# Patient Record
Sex: MALE | Race: BLACK OR AFRICAN AMERICAN | NOT HISPANIC OR LATINO | Employment: UNEMPLOYED | URBAN - METROPOLITAN AREA
[De-identification: names, ages, dates, MRNs, and addresses within clinical notes are randomized per-mention and may not be internally consistent; named-entity substitution may affect disease eponyms.]

---

## 2017-04-26 ENCOUNTER — HOSPITAL ENCOUNTER (EMERGENCY)
Facility: MEDICAL CENTER | Age: 62
End: 2017-04-26
Attending: EMERGENCY MEDICINE
Payer: MEDICAID

## 2017-04-26 ENCOUNTER — APPOINTMENT (OUTPATIENT)
Dept: RADIOLOGY | Facility: MEDICAL CENTER | Age: 62
End: 2017-04-26
Attending: EMERGENCY MEDICINE
Payer: MEDICAID

## 2017-04-26 VITALS
HEIGHT: 67 IN | WEIGHT: 129 LBS | OXYGEN SATURATION: 98 % | RESPIRATION RATE: 15 BRPM | BODY MASS INDEX: 20.25 KG/M2 | HEART RATE: 60 BPM | DIASTOLIC BLOOD PRESSURE: 72 MMHG | TEMPERATURE: 97.9 F | SYSTOLIC BLOOD PRESSURE: 140 MMHG

## 2017-04-26 DIAGNOSIS — R63.4 WEIGHT LOSS: ICD-10-CM

## 2017-04-26 DIAGNOSIS — Z72.0 TOBACCO ABUSE: ICD-10-CM

## 2017-04-26 DIAGNOSIS — R94.31 ABNORMAL EKG: ICD-10-CM

## 2017-04-26 LAB
ALBUMIN SERPL BCP-MCNC: 3.5 G/DL (ref 3.2–4.9)
ALBUMIN/GLOB SERPL: 1 G/DL
ALP SERPL-CCNC: 45 U/L (ref 30–99)
ALT SERPL-CCNC: 20 U/L (ref 2–50)
ANION GAP SERPL CALC-SCNC: 2 MMOL/L (ref 0–11.9)
AST SERPL-CCNC: 25 U/L (ref 12–45)
BASOPHILS # BLD AUTO: 0.5 % (ref 0–1.8)
BASOPHILS # BLD: 0.02 K/UL (ref 0–0.12)
BILIRUB SERPL-MCNC: 0.3 MG/DL (ref 0.1–1.5)
BUN SERPL-MCNC: 13 MG/DL (ref 8–22)
CALCIUM SERPL-MCNC: 8.9 MG/DL (ref 8.5–10.5)
CHLORIDE SERPL-SCNC: 107 MMOL/L (ref 96–112)
CO2 SERPL-SCNC: 28 MMOL/L (ref 20–33)
CREAT SERPL-MCNC: 0.91 MG/DL (ref 0.5–1.4)
EOSINOPHIL # BLD AUTO: 0.16 K/UL (ref 0–0.51)
EOSINOPHIL NFR BLD: 4.1 % (ref 0–6.9)
ERYTHROCYTE [DISTWIDTH] IN BLOOD BY AUTOMATED COUNT: 43.7 FL (ref 35.9–50)
GFR SERPL CREATININE-BSD FRML MDRD: >60 ML/MIN/1.73 M 2
GLOBULIN SER CALC-MCNC: 3.6 G/DL (ref 1.9–3.5)
GLUCOSE SERPL-MCNC: 82 MG/DL (ref 65–99)
HCT VFR BLD AUTO: 38.3 % (ref 42–52)
HGB BLD-MCNC: 12.5 G/DL (ref 14–18)
IMM GRANULOCYTES # BLD AUTO: 0 K/UL (ref 0–0.11)
IMM GRANULOCYTES NFR BLD AUTO: 0 % (ref 0–0.9)
LYMPHOCYTES # BLD AUTO: 1.74 K/UL (ref 1–4.8)
LYMPHOCYTES NFR BLD: 44.3 % (ref 22–41)
MCH RBC QN AUTO: 28.5 PG (ref 27–33)
MCHC RBC AUTO-ENTMCNC: 32.6 G/DL (ref 33.7–35.3)
MCV RBC AUTO: 87.2 FL (ref 81.4–97.8)
MONOCYTES # BLD AUTO: 0.51 K/UL (ref 0–0.85)
MONOCYTES NFR BLD AUTO: 13 % (ref 0–13.4)
NEUTROPHILS # BLD AUTO: 1.5 K/UL (ref 1.82–7.42)
NEUTROPHILS NFR BLD: 38.1 % (ref 44–72)
NRBC # BLD AUTO: 0 K/UL
NRBC BLD AUTO-RTO: 0 /100 WBC
PLATELET # BLD AUTO: 246 K/UL (ref 164–446)
PMV BLD AUTO: 9.8 FL (ref 9–12.9)
POTASSIUM SERPL-SCNC: 4.2 MMOL/L (ref 3.6–5.5)
PROT SERPL-MCNC: 7.1 G/DL (ref 6–8.2)
RBC # BLD AUTO: 4.39 M/UL (ref 4.7–6.1)
SODIUM SERPL-SCNC: 137 MMOL/L (ref 135–145)
TROPONIN I SERPL-MCNC: <0.01 NG/ML (ref 0–0.04)
WBC # BLD AUTO: 3.9 K/UL (ref 4.8–10.8)

## 2017-04-26 PROCEDURE — 700117 HCHG RX CONTRAST REV CODE 255: Performed by: EMERGENCY MEDICINE

## 2017-04-26 PROCEDURE — 700102 HCHG RX REV CODE 250 W/ 637 OVERRIDE(OP): Performed by: EMERGENCY MEDICINE

## 2017-04-26 PROCEDURE — 85025 COMPLETE CBC W/AUTO DIFF WBC: CPT

## 2017-04-26 PROCEDURE — 700105 HCHG RX REV CODE 258: Performed by: EMERGENCY MEDICINE

## 2017-04-26 PROCEDURE — A9270 NON-COVERED ITEM OR SERVICE: HCPCS | Performed by: EMERGENCY MEDICINE

## 2017-04-26 PROCEDURE — 93005 ELECTROCARDIOGRAM TRACING: CPT | Performed by: EMERGENCY MEDICINE

## 2017-04-26 PROCEDURE — 80053 COMPREHEN METABOLIC PANEL: CPT

## 2017-04-26 PROCEDURE — 99284 EMERGENCY DEPT VISIT MOD MDM: CPT

## 2017-04-26 PROCEDURE — 71275 CT ANGIOGRAPHY CHEST: CPT

## 2017-04-26 PROCEDURE — 36415 COLL VENOUS BLD VENIPUNCTURE: CPT

## 2017-04-26 PROCEDURE — 84484 ASSAY OF TROPONIN QUANT: CPT

## 2017-04-26 PROCEDURE — 96360 HYDRATION IV INFUSION INIT: CPT

## 2017-04-26 RX ORDER — ASPIRIN 81 MG/1
324 TABLET, CHEWABLE ORAL ONCE
Status: COMPLETED | OUTPATIENT
Start: 2017-04-26 | End: 2017-04-26

## 2017-04-26 RX ORDER — SODIUM CHLORIDE 9 MG/ML
1000 INJECTION, SOLUTION INTRAVENOUS ONCE
Status: COMPLETED | OUTPATIENT
Start: 2017-04-26 | End: 2017-04-26

## 2017-04-26 RX ORDER — ASPIRIN 300 MG/1
300 SUPPOSITORY RECTAL ONCE
Status: COMPLETED | OUTPATIENT
Start: 2017-04-26 | End: 2017-04-26

## 2017-04-26 RX ADMIN — ASPIRIN 324 MG: 81 TABLET, CHEWABLE ORAL at 10:53

## 2017-04-26 RX ADMIN — SODIUM CHLORIDE 1000 ML: 9 INJECTION, SOLUTION INTRAVENOUS at 10:55

## 2017-04-26 RX ADMIN — IOHEXOL 75 ML: 350 INJECTION, SOLUTION INTRAVENOUS at 11:40

## 2017-04-26 ASSESSMENT — PAIN SCALES - GENERAL: PAINLEVEL_OUTOF10: 0

## 2017-04-26 ASSESSMENT — LIFESTYLE VARIABLES: DO YOU DRINK ALCOHOL: NO

## 2017-04-26 NOTE — ED PROVIDER NOTES
ED Provider Note    Scribed for Alexei Ashton M.D. by Jelani Lynne. 4/26/2017  10:36 AM    Primary care provider: Pcp Pt States None  Means of arrival: EMS  History obtained from: Patient  History limited by: None    CHIEF COMPLAINT  Chief Complaint   Patient presents with   • Abnormal EKG     sent from [facility]       HPI  Sylvain Espinosa is a 61 y.o. male who presents to the Emergency Department as a transfer from the [facility] for abnormal EKG. Patient reports associated indigestion, clear productive cough, unintentional weight loss. EMS reports rhonchi in lower lung fields. He states that he has not been eating today secondary to swollen gland on his left side face. Patient has a family history of murmurs. He has a history of cigarette use, hypertension for which he takes antihypertensives. Patient denies chest pain.    REVIEW OF SYSTEMS  Pertinent negatives include no chest pain. As above, all other systems reviewed and are negative.   See HPI for further details.   C.    PAST MEDICAL HISTORY   has a past medical history of Hypertension.    SURGICAL HISTORY  patient denies any surgical history    SOCIAL HISTORY  Social History   Substance Use Topics   • Smoking status: Current Some Day Smoker -- 0.50 packs/day   • Smokeless tobacco: None   • Alcohol Use: No      Comment: occ      History   Drug Use   • Yes   • Special: Inhaled     Comment: marijuana, meth        FAMILY HISTORY  History reviewed. No pertinent family history.    CURRENT MEDICATIONS  No current facility-administered medications for this encounter.    Current outpatient prescriptions:   •  aspirin  MG TBEC, Take 1 Tab by mouth every day., Disp: 60 Tab, Rfl:   •  lactobacillus granules (LACTINEX/FLORANEX) PACK, Take 1 Packet by mouth 3 times a day, with meals., Disp: , Rfl:   •  metronidazole (FLAGYL) 500 MG TABS, Take 1 Tab by mouth every 8 hours., Disp: 21 Tab, Rfl: 0  •  oxycodone immediate release (ROXICODONE) 5 MG TABS, Take 1 Tab by  "mouth every 6 hours as needed for Severe Pain., Disp: 10 Tab, Rfl: 0  •  ciprofloxacin (CIPRO) 500 MG TABS, Take 1 Tab by mouth every 12 hours., Disp: 14 Tab, Rfl: 0    ALLERGIES  No Known Allergies Patient reports no allergy to iodine or contrast.    PHYSICAL EXAM  VITAL SIGNS: /72 mmHg  Pulse 58  Temp(Src) 36.6 °C (97.9 °F)  Resp 16  Ht 1.702 m (5' 7.01\")  Wt 58.514 kg (129 lb)  BMI 20.20 kg/m2    Constitutional: Well developed, Well nourished, No acute distress, mildly cachectic appearance.   HENT: Normocephalic, Atraumatic, Bilateral external ears normal, Oropharynx is clear mucous membranes are dry. No oral exudates or nasal discharge.   Eyes: Pupils are equal round and reactive, EOMI, Conjunctiva normal, No discharge.   Neck: Normal range of motion, No tenderness, Supple, No stridor. No meningismus.  Lymphatic: No lymphadenopathy noted.   Cardiovascular: Bradycardic rate, Normal rhythm without rub or gallop. Holosystolic ejection murmur left sternal border.   Thorax & Lungs: No wheezes, or rales. There is no chest wall tenderness. Wet cough noted. Rhoncherous breath sounds bilaterally.   Abdomen: Soft non-tender non-distended. There is no rebound or guarding. No organomegaly is appreciated. Bowel sounds are normal.  Skin: Normal without rash.   Back: No CVA or spinal tenderness.   Extremities: Intact distal pulses, No edema, No tenderness, No cyanosis, No clubbing. Capillary refill is less than 2 seconds.  Musculoskeletal: Good range of motion in all major joints. No tenderness to palpation or major deformities noted.   Neurologic: Alert & oriented x 3, Normal motor function, Normal sensory function, No focal deficits noted. Reflexes are normal.  Psychiatric: Affect normal, Judgment normal, Mood normal. There is no suicidal ideation or patient reported hallucinations.       DIAGNOSTIC STUDIES / PROCEDURES    LABS  Labs Reviewed   CBC WITH DIFFERENTIAL - Abnormal; Notable for the following:     WBC " 3.9 (*)     RBC 4.39 (*)     Hemoglobin 12.5 (*)     Hematocrit 38.3 (*)     MCHC 32.6 (*)     Neutrophils-Polys 38.10 (*)     Lymphocytes 44.30 (*)     Neutrophils (Absolute) 1.50 (*)     All other components within normal limits   COMP METABOLIC PANEL - Abnormal; Notable for the following:     Globulin 3.6 (*)     All other components within normal limits   TROPONIN   ESTIMATED GFR   All labs reviewed by me.    EKG Interpretation:    Interpreted by emergency department physician    Rhythm: normal sinus   Rate: normal  Axis: normal  Ectopy: none  Conduction: normal  ST Segments: depression in  lateral leads  T Waves: inversion in  talia-lateral leads  Q Waves: nonspecific    Clinical Impression: non-specific EKG with old anterolateral depression and T-wave inversion that has been present for many months    RADIOLOGY  CT-CTA CHEST PULMONARY ARTERY W/ RECONS   Final Result         1. No CT evidence of pulmonary embolism.               The radiologist's interpretation of all radiological studies have been reviewed by me.    COURSE & MEDICAL DECISION MAKING  Nursing notes, VS, PMSFHx reviewed in chart.    10:36 AM Patient seen and examined at bedside. Informed the patient that without cardiac symptoms his EKG is not immediately alarming. At this time I believe that a chest CT to evaluate is the best course of action. Patient is agreeable with this. I spent approximately five minutes with the patient explaining the importance of smoking cessation. Ordered for CT-CTA chest pulmonary artery, CBC with differential, CMP, Troponin to evaluate. Patient was treated with NS 1000 ml to rehydrate secondary to weight loss and dry mucous membranes,  mg for his symptoms.      11:55 AM - CT scan does not show pulmonary embolism, pneumonia, or cancer. There is no evidence of EKG abnormalities that are new. Patient will be discharged home.     12:00 PM Recheck: Patient re-evaluated at beside. Discussed patient's condition and  treatment plan. Patient's lab and radiology results discussed. Patient will be discharged with a diagnosis of abnormal EKG and given discharge instructions. Advised to follow up with the [facility]. Instructed to return to Emergency Department immediately if any worsening symptoms.    Patient has had high blood pressure while in the emergency department, felt likely secondary to medical condition. Counseled patient to monitor blood pressure at home and follow up with primary care physician.      The patient will return for new or worsening symptoms and is stable at the time of discharge.    DISPOSITION:  Patient will be discharged home in stable condition.    FINAL IMPRESSION  1. Abnormal EKG    2. Tobacco abuse    3. Weight loss    Tobacco cessation counseling      Jelani DEL REAL (Scribe), am scribing for, and in the presence of, Alexei Ashton M.D..    Electronically signed by: Jelani Lynne (Scribe), 4/26/2017    Alexei DEL REAL M.D. personally performed the services described in this documentation, as scribed by Jealni Lynne in my presence, and it is both accurate and complete.    The note accurately reflects work and decisions made by me.  Alexei Ashton  4/26/2017  12:13 PM

## 2017-04-26 NOTE — ED AVS SNAPSHOT
WeeWorld Access Code: [email or web address]  Expires: 5/26/2017 12:22 PM    WeeWorld  A secure, online tool to manage your health information     [employer]’s WeeWorld® is a secure, online tool that connects you to your personalized health information from the privacy of your home -- day or night - making it very easy for you to manage your healthcare. Once the activation process is completed, you can even access your medical information using the WeeWorld piedad, which is available for free in the Apple Piedad store or Google Play store.     WeeWorld provides the following levels of access (as shown below):   My Chart Features   [facility] Primary Care Doctor [facility]  Specialists [facility]  Urgent  Care Non-[facility]  Primary Care  Doctor   Email your healthcare team securely and privately 24/7 X X X X   Manage appointments: schedule your next appointment; view details of past/upcoming appointments X      Request prescription refills. X      View recent personal medical records, including lab and immunizations X X X X   View health record, including health history, allergies, medications X X X X   Read reports about your outpatient visits, procedures, consult and ER notes X X X X   See your discharge summary, which is a recap of your hospital and/or ER visit that includes your diagnosis, lab results, and care plan. X X       How to register for WeeWorld:  1. Go to  [email or web address].  2. Click on the Sign Up Now box, which takes you to the New Member Sign Up page. You will need to provide the following information:  a. Enter your WeeWorld Access Code exactly as it appears at the top of this page. (You will not need to use this code after you’ve completed the sign-up process. If you do not sign up before the expiration date, you must request a new code.)   b. Enter your date of birth.   c. Enter your home email address.   d. Click Submit, and follow the next screen’s instructions.  3. Create a WeeWorld ID. This will be your WeeWorld  login ID and cannot be changed, so think of one that is secure and easy to remember.  4. Create a [employer] password. You can change your password at any time.  5. Enter your Password Reset Question and Answer. This can be used at a later time if you forget your password.   6. Enter your e-mail address. This allows you to receive e-mail notifications when new information is available in Doppelgames.  7. Click Sign Up. You can now view your health information.    For assistance activating your Doppelgames account, call [phone]

## 2017-04-26 NOTE — ED NOTES
"PT BIB EMS per report pt was having a follow up appt at the [facility] clinic today for a recent BP medication addition.  An EKG was performed and they were concerned about an abnormal EKG.  PT also reports a weight loss of 7 lbs since October and a cough.  Chief Complaint   Patient presents with   • Abnormal EKG     sent from [facility]     Blood pressure 140/72, pulse 58, temperature 36.6 °C (97.9 °F), resp. rate 16, height 1.702 m (5' 7.01\"), weight 58.514 kg (129 lb).    "

## 2017-04-26 NOTE — DISCHARGE INSTRUCTIONS
Steps to Quit Smoking   Smoking tobacco can be harmful to your health and can affect almost every organ in your body. Smoking puts you, and those around you, at risk for developing many serious chronic diseases. Quitting smoking is difficult, but it is one of the best things that you can do for your health. It is never too late to quit.  WHAT ARE THE BENEFITS OF QUITTING SMOKING?  When you quit smoking, you lower your risk of developing serious diseases and conditions, such as:  · Lung cancer or lung disease, such as COPD.  · Heart disease.  · Stroke.  · Heart attack.  · Infertility.  · Osteoporosis and bone fractures.  Additionally, symptoms such as coughing, wheezing, and shortness of breath may get better when you quit. You may also find that you get sick less often because your body is stronger at fighting off colds and infections. If you are pregnant, quitting smoking can help to reduce your chances of having a baby of low birth weight.  HOW DO I GET READY TO QUIT?  When you decide to quit smoking, create a plan to make sure that you are successful. Before you quit:  · Pick a date to quit. Set a date within the next two weeks to give you time to prepare.  · Write down the reasons why you are quitting. Keep this list in places where you will see it often, such as on your bathroom mirror or in your car or wallet.  · Identify the people, places, things, and activities that make you want to smoke (triggers) and avoid them. Make sure to take these actions:  ¨ Throw away all cigarettes at home, at work, and in your car.  ¨ Throw away smoking accessories, such as ashtrays and lighters.  ¨ Clean your car and make sure to empty the ashtray.  ¨ Clean your home, including curtains and carpets.  · Tell your family, friends, and coworkers that you are quitting. Support from your loved ones can make quitting easier.  · Talk with your health care provider about your options for quitting smoking.  · Find out what treatment  "options are covered by your health insurance.  WHAT STRATEGIES CAN I USE TO QUIT SMOKING?   Talk with your healthcare provider about different strategies to quit smoking. Some strategies include:  · Quitting smoking altogether instead of gradually lessening how much you smoke over a period of time. Research shows that quitting \"cold turkey\" is more successful than gradually quitting.  · Attending in-person counseling to help you build problem-solving skills. You are more likely to have success in quitting if you attend several counseling sessions. Even short sessions of 10 minutes can be effective.  · Finding resources and support systems that can help you to quit smoking and remain smoke-free after you quit. These resources are most helpful when you use them often. They can include:  ¨ Online chats with a counselor.  ¨ Telephone quitlines.  ¨ Printed self-help materials.  ¨ Support groups or group counseling.  ¨ Text messaging programs.  ¨ Mobile phone applications.  · Taking medicines to help you quit smoking. (If you are pregnant or breastfeeding, talk with your health care provider first.) Some medicines contain nicotine and some do not. Both types of medicines help with cravings, but the medicines that include nicotine help to relieve withdrawal symptoms. Your health care provider may recommend:  ¨ Nicotine patches, gum, or lozenges.  ¨ Nicotine inhalers or sprays.  ¨ Non-nicotine medicine that is taken by mouth.  Talk with your health care provider about combining strategies, such as taking medicines while you are also receiving in-person counseling. Using these two strategies together makes you more likely to succeed in quitting than if you used either strategy on its own.  If you are pregnant or breastfeeding, talk with your health care provider about finding counseling or other support strategies to quit smoking. Do not take medicine to help you quit smoking unless told to do so by your health care " provider.  WHAT THINGS CAN I DO TO MAKE IT EASIER TO QUIT?  Quitting smoking might feel overwhelming at first, but there is a lot that you can do to make it easier. Take these important actions:  · Reach out to your family and friends and ask that they support and encourage you during this time. Call telephone quitlines, reach out to support groups, or work with a counselor for support.  · Ask people who smoke to avoid smoking around you.  · Avoid places that trigger you to smoke, such as bars, parties, or smoke-break areas at work.  · Spend time around people who do not smoke.  · Lessen stress in your life, because stress can be a smoking trigger for some people. To lessen stress, try:  ¨ Exercising regularly.  ¨ Deep-breathing exercises.  ¨ Yoga.  ¨ Meditating.  ¨ Performing a body scan. This involves closing your eyes, scanning your body from head to toe, and noticing which parts of your body are particularly tense. Purposefully relax the muscles in those areas.  · Download or purchase mobile phone or tablet apps (applications) that can help you stick to your quit plan by providing reminders, tips, and encouragement. There are many free apps, such as QuitGuide from the CDC (Centers for Disease Control and Prevention). You can find other support for quitting smoking (smoking cessation) through smokefree.gov and other websites.  HOW WILL I FEEL WHEN I QUIT SMOKING?  Within the first 24 hours of quitting smoking, you may start to feel some withdrawal symptoms. These symptoms are usually most noticeable 2-3 days after quitting, but they usually do not last beyond 2-3 weeks. Changes or symptoms that you might experience include:  · Mood swings.  · Restlessness, anxiety, or irritation.  · Difficulty concentrating.  · Dizziness.  · Strong cravings for sugary foods in addition to nicotine.  · Mild weight gain.  · Constipation.  · Nausea.  · Coughing or a sore throat.  · Changes in how your medicines work in your  body.  · A depressed mood.  · Difficulty sleeping (insomnia).  After the first 2-3 weeks of quitting, you may start to notice more positive results, such as:  · Improved sense of smell and taste.  · Decreased coughing and sore throat.  · Slower heart rate.  · Lower blood pressure.  · Clearer skin.  · The ability to breathe more easily.  · Fewer sick days.  Quitting smoking is very challenging for most people. Do not get discouraged if you are not successful the first time. Some people need to make many attempts to quit before they achieve long-term success. Do your best to stick to your quit plan, and talk with your health care provider if you have any questions or concerns.     This information is not intended to replace advice given to you by your health care provider. Make sure you discuss any questions you have with your health care provider.     Document Released: 12/12/2002 Document Revised: 05/03/2016 Document Reviewed: 03/28/2013  [employer] Interactive Patient Education ©2016 [employer]

## 2017-04-26 NOTE — ED AVS SNAPSHOT
Home Care Instructions                                                                                                                Sylvain Espinosa   MRN: [id number]    Department:  [facility], Emergency Dept   Date of Visit:  4/26/2017            [facility], Emergency Dept    [address]    [place] [address]    Phone:  [phone]      You were seen by     Alexei Ashton M.D.      Your Diagnosis Was     Abnormal EKG     R94.31       These are the medications you received during your hospitalization from 04/26/2017 1028 to 04/26/2017 1223     Date/Time Order Dose Route Action    04/26/2017 1055 NS infusion 1,000 mL 1,000 mL Intravenous New Bag    04/26/2017 1053 aspirin (ASA) chewable tab 324 mg 324 mg Oral Given    04/26/2017 1140 iohexol (OMNIPAQUE) 350 mg/mL 75 mL Intravenous Given      Medication Information     Review all of your home medications and newly ordered medications with your primary doctor and/or pharmacist as soon as possible. Follow medication instructions as directed by your doctor and/or pharmacist.     Please keep your complete medication list with you and share with your physician. Update the information when medications are discontinued, doses are changed, or new medications (including over-the-counter products) are added; and carry medication information at all times in the event of emergency situations.               Medication List      ASK your doctor about these medications        Instructions    Morning Afternoon Evening Bedtime    Aspirin 325 MG Tbec        Take 1 Tab by mouth every day.   Dose:  325 mg                        ciprofloxacin 500 MG Tabs   Commonly known as:  CIPRO        Take 1 Tab by mouth every 12 hours.   Dose:  500 mg                        lactobacillus granules Pack        Take 1 Packet by mouth 3 times a day, with meals.   Dose:  1 Packet                        metronidazole 500 MG Tabs   Commonly known as:  FLAGYL           Take 1 Tab by mouth every 8 hours.   Dose:  500 mg                        oxycodone immediate-release 5 MG Tabs   Commonly known as:  ROXICODONE        Take 1 Tab by mouth every 6 hours as needed for Severe Pain.   Dose:  5 mg                                Procedures and tests performed during your visit     CBC w/ Differential    CONSENT FOR CONTRAST INJECTION    CT-CTA CHEST PULMONARY ARTERY W/ RECONS    Complete Metabolic Panel (CMP)    EKG (ER)    EKG (NOW)    ESTIMATED GFR    IV Saline Lock    Troponin STAT        Discharge Instructions       Steps to Quit Smoking   Smoking tobacco can be harmful to your health and can affect almost every organ in your body. Smoking puts you, and those around you, at risk for developing many serious chronic diseases. Quitting smoking is difficult, but it is one of the best things that you can do for your health. It is never too late to quit.  WHAT ARE THE BENEFITS OF QUITTING SMOKING?  When you quit smoking, you lower your risk of developing serious diseases and conditions, such as:  · Lung cancer or lung disease, such as COPD.  · Heart disease.  · Stroke.  · Heart attack.  · Infertility.  · Osteoporosis and bone fractures.  Additionally, symptoms such as coughing, wheezing, and shortness of breath may get better when you quit. You may also find that you get sick less often because your body is stronger at fighting off colds and infections. If you are pregnant, quitting smoking can help to reduce your chances of having a baby of low birth weight.  HOW DO I GET READY TO QUIT?  When you decide to quit smoking, create a plan to make sure that you are successful. Before you quit:  · Pick a date to quit. Set a date within the next two weeks to give you time to prepare.  · Write down the reasons why you are quitting. Keep this list in places where you will see it often, such as on your bathroom mirror or in your car or wallet.  · Identify the people, places, things, and  "activities that make you want to smoke (triggers) and avoid them. Make sure to take these actions:  ¨ Throw away all cigarettes at home, at work, and in your car.  ¨ Throw away smoking accessories, such as ashtrays and lighters.  ¨ Clean your car and make sure to empty the ashtray.  ¨ Clean your home, including curtains and carpets.  · Tell your family, friends, and coworkers that you are quitting. Support from your loved ones can make quitting easier.  · Talk with your health care provider about your options for quitting smoking.  · Find out what treatment options are covered by your health insurance.  WHAT STRATEGIES CAN I USE TO QUIT SMOKING?   Talk with your healthcare provider about different strategies to quit smoking. Some strategies include:  · Quitting smoking altogether instead of gradually lessening how much you smoke over a period of time. Research shows that quitting \"cold turkey\" is more successful than gradually quitting.  · Attending in-person counseling to help you build problem-solving skills. You are more likely to have success in quitting if you attend several counseling sessions. Even short sessions of 10 minutes can be effective.  · Finding resources and support systems that can help you to quit smoking and remain smoke-free after you quit. These resources are most helpful when you use them often. They can include:  ¨ Online chats with a counselor.  ¨ Telephone quitlines.  ¨ Printed self-help materials.  ¨ Support groups or group counseling.  ¨ Text messaging programs.  ¨ Mobile phone applications.  · Taking medicines to help you quit smoking. (If you are pregnant or breastfeeding, talk with your health care provider first.) Some medicines contain nicotine and some do not. Both types of medicines help with cravings, but the medicines that include nicotine help to relieve withdrawal symptoms. Your health care provider may recommend:  ¨ Nicotine patches, gum, or lozenges.  ¨ Nicotine inhalers or " sprays.  ¨ Non-nicotine medicine that is taken by mouth.  Talk with your health care provider about combining strategies, such as taking medicines while you are also receiving in-person counseling. Using these two strategies together makes you more likely to succeed in quitting than if you used either strategy on its own.  If you are pregnant or breastfeeding, talk with your health care provider about finding counseling or other support strategies to quit smoking. Do not take medicine to help you quit smoking unless told to do so by your health care provider.  WHAT THINGS CAN I DO TO MAKE IT EASIER TO QUIT?  Quitting smoking might feel overwhelming at first, but there is a lot that you can do to make it easier. Take these important actions:  · Reach out to your family and friends and ask that they support and encourage you during this time. Call telephone quitlines, reach out to support groups, or work with a counselor for support.  · Ask people who smoke to avoid smoking around you.  · Avoid places that trigger you to smoke, such as bars, parties, or smoke-break areas at work.  · Spend time around people who do not smoke.  · Lessen stress in your life, because stress can be a smoking trigger for some people. To lessen stress, try:  ¨ Exercising regularly.  ¨ Deep-breathing exercises.  ¨ Yoga.  ¨ Meditating.  ¨ Performing a body scan. This involves closing your eyes, scanning your body from head to toe, and noticing which parts of your body are particularly tense. Purposefully relax the muscles in those areas.  · Download or purchase mobile phone or tablet apps (applications) that can help you stick to your quit plan by providing reminders, tips, and encouragement. There are many free apps, such as QuitGuide from the CDC (Centers for Disease Control and Prevention). You can find other support for quitting smoking (smoking cessation) through smokefree.gov and other websites.  HOW WILL I FEEL WHEN I QUIT  SMOKING?  Within the first 24 hours of quitting smoking, you may start to feel some withdrawal symptoms. These symptoms are usually most noticeable 2-3 days after quitting, but they usually do not last beyond 2-3 weeks. Changes or symptoms that you might experience include:  · Mood swings.  · Restlessness, anxiety, or irritation.  · Difficulty concentrating.  · Dizziness.  · Strong cravings for sugary foods in addition to nicotine.  · Mild weight gain.  · Constipation.  · Nausea.  · Coughing or a sore throat.  · Changes in how your medicines work in your body.  · A depressed mood.  · Difficulty sleeping (insomnia).  After the first 2-3 weeks of quitting, you may start to notice more positive results, such as:  · Improved sense of smell and taste.  · Decreased coughing and sore throat.  · Slower heart rate.  · Lower blood pressure.  · Clearer skin.  · The ability to breathe more easily.  · Fewer sick days.  Quitting smoking is very challenging for most people. Do not get discouraged if you are not successful the first time. Some people need to make many attempts to quit before they achieve long-term success. Do your best to stick to your quit plan, and talk with your health care provider if you have any questions or concerns.     This information is not intended to replace advice given to you by your health care provider. Make sure you discuss any questions you have with your health care provider.     Document Released: 12/12/2002 Document Revised: 05/03/2016 Document Reviewed: 03/28/2013  [employer] Patient Education ©2016 [employer]                Patient Information     Patient Information    Following emergency treatment: all patient requiring follow-up care must return either to a private physician or a clinic if your condition worsens before you are able to obtain further medical attention, please return to the emergency room.     Billing Information    At [facility], we work to make the billing  process streamlined for our patients.  Our Representatives are here to answer any questions you may have regarding your hospital bill.  If you have insurance coverage and have supplied your insurance information to us, we will submit a claim to your insurer on your behalf.  Should you have any questions regarding your bill, we can be reached online or by phone as follows:  Online: You are able pay your bills online or live chat with our representatives about any billing questions you may have. We are here to help Monday - Friday from 8:00am to 7:30pm and 9:00am - 12:00pm on Saturdays.  Please visit [email or web address]  for more information.   Phone:  [phone] or [phone]    Please note that your emergency physician, surgeon, pathologist, radiologist, anesthesiologist, and other specialists are not employed by [facility] and will therefore bill separately for their services.  Please contact them directly for any questions concerning their bills at the numbers below:     Emergency Physician Services:  [phone]  [facility]:  [phone]  [facility]:  [phone]  [facility]:  [phone]    1. Your final bill may vary from the amount quoted upon discharge if all procedures are not complete at that time, or if your doctor has additional procedures of which we are not aware. You will receive an additional bill if you return to the Emergency Department at [facility] for suture removal regardless of the facility of which the sutures were placed.     2. Please arrange for settlement of this account at the emergency registration.    3. All self-pay accounts are due in full at the time of treatment.  If you are unable to meet this obligation then payment is expected within 4-5 days.     4. If you have had radiology studies (CT, X-ray, Ultrasound, MRI), you have received a preliminary result during your emergency department  visit. Please contact the radiology department [phone] to receive a copy of your final result. Please discuss the Final result with your primary physician or with the follow up physician provided.     Crisis Hotline:  [place] Crisis Hotline:  6-476-WSPBISX or 1-366.158.6753  [place] Crisis Hotline:    1-297.390.7242 or 901-230-7774         ED Discharge Follow Up Questions    1. In order to provide you with very good care, we would like to follow up with a phone call in the next few days.  May we have your permission to contact you?     YES /  NO    2. What is the best phone number to call you? (       )_____-__________    3. What is the best time to call you?      Morning  /  Afternoon  /  Evening                   Patient Signature:  ____________________________________________________________    Date:  ____________________________________________________________

## 2017-04-26 NOTE — ED NOTES
PT ready for discharge.  PT discharge instructions provided, pt verbalizes understanding and signed.   PT ambulated off unit, steady gait.  All possessions with pt upon discharge.

## 2017-04-26 NOTE — ED AVS SNAPSHOT
4/26/2017    Sylvain Espinosa  [address] [place] [address]    Dear Sylvain:    [facility] wants to ensure your discharge home is safe and you or your loved ones have had all of your questions answered regarding your care after you leave the hospital.    Below is a list of resources and contact information should you have any questions regarding your hospital stay, follow-up instructions, or active medical symptoms.    Questions or Concerns Regarding… Contact   Medical Questions Related to Your Discharge  (7 days a week, 8am-5pm) Contact a Nurse Care Coordinator   [phone]   Medical Questions Not Related to Your Discharge  (24 hours a day / 7 days a week)  Contact the Nurse Health Line   [phone]    Medications or Discharge Instructions Refer to your discharge packet   or contact your [facility] Primary Care Provider   [phone]   Follow-up Appointment(s) Schedule your appointment via [employer]   or contact Scheduling [phone]   Billing Review your statement via [employer]  or contact Billing [phone]   Medical Records Review your records via [employer]   or contact Medical Records [phone]     You may receive a telephone call within two days of discharge. This call is to make certain you understand your discharge instructions and have the opportunity to have any questions answered. You can also easily access your medical information, test results and upcoming appointments via the [employer] free online health management tool. You can learn more and sign up at [employer]. For assistance setting up your [employer] account, please call [phone].    Once again, we want to ensure your discharge home is safe and that you have a clear understanding of any next steps in your care. If you have any questions or concerns, please do not hesitate to contact us, we are here for you. Thank you for choosing [facility] for your healthcare needs.    Sincerely,    Your [facility] Healthcare Team

## 2017-05-10 LAB — EKG IMPRESSION: NORMAL

## 2017-07-04 ENCOUNTER — RESOLUTE PROFESSIONAL BILLING HOSPITAL PROF FEE (OUTPATIENT)
Dept: HOSPITALIST | Facility: MEDICAL CENTER | Age: 62
End: 2017-07-04
Payer: MEDICAID

## 2017-07-04 ENCOUNTER — APPOINTMENT (OUTPATIENT)
Dept: RADIOLOGY | Facility: MEDICAL CENTER | Age: 62
End: 2017-07-04
Attending: EMERGENCY MEDICINE
Payer: MEDICAID

## 2017-07-04 ENCOUNTER — HOSPITAL ENCOUNTER (OUTPATIENT)
Facility: MEDICAL CENTER | Age: 62
End: 2017-07-05
Attending: EMERGENCY MEDICINE | Admitting: HOSPITALIST
Payer: MEDICAID

## 2017-07-04 PROBLEM — N17.9 AKI (ACUTE KIDNEY INJURY) (HCC): Status: ACTIVE | Noted: 2017-07-04

## 2017-07-04 LAB
ALBUMIN SERPL BCP-MCNC: 3.2 G/DL (ref 3.2–4.9)
ALBUMIN/GLOB SERPL: 0.8 G/DL
ALP SERPL-CCNC: 45 U/L (ref 30–99)
ALT SERPL-CCNC: 41 U/L (ref 2–50)
AMPHET UR QL SCN: POSITIVE
ANION GAP SERPL CALC-SCNC: 8 MMOL/L (ref 0–11.9)
APPEARANCE UR: CLEAR
APTT PPP: 22 SEC (ref 24.7–36)
AST SERPL-CCNC: 46 U/L (ref 12–45)
BARBITURATES UR QL SCN: NEGATIVE
BASOPHILS # BLD AUTO: 0.2 % (ref 0–1.8)
BASOPHILS # BLD: 0.02 K/UL (ref 0–0.12)
BENZODIAZ UR QL SCN: NEGATIVE
BILIRUB SERPL-MCNC: 0.5 MG/DL (ref 0.1–1.5)
BILIRUB UR QL STRIP.AUTO: NEGATIVE
BNP SERPL-MCNC: 73 PG/ML (ref 0–100)
BUN SERPL-MCNC: 33 MG/DL (ref 8–22)
BZE UR QL SCN: NEGATIVE
CALCIUM SERPL-MCNC: 8.7 MG/DL (ref 8.5–10.5)
CANNABINOIDS UR QL SCN: NEGATIVE
CHLORIDE SERPL-SCNC: 108 MMOL/L (ref 96–112)
CHLORIDE UR-SCNC: 46 MMOL/L
CO2 SERPL-SCNC: 19 MMOL/L (ref 20–33)
COLOR UR: ABNORMAL
COMMENT 1642: NORMAL
CREAT SERPL-MCNC: 1.6 MG/DL (ref 0.5–1.4)
CREAT UR-MCNC: 271.1 MG/DL
CULTURE IF INDICATED INDCX: NO UA CULTURE
EKG IMPRESSION: NORMAL
EOSINOPHIL # BLD AUTO: 0.04 K/UL (ref 0–0.51)
EOSINOPHIL NFR BLD: 0.4 % (ref 0–6.9)
ERYTHROCYTE [DISTWIDTH] IN BLOOD BY AUTOMATED COUNT: 46.8 FL (ref 35.9–50)
ETHANOL BLD-MCNC: 0 G/DL
GFR SERPL CREATININE-BSD FRML MDRD: 44 ML/MIN/1.73 M 2
GIANT PLATELETS BLD QL SMEAR: NORMAL
GLOBULIN SER CALC-MCNC: 4 G/DL (ref 1.9–3.5)
GLUCOSE SERPL-MCNC: 94 MG/DL (ref 65–99)
GLUCOSE UR STRIP.AUTO-MCNC: NEGATIVE MG/DL
HCT VFR BLD AUTO: 40.5 % (ref 42–52)
HGB BLD-MCNC: 13.2 G/DL (ref 14–18)
IMM GRANULOCYTES # BLD AUTO: 0.03 K/UL (ref 0–0.11)
IMM GRANULOCYTES NFR BLD AUTO: 0.3 % (ref 0–0.9)
INR PPP: 1.06 (ref 0.87–1.13)
KETONES UR STRIP.AUTO-MCNC: 15 MG/DL
LACTATE BLD-SCNC: 1.2 MMOL/L (ref 0.5–2)
LEUKOCYTE ESTERASE UR QL STRIP.AUTO: NEGATIVE
LYMPHOCYTES # BLD AUTO: 1.23 K/UL (ref 1–4.8)
LYMPHOCYTES NFR BLD: 12.6 % (ref 22–41)
MCH RBC QN AUTO: 29.2 PG (ref 27–33)
MCHC RBC AUTO-ENTMCNC: 32.6 G/DL (ref 33.7–35.3)
MCV RBC AUTO: 89.6 FL (ref 81.4–97.8)
METHADONE UR QL SCN: NEGATIVE
MICRO URNS: ABNORMAL
MONOCYTES # BLD AUTO: 0.73 K/UL (ref 0–0.85)
MONOCYTES NFR BLD AUTO: 7.4 % (ref 0–13.4)
MORPHOLOGY BLD-IMP: NORMAL
NEUTROPHILS # BLD AUTO: 7.75 K/UL (ref 1.82–7.42)
NEUTROPHILS NFR BLD: 79.1 % (ref 44–72)
NITRITE UR QL STRIP.AUTO: NEGATIVE
NRBC # BLD AUTO: 0 K/UL
NRBC BLD AUTO-RTO: 0 /100 WBC
OPIATES UR QL SCN: NEGATIVE
OXYCODONE UR QL SCN: NEGATIVE
PCP UR QL SCN: NEGATIVE
PH UR STRIP.AUTO: 5 [PH]
PLATELET # BLD AUTO: 154 K/UL (ref 164–446)
PMV BLD AUTO: 11 FL (ref 9–12.9)
POTASSIUM SERPL-SCNC: 4 MMOL/L (ref 3.6–5.5)
POTASSIUM UR-SCNC: 91.9 MMOL/L
PROPOXYPH UR QL SCN: NEGATIVE
PROT SERPL-MCNC: 7.2 G/DL (ref 6–8.2)
PROT UR QL STRIP: NEGATIVE MG/DL
PROT UR-MCNC: 24.8 MG/DL (ref 0–15)
PROTHROMBIN TIME: 14.1 SEC (ref 12–14.6)
RBC # BLD AUTO: 4.52 M/UL (ref 4.7–6.1)
RBC BLD AUTO: PRESENT
RBC UR QL AUTO: NEGATIVE
SODIUM SERPL-SCNC: 135 MMOL/L (ref 135–145)
SODIUM UR-SCNC: 61 MMOL/L
SP GR UR STRIP.AUTO: 1.03
TROPONIN I SERPL-MCNC: 0.02 NG/ML (ref 0–0.04)
WBC # BLD AUTO: 9.8 K/UL (ref 4.8–10.8)

## 2017-07-04 PROCEDURE — 99218 PR INITIAL OBSERVATION CARE,LEVL I: CPT | Mod: 25 | Performed by: HOSPITALIST

## 2017-07-04 PROCEDURE — 80307 DRUG TEST PRSMV CHEM ANLYZR: CPT

## 2017-07-04 PROCEDURE — A9270 NON-COVERED ITEM OR SERVICE: HCPCS | Performed by: HOSPITALIST

## 2017-07-04 PROCEDURE — 85610 PROTHROMBIN TIME: CPT

## 2017-07-04 PROCEDURE — 306588 SLEEVE,VASO CALF MED: Performed by: HOSPITALIST

## 2017-07-04 PROCEDURE — 84156 ASSAY OF PROTEIN URINE: CPT | Mod: 59

## 2017-07-04 PROCEDURE — 302128 INFUSION PUMP: Performed by: HOSPITALIST

## 2017-07-04 PROCEDURE — 80053 COMPREHEN METABOLIC PANEL: CPT

## 2017-07-04 PROCEDURE — 99406 BEHAV CHNG SMOKING 3-10 MIN: CPT | Performed by: HOSPITALIST

## 2017-07-04 PROCEDURE — 82570 ASSAY OF URINE CREATININE: CPT | Mod: 59

## 2017-07-04 PROCEDURE — 700102 HCHG RX REV CODE 250 W/ 637 OVERRIDE(OP): Performed by: HOSPITALIST

## 2017-07-04 PROCEDURE — G0378 HOSPITAL OBSERVATION PER HR: HCPCS

## 2017-07-04 PROCEDURE — 81003 URINALYSIS AUTO W/O SCOPE: CPT | Mod: 59

## 2017-07-04 PROCEDURE — 94760 N-INVAS EAR/PLS OXIMETRY 1: CPT

## 2017-07-04 PROCEDURE — 99285 EMERGENCY DEPT VISIT HI MDM: CPT

## 2017-07-04 PROCEDURE — 82436 ASSAY OF URINE CHLORIDE: CPT

## 2017-07-04 PROCEDURE — 70450 CT HEAD/BRAIN W/O DYE: CPT

## 2017-07-04 PROCEDURE — 85025 COMPLETE CBC W/AUTO DIFF WBC: CPT

## 2017-07-04 PROCEDURE — 93005 ELECTROCARDIOGRAM TRACING: CPT

## 2017-07-04 PROCEDURE — 84300 ASSAY OF URINE SODIUM: CPT

## 2017-07-04 PROCEDURE — 93005 ELECTROCARDIOGRAM TRACING: CPT | Performed by: EMERGENCY MEDICINE

## 2017-07-04 PROCEDURE — 96360 HYDRATION IV INFUSION INIT: CPT

## 2017-07-04 PROCEDURE — 96361 HYDRATE IV INFUSION ADD-ON: CPT

## 2017-07-04 PROCEDURE — 84484 ASSAY OF TROPONIN QUANT: CPT

## 2017-07-04 PROCEDURE — 85730 THROMBOPLASTIN TIME PARTIAL: CPT

## 2017-07-04 PROCEDURE — 83880 ASSAY OF NATRIURETIC PEPTIDE: CPT

## 2017-07-04 PROCEDURE — 700105 HCHG RX REV CODE 258: Performed by: EMERGENCY MEDICINE

## 2017-07-04 PROCEDURE — 700105 HCHG RX REV CODE 258: Performed by: HOSPITALIST

## 2017-07-04 PROCEDURE — 83605 ASSAY OF LACTIC ACID: CPT

## 2017-07-04 PROCEDURE — 84133 ASSAY OF URINE POTASSIUM: CPT

## 2017-07-04 PROCEDURE — 71010 DX-CHEST-PORTABLE (1 VIEW): CPT

## 2017-07-04 RX ORDER — ENALAPRIL MALEATE 5 MG/1
2.5 TABLET ORAL DAILY
Status: ON HOLD | COMMUNITY
End: 2018-03-10

## 2017-07-04 RX ORDER — SODIUM CHLORIDE 9 MG/ML
1000 INJECTION, SOLUTION INTRAVENOUS ONCE
Status: COMPLETED | OUTPATIENT
Start: 2017-07-04 | End: 2017-07-05

## 2017-07-04 RX ORDER — DIGOXIN 125 MCG
125 TABLET ORAL EVERY EVENING
Status: ON HOLD | COMMUNITY
End: 2018-03-10

## 2017-07-04 RX ORDER — BISACODYL 10 MG
10 SUPPOSITORY, RECTAL RECTAL
Status: DISCONTINUED | OUTPATIENT
Start: 2017-07-04 | End: 2017-07-05 | Stop reason: HOSPADM

## 2017-07-04 RX ORDER — POLYETHYLENE GLYCOL 3350 17 G/17G
1 POWDER, FOR SOLUTION ORAL
Status: DISCONTINUED | OUTPATIENT
Start: 2017-07-04 | End: 2017-07-05 | Stop reason: HOSPADM

## 2017-07-04 RX ORDER — AMOXICILLIN 250 MG
2 CAPSULE ORAL 2 TIMES DAILY
Status: DISCONTINUED | OUTPATIENT
Start: 2017-07-04 | End: 2017-07-05 | Stop reason: HOSPADM

## 2017-07-04 RX ORDER — SODIUM CHLORIDE 9 MG/ML
INJECTION, SOLUTION INTRAVENOUS CONTINUOUS
Status: DISCONTINUED | OUTPATIENT
Start: 2017-07-04 | End: 2017-07-05 | Stop reason: HOSPADM

## 2017-07-04 RX ORDER — LABETALOL HYDROCHLORIDE 5 MG/ML
10 INJECTION, SOLUTION INTRAVENOUS EVERY 4 HOURS PRN
Status: DISCONTINUED | OUTPATIENT
Start: 2017-07-04 | End: 2017-07-05 | Stop reason: HOSPADM

## 2017-07-04 RX ORDER — ONDANSETRON 4 MG/1
4 TABLET, ORALLY DISINTEGRATING ORAL EVERY 4 HOURS PRN
Status: DISCONTINUED | OUTPATIENT
Start: 2017-07-04 | End: 2017-07-05 | Stop reason: HOSPADM

## 2017-07-04 RX ORDER — PROMETHAZINE HYDROCHLORIDE 25 MG/1
12.5-25 TABLET ORAL EVERY 4 HOURS PRN
Status: DISCONTINUED | OUTPATIENT
Start: 2017-07-04 | End: 2017-07-05 | Stop reason: HOSPADM

## 2017-07-04 RX ORDER — PROMETHAZINE HYDROCHLORIDE 25 MG/1
12.5-25 SUPPOSITORY RECTAL EVERY 4 HOURS PRN
Status: DISCONTINUED | OUTPATIENT
Start: 2017-07-04 | End: 2017-07-05 | Stop reason: HOSPADM

## 2017-07-04 RX ORDER — ONDANSETRON 2 MG/ML
4 INJECTION INTRAMUSCULAR; INTRAVENOUS EVERY 4 HOURS PRN
Status: DISCONTINUED | OUTPATIENT
Start: 2017-07-04 | End: 2017-07-05 | Stop reason: HOSPADM

## 2017-07-04 RX ORDER — SODIUM CHLORIDE 9 MG/ML
2000 INJECTION, SOLUTION INTRAVENOUS ONCE
Status: COMPLETED | OUTPATIENT
Start: 2017-07-04 | End: 2017-07-04

## 2017-07-04 RX ADMIN — SODIUM CHLORIDE 2000 ML: 9 INJECTION, SOLUTION INTRAVENOUS at 20:32

## 2017-07-04 RX ADMIN — SODIUM CHLORIDE 1000 ML: 9 INJECTION, SOLUTION INTRAVENOUS at 22:41

## 2017-07-04 RX ADMIN — SODIUM CHLORIDE: 9 INJECTION, SOLUTION INTRAVENOUS at 13:45

## 2017-07-04 RX ADMIN — STANDARDIZED SENNA CONCENTRATE AND DOCUSATE SODIUM 2 TABLET: 8.6; 5 TABLET, FILM COATED ORAL at 21:09

## 2017-07-04 ASSESSMENT — PAIN SCALES - GENERAL
PAINLEVEL_OUTOF10: 0
PAINLEVEL_OUTOF10: 0

## 2017-07-04 ASSESSMENT — LIFESTYLE VARIABLES
DO YOU DRINK ALCOHOL: NO
EVER_SMOKED: YES

## 2017-07-04 NOTE — ED NOTES
Pt resting comfortably.   Call light within reach  Personal belongings in reach  Bathroom and comfort needs met  VSS

## 2017-07-04 NOTE — ED NOTES
Chief Complaint   Patient presents with   • Syncope     pt was at [facility] shelter and went to have a BM. pt had syncopal episode. EMS states he was constipated and was trying the bathroom and bearing down many times per staff. denies drugs or ETOH. Blood sugar 87. pt drowsy on arrival

## 2017-07-04 NOTE — ED PROVIDER NOTES
ED Provider Note    CHIEF COMPLAINT  Chief Complaint   Patient presents with   • Syncope     pt was at [facility] shelter and went to have a BM. pt had syncopal episode. EMS states he was constipated and was trying the bathroom and bearing down many times per staff. denies drugs or ETOH. Blood sugar 87. pt drowsy on arrival        HPI  Sylvain Espinosa is a 61 y.o. male with a history of hypertension who presents after having a syncopal episode. [facility] and says while eating today was feeling nauseous. He went to the bathroom to have a bowel movement, and had a reported syncopal episode. EMS was called, the patient was noted to have a fingerstick blood sugar of 87 at the scene. He was transported to the ER for further care. At this time the patient is quite somnolent and drowsy. When awaking he mumbles some answers, which I cannot understand then falls back asleep. He is able to follow simple commands, squeezes my fingers, pushes down with his feet, opens his eyes. He complains of a mild headache, but denies any chest pain, back pain, or abdominal pain.    REVIEW OF SYSTEMS  See HPI for further details. All other systems are not obtainable as the patient is very drowsy, unable to give me much information.    PAST MEDICAL HISTORY  Past Medical History   Diagnosis Date   • Hypertension        FAMILY HISTORY  No family history on file.    SOCIAL HISTORY  Social History     Social History   • Marital Status: Single     Spouse Name: N/A   • Number of Children: N/A   • Years of Education: N/A     Social History Main Topics   • Smoking status: Current Some Day Smoker -- 0.50 packs/day   • Smokeless tobacco: Not on file   • Alcohol Use: No      Comment: occ   • Drug Use: Yes     Special: Inhaled      Comment: marijuana, meth    • Sexual Activity: Not on file     Other Topics Concern   • Not on file     Social History Narrative       SURGICAL HISTORY  No past surgical history on file.    CURRENT  "MEDICATIONS  Home Medications     **Home medications have not yet been reviewed for this encounter**          ALLERGIES  No Known Allergies    PHYSICAL EXAM  VITAL SIGNS: /50 mmHg  Pulse 78  Temp(Src) 36.6 °C (97.9 °F)  Resp 18  Ht 1.727 m (5' 8\")  Wt 54.432 kg (120 lb)  BMI 18.25 kg/m2  SpO2 98%  Constitutional: Asleep, arousable, easily falls back asleep, in no acute distress, Non-toxic appearance.   HENT: Atraumatic. Bilateral external ears normal, mucous membranes moist, throat nonerythematous without exudates, nose is normal.  Eyes: PERRL, EOMI, conjunctiva moist, noninjected.  Neck: Nontender, Normal range of motion, No nuchal rigidity, No stridor.   Lymphatic: No lymphadenopathy noted.   Cardiovascular: Regular rate and rhythm, 3/6 systolic ejection murmur, no rubs, gallops.  Thorax & Lungs:  Good breath sounds bilaterally, no wheezes, rales, or retractions.  No chest tenderness.  Abdomen: Bowel sounds normal, Soft, nontender, nondistended, no rebound, guarding, masses.  Back: No CVA or spinal tenderness.  Extremities: Intact distal pulses, No edema, No tenderness.   Skin: Warm, Dry, No rashes.   Musculoskeletal: No joint swelling or tenderness.  Neurologic: Asleep, arousable, very somnolent, oriented ×1, cranial nerves II through XII shows no obvious facial asymmetry, sensory intact to light touch, and motor function nose symmetrical strength to the upper and lower extremities, though the patient cannot comply with a full neurologic exam no focal deficits.   Psychiatric: Very somnolent, affect is depressed, judgment impaired.    EKG  Twelve-lead EKG shows a normal sinus rhythm, rate of 74, normal intervals, normal axis, left internal hypertrophy, no acute ST wave elevations, there are some mild ST depressions in leads V4 through V6, inverted T waves in 1, aVL, V4 through V6, no pathologic Q waves, no evidence of an acute injury pattern, the inverted T waves and ST depressions may indicate " ischemia, in comparison to a previous EKG from April, 2017, there are no acute changes, the ST depressions and inverted T waves were present previously.          RADIOLOGY/PROCEDURES  CT-HEAD W/O   Final Result      1.  No acute intracranial findings.      2.  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      3.  Chronic maxillary and ethmoid sinus disease.         DX-CHEST-PORTABLE (1 VIEW)   Final Result      Mild interstitial densities in the right lung base are suggestive of atelectasis. Early pneumonia could have a similar appearance.      US-RENAL    (Results Pending)         COURSE & MEDICAL DECISION MAKING  Pertinent Labs & Imaging studies reviewed. (See chart for details)  The patient presents with the above complaints. On arrival he was quite somnolent and difficult to arouse. IV was placed, he was given a bolus of normal saline. EKG shows what appears to be ischemic changes, however these are unchanged from a previous EKG. CT scan head without contrast shows no acute intracranial findings. Chest x-ray shows likely atelectasis of the right lung base. CBC shows a white count 9800, hemoglobin 13.2, 79% polys, lactic acid normal 1.2, chemistry CO2 19, BUN 33, creatinine 1.6, SGOT 46, alcohol 0, INR 1.06, troponin 0.02, BNP 73, urine shows 15 ketones, otherwise negative, urine drug screen is positive for amphetamines. After several hours in the ER, the patient remained somnolent. However when nursing attempted to place a Johnson catheter, the patient became very awake and alert and was able to produce a urine specimen.  He does admit to smoking methamphetamines in the past. He denies any alcohol use. He was able to give me much more coherent history, says he has been feeling nauseous today, and became lightheaded and dizzy before he passed out in the bathroom. The patient appears to be dehydrated with positive ketones in his urine and increase of his creatinine from 0.91 in April to 1.6  currently. The patient was given a 2nd liter normal saline. Given his symptoms she will be admitted. Case was discussed with Dr. Quick for admission.    FINAL IMPRESSION  1. Syncope  2. dehydration  3. Renal insufficiency         Electronically signed by: Lenny Miller, 7/4/2017 2:15 PM

## 2017-07-04 NOTE — IP AVS SNAPSHOT
" Home Care Instructions                                                                                                                  Name:Sylvain Espinosa  Medical Record Number:[id number]  CSN: [id number]    YOB: 1955   Age: 61 y.o.  Sex: male  HT:1.727 m (5' 8\") WT: 57.4 kg (126 lb 8.7 oz)          Admit Date: 7/4/2017     Discharge Date:   Today's Date: 7/5/2017  Attending Doctor:  Leno Sarabia M.D.                  Allergies:  Review of patient's allergies indicates no known allergies.            Discharge Instructions       Discharge Instructions    Discharged to other by bus with self. Discharged via walking, hospital escort: Yes.  Special equipment needed: Not Applicable    Be sure to schedule a follow-up appointment with your primary care doctor or any specialists as instructed.     Discharge Plan:   Diet Plan: Discussed  Activity Level: Discussed  Smoking Cessation Offered: Patient Refused  Confirmed Follow up Appointment: Appointment Scheduled  Confirmed Symptoms Management: Discussed  Medication Reconciliation Updated: Yes  Influenza Vaccine Indication: Not indicated: Previously immunized this influenza season and > 8 years of age    I understand that a diet low in cholesterol, fat, and sodium is recommended for good health. Unless I have been given specific instructions below for another diet, I accept this instruction as my diet prescription.   Other diet: Heart Healthy    Special Instructions: Remain Hydrated. Consume 8, 8 oz glasses of water daily    · Is patient discharged on Warfarin / Coumadin?   No     · Is patient Post Blood Transfusion?  No    Depression / Suicide Risk    As you are discharged from this [facility] facility, it is important to learn how to keep safe from harming yourself.    Recognize the warning signs:  · Abrupt changes in personality, positive or negative- including increase in energy   · Giving away possessions  · Change in eating patterns- significant weight " changes-  positive or negative  · Change in sleeping patterns- unable to sleep or sleeping all the time   · Unwillingness or inability to communicate  · Depression  · Unusual sadness, discouragement and loneliness  · Talk of wanting to die  · Neglect of personal appearance   · Rebelliousness- reckless behavior  · Withdrawal from people/activities they love  · Confusion- inability to concentrate     If you or a loved one observes any of these behaviors or has concerns about self-harm, here's what you can do:  · Talk about it- your feelings and reasons for harming yourself  · Remove any means that you might use to hurt yourself (examples: pills, rope, extension cords, firearm)  · Get professional help from the community (Mental Health, Substance Abuse, psychological counseling)  · Do not be alone:Call your Safe Contact- someone whom you trust who will be there for you.  · Call your local CRISIS HOTLINE 549-3947 or 289-973-8624  · Call your local Children's Mobile Crisis Response Team [place] (596) 902-6652 or www.CargoGuard  · Call the toll free National Suicide Prevention Hotlines   · National Suicide Prevention Lifeline 930-408-HIOP (3755)  · National Hope Line Network 800-SUICIDE (102-0185)      Dehydration, Adult  Dehydration is when you lose more fluids from the body than you take in. Vital organs like the kidneys, brain, and heart cannot function without a proper amount of fluids and salt. Any loss of fluids from the body can cause dehydration.   CAUSES   · Vomiting.  · Diarrhea.  · Excessive sweating.  · Excessive urine output.  · Fever.  SYMPTOMS   Mild dehydration  · Thirst.  · Dry lips.  · Slightly dry mouth.  Moderate dehydration  · Very dry mouth.  · Sunken eyes.  · Skin does not bounce back quickly when lightly pinched and released.  · Dark urine and decreased urine production.  · Decreased tear production.  · Headache.  Severe dehydration  · Very dry mouth.  · Extreme thirst.  · Rapid, weak  pulse (more than 100 beats per minute at rest).  · Cold hands and feet.  · Not able to sweat in spite of heat and temperature.  · Rapid breathing.  · Blue lips.  · Confusion and lethargy.  · Difficulty being awakened.  · Minimal urine production.  · No tears.  DIAGNOSIS   Your caregiver will diagnose dehydration based on your symptoms and your exam. Blood and urine tests will help confirm the diagnosis. The diagnostic evaluation should also identify the cause of dehydration.  TREATMENT   Treatment of mild or moderate dehydration can often be done at home by increasing the amount of fluids that you drink. It is best to drink small amounts of fluid more often. Drinking too much at one time can make vomiting worse. Refer to the home care instructions below.  Severe dehydration needs to be treated at the hospital where you will probably be given intravenous (IV) fluids that contain water and electrolytes.  HOME CARE INSTRUCTIONS   · Ask your caregiver about specific rehydration instructions.  · Drink enough fluids to keep your urine clear or pale yellow.  · Drink small amounts frequently if you have nausea and vomiting.  · Eat as you normally do.  · Avoid:  ¨ Foods or drinks high in sugar.  ¨ Carbonated drinks.  ¨ Juice.  ¨ Extremely hot or cold fluids.  ¨ Drinks with caffeine.  ¨ Fatty, greasy foods.  ¨ Alcohol.  ¨ Tobacco.  ¨ Overeating.  ¨ Gelatin desserts.  · Wash your hands well to avoid spreading bacteria and viruses.  · Only take over-the-counter or prescription medicines for pain, discomfort, or fever as directed by your caregiver.  · Ask your caregiver if you should continue all prescribed and over-the-counter medicines.  · Keep all follow-up appointments with your caregiver.  SEEK MEDICAL CARE IF:  · You have abdominal pain and it increases or stays in one area (localizes).  · You have a rash, stiff neck, or severe headache.  · You are irritable, sleepy, or difficult to awaken.  · You are weak, dizzy, or  extremely thirsty.  SEEK IMMEDIATE MEDICAL CARE IF:   · You are unable to keep fluids down or you get worse despite treatment.  · You have frequent episodes of vomiting or diarrhea.  · You have blood or green matter (bile) in your vomit.  · You have blood in your stool or your stool looks black and tarry.  · You have not urinated in 6 to 8 hours, or you have only urinated a small amount of very dark urine.  · You have a fever.  · You faint.  MAKE SURE YOU:   · Understand these instructions.  · Will watch your condition.  · Will get help right away if you are not doing well or get worse.     This information is not intended to replace advice given to you by your health care provider. Make sure you discuss any questions you have with your health care provider.     Document Released: 12/18/2006 Document Revised: 03/11/2013 Document Reviewed: 08/06/2012  [employer] Interactive Patient Education ©2016 [employer]        Follow-up Information     1. Follow up with NEY Phan. Go on 7/14/2017.    Why:  Please arrive at 8:30am for your appointment.    Contact information    [address] [facility]  [place] [address] [phone]         Discharge Medication Instructions:    Below are the medications your physician expects you to take upon discharge:    Review all your home medications and newly ordered medications with your doctor and/or pharmacist. Follow medication instructions as directed by your doctor and/or pharmacist.    Please keep your medication list with you and share with your physician.               Medication List      START taking these medications        Instructions    Morning Afternoon Evening Bedtime    ondansetron 4 MG Tbdp   Commonly known as:  ZOFRAN ODT        Take 1 Tab by mouth every four hours as needed for Nausea/Vomiting.   Dose:  4 mg                          CONTINUE taking these medications        Instructions    Morning Afternoon Evening Bedtime    aspirin EC 81 MG Tbec   Commonly  known as:  ECOTRIN        Take 81 mg by mouth every day.   Dose:  81 mg                        digoxin 125 MCG Tabs   Commonly known as:  LANOXIN        Take 125 mcg by mouth every day.   Dose:  125 mcg                        enalapril 5 MG Tabs   Commonly known as:  VASOTEC        Take 2.5 mg by mouth every day.   Dose:  2.5 mg                             Where to Get Your Medications      Information about where to get these medications is not yet available     ! Ask your nurse or doctor about these medications    - ondansetron 4 MG Tbdp            Instructions           Diet / Nutrition:    Follow any diet instructions given to you by your doctor or the dietician, including how much salt (sodium) you are allowed each day.    If you are overweight, talk to your doctor about a weight reduction plan.    Activity:    Remain physically active following your doctor's instructions about exercise and activity.    Rest often.     Any time you become even a little tired or short of breath, SIT DOWN and rest.    Worsening Symptoms:    Report any of the following signs and symptoms to the doctor's office immediately:    *Pain of jaw, arm, or neck  *Chest pain not relieved by medication                               *Dizziness or loss of consciousness  *Difficulty breathing even when at rest   *More tired than usual                                       *Bleeding drainage or swelling of surgical site  *Swelling of feet, ankles, legs or stomach                 *Fever (>100ºF)  *Pink or blood tinged sputum  *Weight gain (3lbs/day or 5lbs /week)           *Shock from internal defibrillator (if applicable)  *Palpitations or irregular heartbeats                *Cool and/or numb extremities    Stroke Awareness    Common Risk Factors for Stroke include:    Age  Atrial Fibrillation  Carotid Artery Stenosis  Diabetes Mellitus  Excessive alcohol consumption  High blood pressure  Overweight   Physical inactivity  Smoking    Warning  signs and symptoms of a stroke include:    *Sudden numbness or weakness of the face, arm or leg (especially on one side of the body).  *Sudden confusion, trouble speaking or understanding.  *Sudden trouble seeing in one or both eyes.  *Sudden trouble walking, dizziness, loss of balance or coordination.Sudden severe headache with no known cause.    It is very important to get treatment quickly when a stroke occurs. If you experience any of the above warning signs, call 911 immediately.                   Disclaimer         Quit Smoking / Tobacco Use:    I understand the use of any tobacco products increases my chance of suffering from future heart disease or stroke and could cause other illnesses which may shorten my life. Quitting the use of tobacco products is the single most important thing I can do to improve my health. For further information on smoking / tobacco cessation call a Toll Free Quit Line at 1-439.386.5951 (*National Cancer [place]) or 1-275.300.9094 (American Lung Association) or you can access the web based program at www.lungMapSense.org.    [place] Tobacco Users Help Line:  (574) 163-5095       Toll Free: 1-991.852.4416  Quit Tobacco Program [facility] (447)119-2275    Crisis Hotline:    [place] Crisis Hotline:  5-085-ENZGSWH or 1-366.886.7689    [place] Crisis Hotline:    1-159.759.5235 or 977-577-3699    Discharge Survey:   Thank you for choosing [facility]. We hope we did everything we could to make your hospital stay a pleasant one. You may be receiving a phone survey and we would appreciate your time and participation in answering the questions. Your input is very valuable to us in our efforts to improve our service to our patients and their families.        My signature on this form indicates that:    1. I have reviewed and understand the above information.  2. My questions regarding this information have been answered to my satisfaction.  3. I have formulated a plan  with my discharge nurse to obtain my prescribed medications for home.                  Disclaimer         __________________________________                     __________       ________                       Patient Signature                                                 Date                    Time

## 2017-07-04 NOTE — ED NOTES
Pt arrived in room and is changed into gown.  Pt resting comfortably on gurney.   Call light within reach and visible from nurses station.

## 2017-07-05 ENCOUNTER — APPOINTMENT (OUTPATIENT)
Dept: RADIOLOGY | Facility: MEDICAL CENTER | Age: 62
End: 2017-07-05
Attending: HOSPITALIST
Payer: MEDICAID

## 2017-07-05 ENCOUNTER — PATIENT OUTREACH (OUTPATIENT)
Dept: HEALTH INFORMATION MANAGEMENT | Facility: OTHER | Age: 62
End: 2017-07-05

## 2017-07-05 VITALS
HEART RATE: 94 BPM | BODY MASS INDEX: 19.18 KG/M2 | RESPIRATION RATE: 18 BRPM | OXYGEN SATURATION: 99 % | TEMPERATURE: 97.8 F | DIASTOLIC BLOOD PRESSURE: 65 MMHG | HEIGHT: 68 IN | SYSTOLIC BLOOD PRESSURE: 147 MMHG | WEIGHT: 126.54 LBS

## 2017-07-05 PROBLEM — I10 HTN (HYPERTENSION): Chronic | Status: ACTIVE | Noted: 2017-07-05

## 2017-07-05 PROBLEM — E86.0 DEHYDRATION: Status: ACTIVE | Noted: 2017-07-05

## 2017-07-05 PROBLEM — B19.20 HEPATITIS C: Chronic | Status: ACTIVE | Noted: 2017-07-05

## 2017-07-05 LAB
ANION GAP SERPL CALC-SCNC: 5 MMOL/L (ref 0–11.9)
BUN SERPL-MCNC: 29 MG/DL (ref 8–22)
CALCIUM SERPL-MCNC: 7.7 MG/DL (ref 8.5–10.5)
CHLORIDE SERPL-SCNC: 112 MMOL/L (ref 96–112)
CO2 SERPL-SCNC: 21 MMOL/L (ref 20–33)
CREAT SERPL-MCNC: 1.11 MG/DL (ref 0.5–1.4)
ERYTHROCYTE [DISTWIDTH] IN BLOOD BY AUTOMATED COUNT: 46.4 FL (ref 35.9–50)
GFR SERPL CREATININE-BSD FRML MDRD: >60 ML/MIN/1.73 M 2
GLUCOSE SERPL-MCNC: 93 MG/DL (ref 65–99)
HCT VFR BLD AUTO: 35.8 % (ref 42–52)
HGB BLD-MCNC: 11.7 G/DL (ref 14–18)
MCH RBC QN AUTO: 29 PG (ref 27–33)
MCHC RBC AUTO-ENTMCNC: 32.7 G/DL (ref 33.7–35.3)
MCV RBC AUTO: 88.8 FL (ref 81.4–97.8)
PLATELET # BLD AUTO: 227 K/UL (ref 164–446)
PMV BLD AUTO: 9.9 FL (ref 9–12.9)
POTASSIUM SERPL-SCNC: 4 MMOL/L (ref 3.6–5.5)
RBC # BLD AUTO: 4.03 M/UL (ref 4.7–6.1)
SODIUM SERPL-SCNC: 138 MMOL/L (ref 135–145)
WBC # BLD AUTO: 6.9 K/UL (ref 4.8–10.8)

## 2017-07-05 PROCEDURE — 700102 HCHG RX REV CODE 250 W/ 637 OVERRIDE(OP): Performed by: HOSPITALIST

## 2017-07-05 PROCEDURE — 76775 US EXAM ABDO BACK WALL LIM: CPT

## 2017-07-05 PROCEDURE — A9270 NON-COVERED ITEM OR SERVICE: HCPCS | Performed by: HOSPITALIST

## 2017-07-05 PROCEDURE — 700105 HCHG RX REV CODE 258: Performed by: EMERGENCY MEDICINE

## 2017-07-05 PROCEDURE — G0378 HOSPITAL OBSERVATION PER HR: HCPCS

## 2017-07-05 PROCEDURE — 99217 PR OBSERVATION CARE DISCHARGE: CPT | Performed by: HOSPITALIST

## 2017-07-05 PROCEDURE — 96361 HYDRATE IV INFUSION ADD-ON: CPT

## 2017-07-05 PROCEDURE — 85027 COMPLETE CBC AUTOMATED: CPT

## 2017-07-05 PROCEDURE — 80048 BASIC METABOLIC PNL TOTAL CA: CPT

## 2017-07-05 RX ORDER — ONDANSETRON 4 MG/1
4 TABLET, ORALLY DISINTEGRATING ORAL EVERY 4 HOURS PRN
Qty: 20 TAB | Refills: 0 | Status: SHIPPED | OUTPATIENT
Start: 2017-07-05 | End: 2018-01-19

## 2017-07-05 RX ADMIN — STANDARDIZED SENNA CONCENTRATE AND DOCUSATE SODIUM 2 TABLET: 8.6; 5 TABLET, FILM COATED ORAL at 08:27

## 2017-07-05 RX ADMIN — SODIUM CHLORIDE: 9 INJECTION, SOLUTION INTRAVENOUS at 06:22

## 2017-07-05 ASSESSMENT — PAIN SCALES - GENERAL
PAINLEVEL_OUTOF10: 0
PAINLEVEL_OUTOF10: 0

## 2017-07-05 NOTE — PROGRESS NOTES
Pt arrived to the unit. Oriented to room, call light, and smoking policy. Pt aao x 4. Maged. Fatigued. Denies pain upon assessment, but generalized weakness. Admit profile completed. Assessment completed. Poc discussed with pt. IVF running. Call light within reach. Instructed pt to use call light for assistance. Hourly rounding in use

## 2017-07-05 NOTE — ED NOTES
Pt resting comfortably.   Call light within reach  Personal belongings in reach  Bathroom and comfort needs met with urinal at bedside   VSS with patient on the monitor

## 2017-07-05 NOTE — DISCHARGE INSTRUCTIONS
Discharge Instructions    Discharged to other by bus with self. Discharged via walking, hospital escort: Yes.  Special equipment needed: Not Applicable    Be sure to schedule a follow-up appointment with your primary care doctor or any specialists as instructed.     Discharge Plan:   Diet Plan: Discussed  Activity Level: Discussed  Smoking Cessation Offered: Patient Refused  Confirmed Follow up Appointment: Appointment Scheduled  Confirmed Symptoms Management: Discussed  Medication Reconciliation Updated: Yes  Influenza Vaccine Indication: Not indicated: Previously immunized this influenza season and > 8 years of age    I understand that a diet low in cholesterol, fat, and sodium is recommended for good health. Unless I have been given specific instructions below for another diet, I accept this instruction as my diet prescription.   Other diet: Heart Healthy    Special Instructions: Remain Hydrated. Consume 8, 8 oz glasses of water daily    · Is patient discharged on Warfarin / Coumadin?   No     · Is patient Post Blood Transfusion?  No    Depression / Suicide Risk    As you are discharged from this [facility] facility, it is important to learn how to keep safe from harming yourself.    Recognize the warning signs:  · Abrupt changes in personality, positive or negative- including increase in energy   · Giving away possessions  · Change in eating patterns- significant weight changes-  positive or negative  · Change in sleeping patterns- unable to sleep or sleeping all the time   · Unwillingness or inability to communicate  · Depression  · Unusual sadness, discouragement and loneliness  · Talk of wanting to die  · Neglect of personal appearance   · Rebelliousness- reckless behavior  · Withdrawal from people/activities they love  · Confusion- inability to concentrate     If you or a loved one observes any of these behaviors or has concerns about self-harm, here's what you can do:  · Talk about it- your feelings and  reasons for harming yourself  · Remove any means that you might use to hurt yourself (examples: pills, rope, extension cords, firearm)  · Get professional help from the community (Mental Health, Substance Abuse, psychological counseling)  · Do not be alone:Call your Safe Contact- someone whom you trust who will be there for you.  · Call your local CRISIS HOTLINE 336-1585 or 757-845-4683  · Call your local Children's Mobile Crisis Response Team [place] (936) 073-3056 or wwwTivra  · Call the toll free National Suicide Prevention Hotlines   · National Suicide Prevention Lifeline 664-458-GBRK (9577)  · Good.Co Line Network 800-SUICIDE (020-2876)      Dehydration, Adult  Dehydration is when you lose more fluids from the body than you take in. Vital organs like the kidneys, brain, and heart cannot function without a proper amount of fluids and salt. Any loss of fluids from the body can cause dehydration.   CAUSES   · Vomiting.  · Diarrhea.  · Excessive sweating.  · Excessive urine output.  · Fever.  SYMPTOMS   Mild dehydration  · Thirst.  · Dry lips.  · Slightly dry mouth.  Moderate dehydration  · Very dry mouth.  · Sunken eyes.  · Skin does not bounce back quickly when lightly pinched and released.  · Dark urine and decreased urine production.  · Decreased tear production.  · Headache.  Severe dehydration  · Very dry mouth.  · Extreme thirst.  · Rapid, weak pulse (more than 100 beats per minute at rest).  · Cold hands and feet.  · Not able to sweat in spite of heat and temperature.  · Rapid breathing.  · Blue lips.  · Confusion and lethargy.  · Difficulty being awakened.  · Minimal urine production.  · No tears.  DIAGNOSIS   Your caregiver will diagnose dehydration based on your symptoms and your exam. Blood and urine tests will help confirm the diagnosis. The diagnostic evaluation should also identify the cause of dehydration.  TREATMENT   Treatment of mild or moderate dehydration can often be  done at home by increasing the amount of fluids that you drink. It is best to drink small amounts of fluid more often. Drinking too much at one time can make vomiting worse. Refer to the home care instructions below.  Severe dehydration needs to be treated at the hospital where you will probably be given intravenous (IV) fluids that contain water and electrolytes.  HOME CARE INSTRUCTIONS   · Ask your caregiver about specific rehydration instructions.  · Drink enough fluids to keep your urine clear or pale yellow.  · Drink small amounts frequently if you have nausea and vomiting.  · Eat as you normally do.  · Avoid:  ¨ Foods or drinks high in sugar.  ¨ Carbonated drinks.  ¨ Juice.  ¨ Extremely hot or cold fluids.  ¨ Drinks with caffeine.  ¨ Fatty, greasy foods.  ¨ Alcohol.  ¨ Tobacco.  ¨ Overeating.  ¨ Gelatin desserts.  · Wash your hands well to avoid spreading bacteria and viruses.  · Only take over-the-counter or prescription medicines for pain, discomfort, or fever as directed by your caregiver.  · Ask your caregiver if you should continue all prescribed and over-the-counter medicines.  · Keep all follow-up appointments with your caregiver.  SEEK MEDICAL CARE IF:  · You have abdominal pain and it increases or stays in one area (localizes).  · You have a rash, stiff neck, or severe headache.  · You are irritable, sleepy, or difficult to awaken.  · You are weak, dizzy, or extremely thirsty.  SEEK IMMEDIATE MEDICAL CARE IF:   · You are unable to keep fluids down or you get worse despite treatment.  · You have frequent episodes of vomiting or diarrhea.  · You have blood or green matter (bile) in your vomit.  · You have blood in your stool or your stool looks black and tarry.  · You have not urinated in 6 to 8 hours, or you have only urinated a small amount of very dark urine.  · You have a fever.  · You faint.  MAKE SURE YOU:   · Understand these instructions.  · Will watch your condition.  · Will get help right away  if you are not doing well or get worse.     This information is not intended to replace advice given to you by your health care provider. Make sure you discuss any questions you have with your health care provider.     Document Released: 12/18/2006 Document Revised: 03/11/2013 Document Reviewed: 08/06/2012  [employer]vier Interactive Patient Education ©2016 [employer]

## 2017-07-05 NOTE — DISCHARGE PLANNING
Care Transition Team Assessment    Information Source  Orientation : Oriented x 4  Information Given By: Patient  Informant's Name:  (Sylvain)  Who is responsible for making decisions for patient? : Patient    Readmission Evaluation  Is this a readmission?: No    Elopement Risk  Legal Hold: No  Ambulatory or Self Mobile in Wheelchair: Yes  Disoriented: No  Psychiatric Symptoms: None  History of Wandering: No  Elopement this Admit: No  Vocalizing Wanting to Leave: No  Displays Behaviors, Body Language Wanting to Leave: No-Not at Risk for Elopement  Elopement Risk: Not at Risk for Elopement    Interdisciplinary Discharge Planning  Does Admitting Nurse Feel This Could be a Complex Discharge?: No  Primary Care Physician:  ([employer] [employer])  Lives with - Patient's Self Care Capacity: Alone and Unable to Care For Self  Patient or legal guardian wants to designate a caregiver (see row info): No  Support Systems: Shelter  Housing / Facility: Homeless (shelter )  Do You Take your Prescribed Medications Regularly: Yes  Able to Return to Previous ADL's: Yes  Mobility Issues: No  Prior Services: Other (Comments) (shelter)  Assistance Needed: No  Durable Medical Equipment: Not Applicable    Discharge Preparedness  What are your discharge supports?:  (Cousins Gabe, Sister and Mother in [place])  Prior Functional Level: Ambulatory, Drives Self, Independent with Activities of Daily Living    Functional Assesment  Prior Functional Level: Ambulatory, Drives Self, Independent with Activities of Daily Living    Finances  Financial Barriers to Discharge:  (unemployed-temporarily)  Prescription Coverage: Yes ([employer])    Vision / Hearing Impairment  Vision Impairment : Yes  Right Eye Vision: Wears Glasses  Left Eye Vision: Wears Glasses  Hearing Impairment : Yes  Hearing Impairment: Hearing Device Not Available, Left Ear  Does Pt Need Special Equipment for the Hearing Impaired?: No    Values / Beliefs / Concerns  Values /  "Beliefs Concerns : No    Advance Directive  Advance Directive?: None  Advance Directive offered?: AD Booklet refused    Domestic Abuse  Have you ever been the victim of abuse or violence?: No    Psychological Assessment  History of Substance Abuse: Alcohol  Date Last Used - Alcohol:  (couple days ago)  Substance Abuse Comments:  (pt states he just has things to do-doesn't drink like in pas)  History of Psychiatric Problems: Yes (anger issues-\"spent 30 years in [place]\")  Non-compliant with Treatment: Yes    Discharge Risks or Barriers  Discharge risks or barriers?: Transportation, Lives alone, no community support  Patient risk factors: Lack of outside supports, Lives alone and no community support    Anticipated Discharge Information  Anticipated discharge disposition: Shelter  Discharge Address:  ([address] [place] [address])  Discharge Contact Phone Number:  (pt states he can walk or take bus)        "

## 2017-07-05 NOTE — ED NOTES
Assumed care of pt. Pt sleeping in [facility] in [facility], connected to monitor.

## 2017-07-05 NOTE — DISCHARGE SUMMARY
CHIEF COMPLAINT ON ADMISSION  Chief Complaint   Patient presents with   • Syncope     pt was at [facility] shelter and went to have a BM. pt had syncopal episode. EMS states he was constipated and was trying the bathroom and bearing down many times per staff. denies drugs or ETOH. Blood sugar 87. pt drowsy on arrival        CODE STATUS  Full Code    HPI & HOSPITAL COURSE  This is a 61 y.o. male here with with chief complaint of nausea.  This is a 61-year-old gentleman with a history of hypertension, not on home medications, as well as diagnosed hepatitis C who has not yet started his medical management who presents with complaints of nausea. The patient states that prior to this, he was in his usual state of health other than constipation yesterday. This morning, he had acute onset of nausea but no vomiting area. He was noted to have TATI and was admitted for observation and IV fluids. His TATI resolved and he was able to tolerate PO foods/fluids.    Therefore, he is discharged in good and stable condition with close outpatient follow-up.    SPECIFIC OUTPATIENT FOLLOW-UP  PCP within 7-10days    DISCHARGE PROBLEM LIST  Principal Problem:    Dehydration POA: Yes  Active Problems:    TATI (acute kidney injury) (CMS-HCC) POA: Yes    HTN (hypertension) (Chronic) POA: Yes    Hepatitis C (Chronic) POA: Yes  Resolved Problems:    * No resolved hospital problems. *      FOLLOW UP  No future appointments.  [address] [address] [phone]  Schedule an appointment as soon as possible for a visit        MEDICATIONS ON DISCHARGE   Sylvain Espinosa   Home Medication Instructions JESSY:[id number]    Printed on:07/05/17 1155   Medication Information                      aspirin EC (ECOTRIN) 81 MG Tablet Delayed Response  Take 81 mg by mouth every day.             digoxin (LANOXIN) 125 MCG Tab  Take 125 mcg by mouth every day.             enalapril (VASOTEC) 5 MG Tab  Take 2.5 mg by mouth  every day.             ondansetron (ZOFRAN ODT) 4 MG TABLET DISPERSIBLE  Take 1 Tab by mouth every four hours as needed for Nausea/Vomiting.                 DIET  Orders Placed This Encounter   Procedures   • Diet Order     Standing Status: Standing      Number of Occurrences: 1      Standing Expiration Date:      Order Specific Question:  Diet:     Answer:  Regular [1]       ACTIVITY  As tolerated.  Weight bearing as tolerated      CONSULTATIONS  None    PROCEDURES  None    LABORATORY  Lab Results   Component Value Date/Time    SODIUM 138 07/05/2017 03:50 AM    POTASSIUM 4.0 07/05/2017 03:50 AM    CHLORIDE 112 07/05/2017 03:50 AM    CO2 21 07/05/2017 03:50 AM    GLUCOSE 93 07/05/2017 03:50 AM    BUN 29* 07/05/2017 03:50 AM    CREATININE 1.11 07/05/2017 03:50 AM        Lab Results   Component Value Date/Time    WBC 6.9 07/05/2017 03:50 AM    HEMOGLOBIN 11.7* 07/05/2017 03:50 AM    HEMATOCRIT 35.8* 07/05/2017 03:50 AM    PLATELET COUNT 227 07/05/2017 03:50 AM        Total time of the discharge process exceeds 32 minutes

## 2017-07-05 NOTE — H&P
PATIENT ID:  NAME:  Sylvain Espinosa  MRN:               [id number]  YOB: 1955    PMD: Pcp Not In Computer    CC: Nausea    HPI: Sylvain Espinosa is a 61 y.o. male with chief complaint of nausea.  This is a 61-year-old gentleman with a history of hypertension, not on home medications, as well as diagnosed hepatitis C who has not yet started his medical management who presents with complaints of nausea. The patient states that prior to this, he was in his usual state of health other than constipation yesterday. This morning, he had acute onset of nausea but no vomiting area. Reports that he was able to keep down ice tea. He denies any recent contrast studies. He has not been on any NSAIDs or new prescribed medications. He denies any fevers chills no chest pains or shortness of breath.                 PAST MEDICAL HISTORY  Past Medical History   Diagnosis Date   • Hypertension        PAST SURGICAL HISTORY  No past surgical history on file.    FAMILY HISTORY  No family history on file.    SOCIAL HISTORY  Social History   Substance Use Topics   • Smoking status: Current Some Day Smoker -- 0.50 packs/day   • Smokeless tobacco: Not on file   • Alcohol Use: No      Comment: occ       ALLERGIES  Allergies as of 07/04/2017   • (No Known Allergies)       OUTPATIENT MEDICATIONS     Medication List      ASK your doctor about these medications       Instructions    aspirin EC 81 MG Tbec   Commonly known as:  ECOTRIN    Take 81 mg by mouth every day.   Dose:  81 mg       digoxin 125 MCG Tabs   Commonly known as:  LANOXIN    Take 125 mcg by mouth every day.   Dose:  125 mcg       enalapril 5 MG Tabs   Commonly known as:  VASOTEC    Take 2.5 mg by mouth every day.   Dose:  2.5 mg             REVIEW OF SYSTEMS  A full review of systems was completed and all pertinent positives and negatives are included in the HPI above by AMA/CMS criteria.    PHYSICAL EXAM:  Blood pressure 144/79, pulse 75, temperature 36.3 °C (97.4  "°F), resp. rate 16, height 1.727 m (5' 8\"), weight 57.4 kg (126 lb 8.7 oz), SpO2 94 %.  Oxygen: Pulse Oximetry: 94 %, O2 (LPM): 0, O2 Delivery: None (Room Air)    Gen: NAD, comfortable  HEENT: NCAT, EOMI, no LAD, no JVD, neck supple, MMM  Heart: +S1/S2, RRR, no murmur appreciated  Lungs: CTAB, no accessory muscle use  GI: NTND, soft, +BS, no rebound/guarding  Extremities: Warm, well-perfused, no cyanosis/clubbing, no peripheral edema  MSK: 5/5 strength in all 4 extremities, sensation intact to light touch  Neuro: AO x 3, CN II-XII grossly intact    LAB TESTS and IMAGES:   Recent Labs      07/04/17   1330   WBC  9.8   RBC  4.52*   HEMOGLOBIN  13.2*   HEMATOCRIT  40.5*   MCV  89.6   MCH  29.2   RDW  46.8   PLATELETCT  154*   MPV  11.0   NEUTSPOLYS  79.10*   LYMPHOCYTES  12.60*   MONOCYTES  7.40   EOSINOPHILS  0.40   BASOPHILS  0.20   RBCMORPHOLO  Present     Recent Labs      07/04/17   1330   TROPONINI  0.02   BNPBTYPENAT  73     Recent Labs      07/04/17   1330   APTT  22.0*   INR  1.06     Recent Labs      07/04/17   1330   SODIUM  135   POTASSIUM  4.0   CHLORIDE  108   CO2  19*   BUN  33*   CREATININE  1.60*   CALCIUM  8.7   ALBUMIN  3.2     Estimated GFR/CRCL = Estimated Creatinine Clearance: 39.4 mL/min (by C-G formula based on Cr of 1.6).  Recent Labs      07/04/17   1330   GLUCOSE  94     No results found for: HBA1C, TSH, FREET4, FREET3, CORTISOL  Recent Labs      07/04/17   1330   ASTSGOT  46*   ALTSGPT  41   TBILIRUBIN  0.5   ALKPHOSPHAT  45   GLOBULIN  4.0*   INR  1.06           Invalid input(s): [id number]  No results found for: SGUEBSJQ70, FOLATE, FERRITIN, IRON, TOTIRONBC  Ct-head W/o    7/4/2017 7/4/2017 2:20 PM HISTORY/REASON FOR EXAM:  Syncope. TECHNIQUE/EXAM DESCRIPTION AND NUMBER OF VIEWS: CT of the head without contrast. Up to date radiation dose reduction adjustments have been utilized to meet ALARA standards for radiation dose reduction. COMPARISON:  None available FINDINGS: No acute " hemorrhage, mass-effect, or midline shift.   No acute ischemia is identified. There are extra-axial calcified masses measuring approximately 1.9 x 2.2 x 0.8 cm superficial to the left frontal lobe and 1.1 x 1.3 x 0.5 cm superficial to the right temporal lobe. Ventricles and cisterns are normal.   Gray/white matter differentiation is maintained.    There is mucosal thickening in the maxillary and ethmoid sinuses. The mastoid air cells are clear. There is a hematoma in the posterior neck and lower scalp.     7/4/2017  1.  No acute intracranial findings. 2.  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 3.  Chronic maxillary and ethmoid sinus disease.     Dx-chest-portable (1 View)    7/4/2017 7/4/2017 1:26 PM HISTORY/REASON FOR EXAM:  Cough. TECHNIQUE/EXAM DESCRIPTION AND NUMBER OF VIEWS: Single portable view of the chest. COMPARISON: 1/14/2015 FINDINGS: Single portable view of the chest demonstrates a normal cardiac silhouette and mediastinal contours. There is mild increased interstitial densities in the right lung base. Cystic structures again noted in the left lower lung. No pleural fluid or pneumothorax.. No suspicious bony lesions.     7/4/2017  Mild interstitial densities in the right lung base are suggestive of atelectasis. Early pneumonia could have a similar appearance.      ASSESSMENT/PLAN: This is a 61-year-old gentleman who presented with nausea and is noted to have acute kidney injury likely secondary to dehydration.    #1 acute kidney injury, I will check a renal ultrasound and urine electrolytes for completeness. However, I do suspect that this is prerenal azotemia in the setting of dehydration. He will be receiving IV fluid resuscitation overnight and we will repeat a BMP in the morning.    #2 hypertension: The patient is not on any home medications, we will monitor his blood pressure and provide him with PRN antihypertensives number next    #3 tobacco abuse: Greater than 3  minutes were spent at bedside in tobacco cessation counseling, and nicotine patch has been offered however the patient has declined at this time. We will continue to encourage cessation during this hospitalization.    PPX: Sequential compression devices for DVT prophylaxis, no PPI indicated, stool softeners ordered    DISPO: Medical floor    CODE STATUS: FULL

## 2017-07-05 NOTE — PROGRESS NOTES
Bedside report received 0715. POC discussed with pt; Pt verbalized improvement in the way he feels compared to yesterday, Denies pain, dizziness, SOB; decreased O2 to 1 L, Pt saturation of 95%; will continue to monitor; Pt does not require O2 on regular basis; all questions answered at this time.

## 2017-07-05 NOTE — PROGRESS NOTES
Pt dc'd to self. IV and monitor removed; Pt left unit via walking with staff.  Personal belongings with pt when leaving unit. Pt given discharge instructions prior to leaving unit including prescription and when to visit with physician; verbalizes understanding. Copy of discharge instructions with pt and in the chart.
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
## 2017-11-16 ENCOUNTER — TELEPHONE (OUTPATIENT)
Dept: CARDIOLOGY | Facility: MEDICAL CENTER | Age: 62
End: 2017-11-16

## 2017-11-16 NOTE — TELEPHONE ENCOUNTER
Spoke with pt and he states he had imaging done at [facility]. Called and they have an echo from 07/19, being faxed now

## 2017-11-17 ENCOUNTER — OFFICE VISIT (OUTPATIENT)
Dept: CARDIOLOGY | Facility: MEDICAL CENTER | Age: 62
End: 2017-11-17
Payer: MEDICAID

## 2017-11-17 VITALS
OXYGEN SATURATION: 94 % | SYSTOLIC BLOOD PRESSURE: 140 MMHG | HEIGHT: 68 IN | WEIGHT: 129 LBS | BODY MASS INDEX: 19.55 KG/M2 | DIASTOLIC BLOOD PRESSURE: 90 MMHG | HEART RATE: 73 BPM

## 2017-11-17 DIAGNOSIS — R06.09 DYSPNEA ON EXERTION: ICD-10-CM

## 2017-11-17 DIAGNOSIS — B19.20 HEPATITIS C VIRUS INFECTION WITHOUT HEPATIC COMA, UNSPECIFIED CHRONICITY: Chronic | ICD-10-CM

## 2017-11-17 DIAGNOSIS — I10 ESSENTIAL HYPERTENSION: Chronic | ICD-10-CM

## 2017-11-17 DIAGNOSIS — I35.0 AORTIC VALVE STENOSIS, ETIOLOGY OF CARDIAC VALVE DISEASE UNSPECIFIED: ICD-10-CM

## 2017-11-17 LAB — EKG IMPRESSION: NORMAL

## 2017-11-17 PROCEDURE — 93000 ELECTROCARDIOGRAM COMPLETE: CPT | Performed by: INTERNAL MEDICINE

## 2017-11-17 PROCEDURE — 99244 OFF/OP CNSLTJ NEW/EST MOD 40: CPT | Performed by: INTERNAL MEDICINE

## 2017-11-17 ASSESSMENT — ENCOUNTER SYMPTOMS
CLAUDICATION: 0
NAUSEA: 0
COUGH: 0
SHORTNESS OF BREATH: 1
SPEECH CHANGE: 0
HALLUCINATIONS: 0
BLOOD IN STOOL: 0
FEVER: 0
FALLS: 0
BLURRED VISION: 0
EYE DISCHARGE: 0
ABDOMINAL PAIN: 0
MYALGIAS: 0
DIZZINESS: 0
EYE PAIN: 0
CHILLS: 0
VOMITING: 0
BRUISES/BLEEDS EASILY: 0
ORTHOPNEA: 0
WEIGHT LOSS: 0
PND: 0
HEADACHES: 0
LOSS OF CONSCIOUSNESS: 0
DEPRESSION: 0
SENSORY CHANGE: 0
PALPITATIONS: 0
DOUBLE VISION: 0

## 2017-11-17 NOTE — LETTER
[facility]   [address]  [place] [address]  Phone: [phone]  Fax: [phone]              Sylvain Espinosa  1955    Encounter Date: 11/17/2017    Gerda Mckeon M.D.          PROGRESS NOTE:  Subjective:   Sylvain Espinosa is a 62 y.o. male who presents today For cardiac care and evaluation because of an abnormal echocardiogram showing evidence of aortic stenosis. The mean gradient is in the 20s. Patient does report having shortness of breath upon exertion. No prior cardiac workup in terms of invasive procedure or surgeries.    Past Medical History:   Diagnosis Date   • Hypertension      History reviewed. No pertinent surgical history.  History reviewed. No pertinent family history.  History   Smoking Status   • Former Smoker   • Packs/day: 0.50   • Years: 40.00   • Quit date: 8/17/2017   Smokeless Tobacco   • Never Used     No Known Allergies  Outpatient Encounter Prescriptions as of 11/17/2017   Medication Sig Dispense Refill   • ondansetron (ZOFRAN ODT) 4 MG TABLET DISPERSIBLE Take 1 Tab by mouth every four hours as needed for Nausea/Vomiting. 20 Tab 0   • enalapril (VASOTEC) 5 MG Tab Take 2.5 mg by mouth every day.     • digoxin (LANOXIN) 125 MCG Tab Take 125 mcg by mouth every day.     • aspirin EC (ECOTRIN) 81 MG Tablet Delayed Response Take 81 mg by mouth every day.       No facility-administered encounter medications on file as of 11/17/2017.      Review of Systems   Constitutional: Negative for chills, fever, malaise/fatigue and weight loss.   HENT: Negative for ear discharge, ear pain, hearing loss and nosebleeds.    Eyes: Negative for blurred vision, double vision, pain and discharge.   Respiratory: Positive for shortness of breath. Negative for cough.    Cardiovascular: Negative for chest pain, palpitations, orthopnea, claudication, leg swelling and PND.   Gastrointestinal: Negative for abdominal pain, blood in stool, melena, nausea  "and vomiting.   Genitourinary: Negative for dysuria and hematuria.   Musculoskeletal: Negative for falls, joint pain and myalgias.   Skin: Negative for itching and rash.   Neurological: Negative for dizziness, sensory change, speech change, loss of consciousness and headaches.   Endo/Heme/Allergies: Negative for environmental allergies. Does not bruise/bleed easily.   Psychiatric/Behavioral: Negative for depression, hallucinations and suicidal ideas.        Objective:   /90   Pulse 73   Ht 1.727 m (5' 8\")   Wt 58.5 kg (129 lb)   SpO2 94%   BMI 19.61 kg/m²      Physical Exam   Constitutional: He is oriented to person, place, and time.   HENT:   Head: Normocephalic and atraumatic.   Eyes: EOM are normal.   Neck: Normal range of motion. No JVD present.   Cardiovascular: Normal rate, regular rhythm and intact distal pulses.  Exam reveals no gallop and no friction rub.    Murmur heard.  there is 2-3/6 systolic murmur heard best at the right border of the aortic valve area. The murmur does not radiate to the carotids.     Pulmonary/Chest: No respiratory distress. He has no wheezes. He has no rales. He exhibits no tenderness.   Abdominal: Soft. Bowel sounds are normal. There is no tenderness. There is no rebound and no guarding.   The is no presence of abdominal bruits   Musculoskeletal: Normal range of motion.   Neurological: He is alert and oriented to person, place, and time.   Skin: Skin is warm and dry.   Psychiatric: He has a normal mood and affect.   Nursing note and vitals reviewed.      Assessment:     1. Aortic valve stenosis, etiology of cardiac valve disease unspecified  EKG    TREADMILL STRESS   2. Essential hypertension  TREADMILL STRESS   3. Hepatitis C virus infection without hepatic coma, unspecified chronicity     4. Dyspnea on exertion  TREADMILL STRESS       Medical Decision Making:  Today's Assessment / Status / Plan:   At this time, I will send patient for treadmill stress test to assess " functional capacity and symptomatology.  No indication for intervention for his moderate aortic stenosis at this time.      GARETH Ledesma.  [id number]  [facility] [address]  VIA Facsimile: [phone]

## 2017-11-17 NOTE — PROGRESS NOTES
Subjective:   Sylvain Espinosa is a 62 y.o. male who presents today For cardiac care and evaluation because of an abnormal echocardiogram showing evidence of aortic stenosis. The mean gradient is in the 20s. Patient does report having shortness of breath upon exertion. No prior cardiac workup in terms of invasive procedure or surgeries.    Past Medical History:   Diagnosis Date   • Hypertension      History reviewed. No pertinent surgical history.  Family History   Problem Relation Age of Onset   • Heart Disease Mother      History   Smoking Status   • Former Smoker   • Packs/day: 0.50   • Years: 40.00   • Quit date: 8/17/2017   Smokeless Tobacco   • Never Used     No Known Allergies  Outpatient Encounter Prescriptions as of 11/17/2017   Medication Sig Dispense Refill   • ondansetron (ZOFRAN ODT) 4 MG TABLET DISPERSIBLE Take 1 Tab by mouth every four hours as needed for Nausea/Vomiting. 20 Tab 0   • enalapril (VASOTEC) 5 MG Tab Take 2.5 mg by mouth every day.     • digoxin (LANOXIN) 125 MCG Tab Take 125 mcg by mouth every day.     • aspirin EC (ECOTRIN) 81 MG Tablet Delayed Response Take 81 mg by mouth every day.       No facility-administered encounter medications on file as of 11/17/2017.      Review of Systems   Constitutional: Negative for chills, fever, malaise/fatigue and weight loss.   HENT: Negative for ear discharge, ear pain, hearing loss and nosebleeds.    Eyes: Negative for blurred vision, double vision, pain and discharge.   Respiratory: Positive for shortness of breath. Negative for cough.    Cardiovascular: Negative for chest pain, palpitations, orthopnea, claudication, leg swelling and PND.   Gastrointestinal: Negative for abdominal pain, blood in stool, melena, nausea and vomiting.   Genitourinary: Negative for dysuria and hematuria.   Musculoskeletal: Negative for falls, joint pain and myalgias.   Skin: Negative for itching and rash.   Neurological: Negative for dizziness, sensory change, speech  "change, loss of consciousness and headaches.   Endo/Heme/Allergies: Negative for environmental allergies. Does not bruise/bleed easily.   Psychiatric/Behavioral: Negative for depression, hallucinations and suicidal ideas.        Objective:   /90   Pulse 73   Ht 1.727 m (5' 8\")   Wt 58.5 kg (129 lb)   SpO2 94%   BMI 19.61 kg/m²     Physical Exam   Constitutional: He is oriented to person, place, and time.   HENT:   Head: Normocephalic and atraumatic.   Eyes: EOM are normal.   Neck: Normal range of motion. No JVD present.   Cardiovascular: Normal rate, regular rhythm and intact distal pulses.  Exam reveals no gallop and no friction rub.    Murmur heard.  there is 2-3/6 systolic murmur heard best at the right border of the aortic valve area. The murmur does not radiate to the carotids.     Pulmonary/Chest: No respiratory distress. He has no wheezes. He has no rales. He exhibits no tenderness.   Abdominal: Soft. Bowel sounds are normal. There is no tenderness. There is no rebound and no guarding.   The is no presence of abdominal bruits   Musculoskeletal: Normal range of motion.   Neurological: He is alert and oriented to person, place, and time.   Skin: Skin is warm and dry.   Psychiatric: He has a normal mood and affect.   Nursing note and vitals reviewed.      Assessment:     1. Aortic valve stenosis, etiology of cardiac valve disease unspecified  EKG    TREADMILL STRESS   2. Essential hypertension  TREADMILL STRESS   3. Hepatitis C virus infection without hepatic coma, unspecified chronicity     4. Dyspnea on exertion  TREADMILL STRESS       Medical Decision Making:  Today's Assessment / Status / Plan:   At this time, I will send patient for treadmill stress test to assess functional capacity and symptomatology.  No indication for intervention for his moderate aortic stenosis at this time.  "

## 2018-01-19 ENCOUNTER — HOSPITAL ENCOUNTER (EMERGENCY)
Facility: MEDICAL CENTER | Age: 63
End: 2018-01-19
Attending: EMERGENCY MEDICINE
Payer: MEDICAID

## 2018-01-19 ENCOUNTER — APPOINTMENT (OUTPATIENT)
Dept: RADIOLOGY | Facility: MEDICAL CENTER | Age: 63
End: 2018-01-19
Attending: EMERGENCY MEDICINE
Payer: MEDICAID

## 2018-01-19 VITALS
OXYGEN SATURATION: 99 % | TEMPERATURE: 98 F | RESPIRATION RATE: 17 BRPM | BODY MASS INDEX: 20.98 KG/M2 | HEIGHT: 68 IN | WEIGHT: 138.45 LBS | DIASTOLIC BLOOD PRESSURE: 96 MMHG | HEART RATE: 81 BPM | SYSTOLIC BLOOD PRESSURE: 135 MMHG

## 2018-01-19 DIAGNOSIS — M79.89 SWELLING OF RIGHT HAND: ICD-10-CM

## 2018-01-19 DIAGNOSIS — M79.89 CALF SWELLING: ICD-10-CM

## 2018-01-19 PROCEDURE — 99284 EMERGENCY DEPT VISIT MOD MDM: CPT

## 2018-01-19 PROCEDURE — 93971 EXTREMITY STUDY: CPT

## 2018-01-19 PROCEDURE — 73130 X-RAY EXAM OF HAND: CPT | Mod: RT

## 2018-01-19 NOTE — ED NOTES
Assumed care. Dr. Goncalves at bedside. R hand noted with marked swelling. R leg noted with swelling noted in the calf area. Pt aware of plan for hand x-ray and leg US. Call light in reach, no additional needs at this time.
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
## 2018-01-19 NOTE — DISCHARGE INSTRUCTIONS
"How to Use a Sling  A sling is a type of hanging bandage. You wear it around your neck to protect an injured arm, shoulder, or other body part. You may need to wear a sling to keep you from moving the injured body part while it heals. Keeping the injured part of your body still reduces pain and speeds up healing. Your doctor may suggest you use a sling if you have:  · A broken arm.  · A broken collarbone.  · A shoulder injury.  · Surgery.  RISKS AND COMPLICATIONS  Wearing a sling the wrong way can:  · Make your injury worse.  · Cause stiffness or numbness.  · Affect blood circulation in your arm and hand. This can cause tingling or numbness in your fingers or hands.  HOW TO USE A SLING  The way that you should use a sling depends on your injury. It is important that you follow all of your doctor's instructions for your injury. Also follow these general suggestions:  · Wear the sling so that your arm bends 90 degrees at the elbow. That is like a right angle or the shape of a capital letter \"L.\" The sling should also support your wrist and hand.  · Try not to move your arm.  · Do not lie down flat on your back while you have to wear a sling. Sleep in a recliner or use pillows to raise your upper body in bed.  · Do not twist, raise, or move your arm in a way that could make your injury worse.  · Do not lean on your arm while you have to wear a sling.  · Do not lift anything while you have to wear a sling.  GET HELP IF:  · You have bruising, swelling, or pain that is getting worse.  · Your pain medicine is not helping.  · You have a fever.  GET HELP RIGHT AWAY IF:  · Your fingers are numb or tingling.  · Your fingers turn blue or feel cold to the touch.  · You cannot control the bleeding from your injury.  · You are short of breath.     This information is not intended to replace advice given to you by your health care provider. Make sure you discuss any questions you have with your health care provider.     Document " Released: 03/14/2011 Document Revised: 01/08/2016 Document Reviewed: 10/21/2015  Elsevier Interactive Patient Education ©2016 [employer]

## 2018-01-19 NOTE — ED NOTES
US complete. All results back, chart up for re-eval. Pt updated on POC. Tech aware of order for sling
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
## 2018-02-20 ENCOUNTER — TELEPHONE (OUTPATIENT)
Dept: CARDIOLOGY | Facility: MEDICAL CENTER | Age: 63
End: 2018-02-20

## 2018-03-05 ENCOUNTER — APPOINTMENT (OUTPATIENT)
Dept: RADIOLOGY | Facility: MEDICAL CENTER | Age: 63
DRG: 872 | End: 2018-03-05
Attending: UROLOGY
Payer: MEDICAID

## 2018-03-05 ENCOUNTER — APPOINTMENT (OUTPATIENT)
Dept: RADIOLOGY | Facility: MEDICAL CENTER | Age: 63
DRG: 872 | End: 2018-03-05
Attending: EMERGENCY MEDICINE
Payer: MEDICAID

## 2018-03-05 ENCOUNTER — HOSPITAL ENCOUNTER (INPATIENT)
Facility: MEDICAL CENTER | Age: 63
LOS: 5 days | DRG: 872 | End: 2018-03-10
Attending: EMERGENCY MEDICINE | Admitting: HOSPITALIST
Payer: MEDICAID

## 2018-03-05 ENCOUNTER — RESOLUTE PROFESSIONAL BILLING HOSPITAL PROF FEE (OUTPATIENT)
Dept: HOSPITALIST | Facility: MEDICAL CENTER | Age: 63
End: 2018-03-05
Payer: MEDICAID

## 2018-03-05 DIAGNOSIS — N49.0: ICD-10-CM

## 2018-03-05 LAB
ALBUMIN SERPL BCP-MCNC: 3.3 G/DL (ref 3.2–4.9)
ALBUMIN/GLOB SERPL: 0.8 G/DL
ALP SERPL-CCNC: 63 U/L (ref 30–99)
ALT SERPL-CCNC: 14 U/L (ref 2–50)
ANION GAP SERPL CALC-SCNC: 9 MMOL/L (ref 0–11.9)
APPEARANCE UR: ABNORMAL
AST SERPL-CCNC: 14 U/L (ref 12–45)
BACTERIA #/AREA URNS HPF: ABNORMAL /HPF
BASOPHILS # BLD AUTO: 0.2 % (ref 0–1.8)
BASOPHILS # BLD: 0.03 K/UL (ref 0–0.12)
BILIRUB SERPL-MCNC: 0.4 MG/DL (ref 0.1–1.5)
BILIRUB UR QL STRIP.AUTO: NEGATIVE
BUN SERPL-MCNC: 16 MG/DL (ref 8–22)
CALCIUM SERPL-MCNC: 9 MG/DL (ref 8.5–10.5)
CHLORIDE SERPL-SCNC: 98 MMOL/L (ref 96–112)
CO2 SERPL-SCNC: 24 MMOL/L (ref 20–33)
COLOR UR: YELLOW
CREAT SERPL-MCNC: 0.86 MG/DL (ref 0.5–1.4)
EOSINOPHIL # BLD AUTO: 0.11 K/UL (ref 0–0.51)
EOSINOPHIL NFR BLD: 0.7 % (ref 0–6.9)
EPI CELLS #/AREA URNS HPF: ABNORMAL /HPF
ERYTHROCYTE [DISTWIDTH] IN BLOOD BY AUTOMATED COUNT: 40.5 FL (ref 35.9–50)
GLOBULIN SER CALC-MCNC: 4 G/DL (ref 1.9–3.5)
GLUCOSE SERPL-MCNC: 92 MG/DL (ref 65–99)
GLUCOSE UR STRIP.AUTO-MCNC: NEGATIVE MG/DL
HCT VFR BLD AUTO: 36.5 % (ref 42–52)
HGB BLD-MCNC: 12.2 G/DL (ref 14–18)
IMM GRANULOCYTES # BLD AUTO: 0.11 K/UL (ref 0–0.11)
IMM GRANULOCYTES NFR BLD AUTO: 0.7 % (ref 0–0.9)
KETONES UR STRIP.AUTO-MCNC: NEGATIVE MG/DL
LEUKOCYTE ESTERASE UR QL STRIP.AUTO: ABNORMAL
LYMPHOCYTES # BLD AUTO: 1.83 K/UL (ref 1–4.8)
LYMPHOCYTES NFR BLD: 10.9 % (ref 22–41)
MCH RBC QN AUTO: 28.4 PG (ref 27–33)
MCHC RBC AUTO-ENTMCNC: 33.4 G/DL (ref 33.7–35.3)
MCV RBC AUTO: 84.9 FL (ref 81.4–97.8)
MICRO URNS: ABNORMAL
MONOCYTES # BLD AUTO: 1.58 K/UL (ref 0–0.85)
MONOCYTES NFR BLD AUTO: 9.4 % (ref 0–13.4)
NEUTROPHILS # BLD AUTO: 13.18 K/UL (ref 1.82–7.42)
NEUTROPHILS NFR BLD: 78.1 % (ref 44–72)
NITRITE UR QL STRIP.AUTO: POSITIVE
NRBC # BLD AUTO: 0 K/UL
NRBC BLD-RTO: 0 /100 WBC
PH UR STRIP.AUTO: 5.5 [PH]
PLATELET # BLD AUTO: 312 K/UL (ref 164–446)
PMV BLD AUTO: 9.4 FL (ref 9–12.9)
POTASSIUM SERPL-SCNC: 4.1 MMOL/L (ref 3.6–5.5)
PROT SERPL-MCNC: 7.3 G/DL (ref 6–8.2)
PROT UR QL STRIP: NEGATIVE MG/DL
RBC # BLD AUTO: 4.3 M/UL (ref 4.7–6.1)
RBC # URNS HPF: ABNORMAL /HPF
RBC UR QL AUTO: ABNORMAL
SODIUM SERPL-SCNC: 131 MMOL/L (ref 135–145)
SP GR UR REFRACTOMETRY: >1.035
UROBILINOGEN UR STRIP.AUTO-MCNC: 0.2 MG/DL
WBC # BLD AUTO: 16.8 K/UL (ref 4.8–10.8)
WBC #/AREA URNS HPF: ABNORMAL /HPF

## 2018-03-05 PROCEDURE — 96367 TX/PROPH/DG ADDL SEQ IV INF: CPT

## 2018-03-05 PROCEDURE — 700105 HCHG RX REV CODE 258: Performed by: EMERGENCY MEDICINE

## 2018-03-05 PROCEDURE — 99406 BEHAV CHNG SMOKING 3-10 MIN: CPT | Performed by: HOSPITALIST

## 2018-03-05 PROCEDURE — 700101 HCHG RX REV CODE 250: Performed by: EMERGENCY MEDICINE

## 2018-03-05 PROCEDURE — 96366 THER/PROPH/DIAG IV INF ADDON: CPT

## 2018-03-05 PROCEDURE — 700105 HCHG RX REV CODE 258: Performed by: HOSPITALIST

## 2018-03-05 PROCEDURE — 81001 URINALYSIS AUTO W/SCOPE: CPT

## 2018-03-05 PROCEDURE — 700101 HCHG RX REV CODE 250: Performed by: HOSPITALIST

## 2018-03-05 PROCEDURE — 99285 EMERGENCY DEPT VISIT HI MDM: CPT

## 2018-03-05 PROCEDURE — 85025 COMPLETE CBC W/AUTO DIFF WBC: CPT

## 2018-03-05 PROCEDURE — 74177 CT ABD & PELVIS W/CONTRAST: CPT

## 2018-03-05 PROCEDURE — 700117 HCHG RX CONTRAST REV CODE 255: Performed by: EMERGENCY MEDICINE

## 2018-03-05 PROCEDURE — 304561 HCHG STAT O2

## 2018-03-05 PROCEDURE — 76870 US EXAM SCROTUM: CPT

## 2018-03-05 PROCEDURE — 96365 THER/PROPH/DIAG IV INF INIT: CPT

## 2018-03-05 PROCEDURE — 96375 TX/PRO/DX INJ NEW DRUG ADDON: CPT

## 2018-03-05 PROCEDURE — 36415 COLL VENOUS BLD VENIPUNCTURE: CPT

## 2018-03-05 PROCEDURE — 770006 HCHG ROOM/CARE - MED/SURG/GYN SEMI*

## 2018-03-05 PROCEDURE — 99223 1ST HOSP IP/OBS HIGH 75: CPT | Mod: 25 | Performed by: HOSPITALIST

## 2018-03-05 PROCEDURE — 80053 COMPREHEN METABOLIC PANEL: CPT

## 2018-03-05 PROCEDURE — 700111 HCHG RX REV CODE 636 W/ 250 OVERRIDE (IP): Performed by: EMERGENCY MEDICINE

## 2018-03-05 PROCEDURE — 71046 X-RAY EXAM CHEST 2 VIEWS: CPT

## 2018-03-05 PROCEDURE — 700111 HCHG RX REV CODE 636 W/ 250 OVERRIDE (IP): Performed by: HOSPITALIST

## 2018-03-05 RX ORDER — CLINDAMYCIN PHOSPHATE 600 MG/50ML
600 INJECTION, SOLUTION INTRAVENOUS ONCE
Status: COMPLETED | OUTPATIENT
Start: 2018-03-05 | End: 2018-03-05

## 2018-03-05 RX ORDER — BISACODYL 10 MG
10 SUPPOSITORY, RECTAL RECTAL
Status: DISCONTINUED | OUTPATIENT
Start: 2018-03-05 | End: 2018-03-10 | Stop reason: HOSPADM

## 2018-03-05 RX ORDER — POLYETHYLENE GLYCOL 3350 17 G/17G
1 POWDER, FOR SOLUTION ORAL
Status: DISCONTINUED | OUTPATIENT
Start: 2018-03-05 | End: 2018-03-10 | Stop reason: HOSPADM

## 2018-03-05 RX ORDER — NICOTINE 21 MG/24HR
14 PATCH, TRANSDERMAL 24 HOURS TRANSDERMAL
Status: DISCONTINUED | OUTPATIENT
Start: 2018-03-06 | End: 2018-03-10 | Stop reason: HOSPADM

## 2018-03-05 RX ORDER — TAMSULOSIN HYDROCHLORIDE 0.4 MG/1
0.4 CAPSULE ORAL
Status: DISCONTINUED | OUTPATIENT
Start: 2018-03-06 | End: 2018-03-10 | Stop reason: HOSPADM

## 2018-03-05 RX ORDER — AMOXICILLIN 250 MG
2 CAPSULE ORAL 2 TIMES DAILY
Status: DISCONTINUED | OUTPATIENT
Start: 2018-03-05 | End: 2018-03-10 | Stop reason: HOSPADM

## 2018-03-05 RX ORDER — DIGOXIN 125 MCG
125 TABLET ORAL EVERY EVENING
Status: DISCONTINUED | OUTPATIENT
Start: 2018-03-05 | End: 2018-03-10 | Stop reason: HOSPADM

## 2018-03-05 RX ORDER — MORPHINE SULFATE 4 MG/ML
2-4 INJECTION, SOLUTION INTRAMUSCULAR; INTRAVENOUS EVERY 4 HOURS PRN
Status: DISCONTINUED | OUTPATIENT
Start: 2018-03-05 | End: 2018-03-10 | Stop reason: HOSPADM

## 2018-03-05 RX ORDER — OXYCODONE HYDROCHLORIDE 5 MG/1
5 TABLET ORAL EVERY 4 HOURS PRN
Status: DISCONTINUED | OUTPATIENT
Start: 2018-03-05 | End: 2018-03-10 | Stop reason: HOSPADM

## 2018-03-05 RX ORDER — SODIUM CHLORIDE 9 MG/ML
2000 INJECTION, SOLUTION INTRAVENOUS CONTINUOUS
Status: DISCONTINUED | OUTPATIENT
Start: 2018-03-05 | End: 2018-03-08

## 2018-03-05 RX ORDER — SODIUM CHLORIDE 9 MG/ML
500 INJECTION, SOLUTION INTRAVENOUS
Status: DISCONTINUED | OUTPATIENT
Start: 2018-03-05 | End: 2018-03-10 | Stop reason: HOSPADM

## 2018-03-05 RX ORDER — ENALAPRIL MALEATE 2.5 MG/1
2.5 TABLET ORAL DAILY
Status: DISCONTINUED | OUTPATIENT
Start: 2018-03-05 | End: 2018-03-10 | Stop reason: HOSPADM

## 2018-03-05 RX ORDER — OXYCODONE HYDROCHLORIDE 10 MG/1
10 TABLET ORAL EVERY 4 HOURS PRN
Status: DISCONTINUED | OUTPATIENT
Start: 2018-03-05 | End: 2018-03-10 | Stop reason: HOSPADM

## 2018-03-05 RX ORDER — CLINDAMYCIN PHOSPHATE 600 MG/50ML
600 INJECTION, SOLUTION INTRAVENOUS EVERY 8 HOURS
Status: DISCONTINUED | OUTPATIENT
Start: 2018-03-05 | End: 2018-03-05

## 2018-03-05 RX ORDER — SODIUM CHLORIDE 9 MG/ML
INJECTION, SOLUTION INTRAVENOUS CONTINUOUS
Status: DISCONTINUED | OUTPATIENT
Start: 2018-03-05 | End: 2018-03-05

## 2018-03-05 RX ADMIN — VANCOMYCIN HYDROCHLORIDE 1600 MG: 100 INJECTION, POWDER, LYOPHILIZED, FOR SOLUTION INTRAVENOUS at 22:50

## 2018-03-05 RX ADMIN — SODIUM CHLORIDE 2000 ML: 9 INJECTION, SOLUTION INTRAVENOUS at 21:30

## 2018-03-05 RX ADMIN — MORPHINE SULFATE 4 MG: 4 INJECTION INTRAVENOUS at 22:44

## 2018-03-05 RX ADMIN — FENTANYL CITRATE 100 MCG: 50 INJECTION, SOLUTION INTRAMUSCULAR; INTRAVENOUS at 17:22

## 2018-03-05 RX ADMIN — CLINDAMYCIN IN 5 PERCENT DEXTROSE 600 MG: 12 INJECTION, SOLUTION INTRAVENOUS at 18:56

## 2018-03-05 RX ADMIN — SODIUM CHLORIDE: 9 INJECTION, SOLUTION INTRAVENOUS at 17:19

## 2018-03-05 RX ADMIN — IOHEXOL 100 ML: 350 INJECTION, SOLUTION INTRAVENOUS at 18:15

## 2018-03-05 RX ADMIN — WATER 2 G: 1 INJECTION INTRAMUSCULAR; INTRAVENOUS; SUBCUTANEOUS at 22:45

## 2018-03-05 ASSESSMENT — PAIN SCALES - GENERAL
PAINLEVEL_OUTOF10: 6
PAINLEVEL_OUTOF10: 4
PAINLEVEL_OUTOF10: 3
PAINLEVEL_OUTOF10: 8
PAINLEVEL_OUTOF10: 3

## 2018-03-05 NOTE — ED NOTES
"Pt bib [facility], arrives to triage via wheelchair with c/c left groin pain & left testicle swelling x2 days- \"It is as big as 2 oranges.\"  A&ox4.  Appears uncomfortable.  Pt to lobby & advised to inform RN of any changes.    "

## 2018-03-06 PROBLEM — Z72.0 TOBACCO ABUSE: Status: ACTIVE | Noted: 2018-03-06

## 2018-03-06 PROBLEM — A41.9 SEPSIS (HCC): Status: ACTIVE | Noted: 2018-03-06

## 2018-03-06 PROBLEM — N45.2 ORCHITIS: Status: ACTIVE | Noted: 2018-03-06

## 2018-03-06 LAB
APPEARANCE UR: ABNORMAL
BACTERIA #/AREA URNS HPF: NEGATIVE /HPF
BILIRUB UR QL STRIP.AUTO: NEGATIVE
COLOR UR: YELLOW
EPI CELLS #/AREA URNS HPF: NEGATIVE /HPF
GLUCOSE UR STRIP.AUTO-MCNC: NEGATIVE MG/DL
HYALINE CASTS #/AREA URNS LPF: ABNORMAL /LPF
KETONES UR STRIP.AUTO-MCNC: ABNORMAL MG/DL
LEUKOCYTE ESTERASE UR QL STRIP.AUTO: ABNORMAL
MICRO URNS: ABNORMAL
NITRITE UR QL STRIP.AUTO: NEGATIVE
PH UR STRIP.AUTO: 5 [PH]
PROT UR QL STRIP: NEGATIVE MG/DL
RBC # URNS HPF: ABNORMAL /HPF
RBC UR QL AUTO: ABNORMAL
SP GR UR STRIP.AUTO: 1.02
UROBILINOGEN UR STRIP.AUTO-MCNC: 0.2 MG/DL
WBC #/AREA URNS HPF: ABNORMAL /HPF

## 2018-03-06 PROCEDURE — 700102 HCHG RX REV CODE 250 W/ 637 OVERRIDE(OP): Performed by: UROLOGY

## 2018-03-06 PROCEDURE — 700101 HCHG RX REV CODE 250: Performed by: HOSPITALIST

## 2018-03-06 PROCEDURE — 770006 HCHG ROOM/CARE - MED/SURG/GYN SEMI*

## 2018-03-06 PROCEDURE — 87591 N.GONORRHOEAE DNA AMP PROB: CPT

## 2018-03-06 PROCEDURE — A9270 NON-COVERED ITEM OR SERVICE: HCPCS | Performed by: HOSPITALIST

## 2018-03-06 PROCEDURE — A9270 NON-COVERED ITEM OR SERVICE: HCPCS | Performed by: UROLOGY

## 2018-03-06 PROCEDURE — 700105 HCHG RX REV CODE 258: Performed by: HOSPITALIST

## 2018-03-06 PROCEDURE — 81001 URINALYSIS AUTO W/SCOPE: CPT

## 2018-03-06 PROCEDURE — 700111 HCHG RX REV CODE 636 W/ 250 OVERRIDE (IP): Performed by: HOSPITALIST

## 2018-03-06 PROCEDURE — 700102 HCHG RX REV CODE 250 W/ 637 OVERRIDE(OP): Performed by: HOSPITALIST

## 2018-03-06 PROCEDURE — 99232 SBSQ HOSP IP/OBS MODERATE 35: CPT | Performed by: HOSPITALIST

## 2018-03-06 PROCEDURE — 87491 CHLMYD TRACH DNA AMP PROBE: CPT

## 2018-03-06 RX ADMIN — TAMSULOSIN HYDROCHLORIDE 0.4 MG: 0.4 CAPSULE ORAL at 09:59

## 2018-03-06 RX ADMIN — OXYCODONE HYDROCHLORIDE 10 MG: 10 TABLET ORAL at 01:34

## 2018-03-06 RX ADMIN — OXYCODONE HYDROCHLORIDE 10 MG: 10 TABLET ORAL at 09:59

## 2018-03-06 RX ADMIN — DIGOXIN 125 MCG: 125 TABLET ORAL at 01:34

## 2018-03-06 RX ADMIN — ENALAPRIL MALEATE 2.5 MG: 2.5 TABLET ORAL at 19:59

## 2018-03-06 RX ADMIN — DOXYCYCLINE 100 MG: 100 INJECTION, POWDER, LYOPHILIZED, FOR SOLUTION INTRAVENOUS at 19:59

## 2018-03-06 RX ADMIN — ENALAPRIL MALEATE 2.5 MG: 2.5 TABLET ORAL at 01:34

## 2018-03-06 RX ADMIN — VANCOMYCIN HYDROCHLORIDE 1000 MG: 100 INJECTION, POWDER, LYOPHILIZED, FOR SOLUTION INTRAVENOUS at 09:59

## 2018-03-06 RX ADMIN — WATER 2 G: 1 INJECTION INTRAMUSCULAR; INTRAVENOUS; SUBCUTANEOUS at 21:00

## 2018-03-06 RX ADMIN — DIGOXIN 125 MCG: 125 TABLET ORAL at 19:59

## 2018-03-06 ASSESSMENT — ENCOUNTER SYMPTOMS
DIZZINESS: 0
ABDOMINAL PAIN: 0
SHORTNESS OF BREATH: 0
DIARRHEA: 0
HEARTBURN: 0
FEVER: 0
VOMITING: 0
SORE THROAT: 0
NECK PAIN: 0
HEMOPTYSIS: 0
MYALGIAS: 0
DOUBLE VISION: 0
PHOTOPHOBIA: 0
DEPRESSION: 0
FOCAL WEAKNESS: 0
BRUISES/BLEEDS EASILY: 0
BLURRED VISION: 0
PALPITATIONS: 0
HEADACHES: 0
CHILLS: 0
WHEEZING: 0
TINGLING: 0
COUGH: 0
NAUSEA: 0

## 2018-03-06 ASSESSMENT — PAIN SCALES - GENERAL
PAINLEVEL_OUTOF10: 4
PAINLEVEL_OUTOF10: 4
PAINLEVEL_OUTOF10: 3

## 2018-03-06 ASSESSMENT — LIFESTYLE VARIABLES
EVER HAD A DRINK FIRST THING IN THE MORNING TO STEADY YOUR NERVES TO GET RID OF A HANGOVER: YES
HAVE PEOPLE ANNOYED YOU BY CRITICIZING YOUR DRINKING: NO
ON A TYPICAL DAY WHEN YOU DRINK ALCOHOL HOW MANY DRINKS DO YOU HAVE: 2
ALCOHOL_USE: YES
EVER_SMOKED: YES
AVERAGE NUMBER OF DAYS PER WEEK YOU HAVE A DRINK CONTAINING ALCOHOL: 1
TOTAL SCORE: 1
TOTAL SCORE: 1
HOW MANY TIMES IN THE PAST YEAR HAVE YOU HAD 5 OR MORE DRINKS IN A DAY: 5
CONSUMPTION TOTAL: POSITIVE
TOTAL SCORE: 1
HAVE YOU EVER FELT YOU SHOULD CUT DOWN ON YOUR DRINKING: NO
EVER FELT BAD OR GUILTY ABOUT YOUR DRINKING: NO

## 2018-03-06 ASSESSMENT — PATIENT HEALTH QUESTIONNAIRE - PHQ9
1. LITTLE INTEREST OR PLEASURE IN DOING THINGS: NOT AT ALL
SUM OF ALL RESPONSES TO PHQ QUESTIONS 1-9: 0
2. FEELING DOWN, DEPRESSED, IRRITABLE, OR HOPELESS: NOT AT ALL
SUM OF ALL RESPONSES TO PHQ9 QUESTIONS 1 AND 2: 0

## 2018-03-06 NOTE — ED PROVIDER NOTES
ED Provider Note    Scribed for Trudy Hernandez M.D. by Merrick Penaloza. 3/5/2018, 4:41 PM.    Primary care provider: NEY Ledesma  Means of arrival: Walk in  History obtained from: Patient  History limited by: None    CHIEF COMPLAINT  Chief Complaint   Patient presents with   • Testicle Pain     left    • Groin Pain       HPI  Sylvain Espinosa is a 62 y.o. male who presents to the Emergency Department complaining of testicle pain and swelling, left greater than right. Patient noticed the pain two weeks ago. The swelling started a couple of days ago. He denies any recent trauma to the area. Patient denies any associated trouble urinating, abnormal bowel movements, fevers, vomiting, leg swelling, or abdominal pain. He does not report any other associated symptoms at this time. He has a history of prostate problems. Patient denies history of diabetes.       REVIEW OF SYSTEMS  Pertinent positives include testicle pain and swelling. Pertinent negatives include no trouble urinating, abnormal bowel movements, fevers, vomiting, leg swelling, or abdominal pain. As above, all other systems reviewed and are negative.   See HPI for further details.   C    PAST MEDICAL HISTORY   has a past medical history of Abnormality of left ventricle of heart and Hypertension.    SURGICAL HISTORY  Past Surgical History:   Procedure Laterality Date   • TONSILLECTOMY         SOCIAL HISTORY  Social History   Substance Use Topics   • Smoking status: Current Some Day Smoker     Packs/day: 0.20     Years: 40.00     Types: Cigarettes   • Smokeless tobacco: Never Used   • Alcohol use Yes      Comment: occ      History   Drug Use   • Types: Inhaled     Comment: marijuana current,  meth last use 1 monht ago       FAMILY HISTORY  Family History   Problem Relation Age of Onset   • Heart Disease Mother        CURRENT MEDICATIONS  No current facility-administered medications on file prior to encounter.      Current Outpatient Prescriptions on  "File Prior to Encounter   Medication Sig Dispense Refill   • enalapril (VASOTEC) 5 MG Tab Take 2.5 mg by mouth every day.     • digoxin (LANOXIN) 125 MCG Tab Take 125 mcg by mouth every day.     • aspirin EC (ECOTRIN) 81 MG Tablet Delayed Response Take 81 mg by mouth every day.        ALLERGIES  No Known Allergies    PHYSICAL EXAM  VITAL SIGNS: /73   Pulse (!) 103   Temp 36.9 °C (98.5 °F)   Resp 16   Ht 1.727 m (5' 8\")   Wt 64.9 kg (143 lb)   SpO2 96%   BMI 21.74 kg/m²   Vitals reviewed.  Consitutional: Well-developed, thin, mildly disheveled. Negative for: distress.  HENT: Normocephalic, right external ear normal, left external ear normal, oropharynx clear and moist.  Eyes: Conjunctivae normal, extraocular movements normal. Negative for: discharge in right and left eye, icterus.  Neck: Range of motion normal, supple. Negative for cervical adenopathy.  Cardiovascular: Moderately tachycardic, regular rhythm, heart sounds normal, intact distal pulses. Negative for: murmur, rub, gallop.  Pulmonary/Chest Wall: Effort normal, breath sounds normal. Negative for: respiratory distress, wheezes, rales, rhonchi.   Abdominal: Soft, bowel sounds normal. Negative for: distention, tenderness, rebound, guarding.  : Tenderness, swelling, and induration to entire left testicle and left inguinal canal. Enlarged spermatic cord. No tenderness to perineum. Right testicle is only minimally tender and appears to be only slightly enlarged.  Musculoskeletal: Normal range of motion. Negative for edema.  Neurological: Alert and oriented x3. No focal deficits.  Skin: Hot, sweaty. Negative for rash.  Psych: Mood/affect normal, behavior normal, judgment normal.      DIAGNOSTIC STUDIES / PROCEDURES    LABS  Results for orders placed or performed during the hospital encounter of 03/05/18   CBC WITH DIFFERENTIAL   Result Value Ref Range    WBC 16.8 (H) 4.8 - 10.8 K/uL    RBC 4.30 (L) 4.70 - 6.10 M/uL    Hemoglobin 12.2 (L) 14.0 - " 18.0 g/dL    Hematocrit 36.5 (L) 42.0 - 52.0 %    MCV 84.9 81.4 - 97.8 fL    MCH 28.4 27.0 - 33.0 pg    MCHC 33.4 (L) 33.7 - 35.3 g/dL    RDW 40.5 35.9 - 50.0 fL    Platelet Count 312 164 - 446 K/uL    MPV 9.4 9.0 - 12.9 fL    Neutrophils-Polys 78.10 (H) 44.00 - 72.00 %    Lymphocytes 10.90 (L) 22.00 - 41.00 %    Monocytes 9.40 0.00 - 13.40 %    Eosinophils 0.70 0.00 - 6.90 %    Basophils 0.20 0.00 - 1.80 %    Immature Granulocytes 0.70 0.00 - 0.90 %    Nucleated RBC 0.00 /100 WBC    Neutrophils (Absolute) 13.18 (H) 1.82 - 7.42 K/uL    Lymphs (Absolute) 1.83 1.00 - 4.80 K/uL    Monos (Absolute) 1.58 (H) 0.00 - 0.85 K/uL    Eos (Absolute) 0.11 0.00 - 0.51 K/uL    Baso (Absolute) 0.03 0.00 - 0.12 K/uL    Immature Granulocytes (abs) 0.11 0.00 - 0.11 K/uL    NRBC (Absolute) 0.00 K/uL   COMP METABOLIC PANEL   Result Value Ref Range    Sodium 131 (L) 135 - 145 mmol/L    Potassium 4.1 3.6 - 5.5 mmol/L    Chloride 98 96 - 112 mmol/L    Co2 24 20 - 33 mmol/L    Anion Gap 9.0 0.0 - 11.9    Glucose 92 65 - 99 mg/dL    Bun 16 8 - 22 mg/dL    Creatinine 0.86 0.50 - 1.40 mg/dL    Calcium 9.0 8.5 - 10.5 mg/dL    AST(SGOT) 14 12 - 45 U/L    ALT(SGPT) 14 2 - 50 U/L    Alkaline Phosphatase 63 30 - 99 U/L    Total Bilirubin 0.4 0.1 - 1.5 mg/dL    Albumin 3.3 3.2 - 4.9 g/dL    Total Protein 7.3 6.0 - 8.2 g/dL    Globulin 4.0 (H) 1.9 - 3.5 g/dL    A-G Ratio 0.8 g/dL   ESTIMATED GFR   Result Value Ref Range    GFR If African American >60 >60 mL/min/1.73 m 2    GFR If Non African American >60 >60 mL/min/1.73 m 2      All labs reviewed by me.      RADIOLOGY  CT-ABDOMEN-PELVIS WITH   Final Result      1.6 x 1.6 x 1.3 cm peripherally enhancing collection in the region of the left seminal vesicle. This could represent a small abscess.      Decompressed bladder, limiting evaluation. The bladder does appear somewhat thick-walled. Correlation with urinalysis is recommended as this can be seen in setting of cystitis.      Small amount of free  fluid in the abdomen and pelvis.      Nonvisualization of the appendix, limiting evaluation for appendicitis.      Moderate amount of colonic stool.      Mildly heterogeneous enhancement of the upper pole of the right kidney. Correlation with urinalysis is recommended as this can be seen in the setting of pyelonephritis.      Hyperenhancing 2.5 x 2.5 cm lesion in the right lobe of the liver. This could represent a hemangioma. Hepatic protocol MRI may be performed for definitive characterization.      Infrarenal aortic aneurysm measuring 3.1 cm. Atherosclerotic plaque.      Trace bilateral pleural effusions. Emphysematous changes.         [id number]   Final Result      1.  Enlarged hypervascular right epididymis consistent with epididymitis.      2.  Again seen large complex left hydrocele.      3.  No evidence of testicular torsion or testicular mass.      4.  Small right epididymal cyst.      5.  Small right varicocele.      6.  Small right hydrocele.      CT-ABDOMEN-PELVIS W/O   Final Result      Anasarca.   No evidence of bowel obstruction.   No hydronephrosis.   Marked mesenteric and retroperitoneal edema. Small pelvic free fluid.   Small bibasilar pleural effusions and dependent atelectasis within the lungs.      DX-CHEST-2 VIEWS   Final Result         1.  A near densities in the bilateral lung bases, similar to prior, appearance suggests scarring or chronic atelectasis.   2.  Atherosclerosis   3.  Perihilar interstitial prominence and bronchial wall cuffing, appearance suggests changes of underlying bronchial inflammation, consider bronchitis.   4.  Hyperexpansion of the lungs suggest changes of COPD.      [id number]   Final Result         1.  Increased size and vascularity of the right epididymis, appearance compatible with epididymitis.   2.  Large complex left hydrocele with septation and debris, recommend radiographic follow-up.   3.  Small right hydrocele with debris.   4.  Bilateral  small epididymal cysts.   5.  Right varicocele      CT-ABDOMEN-PELVIS WITH   Final Result      1.  Medium-sized left, small right hydroceles      2.  Left hemipelvis enhancing 24 x 18 mm cystic process is suspicious for the left seminal vesicle and could indicate seminal vesicle infection. Abscess is possible.      3.  3 cm infrarenal aortic ectasia is minimally enlarged      4.  Stable aortic valvular calcifications which likely indicate stenosis      5.  Severe emphysema with bleb and bullae formation        The radiologist's interpretation of all radiological studies have been reviewed by me.    COURSE & MEDICAL DECISION MAKING  Nursing notes, VS, PMSFHx reviewed in chart.      4:41 PM - Patient seen and examined at bedside. The patient presents with testicle pain and swelling and the differential diagnosis includes but is not limited to rupture, abscess, orchitis, necrotizing fasciitis, hydrocele. Discussed plan of care which includes pain medication, CT evaluation, and labs. He understands and agrees. Ordered for CT abdomen-pelvis, CBC, CMP, UA to evaluate. Patient will be treated with sublimaze 100 mcg, NS infusion because he is NPO, Clindamycin for liekly infection.       Response to IV fluids: Tachycardia has improved.     6:50 PM Page out for urology and US scrotum ordered.    7:13 PM 2nd page out for Dr Cox.    7:25 PM Dr Cox called back and will consult from urology.  He wants the patient NPO, admitted on antibiotics.  Page out for hospitalist    7:31 PM I spoke with Dr. Wilkins. She is happy to admit the patient. Transfer of care is at 7:45 PM. I reevaluated the patient. He was sleeping soundly and understands the plan of care. He states he is feeling somewhat better and has urinated dark abbey urine which has been sent to the lab.    Disposition:  Admitted to Dr. Wilkins, hospitalist, in guarded condition    FINAL IMPRESSION  1. Abscess of seminal vesicle          I, Merrick Penaloza (Scribe),  am scribing for, and in the presence of, Trudy Hernandez M.D..    Electronically signed by: Merrick Penaloza (Scribe), 3/5/2018    ITrudy M.D. personally performed the services described in this documentation, as scribed by Merrick Penaloza in my presence, and it is both accurate and complete.    The note accurately reflects work and decisions made by me.  Trudy Hernandez  3/5/2018  7:32 PM

## 2018-03-06 NOTE — ED NOTES
PIV placed, blood has been drawn and sent.  Pt medicated per MAR.  NS infusing.  Urinal bedside.  Pt awaiting CT scan.  Updated on POC.  All needs met.

## 2018-03-06 NOTE — H&P
" Hospital Medicine History and Physical    Date of Service  3/5/2018    Chief Complaint  Chief Complaint   Patient presents with   • Testicle Pain     left    • Groin Pain       History of Presenting Illness  62 y.o. male who presented on 3/5/2018 with testicular pain. The patient reports that his symptoms began 2 weeks ago with mild pain. However in the last several days, he noted swelling to the left testicle. He reports no trauma to the area, no lesions, no bruising or scratches. He has had no dysuria, increased urgency or frequency. Prior to this, he was in his usual state of health with no fevers, chills, nausea, vomiting, headache, chest pain, shortness of breath abdominal pain, or diarrhea.      Primary Care Physician  GARETH Ledesma.    Consultants  Dr. Remy, urology    Code Status  Full    Review of Systems  Review of Systems   Constitutional: Negative for chills and fever.   HENT: Negative for congestion and sore throat.    Eyes: Negative for photophobia.   Respiratory: Negative for cough, shortness of breath and wheezing.    Cardiovascular: Negative for chest pain and palpitations.   Gastrointestinal: Negative for abdominal pain, diarrhea, nausea and vomiting.   Genitourinary: Negative for dysuria.        Testicular swelling and pain, left sided   Musculoskeletal: Negative for myalgias.   Skin: Negative.    Neurological: Negative for dizziness, tingling, focal weakness and headaches.   Psychiatric/Behavioral: Negative for depression and suicidal ideas.        Past Medical History  Past Medical History:   Diagnosis Date   • Abnormality of left ventricle of heart     \"constricted left ventricle\"    • Hypertension        Surgical History  Past Surgical History:   Procedure Laterality Date   • TONSILLECTOMY         Medications  No current facility-administered medications on file prior to encounter.      Current Outpatient Prescriptions on File Prior to Encounter   Medication Sig Dispense Refill "   • enalapril (VASOTEC) 5 MG Tab Take 2.5 mg by mouth every day.     • digoxin (LANOXIN) 125 MCG Tab Take 125 mcg by mouth every evening.     • aspirin EC (ECOTRIN) 81 MG Tablet Delayed Response Take 81 mg by mouth every day.         Family History  Family History   Problem Relation Age of Onset   • Heart Disease Mother        Social History  Social History   Substance Use Topics   • Smoking status: Current Some Day Smoker     Packs/day: 0.20     Years: 40.00     Types: Cigarettes   • Smokeless tobacco: Never Used   • Alcohol use Yes      Comment: occ       Allergies  No Known Allergies     Physical Exam  Laboratory   Hemodynamics  Temp (24hrs), Av.9 °C (98.5 °F), Min:36.9 °C (98.5 °F), Max:36.9 °C (98.5 °F)   Temperature: 36.9 °C (98.5 °F)  Pulse  Av.3  Min: 102  Max: 112    Blood Pressure: 136/73, NIBP: 121/81      Respiratory      Respiration: 16, Pulse Oximetry: 98 %             Physical Exam   Constitutional: He is oriented to person, place, and time. No distress.   HENT:   Head: Normocephalic and atraumatic.   Right Ear: External ear normal.   Left Ear: External ear normal.   Eyes: EOM are normal. Right eye exhibits no discharge. Left eye exhibits no discharge.   Neck: Neck supple. No JVD present.   Cardiovascular: Normal rate, regular rhythm and normal heart sounds.    Pulmonary/Chest: Effort normal and breath sounds normal. No respiratory distress. He exhibits no tenderness.   Abdominal: Soft. Bowel sounds are normal. He exhibits no distension. There is no tenderness.   Genitourinary:   Genitourinary Comments: Left testicle swollen and erythematous, exquisitely tender   Musculoskeletal: He exhibits no edema.   Neurological: He is alert and oriented to person, place, and time. No cranial nerve deficit.   Skin: Skin is dry. He is not diaphoretic. No erythema.   Psychiatric: He has a normal mood and affect. His behavior is normal.   Nursing note and vitals reviewed.    Capillary refill less than 3  seconds, distal pulses intact    Recent Labs      03/05/18   1717   WBC  16.8*   RBC  4.30*   HEMOGLOBIN  12.2*   HEMATOCRIT  36.5*   MCV  84.9   MCH  28.4   MCHC  33.4*   RDW  40.5   PLATELETCT  312   MPV  9.4     Recent Labs      03/05/18   1717   SODIUM  131*   POTASSIUM  4.1   CHLORIDE  98   CO2  24   GLUCOSE  92   BUN  16   CREATININE  0.86   CALCIUM  9.0     Recent Labs      03/05/18   1717   ALTSGPT  14   ASTSGOT  14   ALKPHOSPHAT  63   TBILIRUBIN  0.4   GLUCOSE  92                 Lab Results   Component Value Date    TROPONINI 0.02 07/04/2017       Imaging  Ct-abdomen-pelvis With    Result Date: 3/5/2018  3/5/2018 5:51 PM HISTORY/REASON FOR EXAM:  LLQ Pain, left testicular swelling for 2 days TECHNIQUE/EXAM DESCRIPTION: CT of the abdomen and pelvis with contrast. Contrast-enhanced helical scanning was obtained from the diaphragmatic domes through the pubic symphysis following the bolus administration of 100 mL of nonionic contrast (Omnipaque 350) without complication. Low dose optimization technique was utilized for this CT exam including automated exposure control and adjustment of the mA and/or kV according to patient size. COMPARISON: 1/14/2015 FINDINGS: The visualized lung bases show no consolidation. Emphysematous change with areas of bleb and bullae formation. Right lower lobe atelectasis Aortic valve calcification CT Abdomen: The spleen, pancreas, gallbladder, adrenal glands enhance normally. Hyper enhancing right hepatic 22 x 31 mm mass most likely is a hemangioma Kidneys again shows some peripheral areas of decreased density, heterogeneous enhancement but solid masses are not seen. There is a stable 1 cm left interpolar cortical cyst. No hydronephrosis There is no abnormal bowel dilatation or inflammatory change. Possible normal gas-filled appendix overlying the right common iliac artery There is no lymphadenopathy or aneurysmal change of the aorta. 30 mm from 29 mm infrarenal aortic ectasia.  Considerable atherosclerosis CT Pelvis: There is a medium sized left hydrocele. Small right hydrocele. No inguinal hernia There is a left hemipelvis 18 x 24 mm rim-enhancing process appears to correspond to the left seminal vesicle although there is generalized fat stranding and a paucity of fat said this is difficult to definitively characterize Prostate is moderately enlarged. No lymphadenopathy. There is no free air or free fluid.     1.  Medium-sized left, small right hydroceles 2.  Left hemipelvis enhancing 24 x 18 mm cystic process is suspicious for the left seminal vesicle and could indicate seminal vesicle infection. Abscess is possible. 3.  3 cm infrarenal aortic ectasia is minimally enlarged 4.  Stable aortic valvular calcifications which likely indicate stenosis 5.  Severe emphysema with bleb and bullae formation     Assessment/Plan     Anticipate that patient will need greater than 2 midnights for management of the discussed medical issues.    This dictation was created using voice recognition software. The accuracy of the dictation is limited to the abilities of the software. I expect there may be some errors of grammar and possibly content.    * Orchitis   Assessment & Plan    Epididymitis also noted on ultrasound. CT shows possible abscess. The patient has leukocytosis, he is tachycardic, but otherwise he is afebrile and his vital signs are stable with a normal blood pressure. I have started him on IV ceftriaxone and vancomycin. I will check a urinalysis, chlamydia and gonorrhea. He will receive maintenance IV fluids and we will await blood cultures and any cultures obtained intraoperatively to monitor for speciation and sensitivities. He will be kept nothing by mouth in the event that urology would like to proceed to the operating room in the morning. He'll receive symptomatic management for pain.        Sepsis (CMS-[facility])   Assessment & Plan    Treatment as noted above, no evidence of end organ  damage.        HTN (hypertension)- (present on admission)   Assessment & Plan    Patient is currently normotensive, will continue him on his home digoxin and enalapril.        Tobacco abuse   Assessment & Plan    Greater than 3 minutes were spent at bedside and tobacco cessation counseling, nicotine replacement ordered, we will continue to encourage cessation during this hospitalization.          Prophylaxis: Sequential compression devices for DVT prophylaxis, no PPI indicated, stool softeners ordered

## 2018-03-06 NOTE — PROGRESS NOTES
"Urology consult note, follow up  Reason for ongoing consultation: epididymitis/prostatitis    Overnight Events: None    S: No fevers, chills, nausea or vomiting. Voiding well. Feels better    O:   Blood pressure 122/87, pulse 78, temperature 37.1 °C (98.7 °F), resp. rate 16, height 1.727 m (5' 8\"), weight 64.9 kg (143 lb), SpO2 93 %.  Recent Labs      03/05/18   1717   SODIUM  131*   POTASSIUM  4.1   CHLORIDE  98   CO2  24   GLUCOSE  92   BUN  16   CREATININE  0.86   CALCIUM  9.0     Recent Labs      03/05/18   1717   WBC  16.8*   RBC  4.30*   HEMOGLOBIN  12.2*   HEMATOCRIT  36.5*   MCV  84.9   MCH  28.4   MCHC  33.4*   RDW  40.5   PLATELETCT  312   MPV  9.4         Exam:  Abdomen soft, benign.  : left testis tender, swollen. No change. No signs of abcess or scrotal necrosis   Urine: clear      A/P:    Active Hospital Problems    Diagnosis   • Orchitis [N45.2]     Priority: High   • Sepsis (CMS-HCC) [A41.9]     Priority: High   • Tobacco abuse [Z72.0]     Priority: Low   • HTN (hypertension) [I10]       Stable.   New recommendations: daily cbc. Check cultures. Plan for f/u CT in 2 to 3 days to see if abcess develops in pelvis     "

## 2018-03-06 NOTE — CONSULTS
DATE OF SERVICE:  03/05/2018    EMERGENCY ROOM CONSULTATION    REASON FOR CONSULTATION:  Acute epididymal orchitis and possible seminal   vesicle abscess.    HISTORY OF PRESENT ILLNESS:  The patient is a 61-year-old gentleman.  He has a   prior history of benign prostatic hyperplasia.  He presents with increased   pain and swelling in the left testicle.  He denies prior similar problems.  He   has not had prior urinary tract infections or prostatitis.  No history of   stones.    PAST MEDICAL HISTORY:  Hypertension.    SOCIAL HISTORY:  Has a long smoking history.    PAST SURGICAL HISTORY:  None.    PHYSICAL EXAMINATION:  GENERAL:  He is awake.  He is in mild distress.  He is coughing with some   sputum production.  ABDOMEN:  Soft, nontender, no masses.  GENITOURINARY:  Shows a normal uncircumcised penis.  Right testicle is normal.    Left hemiscrotum is slightly reddened, but no signs of necrosis.  The   testicle is 3 times normal size, firm and tender consistent with epididymal   orchitis with extension into the inguinal canal.  Rectal exam shows no   palpable masses.  He is tender with some induration of the left area of the   prostate.  EXTREMITIES:  No edema.    LABORATORY DATA:  White blood count is 16.8, creatinine 0.86.  Urinalysis   positive nitrites, moderate blood, large white cells, many bacteria.    DIAGNOSTIC DATA:  CT scan shows normal kidneys.  There is some swelling and   inflammation, some decreased contrast areas in the left prostate, left seminal   vesicle area suggestive of acute prostatitis with possible abscess formation.    Bladder is moderately distended.    ASSESSMENT:  Probable acute prostatitis, seminal vesiculitis and epididymal   orchitis.  At this point, I do not see anything that would be drainable, need   to be drained.  He is on an alpha blocker at home with doxazosin.  I will just   change it to Flomax at the hospital.  He is on systemic antibiotics.  We will   need to monitor that  for resolution.  He will likely require then future   imaging to see if there is progression towards the true abscess in the   prostate.       ____________________________________     MD HARDIK BAUTISTA / CARA    DD:  03/05/2018 20:39:15  DT:  03/05/2018 21:20:53    D#:  [id number]  Job#:  [id number]

## 2018-03-06 NOTE — ASSESSMENT & PLAN NOTE
Epididymitis also noted on ultrasound. CT shows possible abscess. The patient has leukocytosis, he is tachycardic, but otherwise he is afebrile and his vital signs are stable with a normal blood pressure. I have started him on IV ceftriaxone and vancomycin. I will check a urinalysis, chlamydia and gonorrhea. He will receive maintenance IV fluids and we will await blood cultures and any cultures obtained intraoperatively to monitor for speciation and sensitivities. He will be kept nothing by mouth in the event that urology would like to proceed to the operating room in the morning. He'll receive symptomatic management for pain.  3/7 had ct abdomen today w/o contrast showed Marked mesenteric and retroperitoneal edema. Small pelvic free fluid. Might need to repeat ct with contrast. Leukocytosis improving.   3/8 repeat ct with contrast today showed small abscess, ID consulted and possible drainage by urology.   3/9 as per urologist no need for drainage of small abscess, on PO atb, will probably d/c tomorrow if ok with urologist/ID.

## 2018-03-06 NOTE — PROGRESS NOTES
"Pharmacy Kinetics 62 y.o. male on vancomycin day # 1 3/6/2018    Currently on Vancomycin new start    Indication for Treatment: epididymal orchitis with possible abscess     Pertinent history per medical record: Admitted on 3/5/2018 for worsening pain and swelling in the L testicle.  Patient was found to have L seminal vesicle infection with possible abscess 24 x 18 mm. Urology consulted. Antibiotics initiated.     Other antibiotics: ceftriaxone 2 g IV q24h    Allergies: Patient has no known allergies.     List concerns for renal function: age    Pertinent cultures to date:   none    Recent Labs      18   1717   WBC  16.8*   NEUTSPOLYS  78.10*     Recent Labs      18   1717   BUN  16   CREATININE  0.86   ALBUMIN  3.3      Blood pressure 136/73, pulse 94, temperature 36.9 °C (98.5 °F), resp. rate 17, height 1.727 m (5' 8\"), weight 64.9 kg (143 lb), SpO2 100 %. Temp (24hrs), Av.9 °C (98.5 °F), Min:36.9 °C (98.5 °F), Max:36.9 °C (98.5 °F)      A/P   1. Vancomycin dose change: initiate 15 mg/kg q12h  2. Next vancomycin level: prior to 4th total dose (not yet ordered)  3. Goal trough: 12-16 mcg/mL  4. Comments: new start vanco for SSTI. Urology consulted, no current plans to drain abscess. Minimal concerns for renal insult and accumulation. Will dose per protocol and plan for a level prior to the 4th total dose. Will follow.    Capri Rodriguez, PharmD    "

## 2018-03-07 ENCOUNTER — APPOINTMENT (OUTPATIENT)
Dept: RADIOLOGY | Facility: MEDICAL CENTER | Age: 63
DRG: 872 | End: 2018-03-07
Attending: NURSE PRACTITIONER
Payer: MEDICAID

## 2018-03-07 LAB
ANION GAP SERPL CALC-SCNC: 8 MMOL/L (ref 0–11.9)
BASOPHILS # BLD AUTO: 0.1 % (ref 0–1.8)
BASOPHILS # BLD: 0.02 K/UL (ref 0–0.12)
BUN SERPL-MCNC: 18 MG/DL (ref 8–22)
C TRACH DNA SPEC QL NAA+PROBE: NEGATIVE
CALCIUM SERPL-MCNC: 8.5 MG/DL (ref 8.5–10.5)
CHLORIDE SERPL-SCNC: 99 MMOL/L (ref 96–112)
CO2 SERPL-SCNC: 24 MMOL/L (ref 20–33)
CREAT SERPL-MCNC: 1.02 MG/DL (ref 0.5–1.4)
CRP SERPL HS-MCNC: 10.03 MG/DL (ref 0–0.75)
EOSINOPHIL # BLD AUTO: 0.23 K/UL (ref 0–0.51)
EOSINOPHIL NFR BLD: 1.6 % (ref 0–6.9)
ERYTHROCYTE [DISTWIDTH] IN BLOOD BY AUTOMATED COUNT: 38.7 FL (ref 35.9–50)
ERYTHROCYTE [SEDIMENTATION RATE] IN BLOOD BY WESTERGREN METHOD: 86 MM/HOUR (ref 0–20)
GLUCOSE SERPL-MCNC: 162 MG/DL (ref 65–99)
HCT VFR BLD AUTO: 37.2 % (ref 42–52)
HGB BLD-MCNC: 12.4 G/DL (ref 14–18)
IMM GRANULOCYTES # BLD AUTO: 0.06 K/UL (ref 0–0.11)
IMM GRANULOCYTES NFR BLD AUTO: 0.4 % (ref 0–0.9)
LYMPHOCYTES # BLD AUTO: 1.07 K/UL (ref 1–4.8)
LYMPHOCYTES NFR BLD: 7.6 % (ref 22–41)
MCH RBC QN AUTO: 28.6 PG (ref 27–33)
MCHC RBC AUTO-ENTMCNC: 33.3 G/DL (ref 33.7–35.3)
MCV RBC AUTO: 85.7 FL (ref 81.4–97.8)
MONOCYTES # BLD AUTO: 0.85 K/UL (ref 0–0.85)
MONOCYTES NFR BLD AUTO: 6 % (ref 0–13.4)
N GONORRHOEA DNA SPEC QL NAA+PROBE: NEGATIVE
NEUTROPHILS # BLD AUTO: 11.83 K/UL (ref 1.82–7.42)
NEUTROPHILS NFR BLD: 84.3 % (ref 44–72)
NRBC # BLD AUTO: 0 K/UL
NRBC BLD-RTO: 0 /100 WBC
PLATELET # BLD AUTO: 324 K/UL (ref 164–446)
PMV BLD AUTO: 9.5 FL (ref 9–12.9)
POTASSIUM SERPL-SCNC: 3.6 MMOL/L (ref 3.6–5.5)
RBC # BLD AUTO: 4.34 M/UL (ref 4.7–6.1)
SODIUM SERPL-SCNC: 131 MMOL/L (ref 135–145)
SPECIMEN SOURCE: NORMAL
WBC # BLD AUTO: 14.1 K/UL (ref 4.8–10.8)

## 2018-03-07 PROCEDURE — 36415 COLL VENOUS BLD VENIPUNCTURE: CPT

## 2018-03-07 PROCEDURE — 74176 CT ABD & PELVIS W/O CONTRAST: CPT

## 2018-03-07 PROCEDURE — 700105 HCHG RX REV CODE 258: Performed by: HOSPITALIST

## 2018-03-07 PROCEDURE — A9270 NON-COVERED ITEM OR SERVICE: HCPCS | Performed by: HOSPITALIST

## 2018-03-07 PROCEDURE — 700102 HCHG RX REV CODE 250 W/ 637 OVERRIDE(OP): Performed by: HOSPITALIST

## 2018-03-07 PROCEDURE — 700111 HCHG RX REV CODE 636 W/ 250 OVERRIDE (IP): Performed by: HOSPITALIST

## 2018-03-07 PROCEDURE — 85652 RBC SED RATE AUTOMATED: CPT

## 2018-03-07 PROCEDURE — 770006 HCHG ROOM/CARE - MED/SURG/GYN SEMI*

## 2018-03-07 PROCEDURE — 700101 HCHG RX REV CODE 250: Performed by: HOSPITALIST

## 2018-03-07 PROCEDURE — 80048 BASIC METABOLIC PNL TOTAL CA: CPT

## 2018-03-07 PROCEDURE — 99232 SBSQ HOSP IP/OBS MODERATE 35: CPT | Performed by: HOSPITALIST

## 2018-03-07 PROCEDURE — 85025 COMPLETE CBC W/AUTO DIFF WBC: CPT

## 2018-03-07 PROCEDURE — 86140 C-REACTIVE PROTEIN: CPT

## 2018-03-07 RX ADMIN — MORPHINE SULFATE 4 MG: 4 INJECTION INTRAVENOUS at 08:07

## 2018-03-07 RX ADMIN — DOXYCYCLINE 100 MG: 100 INJECTION, POWDER, LYOPHILIZED, FOR SOLUTION INTRAVENOUS at 21:37

## 2018-03-07 RX ADMIN — DOXYCYCLINE 100 MG: 100 INJECTION, POWDER, LYOPHILIZED, FOR SOLUTION INTRAVENOUS at 07:46

## 2018-03-07 RX ADMIN — WATER 2 G: 1 INJECTION INTRAMUSCULAR; INTRAVENOUS; SUBCUTANEOUS at 21:37

## 2018-03-07 RX ADMIN — DIGOXIN 125 MCG: 125 TABLET ORAL at 21:37

## 2018-03-07 RX ADMIN — STANDARDIZED SENNA CONCENTRATE AND DOCUSATE SODIUM 2 TABLET: 8.6; 5 TABLET, FILM COATED ORAL at 21:37

## 2018-03-07 ASSESSMENT — ENCOUNTER SYMPTOMS
DEPRESSION: 0
FEVER: 0
NECK PAIN: 0
BRUISES/BLEEDS EASILY: 0
DOUBLE VISION: 0
BLURRED VISION: 0
NAUSEA: 0
DIZZINESS: 0
HEMOPTYSIS: 0
PALPITATIONS: 0
HEARTBURN: 0
MYALGIAS: 0

## 2018-03-07 ASSESSMENT — PAIN SCALES - GENERAL
PAINLEVEL_OUTOF10: 2
PAINLEVEL_OUTOF10: 6

## 2018-03-07 NOTE — CARE PLAN
Problem: Bowel/Gastric:  Goal: Normal bowel function is maintained or improved  Outcome: PROGRESSING AS EXPECTED  Patient ate a meal tonight before becoming NPO; first meal in over 24 hours    Problem: Pain Management  Goal: Pain level will decrease to patient's comfort goal  Outcome: PROGRESSING AS EXPECTED  Patient states pain levels are tolerable and is able to sleep with current levels of pain

## 2018-03-07 NOTE — PROGRESS NOTES
[facility] Hospitalist Progress Note    Date of Service: 3/6/2018    Chief Complaint  62 y.o. male admitted 3/5/2018 with scrotal redness and swelling.     Interval Problem Update  Patient is resting in bed, no new complains not in distress,  Pain is improved. Voiding w/o problem    Consultants/Specialty  urology    Disposition  Tbd.         Review of Systems   Constitutional: Negative for chills and fever.   HENT: Negative for hearing loss and tinnitus.    Eyes: Negative for blurred vision and double vision.   Respiratory: Negative for hemoptysis.    Cardiovascular: Negative for chest pain and palpitations.   Gastrointestinal: Negative for heartburn and nausea.   Genitourinary: Negative for frequency and urgency.        Scrotum edema and erythema    Musculoskeletal: Negative for myalgias and neck pain.   Skin: Negative for itching.   Neurological: Negative for dizziness.   Endo/Heme/Allergies: Does not bruise/bleed easily.   Psychiatric/Behavioral: Negative for depression.      Physical Exam  Laboratory/Imaging   Hemodynamics  Temp (24hrs), Av.2 °C (98.9 °F), Min:36.4 °C (97.6 °F), Max:37.6 °C (99.7 °F)   Temperature: 37.6 °C (99.7 °F)  Pulse  Av.6  Min: 78  Max: 112    Blood Pressure: 130/70, NIBP: 130/99      Respiratory      Respiration: 14, Pulse Oximetry: 95 %             Fluids    Intake/Output Summary (Last 24 hours) at 18 1901  Last data filed at 18 0954   Gross per 24 hour   Intake                0 ml   Output              450 ml   Net             -450 ml       Nutrition  Orders Placed This Encounter   Procedures   • Diet NPO     Standing Status:   Standing     Number of Occurrences:   1     Order Specific Question:   Restrict to:     Answer:   Sips with Medications [3]     Physical Exam   Constitutional: He is oriented to person, place, and time. No distress.   HENT:   Head: Normocephalic.   Mouth/Throat: No oropharyngeal exudate.   Eyes: Conjunctivae are normal. No scleral icterus.      Neck: Normal range of motion. Neck supple. No JVD present.   Cardiovascular: Normal rate and regular rhythm.    Pulmonary/Chest: Effort normal and breath sounds normal. No respiratory distress. He has no wheezes.   Abdominal: Soft. Bowel sounds are normal. He exhibits no distension. There is no tenderness.   Genitourinary:       Right testis shows swelling. Left testis shows swelling.   Musculoskeletal: Normal range of motion. He exhibits no tenderness.   Neurological: He is alert and oriented to person, place, and time. He exhibits normal muscle tone.   Skin: No erythema.   Psychiatric: He has a normal mood and affect.   Nursing note and vitals reviewed.      Recent Labs      03/05/18   1717   WBC  16.8*   RBC  4.30*   HEMOGLOBIN  12.2*   HEMATOCRIT  36.5*   MCV  84.9   MCH  28.4   MCHC  33.4*   RDW  40.5   PLATELETCT  312   MPV  9.4     Recent Labs      03/05/18   1717   SODIUM  131*   POTASSIUM  4.1   CHLORIDE  98   CO2  24   GLUCOSE  92   BUN  16   CREATININE  0.86   CALCIUM  9.0                      Assessment/Plan     * Orchitis   Assessment & Plan    Epididymitis also noted on ultrasound. CT shows possible abscess. The patient has leukocytosis, he is tachycardic, but otherwise he is afebrile and his vital signs are stable with a normal blood pressure. I have started him on IV ceftriaxone and vancomycin. I will check a urinalysis, chlamydia and gonorrhea. He will receive maintenance IV fluids and we will await blood cultures and any cultures obtained intraoperatively to monitor for speciation and sensitivities. He will be kept nothing by mouth in the event that urology would like to proceed to the operating room in the morning. He'll receive symptomatic management for pain.  3/6 continue iv atb, will need repeat ct in 2-3 days per urology. F/u cx will probably need to discuss with ID.         Sepsis (CMS-HCC)   Assessment & Plan    Due to above, stable.         HTN (hypertension)- (present on admission)    Assessment & Plan    on enalapril.        Tobacco abuse   Assessment & Plan    Needs to quit.           Quality-Core Measures   Reviewed items::  Radiology images reviewed, Labs reviewed and Medications reviewed  Johnson catheter::  No Jhonson  Antibiotics:  Treating active infection/contamination beyond 24 hours perioperative coverage

## 2018-03-08 ENCOUNTER — APPOINTMENT (OUTPATIENT)
Dept: RADIOLOGY | Facility: MEDICAL CENTER | Age: 63
DRG: 872 | End: 2018-03-08
Attending: HOSPITALIST
Payer: MEDICAID

## 2018-03-08 PROBLEM — R16.0 LIVER MASS, RIGHT LOBE: Status: ACTIVE | Noted: 2018-03-08

## 2018-03-08 LAB
ANION GAP SERPL CALC-SCNC: 6 MMOL/L (ref 0–11.9)
BASOPHILS # BLD AUTO: 0.3 % (ref 0–1.8)
BASOPHILS # BLD: 0.03 K/UL (ref 0–0.12)
BUN SERPL-MCNC: 17 MG/DL (ref 8–22)
CALCIUM SERPL-MCNC: 9.2 MG/DL (ref 8.5–10.5)
CHLORIDE SERPL-SCNC: 101 MMOL/L (ref 96–112)
CO2 SERPL-SCNC: 25 MMOL/L (ref 20–33)
CREAT SERPL-MCNC: 0.84 MG/DL (ref 0.5–1.4)
EOSINOPHIL # BLD AUTO: 0.23 K/UL (ref 0–0.51)
EOSINOPHIL NFR BLD: 1.9 % (ref 0–6.9)
ERYTHROCYTE [DISTWIDTH] IN BLOOD BY AUTOMATED COUNT: 37.4 FL (ref 35.9–50)
GLUCOSE SERPL-MCNC: 99 MG/DL (ref 65–99)
HCT VFR BLD AUTO: 36.8 % (ref 42–52)
HGB BLD-MCNC: 12.5 G/DL (ref 14–18)
IMM GRANULOCYTES # BLD AUTO: 0.06 K/UL (ref 0–0.11)
IMM GRANULOCYTES NFR BLD AUTO: 0.5 % (ref 0–0.9)
LYMPHOCYTES # BLD AUTO: 1.47 K/UL (ref 1–4.8)
LYMPHOCYTES NFR BLD: 12.4 % (ref 22–41)
MCH RBC QN AUTO: 28.6 PG (ref 27–33)
MCHC RBC AUTO-ENTMCNC: 34 G/DL (ref 33.7–35.3)
MCV RBC AUTO: 84.2 FL (ref 81.4–97.8)
MONOCYTES # BLD AUTO: 0.97 K/UL (ref 0–0.85)
MONOCYTES NFR BLD AUTO: 8.2 % (ref 0–13.4)
NEUTROPHILS # BLD AUTO: 9.09 K/UL (ref 1.82–7.42)
NEUTROPHILS NFR BLD: 76.7 % (ref 44–72)
NRBC # BLD AUTO: 0 K/UL
NRBC BLD-RTO: 0 /100 WBC
PLATELET # BLD AUTO: 399 K/UL (ref 164–446)
PMV BLD AUTO: 9.6 FL (ref 9–12.9)
POTASSIUM SERPL-SCNC: 4 MMOL/L (ref 3.6–5.5)
RBC # BLD AUTO: 4.37 M/UL (ref 4.7–6.1)
SODIUM SERPL-SCNC: 132 MMOL/L (ref 135–145)
WBC # BLD AUTO: 11.9 K/UL (ref 4.8–10.8)

## 2018-03-08 PROCEDURE — 700117 HCHG RX CONTRAST REV CODE 255: Performed by: HOSPITALIST

## 2018-03-08 PROCEDURE — 74177 CT ABD & PELVIS W/CONTRAST: CPT

## 2018-03-08 PROCEDURE — 700105 HCHG RX REV CODE 258: Performed by: HOSPITALIST

## 2018-03-08 PROCEDURE — 85025 COMPLETE CBC W/AUTO DIFF WBC: CPT

## 2018-03-08 PROCEDURE — 80048 BASIC METABOLIC PNL TOTAL CA: CPT

## 2018-03-08 PROCEDURE — 36415 COLL VENOUS BLD VENIPUNCTURE: CPT

## 2018-03-08 PROCEDURE — 76870 US EXAM SCROTUM: CPT

## 2018-03-08 PROCEDURE — A9270 NON-COVERED ITEM OR SERVICE: HCPCS | Performed by: INTERNAL MEDICINE

## 2018-03-08 PROCEDURE — 700102 HCHG RX REV CODE 250 W/ 637 OVERRIDE(OP): Performed by: INTERNAL MEDICINE

## 2018-03-08 PROCEDURE — A9270 NON-COVERED ITEM OR SERVICE: HCPCS | Performed by: UROLOGY

## 2018-03-08 PROCEDURE — 700102 HCHG RX REV CODE 250 W/ 637 OVERRIDE(OP): Performed by: UROLOGY

## 2018-03-08 PROCEDURE — 700102 HCHG RX REV CODE 250 W/ 637 OVERRIDE(OP): Performed by: HOSPITALIST

## 2018-03-08 PROCEDURE — 700111 HCHG RX REV CODE 636 W/ 250 OVERRIDE (IP): Performed by: HOSPITALIST

## 2018-03-08 PROCEDURE — 770006 HCHG ROOM/CARE - MED/SURG/GYN SEMI*

## 2018-03-08 PROCEDURE — 700101 HCHG RX REV CODE 250: Performed by: HOSPITALIST

## 2018-03-08 PROCEDURE — 99232 SBSQ HOSP IP/OBS MODERATE 35: CPT | Performed by: HOSPITALIST

## 2018-03-08 PROCEDURE — A9270 NON-COVERED ITEM OR SERVICE: HCPCS | Performed by: HOSPITALIST

## 2018-03-08 RX ORDER — SODIUM CHLORIDE 9 MG/ML
INJECTION, SOLUTION INTRAVENOUS CONTINUOUS
Status: DISCONTINUED | OUTPATIENT
Start: 2018-03-08 | End: 2018-03-10 | Stop reason: HOSPADM

## 2018-03-08 RX ORDER — DOXYCYCLINE 100 MG/1
100 TABLET ORAL EVERY 12 HOURS
Status: DISCONTINUED | OUTPATIENT
Start: 2018-03-08 | End: 2018-03-08

## 2018-03-08 RX ORDER — SULFAMETHOXAZOLE AND TRIMETHOPRIM 800; 160 MG/1; MG/1
1 TABLET ORAL EVERY 12 HOURS
Status: DISCONTINUED | OUTPATIENT
Start: 2018-03-08 | End: 2018-03-10 | Stop reason: HOSPADM

## 2018-03-08 RX ADMIN — IOHEXOL 100 ML: 350 INJECTION, SOLUTION INTRAVENOUS at 11:10

## 2018-03-08 RX ADMIN — STANDARDIZED SENNA CONCENTRATE AND DOCUSATE SODIUM 2 TABLET: 8.6; 5 TABLET, FILM COATED ORAL at 20:01

## 2018-03-08 RX ADMIN — DOXYCYCLINE 100 MG: 100 INJECTION, POWDER, LYOPHILIZED, FOR SOLUTION INTRAVENOUS at 10:22

## 2018-03-08 RX ADMIN — SODIUM CHLORIDE: 9 INJECTION, SOLUTION INTRAVENOUS at 10:22

## 2018-03-08 RX ADMIN — STANDARDIZED SENNA CONCENTRATE AND DOCUSATE SODIUM 2 TABLET: 8.6; 5 TABLET, FILM COATED ORAL at 10:22

## 2018-03-08 RX ADMIN — OXYCODONE HYDROCHLORIDE 5 MG: 5 TABLET ORAL at 20:01

## 2018-03-08 RX ADMIN — WATER 2 G: 1 INJECTION INTRAMUSCULAR; INTRAVENOUS; SUBCUTANEOUS at 20:04

## 2018-03-08 RX ADMIN — OXYCODONE HYDROCHLORIDE 5 MG: 5 TABLET ORAL at 11:28

## 2018-03-08 RX ADMIN — TAMSULOSIN HYDROCHLORIDE 0.4 MG: 0.4 CAPSULE ORAL at 10:22

## 2018-03-08 RX ADMIN — DIGOXIN 125 MCG: 125 TABLET ORAL at 20:01

## 2018-03-08 RX ADMIN — ENALAPRIL MALEATE 2.5 MG: 2.5 TABLET ORAL at 10:23

## 2018-03-08 RX ADMIN — SULFAMETHOXAZOLE AND TRIMETHOPRIM 1 TABLET: 800; 160 TABLET ORAL at 20:01

## 2018-03-08 RX ADMIN — SULFAMETHOXAZOLE AND TRIMETHOPRIM 1 TABLET: 800; 160 TABLET ORAL at 15:41

## 2018-03-08 ASSESSMENT — PAIN SCALES - GENERAL
PAINLEVEL_OUTOF10: 0
PAINLEVEL_OUTOF10: 2
PAINLEVEL_OUTOF10: 1
PAINLEVEL_OUTOF10: ASSUMED PAIN PRESENT
PAINLEVEL_OUTOF10: 3
PAINLEVEL_OUTOF10: ASSUMED PAIN PRESENT

## 2018-03-08 ASSESSMENT — ENCOUNTER SYMPTOMS
BRUISES/BLEEDS EASILY: 0
HEMOPTYSIS: 0
FEVER: 0
BLURRED VISION: 0
HEARTBURN: 0
MYALGIAS: 0
NAUSEA: 0
DEPRESSION: 0
DIZZINESS: 0

## 2018-03-08 NOTE — CONSULTS
"DATE OF SERVICE:  03/08/2018    REASON FOR CONSULT:  Epididymitis, prostatitis.    CONSULTING PHYSICIAN:  Geo Grant MD    HISTORY OF PRESENT ILLNESS:  This is a very pleasant 62-year-old    American male who was admitted on 03/05/2018 due to increasing testicular   pain.  He states his symptoms started approximately 2 days prior to admission.    He denies any unprotected sex, trauma, lesions, oozing or injury to that   area.  He had no associated dysuria, urgency, or frequency; however, he does   have a known history of BPH.  He was seen and evaluated by urology and   diagnosed with acute epididymal orchitis with possible seminal vesicle abscess   as well as prostatitis and possible early prostatic abscess.  He has been   started empirically on doxycycline and ceftriaxone with significant   improvement in his symptoms.  He is scheduled for repeat CT scan and   infectious disease is consulted for antibiotic recommendations and management.    REVIEW OF SYSTEMS:  All other systems reviewed and are negative.    ALLERGIES:  He has no known antibiotic allergies.    PAST MEDICAL HISTORY:  BPH, heart murmur, \"constricted left ventricle\", and   hypertension.    FAMILY HISTORY:  Coronary artery disease and extensive cancer.    SOCIAL HISTORY:  He does smoke, occasionally drinks, does not use illicit   drugs.    PHYSICAL EXAMINATION:  GENERAL:  Looks younger than his stated age, in no acute distress, pleasant   and cooperative.  VITAL SIGNS:  T-max since admission is 99.7, blood pressure 170/89, pulse 90,   respiratory rate 16, oxygen saturation 96% on room air.  He weighs 67 kilos.  HEENT:  Normocephalic, atraumatic.  Pupils are equal, round, reactive to   light.  Extraocular movements intact.  Oropharynx is clear.  He has fair   dentition.  NECK:  Supple.  CARDIOVASCULAR:  Regular rate and rhythm.  He has a loud 3/6 systolic murmur.  CHEST:  Clear to auscultation bilaterally, unlabored.  ABDOMEN:  Soft, " nontender.  EXTREMITIES:  Show no cyanosis, clubbing, or edema.  He states he has   decreased swelling.  NEUROLOGIC:  He is awake, alert, oriented.  Speech is fluent without   dysarthria.  Cranial nerves were intact.    LABORATORY DATA:  Current labs, his admitting white blood cell count was 16.8,   is now 11.9, H and H of 12.5 and 36.8, platelets of 399.  Sodium 132,   potassium 4, chloride 101, bicarb 25, glucose 99, BUN 17, creatinine 0.84.    Urinalysis on admission did show trace ketones, large leukocyte esterase,   packed wbc's, 10-20 rbc's.  C-reactive protein was 10.  Gonorrhea, chlamydia   was negative, but no urine culture was sent.  CT scan done on admission showed   a 2.2x3.1 cm mass in his liver, thought to be a hemangioma.  Kidneys showed a   cortical cyst.  No hydronephrosis, no lymphadenopathy.  A medium sized left   hydrocele, small right hydrocele.  No inguinal hernia.  On the left   hemipelvis, there is a 1.8x2.4 cm rim enhancing process corresponding to the   left seminal vesicle with some stranding.  Prostate was moderately enlarged.    He had a calcified aortic valve, severe emphysema.  CT scan done today showed   no free air.  There is a 2.5x2.5 enhancing lesion in the liver that was again   noted, previously described abscess of the seminal vesicles was approximately   1.6x1.6x1.3 cm.    ASSESSMENT AND PLAN:  A 62-year-old male with known history of heart murmur   and chronic obstructive pulmonary disease by radiography, admitted with   epididymitis, seminal vesicle abscess and likely prostatitis.  GC was   negative, so we will plan on discontinuing the doxycycline.  Can continue the   ceftriaxone for the time being.  We will add Bactrim to better treat the prostatitis.    Monitor closely for side effects.  Drainage of abscess per urology.    Discussed with internal medicine.    Thank you and we will follow with you.       ____________________________________     MICHELE LORENZANA,  MD TOLBERT / CARA    DD:  03/08/2018 13:39:39  DT:  03/08/2018 14:03:40    D#:  [id number]  Job#:  [id number]

## 2018-03-08 NOTE — PROGRESS NOTES
[facility] Hospitalist Progress Note    Date of Service: 3/7/2018    Chief Complaint  62 y.o. male admitted 3/5/2018 with scrotal redness and swelling.     Interval Problem Update  In bed, no fever cx still pending, no complains, tolerating diet.     Consultants/Specialty  urology    Disposition  Tbd.         Review of Systems   Constitutional: Negative for fever.   Eyes: Negative for blurred vision and double vision.   Respiratory: Negative for hemoptysis.    Cardiovascular: Negative for chest pain and palpitations.   Gastrointestinal: Negative for heartburn and nausea.   Genitourinary: Negative for urgency.        Scrotum edema and erythema    Musculoskeletal: Negative for myalgias and neck pain.   Neurological: Negative for dizziness.   Endo/Heme/Allergies: Does not bruise/bleed easily.   Psychiatric/Behavioral: Negative for depression.      Physical Exam  Laboratory/Imaging   Hemodynamics  Temp (24hrs), Av.7 °C (98.1 °F), Min:36.1 °C (97 °F), Max:37.2 °C (98.9 °F)   Temperature: 37.2 °C (98.9 °F)  Pulse  Av.3  Min: 78  Max: 112    Blood Pressure: 151/78      Respiratory      Respiration: 18, Pulse Oximetry: 91 %             Fluids    Intake/Output Summary (Last 24 hours) at 18 1756  Last data filed at 18 1530   Gross per 24 hour   Intake              200 ml   Output             1150 ml   Net             -950 ml       Nutrition  Orders Placed This Encounter   Procedures   • DIET ORDER     Standing Status:   Standing     Number of Occurrences:   1     Order Specific Question:   Diet:     Answer:   Regular [1]     Physical Exam   Constitutional: He is oriented to person, place, and time. No distress.   HENT:   Head: Normocephalic.   Eyes: Conjunctivae are normal.   Neck: Normal range of motion.   Cardiovascular: Normal rate and regular rhythm.    Pulmonary/Chest: Effort normal and breath sounds normal. No respiratory distress. He has no wheezes.   Abdominal: Soft. Bowel sounds are normal. He  exhibits no distension. There is no tenderness.   Genitourinary:       Right testis shows swelling. Left testis shows swelling.   Musculoskeletal: Normal range of motion. He exhibits no tenderness.   Neurological: He is alert and oriented to person, place, and time.   Skin: No erythema.   Psychiatric: He has a normal mood and affect.   Nursing note and vitals reviewed.      Recent Labs      03/05/18 1717 03/07/18   0115   WBC  16.8*  14.1*   RBC  4.30*  4.34*   HEMOGLOBIN  12.2*  12.4*   HEMATOCRIT  36.5*  37.2*   MCV  84.9  85.7   MCH  28.4  28.6   MCHC  33.4*  33.3*   RDW  40.5  38.7   PLATELETCT  312  324   MPV  9.4  9.5     Recent Labs      03/05/18 1717 03/07/18   0116   SODIUM  131*  131*   POTASSIUM  4.1  3.6   CHLORIDE  98  99   CO2  24  24   GLUCOSE  92  162*   BUN  16  18   CREATININE  0.86  1.02   CALCIUM  9.0  8.5                      Assessment/Plan     * Orchitis   Assessment & Plan    Epididymitis also noted on ultrasound. CT shows possible abscess. The patient has leukocytosis, he is tachycardic, but otherwise he is afebrile and his vital signs are stable with a normal blood pressure. I have started him on IV ceftriaxone and vancomycin. I will check a urinalysis, chlamydia and gonorrhea. He will receive maintenance IV fluids and we will await blood cultures and any cultures obtained intraoperatively to monitor for speciation and sensitivities. He will be kept nothing by mouth in the event that urology would like to proceed to the operating room in the morning. He'll receive symptomatic management for pain.  3/7 had ct abdomen today w/o contrast showed Marked mesenteric and retroperitoneal edema. Small pelvic free fluid. Might need to repeat ct with contrast. Leukocytosis improving.         Sepsis (CMS-HCC)   Assessment & Plan    Resolving.         HTN (hypertension)- (present on admission)   Assessment & Plan    on enalapril.        Tobacco abuse   Assessment & Plan    Needs to quit.            Quality-Core Measures   Reviewed items::  Radiology images reviewed, Labs reviewed and Medications reviewed  Johnson catheter::  No Johnson  Antibiotics:  Treating active infection/contamination beyond 24 hours perioperative coverage

## 2018-03-08 NOTE — PROGRESS NOTES
"Urology Progress Note    Interval Events: None    S: No fevers, chills, nausea or vomiting. +flatus.  + left testicular pain  O:   Blood pressure 149/80, pulse 90, temperature 37.6 °C (99.6 °F), resp. rate 16, height 1.727 m (5' 8\"), weight 66.7 kg (147 lb 0.8 oz), SpO2 96 %.  Recent Labs      03/05/18 1717 03/07/18   0116  03/08/18   0426   SODIUM  131*  131*  132*   POTASSIUM  4.1  3.6  4.0   CHLORIDE  98  99  101   CO2  24  24  25   GLUCOSE  92  162*  99   BUN  16  18  17   CREATININE  0.86  1.02  0.84   CALCIUM  9.0  8.5  9.2     Recent Labs      03/05/18 1717 03/07/18   0115  03/08/18   0426   WBC  16.8*  14.1*  11.9*   RBC  4.30*  4.34*  4.37*   HEMOGLOBIN  12.2*  12.4*  12.5*   HEMATOCRIT  36.5*  37.2*  36.8*   MCV  84.9  85.7  84.2   MCH  28.4  28.6  28.6   MCHC  33.4*  33.3*  34.0   RDW  40.5  38.7  37.4   PLATELETCT  312  324  399   MPV  9.4  9.5  9.6         Intake/Output Summary (Last 24 hours) at 03/08/18 1429  Last data filed at 03/08/18 1300   Gross per 24 hour   Intake             1120 ml   Output             2480 ml   Net            -1360 ml       Exam:  Abdomen soft, benign. Left testicle swollen   Urine: clear    A/P:    Active Hospital Problems    Diagnosis   • Orchitis [N45.2]     Priority: High   • Sepsis (CMS-HCC) [A41.9]     Priority: High   • Tobacco abuse [Z72.0]     Priority: Low   • HTN (hypertension) [I10]     C&G  Negative- Doxy will be Dc'd    MD Cox will evaluate for drainage of abscess.     Discussed with Pt, MD Brooke Baez A.P.R.N.  "

## 2018-03-08 NOTE — PROGRESS NOTES
"Urology Progress Note        Interval Events: None.  Pt states swelling to left testis is improved    S: No fevers, chills, nausea or vomiting.  + flatus.    O:   Blood pressure 151/78, pulse 99, temperature 37.2 °C (98.9 °F), resp. rate 18, height 1.727 m (5' 8\"), weight 66.7 kg (147 lb 0.8 oz), SpO2 91 %.  Recent Labs      03/05/18   1717  03/07/18   0116   SODIUM  131*  131*   POTASSIUM  4.1  3.6   CHLORIDE  98  99   CO2  24  24   GLUCOSE  92  162*   BUN  16  18   CREATININE  0.86  1.02   CALCIUM  9.0  8.5     Recent Labs      03/05/18   1717  03/07/18   0115   WBC  16.8*  14.1*   RBC  4.30*  4.34*   HEMOGLOBIN  12.2*  12.4*   HEMATOCRIT  36.5*  37.2*   MCV  84.9  85.7   MCH  28.4  28.6   MCHC  33.4*  33.3*   RDW  40.5  38.7   PLATELETCT  312  324   MPV  9.4  9.5         Intake/Output Summary (Last 24 hours) at 03/07/18 1734  Last data filed at 03/07/18 1530   Gross per 24 hour   Intake              200 ml   Output             1150 ml   Net             -950 ml       Exam:  Abdomen soft, benign.  Left testis tender and swollen.  No signs of necrosis.     Urine: clear      A/P:    Active Hospital Problems    Diagnosis   • Orchitis [N45.2]     Priority: High   • Sepsis (CMS-HCC) [A41.9]     Priority: High   • Tobacco abuse [Z72.0]     Priority: Low   • HTN (hypertension) [I10]       Stable.   Ambulate, IS.    CT done without contrast-  Shows small  Pelvic fluid, marked presacral and perirectal edema. Without contrast it is difficult to compare to previous scan and visualization is diminished.- Consider CT of abdomen/pelvis with contrast.      Discussed case with RN, BRENDA Thomas.  "

## 2018-03-08 NOTE — CARE PLAN
Problem: Safety  Goal: Will remain free from injury  Outcome: PROGRESSING AS EXPECTED    Goal: Will remain free from falls  Outcome: PROGRESSING AS EXPECTED      Problem: Knowledge Deficit  Goal: Knowledge of disease process/condition, treatment plan, diagnostic tests, and medications will improve  Outcome: PROGRESSING AS EXPECTED

## 2018-03-08 NOTE — PROGRESS NOTES
Assume pt care. Pt a/o x3, VSS, No acute issues overnight. Pt rounds Q1-2hr with assessment of 4p's. IV assessment and care given if present. Pt education provided base on admission, care plan, current medications, and PRN. Pt is otherwise lethargic, has a flat affect, compliant with medications and pleasant with writer.     Disposition  Tbd.     MD - Might need to repeat ct with contrast.    Labs resulted - chlamydia and gonorrhea are both negative.

## 2018-03-09 PROBLEM — E87.1 HYPONATREMIA: Status: ACTIVE | Noted: 2018-03-09

## 2018-03-09 PROCEDURE — A9270 NON-COVERED ITEM OR SERVICE: HCPCS | Performed by: UROLOGY

## 2018-03-09 PROCEDURE — 99232 SBSQ HOSP IP/OBS MODERATE 35: CPT | Performed by: HOSPITALIST

## 2018-03-09 PROCEDURE — 700102 HCHG RX REV CODE 250 W/ 637 OVERRIDE(OP): Performed by: HOSPITALIST

## 2018-03-09 PROCEDURE — 770006 HCHG ROOM/CARE - MED/SURG/GYN SEMI*

## 2018-03-09 PROCEDURE — 700102 HCHG RX REV CODE 250 W/ 637 OVERRIDE(OP): Performed by: INTERNAL MEDICINE

## 2018-03-09 PROCEDURE — A9270 NON-COVERED ITEM OR SERVICE: HCPCS | Performed by: INTERNAL MEDICINE

## 2018-03-09 PROCEDURE — 700102 HCHG RX REV CODE 250 W/ 637 OVERRIDE(OP): Performed by: UROLOGY

## 2018-03-09 PROCEDURE — A9270 NON-COVERED ITEM OR SERVICE: HCPCS | Performed by: HOSPITALIST

## 2018-03-09 RX ORDER — CEFDINIR 300 MG/1
300 CAPSULE ORAL EVERY 12 HOURS
Status: DISCONTINUED | OUTPATIENT
Start: 2018-03-09 | End: 2018-03-10 | Stop reason: HOSPADM

## 2018-03-09 RX ADMIN — CEFDINIR 300 MG: 300 CAPSULE ORAL at 12:12

## 2018-03-09 RX ADMIN — STANDARDIZED SENNA CONCENTRATE AND DOCUSATE SODIUM 2 TABLET: 8.6; 5 TABLET, FILM COATED ORAL at 09:46

## 2018-03-09 RX ADMIN — SULFAMETHOXAZOLE AND TRIMETHOPRIM 1 TABLET: 800; 160 TABLET ORAL at 19:23

## 2018-03-09 RX ADMIN — ENALAPRIL MALEATE 2.5 MG: 2.5 TABLET ORAL at 09:47

## 2018-03-09 RX ADMIN — DIGOXIN 125 MCG: 125 TABLET ORAL at 19:23

## 2018-03-09 RX ADMIN — OXYCODONE HYDROCHLORIDE 5 MG: 5 TABLET ORAL at 09:46

## 2018-03-09 RX ADMIN — CEFDINIR 300 MG: 300 CAPSULE ORAL at 19:23

## 2018-03-09 RX ADMIN — SULFAMETHOXAZOLE AND TRIMETHOPRIM 1 TABLET: 800; 160 TABLET ORAL at 09:47

## 2018-03-09 RX ADMIN — OXYCODONE HYDROCHLORIDE 5 MG: 5 TABLET ORAL at 19:23

## 2018-03-09 RX ADMIN — TAMSULOSIN HYDROCHLORIDE 0.4 MG: 0.4 CAPSULE ORAL at 09:47

## 2018-03-09 RX ADMIN — STANDARDIZED SENNA CONCENTRATE AND DOCUSATE SODIUM 2 TABLET: 8.6; 5 TABLET, FILM COATED ORAL at 19:23

## 2018-03-09 ASSESSMENT — ENCOUNTER SYMPTOMS
DIZZINESS: 0
ABDOMINAL PAIN: 0
HEARTBURN: 0
HEMOPTYSIS: 0
NAUSEA: 0
DEPRESSION: 0
VOMITING: 0
MYALGIAS: 0
DIARRHEA: 0
BLURRED VISION: 0
BRUISES/BLEEDS EASILY: 0
FEVER: 0
CHILLS: 0

## 2018-03-09 ASSESSMENT — PAIN SCALES - GENERAL
PAINLEVEL_OUTOF10: 7
PAINLEVEL_OUTOF10: 5
PAINLEVEL_OUTOF10: 2
PAINLEVEL_OUTOF10: ASSUMED PAIN PRESENT
PAINLEVEL_OUTOF10: ASSUMED PAIN PRESENT

## 2018-03-09 NOTE — PROGRESS NOTES
Patient clinically improving.    CT with possible, small abcess of left seminal vesicle. Not readily accessible. As is getting better, will not plan for attempt at drainage.

## 2018-03-09 NOTE — PROGRESS NOTES
Infectious Disease Progress Note    Author: Anisha Lincoln M.D. Date & Time of service: 3/9/2018  2:25 PM    Chief Complaint:  Epididymitis, prostatitis, seminal abscess      Interval History:  62-year-old male with known history of heart murmur and chronic obstructive pulmonary disease by radiography, admitted with   epididymitis, seminal vesicle abscess and likely prostatitis  3/9 AF tolerated Bactrim well-no plan for surgery    Labs Reviewed, Medications Reviewed, Radiology Reviewed and Wound Reviewed.    Review of Systems:  Review of Systems   Constitutional: Negative for chills and fever.   Cardiovascular: Negative for chest pain.   Gastrointestinal: Negative for abdominal pain, diarrhea, nausea and vomiting.   All other systems reviewed and are negative.      Hemodynamics:  Temp (24hrs), Av.1 °C (98.7 °F), Min:36.8 °C (98.2 °F), Max:37.3 °C (99.1 °F)  Temperature: 36.8 °C (98.2 °F)  Pulse  Av.6  Min: 70  Max: 112   Blood Pressure: 143/86       Physical Exam:  Physical Exam   Constitutional: He is oriented to person, place, and time. He appears well-developed. No distress.   HENT:   Head: Normocephalic and atraumatic.   Poor dentition   Eyes: EOM are normal. Pupils are equal, round, and reactive to light.   Neck: Neck supple.   Cardiovascular: Normal rate.    Murmur heard.  Pulmonary/Chest: Effort normal. No respiratory distress.   Abdominal: Soft. He exhibits no distension.   Musculoskeletal: He exhibits no edema.   Neurological: He is alert and oriented to person, place, and time.   Skin: No rash noted.   Nursing note and vitals reviewed.      Meds:    Current Facility-Administered Medications:   •  cefdinir  •  NS  •  sulfamethoxazole-trimethoprim  •  enalapril  •  digoxin  •  senna-docusate **AND** polyethylene glycol/lytes **AND** magnesium hydroxide **AND** bisacodyl  •  NS  •  INITIATE NICOTINE REPLACEMENT PROTOCOL  **AND** nicotine **AND** Protocol 205 PATIENT EDUCATION MATERIALS **AND**  Protocol 205 Rotate nicotine patch application sites daily  **AND** nicotine polacrilex  •  tamsulosin  •  morphine injection  •  oxyCODONE immediate-release **OR** oxyCODONE immediate-release    Labs:  Recent Labs      03/07/18 0115 03/08/18 0426   WBC  14.1*  11.9*   RBC  4.34*  4.37*   HEMOGLOBIN  12.4*  12.5*   HEMATOCRIT  37.2*  36.8*   MCV  85.7  84.2   MCH  28.6  28.6   RDW  38.7  37.4   PLATELETCT  324  399   MPV  9.5  9.6   NEUTSPOLYS  84.30*  76.70*   LYMPHOCYTES  7.60*  12.40*   MONOCYTES  6.00  8.20   EOSINOPHILS  1.60  1.90   BASOPHILS  0.10  0.30     Recent Labs      03/07/18 0116 03/08/18 0426   SODIUM  131*  132*   POTASSIUM  3.6  4.0   CHLORIDE  99  101   CO2  24  25   GLUCOSE  162*  99   BUN  18  17     Recent Labs      03/07/18 0116 03/08/18 0426   CREATININE  1.02  0.84       Imaging:  Ct-abdomen-pelvis W/o    Result Date: 3/7/2018  3/7/2018 4:32 PM HISTORY/REASON FOR EXAM:  Pain. Left lower quadrant pain TECHNIQUE/EXAM DESCRIPTION: CT scan of the abdomen and pelvis without contrast. Noncontrast helical scanning was obtained from the diaphragmatic domes through the pubic symphysis. Low dose optimization technique was utilized for this CT exam including automated exposure control and adjustment of the mA and/or kV according to patient size. COMPARISON: 3/5/2018 FINDINGS: CT Abdomen: Bilateral lung base blebs. Small bibasilar pleural effusions and dependent atelectasis. Cardiac valvular calcifications. Liver and spleen are within normal limits in size. Pancreas and adrenal glands are not well demonstrated. No calcified gallstones. No hydronephrosis and no urolithiasis identified. Ureters are not well delineated. No evidence of bowel obstruction. Large and small bowel are not well delineated due to considerable mesenteric edema. Calcified plaque is located within the aorta. Infrarenal abdominal aorta is dilated to 2.9 cm. Marked diffuse subcutaneous edema. CT Pelvis: Urinary bladder  is distended and smoothly marginated. Small pelvic free fluid. Marked presacral and perirectal edema. The previously demonstrated cystic left seminal vesicle is poorly demonstrated without contrast. It appears similar to previous examination.     Anasarca. No evidence of bowel obstruction. No hydronephrosis. Marked mesenteric and retroperitoneal edema. Small pelvic free fluid. Small bibasilar pleural effusions and dependent atelectasis within the lungs.    Ct-abdomen-pelvis With    Result Date: 3/8/2018  3/8/2018 11:10 AM HISTORY/REASON FOR EXAM:  Intra-abdominal abscess. TECHNIQUE/EXAM DESCRIPTION:  CT scan of the abdomen and pelvis with contrast. Contrast-enhanced helical scanning was obtained from the diaphragmatic domes through the pubic symphysis following the bolus administration of nonionic contrast without complication. 100 mL of Omnipaque 350 nonionic contrast was administered without complication. Low dose optimization technique was utilized for this CT exam including automated exposure control and adjustment of the mA and/or kV according to patient size. COMPARISON: 3/7/2018 FINDINGS: Lung bases: There is trace pleural fluid, right greater than left. There is bibasilar emphysematous change. Calcific dictation is seen at the aortic and mitral annulus. Abdomen: No free air is seen in the abdomen or pelvis. There is a 2.5 x 2.5 cm hyperenhancing lesion in the inferior right lobe of the liver. Portal vein is patent. Gallbladder is contracted. Spleen is not enlarged. No definite adrenal mass is identified. Small hypodense left renal lesion is again seen which most likely represents a small cyst. There is mild heterogeneity of the upper pole of the right kidney. No hydronephrosis is seen. The ureters are not well delineated. Examination is limited by paucity of intra-abdominal fat. Pancreas appears unremarkable. There is no biliary ductal dilatation. Atherosclerotic plaque is seen in the aorta. There is an  infrarenal aortic aneurysm measuring 3.1 cm. No bulky lymphadenopathy is identified. There is no evidence of bowel obstruction. The appendix is not identified, limiting evaluation for appendicitis. There is a moderate amount of colonic stool. Small amount of free fluid is seen in the abdomen and pelvis. Bladder is thick-walled. There is a small peripherally enhancing collection in the left pelvis measuring 1.6 x 1.6 x 1.3 cm. This is in the region of the left seminal vesicle. Degenerative changes are seen in the spine.     1.6 x 1.6 x 1.3 cm peripherally enhancing collection in the region of the left seminal vesicle. This could represent a small abscess. Decompressed bladder, limiting evaluation. The bladder does appear somewhat thick-walled. Correlation with urinalysis is recommended as this can be seen in setting of cystitis. Small amount of free fluid in the abdomen and pelvis. Nonvisualization of the appendix, limiting evaluation for appendicitis. Moderate amount of colonic stool. Mildly heterogeneous enhancement of the upper pole of the right kidney. Correlation with urinalysis is recommended as this can be seen in the setting of pyelonephritis. Hyperenhancing 2.5 x 2.5 cm lesion in the right lobe of the liver. This could represent a hemangioma. Hepatic protocol MRI may be performed for definitive characterization. Infrarenal aortic aneurysm measuring 3.1 cm. Atherosclerotic plaque. Trace bilateral pleural effusions. Emphysematous changes.     Ct-abdomen-pelvis With    Result Date: 3/5/2018  3/5/2018 5:51 PM HISTORY/REASON FOR EXAM:  LLQ Pain, left testicular swelling for 2 days TECHNIQUE/EXAM DESCRIPTION: CT of the abdomen and pelvis with contrast. Contrast-enhanced helical scanning was obtained from the diaphragmatic domes through the pubic symphysis following the bolus administration of 100 mL of nonionic contrast (Omnipaque 350) without complication. Low dose optimization technique was utilized for this CT  exam including automated exposure control and adjustment of the mA and/or kV according to patient size. COMPARISON: 1/14/2015 FINDINGS: The visualized lung bases show no consolidation. Emphysematous change with areas of bleb and bullae formation. Right lower lobe atelectasis Aortic valve calcification CT Abdomen: The spleen, pancreas, gallbladder, adrenal glands enhance normally. Hyper enhancing right hepatic 22 x 31 mm mass most likely is a hemangioma Kidneys again shows some peripheral areas of decreased density, heterogeneous enhancement but solid masses are not seen. There is a stable 1 cm left interpolar cortical cyst. No hydronephrosis There is no abnormal bowel dilatation or inflammatory change. Possible normal gas-filled appendix overlying the right common iliac artery There is no lymphadenopathy or aneurysmal change of the aorta. 30 mm from 29 mm infrarenal aortic ectasia. Considerable atherosclerosis CT Pelvis: There is a medium sized left hydrocele. Small right hydrocele. No inguinal hernia There is a left hemipelvis 18 x 24 mm rim-enhancing process appears to correspond to the left seminal vesicle although there is generalized fat stranding and a paucity of fat said this is difficult to definitively characterize Prostate is moderately enlarged. No lymphadenopathy. There is no free air or free fluid.     1.  Medium-sized left, small right hydroceles 2.  Left hemipelvis enhancing 24 x 18 mm cystic process is suspicious for the left seminal vesicle and could indicate seminal vesicle infection. Abscess is possible. 3.  3 cm infrarenal aortic ectasia is minimally enlarged 4.  Stable aortic valvular calcifications which likely indicate stenosis 5.  Severe emphysema with bleb and bullae formation    Dx-chest-2 Views    Result Date: 3/5/2018    3/5/2018 9:06 PM HISTORY/REASON FOR EXAM: Cough TECHNIQUE/EXAM DESCRIPTION:  PA and lateral views of the chest. COMPARISON: July 4, 2017 FINDINGS: The cardiac silhouette  appears within normal limits. Atherosclerotic calcification of the aorta is noted.  Bilateral perihilar interstitial prominence is noted. The lungs appear hyperexpanded bilaterally with flattening of the diaphragms.  Near densities in the bilateral lung bases appears similar to prior study. No significant pleural effusions are identified. The bony structures appear age-appropriate.     1.  A near densities in the bilateral lung bases, similar to prior, appearance suggests scarring or chronic atelectasis. 2.  Atherosclerosis 3.  Perihilar interstitial prominence and bronchial wall cuffing, appearance suggests changes of underlying bronchial inflammation, consider bronchitis. 4.  Hyperexpansion of the lungs suggest changes of COPD.    [id number]    Result Date: 3/8/2018  3/8/2018 8:48 AM HISTORY/REASON FOR EXAM: Follow-up left scrotal swelling.. TECHNIQUE/EXAM DESCRIPTION: Real-time sonography of the scrotum was performed with grey-scale, color and duplex Doppler imaging. COMPARISON: 3/5/2018 FINDINGS: The right testicle measures 4.4 cm in length. The left testicle measures 3.7 cm in length. There is no evidence of testicular mass.   Arterial flow is present bilaterally and appears symmetric. There is septated echogenic fluid seen surrounding the left testicle consistent with a complex echogenic left hydrocele. There is a small right hydrocele. The left epididymis is enlarged and hypervascular. There is a small right varicocele. There is a 4 mm right epididymal cyst.     1.  Enlarged hypervascular right epididymis consistent with epididymitis. 2.  Again seen large complex left hydrocele. 3.  No evidence of testicular torsion or testicular mass. 4.  Small right epididymal cyst. 5.  Small right varicocele. 6.  Small right hydrocele.    [id number]    Result Date: 3/5/2018  3/5/2018 7:35 PM HISTORY/REASON FOR EXAM: Pain. TECHNIQUE/EXAM DESCRIPTION: Real-time sonography of the scrotum was performed with  gray-scale, color and duplex Doppler imaging. COMPARISON: None FINDINGS: The testes are homogeneous in echotexture and low-resistive waveforms are confirmed bilaterally. No intratesticular mass is seen. Vascular flow is symmetric. The right testis measures 4.06 cm x 2.22 cm x 2.40 cm. The left testis measures  3.59 cm x 2.84 cm x 2.62 cm. Right epididymis head and body appear enlarged with somewhat increased Doppler flow.  There is 7.2 x 3.6 mm right epididymal head cyst identified. 3.2 x 2.1 mm left epididymal body cyst is seen. Small right hydrocele with specular echoes is seen. There is complex hydrocele on the left with septation and echogenic material. Right varicocele is seen.     1.  Increased size and vascularity of the right epididymis, appearance compatible with epididymitis. 2.  Large complex left hydrocele with septation and debris, recommend radiographic follow-up. 3.  Small right hydrocele with debris. 4.  Bilateral small epididymal cysts. 5.  Right varicocele      Micro:  Results     Procedure Component Value Units Date/Time    CHLAMYDIA/GC PCR URINE OR SWAB [[id number]] Collected:  03/06/18 1840    Order Status:  Completed Specimen:  Urine from Genital Updated:  03/07/18 2246     Source Urine     C. trachomatis by PCR Negative     N. gonorrhoeae by PCR Negative    Narrative:       Collected By:[id number] VALERIE YOUNG    Urinalysis [[id number]]  (Abnormal) Collected:  03/06/18 1840    Order Status:  Completed Specimen:  Urine Updated:  03/06/18 1913     Color Yellow     Character Cloudy (A)     Specific Gravity 1.024     Ph 5.0     Glucose Negative mg/dL      Ketones Trace (A) mg/dL      Protein Negative mg/dL      Bilirubin Negative     Urobilinogen, Urine 0.2     Nitrite Negative     Leukocyte Esterase Large (A)     Occult Blood Moderate (A)     Micro Urine Req Microscopic    Narrative:       If not done within the last 24 hours    URINALYSIS (UA) [[id number]]  (Abnormal) Collected:  03/05/18 1945     Order Status:  Completed Specimen:  Urine Updated:  03/05/18 2015     Micro Urine Req Microscopic     Color Yellow     Character Cloudy (A)     Ph 5.5     Glucose Negative mg/dL      Ketones Negative mg/dL      Protein Negative mg/dL      Bilirubin Negative     Urobilinogen, Urine 0.2     Nitrite Positive (A)     Leukocyte Esterase Large (A)     Occult Blood Moderate (A)          Assessment:  Active Hospital Problems    Diagnosis   • *Orchitis [N45.2]   • Sepsis (CMS-HCC) [A41.9]   • Tobacco abuse [Z72.0]   • Liver mass, right lobe [R16.0]   • HTN (hypertension) [I10]       Plan:  Epididymitis  Improved  GC neg  Change to cefdinir to complete 10 day course    Prostatitis  Continue Bactrim  Stop date 4/5/2018    Seminal vesicle abscess  Abx as above  1.6 by 1.3 cm  FU Urology as scheduled    FU ID clinic if needed    Discussed with internal medicine.

## 2018-03-09 NOTE — PROGRESS NOTES
"Urology Progress Note    Interval Events: None     S: No fevers, chills, nausea or vomiting. +flatus.  + left testicular pain improved.    O:   Blood pressure 143/86, pulse 81, temperature 36.8 °C (98.2 °F), resp. rate 18, height 1.727 m (5' 8\"), weight 66.7 kg (147 lb 0.8 oz), SpO2 92 %.  Recent Labs      03/07/18   0116  03/08/18   0426   SODIUM  131*  132*   POTASSIUM  3.6  4.0   CHLORIDE  99  101   CO2  24  25   GLUCOSE  162*  99   BUN  18  17   CREATININE  1.02  0.84   CALCIUM  8.5  9.2     Recent Labs      03/07/18   0115  03/08/18   0426   WBC  14.1*  11.9*   RBC  4.34*  4.37*   HEMOGLOBIN  12.4*  12.5*   HEMATOCRIT  37.2*  36.8*   MCV  85.7  84.2   MCH  28.6  28.6   MCHC  33.3*  34.0   RDW  38.7  37.4   PLATELETCT  324  399   MPV  9.5  9.6         Intake/Output Summary (Last 24 hours) at 03/09/18 1450  Last data filed at 03/09/18 1200   Gross per 24 hour   Intake                0 ml   Output             2000 ml   Net            -2000 ml       Exam:  Abdomen soft, benign.  Left testicle swollen,     Urine: clear    A/P:    Active Hospital Problems    Diagnosis   • Orchitis [N45.2]     Priority: High   • Sepsis (CMS-HCC) [A41.9]     Priority: High   • Tobacco abuse [Z72.0]     Priority: Low   • Liver mass, right lobe [R16.0]   • HTN (hypertension) [I10]       Stable.   Ambulate, Is.       Patient  improving.     CT shows possible, small abcess of left seminal vesicle. No attempts for drainage at his time due to accessibility and pt improvement.  Continue ABX  [facility] signing off Please contact for question.     Discussed with PT, BRENDA Thomas.  "

## 2018-03-09 NOTE — CARE PLAN
Problem: Pain Management  Goal: Pain level will decrease to patient's comfort goal  Outcome: PROGRESSING AS EXPECTED  Pt states only having pain with movement.    Problem: Mobility  Goal: Risk for activity intolerance will decrease  Outcome: PROGRESSING AS EXPECTED  Pt is up with one assist and tolerates it well. Increase of pain

## 2018-03-09 NOTE — PROGRESS NOTES
[facility] Hospitalist Progress Note    Date of Service: 3/8/2018    Chief Complaint  62 y.o. male admitted 3/5/2018 with scrotal redness and swelling.     Interval Problem Update  Resting in bed feels harpal, no fever swelling is better able to ambulate.     Consultants/Specialty  urology    Disposition  Tbd.         Review of Systems   Constitutional: Negative for fever.   Eyes: Negative for blurred vision.   Respiratory: Negative for hemoptysis.    Cardiovascular: Negative for chest pain.   Gastrointestinal: Negative for heartburn and nausea.   Genitourinary: Negative for urgency.        Scrotum edema and erythema    Musculoskeletal: Negative for myalgias.   Neurological: Negative for dizziness.   Endo/Heme/Allergies: Does not bruise/bleed easily.   Psychiatric/Behavioral: Negative for depression.      Physical Exam  Laboratory/Imaging   Hemodynamics  Temp (24hrs), Av.3 °C (99.2 °F), Min:37.1 °C (98.8 °F), Max:37.6 °C (99.6 °F)   Temperature: 37.2 °C (99 °F)  Pulse  Av.1  Min: 78  Max: 112    Blood Pressure: 124/74      Respiratory      Respiration: 16, Pulse Oximetry: 94 %             Fluids    Intake/Output Summary (Last 24 hours) at 18 1849  Last data filed at 18 1800   Gross per 24 hour   Intake              920 ml   Output             2730 ml   Net            -1810 ml       Nutrition  Orders Placed This Encounter   Procedures   • DIET ORDER     Standing Status:   Standing     Number of Occurrences:   1     Order Specific Question:   Diet:     Answer:   Regular [1]     Physical Exam   Constitutional: He is oriented to person, place, and time. No distress.   HENT:   Head: Normocephalic.   Eyes: Conjunctivae are normal.   Neck: Normal range of motion.   Cardiovascular: Normal rate and regular rhythm.    Pulmonary/Chest: Effort normal and breath sounds normal. No respiratory distress. He has no wheezes.   Abdominal: Soft. Bowel sounds are normal. He exhibits no distension. There is no tenderness.    Genitourinary:       Right testis shows swelling (improved. ). Left testis shows swelling (improved. ).   Musculoskeletal: Normal range of motion. He exhibits no tenderness.   Neurological: He is alert and oriented to person, place, and time.   Skin: No erythema.   Psychiatric: He has a normal mood and affect.   Nursing note and vitals reviewed.      Recent Labs      03/07/18 0115 03/08/18   0426   WBC  14.1*  11.9*   RBC  4.34*  4.37*   HEMOGLOBIN  12.4*  12.5*   HEMATOCRIT  37.2*  36.8*   MCV  85.7  84.2   MCH  28.6  28.6   MCHC  33.3*  34.0   RDW  38.7  37.4   PLATELETCT  324  399   MPV  9.5  9.6     Recent Labs      03/07/18 0116 03/08/18 0426   SODIUM  131*  132*   POTASSIUM  3.6  4.0   CHLORIDE  99  101   CO2  24  25   GLUCOSE  162*  99   BUN  18  17   CREATININE  1.02  0.84   CALCIUM  8.5  9.2                      Assessment/Plan     * Orchitis- (present on admission)   Assessment & Plan    Epididymitis also noted on ultrasound. CT shows possible abscess. The patient has leukocytosis, he is tachycardic, but otherwise he is afebrile and his vital signs are stable with a normal blood pressure. I have started him on IV ceftriaxone and vancomycin. I will check a urinalysis, chlamydia and gonorrhea. He will receive maintenance IV fluids and we will await blood cultures and any cultures obtained intraoperatively to monitor for speciation and sensitivities. He will be kept nothing by mouth in the event that urology would like to proceed to the operating room in the morning. He'll receive symptomatic management for pain.  3/7 had ct abdomen today w/o contrast showed Marked mesenteric and retroperitoneal edema. Small pelvic free fluid. Might need to repeat ct with contrast. Leukocytosis improving.   3/8 repeat ct with contrast today showed small abscess, ID consulted and possible drainage by urology.         Sepsis (CMS-HCC)- (present on admission)   Assessment & Plan    Resolving.         Liver mass, right  lobe- (present on admission)   Assessment & Plan    Ct abdomen showed Hyperenhancing 2.5 x 2.5 cm lesion in the right lobe of the liver. This could represent a hemangioma will probably need MRI liver as outpatient.         HTN (hypertension)- (present on admission)   Assessment & Plan    on enalapril.        Tobacco abuse   Assessment & Plan    Needs to quit.           Quality-Core Measures   Reviewed items::  Radiology images reviewed, Labs reviewed and Medications reviewed  Johnson catheter::  No Johnson  Antibiotics:  Treating active infection/contamination beyond 24 hours perioperative coverage

## 2018-03-09 NOTE — PROGRESS NOTES
Report received from HEBERT Fuentes and care assumed for patient at 1900. Pt A&O X 4,  On room air oxygen. VSS.  Voiding via urinal. CMS +. Pt calls appropriately.  Pt sitting up in bed, requesting snacks, snacks provided. Plan of care discussed including pain management, safety, mobilization.  Hourly rounding in place.  Call light and belongings at bedside and within reach.  Bed in lowest position.

## 2018-03-09 NOTE — CARE PLAN
Problem: Safety  Goal: Will remain free from injury  Outcome: PROGRESSING AS EXPECTED  Call light within reach, pt calls for assistance at all times. Bed in low position and locked, upper bedside rails up, proper mobility signs placed, personal possessions within reach, treaded socks on. Hourly rounding in place.          Problem: Pain Management  Goal: Pain level will decrease to patient's comfort goal  Outcome: PROGRESSING AS EXPECTED  Pain medication given PRN per MAR for adequate pain relief.

## 2018-03-09 NOTE — PROGRESS NOTES
Received bedside shift report from night RN. Pt AOx4.  Pt report no pain, he says there is only pain with movement. Does call appropriately. Bed alarm is off.  Pt is resting in bed. PIV assessed and is patent. Pt is on RA. POC discussed as well as unit routine, comfort, and safety. Dicussed DC planning to home after CT and US. Discussed the goal for today, scans, mobility, safety and dc planning. Reviewed orders, notes, labs, and test results. Hourly rounding in place with RN rounding on odd hours and CNA on even hours. 12 hour chart check completed.

## 2018-03-09 NOTE — PROGRESS NOTES
Assumed care from HEBERT Fuentes at 0730. Performed assessment, PT A&O x4, loud murmur present. Ate 100% of breakfast, Voiding in urinal, up to bathroom for Bm. Up for shave and shower after lunch. Changed linen and gown. Call light and belongings at bedside and within reach. Bed in lowest position, brakes engaged

## 2018-03-09 NOTE — ASSESSMENT & PLAN NOTE
Ct abdomen showed Hyperenhancing 2.5 x 2.5 cm lesion in the right lobe of the liver. This could represent a hemangioma will probably need MRI liver as outpatient.

## 2018-03-09 NOTE — DISCHARGE PLANNING
Medical Social Work    BLAKE faxed progress notes to Nolberto at [place] [phone].    BLAKE remains available for DC planning and support.

## 2018-03-09 NOTE — DISCHARGE PLANNING
"Care Transition Team Assessment    Pt was denied housing by [facility] because of criminal background.  Pt was last released from alf in 2011.  Pt has a mother and sister that live in [place].  When SW asked why he doesn't live with family he replied \"their home is too small.\"  Pt has been living at the shelter for the past year.    Information Source  Orientation : Oriented x 4  Information Given By: Patient  Informant's Name: Sylvain Espinosa  Who is responsible for making decisions for patient? : Patient    Readmission Evaluation  Is this a readmission?: No    Elopement Risk  Legal Hold: No  Ambulatory or Self Mobile in Wheelchair: Yes  Disoriented: No  Psychiatric Symptoms: None  History of Wandering: No  Elopement this Admit: No  Vocalizing Wanting to Leave: No  Displays Behaviors, Body Language Wanting to Leave: No-Not at Risk for Elopement  Elopement Risk: Not at Risk for Elopement    Interdisciplinary Discharge Planning  Lives with - Patient's Self Care Capacity: Alone and Able to Care For Self  Support Systems: Friends / Neighbors, Family Member(s)  Housing / Facility: [address] [facility]  Do You Take your Prescribed Medications Regularly: Yes  Able to Return to Previous ADL's: Yes  Mobility Issues: No  Prior Services: None  Assistance Needed: No  Durable Medical Equipment: Not Applicable    Discharge Preparedness  What is your plan after discharge?: Uncertain - pending medical team collaboration  What are your discharge supports?: Parent, Sibling  Prior Functional Level: Independent with Activities of Daily Living  Difficulity with ADLs: None  Difficulity with IADLs: None    Functional Assesment  Prior Functional Level: Independent with Activities of Daily Living    Finances  Financial Barriers to Discharge: Yes  Average Monthly Income: 657 $  Source of Income: [facility]  Prescription Coverage: Yes    Vision / Hearing Impairment  Vision Impairment : No  Hearing Impairment : No    Values / Beliefs / " Concerns  Values / Beliefs Concerns : No    Advance Directive  Advance Directive?: None  Advance Directive offered?: AD Booklet given    Domestic Abuse  Have you ever been the victim of abuse or violence?: No  Physical Abuse or Sexual Abuse: No  Verbal Abuse or Emotional Abuse: No  Possible Abuse Reported to:: Not Applicable    Psychological Assessment  History of Substance Abuse: Methamphetamine  Date Last Used - Methamphetamine: 03/03/18    Discharge Risks or Barriers  Discharge risks or barriers?: Transportation, Substance abuse, Mental health, Lives alone, no community support, Homeless / couch surfing  Patient risk factors: Homeless, Lives alone and no community support, Substance abuse    Anticipated Discharge Information  Anticipated discharge disposition: Shelter

## 2018-03-09 NOTE — CARE PLAN
Problem: Discharge Barriers/Planning  Goal: Patient's continuum of care needs will be met  Outcome: PROGRESSING AS EXPECTED  Plan to go home with out pt urology appointment once pt is medically cleared    Problem: Mobility  Goal: Risk for activity intolerance will decrease  Outcome: PROGRESSING AS EXPECTED  Pt is up with stand by assist and FWW. He is at his baseline. Pain increases only with ambulation.

## 2018-03-09 NOTE — DOCUMENTATION QUERY
DOCUMENTATION QUERY    PROVIDERS: Please select “Cosign w/ note”to reply to query.    To better represent the severity of illness of your patient, please review the following information and exercise your independent professional judgment in responding to this query.     3/5/18 sodium is 131  3/7/18 sodium is 131  3/8/18 sodium is 132    Low sodium levels are noted in the lab results.     Based upon the clinical findings, risk factors, and treatment, can a diagnosis be provided to support this finding?    • Hyponatremia  • Findings of no clinical significance   • Other explanation of clinical findings  • Unable to determine (no explanation for clinical findings)          The medical record reflects the following:   Clinical Findings Noted low sodium levels    Treatment NS IVF   Repeat lab draws    Risk Factors Advanced age   Sepsis   Prostatitis   Orchitis  Epididymitis    Location within medical record  Progress Notes, Lab Results and MAR      Thank you,   Martha Valentine RN  Clinical   [phone]

## 2018-03-10 ENCOUNTER — PATIENT OUTREACH (OUTPATIENT)
Dept: HEALTH INFORMATION MANAGEMENT | Facility: OTHER | Age: 63
End: 2018-03-10

## 2018-03-10 VITALS
SYSTOLIC BLOOD PRESSURE: 144 MMHG | WEIGHT: 147.05 LBS | DIASTOLIC BLOOD PRESSURE: 74 MMHG | RESPIRATION RATE: 18 BRPM | OXYGEN SATURATION: 94 % | TEMPERATURE: 98.5 F | HEART RATE: 74 BPM | BODY MASS INDEX: 22.29 KG/M2 | HEIGHT: 68 IN

## 2018-03-10 PROBLEM — N45.2 ORCHITIS: Status: RESOLVED | Noted: 2018-03-06 | Resolved: 2018-03-10

## 2018-03-10 PROBLEM — A41.9 SEPSIS (HCC): Status: RESOLVED | Noted: 2018-03-06 | Resolved: 2018-03-10

## 2018-03-10 PROCEDURE — 99239 HOSP IP/OBS DSCHRG MGMT >30: CPT | Performed by: HOSPITALIST

## 2018-03-10 PROCEDURE — 700102 HCHG RX REV CODE 250 W/ 637 OVERRIDE(OP): Performed by: UROLOGY

## 2018-03-10 PROCEDURE — A9270 NON-COVERED ITEM OR SERVICE: HCPCS | Performed by: HOSPITALIST

## 2018-03-10 PROCEDURE — 700102 HCHG RX REV CODE 250 W/ 637 OVERRIDE(OP): Performed by: INTERNAL MEDICINE

## 2018-03-10 PROCEDURE — A9270 NON-COVERED ITEM OR SERVICE: HCPCS | Performed by: UROLOGY

## 2018-03-10 PROCEDURE — 700102 HCHG RX REV CODE 250 W/ 637 OVERRIDE(OP): Performed by: HOSPITALIST

## 2018-03-10 PROCEDURE — A9270 NON-COVERED ITEM OR SERVICE: HCPCS | Performed by: INTERNAL MEDICINE

## 2018-03-10 RX ORDER — ENALAPRIL MALEATE 5 MG/1
2.5 TABLET ORAL DAILY
Qty: 30 TAB | Refills: 0 | Status: SHIPPED | OUTPATIENT
Start: 2018-03-10 | End: 2018-12-29 | Stop reason: CLARIF

## 2018-03-10 RX ORDER — DIGOXIN 125 MCG
125 TABLET ORAL EVERY EVENING
Qty: 30 TAB | Refills: 0 | Status: SHIPPED | OUTPATIENT
Start: 2018-03-10 | End: 2018-12-29

## 2018-03-10 RX ORDER — SULFAMETHOXAZOLE AND TRIMETHOPRIM 800; 160 MG/1; MG/1
1 TABLET ORAL EVERY 12 HOURS
Qty: 52 TAB | Refills: 0 | Status: SHIPPED | OUTPATIENT
Start: 2018-03-10 | End: 2018-04-05

## 2018-03-10 RX ORDER — ACETAMINOPHEN 325 MG/1
650 TABLET ORAL EVERY 6 HOURS PRN
COMMUNITY
Start: 2018-03-10 | End: 2018-10-24

## 2018-03-10 RX ORDER — TAMSULOSIN HYDROCHLORIDE 0.4 MG/1
0.4 CAPSULE ORAL
Qty: 30 CAP | Refills: 0 | Status: SHIPPED | OUTPATIENT
Start: 2018-03-11 | End: 2018-12-29

## 2018-03-10 RX ORDER — IBUPROFEN 400 MG/1
400 TABLET ORAL EVERY 6 HOURS PRN
Qty: 30 TAB | COMMUNITY
Start: 2018-03-10 | End: 2018-10-24

## 2018-03-10 RX ORDER — CEFDINIR 300 MG/1
300 CAPSULE ORAL EVERY 12 HOURS
Qty: 12 CAP | Refills: 0 | Status: SHIPPED | OUTPATIENT
Start: 2018-03-10 | End: 2018-03-16

## 2018-03-10 RX ADMIN — OXYCODONE HYDROCHLORIDE 5 MG: 5 TABLET ORAL at 09:10

## 2018-03-10 RX ADMIN — ENALAPRIL MALEATE 2.5 MG: 2.5 TABLET ORAL at 09:10

## 2018-03-10 RX ADMIN — SULFAMETHOXAZOLE AND TRIMETHOPRIM 1 TABLET: 800; 160 TABLET ORAL at 09:10

## 2018-03-10 RX ADMIN — TAMSULOSIN HYDROCHLORIDE 0.4 MG: 0.4 CAPSULE ORAL at 09:11

## 2018-03-10 RX ADMIN — STANDARDIZED SENNA CONCENTRATE AND DOCUSATE SODIUM 2 TABLET: 8.6; 5 TABLET, FILM COATED ORAL at 09:10

## 2018-03-10 RX ADMIN — CEFDINIR 300 MG: 300 CAPSULE ORAL at 09:11

## 2018-03-10 ASSESSMENT — ENCOUNTER SYMPTOMS
CHILLS: 0
ABDOMINAL PAIN: 0
NAUSEA: 0
FEVER: 0
DIARRHEA: 0
VOMITING: 0

## 2018-03-10 ASSESSMENT — LIFESTYLE VARIABLES: EVER_SMOKED: YES

## 2018-03-10 ASSESSMENT — PATIENT HEALTH QUESTIONNAIRE - PHQ9
SUM OF ALL RESPONSES TO PHQ9 QUESTIONS 1 AND 2: 0
2. FEELING DOWN, DEPRESSED, IRRITABLE, OR HOPELESS: NOT AT ALL
1. LITTLE INTEREST OR PLEASURE IN DOING THINGS: NOT AT ALL
SUM OF ALL RESPONSES TO PHQ QUESTIONS 1-9: 0

## 2018-03-10 ASSESSMENT — PAIN SCALES - GENERAL: PAINLEVEL_OUTOF10: 3

## 2018-03-10 NOTE — CARE PLAN
Problem: Venous Thromboembolism (VTW)/Deep Vein Thrombosis (DVT) Prevention:  Goal: Patient will participate in Venous Thrombosis (VTE)/Deep Vein Thrombosis (DVT)Prevention Measures  Outcome: PROGRESSING AS EXPECTED  No s/s of DVT. Pt walks around occasionally. Pt moves around in bed frequently.    Problem: Bowel/Gastric:  Goal: Normal bowel function is maintained or improved  Outcome: PROGRESSING AS EXPECTED  Pt had a BM today. Pt is taking stool softeners.

## 2018-03-10 NOTE — CARE PLAN
Problem: Discharge Barriers/Planning  Goal: Patient's continuum of care needs will be met  Outcome: PROGRESSING AS EXPECTED  Pt is now on all oral ABX and tolerating it very well. He will be dc today    Problem: Pain Management  Goal: Pain level will decrease to patient's comfort goal  Outcome: PROGRESSING AS EXPECTED  Pt only states pain when he stands. He says it is getting better.

## 2018-03-10 NOTE — PROGRESS NOTES
Received shift report from night RN. Pt comfortable and resting in bed at the moment. Planning for today is discharge, Rx , and bus pass for transportation . Goals for the day: safety and comfort. Reviewed orders, labs/test results. Assessment done. Bed down/locked, call light in place, hourly rounding in place. Chart check completed.

## 2018-03-10 NOTE — DISCHARGE INSTRUCTIONS
Discharge Instructions    Discharged to other by taxi with self. Discharged via wheelchair, hospital escort: Yes.  Special equipment needed: Not Applicable    Be sure to schedule a follow-up appointment with your primary care doctor or any specialists as instructed.     Discharge Plan:   Diet Plan: Discussed  Activity Level: Discussed  Smoking Cessation Offered: Patient Refused  Confirmed Symptoms Management: Discussed  Medication Reconciliation Updated: Yes  Influenza Vaccine Indication: Not indicated: Previously immunized this influenza season and > 8 years of age    I understand that a diet low in cholesterol, fat, and sodium is recommended for good health. Unless I have been given specific instructions below for another diet, I accept this instruction as my diet prescription.   Other diet: regular as tolerated     Special Instructions: Discharge instructions for the Orthopedic Patient    Follow up with Primary Care Physician within 2 weeks of discharge to home, regarding:  Review of medications and diagnostic testing.  Surveillance for medical complications.  Workup and treatment of osteoporosis, if appropriate.     -Is this a Joint Replacement patient? No    -Is this patient being discharged with medication to prevent blood clots?  Yes, Aspirin Aspirin, ASA oral tablets  What is this medicine?  ASPIRIN (AS pir in) is a pain reliever. It is used to treat mild pain and fever. This medicine is also used as directed by a doctor to prevent and to treat heart attacks, to prevent strokes, and to treat arthritis or inflammation.  This medicine may be used for other purposes; ask your health care provider or pharmacist if you have questions.  COMMON BRAND NAME(S): Aspir-Low, Aspir-Deanna, Aspirtab, Michael Advanced Aspirin, Michael Aspirin, Michael Aspirin Extra Strength, Michael Aspirin Plus, Michael Extra Strength, Michael Extra Strength Plus, Michael Genuine Aspirin, Michael Womens Aspirin, Bufferin, Bufferin Extra Strength, Bufferin  Low Dose  What should I tell my health care provider before I take this medicine?  They need to know if you have any of these conditions:  -anemia  -asthma  -bleeding problems  -child with chickenpox, the flu, or other viral infection  -diabetes  -gout  -if you frequently drink alcohol containing drinks  -kidney disease  -liver disease  -low level of vitamin K  -lupus  -smoke tobacco  -stomach ulcers or other problems  -an unusual or allergic reaction to aspirin, tartrazine dye, other medicines, dyes, or preservatives  -pregnant or trying to get pregnant  -breast-feeding  How should I use this medicine?  Take this medicine by mouth with a glass of water. Follow the directions on the package or prescription label. You can take this medicine with or without food. If it upsets your stomach, take it with food. Do not take your medicine more often than directed.  Talk to your pediatrician regarding the use of this medicine in children. While this drug may be prescribed for children as young as 12 years of age for selected conditions, precautions do apply. Children and teenagers should not use this medicine to treat chicken pox or flu symptoms unless directed by a doctor.  Patients over 65 years old may have a stronger reaction and need a smaller dose.  Overdosage: If you think you have taken too much of this medicine contact a poison control center or emergency room at once.  NOTE: This medicine is only for you. Do not share this medicine with others.  What if I miss a dose?  If you are taking this medicine on a regular schedule and miss a dose, take it as soon as you can. If it is almost time for your next dose, take only that dose. Do not take double or extra doses.  What may interact with this medicine?  Do not take this medicine with any of the following medications:  -cidofovir  -ketorolac  -probenecid  This medicine may also interact with the following medications:  -alcohol  -alendronate  -bismuth  subsalicylate  -flavocoxid  -herbal supplements like feverfew, garlic, praneeth, ginkgo biloba, horse chestnut  -medicines for diabetes or glaucoma like acetazolamide, methazolamide  -medicines for gout  -medicines that treat or prevent blood clots like enoxaparin, heparin, ticlopidine, warfarin  -other aspirin and aspirin-like medicines  -NSAIDs, medicines for pain and inflammation, like ibuprofen or naproxen  -pemetrexed  -sulfinpyrazone  -varicella live vaccine  This list may not describe all possible interactions. Give your health care provider a list of all the medicines, herbs, non-prescription drugs, or dietary supplements you use. Also tell them if you smoke, drink alcohol, or use illegal drugs. Some items may interact with your medicine.  What should I watch for while using this medicine?  If you are treating yourself for pain, tell your doctor or health care professional if the pain lasts more than 10 days, if it gets worse, or if there is a new or different kind of pain. Tell your doctor if you see redness or swelling. Also, check with your doctor if you have a fever that lasts for more than 3 days. Only take this medicine to prevent heart attacks or blood clotting if prescribed by your doctor or health care professional.  Do not take aspirin or aspirin-like medicines with this medicine. Too much aspirin can be dangerous. Always read the labels carefully.  This medicine can irritate your stomach or cause bleeding problems. Do not smoke cigarettes or drink alcohol while taking this medicine. Do not lie down for 30 minutes after taking this medicine to prevent irritation to your throat.  If you are scheduled for any medical or dental procedure, tell your healthcare provider that you are taking this medicine. You may need to stop taking this medicine before the procedure.  This medicine may be used to treat migraines. If you take migraine medicines for 10 or more days a month, your migraines may get worse.  Keep a diary of headache days and medicine use. Contact your healthcare professional if your migraine attacks occur more frequently.  What side effects may I notice from receiving this medicine?  Side effects that you should report to your doctor or health care professional as soon as possible:  -allergic reactions like skin rash, itching or hives, swelling of the face, lips, or tongue  -breathing problems  -changes in hearing, ringing in the ears  -confusion  -general ill feeling or flu-like symptoms  -pain on swallowing  -redness, blistering, peeling or loosening of the skin, including inside the mouth or nose  -signs and symptoms of bleeding such as bloody or black, tarry stools; red or dark-brown urine; spitting up blood or brown material that looks like coffee grounds; red spots on the skin; unusual bruising or bleeding from the eye, gums, or nose  -trouble passing urine or change in the amount of urine  -unusually weak or tired  -yellowing of the eyes or skin  Side effects that usually do not require medical attention (report to your doctor or health care professional if they continue or are bothersome):  -diarrhea or constipation  -headache  -nausea, vomiting  -stomach gas, heartburn  This list may not describe all possible side effects. Call your doctor for medical advice about side effects. You may report side effects to FDA at 5-800-FDA-5433.  Where should I keep my medicine?  Keep out of the reach of children.  Store at room temperature between 15 and 30 degrees C (59 and 86 degrees F). Protect from heat and moisture. Do not use this medicine if it has a strong vinegar smell. Throw away any unused medicine after the expiration date.  NOTE: This sheet is a summary. It may not cover all possible information. If you have questions about this medicine, talk to your doctor, pharmacist, or health care provider.  © 2018 [employer] Standard (2014-08-19 11:30:31)      · Is patient discharged on Warfarin /  Coumadin?   No     Orchitis  Introduction  Orchitis is swelling (inflammation) of a testicle caused by infection. Testicles are the male organs that produce sperm. The testicles are held in a fleshy sac (scrotum) located behind the penis. Orchitis usually affects only one testicle, but it can occur in both. The condition can develop suddenly.  Orchitis can be caused by many different kinds of bacteria and viruses.  What are the causes?  Orchitis can be caused by either a bacterial or viral infection.  Bacterial Infections  · These often occur along with an infection of the coiled tube that collects sperm and sits on top of the testicle (epididymis).  · In men who are not sexually active, bacterial orchitis usually starts as a urinary tract infection and spreads to the testicle.  · In sexually active men, sexually transmitted infections are the most common cause of bacterial orchitis. These can include:  ¨ Gonorrhea.  ¨ Chlamydia.  Viral Infections  · Mumps is still the most common cause of viral orchitis, though mumps is now rare in many areas because of vaccination.  · Other viruses that can cause orchitis include:  ¨ The chickenpox virus (varicella-zoster virus).  ¨ The virus that causes mononucleosis (Coleen-Barr virus).  What increases the risk?  Boys and men who have not been vaccinated against mumps are at risk for mumps orchitis.  Risk factors for bacterial orchitis include:  · Frequent urinary tract infections.  · High-risk sexual behaviors.  · Having a sexual partner with a sexually transmitted infection.  · Having had urinary tract surgery.  · Using a tube passed through the penis to drain urine (Johnson catheter).  · An enlarged prostate gland.  What are the signs or symptoms?  The most common symptoms of orchitis are swelling and pain in the scrotum. Other signs and symptoms may include:  · Feeling generally sick (malaise).  · Fever and chills.  · Painful urination.  · Painful ejaculation.  · Blood or  discharge from the penis.  · Nausea.  · Headache.  · Fatigue.  How is this diagnosed?  Your health care provider may suspect orchitis if you have a painful, swollen testicle along with other signs and symptoms of the condition. A physical exam will be done. Tests may also be done to help your health care provider make a diagnosis. These may include:  · A blood test to check for signs of infection.  · A urine test to check for a urinary tract infection.  · Using a swab to collect a fluid sample from the tip of the penis to test for sexually transmitted infections.  · Taking an image of the testicle using sound waves and a computer (testicular ultrasound).  How is this treated?  Treatment of orchitis depends on the cause. For orchitis caused by a bacterial infection, your health care provider will most likely prescribe antibiotic medicines. Bacterial infections usually clear up within a few days.  Both viral infections and bacterial infections may be treated with:  · Bed rest.  · Anti-inflammatory medicines.  · Pain medicines.  · Elevating the scrotum and applying ice.  Follow these instructions at home:  · Rest as directed by your health care provider.  · Take medicines only as directed by your health care provider.  · If you were prescribed an antibiotic medicine, finish it all even if you start to feel better.  · Elevate your scrotum and apply ice as directed:  ¨ Put ice in a plastic bag.  ¨ Place a small towel or pillow between your legs.  ¨ Rest your scrotum on the pillow or towel.  ¨ Place another towel between your skin and the plastic bag.  ¨ Leave the ice on for 20 minutes, 2-3 times a day.  Contact a health care provider if:  · You have a fever.  · Pain and swelling have not gotten better after 3 days.  Get help right away if:  · Your pain is getting worse.  · The swelling in your testicle gets worse.  This information is not intended to replace advice given to you by your health care provider. Make sure you  discuss any questions you have with your health care provider.  Document Released: 12/15/2001 Document Revised: 05/25/2017 Document Reviewed: 05/07/2015  © 2017 Elsevier    Infection Control in the Home  If you have an infection or you are taking care of someone who has an infection, it is important to know how to keep the infection from spreading. Follow these guidelines to help stop the spread of infection, and talk to your health care provider.  HOW ARE INFECTIONS SPREAD?  In order for an infection to spread, the following must be present:  · A germ. This may be a virus, bacteria, fungus, or parasite.  · A place for the germ to live. This may be:  ¨ On or in a person, animal, plant, or food.  ¨ In soil or water.  ¨ On surfaces, such as a door handle.  · A susceptible host. This is a person or animal who does not have resistance (immunity) to the germ.  · A way for the germ to enter the host. This may occur by:  ¨ Direct contact. This may happen by making contact--such as shaking hands or hugging--with an infected person or animal. Some germs can also travel through the air and spread to you if an infected person coughs or sneezes on you or near you.  ¨ Indirect contact. This is when the germ enters the host through contact with an infected object. Examples include eating contaminated food, drinking contaminated water, or touching a contaminated surface with your hands and then touching your face, nose, or mouth soon after that.  HOW CAN I HELP TO PREVENT INFECTION FROM SPREADING?  There are several things that you can do to help prevent infection from spreading.  Hand Washing  It is very important to wash your hands correctly, following these steps:  1. Wet your hands with clean, running water.  2. Apply soap to your hands. Liquid soap is better than bar soap.  3. Rub your hands together quickly to create lather.  4. Keep rubbing your hands together for at least 20 seconds. Thoroughly scrub all parts of your  hands, including under your fingernails and between your fingers.  5. Rinse your hands with clean, running water until all of the soap is gone.  6. Dry your hands with an air dryer or a clean paper or cloth towel, or let your hands air-dry. Do not use your clothing or a soiled towel to dry your hands.  7. If you are in a public restroom, use your towel to turn off the water faucet and to open the bathroom door.  Make sure to wash your hands:  · Before:  ¨ Visiting a baby or anyone with a weakened or lowered defense (immune) system.  ¨ Putting in and taking out any contact lenses.  · After:  ¨ Working or playing outside.  ¨ Touching an animal or its toys or leash.  ¨ Handling livestock.  ¨ Using the bathroom or helping a child or adult to use the bathroom.  ¨ Using household  or toxic chemicals.  ¨ Touching or taking out the garbage.  ¨ Touching anything dirty around your home.  ¨ Handling soiled clothes or rags.  ¨ Taking care of a sick child. This includes touching used tissues, toys, and clothes.  ¨ Sneezing, coughing, or blowing your nose.  ¨ Using public transportation.  ¨ Shaking hands.  ¨ Using a phone, including your mobile phone.  ¨ Touching money.  · Before and after:  ¨ Preparing food.  ¨ Preparing a bottle for a baby.  ¨ Feeding a baby or a young child.  ¨ Eating.  ¨ Visiting or taking care of someone who is sick.  ¨ Changing a diaper.  ¨ Changing a bandage (dressing) or taking care of an injury or wound.  ¨ Giving or taking medicine.  Taking Care of Your Home  · Make sure that you have enough cleaning supplies at all times. These include:  ¨ Disinfectants.  ¨ Reusable cleaning cloths. Wash these after each use.  ¨ Paper towels.  ¨ Utility gloves. Replace your gloves if they are cracked or torn or if they start to peel.  · Use bleach safely. Never mix it with other cleaning products, especially those that contain ammonia. This mixture can create a dangerous gas that may be deadly.  · Take care of  your cleaning supplies. Toilet brushes, mops, and sponges can breed germs. Soak them in bleach and water for 5 minutes after each use.  · Do not pour used mop water down the sink. Pour it down the toilet instead.  · Maintain proper ventilation in your home.  · If you have a pet, ensure that your pet stays clean. Do not let people with weak immune systems touch bird droppings, fish tank water, or a litter box.  ¨ If you have a cat, be sure to change the litter every day.  · In the bathroom, make sure you:  ¨ Provide liquid soap.  ¨ Change towels and washcloths frequently. Avoid sharing towels and washcloths.  ¨ Change toothbrushes often and store them separately in a clean, dry place.  ¨ Disinfect the toilet.  ¨ Clean the tub, shower, and sink with standard cleaning products.    ¨ Mop the floor with a standard .  ¨ Do not share personal items, such as razors, toothbrushes, drinking glasses, deodorant, fernandez, brushes, towels, and washcloths.    · In the kitchen, make sure you:  ¨ Store food carefully.  ¨ Refrigerate leftovers promptly in covered containers.  ¨ Throw out stale or spoiled food.  ¨ Clean the inside of your refrigerator each week.  ¨ Keep your refrigerator set at 40°F (4°C) or less, and set your freezer at 0°F (-18°C) or less.  ¨ Thaw foods in the refrigerator or microwave, not at room temperature.  ¨ Serve foods at the proper temperature. Do not eat raw meat. Make sure it is cooked to the appropriate temperature. Cook eggs until they are firm.  ¨ Wash fruits and vegetables under running water.  ¨ Use separate cutting boards, plates, and utensils for raw foods and cooked foods.  ¨ Keep work surfaces clean.  ¨ Use a clean spoon each time you sample food while cooking.  ¨ Wash your dishes in hot, soapy water. Air-dry your dishes or use a .  ¨ Do not share forks, cups, or spoons during meals.  · Wear gloves if laundry is visibly soiled.  · Change linens each week or whenever they are  soiled.  · Do not shake soiled linens. Doing that may send germs into the air. Put dressings, sanitary or incontinence pads, diapers, and gloves in plastic garbage bags for disposal.     This information is not intended to replace advice given to you by your health care provider. Make sure you discuss any questions you have with your health care provider.     Document Released: 09/26/2009 Document Revised: 01/08/2016 Document Reviewed: 08/20/2015  [employer] Patient Education ©2016 [employer]    Enalapril tablets  What is this medicine?  ENALAPRIL (e NAL a pril) is an ACE inhibitor. This medicine is used to treat high blood pressure and heart failure.  This medicine may be used for other purposes; ask your health care provider or pharmacist if you have questions.  COMMON BRAND NAME(S): Vasotec  What should I tell my health care provider before I take this medicine?  They need to know if you have any of these conditions:  -bone marrow disease  -heart or blood vessel disease  -if you are on a special diet, such as a low salt diet  -immune system disease like lupus  -kidney or liver disease  -low blood pressure  -previous swelling of the tongue, face, or lips with difficulty breathing, difficulty swallowing, hoarseness, or tightening of the throat  -an unusual or allergic reaction to enalapril, other ACE inhibitors, insect venom, foods, dyes, or preservatives  -pregnant or trying to get pregnant  -breast-feeding  How should I use this medicine?  Take this medicine by mouth with a glass of water. Follow the directions on the prescription label. Take your doses at regular intervals. Do not take your medicine more often than directed. Do not stop taking this medicine except on the advice of your doctor or health care professional.  Talk to your pediatrician regarding the use of this medicine in children. Special care may be needed. While this drug may be prescribed for children as young as 1 month,  precautions do apply.  Overdosage: If you think you have taken too much of this medicine contact a poison control center or emergency room at once.  NOTE: This medicine is only for you. Do not share this medicine with others.  What if I miss a dose?  If you miss a dose, take it as soon as you can. If it is almost time for your next dose, take only that dose. Do not take double or extra doses.  What may interact with this medicine?  Do not take this medication with any of the following medications:  -sacubitril; valsartan  This medicine may also interact with the following:  -diuretics  -lithium  -medicines for high blood pressure  -NSAIDs, medicines for pain and inflammation, like ibuprofen or naproxen  -potassium salts or potassium supplements  This list may not describe all possible interactions. Give your health care provider a list of all the medicines, herbs, non-prescription drugs, or dietary supplements you use. Also tell them if you smoke, drink alcohol, or use illegal drugs. Some items may interact with your medicine.  What should I watch for while using this medicine?  Visit your doctor or health care professional for regular checks on your progress. Check your blood pressure as directed. Ask your doctor or health care professional what your blood pressure should be and when you should contact him or her. Call your doctor or health care professional if you notice an irregular or fast heart beat.  You may get drowsy or dizzy. Do not drive, use machinery, or do anything that needs mental alertness until you know how this drug affects you. Do not stand or sit up quickly, especially if you are an older patient. This reduces the risk of dizzy or fainting spells. Alcohol can make you more drowsy and dizzy. Avoid alcoholic drinks.  Women should inform their doctor if they wish to become pregnant or think they might be pregnant. There is a potential for serious side effects to an unborn child. Talk to your health  care professional or pharmacist for more information.  Check with your doctor or health care professional if you get an attack of severe diarrhea, nausea and vomiting, or if you sweat a lot. The loss of too much body fluid can make it dangerous for you to take this medicine.  Avoid salt substitutes unless you are told otherwise by your doctor or health care professional.  Do not treat yourself for coughs, colds, or pain while you are taking this medicine without asking your doctor or health care professional for advice. Some ingredients may increase your blood pressure.  What side effects may I notice from receiving this medicine?  Side effects that you should report to your doctor or health care professional as soon as possible:  -allergic reactions like skin rash, itching or hives, swelling of the face, lips, or tongue  -breathing problems  -change in amount of urine passed  -chest pain  -feeling faint or lightheaded, falls  -fever or chills  -numbness or tingling in your fingers or toes  -redness, blistering, peeling or loosening of the skin, including inside the mouth  -swelling of ankles, legs  -unusual bleeding or bruising or pinpoint red spots on the skin  Side effects that usually do not require medical attention (report to your doctor or health care professional if they continue or are bothersome):  -change in sex drive or performance  -cough  -sun sensitivity  -tiredness  This list may not describe all possible side effects. Call your doctor for medical advice about side effects. You may report side effects to FDA at 0-245-FDA-7567.  Where should I keep my medicine?  Keep out of the reach of children.  Store at room temperature below 30 degrees C (86 degrees F). Protect from moisture. Keep container tightly closed. Throw away any unused medicine after the expiration date.  NOTE: This sheet is a summary. It may not cover all possible information. If you have questions about this medicine, talk to your  doctor, pharmacist, or health care provider.  © 2018 [employer] (2017-02-10 11:24:30)    Cefdinir capsules  What is this medicine?  CEFDINIR (SEF di ner) is a cephalosporin antibiotic. It is used to treat certain kinds of bacterial infections. It will not work for colds, flu, or other viral infections.  This medicine may be used for other purposes; ask your health care provider or pharmacist if you have questions.  COMMON BRAND NAME(S): Eliza  What should I tell my health care provider before I take this medicine?  They need to know if you have any of these conditions:  -bleeding problems  -kidney disease  -stomach or intestine problems (especially colitis)  -an unusual or allergic reaction to cefdinir, other cephalosporin antibiotics, penicillin, penicillamine, other foods, dyes or preservatives  -pregnant or trying to get pregnant  -breast-feeding  How should I use this medicine?  Take this medicine by mouth. Swallow it with a drink of water. Follow the directions on the prescription label. You can take it with or without food. If it upsets your stomach it may help to take it with food. Take your doses at regular intervals. Do not take it more often than directed. Finish all the medicine you are prescribed even if you think your infection is better.  Talk to your pediatrician regarding the use of this medicine in children. Special care may be needed.  Overdosage: If you think you have taken too much of this medicine contact a poison control center or emergency room at once.  NOTE: This medicine is only for you. Do not share this medicine with others.  What if I miss a dose?  If you miss a dose, take it as soon as you can. If it is almost time for your next dose, take only that dose. Do not take double or extra doses.  What may interact with this medicine?  -antacids that contain aluminum or magnesium  -iron supplements  -other antibiotics  -probenecid  This list may not describe all possible  interactions. Give your health care provider a list of all the medicines, herbs, non-prescription drugs, or dietary supplements you use. Also tell them if you smoke, drink alcohol, or use illegal drugs. Some items may interact with your medicine.  What should I watch for while using this medicine?  Tell your doctor or health care professional if your symptoms do not get better in a few days.  If you are diabetic you may get a false-positive result for sugar in your urine. Check with your doctor or health care professional before you change your diet or the dose of your diabetes medicine.  What side effects may I notice from receiving this medicine?  Side effects that you should report to your doctor or health care professional as soon as possible:  -allergic reactions like skin rash, itching or hives, swelling of the face, lips, or tongue  -bloody or watery diarrhea  -breathing problems  -fever  -redness, blistering, peeling or loosening of the skin, including inside the mouth  -seizures  -trouble passing urine or change in the amount of urine  -unusual bleeding or bruising  -unusually weak or tired  Side effects that usually do not require medical attention (report to your doctor or health care professional if they continue or are bothersome):  -constipation  -diarrhea  -dizziness  -dry mouth  -headache  -loss of appetite  -nausea, vomiting  -stomach pain  -stool discoloration  -tiredness  -vaginal discharge, itching, or odor in women  This list may not describe all possible side effects. Call your doctor for medical advice about side effects. You may report side effects to FDA at 2-173-FDA-9399.  Where should I keep my medicine?  Keep out of the reach of children.  Store at room temperature between 15 and 30 degrees C (59 and 86 degrees F). Throw the medicine away after the expiration date.  NOTE: This sheet is a summary. It may not cover all possible information. If you have questions about this medicine, talk to  your doctor, pharmacist, or health care provider.  © 2018 [employer] (2017-04-24 15:52:44)    Sulfamethoxazole; Trimethoprim, SMX-TMP tablets  What is this medicine?  SULFAMETHOXAZOLE; TRIMETHOPRIM or SMX-TMP (suhl fuh meth OK gretta zohl; trye METH oh prim) is a combination of a sulfonamide antibiotic and a second antibiotic, trimethoprim. It is used to treat or prevent certain kinds of bacterial infections. It will not work for colds, flu, or other viral infections.  This medicine may be used for other purposes; ask your health care provider or pharmacist if you have questions.  COMMON BRAND NAME(S): Bacter-Aid DS, Bactrim, Bactrim DS, Septra, Septra DS  What should I tell my health care provider before I take this medicine?  They need to know if you have any of these conditions:  -anemia  -asthma  -being treated with anticonvulsants  -if you frequently drink alcohol containing drinks  -kidney disease  -liver disease  -low level of folic acid or glucose-6-phosphate dehydrogenase  -poor nutrition or malabsorption  -porphyria  -severe allergies  -thyroid disorder  -an unusual or allergic reaction to sulfamethoxazole, trimethoprim, sulfa drugs, other medicines, foods, dyes, or preservatives  -pregnant or trying to get pregnant  -breast-feeding  How should I use this medicine?  Take this medicine by mouth with a full glass of water. Follow the directions on the prescription label. Take your medicine at regular intervals. Do not take it more often than directed. Do not skip doses or stop your medicine early.  Talk to your pediatrician regarding the use of this medicine in children. Special care may be needed. This medicine has been used in children as young as 2 months of age.  Overdosage: If you think you have taken too much of this medicine contact a poison control center or emergency room at once.  NOTE: This medicine is only for you. Do not share this medicine with others.  What if I miss a dose?  If you  miss a dose, take it as soon as you can. If it is almost time for your next dose, take only that dose. Do not take double or extra doses.  What may interact with this medicine?  Do not take this medicine with any of the following medications:  -aminobenzoate potassium  -dofetilide  -metronidazole  This medicine may also interact with the following medications:  -ACE inhibitors like benazepril, enalapril, lisinopril, and ramipril  -birth control pills  -cyclosporine  -digoxin  -diuretics  -indomethacin  -medicines for diabetes  -methenamine  -methotrexate  -phenytoin  -potassium supplements  -pyrimethamine  -sulfinpyrazone  -tricyclic antidepressants  -warfarin  This list may not describe all possible interactions. Give your health care provider a list of all the medicines, herbs, non-prescription drugs, or dietary supplements you use. Also tell them if you smoke, drink alcohol, or use illegal drugs. Some items may interact with your medicine.  What should I watch for while using this medicine?  Tell your doctor or health care professional if your symptoms do not improve. Drink several glasses of water a day to reduce the risk of kidney problems.  Do not treat diarrhea with over the counter products. Contact your doctor if you have diarrhea that lasts more than 2 days or if it is severe and watery.  This medicine can make you more sensitive to the sun. Keep out of the sun. If you cannot avoid being in the sun, wear protective clothing and use a sunscreen. Do not use sun lamps or tanning beds/booths.  What side effects may I notice from receiving this medicine?  Side effects that you should report to your doctor or health care professional as soon as possible:  -allergic reactions like skin rash or hives, swelling of the face, lips, or tongue  -breathing problems  -fever or chills, sore throat  -irregular heartbeat, chest pain  -joint or muscle pain  -pain or difficulty passing urine  -red pinpoint spots on  skin  -redness, blistering, peeling or loosening of the skin, including inside the mouth  -unusual bleeding or bruising  -unusually weak or tired  -yellowing of the eyes or skin  Side effects that usually do not require medical attention (report to your doctor or health care professional if they continue or are bothersome):  -diarrhea  -dizziness  -headache  -loss of appetite  -nausea, vomiting  -nervousness  This list may not describe all possible side effects. Call your doctor for medical advice about side effects. You may report side effects to FDA at 5-417-FDA-3038.  Where should I keep my medicine?  Keep out of the reach of children.  Store at room temperature between 20 to 25 degrees C (68 to 77 degrees F). Protect from light. Throw away any unused medicine after the expiration date.  NOTE: This sheet is a summary. It may not cover all possible information. If you have questions about this medicine, talk to your doctor, pharmacist, or health care provider.  © 2018 [employer] Standard (2014-07-25 14:38:26)    Tamsulosin capsules  What is this medicine?  TAMSULOSIN (lakhani CORINA bolivar sin) is used to treat enlargement of the prostate gland in men, a condition called benign prostatic hyperplasia or BPH. It is not for use in women. It works by relaxing muscles in the prostate and bladder neck. This improves urine flow and reduces BPH symptoms.  This medicine may be used for other purposes; ask your health care provider or pharmacist if you have questions.  COMMON BRAND NAME(S): Flomax  What should I tell my health care provider before I take this medicine?  They need to know if you have any of the following conditions:  -advanced kidney disease  -advanced liver disease  -low blood pressure  -prostate cancer  -an unusual or allergic reaction to tamsulosin, sulfa drugs, other medicines, foods, dyes, or preservatives  -pregnant or trying to get pregnant  -breast-feeding  How should I use this medicine?  Take this  medicine by mouth about 30 minutes after the same meal every day. Follow the directions on the prescription label. Swallow the capsules whole with a glass of water. Do not crush, chew, or open capsules. Do not take your medicine more often than directed. Do not stop taking your medicine unless your doctor tells you to.  Talk to your pediatrician regarding the use of this medicine in children. Special care may be needed.  Overdosage: If you think you have taken too much of this medicine contact a poison control center or emergency room at once.  NOTE: This medicine is only for you. Do not share this medicine with others.  What if I miss a dose?  If you miss a dose, take it as soon as you can. If it is almost time for your next dose, take only that dose. Do not take double or extra doses. If you stop taking your medicine for several days or more, ask your doctor or health care professional what dose you should start back on.  What may interact with this medicine?  -cimetidine  -fluoxetine  -ketoconazole  -medicines for erectile disfunction like sildenafil, tadalafil, vardenafil  -medicines for high blood pressure  -other alpha-blockers like alfuzosin, doxazosin, phentolamine, phenoxybenzamine, prazosin, terazosin  -warfarin  This list may not describe all possible interactions. Give your health care provider a list of all the medicines, herbs, non-prescription drugs, or dietary supplements you use. Also tell them if you smoke, drink alcohol, or use illegal drugs. Some items may interact with your medicine.  What should I watch for while using this medicine?  Visit your doctor or health care professional for regular check ups. You will need lab work done before you start this medicine and regularly while you are taking it. Check your blood pressure as directed. Ask your health care professional what your blood pressure should be, and when you should contact him or her.  This medicine may make you feel dizzy or  lightheaded. This is more likely to happen after the first dose, after an increase in dose, or during hot weather or exercise. Drinking alcohol and taking some medicines can make this worse. Do not drive, use machinery, or do anything that needs mental alertness until you know how this medicine affects you. Do not sit or stand up quickly. If you begin to feel dizzy, sit down until you feel better. These effects can decrease once your body adjusts to the medicine.  Contact your doctor or health care professional right away if you have an erection that lasts longer than 4 hours or if it becomes painful. This may be a sign of a serious problem and must be treated right away to prevent permanent damage.  If you are thinking of having cataract surgery, tell your eye surgeon that you have taken this medicine.  What side effects may I notice from receiving this medicine?  Side effects that you should report to your doctor or health care professional as soon as possible:  -allergic reactions like skin rash or itching, hives, swelling of the lips, mouth, tongue, or throat  -breathing problems  -change in vision  -feeling faint or lightheaded  -irregular heartbeat  -prolonged or painful erection  -weakness  Side effects that usually do not require medical attention (report to your doctor or health care professional if they continue or are bothersome):  -back pain  -change in sex drive or performance  -constipation, nausea or vomiting  -cough  -drowsy  -runny or stuffy nose  -trouble sleeping  This list may not describe all possible side effects. Call your doctor for medical advice about side effects. You may report side effects to FDA at 8-172-FDA-8015.  Where should I keep my medicine?  Keep out of the reach of children.  Store at room temperature between 15 and 30 degrees C (59 and 86 degrees F). Throw away any unused medicine after the expiration date.  NOTE: This sheet is a summary. It may not cover all possible  information. If you have questions about this medicine, talk to your doctor, pharmacist, or health care provider.  © 2018 [employer] Standard (2013-12-18 14:11:34)      Left Ventricular Dysfunction  Left ventricular dysfunction means that the main pumping chamber of your heart (left ventricle) is not working well. The heart is a muscle and needs a constant supply of oxygen and blood. If the arteries that deliver the oxygen and blood are blocked, parts of the heart may get weak and not pump well. The heart may also get weak from having to constantly pump against the resistance of high blood pressure or narrow arteries.  CAUSES  · Damage after a heart attack.  · High blood pressure.  · Leaking or malfunctioning heart valves.  · Toxins such as alcohol.  · Metabolic problems such as:  · Vitamin deficiencies.  · Thyroid problems.  · Diabetes.  Heartbeats that are irregular or too fast for a long time.  SYMPTOMS  Symptoms may progress to include:  · Fatigue.  · Reduced ability to exercise.  · Trouble breathing, especially when active.  · Shortness of breath at rest.  · Trouble breathing when lying down at night.  · Being more tired, lightheaded, or confused.  · Weight gain with swelling of the legs and feet.  · Passing out.  · Sudden cardiac death.  DIAGNOSIS   Your doctor may find that your heart pumping function is reduced after taking a medical history and doing a physical exam.   · Clues from your exam may include:  · The veins in your neck are too big.  · An irregular heart beat.  · Abnormal heart sounds  · Sounds in your chest from fluid in your lungs.  · Swelling in your ankles or feet.  · Tender places in your belly plus swelling.  · Other studies that may be done include:  · Chest x-ray  · EKG (a recording of the electrical activity of your heart).  · Echocardiogram (a picture of your heart made by using sound waves).  · Holter monitoring (portable longer term checking of your heart rhythm).  · Cardiac stress  testing to see how well your heart is pumping.  · Angiogram to look for any blocked arteries.  HOME CARE INSTRUCTIONS  · Activity Level--- Your caregiver will help you determine what type of exercise program may be helpful. It is important to maintain strength and increase it if possible. Pace your activities and avoid shortness of breath or chest pain. Plan activities for at least an hour after meals or before eating. This allows your body to handle one activity at a time. Your caregiver can help advise you.  · Diet--- Maintain a low salt and heart healthy diet,or as directed by your caregiver. Get diet information from your caregiver or a registered dietician. Keep your salt  of sight and avoid adding salt to your foods. Measure the amounts of fluids you take in per day in cups and record these amounts.  · Medications--- You may have been prescribed an ACE Inhibitor or a beta blocker to take for your heart failure. Take these medications as directed. They improve your heart function and your survival. Ask your caregiver if taking statins (drugs to lower cholesterol) would be helpful. Diuretics or water pills may be prescribed to help get rid of fluids. You may also be given a potassium supplement to replace the amount lost from the water pills.  · Weight Monitoring---Record your hospital or clinic weight. When you get home, compare it to your scale and record your weight. Weigh twice per day at first and record these weights. Try to weigh at the same time every day. It is best to weigh first thing in the morning, in your same clothes, after going to the bathroom and before eating or drinking anything. Place the scale on a hard surfaced floor. Bring these weights to your caregiver to be reviewed during your appointments.  · Blood pressure monitoring should be done twice a day at first, morning and evening when you are relaxed. Once your blood pressure has stabilized, rechecking once a day and then a few  times a week may be enough. You can get a home blood pressure cuff at your [facility]. Record these values and bring them with you for your clinic visits. Notify your caregiver if you become dizzy or lightheaded upon standing up.  · Be familiar with your medications--- If you have trouble remembering when you took them, write down times or set your medications out in advance for the day or the week to avoid problems. If you take your medications twice a day, place them by your toothbrush and get in the habit of brushing your teeth twice a day. If you are on a water pill (diuretic), take these in the morning so you are not up all night going to the bathroom.  · If you are currently a smoker, it is time to quit. Nicotine makes your heart work harder and is one of the leading causes of cardiac (heart) deaths. Do not leave your doctor's office without a smoking cessation plan or instructions on help available to quit smoking.  · Immunization with influenza and pneumococcal vaccines may reduce the risk of respiratory infection.  · Nonsteroidal anti-inflammatory drugs should not be used. They can cause salt (sodium) retention and also may impair the action of diuretics and ACE inhibitors. They can also elevate blood pressure. This includes drugs such as Advil, Motrin, ibuprofen, etc.  · Aldosterone Antagonists such as spironolactone may have helpful effects and may be used if there are symptoms at rest despite the use of diurectics, an ACE inhibitor and (usually) a beta-blocker. Aldosterone antagonism is recommended in advanced heart failure in addition to ACE inhibition and diuretics to improve survival and decrease chances of other problems.  · If you do not follow your diet and take your medications properly, this may lead to a rapid decline in your health, emergency care or hospitalization. Follow the advice of your caregiver.  · What To Do If Symptoms Worsen--- If there are immediate problems go to the Emergency  Department. This would include any symptoms which brought you in and which are getting worse rather than better. Call your local emergency services (911 in [place] for immediate care.  DAILY PATH TO QUALITY LIVING  · Monitor weight and record.  · Monitor blood pressure and record.  · Monitor fluid intake.  · Monitor sodium intake.  · Monitor activity levels.  · Take your medications.  · Stop all use of nicotine.  · Know when to call for help and do so.  SEEK IMMEDIATE MEDICAL CARE IF:   · Your weight increases by 3 lb/1.4 kg in 1 day or 5 lb/2.3 kg in a week, or as your caregiver suggests.  · You notice increasing shortness of breath during rest, sleeping, or with activity, and which is unusual for you.  · You develop an increase in angina, or develop chest pain which is unusual for you.  · You develop sweating or nausea which is unusual for you.  · You notice swelling in your hands, feet, ankles or abdomen.  · You have a feeling of fullness in your abdomen or develop nausea or loss of appetite.  · You notice lasting (persistent) dizziness, blurred vision, headache, or unsteadiness.  Make an appointment with your caregiver as directed for follow-up.  Document Released: 03/07/2006 Document Revised: 03/11/2013 Document Reviewed: 04/16/2009  [employer]® Patient Information ©2014 [employer].        Smoking Cessation, Tips for Success  If you are ready to quit smoking, congratulations! You have chosen to help yourself be healthier. Cigarettes bring nicotine, tar, carbon monoxide, and other irritants into your body. Your lungs, heart, and blood vessels will be able to work better without these poisons. There are many different ways to quit smoking. Nicotine gum, nicotine patches, a nicotine inhaler, or nicotine nasal spray can help with physical craving. Hypnosis, support groups, and medicines help break the habit of smoking.  WHAT THINGS CAN I DO TO MAKE QUITTING EASIER?   Here are some tips to help you quit for  "good:  · Pick a date when you will quit smoking completely. Tell all of your friends and family about your plan to quit on that date.  · Do not try to slowly cut down on the number of cigarettes you are smoking. Pick a quit date and quit smoking completely starting on that day.  · Throw away all cigarettes.    · Clean and remove all ashtrays from your home, work, and car.  · On a card, write down your reasons for quitting. Carry the card with you and read it when you get the urge to smoke.  · Cleanse your body of nicotine. Drink enough water and fluids to keep your urine clear or pale yellow. Do this after quitting to flush the nicotine from your body.  · Learn to predict your moods. Do not let a bad situation be your excuse to have a cigarette. Some situations in your life might tempt you into wanting a cigarette.  · Never have \"just one\" cigarette. It leads to wanting another and another. Remind yourself of your decision to quit.  · Change habits associated with smoking. If you smoked while driving or when feeling stressed, try other activities to replace smoking. Stand up when drinking your coffee. Brush your teeth after eating. Sit in a different chair when you read the paper. Avoid alcohol while trying to quit, and try to drink fewer caffeinated beverages. Alcohol and caffeine may urge you to smoke.  · Avoid foods and drinks that can trigger a desire to smoke, such as sugary or spicy foods and alcohol.  · Ask people who smoke not to smoke around you.  · Have something planned to do right after eating or having a cup of coffee. For example, plan to take a walk or exercise.  · Try a relaxation exercise to calm you down and decrease your stress. Remember, you may be tense and nervous for the first 2 weeks after you quit, but this will pass.  · Find new activities to keep your hands busy. Play with a pen, coin, or rubber band. Doodle or draw things on paper.  · Brush your teeth right after eating. This will help " "cut down on the craving for the taste of tobacco after meals. You can also try mouthwash.    · Use oral substitutes in place of cigarettes. Try using lemon drops, carrots, cinnamon sticks, or chewing gum. Keep them handy so they are available when you have the urge to smoke.  · When you have the urge to smoke, try deep breathing.  · Designate your home as a nonsmoking area.  · If you are a heavy smoker, ask your health care provider about a prescription for nicotine chewing gum. It can ease your withdrawal from nicotine.  · Reward yourself. Set aside the cigarette money you save and buy yourself something nice.  · Look for support from others. Join a support group or smoking cessation program. Ask someone at home or at work to help you with your plan to quit smoking.  · Always ask yourself, \"Do I need this cigarette or is this just a reflex?\" Tell yourself, \"Today, I choose not to smoke,\" or \"I do not want to smoke.\" You are reminding yourself of your decision to quit.  · Do not replace cigarette smoking with electronic cigarettes (commonly called e-cigarettes). The safety of e-cigarettes is unknown, and some may contain harmful chemicals.  · If you relapse, do not give up! Plan ahead and think about what you will do the next time you get the urge to smoke.  HOW WILL I FEEL WHEN I QUIT SMOKING?  You may have symptoms of withdrawal because your body is used to nicotine (the addictive substance in cigarettes). You may crave cigarettes, be irritable, feel very hungry, cough often, get headaches, or have difficulty concentrating. The withdrawal symptoms are only temporary. They are strongest when you first quit but will go away within 10-14 days. When withdrawal symptoms occur, stay in control. Think about your reasons for quitting. Remind yourself that these are signs that your body is healing and getting used to being without cigarettes. Remember that withdrawal symptoms are easier to treat than the major diseases " that smoking can cause.   Even after the withdrawal is over, expect periodic urges to smoke. However, these cravings are generally short lived and will go away whether you smoke or not. Do not smoke!  WHAT RESOURCES ARE AVAILABLE TO HELP ME QUIT SMOKING?  Your health care provider can direct you to community resources or hospitals for support, which may include:  · Group support.  · Education.  · Hypnosis.  · Therapy.     This information is not intended to replace advice given to you by your health care provider. Make sure you discuss any questions you have with your health care provider.     Document Released: 09/15/2005 Document Revised: 01/08/2016 Document Reviewed: 06/05/2014  [employer] Interactive Patient Education ©2016 [employer]        Depression / Suicide Risk    As you are discharged from this [facility] facility, it is important to learn how to keep safe from harming yourself.    Recognize the warning signs:  · Abrupt changes in personality, positive or negative- including increase in energy   · Giving away possessions  · Change in eating patterns- significant weight changes-  positive or negative  · Change in sleeping patterns- unable to sleep or sleeping all the time   · Unwillingness or inability to communicate  · Depression  · Unusual sadness, discouragement and loneliness  · Talk of wanting to die  · Neglect of personal appearance   · Rebelliousness- reckless behavior  · Withdrawal from people/activities they love  · Confusion- inability to concentrate     If you or a loved one observes any of these behaviors or has concerns about self-harm, here's what you can do:  · Talk about it- your feelings and reasons for harming yourself  · Remove any means that you might use to hurt yourself (examples: pills, rope, extension cords, firearm)  · Get professional help from the community (Mental Health, Substance Abuse, psychological counseling)  · Do not be alone:Call your Safe Contact- someone whom you  trust who will be there for you.  · Call your local CRISIS HOTLINE 930-2500 or 347-472-0680  · Call your local Children's Mobile Crisis Response Team [place] (297) 510-7416 or www.[employer]  · Call the toll free National Suicide Prevention Hotlines   · National Suicide Prevention Lifeline 577-896-YDFQ (7016)  · National [employer] 800-SUICIDE (813-0191)

## 2018-03-10 NOTE — PROGRESS NOTES
Pt d/c to shelter/friends house, with self. He was escorted out by Brandie DELGADILLO in wheelchair. All belongings were with pt on discharge. Discharge paperwork was reviewed, all questions for the MD/RN were answered, one copy was signed and placed in the chart, another was given to the pt to keep. IV was d/c, tip intact, pt tolerated well. Pt had 5 Rx to take home with him. Pt left with 2 bus passes that he could use to get to the pharmacy and to the shelter or his friends house to stay. Pt educated on where to find DME should he need it for comfort and stability with walking.

## 2018-03-10 NOTE — DISCHARGE PLANNING
Referral: Follow Up    Intervention: SW met with pt at bedside.  Pt indicates he will filled his discharge scripts at [facility].  Pt requested / received a few bus passes as he will stay at his sister's house across from [facility].    Plan: As Above.  Home with transportation assistance.

## 2018-03-10 NOTE — PROGRESS NOTES
[facility] Hospitalist Progress Note    Date of Service: 3/9/2018    Chief Complaint  62 y.o. male admitted 3/5/2018 with scrotal redness and swelling.     Interval Problem Update  Feeling better, tolerating po intake, no N/V pain and swelling is improved voiding w/o problem.     Consultants/Specialty  urology    Disposition  Home in am.         Review of Systems   Constitutional: Negative for fever.   Eyes: Negative for blurred vision.   Respiratory: Negative for hemoptysis.    Cardiovascular: Negative for chest pain.   Gastrointestinal: Negative for heartburn.   Genitourinary: Negative for dysuria.        Scrotum edema and erythema    Musculoskeletal: Negative for myalgias.   Neurological: Negative for dizziness.   Endo/Heme/Allergies: Does not bruise/bleed easily.   Psychiatric/Behavioral: Negative for depression.      Physical Exam  Laboratory/Imaging   Hemodynamics  Temp (24hrs), Av °C (98.6 °F), Min:36.8 °C (98.2 °F), Max:37.3 °C (99.1 °F)   Temperature: 37.1 °C (98.8 °F)  Pulse  Av.5  Min: 70  Max: 112    Blood Pressure: 150/76      Respiratory      Respiration: 16, Pulse Oximetry: 95 %        RUL Breath Sounds: Clear, RML Breath Sounds: Clear, RLL Breath Sounds: Diminished, RUTH Breath Sounds: Clear, LLL Breath Sounds: Diminished    Fluids    Intake/Output Summary (Last 24 hours) at 18 1705  Last data filed at 18 1700   Gross per 24 hour   Intake                0 ml   Output             2180 ml   Net            -2180 ml       Nutrition  Orders Placed This Encounter   Procedures   • DIET ORDER     Standing Status:   Standing     Number of Occurrences:   1     Order Specific Question:   Diet:     Answer:   Regular [1]     Physical Exam   Constitutional: He is oriented to person, place, and time. No distress.   HENT:   Head: Normocephalic.   Cardiovascular: Normal rate and regular rhythm.    Pulmonary/Chest: Effort normal and breath sounds normal. No respiratory distress. He has no wheezes.      Abdominal: Soft. Bowel sounds are normal. He exhibits no distension. There is no tenderness.   Genitourinary:       Right testis shows swelling (improved. ). Left testis shows swelling (improved. ).   Musculoskeletal: Normal range of motion. He exhibits no tenderness.   Neurological: He is alert and oriented to person, place, and time.   Skin: No erythema.   Psychiatric: He has a normal mood and affect.   Nursing note and vitals reviewed.      Recent Labs      03/07/18 0115 03/08/18   0426   WBC  14.1*  11.9*   RBC  4.34*  4.37*   HEMOGLOBIN  12.4*  12.5*   HEMATOCRIT  37.2*  36.8*   MCV  85.7  84.2   MCH  28.6  28.6   MCHC  33.3*  34.0   RDW  38.7  37.4   PLATELETCT  324  399   MPV  9.5  9.6     Recent Labs      03/07/18 0116 03/08/18   0426   SODIUM  131*  132*   POTASSIUM  3.6  4.0   CHLORIDE  99  101   CO2  24  25   GLUCOSE  162*  99   BUN  18  17   CREATININE  1.02  0.84   CALCIUM  8.5  9.2                      Assessment/Plan     * Orchitis- (present on admission)   Assessment & Plan    Epididymitis also noted on ultrasound. CT shows possible abscess. The patient has leukocytosis, he is tachycardic, but otherwise he is afebrile and his vital signs are stable with a normal blood pressure. I have started him on IV ceftriaxone and vancomycin. I will check a urinalysis, chlamydia and gonorrhea. He will receive maintenance IV fluids and we will await blood cultures and any cultures obtained intraoperatively to monitor for speciation and sensitivities. He will be kept nothing by mouth in the event that urology would like to proceed to the operating room in the morning. He'll receive symptomatic management for pain.  3/7 had ct abdomen today w/o contrast showed Marked mesenteric and retroperitoneal edema. Small pelvic free fluid. Might need to repeat ct with contrast. Leukocytosis improving.   3/8 repeat ct with contrast today showed small abscess, ID consulted and possible drainage by urology.   3/9 as per  urologist no need for drainage of small abscess, on PO atb, will probably d/c tomorrow if ok with urologist/ID.         Sepsis (CMS-HCC)- (present on admission)   Assessment & Plan    Resolved        Hyponatremia   Assessment & Plan    Mild and trending up.         Liver mass, right lobe- (present on admission)   Assessment & Plan    Ct abdomen showed Hyperenhancing 2.5 x 2.5 cm lesion in the right lobe of the liver. This could represent a hemangioma will probably need MRI liver as outpatient.         HTN (hypertension)- (present on admission)   Assessment & Plan    on enalapril.         Tobacco abuse   Assessment & Plan    Needs to quit.           Quality-Core Measures   Reviewed items::  Radiology images reviewed, Labs reviewed and Medications reviewed  Johnson catheter::  No Johnson  Antibiotics:  Treating active infection/contamination beyond 24 hours perioperative coverage

## 2018-03-10 NOTE — PROGRESS NOTES
Infectious Disease Progress Note    Author: Anisha Lincoln M.D. Date & Time of service: 3/10/2018  3:11 PM    Chief Complaint:  Epididymitis, prostatitis, seminal abscess      Interval History:  62-year-old male with known history of heart murmur and chronic obstructive pulmonary disease by radiography, admitted with   epididymitis, seminal vesicle abscess and likely prostatitis  3/9 AF tolerated Bactrim well-no plan for surgery  3/10 AF WBC 11.9 has some itching in groin-decreased pain-denies SE abx  Labs Reviewed, Medications Reviewed, Radiology Reviewed and Wound Reviewed.    Review of Systems:  Review of Systems   Constitutional: Negative for chills and fever.   Cardiovascular: Negative for chest pain.   Gastrointestinal: Negative for abdominal pain, diarrhea, nausea and vomiting.   All other systems reviewed and are negative.      Hemodynamics:  Temp (24hrs), Av.1 °C (98.8 °F), Min:36.9 °C (98.5 °F), Max:37.3 °C (99.1 °F)  Temperature: 36.9 °C (98.5 °F)  Pulse  Av.1  Min: 70  Max: 112   Blood Pressure: 144/74       Physical Exam:  Physical Exam   Constitutional: He is oriented to person, place, and time. He appears well-developed. No distress.   HENT:   Head: Normocephalic and atraumatic.   Poor dentition   Eyes: EOM are normal. Pupils are equal, round, and reactive to light.   Neck: Neck supple.   Cardiovascular: Normal rate.    Murmur heard.  Pulmonary/Chest: Effort normal. No respiratory distress.   Abdominal: Soft. He exhibits no distension.   Musculoskeletal: He exhibits no edema.   +LAD   Neurological: He is alert and oriented to person, place, and time.   Skin: No rash noted.   Nursing note and vitals reviewed.      Meds:    Current Facility-Administered Medications:   •  cefdinir  •  NS  •  sulfamethoxazole-trimethoprim  •  enalapril  •  digoxin  •  senna-docusate **AND** polyethylene glycol/lytes **AND** magnesium hydroxide **AND** bisacodyl  •  NS  •  INITIATE NICOTINE REPLACEMENT  PROTOCOL  **AND** nicotine **AND** Protocol 205 PATIENT EDUCATION MATERIALS **AND** Protocol 205 Rotate nicotine patch application sites daily  **AND** nicotine polacrilex  •  tamsulosin  •  morphine injection  •  oxyCODONE immediate-release **OR** oxyCODONE immediate-release    Current Outpatient Prescriptions:   •  aspirin EC, 81 mg, Oral, DAILY  •  enalapril, 2.5 mg, Oral, DAILY  •  digoxin, 125 mcg, Oral, Q EVENING  •  cefdinir, 300 mg, Oral, Q12HRS  •  sulfamethoxazole-trimethoprim, 1 Tab, Oral, Q12HRS  •  [START ON 3/11/2018] tamsulosin, 0.4 mg, Oral, AFTER BREAKFAST  •  acetaminophen, 650 mg, Oral, Q6HRS PRN  •  ibuprofen, 400 mg, Oral, Q6HRS PRN    Labs:  Recent Labs      03/08/18 0426   WBC  11.9*   RBC  4.37*   HEMOGLOBIN  12.5*   HEMATOCRIT  36.8*   MCV  84.2   MCH  28.6   RDW  37.4   PLATELETCT  399   MPV  9.6   NEUTSPOLYS  76.70*   LYMPHOCYTES  12.40*   MONOCYTES  8.20   EOSINOPHILS  1.90   BASOPHILS  0.30     Recent Labs      03/08/18   0426   SODIUM  132*   POTASSIUM  4.0   CHLORIDE  101   CO2  25   GLUCOSE  99   BUN  17     Recent Labs      03/08/18 0426   CREATININE  0.84       Imaging:  Ct-abdomen-pelvis W/o    Result Date: 3/7/2018  3/7/2018 4:32 PM HISTORY/REASON FOR EXAM:  Pain. Left lower quadrant pain TECHNIQUE/EXAM DESCRIPTION: CT scan of the abdomen and pelvis without contrast. Noncontrast helical scanning was obtained from the diaphragmatic domes through the pubic symphysis. Low dose optimization technique was utilized for this CT exam including automated exposure control and adjustment of the mA and/or kV according to patient size. COMPARISON: 3/5/2018 FINDINGS: CT Abdomen: Bilateral lung base blebs. Small bibasilar pleural effusions and dependent atelectasis. Cardiac valvular calcifications. Liver and spleen are within normal limits in size. Pancreas and adrenal glands are not well demonstrated. No calcified gallstones. No hydronephrosis and no urolithiasis identified. Ureters are not  well delineated. No evidence of bowel obstruction. Large and small bowel are not well delineated due to considerable mesenteric edema. Calcified plaque is located within the aorta. Infrarenal abdominal aorta is dilated to 2.9 cm. Marked diffuse subcutaneous edema. CT Pelvis: Urinary bladder is distended and smoothly marginated. Small pelvic free fluid. Marked presacral and perirectal edema. The previously demonstrated cystic left seminal vesicle is poorly demonstrated without contrast. It appears similar to previous examination.     Anasarca. No evidence of bowel obstruction. No hydronephrosis. Marked mesenteric and retroperitoneal edema. Small pelvic free fluid. Small bibasilar pleural effusions and dependent atelectasis within the lungs.    Ct-abdomen-pelvis With    Result Date: 3/8/2018  3/8/2018 11:10 AM HISTORY/REASON FOR EXAM:  Intra-abdominal abscess. TECHNIQUE/EXAM DESCRIPTION:  CT scan of the abdomen and pelvis with contrast. Contrast-enhanced helical scanning was obtained from the diaphragmatic domes through the pubic symphysis following the bolus administration of nonionic contrast without complication. 100 mL of Omnipaque 350 nonionic contrast was administered without complication. Low dose optimization technique was utilized for this CT exam including automated exposure control and adjustment of the mA and/or kV according to patient size. COMPARISON: 3/7/2018 FINDINGS: Lung bases: There is trace pleural fluid, right greater than left. There is bibasilar emphysematous change. Calcific dictation is seen at the aortic and mitral annulus. Abdomen: No free air is seen in the abdomen or pelvis. There is a 2.5 x 2.5 cm hyperenhancing lesion in the inferior right lobe of the liver. Portal vein is patent. Gallbladder is contracted. Spleen is not enlarged. No definite adrenal mass is identified. Small hypodense left renal lesion is again seen which most likely represents a small cyst. There is mild heterogeneity  of the upper pole of the right kidney. No hydronephrosis is seen. The ureters are not well delineated. Examination is limited by paucity of intra-abdominal fat. Pancreas appears unremarkable. There is no biliary ductal dilatation. Atherosclerotic plaque is seen in the aorta. There is an infrarenal aortic aneurysm measuring 3.1 cm. No bulky lymphadenopathy is identified. There is no evidence of bowel obstruction. The appendix is not identified, limiting evaluation for appendicitis. There is a moderate amount of colonic stool. Small amount of free fluid is seen in the abdomen and pelvis. Bladder is thick-walled. There is a small peripherally enhancing collection in the left pelvis measuring 1.6 x 1.6 x 1.3 cm. This is in the region of the left seminal vesicle. Degenerative changes are seen in the spine.     1.6 x 1.6 x 1.3 cm peripherally enhancing collection in the region of the left seminal vesicle. This could represent a small abscess. Decompressed bladder, limiting evaluation. The bladder does appear somewhat thick-walled. Correlation with urinalysis is recommended as this can be seen in setting of cystitis. Small amount of free fluid in the abdomen and pelvis. Nonvisualization of the appendix, limiting evaluation for appendicitis. Moderate amount of colonic stool. Mildly heterogeneous enhancement of the upper pole of the right kidney. Correlation with urinalysis is recommended as this can be seen in the setting of pyelonephritis. Hyperenhancing 2.5 x 2.5 cm lesion in the right lobe of the liver. This could represent a hemangioma. Hepatic protocol MRI may be performed for definitive characterization. Infrarenal aortic aneurysm measuring 3.1 cm. Atherosclerotic plaque. Trace bilateral pleural effusions. Emphysematous changes.     Ct-abdomen-pelvis With    Result Date: 3/5/2018  3/5/2018 5:51 PM HISTORY/REASON FOR EXAM:  LLQ Pain, left testicular swelling for 2 days TECHNIQUE/EXAM DESCRIPTION: CT of the abdomen and  pelvis with contrast. Contrast-enhanced helical scanning was obtained from the diaphragmatic domes through the pubic symphysis following the bolus administration of 100 mL of nonionic contrast (Omnipaque 350) without complication. Low dose optimization technique was utilized for this CT exam including automated exposure control and adjustment of the mA and/or kV according to patient size. COMPARISON: 1/14/2015 FINDINGS: The visualized lung bases show no consolidation. Emphysematous change with areas of bleb and bullae formation. Right lower lobe atelectasis Aortic valve calcification CT Abdomen: The spleen, pancreas, gallbladder, adrenal glands enhance normally. Hyper enhancing right hepatic 22 x 31 mm mass most likely is a hemangioma Kidneys again shows some peripheral areas of decreased density, heterogeneous enhancement but solid masses are not seen. There is a stable 1 cm left interpolar cortical cyst. No hydronephrosis There is no abnormal bowel dilatation or inflammatory change. Possible normal gas-filled appendix overlying the right common iliac artery There is no lymphadenopathy or aneurysmal change of the aorta. 30 mm from 29 mm infrarenal aortic ectasia. Considerable atherosclerosis CT Pelvis: There is a medium sized left hydrocele. Small right hydrocele. No inguinal hernia There is a left hemipelvis 18 x 24 mm rim-enhancing process appears to correspond to the left seminal vesicle although there is generalized fat stranding and a paucity of fat said this is difficult to definitively characterize Prostate is moderately enlarged. No lymphadenopathy. There is no free air or free fluid.     1.  Medium-sized left, small right hydroceles 2.  Left hemipelvis enhancing 24 x 18 mm cystic process is suspicious for the left seminal vesicle and could indicate seminal vesicle infection. Abscess is possible. 3.  3 cm infrarenal aortic ectasia is minimally enlarged 4.  Stable aortic valvular calcifications which likely  indicate stenosis 5.  Severe emphysema with bleb and bullae formation    Dx-chest-2 Views    Result Date: 3/5/2018    3/5/2018 9:06 PM HISTORY/REASON FOR EXAM: Cough TECHNIQUE/EXAM DESCRIPTION:  PA and lateral views of the chest. COMPARISON: July 4, 2017 FINDINGS: The cardiac silhouette appears within normal limits. Atherosclerotic calcification of the aorta is noted.  Bilateral perihilar interstitial prominence is noted. The lungs appear hyperexpanded bilaterally with flattening of the diaphragms.  Near densities in the bilateral lung bases appears similar to prior study. No significant pleural effusions are identified. The bony structures appear age-appropriate.     1.  A near densities in the bilateral lung bases, similar to prior, appearance suggests scarring or chronic atelectasis. 2.  Atherosclerosis 3.  Perihilar interstitial prominence and bronchial wall cuffing, appearance suggests changes of underlying bronchial inflammation, consider bronchitis. 4.  Hyperexpansion of the lungs suggest changes of COPD.    [id number]    Result Date: 3/8/2018  3/8/2018 8:48 AM HISTORY/REASON FOR EXAM: Follow-up left scrotal swelling.. TECHNIQUE/EXAM DESCRIPTION: Real-time sonography of the scrotum was performed with grey-scale, color and duplex Doppler imaging. COMPARISON: 3/5/2018 FINDINGS: The right testicle measures 4.4 cm in length. The left testicle measures 3.7 cm in length. There is no evidence of testicular mass.   Arterial flow is present bilaterally and appears symmetric. There is septated echogenic fluid seen surrounding the left testicle consistent with a complex echogenic left hydrocele. There is a small right hydrocele. The left epididymis is enlarged and hypervascular. There is a small right varicocele. There is a 4 mm right epididymal cyst.     1.  Enlarged hypervascular right epididymis consistent with epididymitis. 2.  Again seen large complex left hydrocele. 3.  No evidence of testicular torsion  or testicular mass. 4.  Small right epididymal cyst. 5.  Small right varicocele. 6.  Small right hydrocele.    [id number]    Result Date: 3/5/2018  3/5/2018 7:35 PM HISTORY/REASON FOR EXAM: Pain. TECHNIQUE/EXAM DESCRIPTION: Real-time sonography of the scrotum was performed with gray-scale, color and duplex Doppler imaging. COMPARISON: None FINDINGS: The testes are homogeneous in echotexture and low-resistive waveforms are confirmed bilaterally. No intratesticular mass is seen. Vascular flow is symmetric. The right testis measures 4.06 cm x 2.22 cm x 2.40 cm. The left testis measures  3.59 cm x 2.84 cm x 2.62 cm. Right epididymis head and body appear enlarged with somewhat increased Doppler flow.  There is 7.2 x 3.6 mm right epididymal head cyst identified. 3.2 x 2.1 mm left epididymal body cyst is seen. Small right hydrocele with specular echoes is seen. There is complex hydrocele on the left with septation and echogenic material. Right varicocele is seen.     1.  Increased size and vascularity of the right epididymis, appearance compatible with epididymitis. 2.  Large complex left hydrocele with septation and debris, recommend radiographic follow-up. 3.  Small right hydrocele with debris. 4.  Bilateral small epididymal cysts. 5.  Right varicocele      Micro:  Results     Procedure Component Value Units Date/Time    CHLAMYDIA/GC PCR URINE OR SWAB [[id number]] Collected:  03/06/18 1840    Order Status:  Completed Specimen:  Urine from Genital Updated:  03/07/18 2246     Source Urine     C. trachomatis by PCR Negative     N. gonorrhoeae by PCR Negative    Narrative:       Collected By:[id number] VALERIE YOUNG    Urinalysis [[id number]]  (Abnormal) Collected:  03/06/18 1840    Order Status:  Completed Specimen:  Urine Updated:  03/06/18 1913     Color Yellow     Character Cloudy (A)     Specific Gravity 1.024     Ph 5.0     Glucose Negative mg/dL      Ketones Trace (A) mg/dL      Protein Negative mg/dL       Bilirubin Negative     Urobilinogen, Urine 0.2     Nitrite Negative     Leukocyte Esterase Large (A)     Occult Blood Moderate (A)     Micro Urine Req Microscopic    Narrative:       If not done within the last 24 hours    URINALYSIS (UA) [[id number]]  (Abnormal) Collected:  03/05/18 1945    Order Status:  Completed Specimen:  Urine Updated:  03/05/18 2015     Micro Urine Req Microscopic     Color Yellow     Character Cloudy (A)     Ph 5.5     Glucose Negative mg/dL      Ketones Negative mg/dL      Protein Negative mg/dL      Bilirubin Negative     Urobilinogen, Urine 0.2     Nitrite Positive (A)     Leukocyte Esterase Large (A)     Occult Blood Moderate (A)          Assessment:  Active Hospital Problems    Diagnosis   • *Orchitis [N45.2]   • Sepsis (CMS-HCC) [A41.9]   • Tobacco abuse [Z72.0]   • Liver mass, right lobe [R16.0]   • HTN (hypertension) [I10]       Plan:  Epididymitis  Improved  GC neg  Continue cefdinir to complete 10 day course    Prostatitis  Continue Bactrim  Stop date 4/5/2018    Seminal vesicle abscess  Abx as above  1.6 by 1.3 cm  FU Urology as scheduled    Itching, new  No current rash  Monitor for tinea cruris-topical treatment discussed    FU ID clinic if needed    Discussed with internal medicine.

## 2018-03-11 ENCOUNTER — PATIENT OUTREACH (OUTPATIENT)
Dept: HEALTH INFORMATION MANAGEMENT | Facility: OTHER | Age: 63
End: 2018-03-11

## 2018-03-11 NOTE — PROGRESS NOTES
Placed discharge outreach phone call to patient s/p hospital discharge 3/10/18.  Left voicemail providing my contact information and instructions to call with any questions or concerns.

## 2018-03-11 NOTE — DISCHARGE SUMMARY
CHIEF COMPLAINT ON ADMISSION  Chief Complaint   Patient presents with   • Testicle Pain     left    • Groin Pain       CODE STATUS     Full code  HPI & HOSPITAL COURSE  Please see original H&P and consult note for specific information, patient was admitted on 3/5/18. He was discharged on 3/10/18. Patient was admitted for acute orchitis, and sepsis, had CT of the abdomen that showed possible abscess. He was started on vancomycin and ceftriaxone. Urology was consulted. He went for ultrasound of the testis with his which showed epididymitis. Patient was started on IV fluids, patient responded to treatment, patient is started to fit him better and leukocytosis resolved. Patient had repeated CT and ultrasound . CT abdomen showed 1.6 x 1.6 x 1.3 cm peripherally enhancing collection in the region of the left seminal vesicle. This could represent a small abscess. Infectious disease was consulted, as per urology consult to a small to drain. Recommended medical management for now, patient is afebrile and he is tolerating diet. He denies any fever and pain and swelling has resolved. ID recommended to continue with cefdinir to complete 10 days and with bactrim for 26 days. Patient went follow-up with urology and ID as needed. Patient will be discharged today.  was consulted since patient is homeless, prescriptions given to , patient expressed understanding of his discharge plan patient is hemodynamically stable. He is afebrile.  Patient had mild hyponatremia which is trending up, leukocytosis resolved.    The patient met 2-midnight criteria for an inpatient stay at the time of discharge.    Therefore, he is discharged in good and stable condition with close outpatient follow-up.    SPECIFIC OUTPATIENT FOLLOW-UP  Primary care  ID  Urology  Patient has incidental findings on CT abdomen that need to be f/u as outpatient. Has Hyperenhancing 2.5 x 2.5 cm lesion in the right lobe of the liver. This could  represent a hemangioma, he will probably need MRI liver.     DISCHARGE PROBLEM LIST  Principal Problem (Resolved):    Orchitis POA: Yes  Active Problems:    Tobacco abuse POA: Unknown    HTN (hypertension) (Chronic) POA: Yes    Liver mass, right lobe POA: Yes    Hyponatremia POA: Unknown  Resolved Problems:    Sepsis (CMS-HCC) POA: Yes      FOLLOW UP  No future appointments.  Jakob Cox M.D.  [address]  [place] [address]  [phone]      Please call to setup your follow up appointment. Office closed over weekend,  could not make appointment. thank you     NEY Landers  [address] [facility] [address]  [place] [address]  [phone]    On 3/13/2018  Please check in for your appointment at 10:20 am thank you.    Anisha Lincoln M.D.  [address]  [place] [address] [phone]      Please call to setup your follow up appointment. Office closed over weekend. thank you       MEDICATIONS ON DISCHARGE   Sylvain Espinosa   Home Medication Instructions JESSY:[id number]    Printed on:03/10/18 6532   Medication Information                      acetaminophen (TYLENOL) 325 MG Tab  Take 2 Tabs by mouth every 6 hours as needed for Mild Pain or Moderate Pain.             aspirin EC (ECOTRIN) 81 MG Tablet Delayed Response  Take 1 Tab by mouth every day.             cefdinir (OMNICEF) 300 MG Cap  Take 1 Cap by mouth every 12 hours for 6 days.             digoxin (LANOXIN) 125 MCG Tab  Take 1 Tab by mouth every evening.             enalapril (VASOTEC) 5 MG Tab  Take 0.5 Tabs by mouth every day.             ibuprofen (MOTRIN) 400 MG Tab  Take 1 Tab by mouth every 6 hours as needed for Moderate Pain or Inflammation.             sulfamethoxazole-trimethoprim (BACTRIM DS) 800-160 MG tablet  Take 1 Tab by mouth every 12 hours for 26 days.             tamsulosin (FLOMAX) 0.4 MG capsule  Take 1 Cap by mouth ONE-HALF HOUR AFTER BREAKFAST.                 DIET  General diet    ACTIVITY  As  tolerated.  Weight bearing as tolerated      CONSULTATIONS  Infectious disease  Urology    PROCEDURES  None    LABORATORY  Lab Results   Component Value Date/Time    SODIUM 132 (L) 03/08/2018 04:26 AM    POTASSIUM 4.0 03/08/2018 04:26 AM    CHLORIDE 101 03/08/2018 04:26 AM    CO2 25 03/08/2018 04:26 AM    GLUCOSE 99 03/08/2018 04:26 AM    BUN 17 03/08/2018 04:26 AM    CREATININE 0.84 03/08/2018 04:26 AM        Lab Results   Component Value Date/Time    WBC 11.9 (H) 03/08/2018 04:26 AM    HEMOGLOBIN 12.5 (L) 03/08/2018 04:26 AM    HEMATOCRIT 36.8 (L) 03/08/2018 04:26 AM    PLATELETCT 399 03/08/2018 04:26 AM        Total time of the discharge process exceeds 40 minutes

## 2018-10-24 ENCOUNTER — APPOINTMENT (OUTPATIENT)
Dept: RADIOLOGY | Facility: MEDICAL CENTER | Age: 63
End: 2018-10-24
Attending: EMERGENCY MEDICINE
Payer: MEDICAID

## 2018-10-24 ENCOUNTER — HOSPITAL ENCOUNTER (EMERGENCY)
Facility: MEDICAL CENTER | Age: 63
End: 2018-10-24
Attending: EMERGENCY MEDICINE
Payer: MEDICAID

## 2018-10-24 VITALS
HEIGHT: 67 IN | OXYGEN SATURATION: 93 % | SYSTOLIC BLOOD PRESSURE: 137 MMHG | TEMPERATURE: 98.9 F | BODY MASS INDEX: 21.97 KG/M2 | WEIGHT: 140 LBS | DIASTOLIC BLOOD PRESSURE: 64 MMHG | HEART RATE: 65 BPM | RESPIRATION RATE: 14 BRPM

## 2018-10-24 DIAGNOSIS — M79.10 MUSCULAR PAIN: ICD-10-CM

## 2018-10-24 DIAGNOSIS — M25.551 RIGHT HIP PAIN: ICD-10-CM

## 2018-10-24 DIAGNOSIS — R05.9 COUGH: ICD-10-CM

## 2018-10-24 LAB
ALBUMIN SERPL BCP-MCNC: 3.7 G/DL (ref 3.2–4.9)
ALBUMIN/GLOB SERPL: 0.9 G/DL
ALP SERPL-CCNC: 48 U/L (ref 30–99)
ALT SERPL-CCNC: 30 U/L (ref 2–50)
ANION GAP SERPL CALC-SCNC: 5 MMOL/L (ref 0–11.9)
AST SERPL-CCNC: 36 U/L (ref 12–45)
BASOPHILS # BLD AUTO: 0.2 % (ref 0–1.8)
BASOPHILS # BLD: 0.01 K/UL (ref 0–0.12)
BILIRUB SERPL-MCNC: 0.3 MG/DL (ref 0.1–1.5)
BUN SERPL-MCNC: 17 MG/DL (ref 8–22)
CALCIUM SERPL-MCNC: 9.8 MG/DL (ref 8.5–10.5)
CHLORIDE SERPL-SCNC: 99 MMOL/L (ref 96–112)
CO2 SERPL-SCNC: 29 MMOL/L (ref 20–33)
CREAT SERPL-MCNC: 1.03 MG/DL (ref 0.5–1.4)
EOSINOPHIL # BLD AUTO: 0.17 K/UL (ref 0–0.51)
EOSINOPHIL NFR BLD: 3.3 % (ref 0–6.9)
ERYTHROCYTE [DISTWIDTH] IN BLOOD BY AUTOMATED COUNT: 44.7 FL (ref 35.9–50)
GLOBULIN SER CALC-MCNC: 4.1 G/DL (ref 1.9–3.5)
GLUCOSE SERPL-MCNC: 78 MG/DL (ref 65–99)
HCT VFR BLD AUTO: 40.5 % (ref 42–52)
HGB BLD-MCNC: 13.8 G/DL (ref 14–18)
IMM GRANULOCYTES # BLD AUTO: 0.01 K/UL (ref 0–0.11)
IMM GRANULOCYTES NFR BLD AUTO: 0.2 % (ref 0–0.9)
LYMPHOCYTES # BLD AUTO: 2 K/UL (ref 1–4.8)
LYMPHOCYTES NFR BLD: 39.1 % (ref 22–41)
MCH RBC QN AUTO: 30.4 PG (ref 27–33)
MCHC RBC AUTO-ENTMCNC: 34.1 G/DL (ref 33.7–35.3)
MCV RBC AUTO: 89.2 FL (ref 81.4–97.8)
MONOCYTES # BLD AUTO: 0.87 K/UL (ref 0–0.85)
MONOCYTES NFR BLD AUTO: 17 % (ref 0–13.4)
NEUTROPHILS # BLD AUTO: 2.06 K/UL (ref 1.82–7.42)
NEUTROPHILS NFR BLD: 40.2 % (ref 44–72)
NRBC # BLD AUTO: 0 K/UL
NRBC BLD-RTO: 0 /100 WBC
PLATELET # BLD AUTO: 257 K/UL (ref 164–446)
PMV BLD AUTO: 9.9 FL (ref 9–12.9)
POTASSIUM SERPL-SCNC: 4.1 MMOL/L (ref 3.6–5.5)
PROT SERPL-MCNC: 7.8 G/DL (ref 6–8.2)
RBC # BLD AUTO: 4.54 M/UL (ref 4.7–6.1)
SODIUM SERPL-SCNC: 133 MMOL/L (ref 135–145)
WBC # BLD AUTO: 5.1 K/UL (ref 4.8–10.8)

## 2018-10-24 PROCEDURE — 71045 X-RAY EXAM CHEST 1 VIEW: CPT

## 2018-10-24 PROCEDURE — 85025 COMPLETE CBC W/AUTO DIFF WBC: CPT

## 2018-10-24 PROCEDURE — 72170 X-RAY EXAM OF PELVIS: CPT

## 2018-10-24 PROCEDURE — 80053 COMPREHEN METABOLIC PANEL: CPT

## 2018-10-24 PROCEDURE — 99284 EMERGENCY DEPT VISIT MOD MDM: CPT

## 2018-10-24 PROCEDURE — A9270 NON-COVERED ITEM OR SERVICE: HCPCS | Performed by: EMERGENCY MEDICINE

## 2018-10-24 PROCEDURE — 700102 HCHG RX REV CODE 250 W/ 637 OVERRIDE(OP): Performed by: EMERGENCY MEDICINE

## 2018-10-24 PROCEDURE — 700105 HCHG RX REV CODE 258: Performed by: EMERGENCY MEDICINE

## 2018-10-24 RX ORDER — SODIUM CHLORIDE 9 MG/ML
1000 INJECTION, SOLUTION INTRAVENOUS ONCE
Status: COMPLETED | OUTPATIENT
Start: 2018-10-24 | End: 2018-10-24

## 2018-10-24 RX ORDER — ACETAMINOPHEN 325 MG/1
325 TABLET ORAL EVERY 4 HOURS PRN
Qty: 30 TAB | Refills: 0 | Status: SHIPPED | OUTPATIENT
Start: 2018-10-24 | End: 2018-10-29

## 2018-10-24 RX ORDER — IBUPROFEN 600 MG/1
600 TABLET ORAL ONCE
Status: COMPLETED | OUTPATIENT
Start: 2018-10-24 | End: 2018-10-24

## 2018-10-24 RX ORDER — ACETAMINOPHEN 325 MG/1
650 TABLET ORAL ONCE
Status: COMPLETED | OUTPATIENT
Start: 2018-10-24 | End: 2018-10-24

## 2018-10-24 RX ORDER — IBUPROFEN 600 MG/1
600 TABLET ORAL EVERY 6 HOURS PRN
Qty: 30 TAB | Refills: 0 | Status: SHIPPED | OUTPATIENT
Start: 2018-10-24 | End: 2018-10-29

## 2018-10-24 RX ADMIN — SODIUM CHLORIDE 1000 ML: 9 INJECTION, SOLUTION INTRAVENOUS at 16:19

## 2018-10-24 RX ADMIN — IBUPROFEN 600 MG: 600 TABLET, FILM COATED ORAL at 16:18

## 2018-10-24 RX ADMIN — ACETAMINOPHEN 650 MG: 325 TABLET, FILM COATED ORAL at 16:18

## 2018-10-24 ASSESSMENT — PAIN SCALES - GENERAL: PAINLEVEL_OUTOF10: 10

## 2018-10-24 NOTE — ED TRIAGE NOTES
Chief Complaint   Patient presents with   • Hip Pain     pt bib remsa c/o right hip pain/back pain x 2 months. pt able to ambulate on scene using cane.   • T-5000 GLF     slipped and fell yesterday landed on tailbone. no shortening/rotation noted.     Denies trauma per EMS. Triage process explained. Instructed to notify staff for worsening symptoms.

## 2018-10-24 NOTE — ED PROVIDER NOTES
ED Provider Note    Scribed for Abraham Hurley M.D. by Bernard Boland. 10/24/2018  3:56 PM    CHIEF COMPLAINT  Chief Complaint   Patient presents with   • Hip Pain     pt bib remsa c/o right hip pain/back pain x 2 months. pt able to ambulate on scene using cane.   • T-5000 GLF     slipped and fell yesterday landed on tailbone. no shortening/rotation noted.       HPI  Sylvain Espinosa is a 63 y.o. male who presents for evaluation of right hip pain, which onset a few months ago and is described as an achy pain. He denies any falls or traumatic injuries to cause his hip pain. He reports walking exacerbates his pain and also causes tingling in his hip, sitting down is said to help alleviate his pain. Sylvain has not taken any medications for his pain. Sylvain denies any fevers, chills, nausea or vomiting. He denies any drug use at this time as well.    Sylvain endorses falling yesterday, stating he slipped, fell, and landed on his tailbone. He denies any chest pain, or lightheadedness prior to falling.    Sylvain also has a cough at this time, which he has not had evaluated yet. He denies any pain with his cough.    REVIEW OF SYSTEMS  Const: No fevers, chills, or drug use  GI: No nausea, or vomiting  MSK: Falls, Right hip pain  Pulmonary: Cough  Neuro: Tingling to right hip  See HPI for further details. All other systems are negative.       PAST MEDICAL HISTORY   has a past medical history of Abnormality of left ventricle of heart and Hypertension.    SOCIAL HISTORY  Social History     Social History Main Topics   • Smoking status: Current Some Day Smoker     Packs/day: 0.20     Years: 40.00     Types: Cigarettes   • Smokeless tobacco: Never Used   • Alcohol use Yes      Comment: occ   • Drug use: Yes     Types: Inhaled      Comment: marijuana current,  meth last use 1 monht ago   • Sexual activity: Not on file       SURGICAL HISTORY   has a past  "surgical history that includes tonsillectomy.    CURRENT MEDICATIONS  No current facility-administered medications on file prior to encounter.      Current Outpatient Prescriptions on File Prior to Encounter   Medication Sig Dispense Refill   • aspirin EC (ECOTRIN) 81 MG Tablet Delayed Response Take 1 Tab by mouth every day. 30 Tab 0   • enalapril (VASOTEC) 5 MG Tab Take 0.5 Tabs by mouth every day. 30 Tab 0   • digoxin (LANOXIN) 125 MCG Tab Take 1 Tab by mouth every evening. 30 Tab 0   • tamsulosin (FLOMAX) 0.4 MG capsule Take 1 Cap by mouth ONE-HALF HOUR AFTER BREAKFAST. 30 Cap 0       ALLERGIES  No Known Allergies    PHYSICAL EXAM  VITAL SIGNS: /76   Pulse 90   Temp 37.2 °C (98.9 °F)   Resp 16   Ht 1.702 m (5' 7\")   SpO2 97%  @ANGEL[[id number]::@   Pulse ox interpretation: I interpret this pulse ox as normal.  Genl: Male laying in bed, speaking clearly, thin but non-toxic appearing,  Head: NC/AT   ENT: Dry mucous membranes, posterior pharynx clear, uvula midline, nares patent bilaterally   Eyes: Normal sclera, pupils equal round reactive to light  Neck: Supple, FROM, no LAD appreciated   Pulmonary: Lungs are clear to auscultation bilaterally with slight inspiratory crackles on the right lower side inconsistent and cleared after cough, Patient coughing throughout exam,  Chest: No Chest wall tenderness, no TTP  CV:  RRR, no murmur appreciated, pulses 2+ in both upper and lower extremities,  Abdomen: soft, NT/ND; no rebound/guarding, no masses palpated, no HSM   : no CVA or suprapubic tenderness, no pain with palpation of the coccyx or sacrum area no cutaneous bruising noted in this area  Musculoskeletal: Pain with palpation over IT band, pain with palpation over anterior iliac crest, intact range of motion testing both actively and passively, neurovascularly intact, Pain free ROM of the neck.   Neuro: A&Ox4 (person, place, time, situation), speech fluent, gait steady, no focal deficits appreciated, No " cerebellar signs Sensation is grossly intact in the distal upper and lower extremities  5/5 strength in  and dorsiflexion/plantar flexion of the ankles  Psych: Patient has an appropriate affect and behavior  Skin: No rash or lesions.  No pallor or jaundice.  No cyanosis.  Warm and dry.    DIAGNOSTIC STUDIES / PROCEDURES      LABS  Results for orders placed or performed during the hospital encounter of 10/24/18   COMP METABOLIC PANEL   Result Value Ref Range    Sodium 133 (L) 135 - 145 mmol/L    Potassium 4.1 3.6 - 5.5 mmol/L    Chloride 99 96 - 112 mmol/L    Co2 29 20 - 33 mmol/L    Anion Gap 5.0 0.0 - 11.9    Glucose 78 65 - 99 mg/dL    Bun 17 8 - 22 mg/dL    Creatinine 1.03 0.50 - 1.40 mg/dL    Calcium 9.8 8.5 - 10.5 mg/dL    AST(SGOT) 36 12 - 45 U/L    ALT(SGPT) 30 2 - 50 U/L    Alkaline Phosphatase 48 30 - 99 U/L    Total Bilirubin 0.3 0.1 - 1.5 mg/dL    Albumin 3.7 3.2 - 4.9 g/dL    Total Protein 7.8 6.0 - 8.2 g/dL    Globulin 4.1 (H) 1.9 - 3.5 g/dL    A-G Ratio 0.9 g/dL   CBC WITH DIFFERENTIAL   Result Value Ref Range    WBC 5.1 4.8 - 10.8 K/uL    RBC 4.54 (L) 4.70 - 6.10 M/uL    Hemoglobin 13.8 (L) 14.0 - 18.0 g/dL    Hematocrit 40.5 (L) 42.0 - 52.0 %    MCV 89.2 81.4 - 97.8 fL    MCH 30.4 27.0 - 33.0 pg    MCHC 34.1 33.7 - 35.3 g/dL    RDW 44.7 35.9 - 50.0 fL    Platelet Count 257 164 - 446 K/uL    MPV 9.9 9.0 - 12.9 fL    Neutrophils-Polys 40.20 (L) 44.00 - 72.00 %    Lymphocytes 39.10 22.00 - 41.00 %    Monocytes 17.00 (H) 0.00 - 13.40 %    Eosinophils 3.30 0.00 - 6.90 %    Basophils 0.20 0.00 - 1.80 %    Immature Granulocytes 0.20 0.00 - 0.90 %    Nucleated RBC 0.00 /100 WBC    Neutrophils (Absolute) 2.06 1.82 - 7.42 K/uL    Lymphs (Absolute) 2.00 1.00 - 4.80 K/uL    Monos (Absolute) 0.87 (H) 0.00 - 0.85 K/uL    Eos (Absolute) 0.17 0.00 - 0.51 K/uL    Baso (Absolute) 0.01 0.00 - 0.12 K/uL    Immature Granulocytes (abs) 0.01 0.00 - 0.11 K/uL    NRBC (Absolute) 0.00 K/uL   ESTIMATED GFR   Result Value  Ref Range    GFR If African American >60 >60 mL/min/1.73 m 2    GFR If Non African American >60 >60 mL/min/1.73 m 2       RADIOLOGY  DX-PELVIS-1 OR 2 VIEWS   Final Result      No acute fractures are identified.      DX-CHEST-PORTABLE (1 VIEW)   Final Result      Interstitial prominence is similar to the prior exam and may be chronic in nature. Superimposed peribronchial inflammation/infection cannot be excluded.            COURSE & MEDICAL DECISION MAKING  Pertinent Labs & Imaging studies reviewed. (See chart for details)    Differential diagnoses include but not limited to:  muscle strain, fracture, dislocation, dehydration, pneumonia, bronchitis, traumatic injury    3:56 PM - Patient seen and examined at bedside. Patient will be treated with Motrin 600 mg, Tylenol 650 mg, and 1 L of IV fluids secondary to patients tachycardia and dehydration. Ordered DX-Pelvis 1 or 2 views, DX-Chest 1 view, CMP, CBC with differential to evaluate his symptoms.     5:57 PM - Patient was reevaluated at bedside. Discussed lab and radiology results with the patient and informed them that there are no fractures identified on his X-Ray. He is recommended to follow up with his PCP as soon as possible for continued management of his pain and will be sent home with Tylenol and Ibuprofen to help alleviate his pain. He understands the recommended follow up plan of care and will be discharged at this time.    Medical Decision Making:   Patient presented to the emergency room for complaints of right-sided hip pain.  The patient did not have a traumatic origin and reports having some chronic changes with worsening pain in the last couple of days.  He did have a ground-level fall at some point where he landed on his tailbone.  Initial evaluation demonstrated stable vital signs, no fevers, and reproducible pain with palpation of the iliac crests, IT band.  He is a thin distribution, he has no ecchymoses or signs of a developing hematoma.  His pain  was treated with oral nonnarcotic pain medications and x-rays were obtained for evaluation of the differential noted above.  The patient also had a nonproductive and dry cough during the exam.  As the patient does not have established care, basic labs and x-ray were obtained.  He has a anemia with no other gross metabolic derangements at this time.  Chest x-ray is without focal consolidations or signs of acute pneumonia or cardiomegaly.  I discussed the results with the patient and recommend short course of anti-inflammatories and Tylenol in addition to rest, ice, elevation.  The patient was able to ambulate with me without difficulty and continues to have soft tissue tenderness over the right lateral hip.  He had good active range of motion testing, making likelihood of septic joint or other acute infectious etiology less likely this done the labs and vital signs.  I discussed the return precautions should the signs develop instructed him on the importance of long-term follow-up care.    The patient will return for worsening symptoms and is stable at the time of discharge. The patient verbalizes understanding and will comply.     The patient will return for new or worsening symptoms and is stable at the time of discharge.    The patient is referred to a primary physician for blood pressure management, diabetic screening, and for all other preventative health concerns.    DISPOSITION:  Patient will be discharged home in stable condition.    FOLLOW UP:  GARETH Ledesma.  [address] [place] [address] [address]  [address]  [facility] [address] [phone]    Schedule an appointment as soon as possible for a visit       Kimi Figueroa M.D.  [address] [facility]  [address]  [facility] [address] [phone]      As needed, call if unable to get in with your PCP to establish care      OUTPATIENT MEDICATIONS:  New Prescriptions    ACETAMINOPHEN (TYLENOL) 325 MG TAB    Take 1 Tab by mouth every four hours as needed for up to 5 days.     IBUPROFEN (MOTRIN) 600 MG TAB    Take 1 Tab by mouth every 6 hours as needed for up to 5 days.       FINAL IMPRESSION  Visit Diagnoses     ICD-10-CM   1. Right hip pain M25.551   2. Cough R05   3. Muscular pain M79.10          IBernard (Scribe), am scribing for, and in the presence of, Abraham Hurley M.D..    Electronically signed by: Bernard Boland (Scribe), 10/24/2018    IAbraham M.D. personally performed the services described in this documentation, as scribed by Bernard Boland in my presence, and it is both accurate and complete. C.    The note accurately reflects work and decisions made by me.  Abraham Hurley  10/24/2018  7:24 PM

## 2018-10-25 ENCOUNTER — PATIENT OUTREACH (OUTPATIENT)
Dept: HEALTH INFORMATION MANAGEMENT | Facility: OTHER | Age: 63
End: 2018-10-25

## 2018-10-25 NOTE — LETTER
Sylvain Espinosa  [address] [place] [address]    October 25, 2018      Dear Sylvain Espinosa,    [facility] wants to ensure your discharge home is safe and you or your loved ones have had all of your questions answered regarding your care after you leave the hospital.    Our discharge team was unsuccessful in our attempts to contact you telephonically and we wanted to be sure that you had a list of resources and contact information should you have any questions regarding your hospital stay, follow-up instructions, or active medical symptoms.    Questions or Concerns Regarding… Contact   Medical Questions Related to Your Discharge  (7 days a week, 8am-5pm) Contact a Nurse Care Coordinator   [phone]   Medical Questions Not Related to Your Discharge  (24 hours a day / 7 days a week)  Contact the Nurse Health Line   [phone]    Medications or Discharge Instructions Refer to your discharge packet   or contact your -[phone]   Follow-up Appointment(s) Schedule your appointment via [employer]   or contact Scheduling [phone]   Billing Review your statement via [employer]  or contact Billing [phone]   Medical Records Review your records via [employer]   or contact Medical Records [phone]     You can also easily access your medical information, test results and upcoming appointments via the [employer] free online health management tool. You can learn more and sign up at [employer]. For assistance setting up your [employer] account, please call [phone].    Once again, we want to ensure your discharge home is safe and that you have a clear understanding of any next steps in your care. If you have any questions or concerns, please do not hesitate to contact us, we are here for you. Thank you for choosing [facility] for your healthcare needs.    Sincerely,      Your [facility] Healthcare Team

## 2018-10-30 ENCOUNTER — APPOINTMENT (OUTPATIENT)
Dept: RADIOLOGY | Facility: MEDICAL CENTER | Age: 63
End: 2018-10-30
Attending: EMERGENCY MEDICINE
Payer: MEDICAID

## 2018-10-30 ENCOUNTER — HOSPITAL ENCOUNTER (EMERGENCY)
Facility: MEDICAL CENTER | Age: 63
End: 2018-10-30
Attending: EMERGENCY MEDICINE
Payer: MEDICAID

## 2018-10-30 VITALS
HEART RATE: 78 BPM | HEIGHT: 67 IN | SYSTOLIC BLOOD PRESSURE: 161 MMHG | WEIGHT: 140 LBS | RESPIRATION RATE: 16 BRPM | TEMPERATURE: 97.9 F | OXYGEN SATURATION: 97 % | BODY MASS INDEX: 21.97 KG/M2 | DIASTOLIC BLOOD PRESSURE: 66 MMHG

## 2018-10-30 DIAGNOSIS — R11.0 NAUSEA: ICD-10-CM

## 2018-10-30 DIAGNOSIS — R19.7 DIARRHEA, UNSPECIFIED TYPE: ICD-10-CM

## 2018-10-30 DIAGNOSIS — R10.33 PERIUMBILICAL ABDOMINAL PAIN: ICD-10-CM

## 2018-10-30 LAB
ALBUMIN SERPL BCP-MCNC: 3.7 G/DL (ref 3.2–4.9)
ALBUMIN/GLOB SERPL: 0.8 G/DL
ALP SERPL-CCNC: 53 U/L (ref 30–99)
ALT SERPL-CCNC: 23 U/L (ref 2–50)
ANION GAP SERPL CALC-SCNC: 4 MMOL/L (ref 0–11.9)
APPEARANCE UR: ABNORMAL
AST SERPL-CCNC: 32 U/L (ref 12–45)
BACTERIA #/AREA URNS HPF: NEGATIVE /HPF
BASOPHILS # BLD AUTO: 0.2 % (ref 0–1.8)
BASOPHILS # BLD: 0.01 K/UL (ref 0–0.12)
BILIRUB SERPL-MCNC: 0.3 MG/DL (ref 0.1–1.5)
BILIRUB UR QL STRIP.AUTO: NEGATIVE
BUN SERPL-MCNC: 18 MG/DL (ref 8–22)
CALCIUM SERPL-MCNC: 9.8 MG/DL (ref 8.5–10.5)
CAOX CRY #/AREA URNS HPF: ABNORMAL /HPF
CHLORIDE SERPL-SCNC: 104 MMOL/L (ref 96–112)
CO2 SERPL-SCNC: 28 MMOL/L (ref 20–33)
COLOR UR: YELLOW
CREAT SERPL-MCNC: 0.96 MG/DL (ref 0.5–1.4)
EOSINOPHIL # BLD AUTO: 0.08 K/UL (ref 0–0.51)
EOSINOPHIL NFR BLD: 1.5 % (ref 0–6.9)
EPI CELLS #/AREA URNS HPF: NEGATIVE /HPF
ERYTHROCYTE [DISTWIDTH] IN BLOOD BY AUTOMATED COUNT: 46.1 FL (ref 35.9–50)
GLOBULIN SER CALC-MCNC: 4.8 G/DL (ref 1.9–3.5)
GLUCOSE SERPL-MCNC: 88 MG/DL (ref 65–99)
GLUCOSE UR STRIP.AUTO-MCNC: NEGATIVE MG/DL
HCT VFR BLD AUTO: 48.5 % (ref 42–52)
HGB BLD-MCNC: 15.4 G/DL (ref 14–18)
HYALINE CASTS #/AREA URNS LPF: ABNORMAL /LPF
IMM GRANULOCYTES # BLD AUTO: 0.01 K/UL (ref 0–0.11)
IMM GRANULOCYTES NFR BLD AUTO: 0.2 % (ref 0–0.9)
KETONES UR STRIP.AUTO-MCNC: NEGATIVE MG/DL
LEUKOCYTE ESTERASE UR QL STRIP.AUTO: NEGATIVE
LIPASE SERPL-CCNC: 24 U/L (ref 11–82)
LYMPHOCYTES # BLD AUTO: 1.62 K/UL (ref 1–4.8)
LYMPHOCYTES NFR BLD: 29.8 % (ref 22–41)
MCH RBC QN AUTO: 28.8 PG (ref 27–33)
MCHC RBC AUTO-ENTMCNC: 31.8 G/DL (ref 33.7–35.3)
MCV RBC AUTO: 90.8 FL (ref 81.4–97.8)
MICRO URNS: ABNORMAL
MONOCYTES # BLD AUTO: 0.66 K/UL (ref 0–0.85)
MONOCYTES NFR BLD AUTO: 12.1 % (ref 0–13.4)
NEUTROPHILS # BLD AUTO: 3.06 K/UL (ref 1.82–7.42)
NEUTROPHILS NFR BLD: 56.2 % (ref 44–72)
NITRITE UR QL STRIP.AUTO: NEGATIVE
NRBC # BLD AUTO: 0 K/UL
NRBC BLD-RTO: 0 /100 WBC
PH UR STRIP.AUTO: 5 [PH]
PLATELET # BLD AUTO: 269 K/UL (ref 164–446)
PMV BLD AUTO: 9.9 FL (ref 9–12.9)
POTASSIUM SERPL-SCNC: 4.9 MMOL/L (ref 3.6–5.5)
PROT SERPL-MCNC: 8.5 G/DL (ref 6–8.2)
PROT UR QL STRIP: NEGATIVE MG/DL
RBC # BLD AUTO: 5.34 M/UL (ref 4.7–6.1)
RBC # URNS HPF: ABNORMAL /HPF
RBC UR QL AUTO: NEGATIVE
SODIUM SERPL-SCNC: 136 MMOL/L (ref 135–145)
SP GR UR STRIP.AUTO: 1.03
UROBILINOGEN UR STRIP.AUTO-MCNC: 0.2 MG/DL
WBC # BLD AUTO: 5.4 K/UL (ref 4.8–10.8)
WBC #/AREA URNS HPF: ABNORMAL /HPF

## 2018-10-30 PROCEDURE — 71045 X-RAY EXAM CHEST 1 VIEW: CPT

## 2018-10-30 PROCEDURE — 80053 COMPREHEN METABOLIC PANEL: CPT

## 2018-10-30 PROCEDURE — 81001 URINALYSIS AUTO W/SCOPE: CPT

## 2018-10-30 PROCEDURE — 99284 EMERGENCY DEPT VISIT MOD MDM: CPT | Mod: 25

## 2018-10-30 PROCEDURE — 85025 COMPLETE CBC W/AUTO DIFF WBC: CPT

## 2018-10-30 PROCEDURE — 83690 ASSAY OF LIPASE: CPT

## 2018-10-30 PROCEDURE — 36415 COLL VENOUS BLD VENIPUNCTURE: CPT

## 2018-10-30 RX ORDER — IBUPROFEN 600 MG/1
600 TABLET ORAL EVERY 6 HOURS PRN
Qty: 30 TAB | Refills: 0 | Status: SHIPPED | OUTPATIENT
Start: 2018-10-30 | End: 2018-12-29 | Stop reason: CLARIF

## 2018-10-30 RX ORDER — ACETAMINOPHEN 325 MG/1
325 TABLET ORAL EVERY 4 HOURS PRN
Qty: 30 TAB | Refills: 0 | Status: SHIPPED | OUTPATIENT
Start: 2018-10-30 | End: 2018-12-29 | Stop reason: CLARIF

## 2018-10-30 RX ORDER — LOPERAMIDE HYDROCHLORIDE 2 MG/1
TABLET ORAL
Qty: 20 TAB | Refills: 0 | Status: SHIPPED | OUTPATIENT
Start: 2018-10-30 | End: 2018-10-30

## 2018-10-30 RX ORDER — LOPERAMIDE HYDROCHLORIDE 2 MG/1
TABLET ORAL
Qty: 20 TAB | Refills: 0 | Status: SHIPPED | OUTPATIENT
Start: 2018-10-30 | End: 2018-12-29 | Stop reason: CLARIF

## 2018-10-30 RX ORDER — ONDANSETRON 4 MG/1
4 TABLET, ORALLY DISINTEGRATING ORAL EVERY 8 HOURS PRN
Qty: 10 TAB | Refills: 0 | Status: SHIPPED | OUTPATIENT
Start: 2018-10-30 | End: 2018-12-29 | Stop reason: CLARIF

## 2018-10-30 ASSESSMENT — LIFESTYLE VARIABLES
HAVE YOU EVER FELT YOU SHOULD CUT DOWN ON YOUR DRINKING: NO
AVERAGE NUMBER OF DAYS PER WEEK YOU HAVE A DRINK CONTAINING ALCOHOL: 2
HOW MANY TIMES IN THE PAST YEAR HAVE YOU HAD 5 OR MORE DRINKS IN A DAY: 0
TOTAL SCORE: 0
TOTAL SCORE: 0
DO YOU DRINK ALCOHOL: YES
EVER FELT BAD OR GUILTY ABOUT YOUR DRINKING: NO
EVER HAD A DRINK FIRST THING IN THE MORNING TO STEADY YOUR NERVES TO GET RID OF A HANGOVER: NO
ON A TYPICAL DAY WHEN YOU DRINK ALCOHOL HOW MANY DRINKS DO YOU HAVE: 1
HAVE PEOPLE ANNOYED YOU BY CRITICIZING YOUR DRINKING: NO
TOTAL SCORE: 0
CONSUMPTION TOTAL: NEGATIVE

## 2018-10-30 NOTE — ED NOTES
Pt resting quietly in room with lights turned down.  Pt lying supine with HOB elevated, appears asleep at this time.  Respirations even and unlabored. VSS.

## 2018-10-30 NOTE — ED TRIAGE NOTES
Pt presenting per EMS with generalized abd pain. Pt reporting diarrhea, nausea, vomiting for the past 2 days. No history noted.

## 2018-10-30 NOTE — ED NOTES
Pt resting quietly in room with lights turned down.  Pt lying supine, appears asleep at this time.  Respirations even and unlabored. VSS.

## 2018-10-30 NOTE — DISCHARGE INSTRUCTIONS
Abdominal Pain, Extended Version   Belly Pain, Stomach Pain    Take a clear fluid diet 12 hours and use Tylenol for pain.  Return to emergency department if pain has not resolved in 1-2 days.  Return to the ED sooner for worsening pain (especially in the lower right abdomen), pain that is worse with movement, uncontrolled vomiting, new fever, bleeding or ill appearance.  Take Imodium for diarrhea and Zofran for nausea.    You had an elevated or high normal blood pressure reading today.  This does not necessarily mean you have hypertension.  Please followup with your/a primary physician for comprehensive blood pressure evaluation.  BP Readings from Last 3 Encounters:   10/30/18 (!) 161/66   10/24/18 137/64   03/10/18 144/74         Your exam may not have shown the exact reason you have abdominal pain.  Since there are many different causes of abdominal pain, another checkup and more tests may be needed. One of the many possible causes of abdominal pain in any person who has not had their appendix removed is Acute Appendicitis.  Appendicitis is often a very difficult to diagnosis. Normal blood tests, urine tests, and even ultrasound and CT can not ensure there is not an early appendicitis. Sometimes only the changes which occur over time will allow appendicitis and other causes of abdominal pain to be determined.  Because of this, it is important you follow all of the instructions below.                                                                                                HOME CARE INSTRUCTIONS  Rest.  Do not eat solid food until your pain is gone.  While You Have Pain:  Stay on a clear liquid diet.  A clear liquid is one you can see through (water, weak tea, broth or bouillon, ginger ale, Jell-o, Serg-Aid, Gatorade, apple juice, popsicles or ice chips).   When Your Pain is Gone:  Start a light diet (dry toast, crackers, applesauce, white rice, bananas, broth or bouillon) and increase the diet slowly, as  long as it does not bother you.  No dairy products (including cheese and eggs) and no spicy, fatty, fried or high fiber foods.  No alcohol, caffeine or cigarettes.  Take your regular medicines unless the caregiver told you not to.  Take any prescribed medicine as directed.  Do not take medicine containing aspirin, ibuprofen (Advil® / Motrin® ), naprosyn/naproxen (Aleve®) or ketoprofen (Orudis® ) unless told to by your own caregiver.    SEEK IMMEDIATE MEDICAL ATTENTION IF :  Your pain is not gone in 8-12 hours.  Your pain becomes worse, changes location or feels different.  You have a fever or shaking chills.  You keep throwing up or cannot drink liquids.   You see blood when you throw up or see blood in your bowel movements.    Your bowel movements become dark or black.  You move your bowels frequently.  Your bowel movements stop (become blocked) or you cannot pass gas.  You have bloody, frequent or painful urination (?passing water?).  Your skin or the whites of your eyes look yellow.  Your stomach becomes bloated or bigger.  You notice bleeding or discharge from your vagina.  You have dizziness or fainting.  You have chest or back pain.   Anything else worries you.  You become short of breath.    If you have questions or concerns, please call your caregiver.     Adapted from ©2001  [employer] Aftercare Instruction Sheets  Last reviewed July 12, 2005, [employer], creator of [employer] Patient Information System.    [employer]® Patient Information ©2007 [employer]

## 2018-10-30 NOTE — ED NOTES
Break RN: PIV removed per primary RN.  Patient given two (2) additional prescriptions for Tylenol and Motrin per patient request.  Discharge instructions previously given by primary RN.  Patient ambulatory out of ED w/steady gait.

## 2018-10-30 NOTE — ED PROVIDER NOTES
"ED Provider Note    Scribed for Liang Santamaria M.D. by Merrick Penaloza. 10/30/2018  9:15 AM    Primary care provider: NEY Ledesma  Means of arrival: EMS  History obtained from: Patient  History limited by: None    CHIEF COMPLAINT  Chief Complaint   Patient presents with   • Abdominal Pain       HPI  Sylvain Espinosa is a 63 y.o. male who presents to the Emergency Department complaining of right periumbilical abdominal pain onset 3 days ago. Patient states the abdominal pain has been constant today. He rates the pain a 7/10 in severity. Patient reports having diarrhea 5 times per day over the last couple of days. He is having nausea and some vomiting. Patient reports urinary incontinence each time he gets up, coughs, and sneezes. He denies any flank pain, dysuria, urinary urgency, hematochezia, or leg swelling. Patient reports having history of UTI. He denies any recent antibiotics. Patient denies any recent new or unusual exposures. He denies eating any new foods. He denies any recent travel. Patient denies any prior abdominal surgeries. He denies history of diabetes or immune compromise. Patient notes having GI symptoms once per month but otherwise denies any history of known GI conditions such as diverticulitis, appendicitis, or pancreatitis. He denies being seen by an MD with prior episodes of GI symptoms.      REVIEW OF SYSTEMS  Pertinent positives include: abdominal pain, nausea, vomiting, diarrhea, urinary incontinence.  Pertinent negatives include: flank pain, dysuria, urinary urgency, hematochezia, or leg swelling.  10+ systems reviewed and negative.        PAST MEDICAL HISTORY  Past Medical History:   Diagnosis Date   • Abnormality of left ventricle of heart     \"constricted left ventricle\"    • BPH (benign prostatic hyperplasia)    • Hypercholesteremia    • Hypertension        FAMILY HISTORY  Family History   Problem Relation Age of Onset   • Heart Disease Mother        SOCIAL HISTORY  Social " "History   Substance Use Topics   • Smoking status: Current Some Day Smoker     Packs/day: 0.20     Years: 40.00     Types: Cigarettes   • Smokeless tobacco: Never Used   • Alcohol use Yes      Comment: occ     History   Drug Use   • Types: Inhaled     Comment: marijuana current,  meth last use 1 monht ago       SURGICAL HISTORY  Past Surgical History:   Procedure Laterality Date   • TONSILLECTOMY         CURRENT MEDICATIONS  No current facility-administered medications on file prior to encounter.      Current Outpatient Prescriptions on File Prior to Encounter   Medication Sig Dispense Refill   • aspirin EC (ECOTRIN) 81 MG Tablet Delayed Response Take 1 Tab by mouth every day. 30 Tab 0   • enalapril (VASOTEC) 5 MG Tab Take 0.5 Tabs by mouth every day. 30 Tab 0   • digoxin (LANOXIN) 125 MCG Tab Take 1 Tab by mouth every evening. 30 Tab 0   • tamsulosin (FLOMAX) 0.4 MG capsule Take 1 Cap by mouth ONE-HALF HOUR AFTER BREAKFAST. 30 Cap 0      ALLERGIES  No Known Allergies    PHYSICAL EXAM  VITAL SIGNS: BP (!) 161/66   Pulse 83   Temp 36.6 °C (97.9 °F)   Resp 16   Ht 1.702 m (5' 7\")   Wt 63.5 kg (140 lb)   SpO2 99%   BMI 21.93 kg/m²   Reviewed and hypertensive, afebrile  Constitutional: Well developed, Well nourished, well-appearing, thin.  HENT: Normocephalic, atraumatic, bilateral external ears normal, oropharynx moist, No exudates or erythema.   Eyes: PERRLA, conjunctiva pink, no scleral icterus.   Cardiovascular: Regular rate and rhythm. 2/6 systolic murmur. No rubs or gallops.  Respiratory: Lungs clear to auscultation bilaterally. No wheezes, rales, or rhonchi.  Abdominal:  Abdomen soft, mild periumbilical tenderness, very minimal bilateral lower quadrant tenderness, non distended. No rebound, or guarding. Good bowel sounds.  Skin: No erythema, no rash.   Genitourinary: No costovertebral angle tenderness.   Musculoskeletal: No edema.   Neurologic: Alert & oriented x 3, cranial nerves 2-12 intact by passive " exam.  No focal deficit noted.  Psychiatric: Affect normal, Judgment normal, Mood normal.       DIFFERENTIAL DIAGNOSIS:  Viral syndrome, diverticulitis, appendicitis  Less likely, UTI, food poisoning.      RADIOLOGY/PROCEDURES  DX-CHEST-LIMITED (1 VIEW)   Final Result      1.  Again seen bullous change within the left lung base.      2.  Again seen peribronchial thickening.      3.  Mild cardiomegaly.        Radiologist interpretation have been reviewed by me.     LABORATORY:  Results for orders placed or performed during the hospital encounter of 10/30/18   CBC WITH DIFFERENTIAL   Result Value Ref Range    WBC 5.4 4.8 - 10.8 K/uL    RBC 5.34 4.70 - 6.10 M/uL    Hemoglobin 15.4 14.0 - 18.0 g/dL    Hematocrit 48.5 42.0 - 52.0 %    MCV 90.8 81.4 - 97.8 fL    MCH 28.8 27.0 - 33.0 pg    MCHC 31.8 (L) 33.7 - 35.3 g/dL    RDW 46.1 35.9 - 50.0 fL    Platelet Count 269 164 - 446 K/uL    MPV 9.9 9.0 - 12.9 fL    Neutrophils-Polys 56.20 44.00 - 72.00 %    Lymphocytes 29.80 22.00 - 41.00 %    Monocytes 12.10 0.00 - 13.40 %    Eosinophils 1.50 0.00 - 6.90 %    Basophils 0.20 0.00 - 1.80 %    Immature Granulocytes 0.20 0.00 - 0.90 %    Nucleated RBC 0.00 /100 WBC    Neutrophils (Absolute) 3.06 1.82 - 7.42 K/uL    Lymphs (Absolute) 1.62 1.00 - 4.80 K/uL    Monos (Absolute) 0.66 0.00 - 0.85 K/uL    Eos (Absolute) 0.08 0.00 - 0.51 K/uL    Baso (Absolute) 0.01 0.00 - 0.12 K/uL    Immature Granulocytes (abs) 0.01 0.00 - 0.11 K/uL    NRBC (Absolute) 0.00 K/uL   COMP METABOLIC PANEL   Result Value Ref Range    Sodium 136 135 - 145 mmol/L    Potassium 4.9 3.6 - 5.5 mmol/L    Chloride 104 96 - 112 mmol/L    Co2 28 20 - 33 mmol/L    Anion Gap 4.0 0.0 - 11.9    Glucose 88 65 - 99 mg/dL    Bun 18 8 - 22 mg/dL    Creatinine 0.96 0.50 - 1.40 mg/dL    Calcium 9.8 8.5 - 10.5 mg/dL    AST(SGOT) 32 12 - 45 U/L    ALT(SGPT) 23 2 - 50 U/L    Alkaline Phosphatase 53 30 - 99 U/L    Total Bilirubin 0.3 0.1 - 1.5 mg/dL    Albumin 3.7 3.2 - 4.9 g/dL     Total Protein 8.5 (H) 6.0 - 8.2 g/dL    Globulin 4.8 (H) 1.9 - 3.5 g/dL    A-G Ratio 0.8 g/dL   LIPASE   Result Value Ref Range    Lipase 24 11 - 82 U/L   URINALYSIS,CULTURE IF INDICATED   Result Value Ref Range    Color Yellow     Character Cloudy (A)     Specific Gravity 1.028 <1.035    Ph 5.0 5.0 - 8.0    Glucose Negative Negative mg/dL    Ketones Negative Negative mg/dL    Protein Negative Negative mg/dL    Bilirubin Negative Negative    Urobilinogen, Urine 0.2 Negative    Nitrite Negative Negative    Leukocyte Esterase Negative Negative    Occult Blood Negative Negative    Micro Urine Req Microscopic    URINE MICROSCOPIC (W/UA)   Result Value Ref Range    WBC 0-2 (A) /hpf    RBC 2-5 (A) /hpf    Bacteria Negative None /hpf    Epithelial Cells Negative /hpf    Ca Oxalate Crystal Many /hpf    Hyaline Cast 0-2 /lpf      Lab results reviewed by me.       ED COURSE:    Obtained and reviewed past medical records which show patient had possible pyelo in March 2018.    9:15 AM - Patient seen and examined at bedside. Discussed plan of care which includes imaging and labs. Patient understands and agrees to plan. Ordered DX chest, CBC, CMP, lipase, urinalysis, culture stool, estimated GFR, C diff by PCR rflx toxin to evaluate.     1:40 PM Patient reevaluated at bedside. Discussed lab and imaging results as seen above which are overall unrevealing. Patient reports persisting abdominal pain. He has not had any further episodes of diarrhea. Informed patient he possibly has a viral syndrome. The patient will be discharged with instructions regarding supportive care and medications. Will treat him with medications for diarrhea and nausea. Advised patient to ensure he stays hydrated with plenty of fluids. Discussed indications for seeking immediate medical attention. Advised patient to visit his PCP or return to the ED if his symptoms do not improve in 2-3 days. Patient was given the opportunity for questions. The patient  understands and agrees.      The patient is referred to a primary physician for blood pressure management, diabetic screening, and for all other preventative health concerns.     MEDICAL DECISION MAKING:  This patient presents with periumbilical abdominal pain and diarrhea.  He does not seem to have risk factors for C. difficile.  His pain spontaneously resolved in the ER and had very minimal tenderness on repeat exam.  Diverticulitis, bowel obstruction, appendicitis are all unlikely.  There is no UTI.  His CT abdomen earlier this year that was consistent with left pyelonephritis without significant other abnormalities.  At this point there is no indication for repeat imaging.    PLAN:  Discharge Medication List as of 10/30/2018  1:47 PM      START taking these medications    Details   ondansetron (ZOFRAN ODT) 4 MG TABLET DISPERSIBLE Take 1 Tab by mouth every 8 hours as needed., Disp-10 Tab, R-0, Print Rx Paper         Imodium  Clear fluid diet for 6-12 hours  Abdominal pain handout given  Return for persistent or worsening pain, uncontrolled vomiting, GI bleed, ill appearance    CONDITION: Patient will be discharged in good condition.     FINAL IMPRESSION  1. Periumbilical abdominal pain    2. Diarrhea, unspecified type    3. Nausea        Merrick DEL REAL (Scribe), am scribing for, and in the presence of, Liang Santamaria M.D..    Electronically signed by: Merrick Penaloza (Mellissaibe), 10/30/2018    Liang DEL REAL M.D. personally performed the services described in this documentation, as scribed by Merrick Penaloza in my presence, and it is both accurate and complete. C    The note accurately reflects work and decisions made by me.  Liang Santamaria  10/30/2018  4:43 PM

## 2018-12-29 ENCOUNTER — APPOINTMENT (OUTPATIENT)
Dept: RADIOLOGY | Facility: MEDICAL CENTER | Age: 63
DRG: 917 | End: 2018-12-29
Attending: EMERGENCY MEDICINE
Payer: MEDICAID

## 2018-12-29 ENCOUNTER — HOSPITAL ENCOUNTER (INPATIENT)
Facility: MEDICAL CENTER | Age: 63
LOS: 7 days | DRG: 917 | End: 2019-01-05
Attending: EMERGENCY MEDICINE | Admitting: HOSPITALIST
Payer: MEDICAID

## 2018-12-29 DIAGNOSIS — D64.9 ANEMIA, UNSPECIFIED TYPE: ICD-10-CM

## 2018-12-29 DIAGNOSIS — I10 ESSENTIAL HYPERTENSION: Chronic | ICD-10-CM

## 2018-12-29 DIAGNOSIS — R79.89 ELEVATED TROPONIN: ICD-10-CM

## 2018-12-29 DIAGNOSIS — E87.1 HYPONATREMIA: ICD-10-CM

## 2018-12-29 DIAGNOSIS — I50.9 ACUTE CONGESTIVE HEART FAILURE, UNSPECIFIED HEART FAILURE TYPE (HCC): ICD-10-CM

## 2018-12-29 DIAGNOSIS — J96.01 ACUTE RESPIRATORY FAILURE WITH HYPOXIA (HCC): ICD-10-CM

## 2018-12-29 LAB
ALBUMIN SERPL BCP-MCNC: 3.3 G/DL (ref 3.2–4.9)
ALBUMIN/GLOB SERPL: 0.8 G/DL
ALP SERPL-CCNC: 75 U/L (ref 30–99)
ALT SERPL-CCNC: 47 U/L (ref 2–50)
ANION GAP SERPL CALC-SCNC: 5 MMOL/L (ref 0–11.9)
APTT PPP: 27.4 SEC (ref 24.7–36)
AST SERPL-CCNC: 52 U/L (ref 12–45)
BASOPHILS # BLD AUTO: 0.2 % (ref 0–1.8)
BASOPHILS # BLD: 0.02 K/UL (ref 0–0.12)
BILIRUB SERPL-MCNC: 0.3 MG/DL (ref 0.1–1.5)
BLOOD CULTURE HOLD CXBCH: NORMAL
BNP SERPL-MCNC: 796 PG/ML (ref 0–100)
BUN SERPL-MCNC: 23 MG/DL (ref 8–22)
CALCIUM SERPL-MCNC: 9.8 MG/DL (ref 8.5–10.5)
CHLORIDE SERPL-SCNC: 107 MMOL/L (ref 96–112)
CO2 SERPL-SCNC: 27 MMOL/L (ref 20–33)
CREAT SERPL-MCNC: 0.9 MG/DL (ref 0.5–1.4)
EOSINOPHIL # BLD AUTO: 0.17 K/UL (ref 0–0.51)
EOSINOPHIL NFR BLD: 2.1 % (ref 0–6.9)
ERYTHROCYTE [DISTWIDTH] IN BLOOD BY AUTOMATED COUNT: 46 FL (ref 35.9–50)
FLUAV RNA SPEC QL NAA+PROBE: NEGATIVE
FLUBV RNA SPEC QL NAA+PROBE: NEGATIVE
GLOBULIN SER CALC-MCNC: 4.2 G/DL (ref 1.9–3.5)
GLUCOSE SERPL-MCNC: 91 MG/DL (ref 65–99)
HCT VFR BLD AUTO: 37.6 % (ref 42–52)
HGB BLD-MCNC: 12.2 G/DL (ref 14–18)
IMM GRANULOCYTES # BLD AUTO: 0.02 K/UL (ref 0–0.11)
IMM GRANULOCYTES NFR BLD AUTO: 0.2 % (ref 0–0.9)
INR PPP: 1.08 (ref 0.87–1.13)
LACTATE BLD-SCNC: 1.7 MMOL/L (ref 0.5–2)
LIPASE SERPL-CCNC: 99 U/L (ref 11–82)
LYMPHOCYTES # BLD AUTO: 1.82 K/UL (ref 1–4.8)
LYMPHOCYTES NFR BLD: 22.7 % (ref 22–41)
MCH RBC QN AUTO: 29.2 PG (ref 27–33)
MCHC RBC AUTO-ENTMCNC: 32.4 G/DL (ref 33.7–35.3)
MCV RBC AUTO: 90 FL (ref 81.4–97.8)
MONOCYTES # BLD AUTO: 0.7 K/UL (ref 0–0.85)
MONOCYTES NFR BLD AUTO: 8.7 % (ref 0–13.4)
NEUTROPHILS # BLD AUTO: 5.3 K/UL (ref 1.82–7.42)
NEUTROPHILS NFR BLD: 66.1 % (ref 44–72)
NRBC # BLD AUTO: 0 K/UL
NRBC BLD-RTO: 0 /100 WBC
PLATELET # BLD AUTO: 280 K/UL (ref 164–446)
PMV BLD AUTO: 10.2 FL (ref 9–12.9)
POTASSIUM SERPL-SCNC: 5.2 MMOL/L (ref 3.6–5.5)
PROT SERPL-MCNC: 7.5 G/DL (ref 6–8.2)
PROTHROMBIN TIME: 14.1 SEC (ref 12–14.6)
RBC # BLD AUTO: 4.18 M/UL (ref 4.7–6.1)
SODIUM SERPL-SCNC: 139 MMOL/L (ref 135–145)
TROPONIN I SERPL-MCNC: 0.06 NG/ML (ref 0–0.04)
WBC # BLD AUTO: 8 K/UL (ref 4.8–10.8)

## 2018-12-29 PROCEDURE — 304561 HCHG STAT O2

## 2018-12-29 PROCEDURE — 83880 ASSAY OF NATRIURETIC PEPTIDE: CPT

## 2018-12-29 PROCEDURE — 99291 CRITICAL CARE FIRST HOUR: CPT | Performed by: INTERNAL MEDICINE

## 2018-12-29 PROCEDURE — 96365 THER/PROPH/DIAG IV INF INIT: CPT

## 2018-12-29 PROCEDURE — 85025 COMPLETE CBC W/AUTO DIFF WBC: CPT

## 2018-12-29 PROCEDURE — 71045 X-RAY EXAM CHEST 1 VIEW: CPT

## 2018-12-29 PROCEDURE — 94760 N-INVAS EAR/PLS OXIMETRY 1: CPT

## 2018-12-29 PROCEDURE — 80053 COMPREHEN METABOLIC PANEL: CPT

## 2018-12-29 PROCEDURE — 36415 COLL VENOUS BLD VENIPUNCTURE: CPT

## 2018-12-29 PROCEDURE — 700101 HCHG RX REV CODE 250: Performed by: EMERGENCY MEDICINE

## 2018-12-29 PROCEDURE — 85730 THROMBOPLASTIN TIME PARTIAL: CPT

## 2018-12-29 PROCEDURE — 94002 VENT MGMT INPAT INIT DAY: CPT

## 2018-12-29 PROCEDURE — 700111 HCHG RX REV CODE 636 W/ 250 OVERRIDE (IP): Performed by: HOSPITALIST

## 2018-12-29 PROCEDURE — 84484 ASSAY OF TROPONIN QUANT: CPT

## 2018-12-29 PROCEDURE — 85610 PROTHROMBIN TIME: CPT

## 2018-12-29 PROCEDURE — 96375 TX/PRO/DX INJ NEW DRUG ADDON: CPT

## 2018-12-29 PROCEDURE — 83690 ASSAY OF LIPASE: CPT

## 2018-12-29 PROCEDURE — 99291 CRITICAL CARE FIRST HOUR: CPT

## 2018-12-29 PROCEDURE — 93005 ELECTROCARDIOGRAM TRACING: CPT | Performed by: EMERGENCY MEDICINE

## 2018-12-29 PROCEDURE — 99223 1ST HOSP IP/OBS HIGH 75: CPT | Mod: AI | Performed by: HOSPITALIST

## 2018-12-29 PROCEDURE — 83605 ASSAY OF LACTIC ACID: CPT

## 2018-12-29 PROCEDURE — 87502 INFLUENZA DNA AMP PROBE: CPT

## 2018-12-29 PROCEDURE — A9270 NON-COVERED ITEM OR SERVICE: HCPCS | Performed by: EMERGENCY MEDICINE

## 2018-12-29 PROCEDURE — 700102 HCHG RX REV CODE 250 W/ 637 OVERRIDE(OP): Performed by: EMERGENCY MEDICINE

## 2018-12-29 PROCEDURE — 770022 HCHG ROOM/CARE - ICU (200)

## 2018-12-29 PROCEDURE — 700111 HCHG RX REV CODE 636 W/ 250 OVERRIDE (IP): Performed by: EMERGENCY MEDICINE

## 2018-12-29 PROCEDURE — 700105 HCHG RX REV CODE 258: Performed by: EMERGENCY MEDICINE

## 2018-12-29 RX ORDER — NITROGLYCERIN 0.4 MG/1
0.4 TABLET SUBLINGUAL ONCE
Status: COMPLETED | OUTPATIENT
Start: 2018-12-29 | End: 2018-12-29

## 2018-12-29 RX ORDER — ONDANSETRON 2 MG/ML
4 INJECTION INTRAMUSCULAR; INTRAVENOUS EVERY 4 HOURS PRN
Status: DISCONTINUED | OUTPATIENT
Start: 2018-12-29 | End: 2019-01-05 | Stop reason: HOSPADM

## 2018-12-29 RX ORDER — BISACODYL 10 MG
10 SUPPOSITORY, RECTAL RECTAL
Status: DISCONTINUED | OUTPATIENT
Start: 2018-12-29 | End: 2019-01-05 | Stop reason: HOSPADM

## 2018-12-29 RX ORDER — HEPARIN SODIUM 5000 [USP'U]/ML
5000 INJECTION, SOLUTION INTRAVENOUS; SUBCUTANEOUS EVERY 8 HOURS
Status: DISCONTINUED | OUTPATIENT
Start: 2018-12-29 | End: 2019-01-05 | Stop reason: HOSPADM

## 2018-12-29 RX ORDER — POLYETHYLENE GLYCOL 3350 17 G/17G
1 POWDER, FOR SOLUTION ORAL
Status: DISCONTINUED | OUTPATIENT
Start: 2018-12-29 | End: 2019-01-05 | Stop reason: HOSPADM

## 2018-12-29 RX ORDER — DOXAZOSIN 2 MG/1
2 TABLET ORAL DAILY
Status: DISCONTINUED | OUTPATIENT
Start: 2018-12-30 | End: 2019-01-05 | Stop reason: HOSPADM

## 2018-12-29 RX ORDER — ASPIRIN 81 MG/1
324 TABLET, CHEWABLE ORAL ONCE
Status: COMPLETED | OUTPATIENT
Start: 2018-12-29 | End: 2018-12-29

## 2018-12-29 RX ORDER — AMOXICILLIN 250 MG
2 CAPSULE ORAL 2 TIMES DAILY
Status: DISCONTINUED | OUTPATIENT
Start: 2018-12-29 | End: 2019-01-05 | Stop reason: HOSPADM

## 2018-12-29 RX ORDER — ATORVASTATIN CALCIUM 20 MG/1
20 TABLET, FILM COATED ORAL NIGHTLY
Status: DISCONTINUED | OUTPATIENT
Start: 2018-12-29 | End: 2019-01-05 | Stop reason: HOSPADM

## 2018-12-29 RX ORDER — SODIUM CHLORIDE 9 MG/ML
30 INJECTION, SOLUTION INTRAVENOUS ONCE
Status: COMPLETED | OUTPATIENT
Start: 2018-12-29 | End: 2018-12-30

## 2018-12-29 RX ORDER — ENALAPRIL MALEATE 5 MG/1
2.5 TABLET ORAL DAILY
Status: ON HOLD | COMMUNITY
End: 2019-01-05

## 2018-12-29 RX ORDER — PROMETHAZINE HYDROCHLORIDE 25 MG/1
12.5-25 TABLET ORAL EVERY 4 HOURS PRN
Status: DISCONTINUED | OUTPATIENT
Start: 2018-12-29 | End: 2019-01-05 | Stop reason: HOSPADM

## 2018-12-29 RX ORDER — IPRATROPIUM BROMIDE AND ALBUTEROL SULFATE 2.5; .5 MG/3ML; MG/3ML
3 SOLUTION RESPIRATORY (INHALATION) ONCE
Status: COMPLETED | OUTPATIENT
Start: 2018-12-29 | End: 2018-12-29

## 2018-12-29 RX ORDER — PROMETHAZINE HYDROCHLORIDE 25 MG/1
12.5-25 SUPPOSITORY RECTAL EVERY 4 HOURS PRN
Status: DISCONTINUED | OUTPATIENT
Start: 2018-12-29 | End: 2019-01-05 | Stop reason: HOSPADM

## 2018-12-29 RX ORDER — ATORVASTATIN CALCIUM 20 MG/1
20 TABLET, FILM COATED ORAL NIGHTLY
COMMUNITY
End: 2019-01-24

## 2018-12-29 RX ORDER — AZITHROMYCIN 500 MG/1
500 INJECTION, POWDER, LYOPHILIZED, FOR SOLUTION INTRAVENOUS ONCE
Status: COMPLETED | OUTPATIENT
Start: 2018-12-29 | End: 2018-12-29

## 2018-12-29 RX ORDER — ONDANSETRON 4 MG/1
4 TABLET, ORALLY DISINTEGRATING ORAL EVERY 4 HOURS PRN
Status: DISCONTINUED | OUTPATIENT
Start: 2018-12-29 | End: 2019-01-05 | Stop reason: HOSPADM

## 2018-12-29 RX ORDER — FUROSEMIDE 10 MG/ML
20 INJECTION INTRAMUSCULAR; INTRAVENOUS
Status: COMPLETED | OUTPATIENT
Start: 2018-12-29 | End: 2019-01-03

## 2018-12-29 RX ORDER — DOXAZOSIN 2 MG/1
2 TABLET ORAL DAILY
COMMUNITY
End: 2019-01-24

## 2018-12-29 RX ORDER — ENALAPRIL MALEATE 2.5 MG/1
2.5 TABLET ORAL DAILY
Status: DISCONTINUED | OUTPATIENT
Start: 2018-12-30 | End: 2018-12-30

## 2018-12-29 RX ADMIN — CEFTRIAXONE SODIUM 2 G: 2 INJECTION, POWDER, FOR SOLUTION INTRAMUSCULAR; INTRAVENOUS at 20:14

## 2018-12-29 RX ADMIN — IPRATROPIUM BROMIDE AND ALBUTEROL SULFATE 3 ML: .5; 3 SOLUTION RESPIRATORY (INHALATION) at 20:12

## 2018-12-29 RX ADMIN — FUROSEMIDE 20 MG: 10 INJECTION, SOLUTION INTRAMUSCULAR; INTRAVENOUS at 23:39

## 2018-12-29 RX ADMIN — SODIUM CHLORIDE 1770 ML: 9 INJECTION, SOLUTION INTRAVENOUS at 20:15

## 2018-12-29 RX ADMIN — AZITHROMYCIN MONOHYDRATE 500 MG: 500 INJECTION, POWDER, LYOPHILIZED, FOR SOLUTION INTRAVENOUS at 20:49

## 2018-12-29 RX ADMIN — NITROGLYCERIN 0.4 MG: 0.4 TABLET SUBLINGUAL at 19:43

## 2018-12-29 RX ADMIN — ASPIRIN 324 MG: 81 TABLET, CHEWABLE ORAL at 19:43

## 2018-12-29 ASSESSMENT — ENCOUNTER SYMPTOMS
SPEECH CHANGE: 0
SENSORY CHANGE: 0
HEMOPTYSIS: 0
ABDOMINAL PAIN: 0
BLURRED VISION: 0
EYE DISCHARGE: 0
COUGH: 1
DOUBLE VISION: 0
FOCAL WEAKNESS: 0
FEVER: 0
WEAKNESS: 0
FLANK PAIN: 0
MYALGIAS: 0
CHILLS: 0
HALLUCINATIONS: 0
ORTHOPNEA: 1
DEPRESSION: 0
SHORTNESS OF BREATH: 1
NAUSEA: 0
VOMITING: 0
HEARTBURN: 0
BRUISES/BLEEDS EASILY: 0
DIZZINESS: 0
PALPITATIONS: 0

## 2018-12-29 ASSESSMENT — LIFESTYLE VARIABLES
SUBSTANCE_ABUSE: 0
EVER_SMOKED: YES
DO YOU DRINK ALCOHOL: NO

## 2018-12-29 ASSESSMENT — COPD QUESTIONNAIRES
DURING THE PAST 4 WEEKS HOW MUCH DID YOU FEEL SHORT OF BREATH: SOME OF THE TIME
COPD SCREENING SCORE: 7
DO YOU EVER COUGH UP ANY MUCUS OR PHLEGM?: YES, A FEW DAYS A WEEK OR MONTH
HAVE YOU SMOKED AT LEAST 100 CIGARETTES IN YOUR ENTIRE LIFE: YES

## 2018-12-29 ASSESSMENT — PULMONARY FUNCTION TESTS
EPAP_CMH2O: 8
EPAP_CMH2O: 8

## 2018-12-29 ASSESSMENT — PAIN SCALES - GENERAL
PAINLEVEL_OUTOF10: 0
PAINLEVEL_OUTOF10: 0

## 2018-12-30 ENCOUNTER — APPOINTMENT (OUTPATIENT)
Dept: CARDIOLOGY | Facility: MEDICAL CENTER | Age: 63
DRG: 917 | End: 2018-12-30
Attending: HOSPITALIST
Payer: MEDICAID

## 2018-12-30 LAB
ALBUMIN SERPL BCP-MCNC: 3 G/DL (ref 3.2–4.9)
ALBUMIN/GLOB SERPL: 0.9 G/DL
ALP SERPL-CCNC: 61 U/L (ref 30–99)
ALT SERPL-CCNC: 39 U/L (ref 2–50)
ANION GAP SERPL CALC-SCNC: 6 MMOL/L (ref 0–11.9)
AST SERPL-CCNC: 29 U/L (ref 12–45)
BASOPHILS # BLD AUTO: 0.5 % (ref 0–1.8)
BASOPHILS # BLD: 0.03 K/UL (ref 0–0.12)
BILIRUB SERPL-MCNC: 0.3 MG/DL (ref 0.1–1.5)
BUN SERPL-MCNC: 19 MG/DL (ref 8–22)
CALCIUM SERPL-MCNC: 9.1 MG/DL (ref 8.5–10.5)
CHLORIDE SERPL-SCNC: 107 MMOL/L (ref 96–112)
CO2 SERPL-SCNC: 27 MMOL/L (ref 20–33)
CREAT SERPL-MCNC: 0.84 MG/DL (ref 0.5–1.4)
EKG IMPRESSION: NORMAL
EOSINOPHIL # BLD AUTO: 0.14 K/UL (ref 0–0.51)
EOSINOPHIL NFR BLD: 2.1 % (ref 0–6.9)
ERYTHROCYTE [DISTWIDTH] IN BLOOD BY AUTOMATED COUNT: 46.7 FL (ref 35.9–50)
GLOBULIN SER CALC-MCNC: 3.5 G/DL (ref 1.9–3.5)
GLUCOSE SERPL-MCNC: 91 MG/DL (ref 65–99)
HCT VFR BLD AUTO: 33.5 % (ref 42–52)
HGB BLD-MCNC: 10.7 G/DL (ref 14–18)
IMM GRANULOCYTES # BLD AUTO: 0.02 K/UL (ref 0–0.11)
IMM GRANULOCYTES NFR BLD AUTO: 0.3 % (ref 0–0.9)
LV EJECT FRACT  99904: 50
LV EJECT FRACT MOD 2C 99903: 49.07
LV EJECT FRACT MOD 4C 99902: 49.99
LV EJECT FRACT MOD BP 99901: 49.37
LYMPHOCYTES # BLD AUTO: 1.85 K/UL (ref 1–4.8)
LYMPHOCYTES NFR BLD: 28.1 % (ref 22–41)
MCH RBC QN AUTO: 28.9 PG (ref 27–33)
MCHC RBC AUTO-ENTMCNC: 31.9 G/DL (ref 33.7–35.3)
MCV RBC AUTO: 90.5 FL (ref 81.4–97.8)
MONOCYTES # BLD AUTO: 0.67 K/UL (ref 0–0.85)
MONOCYTES NFR BLD AUTO: 10.2 % (ref 0–13.4)
NEUTROPHILS # BLD AUTO: 3.88 K/UL (ref 1.82–7.42)
NEUTROPHILS NFR BLD: 58.8 % (ref 44–72)
NRBC # BLD AUTO: 0 K/UL
NRBC BLD-RTO: 0 /100 WBC
PLATELET # BLD AUTO: 347 K/UL (ref 164–446)
PMV BLD AUTO: 9.5 FL (ref 9–12.9)
POTASSIUM SERPL-SCNC: 4.3 MMOL/L (ref 3.6–5.5)
PROT SERPL-MCNC: 6.5 G/DL (ref 6–8.2)
RBC # BLD AUTO: 3.7 M/UL (ref 4.7–6.1)
SODIUM SERPL-SCNC: 140 MMOL/L (ref 135–145)
TROPONIN I SERPL-MCNC: 0.06 NG/ML (ref 0–0.04)
WBC # BLD AUTO: 6.6 K/UL (ref 4.8–10.8)

## 2018-12-30 PROCEDURE — 80053 COMPREHEN METABOLIC PANEL: CPT

## 2018-12-30 PROCEDURE — 93306 TTE W/DOPPLER COMPLETE: CPT

## 2018-12-30 PROCEDURE — 84484 ASSAY OF TROPONIN QUANT: CPT

## 2018-12-30 PROCEDURE — 94003 VENT MGMT INPAT SUBQ DAY: CPT

## 2018-12-30 PROCEDURE — 700111 HCHG RX REV CODE 636 W/ 250 OVERRIDE (IP): Performed by: HOSPITALIST

## 2018-12-30 PROCEDURE — 99291 CRITICAL CARE FIRST HOUR: CPT | Performed by: INTERNAL MEDICINE

## 2018-12-30 PROCEDURE — 93306 TTE W/DOPPLER COMPLETE: CPT | Mod: 26 | Performed by: INTERNAL MEDICINE

## 2018-12-30 PROCEDURE — A9270 NON-COVERED ITEM OR SERVICE: HCPCS | Performed by: HOSPITALIST

## 2018-12-30 PROCEDURE — 700102 HCHG RX REV CODE 250 W/ 637 OVERRIDE(OP): Performed by: HOSPITALIST

## 2018-12-30 PROCEDURE — 99253 IP/OBS CNSLTJ NEW/EST LOW 45: CPT | Performed by: INTERNAL MEDICINE

## 2018-12-30 PROCEDURE — 770022 HCHG ROOM/CARE - ICU (200)

## 2018-12-30 PROCEDURE — 99233 SBSQ HOSP IP/OBS HIGH 50: CPT | Performed by: HOSPITALIST

## 2018-12-30 PROCEDURE — 85025 COMPLETE CBC W/AUTO DIFF WBC: CPT

## 2018-12-30 RX ORDER — AMLODIPINE BESYLATE 5 MG/1
2.5 TABLET ORAL
Status: DISCONTINUED | OUTPATIENT
Start: 2018-12-31 | End: 2019-01-05 | Stop reason: HOSPADM

## 2018-12-30 RX ORDER — POTASSIUM CHLORIDE 20 MEQ/1
20 TABLET, EXTENDED RELEASE ORAL DAILY
Status: DISCONTINUED | OUTPATIENT
Start: 2018-12-30 | End: 2019-01-05 | Stop reason: HOSPADM

## 2018-12-30 RX ADMIN — HEPARIN SODIUM 5000 UNITS: 5000 INJECTION, SOLUTION INTRAVENOUS; SUBCUTANEOUS at 06:06

## 2018-12-30 RX ADMIN — FUROSEMIDE 20 MG: 10 INJECTION, SOLUTION INTRAMUSCULAR; INTRAVENOUS at 06:06

## 2018-12-30 RX ADMIN — POTASSIUM CHLORIDE 20 MEQ: 1500 TABLET, EXTENDED RELEASE ORAL at 14:52

## 2018-12-30 RX ADMIN — HEPARIN SODIUM 5000 UNITS: 5000 INJECTION, SOLUTION INTRAVENOUS; SUBCUTANEOUS at 00:27

## 2018-12-30 RX ADMIN — HEPARIN SODIUM 5000 UNITS: 5000 INJECTION, SOLUTION INTRAVENOUS; SUBCUTANEOUS at 14:52

## 2018-12-30 RX ADMIN — FUROSEMIDE 20 MG: 10 INJECTION, SOLUTION INTRAMUSCULAR; INTRAVENOUS at 14:52

## 2018-12-30 RX ADMIN — ATORVASTATIN CALCIUM 20 MG: 20 TABLET, FILM COATED ORAL at 21:31

## 2018-12-30 RX ADMIN — HEPARIN SODIUM 5000 UNITS: 5000 INJECTION, SOLUTION INTRAVENOUS; SUBCUTANEOUS at 21:31

## 2018-12-30 ASSESSMENT — PULMONARY FUNCTION TESTS
EPAP_CMH2O: 8

## 2018-12-30 ASSESSMENT — ENCOUNTER SYMPTOMS
NEUROLOGICAL NEGATIVE: 1
BACK PAIN: 0
DIZZINESS: 0
EYES NEGATIVE: 1
NECK PAIN: 0
HEMOPTYSIS: 0
SPUTUM PRODUCTION: 1
CHILLS: 0
NAUSEA: 0
COUGH: 1
PALPITATIONS: 0
CONSTITUTIONAL NEGATIVE: 1
ORTHOPNEA: 0
VOMITING: 0
GASTROINTESTINAL NEGATIVE: 1
PSYCHIATRIC NEGATIVE: 1
SHORTNESS OF BREATH: 1
MUSCULOSKELETAL NEGATIVE: 1

## 2018-12-30 ASSESSMENT — COGNITIVE AND FUNCTIONAL STATUS - GENERAL
SUGGESTED CMS G CODE MODIFIER MOBILITY: CH
SUGGESTED CMS G CODE MODIFIER DAILY ACTIVITY: CH
DAILY ACTIVITIY SCORE: 24
MOBILITY SCORE: 24

## 2018-12-30 ASSESSMENT — PATIENT HEALTH QUESTIONNAIRE - PHQ9
2. FEELING DOWN, DEPRESSED, IRRITABLE, OR HOPELESS: NOT AT ALL
1. LITTLE INTEREST OR PLEASURE IN DOING THINGS: NOT AT ALL
SUM OF ALL RESPONSES TO PHQ9 QUESTIONS 1 AND 2: 0

## 2018-12-30 ASSESSMENT — PAIN SCALES - GENERAL
PAINLEVEL_OUTOF10: 0

## 2018-12-30 NOTE — PROGRESS NOTES
[facility] Medicine Daily Progress Note    Date of Service  12/30/2018    Chief Complaint  63 y.o. male admitted 12/29/2018 with shortness of breath.    Hospital Course    Mr Espinosa has a history of hypertension, hyperlipidemia, and substance abuse including methamphetamine.  Patient presented to the emergency room on 12/29/2018 with shortness of breath.  Patient did endorse symptoms of orthopnea and also lower extremity edema.  He was started on BiPAP in the emergency room, admitted to the ICU, diuresis was initiated.      Interval Problem Update  Admitted yesterday  Used BiPAP overnight, on oxymask 10L this morning  Charted I/O's indicate he is 1.9L negative today    Consultants/Specialty  Critical Care, I discussed the patient's condition with Dr. aLra this morning on ICU Rounds    Code Status  Full Code    Disposition  Just off BiPAP, still critical with 10L oxymask, keep in ICU    Review of Systems  Review of Systems   Constitutional: Negative for chills and malaise/fatigue.   Respiratory: Positive for cough, sputum production and shortness of breath. Negative for hemoptysis.    Cardiovascular: Positive for leg swelling. Negative for chest pain, palpitations and orthopnea.   Gastrointestinal: Negative for nausea and vomiting.   Musculoskeletal: Negative for back pain and neck pain.   Skin: Negative for itching and rash.   Neurological: Negative for dizziness.   All other systems reviewed and are negative.       Physical Exam  Temp:  [36.5 °C (97.7 °F)-36.9 °C (98.4 °F)] 36.5 °C (97.7 °F)  Pulse:  [] 94  Resp:  [16-28] 18  BP: (174)/(98) 174/98    Physical Exam   Constitutional: He is oriented to person, place, and time. He appears well-developed and well-nourished.   HENT:   Head: Normocephalic and atraumatic.   Eyes: Conjunctivae and EOM are normal. Right eye exhibits no discharge. Left eye exhibits no discharge.   Cardiovascular: Normal rate, regular rhythm and intact distal pulses.    No murmur  heard.  2+ Radial Pulses  Brisk Capillary Refill   Pulmonary/Chest: No respiratory distress. He has no wheezes. He has rales.   Working hard to breath, RR 25-30  Crackles at bilateral bases   Abdominal: Soft. Bowel sounds are normal. He exhibits no distension. There is no tenderness. There is no rebound.   Musculoskeletal: Normal range of motion. He exhibits edema.   Neurological: He is alert and oriented to person, place, and time. No cranial nerve deficit.   Skin: Skin is warm and dry. He is not diaphoretic. No erythema.   Skin is warm and well perfused   Psychiatric: He has a normal mood and affect.       Fluids    Intake/Output Summary (Last 24 hours) at 12/30/18 0822  Last data filed at 12/30/18 0800   Gross per 24 hour   Intake                0 ml   Output             1650 ml   Net            -1650 ml       Laboratory  Recent Labs      12/29/18   1900  12/30/18   0445   WBC  8.0  6.6   RBC  4.18*  3.70*   HEMOGLOBIN  12.2*  10.7*   HEMATOCRIT  37.6*  33.5*   MCV  90.0  90.5   MCH  29.2  28.9   MCHC  32.4*  31.9*   RDW  46.0  46.7   PLATELETCT  280  347   MPV  10.2  9.5     Recent Labs      12/29/18   1900  12/30/18   0445   SODIUM  139  140   POTASSIUM  5.2  4.3   CHLORIDE  107  107   CO2  27  27   GLUCOSE  91  91   BUN  23*  19   CREATININE  0.90  0.84   CALCIUM  9.8  9.1     Recent Labs      12/29/18   1900   APTT  27.4   INR  1.08     Recent Labs      12/29/18   1900   BNPBTYPENAT  796*           Imaging  DX-CHEST-PORTABLE (1 VIEW)   Final Result         Infrahilar airspace opacities bilaterally could be seen with pulmonary edema or multifocal pneumonia.      EC-ECHOCARDIOGRAM COMPLETE W/O CONT    (Results Pending)        Assessment/Plan  * Acute congestive heart failure (HCC)- (present on admission)   Assessment & Plan    Suspected acute onset heart failure in setting of meth abuse  Current ejection fraction unknown, check echo  Afterload reduction with ACE inhibitor  Monitor I&O Daily weights  Continuous  hemodynamic monitoring     Elevated troponin- (present on admission)   Assessment & Plan    Likely demand ischemia  He is currently chest pain free  Repeat now x 1  Check echocardiogram     HTN (hypertension)- (present on admission)   Assessment & Plan    Enalapril, doxazosin     Acute respiratory failure with hypoxia (HCC)- (present on admission)   Assessment & Plan    Due to CHF  Severe, requiring BiPAP  Wean supplemental O2 as tolerated  Diuresis     Anemia- (present on admission)   Assessment & Plan    Mild with no source of bleeding continue to monitor     Liver mass, right lobe- (present on admission)   Assessment & Plan    Described on scan 3/8/18  Needs outpatient follow-up     Tobacco abuse- (present on admission)   Assessment & Plan    Cessation recommended          VTE prophylaxis: heparin

## 2018-12-30 NOTE — CARE PLAN
Problem: Ventilation Defect:  Goal: Ability to achieve and maintain unassisted ventilation or tolerate decreased levels of ventilator support  Outcome: PROGRESSING SLOWER THAN EXPECTED  Respiratory Therapy Update        BIPAP 12/8. 40%    Cough: Congested (12/30/18 0000)  Sputum Amount: Moderate (12/29/18 2355)  Sputum Color: Yellow;White (12/29/18 2355)  Sputum Consistency: Thin;Thick (12/29/18 2355)    O2 (LPM): 40%  O2 Daily Delivery Respiratory : BIPAP mask     Breath Sounds  Pre/Post Intervention: Pre Intervention Assessment (12/30/18 0248)  RUL Breath Sounds: Coarse Crackles (12/30/18 0248)  RML Breath Sounds: Coarse Crackles (12/30/18 0248)  RLL Breath Sounds: Coarse Crackles (12/30/18 0248)  RUTH Breath Sounds: Coarse Crackles (12/30/18 0248)  LLL Breath Sounds: Coarse Crackles (12/30/18 0248)    Therapy changed to   Events/Summary/Plan: Pt remains on BIPAP. Current CXR shows persistent fluid overload.
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
## 2018-12-30 NOTE — PROGRESS NOTES
· 2 RN skin check complete.   · Devices in place Bi-Pap, BP Cuff, Cardiac Monitor, and Pulse ox.  · Skin assessed under devices yes.  · Confirmed pressure ulcers found on none.  · New potential pressure ulcers noted none. Wound consult placed and wound reported.  · The following interventions in place 2 RN skin assessment q sift

## 2018-12-30 NOTE — H&P
Hospital Medicine History & Physical Note    Date of Service  12/29/2018    Primary Care Physician  NEY Ledesma    Consultants  PMA    Code Status  full    Chief Complaint  Shortness of breath    History of Presenting Illness  63 y.o. male who presented 12/29/2018 with history of congenital heart defect hypertension hypercholesterolemia previous smoker and methamphetamine abuse who presents with shortness of breath.  Patient states last meth use was yesterday has been dealing with shortness of breath progressively worsening over the last several months.  Acutely worsened today also has some orthopnea.  He states he sleeps upright.  He is also had some lower extremity edema worse on the left foot.  In the emergency department he was found to be hypoxic and in respiratory distress placed on BiPAP improvement of symptoms will be admitted to the ICU for further management.    Review of Systems  Review of Systems   Constitutional: Negative for chills and fever.   HENT: Negative for congestion, hearing loss and tinnitus.    Eyes: Negative for blurred vision, double vision and discharge.   Respiratory: Positive for cough and shortness of breath. Negative for hemoptysis.    Cardiovascular: Positive for orthopnea and leg swelling. Negative for chest pain and palpitations.   Gastrointestinal: Negative for abdominal pain, heartburn, nausea and vomiting.   Genitourinary: Negative for dysuria and flank pain.   Musculoskeletal: Negative for joint pain and myalgias.   Skin: Negative for rash.   Neurological: Negative for dizziness, sensory change, speech change, focal weakness and weakness.   Endo/Heme/Allergies: Negative for environmental allergies. Does not bruise/bleed easily.   Psychiatric/Behavioral: Negative for depression, hallucinations and substance abuse.       Past Medical History   has a past medical history of Abnormality of left ventricle of heart; BPH (benign prostatic hyperplasia); Hypercholesteremia; and  Hypertension.    Surgical History   has a past surgical history that includes tonsillectomy.     Family History  family history includes Heart Disease in his mother.     Social History   reports that he quit smoking about 2 months ago. His smoking use included Cigarettes. He has a 8.00 pack-year smoking history. He has never used smokeless tobacco. He reports that he drinks alcohol. He reports that he uses drugs, including Inhaled.    Allergies  No Known Allergies    Medications  Prior to Admission Medications   Prescriptions Last Dose Informant Patient Reported? Taking?   acetaminophen (TYLENOL) 325 MG Tab   No No   Sig: Take 1 Tab by mouth every four hours as needed.   aspirin EC (ECOTRIN) 81 MG Tablet Delayed Response   No No   Sig: Take 1 Tab by mouth every day.   atorvastatin (LIPITOR) 20 MG Tab   Yes Yes   Sig: Take 20 mg by mouth every evening.   doxazosin (CARDURA) 2 MG Tab   Yes Yes   Sig: Take 2 mg by mouth every day.   enalapril (VASOTEC) 5 MG Tab   No No   Sig: Take 0.5 Tabs by mouth every day.   ibuprofen (MOTRIN) 600 MG Tab   No No   Sig: Take 1 Tab by mouth every 6 hours as needed for Mild Pain.   loperamide (IMODIUM A-D) 2 MG tablet   No No   Sig: Take 2 tablets for diarrhea and one tablet after each loose stool to max 8 tablets daily   ondansetron (ZOFRAN ODT) 4 MG TABLET DISPERSIBLE   No No   Sig: Take 1 Tab by mouth every 8 hours as needed.      Facility-Administered Medications: None       Physical Exam  Pulse:  [115-116] 115  Resp:  [22-28] 22  BP: (174)/(98) 174/98    Physical Exam   Constitutional: He is oriented to person, place, and time. He appears well-developed and well-nourished. He appears distressed.   HENT:   Head: Normocephalic and atraumatic.   Eyes: Pupils are equal, round, and reactive to light. Conjunctivae and EOM are normal.   Neck: Normal range of motion. Neck supple. JVD present.   Cardiovascular: Normal rate, regular rhythm, normal heart sounds and intact distal pulses.     No murmur heard.  Pulmonary/Chest: Breath sounds normal. He is in respiratory distress. He exhibits no tenderness.   Crackles bilaterally    Abdominal: Soft. Bowel sounds are normal. He exhibits no distension. There is no tenderness.   Musculoskeletal: Normal range of motion. He exhibits edema.   Neurological: He is alert and oriented to person, place, and time. No cranial nerve deficit. He exhibits normal muscle tone.   Skin: Skin is warm and dry. No erythema.   Psychiatric: He has a normal mood and affect. His behavior is normal. Judgment and thought content normal.   Nursing note and vitals reviewed.      Laboratory:  Recent Labs      12/29/18 1900   WBC  8.0   RBC  4.18*   HEMOGLOBIN  12.2*   HEMATOCRIT  37.6*   MCV  90.0   MCH  29.2   MCHC  32.4*   RDW  46.0   PLATELETCT  280   MPV  10.2     Recent Labs      12/29/18 1900   SODIUM  139   POTASSIUM  5.2   CHLORIDE  107   CO2  27   GLUCOSE  91   BUN  23*   CREATININE  0.90   CALCIUM  9.8     Recent Labs      12/29/18 1900   ALTSGPT  47   ASTSGOT  52*   ALKPHOSPHAT  75   TBILIRUBIN  0.3   LIPASE  99*   GLUCOSE  91     Recent Labs      12/29/18 1900   APTT  27.4   INR  1.08     Recent Labs      12/29/18 1900   BNPBTYPENAT  796*         Recent Labs      12/29/18 1900   TROPONINI  0.06*       Urinalysis:    No results found     Imaging:  DX-CHEST-PORTABLE (1 VIEW)   Final Result         Infrahilar airspace opacities bilaterally could be seen with pulmonary edema or multifocal pneumonia.      EC-ECHOCARDIOGRAM COMPLETE W/O CONT    (Results Pending)         Assessment/Plan:  I anticipate this patient will require at least two midnights for appropriate medical management, necessitating inpatient admission.    * Acute congestive heart failure (HCC)   Assessment & Plan    Suspected acute onset heart failure in setting of meth abuse  Continue with IV Lasix  Check echocardiogram  Afterload reduction with ACE inhibitor  Monitor I&O Daily weights     Elevated  troponin   Assessment & Plan    Likely demand ischemia     Acute respiratory failure with hypoxia (HCC)   Assessment & Plan    On Bipap due to resp distress  PMA consult  ICU admit   Resp care protocol   Wean bipap as tolerated     Anemia   Assessment & Plan    Mild with no source of bleeding continue to monitor     Liver mass, right lobe- (present on admission)   Assessment & Plan    Needs outpatient follow-up     HTN (hypertension)- (present on admission)   Assessment & Plan    Resume home blood pressure medications     Tobacco abuse- (present on admission)   Assessment & Plan    Greater than 3 minutes spent with the patient smoking cessation counseling.  We discussed cardiovascular risk factors of smoking         VTE prophylaxis: heparin

## 2018-12-30 NOTE — CARE PLAN
Problem: Safety  Goal: Will remain free from injury  Outcome: PROGRESSING AS EXPECTED  Patient oriented to room, surroundings, use of call light, bed/chair alarm on, hourly rounding in place.   Intervention: Provide assistance with mobility  Patient mobilized to edge of bed and then stand to chair, patient is a one person standby assist, alarm in place when in chair.       Problem: Respiratory:  Goal: Respiratory status will improve  Outcome: PROGRESSING AS EXPECTED  Patient titrated off bipap and placed on oxymask, then on to nasal cannula, patient tolerating well.

## 2018-12-30 NOTE — ASSESSMENT & PLAN NOTE
Tolerating diuresis well, is net -11.4 L since admission.  In troponin to 0.06, suspect secondary to volume overload on admission.  Cardiac cath was performed and no significant CAD was found  -reduce lasix to 20 mg p.o. daily  -Strict I's and O's, daily weights  -Continue with telemetry monitoring    Echo 12/30/18:  Moderate concentric left ventricular hypertrophy.  Left ventricular systolic function is low normal.  Severe aortic stenosis.  Transvalvular gradients are - Peak: 98 mmHg, Mean: 56 mmHg.  Estimated right ventricular systolic pressure  is 55 mmHg.  Compared to the images of the study done on 7/19/17, previous severe   aortic stenosis is now critical aortic stenosis and previously normal   left ventricular ejection fraction is now mildly reduced.

## 2018-12-30 NOTE — ASSESSMENT & PLAN NOTE
Severe, requiring BiPAP now on 1.5 L nasal cannula.  This is secondary to his volume overload  -Continue with diuresis, slowing down given his critical aortic stenosis.  Appears to be nearly euvolemic.  -Wean oxygen as tolerated

## 2018-12-30 NOTE — CARE PLAN
Problem: Communication  Goal: The ability to communicate needs accurately and effectively will improve  Outcome: PROGRESSING AS EXPECTED      Problem: Safety  Goal: Will remain free from injury  Outcome: PROGRESSING AS EXPECTED    Goal: Will remain free from falls  Outcome: PROGRESSING AS EXPECTED      Problem: Infection  Goal: Will remain free from infection  Outcome: PROGRESSING AS EXPECTED      Problem: Venous Thromboembolism (VTW)/Deep Vein Thrombosis (DVT) Prevention:  Goal: Patient will participate in Venous Thrombosis (VTE)/Deep Vein Thrombosis (DVT)Prevention Measures  Outcome: PROGRESSING AS EXPECTED      Problem: Bowel/Gastric:  Goal: Normal bowel function is maintained or improved  Outcome: PROGRESSING AS EXPECTED      Problem: Respiratory:  Goal: Respiratory status will improve  Outcome: PROGRESSING AS EXPECTED      Problem: Fluid Volume:  Goal: Will maintain balanced intake and output  Outcome: PROGRESSING AS EXPECTED      Problem: Pain Management  Goal: Pain level will decrease to patient's comfort goal  Outcome: PROGRESSING AS EXPECTED      Problem: Psychosocial Needs:  Goal: Level of anxiety will decrease  Outcome: PROGRESSING AS EXPECTED      Problem: Skin Integrity  Goal: Risk for impaired skin integrity will decrease  Outcome: PROGRESSING AS EXPECTED      Problem: Mobility  Goal: Risk for activity intolerance will decrease  Outcome: PROGRESSING AS EXPECTED      Problem: Medication  Goal: Compliance with prescribed medication will improve  Outcome: PROGRESSING AS EXPECTED

## 2018-12-30 NOTE — ED NOTES
Bedside report given to HEBERT Can. Respiratory at bedside. Pt transported off floor, all pt care responsibilities handed off.

## 2018-12-30 NOTE — RESPIRATORY CARE
COPD EDUCATION by COPD CLINICAL EDUCATOR  12/30/2018 at 8:36 AM by Shahana Chau     Patient reviewed by COPD education team. Patient does not qualify for COPD program.
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
## 2018-12-30 NOTE — ASSESSMENT & PLAN NOTE
Borderline low blood pressure.  Was on doxazosin and enalapril outpatient  -Discontinue amlodipine

## 2018-12-30 NOTE — PROGRESS NOTES
Critical Care Progress Note    Date of admission  12/29/2018    Chief Complaint  63 y.o. male admitted 12/29/2018 with pulmonary edema secondary to CHF.  The patient has history of high blood pressure, also he has reported history of congenital heart disease, constricted left ventricle, tobacco and meth abuse.  Hospital Course    Patient was admitted to the cardiac ICU overnight.  He was given Lasix with good urine output.  He was treated with BiPAP to help with work of breathing overnight.  Patient responded to treatment well.      Interval Problem Update  Reviewed last 24 hour events:  Afebrile overnight.  Heart rate between 90s-100s  BNP was 789   Hemoglobin 12.7   review of Systems  Review of Systems   Constitutional: Negative.    HENT: Negative.    Eyes: Negative.    Respiratory: Positive for cough and shortness of breath.    Cardiovascular: Negative for leg swelling.   Gastrointestinal: Negative.    Musculoskeletal: Negative.    Skin: Negative.    Neurological: Negative.    Endo/Heme/Allergies: Negative.    Psychiatric/Behavioral: Negative.         Vital Signs for last 24 hours   Temp:  [36.8 °C (98.2 °F)-36.9 °C (98.4 °F)] 36.9 °C (98.4 °F)  Pulse:  [] 106  Resp:  [16-28] 22  BP: (174)/(98) 174/98    Hemodynamic parameters for last 24 hours       Respiratory       Physical Exam   Physical Exam  Physical Exam   Constitutional: He is oriented to person, place, and time.   Thin male   HENT:   Head: Normocephalic and atraumatic.   Eyes: Pupils are equal, round, and reactive to light. EOM are normal.   Neck: JVD present.   Cardiovascular: Normal rate and regular rhythm.    No murmur heard.  Pulmonary/Chest: He has rales.   Abdominal: Soft. Bowel sounds are normal. He exhibits no distension. There is no tenderness. There is no rebound and no guarding.   Musculoskeletal: He exhibits edema.   Neurological: He is alert and oriented to person, place, and time. No cranial nerve deficit.   Skin: Skin is warm and  dry.   Psychiatric: He has a normal mood and affect.      Medications  Current Facility-Administered Medications   Medication Dose Route Frequency Provider Last Rate Last Dose   • senna-docusate (PERICOLACE or SENOKOT S) 8.6-50 MG per tablet 2 Tab  2 Tab Oral BID Celestina Wolf M.D.   Stopped at 12/29/18 2215    And   • polyethylene glycol/lytes (MIRALAX) PACKET 1 Packet  1 Packet Oral QDAY PRN Celestina Wolf M.D.        And   • magnesium hydroxide (MILK OF MAGNESIA) suspension 30 mL  30 mL Oral QDAY PRN Celestina Wolf M.D.        And   • bisacodyl (DULCOLAX) suppository 10 mg  10 mg Rectal QDAY PRN Celestina Wolf M.D.       • Respiratory Care per Protocol   Nebulization Continuous RT Celestina Wolf M.D.       • heparin injection 5,000 Units  5,000 Units Subcutaneous Q8HRS Celestina Wolf M.D.   5,000 Units at 12/30/18 0606   • ondansetron (ZOFRAN) syringe/vial injection 4 mg  4 mg Intravenous Q4HRS PRN Celestina Wolf M.D.       • ondansetron (ZOFRAN ODT) dispertab 4 mg  4 mg Oral Q4HRS PRN Celestina Wolf M.D.       • promethazine (PHENERGAN) tablet 12.5-25 mg  12.5-25 mg Oral Q4HRS PRN Celestina Wolf M.D.       • promethazine (PHENERGAN) suppository 12.5-25 mg  12.5-25 mg Rectal Q4HRS PRN Celestina Wolf M.D.       • prochlorperazine (COMPAZINE) injection 5-10 mg  5-10 mg Intravenous Q4HRS PRN Celestina Wolf M.D.       • atorvastatin (LIPITOR) tablet 20 mg  20 mg Oral Nightly Celestina Wolf M.D.   Stopped at 12/29/18 2230   • enalapril (VASOTEC) tablet 2.5 mg  2.5 mg Oral DAILY Celestina Wolf M.D.       • doxazosin (CARDURA) tablet 2 mg  2 mg Oral DAILY Celestina Wolf M.D.       • aspirin EC (ECOTRIN) tablet 81 mg  81 mg Oral DAILY Celestina Wolf M.D.       • furosemide (LASIX) injection 20 mg  20 mg Intravenous BID DIURETIC Celestina Wolf M.D.   20 mg at 12/30/18 0606       Fluids    Intake/Output Summary (Last 24 hours) at 12/30/18 0701  Last data filed at 12/30/18  0000   Gross per 24 hour   Intake                0 ml   Output              750 ml   Net             -750 ml       Laboratory      Recent Labs      12/29/18 1900   TROPONINI  0.06*   BNPBTYPENAT  796*     Recent Labs      12/29/18 1900 12/30/18   0445   SODIUM  139  140   POTASSIUM  5.2  4.3   CHLORIDE  107  107   CO2  27  27   BUN  23*  19   CREATININE  0.90  0.84   CALCIUM  9.8  9.1     Recent Labs      12/29/18 1900 12/30/18   0445   ALTSGPT  47  39   ASTSGOT  52*  29   ALKPHOSPHAT  75  61   TBILIRUBIN  0.3  0.3   LIPASE  99*   --    GLUCOSE  91  91     Recent Labs      12/29/18 1900 12/30/18   0445   WBC  8.0  6.6   NEUTSPOLYS  66.10  58.80   LYMPHOCYTES  22.70  28.10   MONOCYTES  8.70  10.20   EOSINOPHILS  2.10  2.10   BASOPHILS  0.20  0.50   ASTSGOT  52*  29   ALTSGPT  47  39   ALKPHOSPHAT  75  61   TBILIRUBIN  0.3  0.3     Recent Labs      12/29/18 1900 12/30/18   0445   RBC  4.18*  3.70*   HEMOGLOBIN  12.2*  10.7*   HEMATOCRIT  37.6*  33.5*   PLATELETCT  280  347   PROTHROMBTM  14.1   --    APTT  27.4   --    INR  1.08   --        Imaging  X-Ray:  I have personally reviewed the images and compared with prior images.    Assessment/Plan  * Acute congestive heart failure (HCC)   Assessment & Plan    Lasix as blood pressure permits  Monitor this and notes  Bipap 12/8 on as-needed basis since the patient also much better  Prior Echo LVH with mod AS  Follow-up echocardiogram will be done this admission  Recommend stopping meth abuse and tobacco  Medical non compliance       Acute respiratory failure with hypoxia (HCC)   Assessment & Plan    Related to chf  Continue Lasix and noninvasive ventilation.  Monitor closely for need to use endotracheal intubation and mechanical ventilation.         Anemia   Assessment & Plan    Monitor for bleeding  Transfuse < 7     Liver mass, right lobe- (present on admission)   Assessment & Plan    Will need follow up as outpatient  Likely hemangioma     HTN  (hypertension)- (present on admission)   Assessment & Plan    Control afterload  Ace Inhibitor  Lasix  Doxazosin  Statin          VTE:  Heparin  Ulcer: Not Indicated  Lines: None    I have performed a physical exam and reviewed and updated ROS and Plan today (12/30/2018). In review of yesterday's note (12/29/2018), there are no changes except as documented above.     Discussed patient condition and risk of morbidity and/or mortality with Hospitalist, RN, RT, Therapies, Pharmacy, Dietary, Charge nurse / hot rounds and Patient  The patient remains critically ill.  Critical care time = 35 minutes in directly providing and coordinating critical care and extensive data review.  No time overlap and excludes procedures.

## 2018-12-30 NOTE — CONSULTS
Critical Care Consultation    Date of consult: 12/29/2018    Referring Physician  Deepak Bernard M.D.    Reason for Consultation  SOB requiring Bipap     History of Presenting Illness  63 y.o. male who presented 12/29/2018 HTN, BPH, Chol and patient describes he has congenital heart disease, constricted left ventricle, tob, meth abuse, chronic right hip pain. He presents with 3 months of worsening SOB that got worse today. He describes no chest pain but does has indigestion to pizza, tomatoe sauce. He describes that he sleep upright.He gi fever chills, sick contacts, abdominal pain or diarrhea. He has been trying to quit tobacco now down to 3 cigs/day last used meth yesterday. Feels now much better on Bipap 12/8 40%    Code Status  Full Code    Review of Systems  Review of Systems   All other systems reviewed and are negative.      Past Medical History   has a past medical history of Abnormality of left ventricle of heart; BPH (benign prostatic hyperplasia); Hypercholesteremia; and Hypertension.    Surgical History   has a past surgical history that includes tonsillectomy.    Family History  family history includes Heart Disease in his mother.    Social History   reports that he quit smoking about 2 months ago. His smoking use included Cigarettes. He has a 8.00 pack-year smoking history. He has never used smokeless tobacco. He reports that he drinks alcohol. He reports that he uses drugs, including Inhaled.    Medications  Home Medications     Reviewed by Alley Barton R.N. (Registered Nurse) on 12/29/18 at 1932  Med List Status: Partial   Medication Last Dose Status   acetaminophen (TYLENOL) 325 MG Tab  Active   aspirin EC (ECOTRIN) 81 MG Tablet Delayed Response  Active   atorvastatin (LIPITOR) 20 MG Tab  Active   doxazosin (CARDURA) 2 MG Tab  Active   enalapril (VASOTEC) 5 MG Tab  Active   ibuprofen (MOTRIN) 600 MG Tab  Active   loperamide (IMODIUM A-D) 2 MG tablet  Active   ondansetron (ZOFRAN ODT)  4 MG TABLET DISPERSIBLE  Active              Current Facility-Administered Medications   Medication Dose Route Frequency Provider Last Rate Last Dose   • senna-docusate (PERICOLACE or SENOKOT S) 8.6-50 MG per tablet 2 Tab  2 Tab Oral BID Celestina Wolf M.D.        And   • polyethylene glycol/lytes (MIRALAX) PACKET 1 Packet  1 Packet Oral QDAY PRN Celestina Wolf M.D.        And   • magnesium hydroxide (MILK OF MAGNESIA) suspension 30 mL  30 mL Oral QDAY PRN Celestina Wolf M.D.        And   • bisacodyl (DULCOLAX) suppository 10 mg  10 mg Rectal QDAY PRN Celestina Wolf M.D.       • Respiratory Care per Protocol   Nebulization Continuous RT Celestina Wolf M.D.       • heparin injection 5,000 Units  5,000 Units Subcutaneous Q8HRS Celestina Wolf M.D.       • ondansetron (ZOFRAN) syringe/vial injection 4 mg  4 mg Intravenous Q4HRS PRN Celestina Wolf M.D.       • ondansetron (ZOFRAN ODT) dispertab 4 mg  4 mg Oral Q4HRS PRN Celestina Wolf M.D.       • promethazine (PHENERGAN) tablet 12.5-25 mg  12.5-25 mg Oral Q4HRS PRN Celestina Wolf M.D.       • promethazine (PHENERGAN) suppository 12.5-25 mg  12.5-25 mg Rectal Q4HRS PRN Celestina Wolf M.D.       • prochlorperazine (COMPAZINE) injection 5-10 mg  5-10 mg Intravenous Q4HRS PRN Celestina Wolf M.D.       • atorvastatin (LIPITOR) tablet 20 mg  20 mg Oral Nightly Celestina Wolf M.D.       • [START ON 12/30/2018] enalapril (VASOTEC) tablet 2.5 mg  2.5 mg Oral DAILY Celestina Wolf M.D.       • [START ON 12/30/2018] doxazosin (CARDURA) tablet 2 mg  2 mg Oral DAILY Celestina Wolf M.D.       • [START ON 12/30/2018] aspirin EC (ECOTRIN) tablet 81 mg  81 mg Oral DAILY Celestina Wolf M.D.       • furosemide (LASIX) injection 20 mg  20 mg Intravenous BID DIURETIC Celestina Wolf M.D.         Current Outpatient Prescriptions   Medication Sig Dispense Refill   • atorvastatin (LIPITOR) 20 MG Tab Take 20 mg by mouth every evening.     •  doxazosin (CARDURA) 2 MG Tab Take 2 mg by mouth every day.     • ondansetron (ZOFRAN ODT) 4 MG TABLET DISPERSIBLE Take 1 Tab by mouth every 8 hours as needed. 10 Tab 0   • loperamide (IMODIUM A-D) 2 MG tablet Take 2 tablets for diarrhea and one tablet after each loose stool to max 8 tablets daily 20 Tab 0   • ibuprofen (MOTRIN) 600 MG Tab Take 1 Tab by mouth every 6 hours as needed for Mild Pain. 30 Tab 0   • acetaminophen (TYLENOL) 325 MG Tab Take 1 Tab by mouth every four hours as needed. 30 Tab 0   • aspirin EC (ECOTRIN) 81 MG Tablet Delayed Response Take 1 Tab by mouth every day. 30 Tab 0   • enalapril (VASOTEC) 5 MG Tab Take 0.5 Tabs by mouth every day. 30 Tab 0       Allergies  No Known Allergies    Vital Signs last 24 hours  Pulse:  [115-116] 115  Resp:  [22-28] 22  BP: (174)/(98) 174/98    Physical Exam  Physical Exam   Constitutional: He is oriented to person, place, and time.   Thin male   HENT:   Head: Normocephalic and atraumatic.   Eyes: Pupils are equal, round, and reactive to light. EOM are normal.   Neck: JVD present.   Cardiovascular: Normal rate and regular rhythm.    No murmur heard.  Pulmonary/Chest: He has rales.   Abdominal: Soft. Bowel sounds are normal. He exhibits no distension. There is no tenderness. There is no rebound and no guarding.   Musculoskeletal: He exhibits edema.   Neurological: He is alert and oriented to person, place, and time. No cranial nerve deficit.   Skin: Skin is warm and dry.   Psychiatric: He has a normal mood and affect.       Fluids  No intake or output data in the 24 hours ending 12/29/18 8151    Laboratory  Recent Results (from the past 48 hour(s))   CBC WITH DIFFERENTIAL    Collection Time: 12/29/18  7:00 PM   Result Value Ref Range    WBC 8.0 4.8 - 10.8 K/uL    RBC 4.18 (L) 4.70 - 6.10 M/uL    Hemoglobin 12.2 (L) 14.0 - 18.0 g/dL    Hematocrit 37.6 (L) 42.0 - 52.0 %    MCV 90.0 81.4 - 97.8 fL    MCH 29.2 27.0 - 33.0 pg    MCHC 32.4 (L) 33.7 - 35.3 g/dL    RDW  46.0 35.9 - 50.0 fL    Platelet Count 280 164 - 446 K/uL    MPV 10.2 9.0 - 12.9 fL    Neutrophils-Polys 66.10 44.00 - 72.00 %    Lymphocytes 22.70 22.00 - 41.00 %    Monocytes 8.70 0.00 - 13.40 %    Eosinophils 2.10 0.00 - 6.90 %    Basophils 0.20 0.00 - 1.80 %    Immature Granulocytes 0.20 0.00 - 0.90 %    Nucleated RBC 0.00 /100 WBC    Neutrophils (Absolute) 5.30 1.82 - 7.42 K/uL    Lymphs (Absolute) 1.82 1.00 - 4.80 K/uL    Monos (Absolute) 0.70 0.00 - 0.85 K/uL    Eos (Absolute) 0.17 0.00 - 0.51 K/uL    Baso (Absolute) 0.02 0.00 - 0.12 K/uL    Immature Granulocytes (abs) 0.02 0.00 - 0.11 K/uL    NRBC (Absolute) 0.00 K/uL   CMP    Collection Time: 12/29/18  7:00 PM   Result Value Ref Range    Sodium 139 135 - 145 mmol/L    Potassium 5.2 3.6 - 5.5 mmol/L    Chloride 107 96 - 112 mmol/L    Co2 27 20 - 33 mmol/L    Anion Gap 5.0 0.0 - 11.9    Glucose 91 65 - 99 mg/dL    Bun 23 (H) 8 - 22 mg/dL    Creatinine 0.90 0.50 - 1.40 mg/dL    Calcium 9.8 8.5 - 10.5 mg/dL    AST(SGOT) 52 (H) 12 - 45 U/L    ALT(SGPT) 47 2 - 50 U/L    Alkaline Phosphatase 75 30 - 99 U/L    Total Bilirubin 0.3 0.1 - 1.5 mg/dL    Albumin 3.3 3.2 - 4.9 g/dL    Total Protein 7.5 6.0 - 8.2 g/dL    Globulin 4.2 (H) 1.9 - 3.5 g/dL    A-G Ratio 0.8 g/dL   LIPASE    Collection Time: 12/29/18  7:00 PM   Result Value Ref Range    Lipase 99 (H) 11 - 82 U/L   TROPONIN    Collection Time: 12/29/18  7:00 PM   Result Value Ref Range    Troponin I 0.06 (H) 0.00 - 0.04 ng/mL   BNP    Collection Time: 12/29/18  7:00 PM   Result Value Ref Range    B Natriuretic Peptide 796 (H) 0 - 100 pg/mL   LACTIC ACID    Collection Time: 12/29/18  7:00 PM   Result Value Ref Range    Lactic Acid 1.7 0.5 - 2.0 mmol/L   PT/INR    Collection Time: 12/29/18  7:00 PM   Result Value Ref Range    PT 14.1 12.0 - 14.6 sec    INR 1.08 0.87 - 1.13   PTT    Collection Time: 12/29/18  7:00 PM   Result Value Ref Range    APTT 27.4 24.7 - 36.0 sec   ESTIMATED GFR    Collection Time: 12/29/18   7:00 PM   Result Value Ref Range    GFR If African American >60 >60 mL/min/1.73 m 2    GFR If Non African American >60 >60 mL/min/1.73 m 2   EKG    Collection Time: 18  7:23 PM   Result Value Ref Range    Report       [facility] Emergency Dept.    Test Date:  2018  Pt Name:    JAIMIE YAO              Department: ER  MRN:        [id number]                      Room:        10  Gender:     Male                         Technician: [id number]  :        1955                   Requested By:KANWAL MARX  Order #:    [id number]                    Reading MD:    Measurements  Intervals                                Axis  Rate:       115                          P:          64  WA:         136                          QRS:        3  QRSD:       88                           T:          83  QT:         348  QTc:        482    Interpretive Statements  SINUS TACHYCARDIA  PROBABLE LEFT ATRIAL ABNORMALITY  LEFT VENTRICULAR HYPERTROPHY  ANTERIOR ST ELEVATION, PROBABLY DUE TO LVH  BORDERLINE PROLONGED QT INTERVAL  BASELINE WANDER IN LEAD(S) II,III,aVF  Compared to ECG 2017 10:50:09  ST (T wave) deviation now present  Sinus arrhythmia no longer present     BLOOD CULTURE,HOLD    Collection Time: 18  7:50 PM   Result Value Ref Range    Blood Culture Hold Collected    Influenza A/B By PCR    Collection Time: 18  8:45 PM   Result Value Ref Range    Influenza virus A RNA Negative Negative    Influenza virus B, PCR Negative Negative       Imaging  DX-CHEST-PORTABLE (1 VIEW)   Final Result         Infrahilar airspace opacities bilaterally could be seen with pulmonary edema or multifocal pneumonia.      EC-ECHOCARDIOGRAM COMPLETE W/O CONT    (Results Pending)       Assessment/Plan  CHF (congestive heart failure) (HCC)   Assessment & Plan    Force diuresis  Bipap  40% feeling much better  Prior Echo LVH with mod AS  Repeat echo  Ekg with LVH  Recommend stopping meth abuse and  tobacco  Medical non compliance       Acute respiratory failure with hypoxia (HCC)   Assessment & Plan    Related to chf  BP control  Force diuresis  Monitor need for bipap and or mechanical ventilation.          Anemia   Assessment & Plan    Monitor for bleeding  Transfuse < 7     Liver mass, right lobe- (present on admission)   Assessment & Plan    Will need follow up as outpatient  Likely hemangioma     HTN (hypertension)- (present on admission)   Assessment & Plan    Control afterload  Ace Inhibitor  Lasix  Doxazosin  Statin         Discussed patient condition and risk of morbidity and/or mortality with Patient.    The patient remains critically ill on bipap for CHF excerbation continue to titrate peep and need for bipap vs mechanical ventilation.  Critical care time = 55 minutes in directly providing and coordinating critical care and extensive data review.  No time overlap and excludes procedures.

## 2018-12-31 PROBLEM — E83.42 HYPOMAGNESEMIA: Status: ACTIVE | Noted: 2018-12-31

## 2018-12-31 PROBLEM — I50.1 PULMONARY EDEMA CARDIAC CAUSE (HCC): Status: ACTIVE | Noted: 2018-12-31

## 2018-12-31 PROBLEM — F15.10 METHAMPHETAMINE ABUSE (HCC): Status: ACTIVE | Noted: 2018-12-31

## 2018-12-31 PROBLEM — I35.0 NONRHEUMATIC AORTIC VALVE STENOSIS: Status: ACTIVE | Noted: 2018-12-31

## 2018-12-31 LAB
ANION GAP SERPL CALC-SCNC: 5 MMOL/L (ref 0–11.9)
BASOPHILS # BLD AUTO: 0.5 % (ref 0–1.8)
BASOPHILS # BLD: 0.04 K/UL (ref 0–0.12)
BUN SERPL-MCNC: 24 MG/DL (ref 8–22)
CALCIUM SERPL-MCNC: 9.4 MG/DL (ref 8.5–10.5)
CHLORIDE SERPL-SCNC: 103 MMOL/L (ref 96–112)
CO2 SERPL-SCNC: 31 MMOL/L (ref 20–33)
CREAT SERPL-MCNC: 0.92 MG/DL (ref 0.5–1.4)
EOSINOPHIL # BLD AUTO: 0.23 K/UL (ref 0–0.51)
EOSINOPHIL NFR BLD: 3 % (ref 0–6.9)
ERYTHROCYTE [DISTWIDTH] IN BLOOD BY AUTOMATED COUNT: 46.8 FL (ref 35.9–50)
GLUCOSE SERPL-MCNC: 97 MG/DL (ref 65–99)
HCT VFR BLD AUTO: 38.4 % (ref 42–52)
HGB BLD-MCNC: 12 G/DL (ref 14–18)
IMM GRANULOCYTES # BLD AUTO: 0.02 K/UL (ref 0–0.11)
IMM GRANULOCYTES NFR BLD AUTO: 0.3 % (ref 0–0.9)
LYMPHOCYTES # BLD AUTO: 1.81 K/UL (ref 1–4.8)
LYMPHOCYTES NFR BLD: 23.9 % (ref 22–41)
MAGNESIUM SERPL-MCNC: 1.9 MG/DL (ref 1.5–2.5)
MCH RBC QN AUTO: 28.6 PG (ref 27–33)
MCHC RBC AUTO-ENTMCNC: 31.3 G/DL (ref 33.7–35.3)
MCV RBC AUTO: 91.4 FL (ref 81.4–97.8)
MONOCYTES # BLD AUTO: 0.77 K/UL (ref 0–0.85)
MONOCYTES NFR BLD AUTO: 10.2 % (ref 0–13.4)
NEUTROPHILS # BLD AUTO: 4.69 K/UL (ref 1.82–7.42)
NEUTROPHILS NFR BLD: 62.1 % (ref 44–72)
NRBC # BLD AUTO: 0 K/UL
NRBC BLD-RTO: 0 /100 WBC
PLATELET # BLD AUTO: 368 K/UL (ref 164–446)
PMV BLD AUTO: 9.6 FL (ref 9–12.9)
POTASSIUM SERPL-SCNC: 4.7 MMOL/L (ref 3.6–5.5)
RBC # BLD AUTO: 4.2 M/UL (ref 4.7–6.1)
SODIUM SERPL-SCNC: 139 MMOL/L (ref 135–145)
WBC # BLD AUTO: 7.6 K/UL (ref 4.8–10.8)

## 2018-12-31 PROCEDURE — 94003 VENT MGMT INPAT SUBQ DAY: CPT

## 2018-12-31 PROCEDURE — 85025 COMPLETE CBC W/AUTO DIFF WBC: CPT

## 2018-12-31 PROCEDURE — A9270 NON-COVERED ITEM OR SERVICE: HCPCS | Performed by: HOSPITALIST

## 2018-12-31 PROCEDURE — 700111 HCHG RX REV CODE 636 W/ 250 OVERRIDE (IP): Performed by: HOSPITALIST

## 2018-12-31 PROCEDURE — A9270 NON-COVERED ITEM OR SERVICE: HCPCS | Performed by: INTERNAL MEDICINE

## 2018-12-31 PROCEDURE — 99233 SBSQ HOSP IP/OBS HIGH 50: CPT | Performed by: INTERNAL MEDICINE

## 2018-12-31 PROCEDURE — 83735 ASSAY OF MAGNESIUM: CPT

## 2018-12-31 PROCEDURE — 700102 HCHG RX REV CODE 250 W/ 637 OVERRIDE(OP): Performed by: HOSPITALIST

## 2018-12-31 PROCEDURE — 80048 BASIC METABOLIC PNL TOTAL CA: CPT

## 2018-12-31 PROCEDURE — 99233 SBSQ HOSP IP/OBS HIGH 50: CPT | Performed by: HOSPITALIST

## 2018-12-31 PROCEDURE — 770022 HCHG ROOM/CARE - ICU (200)

## 2018-12-31 PROCEDURE — 700102 HCHG RX REV CODE 250 W/ 637 OVERRIDE(OP): Performed by: INTERNAL MEDICINE

## 2018-12-31 PROCEDURE — 99291 CRITICAL CARE FIRST HOUR: CPT | Performed by: INTERNAL MEDICINE

## 2018-12-31 RX ADMIN — ATORVASTATIN CALCIUM 20 MG: 20 TABLET, FILM COATED ORAL at 21:00

## 2018-12-31 RX ADMIN — HEPARIN SODIUM 5000 UNITS: 5000 INJECTION, SOLUTION INTRAVENOUS; SUBCUTANEOUS at 21:00

## 2018-12-31 RX ADMIN — DOXAZOSIN 2 MG: 2 TABLET ORAL at 05:58

## 2018-12-31 RX ADMIN — HEPARIN SODIUM 5000 UNITS: 5000 INJECTION, SOLUTION INTRAVENOUS; SUBCUTANEOUS at 15:48

## 2018-12-31 RX ADMIN — AMLODIPINE BESYLATE 2.5 MG: 5 TABLET ORAL at 06:00

## 2018-12-31 RX ADMIN — FUROSEMIDE 20 MG: 10 INJECTION, SOLUTION INTRAMUSCULAR; INTRAVENOUS at 05:58

## 2018-12-31 RX ADMIN — ASPIRIN 81 MG: 81 TABLET, COATED ORAL at 05:58

## 2018-12-31 RX ADMIN — STANDARDIZED SENNA CONCENTRATE AND DOCUSATE SODIUM 2 TABLET: 8.6; 5 TABLET, FILM COATED ORAL at 17:54

## 2018-12-31 RX ADMIN — POTASSIUM CHLORIDE 20 MEQ: 1500 TABLET, EXTENDED RELEASE ORAL at 05:58

## 2018-12-31 RX ADMIN — FUROSEMIDE 20 MG: 10 INJECTION, SOLUTION INTRAMUSCULAR; INTRAVENOUS at 15:48

## 2018-12-31 RX ADMIN — HEPARIN SODIUM 5000 UNITS: 5000 INJECTION, SOLUTION INTRAVENOUS; SUBCUTANEOUS at 05:58

## 2018-12-31 ASSESSMENT — PAIN SCALES - GENERAL
PAINLEVEL_OUTOF10: 0

## 2018-12-31 ASSESSMENT — ENCOUNTER SYMPTOMS
NECK PAIN: 0
ORTHOPNEA: 0
WHEEZING: 0
CHILLS: 0
FLANK PAIN: 0
HEMOPTYSIS: 0
BLURRED VISION: 0
POLYDIPSIA: 0
PND: 0
SHORTNESS OF BREATH: 1
MYALGIAS: 0
ORTHOPNEA: 1
PALPITATIONS: 0
FEVER: 0
NERVOUS/ANXIOUS: 0
VOMITING: 0
NAUSEA: 0
ABDOMINAL PAIN: 0
DEPRESSION: 0
BACK PAIN: 0
SORE THROAT: 0
HEADACHES: 0
COUGH: 1
SPUTUM PRODUCTION: 0
DIZZINESS: 0
SPUTUM PRODUCTION: 1

## 2018-12-31 ASSESSMENT — PULMONARY FUNCTION TESTS
EPAP_CMH2O: 8

## 2018-12-31 NOTE — ASSESSMENT & PLAN NOTE
Cessation recommended, will need to abstain for at least 2 weeks to be a candidate for TAVR.  Patient is motivated  Appt on Swapnil 10

## 2018-12-31 NOTE — ASSESSMENT & PLAN NOTE
Echocardiogram demonstrates progression of his aortic stenosis to critical  -Cardiology following, CT surgery consulted  -Plan for outpatient follow-up in the TAVR clinic  -We will need to abstain from methamphetamines for at least 2 weeks

## 2018-12-31 NOTE — PROGRESS NOTES
Critical Care Progress Note    Date of admission  12/29/2018    Chief Complaint  63 y.o. male admitted 12/29/2018 with pulmonary edema secondary to CHF.  The patient has history of high blood pressure, also he has reported history of congenital heart disease, constricted left ventricle, tobacco and meth abuse.    Hospital Course    Patient was admitted to the cardiac ICU overnight.  He was given Lasix with good urine output.  He was treated with BiPAP to help with work of breathing overnight.  Patient responded to treatment well.      Interval Problem Update  Reviewed last 24 hour events:   - on BiPAP this morning and states breathing improving   - AF, nl WBC   - Hgb unchanged at 12   - nl kidney function, K 4.7   - adequate UOP with lasix   - Mag low at 1.9   - echo showing EF 50% with severe AS   - cardiac cath planned for today   - AAOx4    review of Systems  Review of Systems   Constitutional: Negative for chills, fever and malaise/fatigue.   HENT: Negative for congestion and sore throat.    Eyes: Negative for blurred vision.   Respiratory: Positive for cough and shortness of breath. Negative for sputum production and wheezing.    Cardiovascular: Positive for orthopnea. Negative for chest pain, palpitations, leg swelling and PND.   Gastrointestinal: Negative for abdominal pain, nausea and vomiting.   Genitourinary: Negative for dysuria and flank pain.   Musculoskeletal: Negative for back pain and myalgias.   Skin: Negative for rash.   Neurological: Negative for dizziness and headaches.   Endo/Heme/Allergies: Negative for polydipsia.   Psychiatric/Behavioral: Negative for depression. The patient is not nervous/anxious.    All other systems reviewed and are negative.       Vital Signs for last 24 hours   Temp:  [36.5 °C (97.7 °F)-37.1 °C (98.7 °F)] 37.1 °C (98.7 °F)  Pulse:  [] 92  Resp:  [16-31] 17   Blood pressure 130-140s/70s-90s    Hemodynamic parameters for last 24 hours       Respiratory   SaO2 91-98%  on 2-3 lpm n/c <--> BiPAP 12/8, 30% FiO2 with TVs 500mL+, RR 18    Physical Exam   Physical Exam   Constitutional: He is oriented to person, place, and time. He appears well-developed and well-nourished. No distress.   HENT:   Head: Normocephalic and atraumatic.   Nose: Nose normal.   Mouth/Throat: Oropharynx is clear and moist.   Eyes: Pupils are equal, round, and reactive to light. Conjunctivae are normal. No scleral icterus.   Neck: Neck supple. JVD present. No tracheal deviation present.   Cardiovascular: Regular rhythm and normal pulses.   Occasional extrasystoles are present. Tachycardia present.  PMI is displaced.  Exam reveals no distant heart sounds.    Murmur heard.   Systolic murmur is present with a grade of 5/6   Pulmonary/Chest: No accessory muscle usage. No tachypnea. He has no decreased breath sounds. He has no wheezes. He has no rhonchi. He has rales in the right middle field, the right lower field, the left middle field and the left lower field.   Abdominal: Soft. Bowel sounds are normal. He exhibits no distension. There is no tenderness.   Musculoskeletal: He exhibits no edema or tenderness.   Mild clubbing   Lymphadenopathy:     He has no cervical adenopathy.   Neurological: He is alert and oriented to person, place, and time. No cranial nerve deficit. He exhibits normal muscle tone.   Skin: Skin is warm and dry. No rash noted.   Psychiatric: He has a normal mood and affect. His behavior is normal. Judgment and thought content normal.   Nursing note and vitals reviewed.     Medications  Current Facility-Administered Medications   Medication Dose Route Frequency Provider Last Rate Last Dose   • potassium chloride SA (Kdur) tablet 20 mEq  20 mEq Oral DAILY Deejay Guzman M.D.   20 mEq at 12/31/18 0558   • amLODIPine (NORVASC) tablet 2.5 mg  2.5 mg Oral Q DAY Michi Dietrich M.D.   2.5 mg at 12/31/18 0600   • senna-docusate (PERICOLACE or SENOKOT S) 8.6-50 MG per tablet 2 Tab  2 Tab Oral BID  Celestina Wolf M.D.   Stopped at 12/29/18 2215    And   • polyethylene glycol/lytes (MIRALAX) PACKET 1 Packet  1 Packet Oral QDAY PRN Celestina Wolf M.D.        And   • magnesium hydroxide (MILK OF MAGNESIA) suspension 30 mL  30 mL Oral QDAY PRN Celestina Wolf M.D.        And   • bisacodyl (DULCOLAX) suppository 10 mg  10 mg Rectal QDAY PRN Celestina Wolf M.D.       • Respiratory Care per Protocol   Nebulization Continuous RT Celestina Wolf M.D.       • heparin injection 5,000 Units  5,000 Units Subcutaneous Q8HRS Celestina Wolf M.D.   5,000 Units at 12/31/18 0558   • ondansetron (ZOFRAN) syringe/vial injection 4 mg  4 mg Intravenous Q4HRS PRN Celestina Wolf M.D.       • ondansetron (ZOFRAN ODT) dispertab 4 mg  4 mg Oral Q4HRS PRN Celestina Wlof M.D.       • promethazine (PHENERGAN) tablet 12.5-25 mg  12.5-25 mg Oral Q4HRS PRN Celestina Wolf M.D.       • promethazine (PHENERGAN) suppository 12.5-25 mg  12.5-25 mg Rectal Q4HRS PRN Celestina Wolf M.D.       • prochlorperazine (COMPAZINE) injection 5-10 mg  5-10 mg Intravenous Q4HRS PRN Celestina Wolf M.D.       • atorvastatin (LIPITOR) tablet 20 mg  20 mg Oral Nightly Celestina Wolf M.D.   20 mg at 12/30/18 2131   • doxazosin (CARDURA) tablet 2 mg  2 mg Oral DAILY Celestina Wolf M.D.   2 mg at 12/31/18 0558   • aspirin EC (ECOTRIN) tablet 81 mg  81 mg Oral DAILY Celestina Wolf M.D.   81 mg at 12/31/18 0558   • furosemide (LASIX) injection 20 mg  20 mg Intravenous BID DIURETIC Celestina Wolf M.D.   20 mg at 12/31/18 0558       Fluids    Intake/Output Summary (Last 24 hours) at 12/31/18 0737  Last data filed at 12/31/18 0200   Gross per 24 hour   Intake              240 ml   Output             2780 ml   Net            -2540 ml       Laboratory      Recent Labs      12/29/18   1900  12/30/18   1555   TROPONINI  0.06*  0.06*   BNPBTYPENAT  796*   --      Recent Labs      12/29/18   1900  12/30/18   0445  12/31/18   0430    SODIUM  139  140  139   POTASSIUM  5.2  4.3  4.7   CHLORIDE  107  107  103   CO2  27  27  31   BUN  23*  19  24*   CREATININE  0.90  0.84  0.92   MAGNESIUM   --    --   1.9   CALCIUM  9.8  9.1  9.4     Recent Labs      12/29/18 1900 12/30/18 0445  12/31/18   0430   ALTSGPT  47  39   --    ASTSGOT  52*  29   --    ALKPHOSPHAT  75  61   --    TBILIRUBIN  0.3  0.3   --    LIPASE  99*   --    --    GLUCOSE  91  91  97     Recent Labs      12/29/18 1900 12/30/18 0445  12/31/18   0430   WBC  8.0  6.6  7.6   NEUTSPOLYS  66.10  58.80  62.10   LYMPHOCYTES  22.70  28.10  23.90   MONOCYTES  8.70  10.20  10.20   EOSINOPHILS  2.10  2.10  3.00   BASOPHILS  0.20  0.50  0.50   ASTSGOT  52*  29   --    ALTSGPT  47  39   --    ALKPHOSPHAT  75  61   --    TBILIRUBIN  0.3  0.3   --      Recent Labs      12/29/18 1900 12/30/18 0445  12/31/18   0430   RBC  4.18*  3.70*  4.20*   HEMOGLOBIN  12.2*  10.7*  12.0*   HEMATOCRIT  37.6*  33.5*  38.4*   PLATELETCT  280  347  368   PROTHROMBTM  14.1   --    --    APTT  27.4   --    --    INR  1.08   --    --        Imaging  X-Ray:  No film today  Echo:   Reviewed CONCLUSIONS  Moderate concentric left ventricular hypertrophy.  Left ventricular systolic function is low normal.  Severe aortic stenosis.  Transvalvular gradients are - Peak: 98 mmHg, Mean: 56 mmHg.  Estimated right ventricular systolic pressure  is 55 mmHg.  Compared to the images of the study done on 7/19/17, previous severe   aortic stenosis is now critical aortic stenosis and previously normal   left ventricular ejection fraction is now mildly reduced.    Assessment/Plan  * Acute congestive heart failure (HCC)- (present on admission)   Assessment & Plan    Diuresis  Strict blood pressure control  Beta-blocker, ACE inhibitor?       Nonrheumatic aortic valve stenosis   Assessment & Plan    Severe  Coronary artery angiography today to evaluate for potential TAVR candidacy     Acute respiratory failure with hypoxia  (HCC)- (present on admission)   Assessment & Plan    Continue BiPAP with current settings titrating FiO2 to goal SaO2 greater than 92%  Forced diuresis  RT/O2 protocols  Limit sedatives         Pulmonary edema cardiac cause (HCC)   Assessment & Plan    Forced diuresis     Methamphetamine abuse (HCC)   Assessment & Plan    Drug cessation education provided     Elevated troponin- (present on admission)   Assessment & Plan    Likely secondary to demand ischemia  Aspirin     HTN (hypertension)- (present on admission)   Assessment & Plan    Uncontrolled  Continue amlodipine, Cardura, Lasix     Hypomagnesemia   Assessment & Plan    Repletion and monitor daily     Anemia- (present on admission)   Assessment & Plan    Daily CBC  Conservative transfusion strategy     Liver mass, right lobe- (present on admission)   Assessment & Plan    Will need follow up as outpatient  Likely hemangioma     Tobacco abuse- (present on admission)   Assessment & Plan    Nicotine replacement protocol  Tobacco cessation education       FULL CODE    VTE:  Heparin  Ulcer: Not Indicated  Lines: None    I have performed a physical exam and reviewed and updated ROS and Plan today (12/31/2018). In review of yesterday's note (12/30/2018), there are no changes except as documented above.     Patient remains critically ill today requiring active management of his noninvasive ventilatory support for his acute respiratory failure. High risk of deterioration and worsening vital organ dysfunction and death without the above critical care interventions.    Discussed patient condition and risk of morbidity and/or mortality with Hospitalist, RN, RT, Pharmacy, Charge nurse / hot rounds, Patient and cardiology  The patient remains critically ill.  Critical care time = 31 minutes in directly providing and coordinating critical care and extensive data review.  No time overlap and excludes procedures.

## 2018-12-31 NOTE — PROGRESS NOTES
[facility] Medicine Daily Progress Note    Date of Service  12/31/2018    Chief Complaint  63 y.o. male admitted 12/29/2018 with shortness of breath.    Hospital Course    Mr Espinosa has a history of hypertension, hyperlipidemia, and substance abuse including methamphetamine.  Patient presented to the emergency room on 12/29/2018 with shortness of breath.  Patient did endorse symptoms of orthopnea and also lower extremity edema.  He was started on BiPAP in the emergency room, admitted to the ICU, diuresis was initiated.      Interval Problem Update  Admitted yesterday  Used BiPAP overnight, on oxymask 10L this morning  Coughing a little, no sputum  Charted I/O's indicate he is 1.9L negative since admission  Denies chest pain, denies palpitations    Consultants/Specialty  Critical Care, I discussed the patient's condition with Dr. Lara this morning on ICU Rounds    Code Status  Full Code    Disposition  Just off BiPAP, still critical with 10L oxymask, keep in ICU    Review of Systems  Review of Systems   Constitutional: Negative for chills and malaise/fatigue.   Respiratory: Positive for cough, sputum production and shortness of breath. Negative for hemoptysis.    Cardiovascular: Positive for leg swelling. Negative for chest pain, palpitations and orthopnea.   Gastrointestinal: Negative for nausea and vomiting.   Musculoskeletal: Negative for back pain and neck pain.   Skin: Negative for itching and rash.   Neurological: Negative for dizziness.   All other systems reviewed and are negative.       Physical Exam  Temp:  [36.7 °C (98 °F)-37.1 °C (98.7 °F)] 37.1 °C (98.7 °F)  Pulse:  [] 88  Resp:  [16-31] 18    Physical Exam   Constitutional: He is oriented to person, place, and time. He appears well-developed and well-nourished.   HENT:   Head: Normocephalic and atraumatic.   Eyes: Conjunctivae and EOM are normal. Right eye exhibits no discharge. Left eye exhibits no discharge.   Cardiovascular: Normal rate,  regular rhythm and intact distal pulses.    Murmur heard.  Pulmonary/Chest: No respiratory distress. He has no wheezes. He has rales.   Working hard to breath, RR 25-30  Crackles at bilateral bases   Abdominal: Soft. Bowel sounds are normal. He exhibits no distension. There is no tenderness. There is no rebound.   Musculoskeletal: Normal range of motion. He exhibits edema.   Neurological: He is alert and oriented to person, place, and time. No cranial nerve deficit.   Skin: Skin is warm and dry. He is not diaphoretic. No erythema.   Skin is warm and well perfused   Psychiatric: He has a normal mood and affect.       Fluids    Intake/Output Summary (Last 24 hours) at 12/31/18 1145  Last data filed at 12/31/18 0800   Gross per 24 hour   Intake              240 ml   Output             3080 ml   Net            -2840 ml       Laboratory  Recent Labs      12/29/18   1900  12/30/18   0445  12/31/18   0430   WBC  8.0  6.6  7.6   RBC  4.18*  3.70*  4.20*   HEMOGLOBIN  12.2*  10.7*  12.0*   HEMATOCRIT  37.6*  33.5*  38.4*   MCV  90.0  90.5  91.4   MCH  29.2  28.9  28.6   MCHC  32.4*  31.9*  31.3*   RDW  46.0  46.7  46.8   PLATELETCT  280  347  368   MPV  10.2  9.5  9.6     Recent Labs      12/29/18   1900  12/30/18   0445  12/31/18   0430   SODIUM  139  140  139   POTASSIUM  5.2  4.3  4.7   CHLORIDE  107  107  103   CO2  27  27  31   GLUCOSE  91  91  97   BUN  23*  19  24*   CREATININE  0.90  0.84  0.92   CALCIUM  9.8  9.1  9.4     Recent Labs      12/29/18   1900   APTT  27.4   INR  1.08     Recent Labs      12/29/18   1900   BNPBTYPENAT  796*           Imaging  EC-ECHOCARDIOGRAM COMPLETE W/O CONT   Final Result      DX-CHEST-PORTABLE (1 VIEW)   Final Result         Infrahilar airspace opacities bilaterally could be seen with pulmonary edema or multifocal pneumonia.           Assessment/Plan  * Acute congestive heart failure (HCC)- (present on admission)   Assessment & Plan    EF 50%  Lasix 20mg BID, monitor I/O's, daily  weights  Continuous hemodynamic monitoring    Echo 12/30/18:  Moderate concentric left ventricular hypertrophy.  Left ventricular systolic function is low normal.  Severe aortic stenosis.  Transvalvular gradients are - Peak: 98 mmHg, Mean: 56 mmHg.  Estimated right ventricular systolic pressure  is 55 mmHg.  Compared to the images of the study done on 7/19/17, previous severe   aortic stenosis is now critical aortic stenosis and previously normal   left ventricular ejection fraction is now mildly reduced.     Nonrheumatic aortic valve stenosis- (present on admission)   Assessment & Plan    Echocardiogram demonstrates progression of his aortic stenosis to critical  Discussed with cardiology, plan for cardiac catheterization and further evaluation of the valve     Acute respiratory failure with hypoxia (HCC)- (present on admission)   Assessment & Plan    Severe, requiring BiPAP  Wean supplemental O2 as tolerated  Diuresis     Pulmonary edema cardiac cause (HCC)- (present on admission)   Assessment & Plan    Due to CHF, aortic stenosis, and substance abuse  Improved with diuresis     Methamphetamine abuse (HCC)- (present on admission)   Assessment & Plan    Cessation recommended     Elevated troponin- (present on admission)   Assessment & Plan    Likely demand ischemia  He is currently chest pain free     HTN (hypertension)- (present on admission)   Assessment & Plan    Norvasc     Anemia- (present on admission)   Assessment & Plan    Morning labs reviewed  12/31: HGB 12  No need for transfusion     Liver mass, right lobe- (present on admission)   Assessment & Plan    Described on scan 3/8/18  Needs outpatient follow-up     Tobacco abuse- (present on admission)   Assessment & Plan    Cessation recommended          VTE prophylaxis: heparin

## 2018-12-31 NOTE — CARE PLAN
Problem: Ventilation Defect:  Goal: Ability to achieve and maintain unassisted ventilation or tolerate decreased levels of ventilator support  Outcome: PROGRESSING SLOWER THAN EXPECTED  Respiratory Therapy Update          BIPAP 12/8/ 30%^     Cough: Congested (12/31/18 0000)  Sputum Amount: Unable to Evaluate (12/30/18 1818)  Sputum Color: Unable to Evaluate (12/30/18 1818)  Sputum Consistency: Unable to Evaluate (12/30/18 1818)    FiO2%: 30 % (12/30/18 0800)      Breath Sounds  Pre/Post Intervention: Pre Intervention Assessment (12/31/18 0230)  RUL Breath Sounds: Coarse Crackles (12/31/18 0230)  RML Breath Sounds: Coarse Crackles (12/31/18 0230)  RLL Breath Sounds: Coarse Crackles (12/31/18 0230)  RUTH Breath Sounds: Coarse Crackles (12/31/18 0230)  LLL Breath Sounds: Coarse Crackles (12/31/18 0230)    Therapy changed to   Events/Summary/Plan: Pt increased WOB and tachypneic. Placed back on BIPAP  (12/30/18 2003)

## 2018-12-31 NOTE — CONSULTS
"Cardiology Consult Note    Date of note:    12/30/2018      Patient ID:    Name:             Sylvain Espinosa     YOB: 1955  Age:                 63 y.o.  male   MRN:               [id number]                                                             Consulting Physician: Silvano Sumner MD    Consult question: Evaluation of critical aortic stenosis    History of Present Illness:      Sylvain Espinosa is a 63-year-old man with aortic stenosis previously evaluated in cardiology clinic at which time it was moderate and not seen in follow-up as he did not show for stress test and appointments.  He is an active methamphetamine user who comes in with pulmonary edema and heart failure.    In speaking with the patient, he is quite reasonable.  He confirms with me that he has been using methamphetamines rate up to the time of his admission.  He confirms the admission history of exertional dyspnea telling me that he had exertional dyspnea with walking probably 5-10 feet prior to admission.  He has orthopnea sitting upright prior to admission as well.    He was placed on BiPAP and sent to the ICU diuresing with IV Lasix.    Review of Systems:  All others reviewed and negative.    Past Medical History:   Past Medical History:   Diagnosis Date   • Abnormality of left ventricle of heart     \"constricted left ventricle\"    • BPH (benign prostatic hyperplasia)    • Hypercholesteremia    • Hypertension      Active Hospital Problems    Diagnosis   • Acute congestive heart failure (HCC) [I50.9]     Priority: High   • Elevated troponin [R74.8]     Priority: Medium   • HTN (hypertension) [I10]     Priority: Medium   • Anemia [D64.9]     Priority: Low   • Liver mass, right lobe [R16.0]     Priority: Low   • Tobacco abuse [Z72.0]     Priority: Low   • Acute respiratory failure with hypoxia (HCC) [J96.01]       Past Surgical History:  Past Surgical History:   Procedure Laterality Date   [facility] Medications:    Current Facility-Administered Medications:   •  potassium chloride SA (Kdur) tablet 20 mEq, 20 mEq, Oral, DAILY, Deejay Guzman M.D., 20 mEq at 12/30/18 1452  •  [START ON 12/31/2018] amLODIPine (NORVASC) tablet 2.5 mg, 2.5 mg, Oral, Q DAY, Michi Dietrich M.D.  •  senna-docusate (PERICOLACE or SENOKOT S) 8.6-50 MG per tablet 2 Tab, 2 Tab, Oral, BID, Stopped at 12/29/18 2215 **AND** polyethylene glycol/lytes (MIRALAX) PACKET 1 Packet, 1 Packet, Oral, QDAY PRN **AND** magnesium hydroxide (MILK OF MAGNESIA) suspension 30 mL, 30 mL, Oral, QDAY PRN **AND** bisacodyl (DULCOLAX) suppository 10 mg, 10 mg, Rectal, QDAY PRN, Celestina Wolf M.D.  •  Respiratory Care per Protocol, , Nebulization, Continuous RT, Celestina Wolf M.D.  •  heparin injection 5,000 Units, 5,000 Units, Subcutaneous, Q8HRS, Celestina Wolf M.D., 5,000 Units at 12/30/18 1452  •  ondansetron (ZOFRAN) syringe/vial injection 4 mg, 4 mg, Intravenous, Q4HRS PRN, Celestina Wolf M.D.  •  ondansetron (ZOFRAN ODT) dispertab 4 mg, 4 mg, Oral, Q4HRS PRN, Celestina Wolf M.D.  •  promethazine (PHENERGAN) tablet 12.5-25 mg, 12.5-25 mg, Oral, Q4HRS PRN, Celestina Wolf M.D.  •  promethazine (PHENERGAN) suppository 12.5-25 mg, 12.5-25 mg, Rectal, Q4HRS PRN, Celestina Wolf M.D.  •  prochlorperazine (COMPAZINE) injection 5-10 mg, 5-10 mg, Intravenous, Q4HRS PRN, Celestina Wolf M.D.  •  atorvastatin (LIPITOR) tablet 20 mg, 20 mg, Oral, Nightly, Celestina Wolf M.D., Stopped at 12/29/18 2230  •  doxazosin (CARDURA) tablet 2 mg, 2 mg, Oral, DAILY, Celestina Wolf M.D., Stopped at 12/30/18 0900  •  aspirin EC (ECOTRIN) tablet 81 mg, 81 mg, Oral, DAILY, Celestina Wolf M.D., Stopped at 12/30/18 0900  •  furosemide (LASIX) injection 20 mg, 20 mg, Intravenous, BID DIURETIC, Celestina Wolf M.D., 20 mg at 12/30/18 1323    Current Outpatient Medications:  Prescriptions Prior to Admission   Medication Sig Dispense Refill Last Dose   •  "atorvastatin (LIPITOR) 20 MG Tab Take 20 mg by mouth every evening.   12/28/2018 at PM   • doxazosin (CARDURA) 2 MG Tab Take 2 mg by mouth every day.   12/29/2018 at AM   • aspirin EC (ECOTRIN) 81 MG Tablet Delayed Response Take 81 mg by mouth every day.   12/29/2018 at AM   • enalapril (VASOTEC) 5 MG Tab Take 2.5 mg by mouth every day.   12/29/2018 at AM       Medication Allergy:  No Known Allergies    Family History:  Family History   Problem Relation Age of Onset   • Heart Disease Mother        Social History:  Social History     Social History   • Marital status: Single     Spouse name: N/A   • Number of children: N/A   • Years of education: N/A     Occupational History   • Not on file.     Social History Main Topics   • Smoking status: Former Smoker     Packs/day: 0.20     Years: 40.00     Types: Cigarettes     Quit date: 10/15/2018   • Smokeless tobacco: Never Used   • Alcohol use Yes      Comment: occ   • Drug use: Yes     Types: Inhaled      Comment: marijuana current,  meth last use 1 monht ago   • Sexual activity: Not on file     Other Topics Concern   • Not on file     Social History Narrative   • No narrative on file         Physical Exam:   Vitals/   Weight/BMI: Body mass index is 21.17 kg/m².  Blood pressure (!) 174/98, pulse (!) 109, temperature 36.9 °C (98.4 °F), temperature source Temporal, resp. rate (!) 25, height 1.702 m (5' 7\"), weight 61.3 kg (135 lb 2.3 oz), SpO2 98 %.  Vitals:    12/30/18 1818 12/30/18 1900 12/30/18 1901 12/30/18 2003   BP:       Pulse:  (!) 106 (!) 109    Resp:  (!) 31 (!) 25    Temp:       TempSrc:       SpO2: 98% 90% 91% 98%   Weight:       Height:         Oxygen Therapy:  Pulse Oximetry: 98 %, O2 (LPM): 2, O2 Delivery: Silicone Nasal Cannula    Physical Exam   Constitutional: He is oriented to person, place, and time. No distress.   Middle-aged appearing man, muscular, but in medical distress in the ICU breathing via BiPAP   HENT:   Head: Normocephalic and atraumatic. "   Eyes: Pupils are equal, round, and reactive to light. Conjunctivae and EOM are normal.   Neck: No JVD present.   Cardiovascular: Normal rate and regular rhythm.  Exam reveals no gallop and no friction rub.    Murmur heard.   Crescendo decrescendo systolic (Late peaking systolic ejection murmur with obliterated S2) murmur is present with a grade of 3/6   Pulmonary/Chest: No respiratory distress. He has wheezes. He has no rales.   Accessory muscle use noted, breathing via BiPAP   Abdominal: Soft. Bowel sounds are normal. He exhibits no distension.   Musculoskeletal: He exhibits edema.   Neurological: He is alert and oriented to person, place, and time.   Skin: Skin is warm and dry. No rash noted. He is not diaphoretic. No erythema. No pallor.   Psychiatric: He has a normal mood and affect. His behavior is normal. Judgment and thought content normal.   Nursing note and vitals reviewed.      MDM (Data Review):     Records reviewed and summarized in current documentation    Lab Data Review:  Recent Results (from the past 24 hour(s))   Influenza A/B By PCR    Collection Time: 12/29/18  8:45 PM   Result Value Ref Range    Influenza virus A RNA Negative Negative    Influenza virus B, PCR Negative Negative   CBC with Differential    Collection Time: 12/30/18  4:45 AM   Result Value Ref Range    WBC 6.6 4.8 - 10.8 K/uL    RBC 3.70 (L) 4.70 - 6.10 M/uL    Hemoglobin 10.7 (L) 14.0 - 18.0 g/dL    Hematocrit 33.5 (L) 42.0 - 52.0 %    MCV 90.5 81.4 - 97.8 fL    MCH 28.9 27.0 - 33.0 pg    MCHC 31.9 (L) 33.7 - 35.3 g/dL    RDW 46.7 35.9 - 50.0 fL    Platelet Count 347 164 - 446 K/uL    MPV 9.5 9.0 - 12.9 fL    Neutrophils-Polys 58.80 44.00 - 72.00 %    Lymphocytes 28.10 22.00 - 41.00 %    Monocytes 10.20 0.00 - 13.40 %    Eosinophils 2.10 0.00 - 6.90 %    Basophils 0.50 0.00 - 1.80 %    Immature Granulocytes 0.30 0.00 - 0.90 %    Nucleated RBC 0.00 /100 WBC    Neutrophils (Absolute) 3.88 1.82 - 7.42 K/uL    Lymphs (Absolute) 1.85  1.00 - 4.80 K/uL    Monos (Absolute) 0.67 0.00 - 0.85 K/uL    Eos (Absolute) 0.14 0.00 - 0.51 K/uL    Baso (Absolute) 0.03 0.00 - 0.12 K/uL    Immature Granulocytes (abs) 0.02 0.00 - 0.11 K/uL    NRBC (Absolute) 0.00 K/uL   Comp Metabolic Panel (CMP)    Collection Time: 12/30/18  4:45 AM   Result Value Ref Range    Sodium 140 135 - 145 mmol/L    Potassium 4.3 3.6 - 5.5 mmol/L    Chloride 107 96 - 112 mmol/L    Co2 27 20 - 33 mmol/L    Anion Gap 6.0 0.0 - 11.9    Glucose 91 65 - 99 mg/dL    Bun 19 8 - 22 mg/dL    Creatinine 0.84 0.50 - 1.40 mg/dL    Calcium 9.1 8.5 - 10.5 mg/dL    AST(SGOT) 29 12 - 45 U/L    ALT(SGPT) 39 2 - 50 U/L    Alkaline Phosphatase 61 30 - 99 U/L    Total Bilirubin 0.3 0.1 - 1.5 mg/dL    Albumin 3.0 (L) 3.2 - 4.9 g/dL    Total Protein 6.5 6.0 - 8.2 g/dL    Globulin 3.5 1.9 - 3.5 g/dL    A-G Ratio 0.9 g/dL   ESTIMATED GFR    Collection Time: 12/30/18  4:45 AM   Result Value Ref Range    GFR If African American >60 >60 mL/min/1.73 m 2    GFR If Non African American >60 >60 mL/min/1.73 m 2   TROPONIN    Collection Time: 12/30/18  3:55 PM   Result Value Ref Range    Troponin I 0.06 (H) 0.00 - 0.04 ng/mL   EC-ECHOCARDIOGRAM COMPLETE W/O CONT    Collection Time: 12/30/18  6:57 PM   Result Value Ref Range    Eject.Frac. MOD BP 49.37     Eject.Frac. MOD 4C 49.99     Eject.Frac. MOD 2C 49.07     Left Ventrical Ejection Fraction 50        Imaging/Procedures Review (all reviewed and pertinent for):      EKG (12/29/2018, tracings and report reviewed, my interpretation): Shows sinus tachycardia rate 115 bpm, left ventricular hypertrophy with repolarization abnormality    Echocardiogram, 12/30/2018, images and report reviewed, my interpretation: Demonstrates critical aortic stenosis with a mean gradient of 56 mmHg, and mild systolic dysfunction with a left ventricular ejection fraction of 50%.  RVSP was estimated at 55 mmHg.  Compared to his previous [facility] echocardiogram from 7/19/2017  aortic stenosis previously severe is now critical with worsening of left ventricular systolic function      Impression and Recommendations:       1.  Critical aortic stenosis: I counseled the patient on the poor prognosis associated with this finding if the valve is not replaced.  I discussed with him that he would have to prove abstinence from methamphetamines almost certainly prior to cardiothoracic surgery excepting his case.  He voiced understanding, and agreed that he believes he could do that.  To further work him up, I have scheduled him for cardiac catheterization tomorrow.  After cardiac catheterization, I will plan to have cardiothoracic surgery evaluate his case.    2.  Essential hypertension: I do think that he needs antihypertensive therapy in light of his elevated blood pressure though of course with his critical aortic stenosis will have to be quite judicious with this.  I stopped enalapril, and prescribed amlodipine at 2.5 mg p.o. daily.    3.  Fluid retention: Secondary to valvular heart disease, and methamphetamine use with pulmonary hypertension: Continue diuresis with IV Lasix.    Michi Dietrich MD  Cardiologist, [facility] [place]

## 2018-12-31 NOTE — PROGRESS NOTES
Cardiology Follow Up Progress Note    Date of Service  12/31/2018    Attending Physician  Deejay Guzman M.D.    Chief Complaint   AS, CHF    HPI  Sylvain Espinosa is a 63 y.o. male admitted 12/29/2018 with resp failure, pulm edema, critical AS, LV dysfunction, active meth use. Has HTN, tobacco use also. Known liver mass.    Interim Events  Tele- sinus tach, some PVCs  BP a little high at times  Last Trop 0.06, 0.06  Resp failure improved with lasix and BiPAP, but still with [place] heart class IV shortness of breath and not yet comfortable laying flat    FH/SH unchanged    Review of Systems  Review of Systems   Constitutional: Negative for chills and fever.   Gastrointestinal: Negative for nausea and vomiting.       Vital signs in last 24 hours  Temp:  [36.7 °C (98 °F)-37.1 °C (98.7 °F)] 37.1 °C (98.7 °F)  Pulse:  [] 92  Resp:  [16-31] 17    Physical Exam  Physical Exam   General: No acute distress.  Chronically ill-appearing  HEENT: EOM grossly intact, no scleral icterus, no pharyngeal erythema.  BiPAP in place  Neck:  No JVD appreciated, no bruits, trachea midline  CVS: Fast, regular, some ectopy. 4/6 late peaking syst murmur throughout. No LE edema.  2+ radial pulses, 1+ PT pulses  Resp: CT coarse breath sounds throughout, moderate increased work of breathing.  Abdomen: Soft, NT, no noris hepatomegaly.  MSK/Ext: No clubbing or cyanosis.  Skin: Warm and dry, no rashes.  Neurological: CN III-XII grossly intact. No focal deficits.   Psych: A&O x 3, appropriate affect, good judgement      Lab Review  Lab Results   Component Value Date/Time    WBC 7.6 12/31/2018 04:30 AM    RBC 4.20 (L) 12/31/2018 04:30 AM    HEMOGLOBIN 12.0 (L) 12/31/2018 04:30 AM    HEMATOCRIT 38.4 (L) 12/31/2018 04:30 AM    MCV 91.4 12/31/2018 04:30 AM    MCH 28.6 12/31/2018 04:30 AM    MCHC 31.3 (L) 12/31/2018 04:30 AM    MPV 9.6 12/31/2018 04:30 AM      Lab Results   Component Value Date/Time    SODIUM 139 12/31/2018 04:30 AM    POTASSIUM  4.7 12/31/2018 04:30 AM    CHLORIDE 103 12/31/2018 04:30 AM    CO2 31 12/31/2018 04:30 AM    GLUCOSE 97 12/31/2018 04:30 AM    BUN 24 (H) 12/31/2018 04:30 AM    CREATININE 0.92 12/31/2018 04:30 AM      Lab Results   Component Value Date/Time    ASTSGOT 29 12/30/2018 04:45 AM    ALTSGPT 39 12/30/2018 04:45 AM     Lab Results   Component Value Date/Time    CHOLSTRLTOT 129 01/15/2015 11:37 PM    LDL 74 01/15/2015 11:37 PM    HDL 30 (A) 01/15/2015 11:37 PM    TRIGLYCERIDE 124 01/15/2015 11:37 PM    TROPONINI 0.06 (H) 12/30/2018 03:55 PM       Recent Labs      12/29/18   1900   BNPBTYPENAT  796*       Cardiac Imaging and Procedures Review  EKG:  My personal interpretation of the EKG dated 12-29-18 is sinus tach 115, LVH with strain    Echocardiogram:  12-30-18  Critical AS, Vmax 5 m/s, MG 56 mmHg, EF 50% (from prior 55%), RVSP 55 mmHg, severe LAE      Imaging  Chest X-Ray:  12-29-18  Infrahilar airspace opacities bilaterally could be seen with pulmonary edema or multifocal pneumonia.    Stress Test:  1-15-15  Normal perfusion, EF 46%.    Assessment/Plan  -Critical AS, LV dysfunction  -Meth use  -Acute Diastolic CHF due to AS, [facility] 4  -Acute hypoxic resp failure  -HTN  -Mod to severe sec pulm HTN  -Tobacco use  -Anemia  -Known liver mass  -HLP      PLAN:  -Cont lasix, ASA, statin, CCB  -Eventual L/RHC, likely tomorrow  -Needs AV surgery but will need to prove he is not doing meth, otherwise he will succumb to severe AS and CHF  -Surgical consultation after heart catheterization  -High risk for arrhythmias, severe LAE, continue to monitor    DW Dr. Guzman, RN.    Thank you for allowing me to participate in the care of this patient.  I will continue to follow this patient    Please contact me with any questions.    Mitzi Lee M.D.   Cardiologist, [facility]  [phone]

## 2018-12-31 NOTE — THERAPY
Pt eval order received. Planned cardiac cath today, per cardiology likely with subsequent surgery consult. Will defer eval until definitive POC established and if indicated pt is post op.

## 2018-12-31 NOTE — ASSESSMENT & PLAN NOTE
Severe  Clean coronaries on cath 1/1/2019  CVS and cardiology following with plans for possible TAVR  Optimize blood pressure, heart rate, fluid status

## 2019-01-01 LAB
ALBUMIN SERPL BCP-MCNC: 3 G/DL (ref 3.2–4.9)
ALBUMIN/GLOB SERPL: 0.8 G/DL
ALP SERPL-CCNC: 64 U/L (ref 30–99)
ALT SERPL-CCNC: 25 U/L (ref 2–50)
ANION GAP SERPL CALC-SCNC: 4 MMOL/L (ref 0–11.9)
APTT PPP: 29.7 SEC (ref 24.7–36)
AST SERPL-CCNC: 19 U/L (ref 12–45)
BASOPHILS # BLD AUTO: 0.5 % (ref 0–1.8)
BASOPHILS # BLD: 0.03 K/UL (ref 0–0.12)
BILIRUB SERPL-MCNC: 0.4 MG/DL (ref 0.1–1.5)
BUN SERPL-MCNC: 23 MG/DL (ref 8–22)
CALCIUM SERPL-MCNC: 9.7 MG/DL (ref 8.5–10.5)
CHLORIDE SERPL-SCNC: 98 MMOL/L (ref 96–112)
CO2 SERPL-SCNC: 32 MMOL/L (ref 20–33)
CREAT SERPL-MCNC: 0.9 MG/DL (ref 0.5–1.4)
EOSINOPHIL # BLD AUTO: 0.21 K/UL (ref 0–0.51)
EOSINOPHIL NFR BLD: 3.3 % (ref 0–6.9)
ERYTHROCYTE [DISTWIDTH] IN BLOOD BY AUTOMATED COUNT: 43.6 FL (ref 35.9–50)
GLOBULIN SER CALC-MCNC: 4 G/DL (ref 1.9–3.5)
GLUCOSE SERPL-MCNC: 92 MG/DL (ref 65–99)
HCT VFR BLD AUTO: 38.5 % (ref 42–52)
HGB BLD-MCNC: 12.6 G/DL (ref 14–18)
IMM GRANULOCYTES # BLD AUTO: 0.02 K/UL (ref 0–0.11)
IMM GRANULOCYTES NFR BLD AUTO: 0.3 % (ref 0–0.9)
INR PPP: 1.03 (ref 0.87–1.13)
LYMPHOCYTES # BLD AUTO: 1.68 K/UL (ref 1–4.8)
LYMPHOCYTES NFR BLD: 26 % (ref 22–41)
MCH RBC QN AUTO: 28.8 PG (ref 27–33)
MCHC RBC AUTO-ENTMCNC: 32.7 G/DL (ref 33.7–35.3)
MCV RBC AUTO: 88.1 FL (ref 81.4–97.8)
MONOCYTES # BLD AUTO: 0.77 K/UL (ref 0–0.85)
MONOCYTES NFR BLD AUTO: 11.9 % (ref 0–13.4)
NEUTROPHILS # BLD AUTO: 3.75 K/UL (ref 1.82–7.42)
NEUTROPHILS NFR BLD: 58 % (ref 44–72)
NRBC # BLD AUTO: 0 K/UL
NRBC BLD-RTO: 0 /100 WBC
PLATELET # BLD AUTO: 318 K/UL (ref 164–446)
PMV BLD AUTO: 9.4 FL (ref 9–12.9)
POTASSIUM SERPL-SCNC: 4.6 MMOL/L (ref 3.6–5.5)
PROT SERPL-MCNC: 7 G/DL (ref 6–8.2)
PROTHROMBIN TIME: 13.7 SEC (ref 12–14.6)
RBC # BLD AUTO: 4.37 M/UL (ref 4.7–6.1)
SODIUM SERPL-SCNC: 134 MMOL/L (ref 135–145)
WBC # BLD AUTO: 6.5 K/UL (ref 4.8–10.8)

## 2019-01-01 PROCEDURE — G0278 ILIAC ART ANGIO,CARDIAC CATH: HCPCS

## 2019-01-01 PROCEDURE — B2111ZZ FLUOROSCOPY OF MULTIPLE CORONARY ARTERIES USING LOW OSMOLAR CONTRAST: ICD-10-PCS | Performed by: INTERNAL MEDICINE

## 2019-01-01 PROCEDURE — 99152 MOD SED SAME PHYS/QHP 5/>YRS: CPT

## 2019-01-01 PROCEDURE — 93567 NJX CAR CTH SPRVLV AORTGRPHY: CPT | Performed by: INTERNAL MEDICINE

## 2019-01-01 PROCEDURE — 700117 HCHG RX CONTRAST REV CODE 255: Performed by: INTERNAL MEDICINE

## 2019-01-01 PROCEDURE — 4A023N8 MEASUREMENT OF CARDIAC SAMPLING AND PRESSURE, BILATERAL, PERCUTANEOUS APPROACH: ICD-10-PCS | Performed by: INTERNAL MEDICINE

## 2019-01-01 PROCEDURE — 700102 HCHG RX REV CODE 250 W/ 637 OVERRIDE(OP): Performed by: INTERNAL MEDICINE

## 2019-01-01 PROCEDURE — C1769 GUIDE WIRE: HCPCS

## 2019-01-01 PROCEDURE — 99233 SBSQ HOSP IP/OBS HIGH 50: CPT | Performed by: HOSPITALIST

## 2019-01-01 PROCEDURE — B2151ZZ FLUOROSCOPY OF LEFT HEART USING LOW OSMOLAR CONTRAST: ICD-10-PCS | Performed by: INTERNAL MEDICINE

## 2019-01-01 PROCEDURE — B31P1ZZ FLUOROSCOPY OF THORACO-ABDOMINAL AORTA USING LOW OSMOLAR CONTRAST: ICD-10-PCS | Performed by: INTERNAL MEDICINE

## 2019-01-01 PROCEDURE — 700101 HCHG RX REV CODE 250

## 2019-01-01 PROCEDURE — 85025 COMPLETE CBC W/AUTO DIFF WBC: CPT

## 2019-01-01 PROCEDURE — 85610 PROTHROMBIN TIME: CPT

## 2019-01-01 PROCEDURE — A9270 NON-COVERED ITEM OR SERVICE: HCPCS | Performed by: INTERNAL MEDICINE

## 2019-01-01 PROCEDURE — 700105 HCHG RX REV CODE 258: Performed by: INTERNAL MEDICINE

## 2019-01-01 PROCEDURE — 307093 HCHG TR BAND RADIAL

## 2019-01-01 PROCEDURE — 99152 MOD SED SAME PHYS/QHP 5/>YRS: CPT | Performed by: INTERNAL MEDICINE

## 2019-01-01 PROCEDURE — A9270 NON-COVERED ITEM OR SERVICE: HCPCS | Performed by: HOSPITALIST

## 2019-01-01 PROCEDURE — 361014 HCHG 6FR ARROW SWAN/THERMO

## 2019-01-01 PROCEDURE — 770022 HCHG ROOM/CARE - ICU (200)

## 2019-01-01 PROCEDURE — C1894 INTRO/SHEATH, NON-LASER: HCPCS

## 2019-01-01 PROCEDURE — 93460 R&L HRT ART/VENTRICLE ANGIO: CPT

## 2019-01-01 PROCEDURE — 99233 SBSQ HOSP IP/OBS HIGH 50: CPT | Performed by: INTERNAL MEDICINE

## 2019-01-01 PROCEDURE — 75716 ARTERY X-RAYS ARMS/LEGS: CPT | Mod: 26,59 | Performed by: INTERNAL MEDICINE

## 2019-01-01 PROCEDURE — 93567 NJX CAR CTH SPRVLV AORTGRPHY: CPT

## 2019-01-01 PROCEDURE — 700102 HCHG RX REV CODE 250 W/ 637 OVERRIDE(OP): Performed by: HOSPITALIST

## 2019-01-01 PROCEDURE — 700111 HCHG RX REV CODE 636 W/ 250 OVERRIDE (IP): Performed by: HOSPITALIST

## 2019-01-01 PROCEDURE — 360979 HCHG DIAGNOSTIC CATH

## 2019-01-01 PROCEDURE — 80053 COMPREHEN METABOLIC PANEL: CPT

## 2019-01-01 PROCEDURE — 85730 THROMBOPLASTIN TIME PARTIAL: CPT

## 2019-01-01 PROCEDURE — 99153 MOD SED SAME PHYS/QHP EA: CPT

## 2019-01-01 PROCEDURE — 93460 R&L HRT ART/VENTRICLE ANGIO: CPT | Mod: 26 | Performed by: INTERNAL MEDICINE

## 2019-01-01 PROCEDURE — 700111 HCHG RX REV CODE 636 W/ 250 OVERRIDE (IP)

## 2019-01-01 RX ORDER — MIDAZOLAM HYDROCHLORIDE 1 MG/ML
INJECTION INTRAMUSCULAR; INTRAVENOUS
Status: COMPLETED
Start: 2019-01-01 | End: 2019-01-01

## 2019-01-01 RX ORDER — LIDOCAINE HYDROCHLORIDE 20 MG/ML
INJECTION, SOLUTION INFILTRATION; PERINEURAL
Status: COMPLETED
Start: 2019-01-01 | End: 2019-01-01

## 2019-01-01 RX ORDER — SODIUM CHLORIDE 9 MG/ML
INJECTION, SOLUTION INTRAVENOUS CONTINUOUS
Status: DISCONTINUED | OUTPATIENT
Start: 2019-01-01 | End: 2019-01-01

## 2019-01-01 RX ORDER — VERAPAMIL HYDROCHLORIDE 2.5 MG/ML
INJECTION, SOLUTION INTRAVENOUS
Status: COMPLETED
Start: 2019-01-01 | End: 2019-01-01

## 2019-01-01 RX ORDER — HEPARIN SODIUM,PORCINE 1000/ML
VIAL (ML) INJECTION
Status: COMPLETED
Start: 2019-01-01 | End: 2019-01-01

## 2019-01-01 RX ADMIN — FUROSEMIDE 20 MG: 10 INJECTION, SOLUTION INTRAMUSCULAR; INTRAVENOUS at 15:07

## 2019-01-01 RX ADMIN — NITROGLYCERIN 10 ML: 20 INJECTION INTRAVENOUS at 09:40

## 2019-01-01 RX ADMIN — HEPARIN SODIUM 5000 UNITS: 5000 INJECTION, SOLUTION INTRAVENOUS; SUBCUTANEOUS at 22:00

## 2019-01-01 RX ADMIN — ATORVASTATIN CALCIUM 20 MG: 20 TABLET, FILM COATED ORAL at 22:14

## 2019-01-01 RX ADMIN — FUROSEMIDE 20 MG: 10 INJECTION, SOLUTION INTRAMUSCULAR; INTRAVENOUS at 05:07

## 2019-01-01 RX ADMIN — FENTANYL CITRATE 50 MCG: 50 INJECTION, SOLUTION INTRAMUSCULAR; INTRAVENOUS at 10:17

## 2019-01-01 RX ADMIN — MIDAZOLAM HYDROCHLORIDE 2 MG: 1 INJECTION, SOLUTION INTRAMUSCULAR; INTRAVENOUS at 10:04

## 2019-01-01 RX ADMIN — IOHEXOL 150 ML: 350 INJECTION, SOLUTION INTRAVENOUS at 10:45

## 2019-01-01 RX ADMIN — DOXAZOSIN 2 MG: 2 TABLET ORAL at 05:07

## 2019-01-01 RX ADMIN — SODIUM CHLORIDE: 9 INJECTION, SOLUTION INTRAVENOUS at 08:55

## 2019-01-01 RX ADMIN — LIDOCAINE HYDROCHLORIDE: 20 INJECTION, SOLUTION INFILTRATION; PERINEURAL at 09:39

## 2019-01-01 RX ADMIN — HEPARIN SODIUM 5000 UNITS: 5000 INJECTION, SOLUTION INTRAVENOUS; SUBCUTANEOUS at 05:07

## 2019-01-01 RX ADMIN — HEPARIN SODIUM: 200 INJECTION, SOLUTION INTRAVENOUS at 09:15

## 2019-01-01 RX ADMIN — HEPARIN SODIUM 5000 UNITS: 5000 INJECTION, SOLUTION INTRAVENOUS; SUBCUTANEOUS at 15:06

## 2019-01-01 RX ADMIN — HEPARIN SODIUM: 1000 INJECTION, SOLUTION INTRAVENOUS; SUBCUTANEOUS at 09:39

## 2019-01-01 RX ADMIN — POTASSIUM CHLORIDE 20 MEQ: 1500 TABLET, EXTENDED RELEASE ORAL at 05:07

## 2019-01-01 RX ADMIN — STANDARDIZED SENNA CONCENTRATE AND DOCUSATE SODIUM 2 TABLET: 8.6; 5 TABLET, FILM COATED ORAL at 18:05

## 2019-01-01 RX ADMIN — ASPIRIN 81 MG: 81 TABLET, COATED ORAL at 05:07

## 2019-01-01 RX ADMIN — AMLODIPINE BESYLATE 2.5 MG: 5 TABLET ORAL at 05:08

## 2019-01-01 RX ADMIN — VERAPAMIL HYDROCHLORIDE 5 MG: 2.5 INJECTION, SOLUTION INTRAVENOUS at 09:40

## 2019-01-01 ASSESSMENT — ENCOUNTER SYMPTOMS
LOSS OF CONSCIOUSNESS: 0
COUGH: 1
DOUBLE VISION: 0
HALLUCINATIONS: 0
PALPITATIONS: 0
BACK PAIN: 0
DIZZINESS: 0
SHORTNESS OF BREATH: 0
VOMITING: 0
WHEEZING: 0
SPUTUM PRODUCTION: 0
HEADACHES: 0
NAUSEA: 0
DIARRHEA: 0
HEARTBURN: 0
SHORTNESS OF BREATH: 1
ORTHOPNEA: 0
CHILLS: 0
ABDOMINAL PAIN: 0
FEVER: 0

## 2019-01-01 ASSESSMENT — PAIN SCALES - GENERAL
PAINLEVEL_OUTOF10: 0

## 2019-01-01 NOTE — PROGRESS NOTES
Cardiology Follow Up Progress Note    Date of Service  1/1/2019    Attending Physician  Deejay Guzman M.D.    Chief Complaint   AS, CHF    HPI  Sylvain Espinosa is a 63 y.o. male admitted 12/29/2018 with resp failure, pulm edema, critical AS, LV dysfunction, active meth use. Has HTN, tobacco use also. Known liver mass.    Interim Events  Tele- sinus to sinus tach, some PVCs,   BP better, near controlled  Trop 0.06, 0.06  Resp failure improved with lasix and BiPAP, breathing much better, still [facility] 4 but more comfortable, able to be off BiPAP  Cr same, K 4.6  IO over 7 liters out    FH/SH unchanged    Review of Systems  Review of Systems   Constitutional: Negative for chills and fever.   Gastrointestinal: Negative for nausea and vomiting.       Vital signs in last 24 hours  Temp:  [36.2 °C (97.1 °F)-37.1 °C (98.7 °F)] 36.9 °C (98.5 °F)  Pulse:  [] 89  Resp:  [14-25] 14    Physical Exam  Physical Exam     General: No acute distress.  Chronically ill-appearing  HEENT: EOM grossly intact, no scleral icterus, no pharyngeal erythema.  BiPAP in place  Neck:  No JVD appreciated, no bruits, trachea midline  CVS: Fast, regular, some ectopy. 4/6 late peaking syst murmur throughout. No LE edema.  2+ radial pulses, 1+ PT pulses  Resp: CT coarse breath sounds throughout, moderate increased work of breathing.  Abdomen: Soft, NT, no noris hepatomegaly.  MSK/Ext: No clubbing or cyanosis.  Skin: Warm and dry, no rashes.  Neurological: CN III-XII grossly intact. No focal deficits.   Psych: A&O x 3, appropriate affect, good judgement    Physical exam performed and unchanged compared to 12/31/2018.    Lab Review  Lab Results   Component Value Date/Time    WBC 6.5 01/01/2019 05:19 AM    RBC 4.37 (L) 01/01/2019 05:19 AM    HEMOGLOBIN 12.6 (L) 01/01/2019 05:19 AM    HEMATOCRIT 38.5 (L) 01/01/2019 05:19 AM    MCV 88.1 01/01/2019 05:19 AM    MCH 28.8 01/01/2019 05:19 AM    MCHC 32.7 (L) 01/01/2019 05:19 AM    MPV 9.4 01/01/2019  05:19 AM      Lab Results   Component Value Date/Time    SODIUM 134 (L) 01/01/2019 05:19 AM    POTASSIUM 4.6 01/01/2019 05:19 AM    CHLORIDE 98 01/01/2019 05:19 AM    CO2 32 01/01/2019 05:19 AM    GLUCOSE 92 01/01/2019 05:19 AM    BUN 23 (H) 01/01/2019 05:19 AM    CREATININE 0.90 01/01/2019 05:19 AM      Lab Results   Component Value Date/Time    ASTSGOT 19 01/01/2019 05:19 AM    ALTSGPT 25 01/01/2019 05:19 AM     Lab Results   Component Value Date/Time    CHOLSTRLTOT 129 01/15/2015 11:37 PM    LDL 74 01/15/2015 11:37 PM    HDL 30 (A) 01/15/2015 11:37 PM    TRIGLYCERIDE 124 01/15/2015 11:37 PM    TROPONINI 0.06 (H) 12/30/2018 03:55 PM       Recent Labs      12/29/18   1900   BNPBTYPENAT  796*       Cardiac Imaging and Procedures Review  EKG:  My personal interpretation of the EKG dated 12-29-18 is sinus tach 115, LVH with strain    Echocardiogram:  12-30-18  Critical AS, Vmax 5 m/s, MG 56 mmHg, EF 50% (from prior 55%), RVSP 55 mmHg, severe LAE      Imaging  Chest X-Ray:  12-29-18  Infrahilar airspace opacities bilaterally could be seen with pulmonary edema or multifocal pneumonia.    Stress Test:  1-15-15  Normal perfusion, EF 46%.    Assessment/Plan  -Critical AS, LV dysfunction  -Meth use  -Acute Diastolic CHF due to AS, [facility] 4  -Acute hypoxic resp failure  -HTN  -Mod to severe sec pulm HTN  -Tobacco use  -Anemia  -Known liver mass  -HLP      PLAN:  -Cont lasix, ASA, statin, CCB  -L/RHC today  -Needs AV surgery but will need to prove he is not doing meth, otherwise he will succumb to severe AS and CHF  -Surgical consultation after heart catheterization  -High risk for arrhythmias, severe LAE, continue to monitor    DW Dr. Gilmer Carrillo and RN.    Thank you for allowing me to participate in the care of this patient.  I will continue to follow this patient    Please contact me with any questions.    Mitzi Lee M.D.   Cardiologist, [facility]  [phone] -  [phone]

## 2019-01-01 NOTE — CARE PLAN
Problem: Ventilation Defect:  Goal: Ability to achieve and maintain unassisted ventilation or tolerate decreased levels of ventilator support    Intervention: Support and monitor invasive and noninvasive mechanical ventilation  Pt on continuous Bipap throughout the day. 12/8, 30%   Tolerated well.

## 2019-01-01 NOTE — PROCEDURES
DATE OF SERVICE:  01/01/2019    REFERRING PHYSICIAN:  Mitzi Lee MD and Michi Dietrich MD    PROCEDURES:  1.  Right heart catheterization.  2.  Left heart catheterization.  3.  Coronary angiography.  4.  Left ventriculogram.  5.  Ascending aortogram.  6.  Descending aortogram.  7.  Monitor conscious sedation.    PREPROCEDURE DIAGNOSES:  1.  Acute valvular diastolic congestive heart failure.  2.  Severe aortic stenosis.    POSTPROCEDURE DIAGNOSES:  1.  Severe aortic stenosis with peak to peak gradient of 80 mmHg, mean   gradient of 61 mmHg, calculated TAYA of 0.48 cm2.  2.  No angiographic evidence of coronary artery disease.  3.  Mildly reduced left ventricular systolic function with ejection fraction   of 55%.  4.  Elevated left ventricular end diastolic pressure.  5.  Elevated right heart pressure with PA systolic pressure of 65 mmHg.  6.  Mildly dilated ascending aorta.  7.  Ectatic and diffusely atherosclerotic distal abdominal aorta with   bilateral iliofemoral system, moderate in caliber, bilateral nonobstructive   stenosis with tortuosity left greater than right.    INDICATION:  The patient is a 63-year-old male with past medical history   significant for hypertension and methamphetamine abuse.  The patient was   admitted to [facility] with acute valvular heart failure with   respiratory distress requiring BiPAP therapy.  He underwent an echocardiogram   which shows severe aortic stenosis.  He was scheduled for cardiac   catheterization.    DESCRIPTION OF PROCEDURE:  After informed consent was signed by the patient,   the patient was brought to the cardiac catheterization laboratory.  He was   prepped and draped in usual sterile manner.  The right brachial area was   anesthetized with 2% Xylocaine.  A 6-[place] sheath was inserted into the right   brachial artery using modified Seldinger technique over an existing IV.  A   6-[place]-Angeline catheter was positioned at the pulmonary artery.     Thermodilution cardiac outputs were obtained.  Right heart pressure   measurements were obtained.  The [place]-Angeline catheter was then removed.  The   right wrist area was anesthetized with 2% Xylocaine.  A 6-[place] citizen sheath was   inserted into the right radial artery using the modified Seldinger technique.    Intra-arterial verapamil and nitroglycerin were given.  IV heparin was given.    A JL4 catheter was positioned into the left main coronary artery.  Coronary   angiography was performed.  This was exchanged for a JR4 catheter, which was   positioned into the right coronary artery.  Coronary angiography was   performed.  This catheter was directed across the aortic valve with a straight   wire exchanged over an exchange length wire for a pigtail catheter, which was   positioned into the left ventricle.  Left angiography was performed.  Left   ventricular and aortic pressure gradients were measured on pigtail pullback.    Ascending aortogram was performed.  This catheter was directed into the   descending aorta and descending aortogram was performed.  The patient   tolerated the procedure well.  At the end of procedure, all catheters and   sheaths were removed.  The case was discussed with Dr. Yun.  He was then   transferred back to CICU in stable condition.    HEMODYNAMIC DATA:  Hemodynamic data shows right heart pressures of RA 20 mmHg,   RV 65/18 with RVEDP of 22 mmHg, PA 65/40 with mean of 50 mmHg.  Pulmonary   wedge 30 mmHg.  Cardiac output by thermodilution 4.6.  Cardiac index 2.69.    Left heart pressures /60 with mean of 105 mmHg and /10 with LVEDP   of 25 mmHg.    AORTIC VALVE:  Peak to peak gradient of 80 mmHg, mean gradient of 61 mmHg,   calculated TAYA 0.48 cm2.    LEFT VENTRICULAR GRAM:  A 10 mL of contrast was delivered for 3 seconds.  EF was estimated to be 55%.    ASCENDING AORTOGRAM:  A 10 mL of contrast was delivered for 3 seconds.  There   was mildly dilated ascending aorta  noted.    DESCENDING AORTOGRAM:  A 10 mL of contrast was delivered for 3 seconds.  There   was diffuse ectasis and atherosclerosis noted of the distal aorta.  Bilateral   iliofemoral system are moderate in caliber with diffuse atherosclerosis and   tortuosity, left greater than right.    ANGIOGRAM:  LEFT MAIN CORONARY ARTERY:  Left main coronary artery is a long large caliber   vessel free of disease.    LEFT ANTERIOR DESCENDING ARTERY:  Left anterior descending artery is a long   large caliber vessel, which wraps around the apex.  There are moderate caliber   bifurcating diagonal branches noted.  LAD and its branches are free of   disease.    LEFT CIRCUMFLEX ARTERY:  Left circumflex artery is a nondominant   moderate-caliber vessel with large bifurcating first obtuse marginal branch   and small distal obtuse marginal branches.  Left circumflex artery and its   branches are free of disease.    RIGHT CORONARY ARTERY:  Right coronary artery is a dominant moderate-caliber   vessel with moderate length, moderate caliber posterior descending artery.    Right coronary artery and its branches are free of disease.    IMPRESSION:  1.  Severe aortic stenosis with peak to peak gradient of 80 mmHg, mean   gradient of 61 mmHg, calculated TAYA of 0.48 cm2.  2.  No angiographic evidence of coronary artery disease.  3.  Mildly reduced left ventricular systolic function with ejection fraction   of 55%.  4.  Elevated left ventricular end diastolic pressure.  5.  Elevated right heart pressure with PA systolic pressure of 65 mmHg.  6.  Mildly dilated ascending aorta.  7.  Ectatic and diffusely atherosclerotic distal abdominal aorta with   bilateral iliofemoral system, moderate in caliber, bilateral nonobstructive   stenosis with tortuosity left greater than right.    RECOMMENDATIONS:  Recommend surgical aortic valve replacement with Dr. Yun.    SEDATION: The patient's sedation was managed by myself with continuous   face to face  time with the patient for 15 minutes from 09:20 to 09:35.       ____________________________________     MD UCHE SMILEY / CARA    DD:  01/01/2019 10:31:22  DT:  01/01/2019 11:01:59    D#:  [id number]  Job#:  [id number]

## 2019-01-01 NOTE — PROGRESS NOTES
Hospital Medicine Daily Progress Note    Date of Service  1/1/2019    Chief Complaint  63 y.o. male admitted 12/29/2018 with SOB    Hospital Course        Mr Espinosa has a history of hypertension, hyperlipidemia, and substance abuse including methamphetamine.  Patient presented to the emergency room on 12/29/2018 with shortness of breath.  Patient did endorse symptoms of orthopnea and also lower extremity edema.  He was started on BiPAP in the emergency room, admitted to the ICU, diuresis was initiated.    Cath on 1/1 showing critical AS relatively clean coronaries       Interval Problem Update  Pt states he's much less SOB today.  Breathing more easily, feels less SOB when up and walking.  cont's to have some cough but also less  UOP 4 litres/24hrs  Cath clean vessels, severe AS  SBP 120s  Tolerating po  Off NIPPV x 24hrs      Consultants/Specialty  Pulmonology  cardiology    Code Status  full    Disposition  Cont in ICU  Probable aortic valve replacement pending CT surgery consult    Review of Systems  Review of Systems   Constitutional: Negative for chills and fever.   Respiratory: Positive for cough and shortness of breath.    Cardiovascular: Negative for chest pain.   Gastrointestinal: Negative for abdominal pain, diarrhea, nausea and vomiting.   Musculoskeletal: Negative for back pain.   Skin: Negative for rash.   Neurological: Negative for dizziness, loss of consciousness and headaches.        Physical Exam  Temp:  [36.2 °C (97.1 °F)-37.1 °C (98.7 °F)] 37.1 °C (98.7 °F)  Pulse:  [] 93  Resp:  [14-25] 17    Physical Exam   Constitutional: He is oriented to person, place, and time. He appears well-developed and well-nourished. No distress.   HENT:   Head: Normocephalic and atraumatic.   Eyes: Conjunctivae are normal.   Neck: No JVD present.   Cardiovascular: Normal rate.  Exam reveals no gallop.    Murmur heard.  Pulmonary/Chest: Effort normal. No stridor. No respiratory distress. He has no wheezes. He  has rales.   Abdominal: Soft. There is no tenderness. There is no rebound and no guarding.   Musculoskeletal: He exhibits no edema.   Neurological: He is oriented to person, place, and time.   Skin: Skin is warm and dry. No rash noted. He is not diaphoretic.   Psychiatric: He has a normal mood and affect. Thought content normal.   Nursing note and vitals reviewed.      Fluids    Intake/Output Summary (Last 24 hours) at 01/01/19 0502  Last data filed at 12/31/18 2100   Gross per 24 hour   Intake              740 ml   Output             3325 ml   Net            -2585 ml       Laboratory  Recent Labs      12/29/18   1900  12/30/18   0445  12/31/18   0430   WBC  8.0  6.6  7.6   RBC  4.18*  3.70*  4.20*   HEMOGLOBIN  12.2*  10.7*  12.0*   HEMATOCRIT  37.6*  33.5*  38.4*   MCV  90.0  90.5  91.4   MCH  29.2  28.9  28.6   MCHC  32.4*  31.9*  31.3*   RDW  46.0  46.7  46.8   PLATELETCT  280  347  368   MPV  10.2  9.5  9.6     Recent Labs      12/29/18   1900  12/30/18   0445  12/31/18   0430   SODIUM  139  140  139   POTASSIUM  5.2  4.3  4.7   CHLORIDE  107  107  103   CO2  27  27  31   GLUCOSE  91  91  97   BUN  23*  19  24*   CREATININE  0.90  0.84  0.92   CALCIUM  9.8  9.1  9.4     Recent Labs      12/29/18   1900   APTT  27.4   INR  1.08     Recent Labs      12/29/18   1900   BNPBTYPENAT  796*           Imaging        Assessment/Plan  * Acute congestive heart failure (HCC)- (present on admission)   Assessment & Plan    EF 50%  Lasix 20mg BID, monitor I/O's, daily weights  Continuous hemodynamic monitoring    Echo 12/30/18:  Moderate concentric left ventricular hypertrophy.  Left ventricular systolic function is low normal.  Severe aortic stenosis.  Transvalvular gradients are - Peak: 98 mmHg, Mean: 56 mmHg.  Estimated right ventricular systolic pressure  is 55 mmHg.  Compared to the images of the study done on 7/19/17, previous severe   aortic stenosis is now critical aortic stenosis and previously normal   left  ventricular ejection fraction is now mildly reduced.    CT surgery consulted, look forward to their evaluation and recomendations  Cautious fluid management     Nonrheumatic aortic valve stenosis- (present on admission)   Assessment & Plan    Echocardiogram demonstrates progression of his aortic stenosis to critical  CT surgery consulted     Acute respiratory failure with hypoxia (HCC)- (present on admission)   Assessment & Plan    Severe, requiring BiPAP  Now off NIPPV  Has responded well to diuresis  Cont lasix however cautiously as may not tolerate hypovolemia  Follow daily I&Os, UOP, BMP     Pulmonary edema cardiac cause (HCC)- (present on admission)   Assessment & Plan    Due to CHF, aortic stenosis, and substance abuse  Improved with diuresis     Methamphetamine abuse (HCC)- (present on admission)   Assessment & Plan    Cessation recommended     Elevated troponin- (present on admission)   Assessment & Plan    Likely demand ischemia  He is currently chest pain free     HTN (hypertension)- (present on admission)   Assessment & Plan    Norvasc     Anemia- (present on admission)   Assessment & Plan    Morning labs reviewed  12/31: HGB 12  No need for transfusion     Liver mass, right lobe- (present on admission)   Assessment & Plan    Described on scan 3/8/18  Needs outpatient follow-up     Tobacco abuse- (present on admission)   Assessment & Plan    Cessation recommended          VTE prophylaxis:

## 2019-01-01 NOTE — CARE PLAN
Problem: Safety  Goal: Will remain free from injury    Intervention: Provide assistance with mobility  Bed alarm on.      Problem: Respiratory:  Goal: Respiratory status will improve    Intervention: Assess and monitor pulmonary status  Pt now on 2 liters NC and tolerating it well.

## 2019-01-01 NOTE — PROGRESS NOTES
Critical Care Progress Note    Date of admission  12/29/2018    Chief Complaint  63 y.o. male admitted 12/29/2018 with pulmonary edema secondary to CHF.  The patient has history of high blood pressure, also he has reported history of congenital heart disease, constricted left ventricle, tobacco and meth abuse.    Hospital Course    Patient was admitted to the cardiac ICU overnight.  He was given Lasix with good urine output.  He was treated with BiPAP to help with work of breathing overnight.  Patient responded to treatment well.      Interval Problem Update  Reviewed last 24 hour events:   - off BiPAP now   - clean coronary vessels on cath today, [facility] consulted   - SBP 120s   - Na down to 134    review of Systems  Review of Systems   Constitutional: Negative for chills and malaise/fatigue.   HENT: Negative for congestion.    Eyes: Negative for double vision.   Respiratory: Positive for cough. Negative for sputum production, shortness of breath and wheezing.    Cardiovascular: Negative for chest pain, palpitations and orthopnea.   Gastrointestinal: Negative for heartburn and vomiting.   Genitourinary: Negative for frequency.   Musculoskeletal: Negative for joint pain.   Skin: Negative for itching.   Neurological: Negative for dizziness and headaches.   Endo/Heme/Allergies: Negative for environmental allergies.   Psychiatric/Behavioral: Negative for hallucinations.   All other systems reviewed and are negative.       Vital Signs for last 24 hours   Temp:  [36.2 °C (97.1 °F)-37.1 °C (98.7 °F)] 36.7 °C (98 °F)  Pulse:  [] 86  Resp:  [14-23] 18   Blood pressure 119/68    Hemodynamic parameters for last 24 hours       Respiratory   SaO2 % on 2 lpm n/c    Physical Exam   Physical Exam   Constitutional: He is oriented to person, place, and time. He appears well-developed and well-nourished. No distress.   Resting comfortably in bed post cardiac catheterization   HENT:   Head: Normocephalic and atraumatic.    Right Ear: External ear normal.   Left Ear: External ear normal.   Mouth/Throat: No oropharyngeal exudate.   Eyes: Pupils are equal, round, and reactive to light. Conjunctivae and EOM are normal. Right eye exhibits no discharge. Left eye exhibits no discharge.   Neck: Neck supple. No JVD present. No thyromegaly present.   Cardiovascular: Normal rate, regular rhythm, intact distal pulses and normal pulses.   No extrasystoles are present. PMI is displaced.  Exam reveals no distant heart sounds and no friction rub.    Murmur heard.   Systolic murmur is present with a grade of 5/6   Right radial artery with bandage in place, no hematoma   Pulmonary/Chest: No accessory muscle usage. No tachypnea. No respiratory distress. He has no decreased breath sounds. He has no wheezes. He has no rhonchi. He has rales in the right lower field and the left lower field.   Abdominal: Soft. Bowel sounds are normal. There is no tenderness. There is no rebound.   Musculoskeletal: He exhibits no edema or deformity.   Mild clubbing   Neurological: He is alert and oriented to person, place, and time. No cranial nerve deficit. Coordination normal.   Skin: Skin is warm and dry. He is not diaphoretic. No erythema.   Psychiatric: He has a normal mood and affect. His behavior is normal. Judgment and thought content normal.   Nursing note and vitals reviewed.     Medications  Current Facility-Administered Medications   Medication Dose Route Frequency Provider Last Rate Last Dose   • lidocaine (XYLOCAINE) 1 % injection 0.5 mL  0.5 mL Intradermal Once PRN Mitzi Lee M.D.       • NS infusion   Intravenous Continuous Mitzi Lee M.D. 50 mL/hr at 01/01/19 0855     • potassium chloride SA (Kdur) tablet 20 mEq  20 mEq Oral DAILY Deejay Guzman M.D.   20 mEq at 01/01/19 0507   • amLODIPine (NORVASC) tablet 2.5 mg  2.5 mg Oral Q DAY Michi Dietrich M.D.   2.5 mg at 01/01/19 0508   • senna-docusate (PERICOLACE or SENOKOT S) 8.6-50 MG per  tablet 2 Tab  2 Tab Oral BID Celestina Wolf M.D.   2 Tab at 12/31/18 1754    And   • polyethylene glycol/lytes (MIRALAX) PACKET 1 Packet  1 Packet Oral QDAY PRN Celestina Wolf M.D.        And   • magnesium hydroxide (MILK OF MAGNESIA) suspension 30 mL  30 mL Oral QDAY PRN Celestina Wolf M.D.        And   • bisacodyl (DULCOLAX) suppository 10 mg  10 mg Rectal QDAY PRN Celestina Wolf M.D.       • Respiratory Care per Protocol   Nebulization Continuous RT Celestina Wolf M.D.       • heparin injection 5,000 Units  5,000 Units Subcutaneous Q8HRS Celestina Wolf M.D.   5,000 Units at 01/01/19 0507   • ondansetron (ZOFRAN) syringe/vial injection 4 mg  4 mg Intravenous Q4HRS PRN Celestina Wolf M.D.       • ondansetron (ZOFRAN ODT) dispertab 4 mg  4 mg Oral Q4HRS PRN Celestina Wolf M.D.       • promethazine (PHENERGAN) tablet 12.5-25 mg  12.5-25 mg Oral Q4HRS PRN Celestina Wolf M.D.       • promethazine (PHENERGAN) suppository 12.5-25 mg  12.5-25 mg Rectal Q4HRS PRN Celestina Wolf M.D.       • prochlorperazine (COMPAZINE) injection 5-10 mg  5-10 mg Intravenous Q4HRS PRN Celestina Wolf M.D.       • atorvastatin (LIPITOR) tablet 20 mg  20 mg Oral Nightly Celestina Wolf M.D.   20 mg at 12/31/18 2100   • doxazosin (CARDURA) tablet 2 mg  2 mg Oral DAILY Celestina Wolf M.D.   2 mg at 01/01/19 0507   • aspirin EC (ECOTRIN) tablet 81 mg  81 mg Oral DAILY Celestina Wolf M.D.   81 mg at 01/01/19 0507   • furosemide (LASIX) injection 20 mg  20 mg Intravenous BID DIURETIC Celestina Wolf M.D.   20 mg at 01/01/19 0507       Fluids    Intake/Output Summary (Last 24 hours) at 01/01/19 0901  Last data filed at 01/01/19 0800   Gross per 24 hour   Intake              740 ml   Output             4250 ml   Net            -3510 ml       Laboratory      Recent Labs      12/29/18   1900  12/30/18   1555   TROPONINI  0.06*  0.06*   BNPBTYPENAT  796*   --      Recent Labs      12/30/18   0445  12/31/18    0430 01/01/19   0519   SODIUM  140  139  134*   POTASSIUM  4.3  4.7  4.6   CHLORIDE  107  103  98   CO2  27  31  32   BUN  19  24*  23*   CREATININE  0.84  0.92  0.90   MAGNESIUM   --   1.9   --    CALCIUM  9.1  9.4  9.7     Recent Labs      12/29/18 1900 12/30/18 0445 12/31/18 0430 01/01/19 0519   ALTSGPT  47  39   --   25   ASTSGOT  52*  29   --   19   ALKPHOSPHAT  75  61   --   64   TBILIRUBIN  0.3  0.3   --   0.4   LIPASE  99*   --    --    --    GLUCOSE  91  91  97  92     Recent Labs      12/29/18 1900 12/30/18 0445 12/31/18 0430 01/01/19 0519   WBC  8.0  6.6  7.6  6.5   NEUTSPOLYS  66.10  58.80  62.10  58.00   LYMPHOCYTES  22.70  28.10  23.90  26.00   MONOCYTES  8.70  10.20  10.20  11.90   EOSINOPHILS  2.10  2.10  3.00  3.30   BASOPHILS  0.20  0.50  0.50  0.50   ASTSGOT  52*  29   --   19   ALTSGPT  47  39   --   25   ALKPHOSPHAT  75  61   --   64   TBILIRUBIN  0.3  0.3   --   0.4     Recent Labs      12/29/18 1900 12/30/18 0445 12/31/18 0430 01/01/19 0519   RBC  4.18*  3.70*  4.20*  4.37*   HEMOGLOBIN  12.2*  10.7*  12.0*  12.6*   HEMATOCRIT  37.6*  33.5*  38.4*  38.5*   PLATELETCT  280  347  368  318   PROTHROMBTM  14.1   --    --   13.7   APTT  27.4   --    --   29.7   INR  1.08   --    --   1.03       Imaging  X-Ray:  No film today and 1/1/2019  Echo:   Reviewed CONCLUSIONS  Moderate concentric left ventricular hypertrophy.  Left ventricular systolic function is low normal.  Severe aortic stenosis.  Transvalvular gradients are - Peak: 98 mmHg, Mean: 56 mmHg.  Estimated right ventricular systolic pressure  is 55 mmHg.  Compared to the images of the study done on 7/19/17, previous severe   aortic stenosis is now critical aortic stenosis and previously normal   left ventricular ejection fraction is now mildly reduced.    Assessment/Plan  * Acute congestive heart failure (HCC)- (present on admission)   Assessment & Plan    Decompensated   Continue diuresis  Strict blood  pressure control  Beta-blocker, ACE inhibitor?       Nonrheumatic aortic valve stenosis- (present on admission)   Assessment & Plan    Severe  Clean coronaries on cath 1/1/2019  Cardiothoracic surgery consultation for possible valve surgery  Optimize blood pressure, heart rate, fluid status     Acute respiratory failure with hypoxia (HCC)- (present on admission)   Assessment & Plan    Improving  Discontinue BiPAP  Continue same dose of forced diuretic  RT/O2 protocols  Limit sedatives         Pulmonary edema cardiac cause (HCC)- (present on admission)   Assessment & Plan    Improving  Continue forced diuresis     Methamphetamine abuse (HCC)- (present on admission)   Assessment & Plan    Drug cessation education provided     Elevated troponin- (present on admission)   Assessment & Plan    Likely secondary to demand ischemia  Aspirin     HTN (hypertension)- (present on admission)   Assessment & Plan    Improving  Continue amlodipine, Cardura, Lasix     Hypomagnesemia   Assessment & Plan    Repleted, monitor     Anemia- (present on admission)   Assessment & Plan    Daily CBC  Conservative transfusion strategy (hemoglobin greater than 7)     Hyponatremia- (present on admission)   Assessment & Plan    Monitor with diuresis     Liver mass, right lobe- (present on admission)   Assessment & Plan    Will need follow up as outpatient  Likely hemangioma     Tobacco abuse- (present on admission)   Assessment & Plan    Nicotine replacement protocol  Tobacco cessation education       FULL CODE    VTE:  Heparin  Ulcer: Not Indicated  Lines: None    I have performed a physical exam and reviewed and updated ROS and Plan today (1/1/2019). In review of yesterday's note (12/31/2018), there are no changes except as documented above.     Discussed patient condition and risk of morbidity and/or mortality with RN, RT, Pharmacy, Charge nurse / hot rounds, Patient, cardiology and Dr. Barth

## 2019-01-01 NOTE — PROGRESS NOTES
Pt back up from cath lab. Right radial site is C/D/I with +pulse. Pt is awake, taking and denying pain. Bed alarm on and call light is with in reach.

## 2019-01-02 ENCOUNTER — PATIENT OUTREACH (OUTPATIENT)
Dept: HEALTH INFORMATION MANAGEMENT | Facility: OTHER | Age: 64
End: 2019-01-02

## 2019-01-02 LAB
ANION GAP SERPL CALC-SCNC: 4 MMOL/L (ref 0–11.9)
BASOPHILS # BLD AUTO: 0.4 % (ref 0–1.8)
BASOPHILS # BLD: 0.03 K/UL (ref 0–0.12)
BUN SERPL-MCNC: 32 MG/DL (ref 8–22)
CALCIUM SERPL-MCNC: 9.6 MG/DL (ref 8.5–10.5)
CHLORIDE SERPL-SCNC: 98 MMOL/L (ref 96–112)
CO2 SERPL-SCNC: 32 MMOL/L (ref 20–33)
CREAT SERPL-MCNC: 1.02 MG/DL (ref 0.5–1.4)
EOSINOPHIL # BLD AUTO: 0.29 K/UL (ref 0–0.51)
EOSINOPHIL NFR BLD: 4.2 % (ref 0–6.9)
ERYTHROCYTE [DISTWIDTH] IN BLOOD BY AUTOMATED COUNT: 43.9 FL (ref 35.9–50)
GLUCOSE SERPL-MCNC: 96 MG/DL (ref 65–99)
HCT VFR BLD AUTO: 41.6 % (ref 42–52)
HGB BLD-MCNC: 13.5 G/DL (ref 14–18)
IMM GRANULOCYTES # BLD AUTO: 0.02 K/UL (ref 0–0.11)
IMM GRANULOCYTES NFR BLD AUTO: 0.3 % (ref 0–0.9)
LYMPHOCYTES # BLD AUTO: 1.71 K/UL (ref 1–4.8)
LYMPHOCYTES NFR BLD: 25 % (ref 22–41)
MCH RBC QN AUTO: 28.7 PG (ref 27–33)
MCHC RBC AUTO-ENTMCNC: 32.5 G/DL (ref 33.7–35.3)
MCV RBC AUTO: 88.3 FL (ref 81.4–97.8)
MONOCYTES # BLD AUTO: 0.89 K/UL (ref 0–0.85)
MONOCYTES NFR BLD AUTO: 13 % (ref 0–13.4)
NEUTROPHILS # BLD AUTO: 3.91 K/UL (ref 1.82–7.42)
NEUTROPHILS NFR BLD: 57.1 % (ref 44–72)
NRBC # BLD AUTO: 0 K/UL
NRBC BLD-RTO: 0 /100 WBC
PLATELET # BLD AUTO: 356 K/UL (ref 164–446)
PMV BLD AUTO: 9.4 FL (ref 9–12.9)
POTASSIUM SERPL-SCNC: 4.6 MMOL/L (ref 3.6–5.5)
RBC # BLD AUTO: 4.71 M/UL (ref 4.7–6.1)
SODIUM SERPL-SCNC: 134 MMOL/L (ref 135–145)
WBC # BLD AUTO: 6.9 K/UL (ref 4.8–10.8)

## 2019-01-02 PROCEDURE — A9270 NON-COVERED ITEM OR SERVICE: HCPCS | Performed by: HOSPITALIST

## 2019-01-02 PROCEDURE — 99232 SBSQ HOSP IP/OBS MODERATE 35: CPT | Performed by: HOSPITALIST

## 2019-01-02 PROCEDURE — 99233 SBSQ HOSP IP/OBS HIGH 50: CPT | Performed by: INTERNAL MEDICINE

## 2019-01-02 PROCEDURE — 700102 HCHG RX REV CODE 250 W/ 637 OVERRIDE(OP): Performed by: INTERNAL MEDICINE

## 2019-01-02 PROCEDURE — A9270 NON-COVERED ITEM OR SERVICE: HCPCS | Performed by: INTERNAL MEDICINE

## 2019-01-02 PROCEDURE — 85025 COMPLETE CBC W/AUTO DIFF WBC: CPT

## 2019-01-02 PROCEDURE — 700111 HCHG RX REV CODE 636 W/ 250 OVERRIDE (IP): Performed by: HOSPITALIST

## 2019-01-02 PROCEDURE — 700102 HCHG RX REV CODE 250 W/ 637 OVERRIDE(OP): Performed by: HOSPITALIST

## 2019-01-02 PROCEDURE — 80048 BASIC METABOLIC PNL TOTAL CA: CPT

## 2019-01-02 PROCEDURE — 770020 HCHG ROOM/CARE - TELE (206)

## 2019-01-02 RX ADMIN — POLYETHYLENE GLYCOL 3350 1 PACKET: 17 POWDER, FOR SOLUTION ORAL at 05:53

## 2019-01-02 RX ADMIN — ASPIRIN 81 MG: 81 TABLET, COATED ORAL at 05:53

## 2019-01-02 RX ADMIN — HEPARIN SODIUM 5000 UNITS: 5000 INJECTION, SOLUTION INTRAVENOUS; SUBCUTANEOUS at 20:52

## 2019-01-02 RX ADMIN — ATORVASTATIN CALCIUM 20 MG: 20 TABLET, FILM COATED ORAL at 20:53

## 2019-01-02 RX ADMIN — POTASSIUM CHLORIDE 20 MEQ: 1500 TABLET, EXTENDED RELEASE ORAL at 05:54

## 2019-01-02 RX ADMIN — HEPARIN SODIUM 5000 UNITS: 5000 INJECTION, SOLUTION INTRAVENOUS; SUBCUTANEOUS at 05:54

## 2019-01-02 RX ADMIN — FUROSEMIDE 20 MG: 10 INJECTION, SOLUTION INTRAMUSCULAR; INTRAVENOUS at 15:48

## 2019-01-02 RX ADMIN — FUROSEMIDE 20 MG: 10 INJECTION, SOLUTION INTRAMUSCULAR; INTRAVENOUS at 05:54

## 2019-01-02 RX ADMIN — DOXAZOSIN 2 MG: 2 TABLET ORAL at 05:54

## 2019-01-02 RX ADMIN — AMLODIPINE BESYLATE 2.5 MG: 5 TABLET ORAL at 05:54

## 2019-01-02 RX ADMIN — HEPARIN SODIUM 5000 UNITS: 5000 INJECTION, SOLUTION INTRAVENOUS; SUBCUTANEOUS at 13:51

## 2019-01-02 RX ADMIN — STANDARDIZED SENNA CONCENTRATE AND DOCUSATE SODIUM 2 TABLET: 8.6; 5 TABLET, FILM COATED ORAL at 05:53

## 2019-01-02 ASSESSMENT — PAIN SCALES - GENERAL
PAINLEVEL_OUTOF10: 0

## 2019-01-02 ASSESSMENT — ENCOUNTER SYMPTOMS
INSOMNIA: 0
NECK PAIN: 0
SHORTNESS OF BREATH: 0
POLYDIPSIA: 0
ABDOMINAL PAIN: 0
FEVER: 0
SPUTUM PRODUCTION: 0
PALPITATIONS: 0
CONSTIPATION: 0
CHILLS: 0
WHEEZING: 0
DIARRHEA: 0
COUGH: 1
DOUBLE VISION: 0
SHORTNESS OF BREATH: 1
HEADACHES: 0
NAUSEA: 0
VOMITING: 0
TREMORS: 0
DIZZINESS: 0
LOSS OF CONSCIOUSNESS: 0
BACK PAIN: 0

## 2019-01-02 NOTE — PROGRESS NOTES
Cardiology Follow Up Progress Note    Date of Service  1/2/2019    Attending Physician  Deejay Guzman M.D.    Chief Complaint   AS, CHF    HPI  Sylvain Espinosa is a 63 y.o. male admitted 12/29/2018 with resp failure, pulm edema, critical AS, LV dysfunction, active meth use. Has HTN, tobacco use also. Known liver mass.  Required BiPAP for 2 days.    Interim Events  Tele- sinus to sinus tach, , some PVCs  BP controlled  Trop 0.06, 0.06  Off BiPap  Shortness of breath significantly better.  No chest pain.  LHC with no sign CAD, critical AS  Cr 1.02 after cath, K 4.6  IO over 9 liters out  LDL 74, HDL 30  Had a long conversation about methamphetamine use    FH/SH unchanged    Review of Systems  Review of Systems   Constitutional: Negative for chills and fever.   Gastrointestinal: Negative for nausea and vomiting.       Vital signs in last 24 hours  Temp:  [36 °C (96.8 °F)-36.9 °C (98.4 °F)] 36.9 °C (98.4 °F)  Pulse:  [] 86  Resp:  [11-25] 18    Physical Exam  Physical Exam     General: No acute distress.  Chronically ill-appearing, on nasal cannula  HEENT: EOM grossly intact, no scleral icterus, no pharyngeal erythema.    Neck:  No JVD appreciated, no bruits, trachea midline  CVS: F regular rate and rhythm. 4/6 late peaking syst murmur throughout. No LE edema.  2+ radial pulses, 1+ PT pulses  Resp: CT coarse breath sounds throughout, mild increased work of breathing.  Abdomen: Soft, NT, no noris hepatomegaly.  MSK/Ext: No clubbing or cyanosis.  Skin: Warm and dry, no rashes.  Neurological: CN III-XII grossly intact. No focal deficits.   Psych: A&O x 3, appropriate affect, good judgement      Lab Review  Lab Results   Component Value Date/Time    WBC 6.9 01/02/2019 04:55 AM    RBC 4.71 01/02/2019 04:55 AM    HEMOGLOBIN 13.5 (L) 01/02/2019 04:55 AM    HEMATOCRIT 41.6 (L) 01/02/2019 04:55 AM    MCV 88.3 01/02/2019 04:55 AM    MCH 28.7 01/02/2019 04:55 AM    MCHC 32.5 (L) 01/02/2019 04:55 AM    MPV 9.4  01/02/2019 04:55 AM      Lab Results   Component Value Date/Time    SODIUM 134 (L) 01/02/2019 04:55 AM    POTASSIUM 4.6 01/02/2019 04:55 AM    CHLORIDE 98 01/02/2019 04:55 AM    CO2 32 01/02/2019 04:55 AM    GLUCOSE 96 01/02/2019 04:55 AM    BUN 32 (H) 01/02/2019 04:55 AM    CREATININE 1.02 01/02/2019 04:55 AM      Lab Results   Component Value Date/Time    ASTSGOT 19 01/01/2019 05:19 AM    ALTSGPT 25 01/01/2019 05:19 AM     Lab Results   Component Value Date/Time    CHOLSTRLTOT 129 01/15/2015 11:37 PM    LDL 74 01/15/2015 11:37 PM    HDL 30 (A) 01/15/2015 11:37 PM    TRIGLYCERIDE 124 01/15/2015 11:37 PM    TROPONINI 0.06 (H) 12/30/2018 03:55 PM             Cardiac Imaging and Procedures Review  EKG:  My personal interpretation of the EKG dated 12-29-18 is sinus tach 115, LVH with strain    Echocardiogram:  12-30-18  Critical AS, Vmax 5 m/s, MG 56 mmHg, EF 50% (from prior 55%), RVSP 55 mmHg, severe LAE    [facility] 1-1-18  POSTPROCEDURE DIAGNOSES:  1.  Severe aortic stenosis with peak to peak gradient of 80 mmHg, mean   gradient of 61 mmHg, calculated TAYA of 0.48 cm2.  2.  No angiographic evidence of coronary artery disease.  3.  Mildly reduced left ventricular systolic function with ejection fraction   of 55%.  4.  Elevated left ventricular end diastolic pressure.  5.  Elevated right heart pressure with PA systolic pressure of 65 mmHg.  6.  Mildly dilated ascending aorta.  7.  Ectatic and diffusely atherosclerotic distal abdominal aorta with   bilateral iliofemoral system, moderate in caliber, bilateral nonobstructive   stenosis with tortuosity left greater than right.    Imaging  Chest X-Ray:  12-29-18  Infrahilar airspace opacities bilaterally could be seen with pulmonary edema or multifocal pneumonia.    Stress Test:  1-15-15  Normal perfusion, EF 46%.    Assessment/Plan  -Critical AS, LV dysfunction  -Meth use  -Acute Diastolic CHF due to AS, Psychiatric 4  -Acute hypoxic resp failure  -HTN  -Mod to severe sec pulm  HTN  -Tobacco use  -Anemia  -Known liver mass  -HLP by hx, LDL 74, HDL 30      PLAN:  -Cont lasix, ASA, statin, CCB, eventual BB (can wait until after surgery)  -CT surgery  TAVR.  -Needs to be ready to abstain from meth use, we discussed this today and he has social support from his sister and mother who lives in [place] so he may go home with them.  -High risk for arrhythmias, severe LAE, continue to monitor      DW Dr. Gilmer Carrillo, Dr. Gonda, Dr. Yun and RN.    Thank you for allowing me to participate in the care of this patient.  I will continue to follow this patient    Please contact me with any questions.    Mitzi Lee M.D.   Cardiologist, [facility]  [phone]

## 2019-01-02 NOTE — PROGRESS NOTES
Critical Care Progress Note    Date of admission  12/29/2018    Chief Complaint  63 y.o. male admitted 12/29/2018 with pulmonary edema secondary to CHF.  The patient has history of high blood pressure, also he has reported history of congenital heart disease, constricted left ventricle, tobacco and meth abuse.    Hospital Course    Patient was admitted to the cardiac ICU overnight.  He was given Lasix with good urine output.  He was treated with BiPAP to help with work of breathing overnight.  Patient responded to treatment well.      Interval Problem Update  Reviewed last 24 hour events:   -No overnight events, remained off BiPAP   -Evaluated by CT surgery who recommends TAVR    review of Systems  Review of Systems   Constitutional: Negative for malaise/fatigue.   HENT: Negative for tinnitus.    Eyes: Negative for double vision.   Respiratory: Positive for cough. Negative for sputum production, shortness of breath and wheezing.    Cardiovascular: Negative for chest pain, palpitations and leg swelling.   Gastrointestinal: Negative for constipation.   Genitourinary: Negative for frequency.   Musculoskeletal: Negative for neck pain.   Skin: Negative for rash.   Neurological: Negative for tremors.   Endo/Heme/Allergies: Negative for polydipsia.   Psychiatric/Behavioral: The patient does not have insomnia.    All other systems reviewed and are negative.       Vital Signs for last 24 hours   Temp:  [36 °C (96.8 °F)-36.9 °C (98.4 °F)] 36.9 °C (98.4 °F)  Pulse:  [] 86  Resp:  [11-25] 18   Blood pressure 108-149/62-74    Hemodynamic parameters for last 24 hours       Respiratory   SaO2 % on 2 lpm n/c    Physical Exam   Physical Exam   Constitutional: He is oriented to person, place, and time. He appears well-developed and well-nourished. No distress.   Open chair today, good energy and mood   HENT:   Head: Normocephalic and atraumatic.   Left Ear: External ear normal.   Nose: Nose normal.   Mouth/Throat:  Oropharynx is clear and moist.   Eyes: Pupils are equal, round, and reactive to light. Conjunctivae are normal. No scleral icterus.   Neck: Neck supple. No tracheal deviation present.   Cardiovascular: Normal rate, regular rhythm, intact distal pulses and normal pulses.   No extrasystoles are present. PMI is displaced.  Exam reveals no distant heart sounds and no friction rub.    Murmur heard.   Systolic murmur is present with a grade of 5/6   Pulmonary/Chest: No accessory muscle usage. No tachypnea. No respiratory distress. He has no decreased breath sounds. He has no wheezes. He has no rhonchi. He has no rales.   Abdominal: Soft. Bowel sounds are normal. He exhibits no distension. There is no tenderness. There is no rebound.   Musculoskeletal: He exhibits no edema or tenderness.   Lymphadenopathy:     He has no cervical adenopathy.   Neurological: He is alert and oriented to person, place, and time. No cranial nerve deficit. He exhibits normal muscle tone.   Skin: Skin is warm and dry. No rash noted.   Psychiatric: He has a normal mood and affect. His behavior is normal. Judgment normal.   Nursing note and vitals reviewed.     Medications  Current Facility-Administered Medications   Medication Dose Route Frequency Provider Last Rate Last Dose   • potassium chloride SA (Kdur) tablet 20 mEq  20 mEq Oral DAILY Deejay Guzman M.D.   20 mEq at 01/02/19 0554   • amLODIPine (NORVASC) tablet 2.5 mg  2.5 mg Oral Q DAY Michi Dietrich M.D.   2.5 mg at 01/02/19 0554   • senna-docusate (PERICOLACE or SENOKOT S) 8.6-50 MG per tablet 2 Tab  2 Tab Oral BID Celestina Wolf M.D.   2 Tab at 01/02/19 0553    And   • polyethylene glycol/lytes (MIRALAX) PACKET 1 Packet  1 Packet Oral QDAY PRN Celestina Wolf M.D.   1 Packet at 01/02/19 0553    And   • magnesium hydroxide (MILK OF MAGNESIA) suspension 30 mL  30 mL Oral QDAY PRN Celestina Wolf M.D.        And   • bisacodyl (DULCOLAX) suppository 10 mg  10 mg Rectal QDAY PRN  Celestina Wolf M.D.       • Respiratory Care per Protocol   Nebulization Continuous RT Celestina Wolf M.D.       • heparin injection 5,000 Units  5,000 Units Subcutaneous Q8HRS Celestina Wolf M.D.   5,000 Units at 01/02/19 0554   • ondansetron (ZOFRAN) syringe/vial injection 4 mg  4 mg Intravenous Q4HRS PRN Celestina Wolf M.D.       • ondansetron (ZOFRAN ODT) dispertab 4 mg  4 mg Oral Q4HRS PRN Celestina Wolf M.D.       • promethazine (PHENERGAN) tablet 12.5-25 mg  12.5-25 mg Oral Q4HRS PRN Celestina Wolf M.D.       • promethazine (PHENERGAN) suppository 12.5-25 mg  12.5-25 mg Rectal Q4HRS PRN Celestina Wolf M.D.       • prochlorperazine (COMPAZINE) injection 5-10 mg  5-10 mg Intravenous Q4HRS PRN Celestina Wolf M.D.       • atorvastatin (LIPITOR) tablet 20 mg  20 mg Oral Nightly Celestina Wolf M.D.   20 mg at 01/01/19 2214   • doxazosin (CARDURA) tablet 2 mg  2 mg Oral DAILY Celestina Wolf M.D.   2 mg at 01/02/19 0554   • aspirin EC (ECOTRIN) tablet 81 mg  81 mg Oral DAILY Celestina Wolf M.D.   81 mg at 01/02/19 0553   • furosemide (LASIX) injection 20 mg  20 mg Intravenous BID DIURETIC Celestina Wolf M.D.   20 mg at 01/02/19 0554       Fluids    Intake/Output Summary (Last 24 hours) at 01/02/19 0729  Last data filed at 01/02/19 0643   Gross per 24 hour   Intake              650 ml   Output             2650 ml   Net            -2000 ml       Laboratory      Recent Labs      12/30/18   1555   TROPONINI  0.06*     Recent Labs      12/31/18   0430  01/01/19   0519  01/02/19   0455   SODIUM  139  134*  134*   POTASSIUM  4.7  4.6  4.6   CHLORIDE  103  98  98   CO2  31  32  32   BUN  24*  23*  32*   CREATININE  0.92  0.90  1.02   MAGNESIUM  1.9   --    --    CALCIUM  9.4  9.7  9.6     Recent Labs      12/31/18   0430  01/01/19   0519  01/02/19   0455   ALTSGPT   --   25   --    ASTSGOT   --   19   --    ALKPHOSPHAT   --   64   --    TBILIRUBIN   --   0.4   --    GLUCOSE  97  92  96      Recent Labs      12/31/18   0430  01/01/19   0519  01/02/19   0455   WBC  7.6  6.5  6.9   NEUTSPOLYS  62.10  58.00  57.10   LYMPHOCYTES  23.90  26.00  25.00   MONOCYTES  10.20  11.90  13.00   EOSINOPHILS  3.00  3.30  4.20   BASOPHILS  0.50  0.50  0.40   ASTSGOT   --   19   --    ALTSGPT   --   25   --    ALKPHOSPHAT   --   64   --    TBILIRUBIN   --   0.4   --      Recent Labs      12/31/18   0430  01/01/19   0519  01/02/19   0455   RBC  4.20*  4.37*  4.71   HEMOGLOBIN  12.0*  12.6*  13.5*   HEMATOCRIT  38.4*  38.5*  41.6*   PLATELETCT  368  318  356   PROTHROMBTM   --   13.7   --    APTT   --   29.7   --    INR   --   1.03   --        Imaging  X-Ray:  No film today and 1/2/2019  Echo:   Reviewed CONCLUSIONS  Moderate concentric left ventricular hypertrophy.  Left ventricular systolic function is low normal.  Severe aortic stenosis.  Transvalvular gradients are - Peak: 98 mmHg, Mean: 56 mmHg.  Estimated right ventricular systolic pressure  is 55 mmHg.  Compared to the images of the study done on 7/19/17, previous severe   aortic stenosis is now critical aortic stenosis and previously normal   left ventricular ejection fraction is now mildly reduced.    Assessment/Plan  * Acute congestive heart failure (HCC)- (present on admission)   Assessment & Plan    Decompensated -improving  Continue diuresis  Strict blood pressure control         Nonrheumatic aortic valve stenosis- (present on admission)   Assessment & Plan    Severe  Clean coronaries on cath 1/1/2019  CVS and cardiology following with plans for possible TAVR  Optimize blood pressure, heart rate, fluid status     Acute respiratory failure with hypoxia (HCC)- (present on admission)   Assessment & Plan    Improving  Continue Lasix  RT/O2 protocols  Limit sedatives  Mobilize/ambulate, encourage incentive spirometry         Pulmonary edema cardiac cause (HCC)- (present on admission)   Assessment & Plan    Improving  Continue forced diuresis     Methamphetamine  abuse (HCC)- (present on admission)   Assessment & Plan    Drug cessation education provided     Elevated troponin- (present on admission)   Assessment & Plan    Likely secondary to demand ischemia  Aspirin     HTN (hypertension)- (present on admission)   Assessment & Plan    Improving  Continue amlodipine, Cardura, Lasix     Hypomagnesemia   Assessment & Plan    Repleted, monitor     Anemia- (present on admission)   Assessment & Plan    Daily CBC  Conservative transfusion strategy (hemoglobin greater than 7)     Hyponatremia- (present on admission)   Assessment & Plan    Unchanged, monitor with diuresis     Liver mass, right lobe- (present on admission)   Assessment & Plan    Will need follow up as outpatient  Likely hemangioma     Tobacco abuse- (present on admission)   Assessment & Plan    Nicotine replacement protocol  Tobacco cessation education       FULL CODE    VTE:  Heparin  Ulcer: Not Indicated  Lines: None    I have performed a physical exam and reviewed and updated ROS and Plan today (1/2/2019). In review of yesterday's note (1/1/2019), there are no changes except as documented above.     Discussed patient condition and risk of morbidity and/or mortality with Hospitalist, RN, RT, Pharmacy, Charge nurse / hot rounds, Patient and cardiology and CVS

## 2019-01-02 NOTE — CARE PLAN
Problem: Safety  Goal: Will remain free from falls    Intervention: Assess risk factors for falls  Chair and bed alarms on.      Problem: Pain Management  Goal: Pain level will decrease to patient's comfort goal    Intervention: Follow pain managment plan developed in collaboration with patient and Interdisciplinary Team  Pt montiored for s/s of pain.

## 2019-01-02 NOTE — PROGRESS NOTES
Hospital Medicine Daily Progress Note    Date of Service  1/2/2019    Chief Complaint  63 y.o. male admitted 12/29/2018 with SOB    Hospital Course        Mr Espinosa has a history of hypertension, hyperlipidemia, and substance abuse including methamphetamine.  Patient presented to the emergency room on 12/29/2018 with shortness of breath.  Patient did endorse symptoms of orthopnea and also lower extremity edema.  He was started on BiPAP in the emergency room, admitted to the ICU, diuresis was initiated.    Cath on 1/1 showing critical AS relatively clean coronaries       Interval Problem Update  Pt states he's much less SOB today.  Breathing more easily, feels less SOB when up and walking.  cont's to have some cough but also less  UOP 4 litres/24hrs  Cath clean vessels, severe AS  SBP 120s  Tolerating po  Off NIPPV x 24hrs      Consultants/Specialty  Pulmonology  cardiology    Code Status  full    Disposition  Cont in ICU  Probable aortic valve replacement pending CT surgery consult    Review of Systems  Review of Systems   Constitutional: Negative for chills and fever.   Respiratory: Positive for cough and shortness of breath.    Cardiovascular: Negative for chest pain and leg swelling.   Gastrointestinal: Negative for abdominal pain, diarrhea, nausea and vomiting.   Musculoskeletal: Negative for back pain.   Skin: Negative for rash.   Neurological: Negative for dizziness, loss of consciousness and headaches.        Physical Exam  Temp:  [36 °C (96.8 °F)-36.8 °C (98.3 °F)] 36.8 °C (98.3 °F)  Pulse:  [] 84  Resp:  [13-25] 20    Physical Exam   Constitutional: He is oriented to person, place, and time. He appears well-developed and well-nourished. No distress.   HENT:   Head: Normocephalic and atraumatic.   Eyes: Conjunctivae are normal. No scleral icterus.   Neck: Neck supple. No JVD present.   Cardiovascular: Normal rate.  Exam reveals no gallop.    Murmur heard.  Pulmonary/Chest: Effort normal. No stridor.  No respiratory distress. He has no wheezes. He has rales.   Abdominal: Soft. There is no tenderness. There is no rebound and no guarding.   Musculoskeletal: He exhibits no edema.   Neurological: He is oriented to person, place, and time.   Skin: Skin is warm and dry. No rash noted. He is not diaphoretic.   Psychiatric: He has a normal mood and affect. Thought content normal.   Nursing note and vitals reviewed.      Fluids    Intake/Output Summary (Last 24 hours) at 01/02/19 0424  Last data filed at 01/01/19 2200   Gross per 24 hour   Intake              400 ml   Output             2525 ml   Net            -2125 ml       Laboratory  Recent Labs      12/30/18 0445 12/31/18   0430  01/01/19   0519   WBC  6.6  7.6  6.5   RBC  3.70*  4.20*  4.37*   HEMOGLOBIN  10.7*  12.0*  12.6*   HEMATOCRIT  33.5*  38.4*  38.5*   MCV  90.5  91.4  88.1   MCH  28.9  28.6  28.8   MCHC  31.9*  31.3*  32.7*   RDW  46.7  46.8  43.6   PLATELETCT  347  368  318   MPV  9.5  9.6  9.4     Recent Labs      12/30/18 0445 12/31/18   0430  01/01/19   0519   SODIUM  140  139  134*   POTASSIUM  4.3  4.7  4.6   CHLORIDE  107  103  98   CO2  27  31  32   GLUCOSE  91  97  92   BUN  19  24*  23*   CREATININE  0.84  0.92  0.90   CALCIUM  9.1  9.4  9.7     Recent Labs      01/01/19 0519   APTT  29.7   INR  1.03               Imaging        Assessment/Plan  * Acute congestive heart failure (HCC)- (present on admission)   Assessment & Plan    EF 50%  Lasix 20mg BID, monitor I/O's, daily weights  Continuous hemodynamic monitoring    Echo 12/30/18:  Moderate concentric left ventricular hypertrophy.  Left ventricular systolic function is low normal.  Severe aortic stenosis.  Transvalvular gradients are - Peak: 98 mmHg, Mean: 56 mmHg.  Estimated right ventricular systolic pressure  is 55 mmHg.  Compared to the images of the study done on 7/19/17, previous severe   aortic stenosis is now critical aortic stenosis and previously normal   left ventricular  ejection fraction is now mildly reduced.    CT surgery consulted probable TAVR     Nonrheumatic aortic valve stenosis- (present on admission)   Assessment & Plan    Echocardiogram demonstrates progression of his aortic stenosis to critical  CT surgery consulted     Acute respiratory failure with hypoxia (HCC)- (present on admission)   Assessment & Plan    Severe, requiring BiPAP  Now off NIPPV  Has responded well to diuresis  Cont lasix however cautiously as may not tolerate hypovolemia  Follow daily I&Os, UOP, BMP     Pulmonary edema cardiac cause (HCC)- (present on admission)   Assessment & Plan    Due to CHF, aortic stenosis, and substance abuse  Improved with diuresis     Methamphetamine abuse (HCC)- (present on admission)   Assessment & Plan    Cessation recommended     Elevated troponin- (present on admission)   Assessment & Plan    Likely demand ischemia  He is currently chest pain free     HTN (hypertension)- (present on admission)   Assessment & Plan    Norvasc     Anemia- (present on admission)   Assessment & Plan    Morning labs reviewed  12/31: HGB 12  No need for transfusion     Liver mass, right lobe- (present on admission)   Assessment & Plan    Described on scan 3/8/18  Needs outpatient follow-up     Tobacco abuse- (present on admission)   Assessment & Plan    Cessation recommended          VTE prophylaxis:

## 2019-01-02 NOTE — HEART FAILURE PROGRAM
.Cardiovascular Nurse Navigator () Advanced Heart Failure Program Inpatient Consult Note:     Hospital Admit Date: 12/29/18. Active methamphetamine use, Medicaid coverage. Listed address is the [facility].    Angiogram yesterday revealed clean coronaries. EF of 55% with severe AS, pending CTS evaluation.     GD Secondary Prevention interventions:    · Pneumococcal vaccine: needs to be given or refused, HF is a high risk condition.  · Influenza vaccine: needs to be given or refused  · AC for AF?: n/a      BEDSIDE NURSING Daily Weights and Intake and Output ordered    · Every HF patient should have daily weights and I's and O's  · Please weigh patient daily on a standing scale if not clinically contraindicated.   · While doing this, teach patient to write weight down on the Symptom Tracker in the HF book - and ask them what they would do with that weight at home (ie: call for 3# weight gain). This is an excellent opportunity for impactful, in the moment teaching.  Providers rely on your complete documentation of weights and I's and O's to decide whether or not our treatment is working and whether or not a HF patient is ready to discharge!    If pt does not own a working scale and cannot afford to purchase one, please provide one. Scales can be found in the Care Coordination Rooms on T-7&T-8.    [facility] Plan Notes:   none  Therapy Notes:   deferred  Advanced Care Planning:  No AD on file. Full code status at the time of this note's filing.    The ACC recommends engaging palliative care as part of optimization of HFrEF treatment to solicit goals of care and focus on quality of life throughout the clinical course of HF.    Once all diagnostics are in, please consider an order for palliative to discuss Advanced Care Planning.      Speaking with patients frankly about their end-of-life wishes is one of the most important things a palliative care team can do. This is especially important in the context  "of heart failure, since it’s such an unpredictable disease.    Follow up appointment:   If discharged from acute care to home (exception hospice discharge), pt must have an appointment scheduled within 7 days of discharge (Cardiology, PCP, or DC Clinic).    If discharged to Transitional Care Facility (LTAC, SNF, IRH), appointment should be made about a month out for after TCF.     Bedside Nursing Education:  Please provide HF booklet and repeated, ongoing education while administering medications, weighing patient, discussing management of symptoms, diet and need to follow up and act on changes.    Bedside Nursing Discharge:  When completing the after visit summary (discharge instructions) please select \"Cardiac Diagnosis, and Heart Failure\" in the special instructions section to populate the heart failure specific discharge instructions.     Referrals/Orders Placed:    Hospital Schedulers for HF f/u?  yes  Social Work   yes  Registered Dietician  yes  [facility] CP Program for patients with Medicaid, [facility], or retirement Plus coverage?  Eligible - will refer when clinical course becomes more clear  Outpatient Care Coordination for patients with Senior Care Plus coverage and with 3 or more admissions within a year?  no    Patricia SANCHES RN, [facility] ext. [phone] M-F  ADDENDUM 1/3/19    BLAKE saw patient yesterday, he may be able to stay with his mother and sister in [place] if he has heart surgery.   BLAKE is working with Markus with Medicaid HPN who will see the patient this week at bedside and who can support him with housing if he qualifies and CM in the community.   No CTS note as of the time of this note's filing.  "

## 2019-01-02 NOTE — CARE PLAN
Problem: Bowel/Gastric:  Goal: Normal bowel function is maintained or improved  Outcome: PROGRESSING AS EXPECTED  Pt had regular formed BM this morning.    Problem: Knowledge Deficit  Goal: Knowledge of disease process/condition, treatment plan, diagnostic tests, and medications will improve  Outcome: PROGRESSING AS EXPECTED  Cardiologist came to bedside and discussed possible TAVR and surgical options for aortic valve replacement.
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
## 2019-01-03 ENCOUNTER — TELEPHONE (OUTPATIENT)
Dept: CARDIOLOGY | Facility: MEDICAL CENTER | Age: 64
End: 2019-01-03

## 2019-01-03 ENCOUNTER — APPOINTMENT (OUTPATIENT)
Dept: RADIOLOGY | Facility: MEDICAL CENTER | Age: 64
DRG: 917 | End: 2019-01-03
Attending: NURSE PRACTITIONER
Payer: MEDICAID

## 2019-01-03 DIAGNOSIS — I35.9 AORTIC VALVE DISORDER: ICD-10-CM

## 2019-01-03 PROBLEM — E87.1 HYPONATREMIA: Status: RESOLVED | Noted: 2018-03-09 | Resolved: 2019-01-03

## 2019-01-03 PROBLEM — I50.31 ACUTE DIASTOLIC CONGESTIVE HEART FAILURE (HCC): Status: ACTIVE | Noted: 2018-12-29

## 2019-01-03 LAB
ANION GAP SERPL CALC-SCNC: 4 MMOL/L (ref 0–11.9)
BASOPHILS # BLD AUTO: 0.6 % (ref 0–1.8)
BASOPHILS # BLD: 0.03 K/UL (ref 0–0.12)
BUN SERPL-MCNC: 31 MG/DL (ref 8–22)
CALCIUM SERPL-MCNC: 9.5 MG/DL (ref 8.5–10.5)
CHLORIDE SERPL-SCNC: 96 MMOL/L (ref 96–112)
CO2 SERPL-SCNC: 35 MMOL/L (ref 20–33)
CREAT SERPL-MCNC: 1.08 MG/DL (ref 0.5–1.4)
EOSINOPHIL # BLD AUTO: 0.24 K/UL (ref 0–0.51)
EOSINOPHIL NFR BLD: 4.7 % (ref 0–6.9)
ERYTHROCYTE [DISTWIDTH] IN BLOOD BY AUTOMATED COUNT: 43.1 FL (ref 35.9–50)
GLUCOSE SERPL-MCNC: 90 MG/DL (ref 65–99)
HCT VFR BLD AUTO: 41.1 % (ref 42–52)
HGB BLD-MCNC: 13.5 G/DL (ref 14–18)
IMM GRANULOCYTES # BLD AUTO: 0.01 K/UL (ref 0–0.11)
IMM GRANULOCYTES NFR BLD AUTO: 0.2 % (ref 0–0.9)
LYMPHOCYTES # BLD AUTO: 1.99 K/UL (ref 1–4.8)
LYMPHOCYTES NFR BLD: 39.3 % (ref 22–41)
MCH RBC QN AUTO: 28.8 PG (ref 27–33)
MCHC RBC AUTO-ENTMCNC: 32.8 G/DL (ref 33.7–35.3)
MCV RBC AUTO: 87.8 FL (ref 81.4–97.8)
MONOCYTES # BLD AUTO: 0.78 K/UL (ref 0–0.85)
MONOCYTES NFR BLD AUTO: 15.4 % (ref 0–13.4)
NEUTROPHILS # BLD AUTO: 2.02 K/UL (ref 1.82–7.42)
NEUTROPHILS NFR BLD: 39.8 % (ref 44–72)
NRBC # BLD AUTO: 0 K/UL
NRBC BLD-RTO: 0 /100 WBC
PLATELET # BLD AUTO: 356 K/UL (ref 164–446)
PMV BLD AUTO: 9.8 FL (ref 9–12.9)
POTASSIUM SERPL-SCNC: 4.4 MMOL/L (ref 3.6–5.5)
RBC # BLD AUTO: 4.68 M/UL (ref 4.7–6.1)
SODIUM SERPL-SCNC: 135 MMOL/L (ref 135–145)
WBC # BLD AUTO: 5.1 K/UL (ref 4.8–10.8)

## 2019-01-03 PROCEDURE — 99232 SBSQ HOSP IP/OBS MODERATE 35: CPT | Performed by: HOSPITALIST

## 2019-01-03 PROCEDURE — 770020 HCHG ROOM/CARE - TELE (206)

## 2019-01-03 PROCEDURE — A9270 NON-COVERED ITEM OR SERVICE: HCPCS | Performed by: HOSPITALIST

## 2019-01-03 PROCEDURE — 85025 COMPLETE CBC W/AUTO DIFF WBC: CPT

## 2019-01-03 PROCEDURE — 700102 HCHG RX REV CODE 250 W/ 637 OVERRIDE(OP): Performed by: INTERNAL MEDICINE

## 2019-01-03 PROCEDURE — 93880 EXTRACRANIAL BILAT STUDY: CPT | Mod: 26 | Performed by: SURGERY

## 2019-01-03 PROCEDURE — 700102 HCHG RX REV CODE 250 W/ 637 OVERRIDE(OP): Performed by: HOSPITALIST

## 2019-01-03 PROCEDURE — 99233 SBSQ HOSP IP/OBS HIGH 50: CPT | Performed by: INTERNAL MEDICINE

## 2019-01-03 PROCEDURE — A9270 NON-COVERED ITEM OR SERVICE: HCPCS | Performed by: INTERNAL MEDICINE

## 2019-01-03 PROCEDURE — 80048 BASIC METABOLIC PNL TOTAL CA: CPT

## 2019-01-03 PROCEDURE — 93880 EXTRACRANIAL BILAT STUDY: CPT

## 2019-01-03 PROCEDURE — 36415 COLL VENOUS BLD VENIPUNCTURE: CPT

## 2019-01-03 PROCEDURE — 700111 HCHG RX REV CODE 636 W/ 250 OVERRIDE (IP): Performed by: NURSE PRACTITIONER

## 2019-01-03 PROCEDURE — 700111 HCHG RX REV CODE 636 W/ 250 OVERRIDE (IP): Performed by: HOSPITALIST

## 2019-01-03 RX ORDER — FUROSEMIDE 20 MG/1
20 TABLET ORAL
Status: DISCONTINUED | OUTPATIENT
Start: 2019-01-04 | End: 2019-01-05 | Stop reason: HOSPADM

## 2019-01-03 RX ADMIN — ASPIRIN 81 MG: 81 TABLET, COATED ORAL at 05:50

## 2019-01-03 RX ADMIN — ATORVASTATIN CALCIUM 20 MG: 20 TABLET, FILM COATED ORAL at 21:58

## 2019-01-03 RX ADMIN — FUROSEMIDE 20 MG: 10 INJECTION, SOLUTION INTRAMUSCULAR; INTRAVENOUS at 16:56

## 2019-01-03 RX ADMIN — HEPARIN SODIUM 5000 UNITS: 5000 INJECTION, SOLUTION INTRAVENOUS; SUBCUTANEOUS at 21:58

## 2019-01-03 RX ADMIN — FUROSEMIDE 20 MG: 10 INJECTION, SOLUTION INTRAMUSCULAR; INTRAVENOUS at 05:51

## 2019-01-03 RX ADMIN — POTASSIUM CHLORIDE 20 MEQ: 1500 TABLET, EXTENDED RELEASE ORAL at 05:50

## 2019-01-03 RX ADMIN — HEPARIN SODIUM 5000 UNITS: 5000 INJECTION, SOLUTION INTRAVENOUS; SUBCUTANEOUS at 05:50

## 2019-01-03 RX ADMIN — AMLODIPINE BESYLATE 2.5 MG: 5 TABLET ORAL at 05:50

## 2019-01-03 RX ADMIN — HEPARIN SODIUM 5000 UNITS: 5000 INJECTION, SOLUTION INTRAVENOUS; SUBCUTANEOUS at 14:23

## 2019-01-03 RX ADMIN — STANDARDIZED SENNA CONCENTRATE AND DOCUSATE SODIUM 2 TABLET: 8.6; 5 TABLET, FILM COATED ORAL at 16:56

## 2019-01-03 RX ADMIN — DOXAZOSIN 2 MG: 2 TABLET ORAL at 05:51

## 2019-01-03 ASSESSMENT — LIFESTYLE VARIABLES: SUBSTANCE_ABUSE: 1

## 2019-01-03 ASSESSMENT — PAIN SCALES - GENERAL
PAINLEVEL_OUTOF10: 0

## 2019-01-03 ASSESSMENT — ENCOUNTER SYMPTOMS
FATIGUE: 0
SHORTNESS OF BREATH: 0
STRIDOR: 0
DIAPHORESIS: 0
DIZZINESS: 0
PALPITATIONS: 0
FALLS: 0
CHILLS: 0
LOSS OF CONSCIOUSNESS: 0
NAUSEA: 0
ABDOMINAL PAIN: 0
WEAKNESS: 0
WHEEZING: 0
CHEST TIGHTNESS: 0
HEADACHES: 0
COUGH: 0
VOMITING: 0
FEVER: 0
BACK PAIN: 0
BLOOD IN STOOL: 0
LIGHT-HEADEDNESS: 0
COUGH: 1
SHORTNESS OF BREATH: 1
DIARRHEA: 0
COLOR CHANGE: 0

## 2019-01-03 NOTE — PROGRESS NOTES
Report received, pt care assumed, tele box on. VSS, pt assessment complete. Pt resting quietly in bed. aaox4, no signs of distress noted at this time. POC discussed with pt and verbalizes no questions. Pt denies chest pain, pain or sob at this time. Pt denies any additional needs at this time. Bed in lowest position, bed alarm on, pt educated on fall risk and verbalized understanding, call light within reach, will continue to monitor.

## 2019-01-03 NOTE — CARE PLAN
Problem: Communication  Goal: The ability to communicate needs accurately and effectively will improve  Outcome: PROGRESSING AS EXPECTED  Patient educated to utilize call light. Patient and family oriented to hospital room. Patient encouraged to ask questions about plan of care. Patient effectively uses call light and is involved in POC.    Problem: Safety  Goal: Will remain free from injury  Outcome: PROGRESSING AS EXPECTED  Patient's risk for injury and falls assessed. Appropriate safety precautions in place. Patient educated to utilize call light for needs. Patient verbalizes understanding.

## 2019-01-03 NOTE — PROGRESS NOTES
Received patient to room via w/c.  Assisted to bed and placed on tele monitor.   Alert and oriented.  No distress noted.  O2 1.5 L NC  Call bell and personal items in reach.

## 2019-01-03 NOTE — PROGRESS NOTES
Pt updated on POC, pt verbalizes understanding, no questions. No changes noted in status. Pt resting comfortably in bed, denies any additional needs, call light within reach, will continue to monitor.

## 2019-01-03 NOTE — CONSULTS
DATE OF SERVICE:  01/02/2019    REFERRING PHYSICIAN:  Dr. Lee.    REASON FOR CONSULTATION:  Consideration for aortic valve replacement.    CHIEF COMPLAINT:  Shortness of breath.    HISTORY OF PRESENT ILLNESS:  The patient is a 63-year-old male who presented   on 12/29/2018 complaining of worsening shortness of breath.  This has become   noticeably worsened in the last 3 months.  He denies any chest pain, nausea,   vomiting, diaphoresis, fever, or syncope.  Echocardiography on 12/30/2018   showed severe aortic stenosis with peak and mean transvalvular gradient of 98   and 66 mmHg, respectively.  His V-max was 4.94 m/sec.  There was trace mitral   regurgitation.  His left ventricular ejection fraction was estimated to be   50%.  Cardiac catheterization on 01/01/2019 did not show any angiographic   evidence of coronary artery disease.  His left ventricular ejection fraction   was mildly reduced at 55%.  There was severe aortic stenosis with a peak and   mean transvalvular gradient of 80 and 61 mmHg, respectively.  His aortic valve   area was calculated at 0.48 cm2.    PAST MEDICAL HISTORY:  Significant for hypertension, dyslipidemia, and benign   prostatic hypertrophy.    ALLERGIES:  No known drug allergies.    MEDICATIONS:  Tylenol, aspirin, Lipitor, Cardura, Vasotec, Motrin, and Zofran.    FAMILY HISTORY:  Negative for premature coronary artery disease.    PSYCHOSOCIAL HISTORY:  He reports smoking cessation about 2 months ago.  He is   an active methamphetamine abuser and also uses marijuana.    REVIEW OF SYSTEMS:  NEUROLOGIC:  There is no history of stroke or syncope.  RESPIRATORY:  Shortness of breath.  CARDIAC:  As per history of present illness.  All the rest of the systems were reviewed with the patient and were negative.    PHYSICAL EXAMINATION:  GENERAL:  Well-developed, well-nourished 63-year-old male in no acute   distress.  He is alert and oriented x3.  VITAL SIGNS:  Blood pressure is 112/71, heart  rate 81 and regular,   respirations 18, height 5 feet 7.5 inches, and weight 128 pounds.  NECK:  Supple, without jugular venous distention.  There is no cervical   lymphadenopathy.  HEENT:  Normocephalic, atraumatic.  Extraocular muscles are intact.  His nasal   and oral mucosae are pink and moist.  SKIN:  Normal.  RESPIRATORY:  Lungs are clear to auscultation bilaterally except with some   scattered wheezes.  There are no rales.  CARDIAC:  Regular rate and rhythm with a III/VI systolic ejection murmur.  ABDOMEN:  Soft, nondistended, nontender with bowel sounds present.  There is   no hepatosplenomegaly and no palpable masses.  EXTREMITIES:  There is no clubbing or cyanosis.  There is 1+ peripheral edema.  VASCULAR:  There are good peripheral pulses bilaterally.  MUSCULOSKELETAL:  Good range of motion.  NEUROLOGIC:  Grossly intact.    IMPRESSION:  Severe aortic stenosis (calcific or degenerative), acute on   chronic left ventricular systolic and diastolic failure, congestive heart   failure, valvular heart disease (NYHA class III), and methamphetamine abuse.    PLAN:  The patient is an intermediate risk candidate for surgical aortic valve   replacement.  His STS mortality risk score is 2.5% and his morbidity and   mortality risk score is 11%.  However, his true surgical risk is probably   in the 3-5% range especially considering his active methamphetamine abuse.  I   therefore feel that he is a better candidate for transcatheter aortic valve   replacement and I would recommend proceeding with workup.  The risks,   benefits, potential complications, and alternative treatments were discussed   with the patient in detail including his risks should he decide not to undergo   my recommended treatment.  The patient understands these risks and is willing   to proceed.  Findings and recommendations were discussed with Dr. Mitzi Lee and Dr. Dariel Moser.    Thank you for this challenging consultation and participation  in the patient's   care.       ____________________________________     MD KWABENA BALLESTEROS / CARA    DD:  01/03/2019 11:23:01  DT:  01/03/2019 12:24:04    D#:  [id number]  Job#:  [id number]
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
## 2019-01-03 NOTE — PROGRESS NOTES
Received Bedside report. Assumed care at 1900. This pt is AOx4, voiding appropriately, no s/s of pain. Patient and RN discussed plan of care: questions answered. Labs noted, assessment complete, patient tolerating cardiac diet. Tele box in place. Pt is on 1.5L of O2 via NC. Call light in place, fall precautions in place, patient educated on importance of calling for assistance. No additional needs at this time. VSS

## 2019-01-03 NOTE — PROGRESS NOTES
[facility] Medicine Daily Progress Note    Date of Service  1/3/2019    Chief Complaint  63 y.o. male admitted 12/29/2018 with SOB    Hospital Course        Mr Espinosa has a history of hypertension, hyperlipidemia, and substance abuse including methamphetamine.  Patient presented to the emergency room on 12/29/2018 with shortness of breath.  Patient did endorse symptoms of orthopnea and also lower extremity edema.  He was started on BiPAP in the emergency room, admitted to the ICU, diuresis was initiated.    Cath on 1/1 showing critical AS relatively clean coronaries     Interval Problem Update  States that he is doing well today, still with some shortness of breath but greatly improved.  No longer requiring BiPAP and was transferred out of the ICU.  Reiterates that he is motivated to stop his drug abuse.    Consultants/Specialty  Pulmonology  cardiology    Code Status  full    Disposition  Home pending cardiology clearance, will need outpatient follow-up at [facility] clinic    Review of Systems  Review of Systems   Constitutional: Negative for chills, fever and malaise/fatigue.   HENT: Negative for congestion.    Respiratory: Positive for cough and shortness of breath.    Cardiovascular: Negative for chest pain and leg swelling.   Gastrointestinal: Negative for abdominal pain, blood in stool, diarrhea, nausea and vomiting.   Genitourinary: Negative for dysuria.   Musculoskeletal: Negative for back pain and falls.   Skin: Negative for rash.   Neurological: Negative for dizziness, loss of consciousness, weakness and headaches.   Psychiatric/Behavioral: Positive for substance abuse.        Physical Exam  Temp:  [36.3 °C (97.4 °F)-37.1 °C (98.7 °F)] 36.5 °C (97.7 °F)  Pulse:  [] 85  Resp:  [16-23] 16  BP: (112-129)/(61-71) 116/71    Physical Exam   Constitutional: He is oriented to person, place, and time. He appears well-developed. No distress.   Thin, muscular   HENT:   Head: Normocephalic.   Mouth/Throat: Oropharynx  is clear and moist.   Eyes: EOM are normal. No scleral icterus.   Neck: Normal range of motion. Neck supple. No JVD present.   Cardiovascular: Normal rate.  Exam reveals no gallop.    Murmur (Systolic) heard.  Pulmonary/Chest: Effort normal. No stridor. No respiratory distress. He has no wheezes. He has rales (Improving).   Abdominal: Soft. He exhibits no distension. There is no tenderness. There is no rebound.   Musculoskeletal: He exhibits no edema.   Neurological: He is alert and oriented to person, place, and time.   Skin: Skin is warm and dry. No rash noted. He is not diaphoretic.   Psychiatric: He has a normal mood and affect. His behavior is normal.   Nursing note and vitals reviewed.      Fluids    Intake/Output Summary (Last 24 hours) at 01/03/19 0651  Last data filed at 01/03/19 0600   Gross per 24 hour   Intake              225 ml   Output             1725 ml   Net            -1500 ml       Laboratory  Recent Labs      01/01/19   0519 01/02/19   0455  01/03/19   0323   WBC  6.5  6.9  5.1   RBC  4.37*  4.71  4.68*   HEMOGLOBIN  12.6*  13.5*  13.5*   HEMATOCRIT  38.5*  41.6*  41.1*   MCV  88.1  88.3  87.8   MCH  28.8  28.7  28.8   MCHC  32.7*  32.5*  32.8*   RDW  43.6  43.9  43.1   PLATELETCT  318  356  356   MPV  9.4  9.4  9.8     Recent Labs      01/01/19   0519 01/02/19   0455   SODIUM  134*  134*   POTASSIUM  4.6  4.6   CHLORIDE  98  98   CO2  32  32   GLUCOSE  92  96   BUN  23*  32*   CREATININE  0.90  1.02   CALCIUM  9.7  9.6     Recent Labs      01/01/19   0519   APTT  29.7   INR  1.03               Imaging        Assessment/Plan  * Acute diastolic congestive heart failure (HCC)- (present on admission)   Assessment & Plan    Tolerating diuresis well, is net -11.4 L since admission.  In troponin to 0.06, suspect secondary to volume overload on admission.  Cardiac cath was performed and no significant CAD was found  -Continue with Lasix 20 mg p.o. twice daily  -Strict I's and O's, daily  weights  -Continue with telemetry monitoring    Echo 12/30/18:  Moderate concentric left ventricular hypertrophy.  Left ventricular systolic function is low normal.  Severe aortic stenosis.  Transvalvular gradients are - Peak: 98 mmHg, Mean: 56 mmHg.  Estimated right ventricular systolic pressure  is 55 mmHg.  Compared to the images of the study done on 7/19/17, previous severe   aortic stenosis is now critical aortic stenosis and previously normal   left ventricular ejection fraction is now mildly reduced.     Nonrheumatic aortic valve stenosis- (present on admission)   Assessment & Plan    Echocardiogram demonstrates progression of his aortic stenosis to critical  -Cardiology following, CT surgery consulted  -Plan for outpatient follow-up in the TAVR clinic  -We will need to abstain from methamphetamines for at least 2 weeks     Methamphetamine abuse (HCC)- (present on admission)   Assessment & Plan    Cessation recommended, will need to abstain for at least 2 weeks to be a candidate for TAVR.  Patient is motivated     Elevated troponin- (present on admission)   Assessment & Plan    Likely demand ischemia, as noted above.  No chest pain  -Monitor clinically     Acute respiratory failure with hypoxia (HCC)- (present on admission)   Assessment & Plan    Severe, requiring BiPAP now on 1.5 L nasal cannula.  This is secondary to his volume overload  -Continue with diuresis, slowing down given his critical aortic stenosis.  Appears to be nearly euvolemic.  -Wean oxygen as tolerated     HTN (hypertension)- (present on admission)   Assessment & Plan    Borderline low blood pressure.  Was on doxazosin and enalapril outpatient  -Discontinue amlodipine     Anemia- (present on admission)   Assessment & Plan    Morning labs reviewed  12/31: HGB 12  No need for transfusion     Liver mass, right lobe- (present on admission)   Assessment & Plan    Described on scan 3/8/18  - Needs outpatient follow-up     Tobacco abuse- (present  on admission)   Assessment & Plan    Cessation recommended          VTE prophylaxis:

## 2019-01-04 ENCOUNTER — APPOINTMENT (OUTPATIENT)
Dept: RADIOLOGY | Facility: MEDICAL CENTER | Age: 64
DRG: 917 | End: 2019-01-04
Attending: NURSE PRACTITIONER
Payer: MEDICAID

## 2019-01-04 LAB
ANION GAP SERPL CALC-SCNC: 5 MMOL/L (ref 0–11.9)
BUN SERPL-MCNC: 28 MG/DL (ref 8–22)
CALCIUM SERPL-MCNC: 9.8 MG/DL (ref 8.5–10.5)
CHLORIDE SERPL-SCNC: 98 MMOL/L (ref 96–112)
CO2 SERPL-SCNC: 30 MMOL/L (ref 20–33)
CREAT SERPL-MCNC: 1.05 MG/DL (ref 0.5–1.4)
GLUCOSE SERPL-MCNC: 95 MG/DL (ref 65–99)
POTASSIUM SERPL-SCNC: 4.4 MMOL/L (ref 3.6–5.5)
SODIUM SERPL-SCNC: 133 MMOL/L (ref 135–145)

## 2019-01-04 PROCEDURE — 700111 HCHG RX REV CODE 636 W/ 250 OVERRIDE (IP): Performed by: HOSPITALIST

## 2019-01-04 PROCEDURE — 97161 PT EVAL LOW COMPLEX 20 MIN: CPT

## 2019-01-04 PROCEDURE — 700102 HCHG RX REV CODE 250 W/ 637 OVERRIDE(OP): Performed by: NURSE PRACTITIONER

## 2019-01-04 PROCEDURE — 74174 CTA ABD&PLVS W/CONTRAST: CPT

## 2019-01-04 PROCEDURE — 99232 SBSQ HOSP IP/OBS MODERATE 35: CPT | Performed by: INTERNAL MEDICINE

## 2019-01-04 PROCEDURE — A9270 NON-COVERED ITEM OR SERVICE: HCPCS | Performed by: NURSE PRACTITIONER

## 2019-01-04 PROCEDURE — 700102 HCHG RX REV CODE 250 W/ 637 OVERRIDE(OP): Performed by: INTERNAL MEDICINE

## 2019-01-04 PROCEDURE — 770020 HCHG ROOM/CARE - TELE (206)

## 2019-01-04 PROCEDURE — 700117 HCHG RX CONTRAST REV CODE 255: Performed by: NURSE PRACTITIONER

## 2019-01-04 PROCEDURE — A9270 NON-COVERED ITEM OR SERVICE: HCPCS | Performed by: INTERNAL MEDICINE

## 2019-01-04 PROCEDURE — 700102 HCHG RX REV CODE 250 W/ 637 OVERRIDE(OP): Performed by: HOSPITALIST

## 2019-01-04 PROCEDURE — 80048 BASIC METABOLIC PNL TOTAL CA: CPT

## 2019-01-04 PROCEDURE — 99232 SBSQ HOSP IP/OBS MODERATE 35: CPT | Performed by: HOSPITALIST

## 2019-01-04 PROCEDURE — A9270 NON-COVERED ITEM OR SERVICE: HCPCS | Performed by: HOSPITALIST

## 2019-01-04 PROCEDURE — 36415 COLL VENOUS BLD VENIPUNCTURE: CPT

## 2019-01-04 PROCEDURE — 71275 CT ANGIOGRAPHY CHEST: CPT

## 2019-01-04 RX ADMIN — IOHEXOL 100 ML: 350 INJECTION, SOLUTION INTRAVENOUS at 10:59

## 2019-01-04 RX ADMIN — HEPARIN SODIUM 5000 UNITS: 5000 INJECTION, SOLUTION INTRAVENOUS; SUBCUTANEOUS at 21:03

## 2019-01-04 RX ADMIN — HEPARIN SODIUM 5000 UNITS: 5000 INJECTION, SOLUTION INTRAVENOUS; SUBCUTANEOUS at 14:37

## 2019-01-04 RX ADMIN — ATORVASTATIN CALCIUM 20 MG: 20 TABLET, FILM COATED ORAL at 21:03

## 2019-01-04 RX ADMIN — FUROSEMIDE 20 MG: 20 TABLET ORAL at 06:06

## 2019-01-04 RX ADMIN — ASPIRIN 81 MG: 81 TABLET, COATED ORAL at 06:05

## 2019-01-04 RX ADMIN — AMLODIPINE BESYLATE 2.5 MG: 5 TABLET ORAL at 06:04

## 2019-01-04 RX ADMIN — HEPARIN SODIUM 5000 UNITS: 5000 INJECTION, SOLUTION INTRAVENOUS; SUBCUTANEOUS at 06:06

## 2019-01-04 RX ADMIN — DOXAZOSIN 2 MG: 2 TABLET ORAL at 06:04

## 2019-01-04 RX ADMIN — POTASSIUM CHLORIDE 20 MEQ: 1500 TABLET, EXTENDED RELEASE ORAL at 06:05

## 2019-01-04 ASSESSMENT — ENCOUNTER SYMPTOMS
PALPITATIONS: 0
STRIDOR: 0
FEVER: 0
SHORTNESS OF BREATH: 1
SHORTNESS OF BREATH: 0
BLOOD IN STOOL: 0
COLOR CHANGE: 0
COUGH: 0
BACK PAIN: 0
FATIGUE: 0
LOSS OF CONSCIOUSNESS: 0
CHEST TIGHTNESS: 0
WEAKNESS: 0
DIARRHEA: 0
ABDOMINAL PAIN: 0
DIZZINESS: 0
WHEEZING: 0
NAUSEA: 0
VOMITING: 0
HEADACHES: 0
FALLS: 0
LIGHT-HEADEDNESS: 0
DIAPHORESIS: 0
CHILLS: 0

## 2019-01-04 ASSESSMENT — GAIT ASSESSMENTS
DISTANCE (FEET): 250
GAIT LEVEL OF ASSIST: SUPERVISED
ASSISTIVE DEVICE: FRONT WHEEL WALKER

## 2019-01-04 ASSESSMENT — COGNITIVE AND FUNCTIONAL STATUS - GENERAL
SUGGESTED CMS G CODE MODIFIER MOBILITY: CH
MOBILITY SCORE: 24

## 2019-01-04 ASSESSMENT — PAIN SCALES - GENERAL
PAINLEVEL_OUTOF10: 0

## 2019-01-04 ASSESSMENT — LIFESTYLE VARIABLES: SUBSTANCE_ABUSE: 1

## 2019-01-04 NOTE — THERAPY
"Physical Therapy Evaluation completed.   Bed Mobility:  Supine to Sit: Supervised  Transfers: Sit to Stand: Supervised  Gait: Level Of Assist: Supervised with Front-Wheel Walker       Plan of Care: Patient with no further skilled PT needs in the acute care setting at this time  Discharge Recommendations: Equipment: Front-Wheel Walker.   63 y.o. male who presented 12/29/2018 with history of congenital heart defect hypertension hypercholesterolemia previous smoker and methamphetamine abuse who presents with shortness of breath. Pt is to have an outpatient consult re TAVR on [address] 10. Today, pt showed wobbly, but functional LEs with standing and ambulation x 250 feet using FWW with supervision. Pt is homeless, but reports that he plans to go stay with his Mother and sister in [place] upon d/c. Pt would benefit from a FWW upon d/c. No further inpt PT needs.   See \"Rehab Therapy-Acute\" Patient Summary Report for complete documentation.     "

## 2019-01-04 NOTE — PROGRESS NOTES
Assumed care of patient. Pt is A+O x4. No chest pain or SOB. No active bleeding noted. Informed of safety and call system. rhythm is SR. Patient reports wish to continue resting.

## 2019-01-04 NOTE — PROGRESS NOTES
Report received, pt care assumed, tele box on. VSS, pt assessment complete. Pt resting quietly in bed. aaox4, no signs of distress noted at this time. POC discussed with pt and verbalizes no questions. Pt denies chest pain, pain or sob at this time. Pt denies any additional needs at this time. Bed in lowest position, bed alarm off, pt educated on fall risk and verbalized understanding, call light within reach, will continue to monitor.

## 2019-01-04 NOTE — PROGRESS NOTES
Hospital Medicine Daily Progress Note    Date of Service  1/4/2019    Chief Complaint  63 y.o. male admitted 12/29/2018 with SOB    Hospital Course        Mr Espinosa has a history of hypertension, hyperlipidemia, and substance abuse including methamphetamine.  Patient presented to the emergency room on 12/29/2018 with shortness of breath.  Patient did endorse symptoms of orthopnea and also lower extremity edema.  He was started on BiPAP in the emergency room, admitted to the ICU, diuresis was initiated.    Cath on 1/1 showing critical AS relatively clean coronaries     Interval Problem Update  Feels well today, weaning O2. Explains that he is going to call his mom today and ask to stay with her while working on his sobriety.    Consultants/Specialty  Pulmonology  cardiology    Code Status  full    Disposition  Anticipate home tomorrow, will have outpatient follow-up at [facility] clinic    Review of Systems  Review of Systems   Constitutional: Negative for chills, fever and malaise/fatigue.   HENT: Negative for congestion.    Respiratory: Positive for shortness of breath (improving). Negative for cough.    Cardiovascular: Negative for chest pain and leg swelling.   Gastrointestinal: Negative for abdominal pain, blood in stool, diarrhea, nausea and vomiting.   Genitourinary: Negative for dysuria.   Musculoskeletal: Negative for back pain and falls.   Skin: Negative for rash.   Neurological: Negative for dizziness, loss of consciousness, weakness and headaches.   Psychiatric/Behavioral: Positive for substance abuse.        Physical Exam  Temp:  [36.6 °C (97.9 °F)-37.5 °C (99.5 °F)] 37.2 °C (98.9 °F)  Pulse:  [81-85] 85  Resp:  [16-19] 18  BP: ()/(63-71) 131/70    Physical Exam   Constitutional: He is oriented to person, place, and time. He appears well-developed. No distress.   Thin, muscular   HENT:   Head: Normocephalic.   Mouth/Throat: Oropharynx is clear and moist.   Eyes: EOM are normal. No scleral icterus.    Neck: Normal range of motion. No JVD present.   Cardiovascular: Normal rate.  Exam reveals no gallop.    Murmur (Systolic) heard.  Pulmonary/Chest: Effort normal. No stridor. No respiratory distress. He has no wheezes. He has no rales (resolved).   Abdominal: Soft. He exhibits no distension. There is no tenderness. There is no rebound.   Musculoskeletal: He exhibits no edema.   Neurological: He is alert and oriented to person, place, and time.   Skin: Skin is warm and dry. No rash noted. He is not diaphoretic.   Psychiatric: He has a normal mood and affect. His behavior is normal.   Nursing note and vitals reviewed.      Fluids    Intake/Output Summary (Last 24 hours) at 01/04/19 0659  Last data filed at 01/04/19 0400   Gross per 24 hour   Intake              720 ml   Output             1550 ml   Net             -830 ml       Laboratory  Recent Labs      01/02/19 0455 01/03/19   0323   WBC  6.9  5.1   RBC  4.71  4.68*   HEMOGLOBIN  13.5*  13.5*   HEMATOCRIT  41.6*  41.1*   MCV  88.3  87.8   MCH  28.7  28.8   MCHC  32.5*  32.8*   RDW  43.9  43.1   PLATELETCT  356  356   MPV  9.4  9.8     Recent Labs      01/02/19 0455 01/03/19   0323  01/04/19   0400   SODIUM  134*  135  133*   POTASSIUM  4.6  4.4  4.4   CHLORIDE  98  96  98   CO2  32  35*  30   GLUCOSE  96  90  95   BUN  32*  31*  28*   CREATININE  1.02  1.08  1.05   CALCIUM  9.6  9.5  9.8                   Imaging        Assessment/Plan  * Acute diastolic congestive heart failure (HCC)- (present on admission)   Assessment & Plan    Tolerating diuresis well, is net -11.4 L since admission.  In troponin to 0.06, suspect secondary to volume overload on admission.  Cardiac cath was performed and no significant CAD was found  -reduce lasix to 20 mg p.o. daily  -Strict I's and O's, daily weights  -Continue with telemetry monitoring    Echo 12/30/18:  Moderate concentric left ventricular hypertrophy.  Left ventricular systolic function is low normal.  Severe  aortic stenosis.  Transvalvular gradients are - Peak: 98 mmHg, Mean: 56 mmHg.  Estimated right ventricular systolic pressure  is 55 mmHg.  Compared to the images of the study done on 7/19/17, previous severe   aortic stenosis is now critical aortic stenosis and previously normal   left ventricular ejection fraction is now mildly reduced.     Nonrheumatic aortic valve stenosis- (present on admission)   Assessment & Plan    Echocardiogram demonstrates progression of his aortic stenosis to critical  -Cardiology following, CT surgery consulted  -Plan for outpatient follow-up in the TAVR clinic  -We will need to abstain from methamphetamines for at least 2 weeks     Methamphetamine abuse (HCC)- (present on admission)   Assessment & Plan    Cessation recommended, will need to abstain for at least 2 weeks to be a candidate for TAVR.  Patient is motivated  Appt on Swapnil 10     Elevated troponin- (present on admission)   Assessment & Plan    Likely demand ischemia, as noted above.  No chest pain  -Monitor clinically     Acute respiratory failure with hypoxia (HCC)- (present on admission)   Assessment & Plan    Severe, requiring BiPAP now on 1.5 L nasal cannula.  This is secondary to his volume overload  -Continue with diuresis, slowing down given his critical aortic stenosis.  Appears to be nearly euvolemic.  -Wean oxygen as tolerated     HTN (hypertension)- (present on admission)   Assessment & Plan    Borderline low blood pressure.  Was on doxazosin and enalapril outpatient  -Discontinue amlodipine     Anemia- (present on admission)   Assessment & Plan    Morning labs reviewed  12/31: HGB 12  No need for transfusion     Liver mass, right lobe- (present on admission)   Assessment & Plan    Described on scan 3/8/18  - Needs outpatient follow-up     Tobacco abuse- (present on admission)   Assessment & Plan    Cessation recommended

## 2019-01-04 NOTE — CARE PLAN
Problem: Respiratory:  Goal: Respiratory status will improve    Intervention: Educate and encourage coughing and deep breathing  Patient has crackles in lungs, patient is able to demonstrate cough and deep breathing. No sputum at this time. Will continue to attempt oxygen titration.       Problem: Mobility  Goal: Risk for activity intolerance will decrease    Intervention: Encourage patient to increase activity level in collaboration with Interdisciplinary Team  Patient reports he moves around in bed and does not have need to walk. Will continue to encourage activity to maintain patient strength and stamina. Will continue to educate that activity can help improve respiratory status.

## 2019-01-04 NOTE — DISCHARGE PLANNING
Anticipated Discharge Disposition: TBD    Action: Spoke to pt's INS Velvet LAKHANI. She will be visiting pt at bedside today. She hopes to assist w/ pt d/c as he is homeless.    LSw reported pt's current room location as he has transferred off this floor and is now on T7.     Barriers to Discharge: TBD    Plan: f/u w/ medical team

## 2019-01-04 NOTE — PROGRESS NOTES
Patient is planned for outpatient TAVR workup and consult appointment on 1/10/18. Cardiology rounding team has been made aware of the plan of care for this patient.     Please call structural heart team for any concerns/questions regarding this patient.    Perla Orr APRN   [phone]

## 2019-01-04 NOTE — THERAPY
Occupational Therapy Contact Note    Per cardiac surgery consult, plan is for TAVR. Will hold until post op.    Avis Hernandez OTR/L

## 2019-01-04 NOTE — PROGRESS NOTES
Spoke with sister in [place]. Patient will going to live with sister and mother. Sister will pick patient up on D/C.

## 2019-01-04 NOTE — PROGRESS NOTES
Cardiology Follow Up Progress Note    Date of Service  1/4/2019    Attending Physician  Radha Blackmon M.D.    Chief Complaint   Shortness of Breath    Cardiology consulted for critical aortic stenosis.    HPI  Sylvain Espinosa is a 63 y.o. male admitted 12/29/2018 with aortic stenosis, pulmonary edema , and heart failure. Patient was previously evaluated in cardiology clinic at which time it was moderate and was not seen in follow-up as he did not show for his stress test and appointments. Patient is an active methamphetamine user and had been using up to the time of his admission. Left cardiac catheterization on 1/1/2019 showed no evidence of coronary artery disease and mildly reduced left ventricular systolic function with ejection fraction   of 55%, and and severe AS with peak gradient of 80 mmHg, mean gradient of 61 mmHg, and calculated TAYA of 0.48 cm². CTS was consulted and based on patient methamphetamine use and morbidity and mortality risk, they recommended patient better candidate for TAVR.     Past medical history of aortic stenosis, diastolic heart failure, methamphetamine abuse, benign prostatic hyperplasia (BPH), hyperlipidemia, hypertension, and former smoker.    Interim Events  1/4/19: Patient up in chair.  Denies chest pain, palpitations, shortness of breath, and/or lower extremity edema.  No significant events or rhythm changes overnight.  Telemetry monitor shows sinus rhythm.  Discussed patient's plan of care upon discharge and need for utilization of social support system with mother and sister in order to abstain from drug use.  Patient verbalizes understanding.  No complaints or concerns at this time.  1/3/19: Patient resting comfortably at bed denies complaints of chest pain, palpitations, shortness of breath, and/or lower extremity edema.  Telemetry monitor showed sinus rhythm with heart rate 70s-80s.  No significant events or rhythm changes overnight.     Review of Systems  Review of  Systems   Constitutional: Negative for chills, diaphoresis, fatigue and fever.   Respiratory: Negative for cough, chest tightness, shortness of breath, wheezing and stridor.    Cardiovascular: Negative for chest pain, palpitations and leg swelling.   Gastrointestinal: Negative for abdominal pain.   Genitourinary: Negative for difficulty urinating and hematuria.   Skin: Negative for color change and rash.   Neurological: Negative for dizziness and light-headedness.     Vital signs in last 24 hours  Temp:  [36.3 °C (97.4 °F)-37.5 °C (99.5 °F)] 36.3 °C (97.4 °F)  Pulse:  [83-88] 88  Resp:  [16-18] 18  BP: ()/(63-70) 113/68    Physical Exam  Physical Exam   Constitutional: He is oriented to person, place, and time. He appears well-developed and well-nourished. No distress.   HENT:   Head: Normocephalic.   Eyes: Pupils are equal, round, and reactive to light.   Neck: No JVD present. No thyromegaly present.   Cardiovascular: Normal rate and regular rhythm.    Murmur heard.  Pulses:       Radial pulses are 2+ on the right side, and 2+ on the left side.        Dorsalis pedis pulses are 2+ on the right side, and 2+ on the left side.   Pulmonary/Chest: Effort normal. No respiratory distress. He has decreased breath sounds. He has no wheezes. He has no rales. He exhibits no tenderness.   Abdominal: Soft. There is no tenderness.   Musculoskeletal: He exhibits no edema.   Neurological: He is alert and oriented to person, place, and time.   Skin: Skin is warm and dry. He is not diaphoretic. No erythema.   Psychiatric: He has a normal mood and affect. His behavior is normal. Judgment and thought content normal.   Nursing note reviewed.    Lab Review  Lab Results   Component Value Date/Time    WBC 5.1 01/03/2019 03:23 AM    RBC 4.68 (L) 01/03/2019 03:23 AM    HEMOGLOBIN 13.5 (L) 01/03/2019 03:23 AM    HEMATOCRIT 41.1 (L) 01/03/2019 03:23 AM    MCV 87.8 01/03/2019 03:23 AM    MCH 28.8 01/03/2019 03:23 AM    MCHC 32.8 (L)  01/03/2019 03:23 AM    MPV 9.8 01/03/2019 03:23 AM      Lab Results   Component Value Date/Time    SODIUM 133 (L) 01/04/2019 04:00 AM    POTASSIUM 4.4 01/04/2019 04:00 AM    CHLORIDE 98 01/04/2019 04:00 AM    CO2 30 01/04/2019 04:00 AM    GLUCOSE 95 01/04/2019 04:00 AM    BUN 28 (H) 01/04/2019 04:00 AM    CREATININE 1.05 01/04/2019 04:00 AM      Lab Results   Component Value Date/Time    ASTSGOT 19 01/01/2019 05:19 AM    ALTSGPT 25 01/01/2019 05:19 AM     Lab Results   Component Value Date/Time    CHOLSTRLTOT 129 01/15/2015 11:37 PM    LDL 74 01/15/2015 11:37 PM    HDL 30 (A) 01/15/2015 11:37 PM    TRIGLYCERIDE 124 01/15/2015 11:37 PM    TROPONINI 0.06 (H) 12/30/2018 03:55 PM           Cardiac Imaging and Procedures Review  EKG 12/29/2018:    SINUS TACHYCARDIA   PROBABLE LEFT ATRIAL ABNORMALITY   LEFT VENTRICULAR HYPERTROPHY   BORDERLINE PROLONGED QT INTERVAL   BASELINE WANDER IN LEAD(S) II,III,aVF   Compared to ECG 11/17/2017 10:50:09   ST (T wave) deviation now present   Sinus arrhythmia no longer present   No STEMI     Echocardiogram 12/30/2018:  Critical AS, Vmax 5 m/s, MG 56 mmHg, EF 50% (from prior 55%), RVSP 55 mmHg, severe LAE     Left Cardiac Catheterization 01/01/2019:  1.  Severe aortic stenosis with peak to peak gradient of 80 mmHg, mean   gradient of 61 mmHg, calculated TAYA of 0.48 cm2.  2.  No angiographic evidence of coronary artery disease.  3.  Mildly reduced left ventricular systolic function with ejection fraction   of 55%.  4.  Elevated left ventricular end diastolic pressure.  5.  Elevated right heart pressure with PA systolic pressure of 65 mmHg.  6.  Mildly dilated ascending aorta.  7.  Ectatic and diffusely atherosclerotic distal abdominal aorta with   bilateral iliofemoral system, moderate in caliber, bilateral nonobstructive   stenosis with tortuosity left greater than right.    Bilateral Carotid Ultrasound 01/03/2019:    1.  Less than 50% stenosis in the right carotid bifurcation with no     evidence of internal carotid arterial stenosis.   2.  Normal left carotid duplex examination.   3.  Antegrade bilateral vertebral arterial flow.  Right carotid.- Very mild plaque of the carotid bifurcation. Doppler velocities are normal. Vertebral artery waveforms are antegrade and normal in character and velocity.   Left carotid- Flow velocities and Doppler waveforms are normal throughout the carotid system without evidence of plaque. Vertebral artery waveforms are antegrade and normal in character and velocity.     Assessment/Plan  Severe Aortic Stenosis  - Critical AS per echo 12/30/18 with LV dysfunction.  - Cardiac catheterization on 1/1/19 showed no evidence of CAD, mildly reduced LVEF 55%, and severe AS with peak gradient of 80 mmHg, mean gradient of 61 mmHg, and calculated TAYA of 0.48 cm².  - Carotid US on 1/3/19 negative with <50% stenosis of right carotid artery and normal left carotid artery.  - CTS recommends non-surgical and patient better candidate for TAVR.  - Discussed with TAVR clinic MICHAEL Resendiz- per Dr. Moser, patient needs to abstain from methamphetamines for at least 2 weeks.  - TAVR clinic will follow up with outpatient appointment for evaluation on 1/10/19.    - Patient has hx of missing appointments in clinic x3, patient will need to make this appointment or he will be discharged from clinic. Reinforced importance with patient.     Acute diastolic congestive heart failure  - Secondary to AS, [facility] IV  - LVEF 50%  - Appears euvolemic.  - Net I/Os: -12.1L since admission (per pt report unsure, but per hx dry weight ~124 lbs; today weight 122 lbs).  - BUN/Cr: 28/1.05, will monitor.  - Started PO lasix 20 mg daily today.   - Continue aspirin 81 mg daily and Lipitor 40 mg daily.  - No beta-blocker at this time, will reevaluate after surgery.  - High risk for arrhythmias, severe LAE, continue to monitor.  - Strict I/0s and daily standing weights, discussed with RN.   - Discussed daily  weights at home and to call office if weight gain >3lbs in 1 day or >5lbs in 1 week.     Methamphetamine abuse  - Needs to abstain from meth use for TAVR evaluation.   - Discussed with patient at bedside, encouraged patient to utilize his social support from his sister and mother who lives in [place] and may go home with them.    Hypertension  -Stable  -Continue Norvasc 2.5 mg daily.    Pulmonary Hypertension  - Moderate to severe, likely secondary to aortic stenosis and methamphetamine abuse.  - RVSP 55 mmHg  -Continue to diurese per above.    Hyperlipidemia  - HLP per history, LDL 74 (1/15/15)  - Recommend repeat lipid panel as outpatient.  - Continue Lipitor 20 mg daily.      Thank you for allowing me to participate in the care of this patient.  Cardiology will sign off on this patient    Please contact me with any questions.    BRENDA Esquivel.   Cardiologist, [facility]  [phone]'

## 2019-01-05 VITALS
SYSTOLIC BLOOD PRESSURE: 120 MMHG | DIASTOLIC BLOOD PRESSURE: 70 MMHG | HEIGHT: 68 IN | OXYGEN SATURATION: 94 % | BODY MASS INDEX: 18.74 KG/M2 | RESPIRATION RATE: 19 BRPM | HEART RATE: 92 BPM | WEIGHT: 123.68 LBS | TEMPERATURE: 97.6 F

## 2019-01-05 PROBLEM — D18.03 HEMANGIOMA OF LIVER: Status: ACTIVE | Noted: 2018-03-08

## 2019-01-05 PROBLEM — R79.89 ELEVATED TROPONIN: Status: RESOLVED | Noted: 2018-12-29 | Resolved: 2019-01-05

## 2019-01-05 PROBLEM — J96.01 ACUTE RESPIRATORY FAILURE WITH HYPOXIA (HCC): Status: RESOLVED | Noted: 2018-12-29 | Resolved: 2019-01-05

## 2019-01-05 PROBLEM — I50.31 ACUTE DIASTOLIC CONGESTIVE HEART FAILURE (HCC): Status: RESOLVED | Noted: 2018-12-29 | Resolved: 2019-01-05

## 2019-01-05 LAB
ANION GAP SERPL CALC-SCNC: 7 MMOL/L (ref 0–11.9)
APTT PPP: 33.1 SEC (ref 24.7–36)
BUN SERPL-MCNC: 28 MG/DL (ref 8–22)
CALCIUM SERPL-MCNC: 9.8 MG/DL (ref 8.5–10.5)
CHLORIDE SERPL-SCNC: 98 MMOL/L (ref 96–112)
CO2 SERPL-SCNC: 27 MMOL/L (ref 20–33)
CREAT SERPL-MCNC: 1.15 MG/DL (ref 0.5–1.4)
GLUCOSE SERPL-MCNC: 89 MG/DL (ref 65–99)
POTASSIUM SERPL-SCNC: 4.5 MMOL/L (ref 3.6–5.5)
SODIUM SERPL-SCNC: 132 MMOL/L (ref 135–145)

## 2019-01-05 PROCEDURE — 700102 HCHG RX REV CODE 250 W/ 637 OVERRIDE(OP): Performed by: HOSPITALIST

## 2019-01-05 PROCEDURE — A9270 NON-COVERED ITEM OR SERVICE: HCPCS | Performed by: HOSPITALIST

## 2019-01-05 PROCEDURE — 700102 HCHG RX REV CODE 250 W/ 637 OVERRIDE(OP): Performed by: INTERNAL MEDICINE

## 2019-01-05 PROCEDURE — 85730 THROMBOPLASTIN TIME PARTIAL: CPT

## 2019-01-05 PROCEDURE — 36415 COLL VENOUS BLD VENIPUNCTURE: CPT

## 2019-01-05 PROCEDURE — 99239 HOSP IP/OBS DSCHRG MGMT >30: CPT | Performed by: HOSPITALIST

## 2019-01-05 PROCEDURE — A9270 NON-COVERED ITEM OR SERVICE: HCPCS | Performed by: INTERNAL MEDICINE

## 2019-01-05 PROCEDURE — A9270 NON-COVERED ITEM OR SERVICE: HCPCS | Performed by: NURSE PRACTITIONER

## 2019-01-05 PROCEDURE — 700111 HCHG RX REV CODE 636 W/ 250 OVERRIDE (IP): Performed by: HOSPITALIST

## 2019-01-05 PROCEDURE — 80048 BASIC METABOLIC PNL TOTAL CA: CPT

## 2019-01-05 PROCEDURE — 700102 HCHG RX REV CODE 250 W/ 637 OVERRIDE(OP): Performed by: NURSE PRACTITIONER

## 2019-01-05 RX ORDER — POTASSIUM CHLORIDE 750 MG/1
10 TABLET, EXTENDED RELEASE ORAL DAILY
Qty: 30 TAB | Refills: 0 | Status: SHIPPED | OUTPATIENT
Start: 2019-01-05 | End: 2019-01-05

## 2019-01-05 RX ORDER — AMLODIPINE BESYLATE 2.5 MG/1
2.5 TABLET ORAL DAILY
Qty: 30 TAB | Refills: 0 | Status: SHIPPED | OUTPATIENT
Start: 2019-01-06 | End: 2019-01-05

## 2019-01-05 RX ORDER — AMLODIPINE BESYLATE 2.5 MG/1
2.5 TABLET ORAL DAILY
Qty: 30 TAB | Refills: 0 | Status: SHIPPED | OUTPATIENT
Start: 2019-01-06 | End: 2019-02-06 | Stop reason: SDUPTHER

## 2019-01-05 RX ORDER — FUROSEMIDE 20 MG/1
20 TABLET ORAL DAILY
Qty: 30 TAB | Refills: 0 | Status: SHIPPED | OUTPATIENT
Start: 2019-01-06 | End: 2019-01-05

## 2019-01-05 RX ORDER — FUROSEMIDE 20 MG/1
20 TABLET ORAL DAILY
Qty: 30 TAB | Refills: 0 | Status: SHIPPED | OUTPATIENT
Start: 2019-01-06 | End: 2019-02-06 | Stop reason: SDUPTHER

## 2019-01-05 RX ORDER — POTASSIUM CHLORIDE 750 MG/1
10 TABLET, EXTENDED RELEASE ORAL DAILY
Qty: 30 TAB | Refills: 0 | Status: SHIPPED | OUTPATIENT
Start: 2019-01-05 | End: 2019-02-06 | Stop reason: SDUPTHER

## 2019-01-05 RX ADMIN — FUROSEMIDE 20 MG: 20 TABLET ORAL at 05:44

## 2019-01-05 RX ADMIN — HEPARIN SODIUM 5000 UNITS: 5000 INJECTION, SOLUTION INTRAVENOUS; SUBCUTANEOUS at 05:44

## 2019-01-05 RX ADMIN — ASPIRIN 81 MG: 81 TABLET, COATED ORAL at 05:44

## 2019-01-05 RX ADMIN — AMLODIPINE BESYLATE 2.5 MG: 5 TABLET ORAL at 05:44

## 2019-01-05 RX ADMIN — POTASSIUM CHLORIDE 20 MEQ: 1500 TABLET, EXTENDED RELEASE ORAL at 05:43

## 2019-01-05 RX ADMIN — DOXAZOSIN 2 MG: 2 TABLET ORAL at 05:44

## 2019-01-05 ASSESSMENT — PAIN SCALES - GENERAL
PAINLEVEL_OUTOF10: 0
PAINLEVEL_OUTOF10: 0

## 2019-01-05 NOTE — PROGRESS NOTES
Sitting up in chair w/o distress noted. No changes noted in status. Needs addressed. Call bell and personal items in reach.

## 2019-01-05 NOTE — CARE PLAN
Problem: Communication  Goal: The ability to communicate needs accurately and effectively will improve    Intervention: [place] patient and significant other/support system to call light to alert staff of needs  Communication provided on plan of care and goals. All questions answered.       Problem: Safety  Goal: Will remain free from falls    Intervention: Implement fall precautions  Safety intervention in place. Demonstration of use of call light. Call light within reach. Bed locked and in lowest in position. Bed alarm on.

## 2019-01-05 NOTE — DISCHARGE SUMMARY
"Discharge Summary    CHIEF COMPLAINT ON ADMISSION  Chief Complaint   Patient presents with   • Shortness of Breath     for the past month, worsened today.       Reason for Admission  EMS     Admission Date  12/29/2018    CODE STATUS  Full Code    HPI & HOSPITAL COURSE  This is a 63 y.o. male with history of tobacco and methamphetamine abuse who presents with SOB. It had been going on for \"months\" but continued to worsen so he presented for evaluation. He was found to be significantly hypoxic and in respiratory distress. He was placed on bipap and PMA was consulted for ICU admission. No leukocytosis, , CXR with diffuse interstitial edema with cephalization. Influenza negative. Echo showed now critical aortic stenosis so CT surgery was consulted who deemed that he was a better candidate for TAVR as opposed to open heart surgery. Evaluation was initiated with angiogram which showed AS but relatively clean coronary arteries and  CT chest/abd/pelvis for aortogram and runoff evaluation.     He was diuresed, net negative 14.6L since admission and titrated down to lasix 20mg PO daily to help maintain his euvolemic state. His hypoxic respiratory failure secondary to acute HFpEF improved and he was weaned to room air, ambulating the floor without issue. His sobriety was extensively discussed with the hospitalist, cardiologist and the patient. He is very motivated to have this procedure and remain off methamphetamines. Plan is for him to stay with his sister and mother to help with this. Plan for outpatient TAVR clinic appointment on [address] 10.         Therefore, he is discharged in good and stable condition to home with close outpatient follow-up.    The patient met 2-midnight criteria for an inpatient stay at the time of discharge.    Discharge Date  1/5/2019    FOLLOW UP ITEMS POST DISCHARGE  Follow up with PCP and Cardiology.    DISCHARGE DIAGNOSES  Principal Problem (Resolved):    Acute diastolic congestive heart " failure (HCC) POA: Yes  Active Problems:    Nonrheumatic aortic valve stenosis POA: Yes    HTN (hypertension) (Chronic) POA: Yes    Methamphetamine abuse (HCC) POA: Yes    Tobacco abuse POA: Yes    Hemangioma of liver POA: Yes    Anemia POA: Yes  Resolved Problems:    Acute respiratory failure with hypoxia (HCC) POA: Yes    Elevated troponin POA: Yes    Hyponatremia POA: Yes      FOLLOW UP  Future Appointments  Date Time Provider Department Center   1/10/2019 9:40 AM Dariel Moser M.D. [facility] None   1/18/2019 1:15 PM JANIYA Hernandez [facility] None     [facility]  [address] [place] [address] [address]  [address]  [facility] [address]  [phone]    Go on 1/9/2019  PLEASE WALK IN AT 9AM TO ARRANGE YOUR FOLLOW UP APPOINTMENT. THANK YOU      MEDICATIONS ON DISCHARGE     Medication List      START taking these medications      Instructions   amLODIPine 2.5 MG Tabs  Start taking on:  1/6/2019  Commonly known as:  NORVASC   Take 1 Tab by mouth every day.  Dose:  2.5 mg     furosemide 20 MG Tabs  Start taking on:  1/6/2019  Commonly known as:  LASIX   Take 1 Tab by mouth every day.  Dose:  20 mg     potassium chloride SA 10 MEQ Tbcr  Commonly known as:  K-DUR   Take 1 Tab by mouth every day.  Dose:  10 mEq        CONTINUE taking these medications      Instructions   aspirin EC 81 MG Tbec  Commonly known as:  ECOTRIN   Take 81 mg by mouth every day.  Dose:  81 mg     atorvastatin 20 MG Tabs  Commonly known as:  LIPITOR   Take 20 mg by mouth every evening.  Dose:  20 mg     doxazosin 2 MG Tabs  Commonly known as:  CARDURA   Take 2 mg by mouth every day.  Dose:  2 mg        STOP taking these medications    enalapril 5 MG Tabs  Commonly known as:  VASOTEC            Allergies  No Known Allergies    DIET  Orders Placed This Encounter   Procedures   • Diet Order Cardiac     Standing Status:   Standing     Number of Occurrences:   1     Order Specific Question:   Diet:     Answer:   Cardiac [6]       ACTIVITY  As  tolerated.  Weight bearing as tolerated    CONSULTATIONS  Pulmonary/Critical Care  Cardiology    IMAGING AND PROCEDURES  CT-CTA CHEST WITH & W/O-POST PROCESS   Final Result      1.  Mean aortic valvular area 584 sq mm      2.  The coronary arteries originate just greater than 10 mm above the aortic annulus      3.  Minimal diameter right external iliac artery 6.1 mm with minimal tortuosity      4.  Minimal diameter left external iliac artery 5.7 mm with moderate tortuosity      5.  3.7 x 3.5 cm infrarenal abdominal aortic aneurysm      6.  Hepatic fatty infiltration and moderate hepatomegaly      7.  2.6 cm benign cavernous hemangioma within the right lobe of the liver         CTA ABDOMEN PELVIS W & W/O POST PROCESS   Final Result      1.  Mean aortic valvular area 584 sq mm      2.  The coronary arteries originate just greater than 10 mm above the aortic annulus      3.  Minimal diameter right external iliac artery 6.1 mm with minimal tortuosity      4.  Minimal diameter left external iliac artery 5.7 mm with moderate tortuosity      5.  3.7 x 3.5 cm infrarenal abdominal aortic aneurysm      6.  Hepatic fatty infiltration and moderate hepatomegaly      7.  2.6 cm benign cavernous hemangioma within the right lobe of the liver         US-CAROTID DOPPLER BILAT   Final Result      EC-ECHOCARDIOGRAM COMPLETE W/O CONT   Final Result      DX-CHEST-PORTABLE (1 VIEW)   Final Result         Infrahilar airspace opacities bilaterally could be seen with pulmonary edema or multifocal pneumonia.        Echo-  Moderate concentric left ventricular hypertrophy.  Left ventricular systolic function is low normal.  Severe aortic stenosis.  Transvalvular gradients are - Peak: 98 mmHg, Mean: 56 mmHg.  Estimated right ventricular systolic pressure  is 55 mmHg.  Compared to the images of the study done on 7/19/17, previous severe   aortic stenosis is now critical aortic stenosis and previously normal   left ventricular ejection fraction is  now mildly reduced.    Angiogram-  1.  Right heart catheterization.  2.  Left heart catheterization.  3.  Coronary angiography.  4.  Left ventriculogram.  5.  Ascending aortogram.  6.  Descending aortogram.  7.  Monitor conscious sedation.  Findings:  1.  Severe aortic stenosis with peak to peak gradient of 80 mmHg, mean   gradient of 61 mmHg, calculated TAYA of 0.48 cm2.  2.  No angiographic evidence of coronary artery disease.  3.  Mildly reduced left ventricular systolic function with ejection fraction   of 55%.  4.  Elevated left ventricular end diastolic pressure.  5.  Elevated right heart pressure with PA systolic pressure of 65 mmHg.  6.  Mildly dilated ascending aorta.  7.  Ectatic and diffusely atherosclerotic distal abdominal aorta with   bilateral iliofemoral system, moderate in caliber, bilateral nonobstructive   stenosis with tortuosity left greater than right.    LABORATORY  Lab Results   Component Value Date    SODIUM 132 (L) 01/05/2019    POTASSIUM 4.5 01/05/2019    CHLORIDE 98 01/05/2019    CO2 27 01/05/2019    GLUCOSE 89 01/05/2019    BUN 28 (H) 01/05/2019    CREATININE 1.15 01/05/2019        Lab Results   Component Value Date    WBC 5.1 01/03/2019    HEMOGLOBIN 13.5 (L) 01/03/2019    HEMATOCRIT 41.1 (L) 01/03/2019    PLATELETCT 356 01/03/2019        Total time of the discharge process exceeds 46 minutes.

## 2019-01-05 NOTE — PROGRESS NOTES
Pt discharged home. Home pharmacy changed to [facility] in [place] as pt is going to stay with family there. IV's DC'd prior to DC. Discharge instructions discussed and all questions answered. Pt left via wheelchair with family in personal car. Pt DC'd in pleasant and stable condition.

## 2019-01-05 NOTE — DISCHARGE INSTRUCTIONS
Discharge Instructions    Discharged to home by car with relative. Discharged via wheelchair, hospital escort: Yes.  Special equipment needed: Not Applicable    Be sure to schedule a follow-up appointment with your primary care doctor or any specialists as instructed.     Discharge Plan:   Diet Plan: Discussed  Activity Level: Discussed  Smoking Cessation Offered: Patient Refused  Confirmed Follow up Appointment: Appointment Scheduled  Confirmed Symptoms Management: Discussed  Medication Reconciliation Updated: Yes  Influenza Vaccine Indication: Not indicated: Previously immunized this influenza season and > 8 years of age    I understand that a diet low in cholesterol, fat, and sodium is recommended for good health. Unless I have been given specific instructions below for another diet, I accept this instruction as my diet prescription.   Other diet: Cardiac as discussed.     Special Instructions: None    · Is patient discharged on Warfarin / Coumadin?   No     Depression / Suicide Risk    As you are discharged from this [facility] facility, it is important to learn how to keep safe from harming yourself.    Recognize the warning signs:  · Abrupt changes in personality, positive or negative- including increase in energy   · Giving away possessions  · Change in eating patterns- significant weight changes-  positive or negative  · Change in sleeping patterns- unable to sleep or sleeping all the time   · Unwillingness or inability to communicate  · Depression  · Unusual sadness, discouragement and loneliness  · Talk of wanting to die  · Neglect of personal appearance   · Rebelliousness- reckless behavior  · Withdrawal from people/activities they love  · Confusion- inability to concentrate     If you or a loved one observes any of these behaviors or has concerns about self-harm, here's what you can do:  · Talk about it- your feelings and reasons for harming yourself  · Remove any means that you might use to hurt  yourself (examples: pills, rope, extension cords, firearm)  · Get professional help from the community (Mental Health, Substance Abuse, psychological counseling)  · Do not be alone:Call your Safe Contact- someone whom you trust who will be there for you.  · Call your local CRISIS HOTLINE 528-6407 or 901-100-9036  · Call your local Children's Mobile Crisis Response Team [place] (166) 381-8811 or www.InboxFever  · Call the toll free National Suicide Prevention Hotlines   · National Suicide Prevention Lifeline 029-336-TBWN (8512)  · National Hope Line Network 800-SUICIDE (813-1988)      Heart-Healthy Eating Plan  Introduction  Heart-healthy meal planning includes:  · Limiting unhealthy fats.  · Increasing healthy fats.  · Making other small dietary changes.  You may need to talk with your doctor or a diet specialist (dietitian) to create an eating plan that is right for you.  What types of fat should I choose?  · Choose healthy fats. These include olive oil and canola oil, flaxseeds, walnuts, almonds, and seeds.  · Eat more omega-3 fats. These include salmon, mackerel, sardines, tuna, flaxseed oil, and ground flaxseeds. Try to eat fish at least twice each week.  · Limit saturated fats.  ¨ Saturated fats are often found in animal products, such as meats, butter, and cream.  ¨ Plant sources of saturated fats include palm oil, palm kernel oil, and coconut oil.  · Avoid foods with partially hydrogenated oils in them. These include stick margarine, some tub margarines, cookies, crackers, and other baked goods. These contain trans fats.  What general guidelines do I need to follow?  · Check food labels carefully. Identify foods with trans fats or high amounts of saturated fat.  · Fill one half of your plate with vegetables and green salads. Eat 4-5 servings of vegetables per day. A serving of vegetables is:  ¨ 1 cup of raw leafy vegetables.  ¨ ½ cup of raw or cooked cut-up vegetables.  ¨ ½ cup of vegetable  "juice.  · Fill one fourth of your plate with whole grains. Look for the word \"whole\" as the first word in the ingredient list.  · Fill one fourth of your plate with lean protein foods.  · Eat 4-5 servings of fruit per day. A serving of fruit is:  ¨ One medium whole fruit.  ¨ ¼ cup of dried fruit.  ¨ ½ cup of fresh, frozen, or canned fruit.  ¨ ½ cup of 100% fruit juice.  · Eat more foods that contain soluble fiber. These include apples, broccoli, carrots, beans, peas, and barley. Try to get 20-30 g of fiber per day.  · Eat more home-cooked food. Eat less restaurant, buffet, and fast food.  · Limit or avoid alcohol.  · Limit foods high in starch and sugar.  · Avoid fried foods.  · Avoid frying your food. Try baking, boiling, grilling, or broiling it instead. You can also reduce fat by:  ¨ Removing the skin from poultry.  ¨ Removing all visible fats from meats.  ¨ Skimming the fat off of stews, soups, and gravies before serving them.  ¨ Steaming vegetables in water or broth.  · Lose weight if you are overweight.  · Eat 4-5 servings of nuts, legumes, and seeds per week:  ¨ One serving of dried beans or legumes equals ½ cup after being cooked.  ¨ One serving of nuts equals 1½ ounces.  ¨ One serving of seeds equals ½ ounce or one tablespoon.  · You may need to keep track of how much salt or sodium you eat. This is especially true if you have high blood pressure. Talk with your doctor or dietitian to get more information.  What foods can I eat?  Grains   Breads, including Sami, white, issac, wheat, raisin, rye, oatmeal, and Italian. Tortillas that are neither fried nor made with lard or trans fat. Low-fat rolls, including hotdog and hamburger buns and English muffins. Biscuits. Muffins. Waffles. Pancakes. Light popcorn. Whole-grain cereals. Flatbread. Daniela toast. Pretzels. Breadsticks. Rusks. Low-fat snacks. Low-fat crackers, including oyster, saltine, matzo, conrad, animal, and rye. Rice and pasta, including brown rice " and pastas that are made with whole wheat.  Vegetables   All vegetables.  Fruits   All fruits, but limit coconut.  Meats and Other Protein Sources   Lean, well-trimmed beef, veal, pork, and lamb. Chicken and turkey without skin. All fish and shellfish. Wild duck, rabbit, pheasant, and venison. Egg whites or low-cholesterol egg substitutes. Dried beans, peas, lentils, and tofu. Seeds and most nuts.  Dairy   Low-fat or nonfat cheeses, including ricotta, string, and mozzarella. Skim or 1% milk that is liquid, powdered, or evaporated. Buttermilk that is made with low-fat milk. Nonfat or low-fat yogurt.  Beverages   Mineral water. Diet carbonated beverages.  Sweets and Desserts   Sherbets and fruit ices. Honey, jam, marmalade, jelly, and syrups. Meringues and gelatins. Pure sugar candy, such as hard candy, jelly beans, gumdrops, mints, marshmallows, and small amounts of dark chocolate. Mauro food cake.  Eat all sweets and desserts in moderation.  Fats and Oils   Nonhydrogenated (trans-free) margarines. Vegetable oils, including soybean, sesame, sunflower, olive, peanut, safflower, corn, canola, and cottonseed. Salad dressings or mayonnaise made with a vegetable oil. Limit added fats and oils that you use for cooking, baking, salads, and as spreads.  Other   Cocoa powder. Coffee and tea. All seasonings and condiments.  The items listed above may not be a complete list of recommended foods or beverages. Contact your dietitian for more options.   What foods are not recommended?  Grains   Breads that are made with saturated or trans fats, oils, or whole milk. Croissants. Butter rolls. Cheese breads. Sweet rolls. Donuts. Buttered popcorn. Chow mein noodles. High-fat crackers, such as cheese or butter crackers.  Meats and Other Protein Sources   Fatty meats, such as hotdogs, short ribs, sausage, spareribs, broussard, rib eye roast or steak, and mutton. High-fat deli meats, such as salami and bologna. Caviar. Domestic duck and  goose. Organ meats, such as kidney, liver, sweetbreads, and heart.  Dairy   Cream, sour cream, cream cheese, and creamed cottage cheese. Whole-milk cheeses, including blue (ciara), [place] Bruce, Brie, Karl, American, Havarti, Swiss, cheddar, Camembert, and [place]. Whole or 2% milk that is liquid, evaporated, or condensed. Whole buttermilk. Cream sauce or high-fat cheese sauce. Yogurt that is made from whole milk.  Beverages   Regular sodas and juice drinks with added sugar.  Sweets and Desserts   Frosting. Pudding. Cookies. Cakes other than alyssa food cake. Candy that has milk chocolate or white chocolate, hydrogenated fat, butter, coconut, or unknown ingredients. Buttered syrups. Full-fat ice cream or ice cream drinks.  Fats and Oils   Gravy that has suet, meat fat, or shortening. Cocoa butter, hydrogenated oils, palm oil, coconut oil, palm kernel oil. These can often be found in baked products, candy, fried foods, nondairy creamers, and whipped toppings. Solid fats and shortenings, including broussard fat, salt pork, lard, and butter. Nondairy cream substitutes, such as coffee creamers and sour cream substitutes. Salad dressings that are made of unknown oils, cheese, or sour cream.  The items listed above may not be a complete list of foods and beverages to avoid. Contact your dietitian for more information.   This information is not intended to replace advice given to you by your health care provider. Make sure you discuss any questions you have with your health care provider.  Document Released: 06/18/2013 Document Revised: 05/25/2017 Document Reviewed: 06/11/2015  © 2017 Elsevier

## 2019-01-06 ENCOUNTER — PATIENT OUTREACH (OUTPATIENT)
Dept: HEALTH INFORMATION MANAGEMENT | Facility: OTHER | Age: 64
End: 2019-01-06

## 2019-01-06 NOTE — PROGRESS NOTES
"Placed discharge outreach phone call to pt s/p hospital discharge 1/5/19.  Male answered and stated \"wrong phone number.\"  Discharge outreach letter mailed to pt.  "

## 2019-01-06 NOTE — LETTER
Sylvain Espinosa  [address] [place] [address]    January 6, 2019      Dear Sylvain Espinosa,    [facility] wants to ensure your discharge home is safe and you or your loved ones have had all of your questions answered regarding your care after you leave the hospital.    Our discharge team was unsuccessful in our attempts to contact you telephonically and we wanted to be sure that you had a list of resources and contact information should you have any questions regarding your hospital stay, follow-up instructions, or active medical symptoms.    Questions or Concerns Regarding… Contact   Medical Questions Related to Your Discharge  (7 days a week, 8am-5pm) Contact a Nurse Care Coordinator   [phone]   Medical Questions Not Related to Your Discharge  (24 hours a day / 7 days a week)  Contact the Nurse Health Line   [phone]    Medications or Discharge Instructions Refer to your discharge packet   or contact your -[phone]   Follow-up Appointment(s) Schedule your appointment via [employer]   or contact Scheduling [phone]   Billing Review your statement via [employer]  or contact Billing [phone]   Medical Records Review your records via [employer]   or contact Medical Records [phone]     You can also easily access your medical information, test results and upcoming appointments via the [employer] free online health management tool. You can learn more and sign up at [employer]. For assistance setting up your [employer] account, please call [phone].    Once again, we want to ensure your discharge home is safe and that you have a clear understanding of any next steps in your care. If you have any questions or concerns, please do not hesitate to contact us, we are here for you. Thank you for choosing [facility] for your healthcare needs.    Sincerely,      Your [facility] Healthcare Team

## 2019-01-07 DIAGNOSIS — R06.02 SHORTNESS OF BREATH: ICD-10-CM

## 2019-01-07 DIAGNOSIS — Z01.810 PRE-OPERATIVE CARDIOVASCULAR EXAMINATION: ICD-10-CM

## 2019-01-07 DIAGNOSIS — I35.0 NONRHEUMATIC AORTIC VALVE STENOSIS: ICD-10-CM

## 2019-01-07 DIAGNOSIS — R42 LIGHTHEADEDNESS: ICD-10-CM

## 2019-01-08 DIAGNOSIS — I35.0 SEVERE AORTIC STENOSIS: ICD-10-CM

## 2019-01-08 DIAGNOSIS — Z00.6 EXAMINATION OF PARTICIPANT IN CLINICAL TRIAL: ICD-10-CM

## 2019-01-09 ENCOUNTER — TELEPHONE (OUTPATIENT)
Dept: CARDIOLOGY | Facility: MEDICAL CENTER | Age: 64
End: 2019-01-09

## 2019-01-09 NOTE — TELEPHONE ENCOUNTER
Valve Conference Heart Team Plan of Care: 1/9/19    TAVR candidate: Not at this time  With Possible open heart: Yes   Access: TF, right iliac  Valve Size: 29  Anesthesia: GA   Unit: Tele   Incidental findings: none  Clearance needed prior to procedure: Yes. Drug rehab  Plan of care: Patient will be postponed until he obtains health insurance coverage. He will need to become active in a drug rehabilitation program and have PCP provide drug tests showing that he is clean from methamphetamine abuse for a minimum of two weeks prior to proceeding with any invasive cardiac treatments.       Patient has chosen to cancel his valve program consultation at this time. He will reschedule such consult once [employer] insurance coverage obtained, as his consult would have been OOP payment. Will have  follow up with patient in regards to rescheduling such consult.

## 2019-01-24 ENCOUNTER — OFFICE VISIT (OUTPATIENT)
Dept: CARDIOLOGY | Facility: MEDICAL CENTER | Age: 64
End: 2019-01-24
Payer: MEDICAID

## 2019-01-24 VITALS
OXYGEN SATURATION: 91 % | BODY MASS INDEX: 19.81 KG/M2 | HEART RATE: 104 BPM | SYSTOLIC BLOOD PRESSURE: 118 MMHG | HEIGHT: 68 IN | DIASTOLIC BLOOD PRESSURE: 78 MMHG | WEIGHT: 130.73 LBS

## 2019-01-24 DIAGNOSIS — F15.10 METHAMPHETAMINE ABUSE (HCC): ICD-10-CM

## 2019-01-24 DIAGNOSIS — I35.0 SEVERE AORTIC STENOSIS: ICD-10-CM

## 2019-01-24 PROCEDURE — 99245 OFF/OP CONSLTJ NEW/EST HI 55: CPT | Performed by: INTERNAL MEDICINE

## 2019-01-24 ASSESSMENT — ENCOUNTER SYMPTOMS
FOCAL WEAKNESS: 0
DIZZINESS: 0
MYALGIAS: 0
WEIGHT LOSS: 0
PALPITATIONS: 0
ABDOMINAL PAIN: 0
GASTROINTESTINAL NEGATIVE: 1
DEPRESSION: 0
PSYCHIATRIC NEGATIVE: 1
NERVOUS/ANXIOUS: 0
BRUISES/BLEEDS EASILY: 0
SHORTNESS OF BREATH: 1
HEADACHES: 0
CLAUDICATION: 0
NAUSEA: 0
VOMITING: 0
COUGH: 0
WEAKNESS: 0
EYES NEGATIVE: 1
CHILLS: 0
BLURRED VISION: 0
CONSTITUTIONAL NEGATIVE: 1
FEVER: 0
NEUROLOGICAL NEGATIVE: 1
DOUBLE VISION: 0
CARDIOVASCULAR NEGATIVE: 1
MUSCULOSKELETAL NEGATIVE: 1

## 2019-01-24 NOTE — PROGRESS NOTES
"Chief Complaint   Patient presents with   • Aortic Stenosis       Subjective:   Sylvain Espinosa is a 63 y.o. male who presents today for interventional consult/TAVR evaluation requested by Mitzi Trivedi for severe symptomatic aortic stenosis.    Thank you for allowing me to evaluate Mr. Joyce, who as you know is a 63 year old male with severe aortic stenosis and methamphetamine use. Since the discharge from [facility] on 01/05/19, he has continued to experience mild fatigue and shortness of breath. He denies shortness of breath, dyspnea on exertion, chest pain, dizziness or syncope. He has been living methamphetamine free at his parents house.    Past Medical History:   Diagnosis Date   • Abnormality of left ventricle of heart     \"constricted left ventricle\"    • BPH (benign prostatic hyperplasia)    • Hypercholesteremia    • Hypertension      Past Surgical History:   Procedure Laterality Date   • TONSILLECTOMY       Family History   Problem Relation Age of Onset   • Heart Disease Mother      Social History     Social History   • Marital status: Single     Spouse name: N/A   • Number of children: N/A   • Years of education: N/A     Occupational History   • Not on file.     Social History Main Topics   • Smoking status: Former Smoker     Packs/day: 0.20     Years: 40.00     Types: Cigarettes     Quit date: 10/15/2018   • Smokeless tobacco: Never Used   • Alcohol use Yes      Comment: occ   • Drug use: Yes     Types: Inhaled      Comment: marijuana current,  meth last use 1 monht ago   • Sexual activity: Not on file     Other Topics Concern   • Not on file     Social History Narrative   • No narrative on file     No Known Allergies     Medications reviewed.    Outpatient Encounter Prescriptions as of 1/24/2019   Medication Sig Dispense Refill   • amLODIPine (NORVASC) 2.5 MG Tab Take 1 Tab by mouth every day. 30 Tab 0   • furosemide (LASIX) 20 MG Tab Take 1 Tab by mouth every day. 30 Tab 0   • " "potassium chloride SA (K-DUR) 10 MEQ Tab CR Take 1 Tab by mouth every day. 30 Tab 0   • [DISCONTINUED] atorvastatin (LIPITOR) 20 MG Tab Take 20 mg by mouth every evening.     • [DISCONTINUED] doxazosin (CARDURA) 2 MG Tab Take 2 mg by mouth every day.     • [DISCONTINUED] aspirin EC (ECOTRIN) 81 MG Tablet Delayed Response Take 81 mg by mouth every day.       No facility-administered encounter medications on file as of 1/24/2019.      Review of Systems   Constitutional: Negative.  Negative for chills, fever, malaise/fatigue and weight loss.   HENT: Negative.  Negative for hearing loss.    Eyes: Negative.  Negative for blurred vision and double vision.   Respiratory: Positive for shortness of breath. Negative for cough.    Cardiovascular: Negative.  Negative for chest pain, palpitations, claudication and leg swelling.   Gastrointestinal: Negative.  Negative for abdominal pain, nausea and vomiting.   Genitourinary: Negative.  Negative for dysuria and urgency.   Musculoskeletal: Negative.  Negative for joint pain and myalgias.   Skin: Negative.  Negative for itching and rash.   Neurological: Negative.  Negative for dizziness, focal weakness, weakness and headaches.   Endo/Heme/Allergies: Negative.  Does not bruise/bleed easily.   Psychiatric/Behavioral: Negative.  Negative for depression. The patient is not nervous/anxious.         Objective:   /78 (BP Location: Left arm, Patient Position: Sitting, BP Cuff Size: Adult)   Pulse (!) 104   Ht 1.715 m (5' 7.5\")   Wt 59.3 kg (130 lb 11.7 oz)   SpO2 91%   BMI 20.17 kg/m²     Physical Exam   Constitutional: He is oriented to person, place, and time. He appears well-developed and well-nourished.   HENT:   Head: Normocephalic and atraumatic.   Eyes: Pupils are equal, round, and reactive to light. Conjunctivae are normal.   Neck: Normal range of motion. Neck supple.   Cardiovascular: Normal rate and regular rhythm.    Murmur heard.  Pulmonary/Chest: Effort normal and " breath sounds normal.   Abdominal: Soft. Bowel sounds are normal.   Musculoskeletal: Normal range of motion.   Neurological: He is alert and oriented to person, place, and time.   Skin: Skin is warm and dry.   Psychiatric: He has a normal mood and affect.     CARDIAC STUDIES/PROCEDURES:    CARDIAC CATHETERIZATION CONCLUSIONS (01/01/19)  1.  Severe aortic stenosis with peak to peak gradient of 80 mmHg, mean   gradient of 61 mmHg, calculated TAYA of 0.48 cm2.  2.  No angiographic evidence of coronary artery disease.  3.  Mildly reduced left ventricular systolic function with ejection fraction of 55%.  4.  Elevated left ventricular end diastolic pressure.  5.  Elevated right heart pressure with PA systolic pressure of 65 mmHg.  6.  Mildly dilated ascending aorta.  7.  Ectatic and diffusely atherosclerotic distal abdominal aorta with   bilateral iliofemoral system, moderate in caliber, bilateral nonobstructive   stenosis with tortuosity left greater than right.  (study result reviewed)    CAROTID ULTRASOUND (01/03/19)  1.  Less than 50% stenosis in the right carotid bifurcation with no    evidence of internal carotid arterial stenosis.  2.  Normal left carotid duplex examination.  3.  Antegrade bilateral vertebral arterial flow.  (study result reviewed)    CTA OF CHEST AND ABDOMEN (01/04/15)  1.  Mean aortic valvular area 584 sq mm  2.  The coronary arteries originate just greater than 10 mm above the aortic annulus  3.  Minimal diameter right external iliac artery 6.1 mm with minimal tortuosity  4.  Minimal diameter left external iliac artery 5.7 mm with moderate tortuosity  5.  3.7 x 3.5 cm infrarenal abdominal aortic aneurysm  6.  Hepatic fatty infiltration and moderate hepatomegaly  7.  2.6 cm benign cavernous hemangioma within the right lobe of the liver(study result reviewed)  (study result reviewed)    ECHOCARDIOGRAM CONCLUSIONS (12/30/18)  Moderate concentric left ventricular hypertrophy.  Left ventricular  systolic function is low normal.  Severe aortic stenosis.  Transvalvular gradients are - Peak: 98 mmHg, Mean: 56 mmHg.  Estimated right ventricular systolic pressure  is 55 mmHg.  Compared to the images of the study done on 7/19/17, previous severe   aortic stenosis is now critical aortic stenosis and previously normal   left ventricular ejection fraction is now mildly reduced.  (study result reviewed)    EKG performed on (12/29/18) was reviewed: EKG shows sinus tachycardia with Left ventricular hypertrophy.    Laboratory results of (01/05/19) were reviewed. Bun of 28 mg/dl, creatinine levels of 1.15 mg/dl noted.    Assessment:     1. Severe aortic stenosis     2. Methamphetamine abuse (HCC)       Medical Decision Making:  Today's Assessment / Status / Plan:     1. Aortic stenosis: He is symptomatic with his severe aortic stenosis, NYHA class IIIB. He is not a surgical candidate due to excessive risk with STS score of 3-5% range especially considering his active methamphetamine abuse per Dr. Yun. We will schedule him for TAVR when he completes his drug use treatment and assessment. He understands the risks and benefits and agrees with plan.  2. Methamphetamine use: He has been drug free.    The risks, benefits, and alternatives to TAVR, general anesthesia and transesophageal echocardiogram were discussed in great detail. Specific risks mentioned include bleeding, infection, kidney damage, allergic reaction, cardiac perforation with possible tamponade requiring natalie-cardiocentesis or possible open heart surgery if the patient is a candidate. Lastly the risks of heart attack, stroke, and death were discussed; the risks of major complications includingall cause mortality of 2.2%, disabling stroke of 1.1%, the risk on new pacemaker of 12% and the risk of vascular complications of 4.1%. The patient verbalized understanding of these potential complications and wishes to proceed with this procedure. (Source 3  Registry).  The procedure will be performed completely in collaboration with cardiac surgery.    We will follow up in one month in valve clinic.    CC Jocelyne Paredes

## 2019-01-24 NOTE — LETTER
"     [facility]   [address]  [place] [address]  Phone: [phone]  Fax: [phone]              Sylvain Espinosa  1955    Encounter Date: 1/24/2019    Dariel Moser M.D.          PROGRESS NOTE:  Chief Complaint   Patient presents with   • Aortic Stenosis       Subjective:   Sylvain Espinosa is a 63 y.o. male who presents today for interventional consult/TAVR evaluation requested by Mitzi Trivedi for severe symptomatic aortic stenosis.    Thank you for allowing me to evaluate Mr. Joyce, who as you know is a 63 year old male with severe aortic stenosis and methamphetamine use. Since the discharge from [facility] on 01/05/19, he has continued to experience mild fatigue and shortness of breath. He denies shortness of breath, dyspnea on exertion, chest pain, dizziness or syncope. He has been living methamphetamine free at his parents house.    Past Medical History:   Diagnosis Date   • Abnormality of left ventricle of heart     \"constricted left ventricle\"    • BPH (benign prostatic hyperplasia)    • Hypercholesteremia    • Hypertension      Past Surgical History:   Procedure Laterality Date   • TONSILLECTOMY       Family History   Problem Relation Age of Onset   • Heart Disease Mother      Social History     Social History   • Marital status: Single     Spouse name: N/A   • Number of children: N/A   • Years of education: N/A     Occupational History   • Not on file.     Social History Main Topics   • Smoking status: Former Smoker     Packs/day: 0.20     Years: 40.00     Types: Cigarettes     Quit date: 10/15/2018   • Smokeless tobacco: Never Used   • Alcohol use Yes      Comment: occ   • Drug use: Yes     Types: Inhaled      Comment: marijuana current,  meth last use 1 monht ago   • Sexual activity: Not on file     Other Topics Concern   • Not on file     Social History Narrative   • No narrative on file     No Known Allergies     " "    Medications reviewed.    Outpatient Encounter Prescriptions as of 1/24/2019   Medication Sig Dispense Refill   • amLODIPine (NORVASC) 2.5 MG Tab Take 1 Tab by mouth every day. 30 Tab 0   • furosemide (LASIX) 20 MG Tab Take 1 Tab by mouth every day. 30 Tab 0   • potassium chloride SA (K-DUR) 10 MEQ Tab CR Take 1 Tab by mouth every day. 30 Tab 0   • [DISCONTINUED] atorvastatin (LIPITOR) 20 MG Tab Take 20 mg by mouth every evening.     • [DISCONTINUED] doxazosin (CARDURA) 2 MG Tab Take 2 mg by mouth every day.     • [DISCONTINUED] aspirin EC (ECOTRIN) 81 MG Tablet Delayed Response Take 81 mg by mouth every day.       No facility-administered encounter medications on file as of 1/24/2019.      Review of Systems   Constitutional: Negative.  Negative for chills, fever, malaise/fatigue and weight loss.   HENT: Negative.  Negative for hearing loss.    Eyes: Negative.  Negative for blurred vision and double vision.   Respiratory: Positive for shortness of breath. Negative for cough.    Cardiovascular: Negative.  Negative for chest pain, palpitations, claudication and leg swelling.   Gastrointestinal: Negative.  Negative for abdominal pain, nausea and vomiting.   Genitourinary: Negative.  Negative for dysuria and urgency.   Musculoskeletal: Negative.  Negative for joint pain and myalgias.   Skin: Negative.  Negative for itching and rash.   Neurological: Negative.  Negative for dizziness, focal weakness, weakness and headaches.   Endo/Heme/Allergies: Negative.  Does not bruise/bleed easily.   Psychiatric/Behavioral: Negative.  Negative for depression. The patient is not nervous/anxious.         Objective:   /78 (BP Location: Left arm, Patient Position: Sitting, BP Cuff Size: Adult)   Pulse (!) 104   Ht 1.715 m (5' 7.5\")   Wt 59.3 kg (130 lb 11.7 oz)   SpO2 91%   BMI 20.17 kg/m²      Physical Exam   Constitutional: He is oriented to person, place, and time. He appears well-developed and well-nourished.   HENT: "   Head: Normocephalic and atraumatic.   Eyes: Pupils are equal, round, and reactive to light. Conjunctivae are normal.   Neck: Normal range of motion. Neck supple.   Cardiovascular: Normal rate and regular rhythm.    Murmur heard.  Pulmonary/Chest: Effort normal and breath sounds normal.   Abdominal: Soft. Bowel sounds are normal.   Musculoskeletal: Normal range of motion.   Neurological: He is alert and oriented to person, place, and time.   Skin: Skin is warm and dry.   Psychiatric: He has a normal mood and affect.     CARDIAC STUDIES/PROCEDURES:    CARDIAC CATHETERIZATION CONCLUSIONS (01/01/19)  1.  Severe aortic stenosis with peak to peak gradient of 80 mmHg, mean   gradient of 61 mmHg, calculated TAYA of 0.48 cm2.  2.  No angiographic evidence of coronary artery disease.  3.  Mildly reduced left ventricular systolic function with ejection fraction of 55%.  4.  Elevated left ventricular end diastolic pressure.  5.  Elevated right heart pressure with PA systolic pressure of 65 mmHg.  6.  Mildly dilated ascending aorta.  7.  Ectatic and diffusely atherosclerotic distal abdominal aorta with   bilateral iliofemoral system, moderate in caliber, bilateral nonobstructive   stenosis with tortuosity left greater than right.  (study result reviewed)    CAROTID ULTRASOUND (01/03/19)  1.  Less than 50% stenosis in the right carotid bifurcation with no    evidence of internal carotid arterial stenosis.  2.  Normal left carotid duplex examination.  3.  Antegrade bilateral vertebral arterial flow.  (study result reviewed)    CTA OF CHEST AND ABDOMEN (01/04/15)  1.  Mean aortic valvular area 584 sq mm  2.  The coronary arteries originate just greater than 10 mm above the aortic annulus  3.  Minimal diameter right external iliac artery 6.1 mm with minimal tortuosity  4.  Minimal diameter left external iliac artery 5.7 mm with moderate tortuosity  5.  3.7 x 3.5 cm infrarenal abdominal aortic aneurysm  6.  Hepatic fatty  infiltration and moderate hepatomegaly  7.  2.6 cm benign cavernous hemangioma within the right lobe of the liver(study result reviewed)  (study result reviewed)    ECHOCARDIOGRAM CONCLUSIONS (12/30/18)  Moderate concentric left ventricular hypertrophy.  Left ventricular systolic function is low normal.  Severe aortic stenosis.  Transvalvular gradients are - Peak: 98 mmHg, Mean: 56 mmHg.  Estimated right ventricular systolic pressure  is 55 mmHg.  Compared to the images of the study done on 7/19/17, previous severe   aortic stenosis is now critical aortic stenosis and previously normal   left ventricular ejection fraction is now mildly reduced.  (study result reviewed)    EKG performed on (12/29/18) was reviewed: EKG shows sinus tachycardia with Left ventricular hypertrophy.    Laboratory results of (01/05/19) were reviewed. Bun of 28 mg/dl, creatinine levels of 1.15 mg/dl noted.    Assessment:     1. Severe aortic stenosis     2. Methamphetamine abuse (HCC)       Medical Decision Making:  Today's Assessment / Status / Plan:     1. Aortic stenosis: He is symptomatic with his severe aortic stenosis, NYHA class IIIB. He is not a surgical candidate due to excessive risk with STS score of 3-5% range especially considering his active methamphetamine abuse per Dr. Yun. We will schedule him for TAVR. He understands the risks and benefits and agrees with plan.  2. Methamphetamine use: He has been drug free.    The risks, benefits, and alternatives to TAVR, general anesthesia and transesophageal echocardiogram were discussed in great detail. Specific risks mentioned include bleeding, infection, kidney damage, allergic reaction, cardiac perforation with possible tamponade requiring natalie-cardiocentesis or possible open heart surgery if the patient is a candidate. Lastly the risks of heart attack, stroke, and death were discussed; the risks of major complications includingall cause mortality of 2.2%, disabling stroke of  1.1%, the risk on new pacemaker of 12% and the risk of vascular complications of 4.1%. The patient verbalized understanding of these potential complications and wishes to proceed with this procedure. (Source 3 Registry).  The procedure will be performed completely in collaboration with cardiac surgery.    We will follow up in one month in valve clinic.    CC Jocelyne Paredes      No Recipients

## 2019-02-01 ENCOUNTER — OFFICE VISIT (OUTPATIENT)
Dept: CARDIOLOGY | Facility: MEDICAL CENTER | Age: 64
End: 2019-02-01
Payer: MEDICAID

## 2019-02-01 VITALS
WEIGHT: 139 LBS | HEART RATE: 90 BPM | OXYGEN SATURATION: 96 % | SYSTOLIC BLOOD PRESSURE: 110 MMHG | HEIGHT: 67 IN | DIASTOLIC BLOOD PRESSURE: 80 MMHG | BODY MASS INDEX: 21.82 KG/M2

## 2019-02-01 DIAGNOSIS — I35.0 SEVERE AORTIC STENOSIS: ICD-10-CM

## 2019-02-01 DIAGNOSIS — F15.10 METHAMPHETAMINE ABUSE (HCC): ICD-10-CM

## 2019-02-01 DIAGNOSIS — Z72.0 TOBACCO ABUSE: ICD-10-CM

## 2019-02-01 DIAGNOSIS — I10 ESSENTIAL HYPERTENSION: Chronic | ICD-10-CM

## 2019-02-01 PROBLEM — E83.42 HYPOMAGNESEMIA: Status: RESOLVED | Noted: 2018-12-31 | Resolved: 2019-02-01

## 2019-02-01 PROBLEM — N17.9 AKI (ACUTE KIDNEY INJURY) (HCC): Status: RESOLVED | Noted: 2017-07-04 | Resolved: 2019-02-01

## 2019-02-01 PROBLEM — I50.1 PULMONARY EDEMA CARDIAC CAUSE (HCC): Status: RESOLVED | Noted: 2018-12-31 | Resolved: 2019-02-01

## 2019-02-01 PROBLEM — E86.0 DEHYDRATION: Status: RESOLVED | Noted: 2017-07-05 | Resolved: 2019-02-01

## 2019-02-01 PROCEDURE — 99214 OFFICE O/P EST MOD 30 MIN: CPT | Performed by: NURSE PRACTITIONER

## 2019-02-01 ASSESSMENT — ENCOUNTER SYMPTOMS
FEVER: 0
DIZZINESS: 0
MYALGIAS: 0
COUGH: 0
CLAUDICATION: 0
PND: 0
PALPITATIONS: 0
ORTHOPNEA: 0
SHORTNESS OF BREATH: 0
ABDOMINAL PAIN: 0

## 2019-02-01 NOTE — PROGRESS NOTES
"Chief Complaint   Patient presents with   • HTN (Controlled)     follow up     Subjective:   Sylvain Espinosa is a 63 y.o. male who presents today for one week follow up to bring drug screening and clearance letter into clinic.    He is a patient of Dr. Moser in our office.    Hx of severe AS pending TAVR, HLD, HTN, BPH, meth abuse, hepatitis C, and former smoker.    He lives currently in [facility] with his sister and mother. His mother who is very frail, seems to be concerned about his health but the sister doesn't seem to want to be involved in his plan of care.    He is planning to live back in [place] after the surgery with his friend Velvet. He will contact her today and make sure he has a room after he discharged from his tentative surgery date.    He still has yet to bring his clearance letter in. He was confused on that he needed to establish with a PCP to get a drug screen.    His only symptom currently is generalized fatigue.    He has had no episodes of chest pain, palpitations, dizziness/lightheadedness, shortness of breath, orthopnea, or peripheral edema.    Past Medical History:   Diagnosis Date   • Abnormality of left ventricle of heart     \"constricted left ventricle\"    • Aortic stenosis    • BPH (benign prostatic hyperplasia)    • Hypercholesteremia    • Hypertension      Past Surgical History:   Procedure Laterality Date   • TONSILLECTOMY       Family History   Problem Relation Age of Onset   • Heart Disease Mother      Social History     Social History   • Marital status: Single     Spouse name: N/A   • Number of children: N/A   • Years of education: N/A     Occupational History   • Not on file.     Social History Main Topics   • Smoking status: Former Smoker     Packs/day: 0.20     Years: 40.00     Types: Cigarettes     Quit date: 10/15/2018   • Smokeless tobacco: Never Used   • Alcohol use Yes      Comment: occ   • Drug use: Yes     Types: Inhaled      Comment: marijuana current,  meth last use 1 " "monht ago   • Sexual activity: Not on file     Other Topics Concern   • Not on file     Social History Narrative   • No narrative on file     No Known Allergies  Outpatient Encounter Prescriptions as of 2/1/2019   Medication Sig Dispense Refill   • amLODIPine (NORVASC) 2.5 MG Tab Take 1 Tab by mouth every day. 30 Tab 0   • furosemide (LASIX) 20 MG Tab Take 1 Tab by mouth every day. 30 Tab 0   • potassium chloride SA (K-DUR) 10 MEQ Tab CR Take 1 Tab by mouth every day. 30 Tab 0     No facility-administered encounter medications on file as of 2/1/2019.      Review of Systems   Constitutional: Positive for malaise/fatigue. Negative for fever.        Generalized with walking   Respiratory: Negative for cough and shortness of breath.    Cardiovascular: Negative for chest pain, palpitations, orthopnea, claudication, leg swelling and PND.   Gastrointestinal: Negative for abdominal pain.   Musculoskeletal: Negative for myalgias.   Neurological: Negative for dizziness.        Objective:   /80 (BP Location: Left arm, Patient Position: Sitting, BP Cuff Size: Adult)   Pulse 90   Ht 1.702 m (5' 7\")   Wt 63 kg (139 lb)   SpO2 96%   BMI 21.77 kg/m²     Physical Exam   Constitutional: He appears well-developed and well-nourished.   HENT:   Head: Normocephalic and atraumatic.   Eyes: EOM are normal.   Neck: No JVD present.   Cardiovascular: Normal rate, regular rhythm and intact distal pulses.    Murmur heard.   Systolic murmur is present with a grade of 2/6   Pulmonary/Chest: Effort normal and breath sounds normal.   Musculoskeletal: Normal range of motion. He exhibits no edema.   Skin: Skin is warm and dry.   Psychiatric: He has a normal mood and affect.   Nursing note and vitals reviewed.      Assessment:     1. Severe aortic stenosis     2. Essential hypertension     3. Methamphetamine abuse (HCC)     4. Tobacco abuse       Medical Decision Making:  Today's Assessment / Status / Plan:     1. Severe AS pending " TAVR  -he needs drug clearance letter and that he is establishing with PCP for drug rehab/prevention program  -once we receive this letter, we will schedule surgery date (TENTATIVE DATE 2/25) dependent on this letter  -cont diuresis for now  -he will fax the clearance and drug screen to our office, which he states he will get in [facility]    2. HTN  -good control on amlodipine and lasix    3. Meth and tobacco abuse  -cessation per patient  -need drug screen and PCP discussion about this    FU in clinic in 1 month with SC post surgery (tentative)    Patient verbalizes understanding and agrees with the plan of care.     Collaborating MD: Michi Dietrich MD

## 2019-02-01 NOTE — LETTER
"     [facility]   [address], [facility] [address]  [place] [address]  Phone: [phone]  Fax: [phone]              Sylvain Espinosa  1955    Encounter Date: 2/1/2019    JANIYA Hernandez          PROGRESS NOTE:  Chief Complaint   Patient presents with   • HTN (Controlled)     follow up     Subjective:   Sylvain Espinosa is a 63 y.o. male who presents today for one week follow up to bring drug screening and clearance letter into clinic.    He is a patient of Dr. Moser in our office.    Hx of severe AS pending TAVR, HLD, HTN, BPH, meth abuse, hepatitis C, and former smoker.    He lives currently in [facility] with his sister and mother. His mother who is very frail, seems to be concerned about his health but the sister doesn't seem to want to be involved in his plan of care.    He is planning to live back in [place] after the surgery with his friend Velvet. He will contact her today and make sure he has a room after he discharged from his tentative surgery date.    He still has yet to bring his clearance letter in. He was confused on that he needed to establish with a PCP to get a drug screen.    His only symptom currently is generalized fatigue.    He has had no episodes of chest pain, palpitations, dizziness/lightheadedness, shortness of breath, orthopnea, or peripheral edema.    Past Medical History:   Diagnosis Date   • Abnormality of left ventricle of heart     \"constricted left ventricle\"    • Aortic stenosis    • BPH (benign prostatic hyperplasia)    • Hypercholesteremia    • Hypertension      Past Surgical History:   Procedure Laterality Date   • TONSILLECTOMY       Family History   Problem Relation Age of Onset   • Heart Disease Mother      Social History     Social History   • Marital status: Single     Spouse name: N/A   • Number of children: N/A   • Years of education: N/A     Occupational History   • Not on file.     Social History Main Topics   • " "Smoking status: Former Smoker     Packs/day: 0.20     Years: 40.00     Types: Cigarettes     Quit date: 10/15/2018   • Smokeless tobacco: Never Used   • Alcohol use Yes      Comment: occ   • Drug use: Yes     Types: Inhaled      Comment: marijuana current,  meth last use 1 monht ago   • Sexual activity: Not on file     Other Topics Concern   • Not on file     Social History Narrative   • No narrative on file     No Known Allergies  Outpatient Encounter Prescriptions as of 2/1/2019   Medication Sig Dispense Refill   • amLODIPine (NORVASC) 2.5 MG Tab Take 1 Tab by mouth every day. 30 Tab 0   • furosemide (LASIX) 20 MG Tab Take 1 Tab by mouth every day. 30 Tab 0   • potassium chloride SA (K-DUR) 10 MEQ Tab CR Take 1 Tab by mouth every day. 30 Tab 0     No facility-administered encounter medications on file as of 2/1/2019.      Review of Systems   Constitutional: Positive for malaise/fatigue. Negative for fever.        Generalized with walking   Respiratory: Negative for cough and shortness of breath.    Cardiovascular: Negative for chest pain, palpitations, orthopnea, claudication, leg swelling and PND.   Gastrointestinal: Negative for abdominal pain.   Musculoskeletal: Negative for myalgias.   Neurological: Negative for dizziness.        Objective:   /80 (BP Location: Left arm, Patient Position: Sitting, BP Cuff Size: Adult)   Pulse 90   Ht 1.702 m (5' 7\")   Wt 63 kg (139 lb)   SpO2 96%   BMI 21.77 kg/m²      Physical Exam   Constitutional: He appears well-developed and well-nourished.   HENT:   Head: Normocephalic and atraumatic.   Eyes: EOM are normal.   Neck: No JVD present.   Cardiovascular: Normal rate, regular rhythm and intact distal pulses.    Murmur heard.   Systolic murmur is present with a grade of 2/6   Pulmonary/Chest: Effort normal and breath sounds normal.   Musculoskeletal: Normal range of motion. He exhibits no edema.   Skin: Skin is warm and dry.   Psychiatric: He has a normal mood and " affect.   Nursing note and vitals reviewed.      Assessment:     1. Severe aortic stenosis     2. Essential hypertension     3. Methamphetamine abuse (HCC)     4. Tobacco abuse       Medical Decision Making:  Today's Assessment / Status / Plan:     1. Severe AS pending TAVR  -he needs drug clearance letter and that he is establishing with PCP for drug rehab/prevention program  -once we receive this letter, we will schedule surgery date (TENTATIVE DATE 2/25) dependent on this letter  -cont diuresis for now  -he will fax the clearance and drug screen to our office, which he states he will get in [place] clinic    2. HTN  -good control on amlodipine and lasix    3. Meth and tobacco abuse  -cessation per patient  -need drug screen and PCP discussion about this    FU in clinic in 1 month with SC post surgery (tentative)    Patient verbalizes understanding and agrees with the plan of care.     Collaborating MD: Michi Dietrich MD        No Recipients

## 2019-02-06 DIAGNOSIS — I10 ESSENTIAL HYPERTENSION: Primary | ICD-10-CM

## 2019-02-06 RX ORDER — POTASSIUM CHLORIDE 750 MG/1
10 TABLET, EXTENDED RELEASE ORAL DAILY
Qty: 30 TAB | Refills: 3 | Status: SHIPPED | OUTPATIENT
Start: 2019-02-06 | End: 2019-10-10

## 2019-02-06 RX ORDER — AMLODIPINE BESYLATE 2.5 MG/1
2.5 TABLET ORAL DAILY
Qty: 30 TAB | Refills: 3 | Status: SHIPPED | OUTPATIENT
Start: 2019-02-06 | End: 2019-10-10

## 2019-02-06 RX ORDER — FUROSEMIDE 20 MG/1
20 TABLET ORAL DAILY
Qty: 30 TAB | Refills: 3 | Status: ON HOLD | OUTPATIENT
Start: 2019-02-06 | End: 2019-03-16

## 2019-02-19 ENCOUNTER — TELEPHONE (OUTPATIENT)
Dept: CARDIOLOGY | Facility: MEDICAL CENTER | Age: 64
End: 2019-02-19

## 2019-02-19 DIAGNOSIS — I35.0 SEVERE AORTIC STENOSIS: ICD-10-CM

## 2019-02-19 DIAGNOSIS — Z00.6 EXAMINATION OF PARTICIPANT IN CLINICAL TRIAL: ICD-10-CM

## 2019-02-19 NOTE — TELEPHONE ENCOUNTER
Spoke with patient regarding APRN's request for drug screening in order to proceed with TAVR.     Patient states that he did complete PCP visit with Dr. Mickey López at [facility] in [place], [phone]. Assured patient we will obtain copies of such. Our heart team will reviewing his case and confirming his plan of care tomorrow 2/20/19. Assured patient I will return call tomorrow with updates of plan of care. Patient states understanding and very thankful for call.     Contacted Dr. López's office regarding obtaining records. Spoke with medical records who has sent all labs, office note,and CTA chest from 2/13/19. Records sent to HIM for scanning to patient's records.

## 2019-02-20 ENCOUNTER — TELEPHONE (OUTPATIENT)
Dept: CARDIOLOGY | Facility: MEDICAL CENTER | Age: 64
End: 2019-02-20

## 2019-02-20 NOTE — TELEPHONE ENCOUNTER
Valve Conference Heart Team Plan of Care: 2/20/19    TAVR candidate: Yes   With Possible open heart: Yes   Access: TF right iliac  Valve Size: 29 S3   Anesthesia: GA / MAC  Unit: Tele   Incidental findings: none   Clearance needed prior to procedure: drug screening negative PCP clearance received.   Plan of care: Proceed with TAVR using MAC 2/25/19.       Spoke with patient and made aware of heart team's recommendations. Reviewed pre-op instructions, including to check in [employer] [place] 0730 2/25. Patient reports the he, his sister, and his mother will be coming into town Saturday or Sunday and staying at the [facility] until after his doctor visit 3/4/19. Patient agrees with the plan and states no further questions at this time.

## 2019-02-22 ENCOUNTER — TELEPHONE (OUTPATIENT)
Dept: CARDIOLOGY | Facility: MEDICAL CENTER | Age: 64
End: 2019-02-22

## 2019-02-23 NOTE — TELEPHONE ENCOUNTER
Spoke with patient explaining there has been a delay in obtianing auth from his insurance. Explained our team would like him to check in 2/25 at 1000 as opposed to 0730 to allow to obtain auth #. Explained that if insurance auth unable to obtained by this time we will unfortunately have to reschedule, however our team is confident we will have success.    Patient states understanding and request to review medication instructions for 2/25 AM. Clarified patient is to be NPO at midnight prior to procedure and also taking no medications AM of procedure. Patient states understanding, states no further questions at this time and very thankful for call.

## 2019-02-25 ENCOUNTER — HOSPITAL ENCOUNTER (OUTPATIENT)
Facility: MEDICAL CENTER | Age: 64
End: 2019-02-25
Attending: INTERNAL MEDICINE | Admitting: INTERNAL MEDICINE
Payer: MEDICAID

## 2019-02-25 RX ORDER — DIPHENHYDRAMINE HYDROCHLORIDE 50 MG/ML
12.5 INJECTION INTRAMUSCULAR; INTRAVENOUS
Status: CANCELLED | OUTPATIENT
Start: 2019-02-25

## 2019-02-25 RX ORDER — HYDRALAZINE HYDROCHLORIDE 20 MG/ML
5 INJECTION INTRAMUSCULAR; INTRAVENOUS
Status: CANCELLED | OUTPATIENT
Start: 2019-02-25

## 2019-02-25 RX ORDER — MEPERIDINE HYDROCHLORIDE 25 MG/ML
6.25 INJECTION INTRAMUSCULAR; INTRAVENOUS; SUBCUTANEOUS
Status: CANCELLED | OUTPATIENT
Start: 2019-02-25

## 2019-02-25 RX ORDER — METOPROLOL TARTRATE 1 MG/ML
1 INJECTION, SOLUTION INTRAVENOUS
Status: CANCELLED | OUTPATIENT
Start: 2019-02-25

## 2019-02-25 RX ORDER — HALOPERIDOL 5 MG/ML
1 INJECTION INTRAMUSCULAR
Status: CANCELLED | OUTPATIENT
Start: 2019-02-25

## 2019-02-25 RX ORDER — ONDANSETRON 2 MG/ML
4 INJECTION INTRAMUSCULAR; INTRAVENOUS
Status: CANCELLED | OUTPATIENT
Start: 2019-02-25

## 2019-02-27 ENCOUNTER — TELEPHONE (OUTPATIENT)
Dept: CARDIOLOGY | Facility: MEDICAL CENTER | Age: 64
End: 2019-02-27

## 2019-02-27 DIAGNOSIS — I35.0 SEVERE AORTIC STENOSIS: ICD-10-CM

## 2019-02-27 DIAGNOSIS — Z00.6 EXAMINATION OF PARTICIPANT IN CLINICAL TRIAL: ICD-10-CM

## 2019-02-27 NOTE — TELEPHONE ENCOUNTER
Valve Conference Heart Team Plan of Care: 2/27/19    TAVR candidate: Yes   With Possible open heart: Yes   Access: TF, right iliac  Valve Size:  29 S3  Anesthesia:  MAC  Unit: Tele   Incidental findings: none  Clearance needed prior to procedure: housing  Plan of care: we will confirm patient housing status. Plan to proceed with rescheduling TAVR  3/4/19 determined by insurance authorization.        Spoke with patient's sister, who reports that her and her mother dropped patient off at [facility] on [address] Once he discharged 2/25/19. She reports that she is unaware of any contact information for this patient.     Contacted [facility] on [address] [phone].  provided contact information they have on file for patient [phone]. MA was able to confirm patient has not been seen in their clinic or requested resources via their center since Dec 2018.    Called all numbers listed in patient's records, including the one given by [facility]. All numbers either not in service or are family member's phone numbers, who are not in contact with patient.     Will update team that we are unable to contact patient at this time.

## 2019-03-04 ENCOUNTER — TELEPHONE (OUTPATIENT)
Dept: CARDIOLOGY | Facility: MEDICAL CENTER | Age: 64
End: 2019-03-04

## 2019-03-04 NOTE — TELEPHONE ENCOUNTER
Received call from Surgery Check-in staff informing that patient is there currently. Informed that unfortunately his insurance has not authorized his procedure yet.     Requested to verify contact information so that I may communicate updates regarding authorization and potential reschedule dates for TAVR.     Patient states that he is living in [facility] on [address] in room [address]. He states if we call the  [phone] they will transfer call to room #216 or take message for him. He also requests that communication still be corresponded via his mother in [place].      Assured patient that our staff are working diligently on obtaining authorization from his insurance, and once received I will reach out in regards to rescheduling his procedure. Patient states understanding and very thankful for assistance.

## 2019-03-07 ENCOUNTER — HOSPITAL ENCOUNTER (OUTPATIENT)
Facility: MEDICAL CENTER | Age: 64
End: 2019-03-07
Attending: INTERNAL MEDICINE | Admitting: INTERNAL MEDICINE
Payer: MEDICAID

## 2019-03-07 DIAGNOSIS — Z00.6 EXAMINATION OF PARTICIPANT IN CLINICAL TRIAL: ICD-10-CM

## 2019-03-07 DIAGNOSIS — I35.0 SEVERE AORTIC STENOSIS: ICD-10-CM

## 2019-03-11 ENCOUNTER — TELEPHONE (OUTPATIENT)
Dept: CARDIOLOGY | Facility: MEDICAL CENTER | Age: 64
End: 2019-03-11

## 2019-03-11 DIAGNOSIS — Z00.6 EXAMINATION OF PARTICIPANT IN CLINICAL TRIAL: ICD-10-CM

## 2019-03-11 DIAGNOSIS — I35.0 SEVERE AORTIC STENOSIS: ICD-10-CM

## 2019-03-11 NOTE — TELEPHONE ENCOUNTER
Patient calling to request rescheduling of his TAVR he missed today. He states he was with his housing representative trying to get permanent housing as he has been living in [facility] room #216.     Explained that I will need to confirm with our heart team regarding rescheduling to next Monday 3/18/19. Explained that I need patient to call me Wednesday 13, 2019 at 1300 to confirm that we are able to reschedule him and review when/where to check in. Patient writes down my contact information, reads it back, and agrees to call me at time discussed.     Spoke with our heart team, and they agree to reschedule TAVR to 3/18/19. Patient case will be re-presented at valve conference 3/13/19.

## 2019-03-14 ENCOUNTER — HOSPITAL ENCOUNTER (INPATIENT)
Facility: MEDICAL CENTER | Age: 64
LOS: 2 days | DRG: 291 | End: 2019-03-16
Attending: EMERGENCY MEDICINE | Admitting: INTERNAL MEDICINE
Payer: MEDICAID

## 2019-03-14 ENCOUNTER — APPOINTMENT (OUTPATIENT)
Dept: RADIOLOGY | Facility: MEDICAL CENTER | Age: 64
DRG: 291 | End: 2019-03-14
Attending: EMERGENCY MEDICINE
Payer: MEDICAID

## 2019-03-14 DIAGNOSIS — I10 ESSENTIAL HYPERTENSION: ICD-10-CM

## 2019-03-14 DIAGNOSIS — R06.00 DYSPNEA, UNSPECIFIED TYPE: ICD-10-CM

## 2019-03-14 DIAGNOSIS — R79.89 ELEVATED TROPONIN: ICD-10-CM

## 2019-03-14 DIAGNOSIS — R09.02 HYPOXIA: ICD-10-CM

## 2019-03-14 DIAGNOSIS — I35.0 SEVERE AORTIC STENOSIS: ICD-10-CM

## 2019-03-14 DIAGNOSIS — I50.30 (HFPEF) HEART FAILURE WITH PRESERVED EJECTION FRACTION (HCC): ICD-10-CM

## 2019-03-14 LAB
ANION GAP SERPL CALC-SCNC: 7 MMOL/L (ref 0–11.9)
BASOPHILS # BLD AUTO: 0.5 % (ref 0–1.8)
BASOPHILS # BLD: 0.03 K/UL (ref 0–0.12)
BLOOD CULTURE HOLD CXBCH: NORMAL
BNP SERPL-MCNC: 738 PG/ML (ref 0–100)
BUN SERPL-MCNC: 15 MG/DL (ref 8–22)
CALCIUM SERPL-MCNC: 9.7 MG/DL (ref 8.5–10.5)
CHLORIDE SERPL-SCNC: 100 MMOL/L (ref 96–112)
CO2 SERPL-SCNC: 29 MMOL/L (ref 20–33)
CREAT SERPL-MCNC: 1.12 MG/DL (ref 0.5–1.4)
EKG IMPRESSION: NORMAL
EOSINOPHIL # BLD AUTO: 0.2 K/UL (ref 0–0.51)
EOSINOPHIL NFR BLD: 3.2 % (ref 0–6.9)
ERYTHROCYTE [DISTWIDTH] IN BLOOD BY AUTOMATED COUNT: 45.9 FL (ref 35.9–50)
GLUCOSE SERPL-MCNC: 115 MG/DL (ref 65–99)
HCT VFR BLD AUTO: 44.2 % (ref 42–52)
HGB BLD-MCNC: 13.9 G/DL (ref 14–18)
IMM GRANULOCYTES # BLD AUTO: 0.01 K/UL (ref 0–0.11)
IMM GRANULOCYTES NFR BLD AUTO: 0.2 % (ref 0–0.9)
LYMPHOCYTES # BLD AUTO: 2.51 K/UL (ref 1–4.8)
LYMPHOCYTES NFR BLD: 39.8 % (ref 22–41)
MAGNESIUM SERPL-MCNC: 1.9 MG/DL (ref 1.5–2.5)
MCH RBC QN AUTO: 28.1 PG (ref 27–33)
MCHC RBC AUTO-ENTMCNC: 31.4 G/DL (ref 33.7–35.3)
MCV RBC AUTO: 89.3 FL (ref 81.4–97.8)
MONOCYTES # BLD AUTO: 0.69 K/UL (ref 0–0.85)
MONOCYTES NFR BLD AUTO: 11 % (ref 0–13.4)
NEUTROPHILS # BLD AUTO: 2.86 K/UL (ref 1.82–7.42)
NEUTROPHILS NFR BLD: 45.3 % (ref 44–72)
NRBC # BLD AUTO: 0 K/UL
NRBC BLD-RTO: 0 /100 WBC
PLATELET # BLD AUTO: 314 K/UL (ref 164–446)
PMV BLD AUTO: 9.9 FL (ref 9–12.9)
POTASSIUM SERPL-SCNC: 4.2 MMOL/L (ref 3.6–5.5)
PROCALCITONIN SERPL-MCNC: <0.05 NG/ML
RBC # BLD AUTO: 4.95 M/UL (ref 4.7–6.1)
SODIUM SERPL-SCNC: 136 MMOL/L (ref 135–145)
TROPONIN I SERPL-MCNC: 0.05 NG/ML (ref 0–0.04)
WBC # BLD AUTO: 6.3 K/UL (ref 4.8–10.8)

## 2019-03-14 PROCEDURE — 84145 PROCALCITONIN (PCT): CPT

## 2019-03-14 PROCEDURE — 71045 X-RAY EXAM CHEST 1 VIEW: CPT

## 2019-03-14 PROCEDURE — 770020 HCHG ROOM/CARE - TELE (206)

## 2019-03-14 PROCEDURE — 94760 N-INVAS EAR/PLS OXIMETRY 1: CPT

## 2019-03-14 PROCEDURE — 85025 COMPLETE CBC W/AUTO DIFF WBC: CPT

## 2019-03-14 PROCEDURE — 80048 BASIC METABOLIC PNL TOTAL CA: CPT

## 2019-03-14 PROCEDURE — 99223 1ST HOSP IP/OBS HIGH 75: CPT | Mod: AI,GC | Performed by: INTERNAL MEDICINE

## 2019-03-14 PROCEDURE — A9270 NON-COVERED ITEM OR SERVICE: HCPCS | Performed by: EMERGENCY MEDICINE

## 2019-03-14 PROCEDURE — 700111 HCHG RX REV CODE 636 W/ 250 OVERRIDE (IP): Performed by: EMERGENCY MEDICINE

## 2019-03-14 PROCEDURE — 700102 HCHG RX REV CODE 250 W/ 637 OVERRIDE(OP): Performed by: EMERGENCY MEDICINE

## 2019-03-14 PROCEDURE — 84484 ASSAY OF TROPONIN QUANT: CPT

## 2019-03-14 PROCEDURE — 99285 EMERGENCY DEPT VISIT HI MDM: CPT

## 2019-03-14 PROCEDURE — 93005 ELECTROCARDIOGRAM TRACING: CPT

## 2019-03-14 PROCEDURE — 93005 ELECTROCARDIOGRAM TRACING: CPT | Performed by: EMERGENCY MEDICINE

## 2019-03-14 PROCEDURE — 96374 THER/PROPH/DIAG INJ IV PUSH: CPT

## 2019-03-14 PROCEDURE — 83735 ASSAY OF MAGNESIUM: CPT

## 2019-03-14 PROCEDURE — 99254 IP/OBS CNSLTJ NEW/EST MOD 60: CPT | Performed by: INTERNAL MEDICINE

## 2019-03-14 PROCEDURE — 83880 ASSAY OF NATRIURETIC PEPTIDE: CPT

## 2019-03-14 RX ORDER — ACETAMINOPHEN 325 MG/1
650 TABLET ORAL EVERY 6 HOURS PRN
Status: DISCONTINUED | OUTPATIENT
Start: 2019-03-14 | End: 2019-03-16 | Stop reason: HOSPADM

## 2019-03-14 RX ORDER — POLYETHYLENE GLYCOL 3350 17 G/17G
1 POWDER, FOR SOLUTION ORAL
Status: DISCONTINUED | OUTPATIENT
Start: 2019-03-14 | End: 2019-03-16 | Stop reason: HOSPADM

## 2019-03-14 RX ORDER — AMOXICILLIN 250 MG
2 CAPSULE ORAL 2 TIMES DAILY
Status: DISCONTINUED | OUTPATIENT
Start: 2019-03-14 | End: 2019-03-16 | Stop reason: HOSPADM

## 2019-03-14 RX ORDER — ASPIRIN 81 MG/1
324 TABLET, CHEWABLE ORAL ONCE
Status: COMPLETED | OUTPATIENT
Start: 2019-03-14 | End: 2019-03-14

## 2019-03-14 RX ORDER — BISACODYL 10 MG
10 SUPPOSITORY, RECTAL RECTAL
Status: DISCONTINUED | OUTPATIENT
Start: 2019-03-14 | End: 2019-03-16 | Stop reason: HOSPADM

## 2019-03-14 RX ORDER — AMLODIPINE BESYLATE 5 MG/1
2.5 TABLET ORAL DAILY
Status: DISCONTINUED | OUTPATIENT
Start: 2019-03-15 | End: 2019-03-16 | Stop reason: HOSPADM

## 2019-03-14 RX ORDER — ASPIRIN 325 MG
325 TABLET ORAL ONCE
Status: DISCONTINUED | OUTPATIENT
Start: 2019-03-14 | End: 2019-03-14

## 2019-03-14 RX ORDER — GUAIFENESIN/DEXTROMETHORPHAN 100-10MG/5
10 SYRUP ORAL EVERY 6 HOURS PRN
Status: DISCONTINUED | OUTPATIENT
Start: 2019-03-14 | End: 2019-03-16 | Stop reason: HOSPADM

## 2019-03-14 RX ORDER — ENALAPRILAT 1.25 MG/ML
1.25 INJECTION INTRAVENOUS EVERY 6 HOURS PRN
Status: DISCONTINUED | OUTPATIENT
Start: 2019-03-14 | End: 2019-03-14

## 2019-03-14 RX ORDER — FUROSEMIDE 10 MG/ML
40 INJECTION INTRAMUSCULAR; INTRAVENOUS ONCE
Status: COMPLETED | OUTPATIENT
Start: 2019-03-14 | End: 2019-03-14

## 2019-03-14 RX ORDER — HYDRALAZINE HYDROCHLORIDE 20 MG/ML
10 INJECTION INTRAMUSCULAR; INTRAVENOUS EVERY 4 HOURS PRN
Status: DISCONTINUED | OUTPATIENT
Start: 2019-03-14 | End: 2019-03-16 | Stop reason: HOSPADM

## 2019-03-14 RX ADMIN — FUROSEMIDE 40 MG: 10 INJECTION, SOLUTION INTRAMUSCULAR; INTRAVENOUS at 21:38

## 2019-03-14 RX ADMIN — ASPIRIN 81 MG 324 MG: 81 TABLET ORAL at 20:28

## 2019-03-14 ASSESSMENT — LIFESTYLE VARIABLES
HOW MANY TIMES IN THE PAST YEAR HAVE YOU HAD 5 OR MORE DRINKS IN A DAY: 0
EVER FELT BAD OR GUILTY ABOUT YOUR DRINKING: NO
HAVE YOU EVER FELT YOU SHOULD CUT DOWN ON YOUR DRINKING: NO
ALCOHOL_USE: YES
TOTAL SCORE: 0
AVERAGE NUMBER OF DAYS PER WEEK YOU HAVE A DRINK CONTAINING ALCOHOL: 5
EVER HAD A DRINK FIRST THING IN THE MORNING TO STEADY YOUR NERVES TO GET RID OF A HANGOVER: NO
TOTAL SCORE: 0
TOTAL SCORE: 0
ON A TYPICAL DAY WHEN YOU DRINK ALCOHOL HOW MANY DRINKS DO YOU HAVE: 2
HAVE PEOPLE ANNOYED YOU BY CRITICIZING YOUR DRINKING: NO
EVER_SMOKED: YES
CONSUMPTION TOTAL: NEGATIVE

## 2019-03-14 ASSESSMENT — COGNITIVE AND FUNCTIONAL STATUS - GENERAL
MOBILITY SCORE: 24
SUGGESTED CMS G CODE MODIFIER MOBILITY: CH
SUGGESTED CMS G CODE MODIFIER DAILY ACTIVITY: CH
DAILY ACTIVITIY SCORE: 24

## 2019-03-14 ASSESSMENT — PATIENT HEALTH QUESTIONNAIRE - PHQ9
2. FEELING DOWN, DEPRESSED, IRRITABLE, OR HOPELESS: NOT AT ALL
SUM OF ALL RESPONSES TO PHQ9 QUESTIONS 1 AND 2: 0
1. LITTLE INTEREST OR PLEASURE IN DOING THINGS: NOT AT ALL

## 2019-03-15 PROBLEM — Z87.891 HISTORY OF SMOKING: Status: ACTIVE | Noted: 2019-03-15

## 2019-03-15 PROBLEM — D64.9 NORMOCYTIC ANEMIA: Status: ACTIVE | Noted: 2019-03-15

## 2019-03-15 PROBLEM — J96.00 ACUTE RESPIRATORY FAILURE (HCC): Status: ACTIVE | Noted: 2019-03-15

## 2019-03-15 PROBLEM — R06.00 DYSPNEA: Status: ACTIVE | Noted: 2019-03-15

## 2019-03-15 PROBLEM — I50.30 (HFPEF) HEART FAILURE WITH PRESERVED EJECTION FRACTION (HCC): Status: ACTIVE | Noted: 2019-03-15

## 2019-03-15 PROBLEM — F15.91 HISTORY OF METHAMPHETAMINE USE: Status: ACTIVE | Noted: 2019-03-15

## 2019-03-15 LAB
ALBUMIN SERPL BCP-MCNC: 3.5 G/DL (ref 3.2–4.9)
ALBUMIN/GLOB SERPL: 0.9 G/DL
ALP SERPL-CCNC: 56 U/L (ref 30–99)
ALT SERPL-CCNC: 25 U/L (ref 2–50)
ANION GAP SERPL CALC-SCNC: 7 MMOL/L (ref 0–11.9)
APTT PPP: 28.9 SEC (ref 24.7–36)
AST SERPL-CCNC: 29 U/L (ref 12–45)
BASOPHILS # BLD AUTO: 0.3 % (ref 0–1.8)
BASOPHILS # BLD: 0.02 K/UL (ref 0–0.12)
BILIRUB SERPL-MCNC: 0.3 MG/DL (ref 0.1–1.5)
BUN SERPL-MCNC: 17 MG/DL (ref 8–22)
CALCIUM SERPL-MCNC: 9.5 MG/DL (ref 8.5–10.5)
CHLORIDE SERPL-SCNC: 99 MMOL/L (ref 96–112)
CO2 SERPL-SCNC: 29 MMOL/L (ref 20–33)
CREAT SERPL-MCNC: 1.17 MG/DL (ref 0.5–1.4)
EKG IMPRESSION: NORMAL
EKG IMPRESSION: NORMAL
EOSINOPHIL # BLD AUTO: 0.12 K/UL (ref 0–0.51)
EOSINOPHIL NFR BLD: 1.8 % (ref 0–6.9)
ERYTHROCYTE [DISTWIDTH] IN BLOOD BY AUTOMATED COUNT: 44.8 FL (ref 35.9–50)
FERRITIN SERPL-MCNC: 31.2 NG/ML (ref 22–322)
FOLATE SERPL-MCNC: 19.1 NG/ML
GLOBULIN SER CALC-MCNC: 4 G/DL (ref 1.9–3.5)
GLUCOSE SERPL-MCNC: 124 MG/DL (ref 65–99)
HAV IGM SERPL QL IA: NEGATIVE
HBV CORE IGM SER QL: NEGATIVE
HBV SURFACE AG SER QL: NEGATIVE
HCT VFR BLD AUTO: 38.9 % (ref 42–52)
HCV AB SER QL: REACTIVE
HGB BLD-MCNC: 12.8 G/DL (ref 14–18)
HGB RETIC QN AUTO: 29.9 PG/CELL (ref 29–35)
IMM GRANULOCYTES # BLD AUTO: 0.02 K/UL (ref 0–0.11)
IMM GRANULOCYTES NFR BLD AUTO: 0.3 % (ref 0–0.9)
IMM RETICS NFR: 16.7 % (ref 9.3–17.4)
INR PPP: 1.1 (ref 0.87–1.13)
IRON SATN MFR SERPL: 10 % (ref 15–55)
IRON SERPL-MCNC: 34 UG/DL (ref 50–180)
LYMPHOCYTES # BLD AUTO: 1.68 K/UL (ref 1–4.8)
LYMPHOCYTES NFR BLD: 25.4 % (ref 22–41)
MCH RBC QN AUTO: 28.7 PG (ref 27–33)
MCHC RBC AUTO-ENTMCNC: 32.9 G/DL (ref 33.7–35.3)
MCV RBC AUTO: 87.2 FL (ref 81.4–97.8)
MONOCYTES # BLD AUTO: 0.57 K/UL (ref 0–0.85)
MONOCYTES NFR BLD AUTO: 8.6 % (ref 0–13.4)
NEUTROPHILS # BLD AUTO: 4.21 K/UL (ref 1.82–7.42)
NEUTROPHILS NFR BLD: 63.6 % (ref 44–72)
NRBC # BLD AUTO: 0 K/UL
NRBC BLD-RTO: 0 /100 WBC
PLATELET # BLD AUTO: 277 K/UL (ref 164–446)
PMV BLD AUTO: 10.3 FL (ref 9–12.9)
POTASSIUM SERPL-SCNC: 4.1 MMOL/L (ref 3.6–5.5)
PROT SERPL-MCNC: 7.5 G/DL (ref 6–8.2)
PROTHROMBIN TIME: 14.3 SEC (ref 12–14.6)
RBC # BLD AUTO: 4.46 M/UL (ref 4.7–6.1)
RETICS # AUTO: 0.03 M/UL (ref 0.04–0.06)
RETICS/RBC NFR: 0.8 % (ref 0.8–2.1)
SODIUM SERPL-SCNC: 135 MMOL/L (ref 135–145)
TIBC SERPL-MCNC: 356 UG/DL (ref 250–450)
TROPONIN I SERPL-MCNC: 0.07 NG/ML (ref 0–0.04)
VIT B12 SERPL-MCNC: 800 PG/ML (ref 211–911)
WBC # BLD AUTO: 6.6 K/UL (ref 4.8–10.8)

## 2019-03-15 PROCEDURE — 87522 HEPATITIS C REVRS TRNSCRPJ: CPT

## 2019-03-15 PROCEDURE — 82728 ASSAY OF FERRITIN: CPT

## 2019-03-15 PROCEDURE — 700102 HCHG RX REV CODE 250 W/ 637 OVERRIDE(OP): Performed by: STUDENT IN AN ORGANIZED HEALTH CARE EDUCATION/TRAINING PROGRAM

## 2019-03-15 PROCEDURE — 84484 ASSAY OF TROPONIN QUANT: CPT

## 2019-03-15 PROCEDURE — 93010 ELECTROCARDIOGRAM REPORT: CPT | Performed by: INTERNAL MEDICINE

## 2019-03-15 PROCEDURE — 83550 IRON BINDING TEST: CPT

## 2019-03-15 PROCEDURE — 85025 COMPLETE CBC W/AUTO DIFF WBC: CPT

## 2019-03-15 PROCEDURE — 700111 HCHG RX REV CODE 636 W/ 250 OVERRIDE (IP): Performed by: STUDENT IN AN ORGANIZED HEALTH CARE EDUCATION/TRAINING PROGRAM

## 2019-03-15 PROCEDURE — 93005 ELECTROCARDIOGRAM TRACING: CPT | Performed by: STUDENT IN AN ORGANIZED HEALTH CARE EDUCATION/TRAINING PROGRAM

## 2019-03-15 PROCEDURE — 82607 VITAMIN B-12: CPT

## 2019-03-15 PROCEDURE — 85610 PROTHROMBIN TIME: CPT

## 2019-03-15 PROCEDURE — 36415 COLL VENOUS BLD VENIPUNCTURE: CPT

## 2019-03-15 PROCEDURE — 99233 SBSQ HOSP IP/OBS HIGH 50: CPT | Mod: GC | Performed by: INTERNAL MEDICINE

## 2019-03-15 PROCEDURE — 80074 ACUTE HEPATITIS PANEL: CPT

## 2019-03-15 PROCEDURE — 99233 SBSQ HOSP IP/OBS HIGH 50: CPT | Performed by: INTERNAL MEDICINE

## 2019-03-15 PROCEDURE — 80053 COMPREHEN METABOLIC PANEL: CPT

## 2019-03-15 PROCEDURE — 770020 HCHG ROOM/CARE - TELE (206)

## 2019-03-15 PROCEDURE — 93010 ELECTROCARDIOGRAM REPORT: CPT | Mod: 77 | Performed by: INTERNAL MEDICINE

## 2019-03-15 PROCEDURE — 82746 ASSAY OF FOLIC ACID SERUM: CPT

## 2019-03-15 PROCEDURE — 93005 ELECTROCARDIOGRAM TRACING: CPT | Performed by: INTERNAL MEDICINE

## 2019-03-15 PROCEDURE — 85046 RETICYTE/HGB CONCENTRATE: CPT

## 2019-03-15 PROCEDURE — 85730 THROMBOPLASTIN TIME PARTIAL: CPT

## 2019-03-15 PROCEDURE — 83540 ASSAY OF IRON: CPT

## 2019-03-15 PROCEDURE — A9270 NON-COVERED ITEM OR SERVICE: HCPCS | Performed by: STUDENT IN AN ORGANIZED HEALTH CARE EDUCATION/TRAINING PROGRAM

## 2019-03-15 RX ORDER — FUROSEMIDE 10 MG/ML
40 INJECTION INTRAMUSCULAR; INTRAVENOUS
Status: DISCONTINUED | OUTPATIENT
Start: 2019-03-15 | End: 2019-03-16

## 2019-03-15 RX ORDER — MAGNESIUM SULFATE 1 G/100ML
1 INJECTION INTRAVENOUS ONCE
Status: COMPLETED | OUTPATIENT
Start: 2019-03-15 | End: 2019-03-15

## 2019-03-15 RX ADMIN — FUROSEMIDE 40 MG: 10 INJECTION, SOLUTION INTRAMUSCULAR; INTRAVENOUS at 14:03

## 2019-03-15 RX ADMIN — AMLODIPINE BESYLATE 2.5 MG: 5 TABLET ORAL at 05:57

## 2019-03-15 RX ADMIN — ENOXAPARIN SODIUM 40 MG: 100 INJECTION SUBCUTANEOUS at 17:03

## 2019-03-15 RX ADMIN — MAGNESIUM SULFATE IN DEXTROSE 1 G: 10 INJECTION, SOLUTION INTRAVENOUS at 01:20

## 2019-03-15 RX ADMIN — ACETAMINOPHEN 650 MG: 325 TABLET, FILM COATED ORAL at 18:35

## 2019-03-15 ASSESSMENT — ENCOUNTER SYMPTOMS
SINUS PAIN: 0
FLANK PAIN: 0
NEUROLOGICAL NEGATIVE: 1
EYE PAIN: 0
COUGH: 0
BLOOD IN STOOL: 0
DIZZINESS: 0
ABDOMINAL PAIN: 0
CHILLS: 0
CONSTITUTIONAL NEGATIVE: 1
CLAUDICATION: 0
MYALGIAS: 0
VOMITING: 0
SORE THROAT: 0
NERVOUS/ANXIOUS: 0
DIARRHEA: 0
CARDIOVASCULAR NEGATIVE: 1
NECK PAIN: 0
SHORTNESS OF BREATH: 1
ORTHOPNEA: 0
SHORTNESS OF BREATH: 0
PALPITATIONS: 0
SPUTUM PRODUCTION: 0
HEMOPTYSIS: 0
HALLUCINATIONS: 0
NAUSEA: 0
CONSTIPATION: 0
GASTROINTESTINAL NEGATIVE: 1
HEADACHES: 0
BRUISES/BLEEDS EASILY: 0
WHEEZING: 0
FOCAL WEAKNESS: 0
SENSORY CHANGE: 0
PSYCHIATRIC NEGATIVE: 1
FEVER: 0
WEAKNESS: 0
MUSCULOSKELETAL NEGATIVE: 1
BACK PAIN: 0
EYES NEGATIVE: 1
PHOTOPHOBIA: 0

## 2019-03-15 ASSESSMENT — LIFESTYLE VARIABLES: SUBSTANCE_ABUSE: 0

## 2019-03-15 ASSESSMENT — PATIENT HEALTH QUESTIONNAIRE - PHQ9
1. LITTLE INTEREST OR PLEASURE IN DOING THINGS: NOT AT ALL
2. FEELING DOWN, DEPRESSED, IRRITABLE, OR HOPELESS: NOT AT ALL
SUM OF ALL RESPONSES TO PHQ9 QUESTIONS 1 AND 2: 0

## 2019-03-15 NOTE — PROGRESS NOTES
Pharmacy came up to verify pt's home meds. This RN went over the 7 containers of pills that pt had from home, and placed the medications in the security bag, that pharmacy will keep until DC. This RN gone over instructions with this pt regarding home meds, and he is in compliance.

## 2019-03-15 NOTE — PROGRESS NOTES
Cardiology Follow Up Progress Note    Date of Service  3/15/2019    Attending Physician  No att. providers found    Chief Complaint   Shortness of breath with severe aortic regurgitation.    HPI  Sylvain Espinosa is a 63 y.o. male admitted 3/14/2019 with above.    Interim Events  No significant changes noted from cardiac standpoint within the past 24 hours. He is clinically improved.    Review of Systems  Review of Systems   Constitutional: Negative.  Negative for chills and fever.   HENT: Negative.  Negative for hearing loss.    Eyes: Negative.    Respiratory: Positive for shortness of breath. Negative for cough.    Cardiovascular: Negative.  Negative for chest pain, palpitations and leg swelling.   Gastrointestinal: Negative.  Negative for abdominal pain, nausea and vomiting.   Genitourinary: Negative.  Negative for dysuria and urgency.   Musculoskeletal: Negative.  Negative for myalgias.   Skin: Negative.  Negative for rash.   Neurological: Negative.  Negative for dizziness, weakness and headaches.   Hematological: Does not bruise/bleed easily.   Psychiatric/Behavioral: Negative.  The patient is not nervous/anxious.        Vital signs in last 24 hours  Temp:  [36.1 °C (97 °F)-36.9 °C (98.4 °F)] 36.9 °C (98.4 °F)  Pulse:  [] 85  Resp:  [15-24] 18  BP: (105-171)/() 121/76  SpO2:  [94 %-100 %] 96 %    Physical Exam  Physical Exam   Constitutional: He is oriented to person, place, and time. He appears well-developed and well-nourished.   HENT:   Head: Normocephalic and atraumatic.   Eyes: Pupils are equal, round, and reactive to light. Conjunctivae are normal.   Neck: Normal range of motion. Neck supple.   Cardiovascular: Normal rate and regular rhythm.    Murmur heard.  Pulmonary/Chest: Effort normal and breath sounds normal.   Abdominal: Soft. Bowel sounds are normal.   Musculoskeletal: Normal range of motion. He exhibits no edema.   Neurological: He is alert and oriented to person, place, and time.    Skin: Skin is warm and dry.   Psychiatric: He has a normal mood and affect.       Lab Review  Lab Results   Component Value Date/Time    WBC 6.6 03/15/2019 01:48 AM    RBC 4.46 (L) 03/15/2019 01:48 AM    HEMOGLOBIN 12.8 (L) 03/15/2019 01:48 AM    HEMATOCRIT 38.9 (L) 03/15/2019 01:48 AM    MCV 87.2 03/15/2019 01:48 AM    MCH 28.7 03/15/2019 01:48 AM    MCHC 32.9 (L) 03/15/2019 01:48 AM    MPV 10.3 03/15/2019 01:48 AM      Lab Results   Component Value Date/Time    SODIUM 135 03/15/2019 01:48 AM    POTASSIUM 4.1 03/15/2019 01:48 AM    CHLORIDE 99 03/15/2019 01:48 AM    CO2 29 03/15/2019 01:48 AM    GLUCOSE 124 (H) 03/15/2019 01:48 AM    BUN 17 03/15/2019 01:48 AM    CREATININE 1.17 03/15/2019 01:48 AM      Lab Results   Component Value Date/Time    ASTSGOT 29 03/15/2019 01:48 AM    ALTSGPT 25 03/15/2019 01:48 AM     Lab Results   Component Value Date/Time    CHOLSTRLTOT 129 01/15/2015 11:37 PM    LDL 74 01/15/2015 11:37 PM    HDL 30 (A) 01/15/2015 11:37 PM    TRIGLYCERIDE 124 01/15/2015 11:37 PM    TROPONINI 0.07 (H) 03/15/2019 01:48 AM       Recent Labs      03/14/19 2010   BNPBTYPENAT  738*   (Above labs reviewed.)     Cardiac Imaging and Procedures Review  CARDIAC STUDIES/PROCEDURES:     CARDIAC CATHETERIZATION CONCLUSIONS (01/01/19)  1.  Severe aortic stenosis with peak to peak gradient of 80 mmHg, mean   gradient of 61 mmHg, calculated TAYA of 0.48 cm2.  2.  No angiographic evidence of coronary artery disease.  3.  Mildly reduced left ventricular systolic function with ejection fraction of 55%.  4.  Elevated left ventricular end diastolic pressure.  5.  Elevated right heart pressure with PA systolic pressure of 65 mmHg.  6.  Mildly dilated ascending aorta.  7.  Ectatic and diffusely atherosclerotic distal abdominal aorta with   bilateral iliofemoral system, moderate in caliber, bilateral nonobstructive   stenosis with tortuosity left greater than right.     CAROTID ULTRASOUND (01/03/19)  1.  Less than 50%  stenosis in the right carotid bifurcation with no    evidence of internal carotid arterial stenosis.  2.  Normal left carotid duplex examination.  3.  Antegrade bilateral vertebral arterial flow.     CTA OF CHEST AND ABDOMEN (01/04/15)  1.  Mean aortic valvular area 584 sq mm  2.  The coronary arteries originate just greater than 10 mm above the aortic annulus  3.  Minimal diameter right external iliac artery 6.1 mm with minimal tortuosity  4.  Minimal diameter left external iliac artery 5.7 mm with moderate tortuosity  5.  3.7 x 3.5 cm infrarenal abdominal aortic aneurysm  6.  Hepatic fatty infiltration and moderate hepatomegaly  7.  2.6 cm benign cavernous hemangioma within the right lobe of the liver(study result reviewed)     ECHOCARDIOGRAM CONCLUSIONS (12/30/18)  Moderate concentric left ventricular hypertrophy.  Left ventricular systolic function is low normal.  Severe aortic stenosis.  Transvalvular gradients are - Peak: 98 mmHg, Mean: 56 mmHg.  Estimated right ventricular systolic pressure  is 55 mmHg.  Compared to the images of the study done on 7/19/17, previous severe   aortic stenosis is now critical aortic stenosis and previously normal   left ventricular ejection fraction is now mildly reduced.     EKG performed on (03/15/19) was reviewed: EKG shows sinus rhythm with left ventricular hypertrophy.  EKG performed on (12/29/18) EKG shows sinus tachycardia with Left ventricular hypertrophy.    Assessment/Plan  1. Aortic stenosis: He is symptomatic with his severe aortic stenosis, NYHA class IIIB. He is not a surgical candidate due to excessive risk with STS score of 3-5% range especially considering his active methamphetamine abuse per Dr. Yun. He is scheduled for TAVR on Monday. He understands the risks and benefits and agrees with plan.  2. Methamphetamine use: He has been drug free and he has completed his drug use treatment and assessment.      The risks, benefits, and alternatives to TAVR,  general anesthesia and transesophageal echocardiogram were discussed in great detail. Specific risks mentioned include bleeding, infection, kidney damage, allergic reaction, cardiac perforation with possible tamponade requiring natalie-cardiocentesis or possible open heart surgery if the patient is a candidate. Lastly the risks of heart attack, stroke, and death were discussed; the risks of major complications includingall cause mortality of 2.2%, disabling stroke of 1.1%, the risk on new pacemaker of 12% and the risk of vascular complications of 4.1%. The patient verbalized understanding of these potential complications and wishes to proceed with this procedure. ([facility]).  The procedure will be performed completely in collaboration with cardiac surgery.    CC Mitzi Trivedi and Jocelyne Paredes    Thank you for allowing me to participate in the care of this patient.  Cardiology will sign off on this patient    Please contact me with any questions.    Dariel Moser M.D.   Cardiologist, [facility]  [phone]

## 2019-03-15 NOTE — ED NOTES
Bedside report given to HEBERT Fajardo. Pt to be transferred to [id number] on tele and oxygen with nurse.

## 2019-03-15 NOTE — ASSESSMENT & PLAN NOTE
-H/o untreated hepatitis C per 2017 notes, no hep panel on record.  -Patient asymptomatic at this time.  -Pending LFTs, hep panel.  -Follow up with PCP.

## 2019-03-15 NOTE — PROCEDURES
Reason for Consult:  Asked by Dr Simms to see this patient with  Shortness of breath  Patient's PCP: NEY Ledesma    CC:   Chief Complaint   Patient presents with   • Shortness of Breath     pt states he was up to the bathroom when he felt sudden SOB. per EMS- spo2 in the 70's on room air       HPI: 63-year-old male patient well-known to our service for symptomatic severe aortic stenosis.  He was scheduled to undergo transcatheter aortic valve replacement several times, because of several issues like lack of housing, lack of phone but could not able to complete procedure.  He has history of drug abuse, currently abstinent from drugs, lives in a shelter.  Today presented to the emergency room because of sudden onset of shortness of breath associated with chest tightness, severe in intensity and persistent so he called ambulance.  His saturation was in 70s upon presentation, improved with oxygen administration.    Medications / Drug list prior to admission:  No current facility-administered medications on file prior to encounter.      Current Outpatient Prescriptions on File Prior to Encounter   Medication Sig Dispense Refill   • amLODIPine (NORVASC) 2.5 MG Tab Take 1 Tab by mouth every day. 30 Tab 3   • furosemide (LASIX) 20 MG Tab Take 1 Tab by mouth every day. 30 Tab 3   • potassium chloride SA (K-DUR) 10 MEQ Tab CR Take 1 Tab by mouth every day. 30 Tab 3       Current list of administered Medications:    Current Facility-Administered Medications:   •  senna-docusate (PERICOLACE or SENOKOT S) 8.6-50 MG per tablet 2 Tab, 2 Tab, Oral, BID **AND** polyethylene glycol/lytes (MIRALAX) PACKET 1 Packet, 1 Packet, Oral, QDAY PRN **AND** magnesium hydroxide (MILK OF MAGNESIA) suspension 30 mL, 30 mL, Oral, QDAY PRN **AND** bisacodyl (DULCOLAX) suppository 10 mg, 10 mg, Rectal, QDAY PRN, Fabiola Mancilla M.D.  •  acetaminophen (TYLENOL) tablet 650 mg, 650 mg, Oral, Q6HRS PRN, Fabiola Mancilla M.D.  •  guaiFENesin  "dextromethorphan (ROBITUSSIN DM) 100-10 MG/5ML syrup 10 mL, 10 mL, Oral, Q6HRS PRN, Fabiola Mancilla M.D.  •  [START ON 3/15/2019] amLODIPine (NORVASC) tablet 2.5 mg, 2.5 mg, Oral, DAILY, Fabiola Mancilla M.D.  •  hydrALAZINE (APRESOLINE) injection 10 mg, 10 mg, Intravenous, Q4HRS PRN, Fabiola Mancilla M.D.    Current Outpatient Prescriptions:   •  amLODIPine (NORVASC) 2.5 MG Tab, Take 1 Tab by mouth every day., Disp: 30 Tab, Rfl: 3  •  furosemide (LASIX) 20 MG Tab, Take 1 Tab by mouth every day., Disp: 30 Tab, Rfl: 3  •  potassium chloride SA (K-DUR) 10 MEQ Tab CR, Take 1 Tab by mouth every day., Disp: 30 Tab, Rfl: 3    Past Medical History:   Diagnosis Date   • Abnormality of left ventricle of heart     \"constricted left ventricle\"    • Aortic stenosis    • BPH (benign prostatic hyperplasia)    • Hypercholesteremia    • Hypertension        Past Surgical History:   Procedure Laterality Date   • TONSILLECTOMY     • ZZZ CARDIAC CATH         Family History   Problem Relation Age of Onset   • Heart Disease Mother        Social History     Social History   • Marital status: Single     Spouse name: N/A   • Number of children: N/A   • Years of education: N/A     Occupational History   • Not on file.     Social History Main Topics   • Smoking status: Current Every Day Smoker     Packs/day: 0.50     Years: 40.00     Types: Cigarettes   • Smokeless tobacco: Never Used   • Alcohol use Yes      Comment: occ   • Drug use: No      Comment: marijuana current,  meth last use 1 monht ago   • Sexual activity: Not on file     Other Topics Concern   • Not on file     Social History Narrative   • No narrative on file       ALLERGIES:  No Known Allergies    Review of systems:  A detailed review of symptoms was reviewed with patient. This is reviewed in H&P and PMH. ALL OTHERS reviewed and negative    Physical exam:  Patient Vitals for the past 24 hrs:   BP Temp Temp src Pulse Resp SpO2 Height Weight   03/14/19 2145 - - - 71 15 95 % - - " "  19 - - - - (!) 22 - - -   19 - - - 98 19 95 % - -   19 - - - 85 (!) 21 96 % - -   19 135/81 - - (!) 102 (!) 24 96 % - -   19 (!) 171/101 36.9 °C (98.4 °F) Temporal (!) 118 (!) 24 96 % - -   19 - - - - - - 1.702 m (5' 7\") 62.1 kg (137 lb)       General: No acute distress.   EYES: no jaundice  HEENT: OP clear   Neck: No bruits No JVD.   CVS:   RRR. S1 + S2. 3/6 systolic murmur in aortic area  Resp: bilateral crepitations.  Abdomen: Soft, NT, ND,  Skin: Grossly nothing acute no obvious rashes  Neurological: Alert, Moves all extremities, no cranial nerve defects on limited exam  Extremities: Pulse 2+ in b/l LE.  No edema. No cyanosis.       Data:  Laboratory studies:  Recent Results (from the past 24 hour(s))   EKG (NOW)    Collection Time: 19  8:07 PM   Result Value Ref Range    Report       [facility] Emergency Dept.    Test Date:  2019  Pt Name:    JAIMIE YAO              Department: ER  MRN:        [id number]                      Room:       [facility]  Gender:     Male                         Technician: STUDENT  :        1955                   Requested By:ER TRIAGE PROTOCOL  Order #:    [id number]                    Reading MD: Leonardo Simms MD    Measurements  Intervals                                Axis  Rate:       111                          P:          78  ND:         140                          QRS:        43  QRSD:       90                           T:          85  QT:         340  QTc:        462    Interpretive Statements  SINUS TACHYCARDIA  LEFT ATRIAL ABNORMALITY  LVH WITH SECONDARY REPOLARIZATION ABNORMALITY  ANTERIOR ST ELEVATION, PROBABLY DUE TO LVH  Compared to ECG 2018 19:23:28  Early repolarization now present  ST (T wave) deviation now present    Electronically Signed On 3- 21:25:15 PDT by Leonardo ivey MD     CBC WITH DIFFERENTIAL    Collection Time: 19  8:10 PM "   Result Value Ref Range    WBC 6.3 4.8 - 10.8 K/uL    RBC 4.95 4.70 - 6.10 M/uL    Hemoglobin 13.9 (L) 14.0 - 18.0 g/dL    Hematocrit 44.2 42.0 - 52.0 %    MCV 89.3 81.4 - 97.8 fL    MCH 28.1 27.0 - 33.0 pg    MCHC 31.4 (L) 33.7 - 35.3 g/dL    RDW 45.9 35.9 - 50.0 fL    Platelet Count 314 164 - 446 K/uL    MPV 9.9 9.0 - 12.9 fL    Neutrophils-Polys 45.30 44.00 - 72.00 %    Lymphocytes 39.80 22.00 - 41.00 %    Monocytes 11.00 0.00 - 13.40 %    Eosinophils 3.20 0.00 - 6.90 %    Basophils 0.50 0.00 - 1.80 %    Immature Granulocytes 0.20 0.00 - 0.90 %    Nucleated RBC 0.00 /100 WBC    Neutrophils (Absolute) 2.86 1.82 - 7.42 K/uL    Lymphs (Absolute) 2.51 1.00 - 4.80 K/uL    Monos (Absolute) 0.69 0.00 - 0.85 K/uL    Eos (Absolute) 0.20 0.00 - 0.51 K/uL    Baso (Absolute) 0.03 0.00 - 0.12 K/uL    Immature Granulocytes (abs) 0.01 0.00 - 0.11 K/uL    NRBC (Absolute) 0.00 K/uL   BASIC METABOLIC PANEL    Collection Time: 03/14/19  8:10 PM   Result Value Ref Range    Sodium 136 135 - 145 mmol/L    Potassium 4.2 3.6 - 5.5 mmol/L    Chloride 100 96 - 112 mmol/L    Co2 29 20 - 33 mmol/L    Glucose 115 (H) 65 - 99 mg/dL    Bun 15 8 - 22 mg/dL    Creatinine 1.12 0.50 - 1.40 mg/dL    Calcium 9.7 8.5 - 10.5 mg/dL    Anion Gap 7.0 0.0 - 11.9   TROPONIN    Collection Time: 03/14/19  8:10 PM   Result Value Ref Range    Troponin I 0.05 (H) 0.00 - 0.04 ng/mL   BNP    Collection Time: 03/14/19  8:10 PM   Result Value Ref Range    B Natriuretic Peptide 738 (H) 0 - 100 pg/mL   ESTIMATED GFR    Collection Time: 03/14/19  8:10 PM   Result Value Ref Range    GFR If African American >60 >60 mL/min/1.73 m 2    GFR If Non African American >60 >60 mL/min/1.73 m 2   Blood Culture,Hold    Collection Time: 03/14/19  8:10 PM   Result Value Ref Range    Blood Culture Hold Collected        Imaging:  DX-CHEST-PORTABLE (1 VIEW)   Final Result      1.  New LEFT mid lung opacity suspicious for pneumonia        CARDIAC STUDIES/PROCEDURES:     CARDIAC  CATHETERIZATION CONCLUSIONS (01/01/19)  1.  Severe aortic stenosis with peak to peak gradient of 80 mmHg, mean   gradient of 61 mmHg, calculated TAYA of 0.48 cm2.  2.  No angiographic evidence of coronary artery disease.  3.  Mildly reduced left ventricular systolic function with ejection fraction of 55%.  4.  Elevated left ventricular end diastolic pressure.  5.  Elevated right heart pressure with PA systolic pressure of 65 mmHg.  6.  Mildly dilated ascending aorta.  7.  Ectatic and diffusely atherosclerotic distal abdominal aorta with   bilateral iliofemoral system, moderate in caliber, bilateral nonobstructive   stenosis with tortuosity left greater than right.     CAROTID ULTRASOUND (01/03/19)  1.  Less than 50% stenosis in the right carotid bifurcation with no    evidence of internal carotid arterial stenosis.  2.  Normal left carotid duplex examination.  3.  Antegrade bilateral vertebral arterial flow.       CTA OF CHEST AND ABDOMEN (01/04/15)  1.  Mean aortic valvular area 584 sq mm  2.  The coronary arteries originate just greater than 10 mm above the aortic annulus  3.  Minimal diameter right external iliac artery 6.1 mm with minimal tortuosity  4.  Minimal diameter left external iliac artery 5.7 mm with moderate tortuosity  5.  3.7 x 3.5 cm infrarenal abdominal aortic aneurysm  6.  Hepatic fatty infiltration and moderate hepatomegaly  7.  2.6 cm benign cavernous hemangioma within the right lobe of the liver(study result reviewed)       ECHOCARDIOGRAM CONCLUSIONS (12/30/18)  Moderate concentric left ventricular hypertrophy.  Left ventricular systolic function is low normal.  Severe aortic stenosis.  Transvalvular gradients are - Peak: 98 mmHg, Mean: 56 mmHg.  Estimated right ventricular systolic pressure  is 55 mmHg.  Compared to the images of the study done on 7/19/17, previous severe   aortic stenosis is now critical aortic stenosis and previously normal   left ventricular ejection fraction is now mildly  reduced.      EKG : personally reviewed by me sinus tachycardia, PVC, lateral ST depression    All pertinent features of laboratory and imaging reviewed including primary images where applicable    Assessment / Plan:  1.  Critical aortic stenosis, acute heart failure NYHA class IV state C  2.  Hypertension  3.  History of methamphetamine abuse    Patient has critical aortic stenosis, now developed acute heart failure, untreated will likely result in mortality.  Diuresis with Lasix 40 mg IV.  I will discuss with Dr. Moser who will be rounding on him tomorrow, likely need to have transcatheter aortic valve replacement as an inpatient on Monday.    It is my pleasure to participate in the care of Mr. Espinosa.  Please do not hesitate to contact me with questions or concerns.    Lonnie De Souza    3/14/2019

## 2019-03-15 NOTE — ASSESSMENT & PLAN NOTE
-Acute respiratory failure with O2 sat 70 on room air on admission, requiring supplemental O2.  -Admitted in January 2019 for acute exacerbation of HFpEF, planned TAVR for 3/11/2019 but missed elective procedure due to housing issues.  -12/30/2018 echo showed moderate concentric LVH, low normal LV systolic function.  Severe aortic stenosis (peak: 98 mmHg, mean: 56 mmHg).  RVSP 55 mmHg.  -CXR on this admission showed mid-lung opacity, more likely edema > pneumonia given acute onset of dyspnea in absence of preceding or subsequent ill symptoms, no leukocytosis, negative procalcitonin, improvement with diuresis.  -EKG showed sinus tachycardia, lateral ST depression likely related to severe AS / acute exacerbation of HFpEF, no chest pain, trop 0.05, BNP stable at 738.  -Current presentation of dyspnea improved with diuresis consistent with acute exacerbation of HFpEF in setting of severe AS.  -Assessed by cards, likely inpatient TAVR.  Plan:  -Tele, I/O, daily weights, fluid / Na restrictions.  -Diuresis PRN.  -NPO at midnight, no pharm DVT ppx for possible TAVR.

## 2019-03-15 NOTE — H&P
Internal Medicine Admitting History and Physical    Note Author: Fabiola Mancilla M.D.       Name Sylvain Espinosa 1955   Age/Sex 63 y.o. male   MRN [id number]   Code Status Full     After 5PM or if no immediate response to page, please call for cross-coverage  Attending/Team: Dr. Ferrell / Lucinda See Patient List for primary contact information  Call [phone] to page    1st Call - Day Intern (R1):   Dr. Becker 2nd Call - Day Sr. Resident (R2/R3):   Dr. Rodriguez       Chief Complaint:  Dyspnea    HPI:  62 yo man with PMH of severe AS, HFpEF, HTN, untreated hep C, h/o smoking and meth use presented with dyspnea.  Patient stated trying to walk to bathroom when experienced acute shortness of breath, prompting ED visit.  Endorsed some nonradiating chest tightness during dyspneic episode, but chest tightness resolved with O2 in ED, no recurrence since.  Denied palpitations, recent ill symptoms, GI /  symptoms.  Of note, admitted in 2019 for acute exacerbation of HFpEF, planned TAVR for 3/11/2019 but missed elective procedure due to housing issues.    In ED, sating 70s on room air initially, improved to ~96 on NC 3L.  Labs showed Hb 13.9, trop 0.05,  (796 in 2018), procalcitonin <0.05.  CXR showed mid-lung opacity, possibly edema vs. Pneumonia.  EKG showed sinus tachycardia, lateral ST depression.  Received  mg, IV Lasix 40 mg.  ED physician discussed with cards, cardiology aware, likely inpatient TAVR.    Review of Systems   Constitutional: Negative for chills and fever.   HENT: Negative for congestion, ear pain, sinus pain and sore throat.    Eyes: Negative for photophobia and pain.   Respiratory: Positive for shortness of breath. Negative for cough, hemoptysis, sputum production and wheezing.    Cardiovascular: Negative for palpitations and leg swelling.        Chest tightness with shortness of breath, resolved with O2 in ED.   Gastrointestinal: Negative for abdominal  "pain, blood in stool, constipation, diarrhea, melena, nausea and vomiting.   Genitourinary: Negative for dysuria, flank pain, frequency, hematuria and urgency.   Musculoskeletal: Negative for myalgias and neck pain.   Skin: Negative for itching and rash.   Neurological: Negative for dizziness and headaches.   Psychiatric/Behavioral: Negative for hallucinations and substance abuse.             Past Medical History:   Past Medical History:   Diagnosis Date   • Abnormality of left ventricle of heart     \"constricted left ventricle\"    • Aortic stenosis    • BPH (benign prostatic hyperplasia)    • Hypercholesteremia    • Hypertension        Past Surgical History:  Past Surgical History:   Procedure Laterality Date   • TONSILLECTOMY     • ZZZ CARDIAC CATH         Current Outpatient Medications:  Home Medications     Reviewed by Tana Barton R.N. (Registered Nurse) on 03/14/19 at 2241  Med List Status: Not Addressed   Medication Last Dose Status   amLODIPine (NORVASC) 2.5 MG Tab  Active   furosemide (LASIX) 20 MG Tab  Active   potassium chloride SA (K-DUR) 10 MEQ Tab CR  Active                Medication Allergy/Sensitivities:  No Known Allergies      Family History:  Family History   Problem Relation Age of Onset   • Heart Disease Mother        Social History:  Social History     Social History   • Marital status: Single     Spouse name: N/A   • Number of children: N/A   • Years of education: N/A     Occupational History   • Not on file.     Social History Main Topics   • Smoking status: Current Every Day Smoker     Packs/day: 0.50     Years: 40.00     Types: Cigarettes   • Smokeless tobacco: Never Used   • Alcohol use Yes      Comment: occ   • Drug use: No      Comment: marijuana current,  meth last use 1 monht ago   • Sexual activity: Not on file     Other Topics Concern   • Not on file     Social History Narrative   • No narrative on file       PCP : NEY Ledesma      Physical Exam     Vitals:    " "19 21319 21419 2220 03/15/19 0015   BP:   135/85 108/72   Pulse:  71 77 87   Resp: (!) 22 15 20 16   Temp:   36.1 °C (97 °F) 36.5 °C (97.7 °F)   TempSrc:   Temporal Temporal   SpO2:  95% 94% 99%   Weight:   59.6 kg (131 lb 6.3 oz)    Height:         Body mass index is 20.58 kg/m².  /72   Pulse 87   Temp 36.5 °C (97.7 °F) (Temporal)   Resp 16   Ht 1.702 m (5' 7\")   Wt 59.6 kg (131 lb 6.3 oz)   SpO2 99%   BMI 20.58 kg/m²   O2 therapy: Pulse Oximetry: 99 %, O2 (LPM): 3, O2 Delivery: Nasal Cannula    Physical Exam   Constitutional: He is oriented to person, place, and time.   Thin.  In no acute distress. Slight dyspneic when speaking.   HENT:   Head: Normocephalic and atraumatic.   Eyes: Pupils are equal, round, and reactive to light. Conjunctivae are normal.   Neck: Normal range of motion. Neck supple.   Cardiovascular: Normal rate and regular rhythm.    III/IV systolic murmur in aortic region, radiating up to carotid.   Pulmonary/Chest: Effort normal. No respiratory distress.   Mild expiratory wheezing bilaterally, all fields.   Abdominal: Soft. There is no tenderness. There is no rebound and no guarding.   Musculoskeletal: He exhibits no edema or tenderness.   Freely moving all extremities.   Neurological: He is alert and oriented to person, place, and time.   No apparent gross neuro deficits   Skin: Skin is warm and dry.   Psychiatric: Mood and affect normal.             Data Review         Current Records review/summary: Completed    Lab Data Review:  Recent Results (from the past 24 hour(s))   EKG (NOW)    Collection Time: 19  8:07 PM   Result Value Ref Range    Report       [facility]    Test Date:  2019  Pt Name:    JAIMIE YAO              Department: ER  MRN:        [id number]                      Room:       [facility]  Gender:     Male                         Technician: STUDENT  :        1955                   Requested " By:ER TRIAGE PROTOCOL  Order #:    [id number]                    Reading MD: Leonardo Simms MD    Measurements  Intervals                                Axis  Rate:       111                          P:          78  MS:         140                          QRS:        43  QRSD:       90                           T:          85  QT:         340  QTc:        462    Interpretive Statements  SINUS TACHYCARDIA  LEFT ATRIAL ABNORMALITY  LVH WITH SECONDARY REPOLARIZATION ABNORMALITY  ANTERIOR ST ELEVATION, PROBABLY DUE TO LVH  Compared to ECG 12/29/2018 19:23:28  Early repolarization now present  ST (T wave) deviation now present    Electronically Signed On 3- 21:25:15 PDT by Leonrado ivey MD     CBC WITH DIFFERENTIAL    Collection Time: 03/14/19  8:10 PM   Result Value Ref Range    WBC 6.3 4.8 - 10.8 K/uL    RBC 4.95 4.70 - 6.10 M/uL    Hemoglobin 13.9 (L) 14.0 - 18.0 g/dL    Hematocrit 44.2 42.0 - 52.0 %    MCV 89.3 81.4 - 97.8 fL    MCH 28.1 27.0 - 33.0 pg    MCHC 31.4 (L) 33.7 - 35.3 g/dL    RDW 45.9 35.9 - 50.0 fL    Platelet Count 314 164 - 446 K/uL    MPV 9.9 9.0 - 12.9 fL    Neutrophils-Polys 45.30 44.00 - 72.00 %    Lymphocytes 39.80 22.00 - 41.00 %    Monocytes 11.00 0.00 - 13.40 %    Eosinophils 3.20 0.00 - 6.90 %    Basophils 0.50 0.00 - 1.80 %    Immature Granulocytes 0.20 0.00 - 0.90 %    Nucleated RBC 0.00 /100 WBC    Neutrophils (Absolute) 2.86 1.82 - 7.42 K/uL    Lymphs (Absolute) 2.51 1.00 - 4.80 K/uL    Monos (Absolute) 0.69 0.00 - 0.85 K/uL    Eos (Absolute) 0.20 0.00 - 0.51 K/uL    Baso (Absolute) 0.03 0.00 - 0.12 K/uL    Immature Granulocytes (abs) 0.01 0.00 - 0.11 K/uL    NRBC (Absolute) 0.00 K/uL   BASIC METABOLIC PANEL    Collection Time: 03/14/19  8:10 PM   Result Value Ref Range    Sodium 136 135 - 145 mmol/L    Potassium 4.2 3.6 - 5.5 mmol/L    Chloride 100 96 - 112 mmol/L    Co2 29 20 - 33 mmol/L    Glucose 115 (H) 65 - 99 mg/dL    Bun 15 8 - 22 mg/dL    Creatinine 1.12 0.50 - 1.40  mg/dL    Calcium 9.7 8.5 - 10.5 mg/dL    Anion Gap 7.0 0.0 - 11.9   TROPONIN    Collection Time: 03/14/19  8:10 PM   Result Value Ref Range    Troponin I 0.05 (H) 0.00 - 0.04 ng/mL   BNP    Collection Time: 03/14/19  8:10 PM   Result Value Ref Range    B Natriuretic Peptide 738 (H) 0 - 100 pg/mL   ESTIMATED GFR    Collection Time: 03/14/19  8:10 PM   Result Value Ref Range    GFR If African American >60 >60 mL/min/1.73 m 2    GFR If Non African American >60 >60 mL/min/1.73 m 2   Blood Culture,Hold    Collection Time: 03/14/19  8:10 PM   Result Value Ref Range    Blood Culture Hold Collected    MAGNESIUM    Collection Time: 03/14/19  8:10 PM   Result Value Ref Range    Magnesium 1.9 1.5 - 2.5 mg/dL   PROCALCITONIN    Collection Time: 03/14/19  8:10 PM   Result Value Ref Range    Procalcitonin <0.05 <0.25 ng/mL       Imaging/Procedures Review:    Independant Imaging Review: Completed  DX-CHEST-PORTABLE (1 VIEW)   Final Result      1.  New LEFT mid lung opacity suspicious for pneumonia             EKG:   EKG Independant Review: Completed  QTc: 462, HR: 111, sinus tachycardia, lateral ST depression    Records reviewed and summarized in current documentation :  Yes             Assessment/Plan     Acute respiratory failure (HCC)- (present on admission)   Assessment & Plan    -Acute respiratory failure with O2 sat 70 on room air on admission, requiring supplemental O2.  -Admitted in January 2019 for acute exacerbation of HFpEF, planned TAVR for 3/11/2019 but missed elective procedure due to housing issues.  -12/30/2018 echo showed moderate concentric LVH, low normal LV systolic function.  Severe aortic stenosis (peak: 98 mmHg, mean: 56 mmHg).  RVSP 55 mmHg.  -CXR on this admission showed mid-lung opacity, more likely edema > pneumonia given acute onset of dyspnea in absence of preceding or subsequent ill symptoms, no leukocytosis, improvement with diuresis.  -EKG showed sinus tachycardia, lateral ST depression likely  "related to severe AS / acute exacerbation of HFpEF, no chest pain, trop 0.05, BNP stable at 738.  -Current presentation of dyspnea improved with diuresis consistent with acute exacerbation of HFpEF in setting of severe AS.  -Assessed by cards, likely inpatient TAVR.  Plan:  -Tele, I/O, daily weights, fluid / Na restrictions.  -Diuresis PRN.  -NPO at midnight, no pharm DVT ppx for possible TAVR.     (HFpEF) heart failure with preserved ejection fraction (HCC)- (present on admission)   Assessment & Plan    -See \"dyspnea.\"     Severe aortic stenosis- (present on admission)   Assessment & Plan    -See \"dyspnea.\"       Normocytic anemia- (present on admission)   Assessment & Plan    -Hb 13.9, stable, no apparent bleed.  -Pending folate, B12, retic count, iron panel / ferritin.     HTN (hypertension)- (present on admission)   Assessment & Plan    -On home amlodipine.     History of methamphetamine use- (present on admission)   Assessment & Plan    -Previously smoked meth, denied recent use.     History of smoking- (present on admission)   Assessment & Plan    -Quit September 2018, previously 1/2 ppd x 40 years.     Hypomagnesemia- (present on admission)   Assessment & Plan    -Goal Mg >2.  -Replete PRN.     Hepatitis C- (present on admission)   Assessment & Plan    -H/o untreated hepatitis C per 2017 notes, no hep panel on record.  -Patient asymptomatic at this time.  -Pending LFTs, hep panel.  -Follow up with PCP.         Anticipated Hospital stay:  >2 midnights        Quality Measures  Quality-Core Measures   Reviewed items::  EKG reviewed, Labs reviewed, Medications reviewed and Radiology images reviewed  Johnson catheter::  No Johnson  DVT prophylaxis - mechanical:  SCDs    PCP: GARETH Ledesma.  "

## 2019-03-15 NOTE — PROGRESS NOTES
Assumed care of pt, he is alert and oriented/denies pain. Discussed POC with pt and ED RN. No questions or concerns at this time. Transfered to T8 on zoll and transfered to tele box upon arrival. Pt is SR on monitor, confirmed with monitor tech.. Discussed safety and the need to call for assistance before attempting to get out of bed. Bed is locked in lowest position and call light/personal belongings are within reach. Yellow socks and arm band on pt.

## 2019-03-15 NOTE — PROGRESS NOTES
[facility] paged x2 regarding patient's NPO status, and new ST depression, although pt does not c/o chest pain. Spoke with [facility] team, they stated that pt no longer has to be NPO, and to order an EKG.

## 2019-03-15 NOTE — ED PROVIDER NOTES
ED Provider Note    ER Provider Note         CHIEF COMPLAINT  Chief Complaint   Patient presents with   • Shortness of Breath     pt states he was up to the bathroom when he felt sudden SOB. per EMS- spo2 in the 70's on room air       HPI  Sylvain Espinosa is a 63 y.o. male who presents to the Emergency Department who presents with sudden onset shortness of breath around 1/2-hour ago while he is in the bathroom.  The patient says he has minimal chest pain associated with this.  The patient says that he was going to get a transaortic valve replacement this Monday but was unable to make his appointment.  The patient says that the shortness of breath is severe and he had to call 911.  He denies any cough.  He denies any fevers or chills.  He denies any nausea or vomiting.    REVIEW OF SYSTEMS  See HPI for further details. All other systems are negative.     PAST MEDICAL HISTORY   has a past medical history of Abnormality of left ventricle of heart; Aortic stenosis; BPH (benign prostatic hyperplasia); Hypercholesteremia; and Hypertension.    SURGICAL HISTORY   has a past surgical history that includes tonsillectomy and zzz cardiac cath.    SOCIAL HISTORY  Social History   Substance Use Topics   • Smoking status: Current Every Day Smoker     Packs/day: 0.50     Years: 40.00     Types: Cigarettes   • Smokeless tobacco: Never Used   • Alcohol use Yes      Comment: occ      History   Drug Use No     Comment: marijuana current,  meth last use 1 monht ago       FAMILY HISTORY  Family History   Problem Relation Age of Onset   • Heart Disease Mother        CURRENT MEDICATIONS  Home Medications     Reviewed by Tana Barton R.N. (Registered Nurse) on 03/14/19 at 2241  Med List Status: Not Addressed   Medication Last Dose Status   amLODIPine (NORVASC) 2.5 MG Tab  Active   furosemide (LASIX) 20 MG Tab  Active   potassium chloride SA (K-DUR) 10 MEQ Tab CR  Active                ALLERGIES  No Known Allergies    PHYSICAL  "EXAM  VITAL SIGNS: /72   Pulse 87   Temp 36.5 °C (97.7 °F) (Temporal)   Resp 16   Ht 1.702 m (5' 7\")   Wt 59.6 kg (131 lb 6.3 oz)   SpO2 99%   BMI 20.58 kg/m²       Constitutional: Alert in no apparent distress.  HENT: No signs of trauma, Bilateral external ears normal, Nose normal.   Eyes: Pupils are equal and reactive, Conjunctiva normal, Non-icteric.   Neck: Normal range of motion, No tenderness, Supple, No stridor.   Lymphatic: No lymphadenopathy noted.   Cardiovascular: Harsh systolic ejection murmur.  Tachycardic.  Thorax & Lungs: Mild respiratory distress.  Abdomen: Bowel sounds normal, Soft, No tenderness, No masses, No pulsatile masses. No peritoneal signs.  Skin: Warm, Dry, No erythema, No rash.   Back: No bony tenderness, No CVA tenderness.   Extremities: Intact distal pulses, No edema, No tenderness, No cyanosis.  Musculoskeletal: Good range of motion in all major joints. No tenderness to palpation or major deformities noted.   Neurologic: Alert , Normal motor function, Normal sensory function, No focal deficits noted.   Psychiatric: Affect normal, Judgment normal, Mood normal.     DIAGNOSTIC STUDIES / PROCEDURES    EKG Interpretation:  Interpreted by me   Sinus tachycardia rate of 111.  There are T wave inversions in lead V4 5, V6 as well as aVL.  This is an ischemic EKG.       LABS  Labs Reviewed   CBC WITH DIFFERENTIAL - Abnormal; Notable for the following:        Result Value    Hemoglobin 13.9 (*)     MCHC 31.4 (*)     All other components within normal limits   BASIC METABOLIC PANEL - Abnormal; Notable for the following:     Glucose 115 (*)     All other components within normal limits   TROPONIN - Abnormal; Notable for the following:     Troponin I 0.05 (*)     All other components within normal limits   BTYPE NATRIURETIC PEPTIDE - Abnormal; Notable for the following:     B Natriuretic Peptide 738 (*)     All other components within normal limits   ESTIMATED GFR   BLOOD CULTURE,HOLD "   MAGNESIUM   PROCALCITONIN   TROPONIN   PROTHROMBIN TIME   APTT   CBC WITH DIFFERENTIAL   COMP METABOLIC PANEL   HEPATITIS PANEL ACUTE(4 COMPONENTS)   RETICULOCYTES COUNT   IRON/TOTAL IRON BIND   FERRITIN   VITAMIN B12   FOLATE       All labs reviewed by me.    RADIOLOGY  DX-CHEST-PORTABLE (1 VIEW)   Final Result      1.  New LEFT mid lung opacity suspicious for pneumonia           The radiologist's interpretation of all radiological studies have been reviewed by me.    COURSE & MEDICAL DECISION MAKING  Pertinent Labs & Imaging studies reviewed. (See chart for details)    This is a 63 y.o. male that presents with severe aortic stenosis who presents with acute shortness of breath.  This could represent acute coronary syndrome versus volume overload and worsening ejection fraction.  At this time I will give the patient aspirin as well as obtain a chest x-ray and get cardiac labs.  Patient's EKG does show some ischemia as well.  We will get cardiology involved quickly.    8:17 PM - Patient seen and examined at bedside.     The patient was found to have a new left midlung opacity.  He does not clinically have pneumonia therefore we will not start him on antibiotics.     The patient does have a BNP of 738.  The troponin is slightly elevated at 0.05.  Patient has no leukocytosis and no significant anemia.  I spoke to the inpatient team who recommends Lasix which I will give to the patient.  Spoke to cardiology who agrees with the patient admission and believes that he needs a TA VR.  This will be performed all the patient is admitted.  The patient will be admitted in guarded condition.    FINAL IMPRESSION  1. Dyspnea, unspecified type    2. Elevated troponin    3. Hypoxia    4. Severe aortic stenosis              Electronically signed by: Leonardo Simms, 3/14/2019

## 2019-03-15 NOTE — PROGRESS NOTES
Internal Medicine Interval Note  Note Author: Qasim Rodriguez M.D.     Name Sylvain Espinosa 1955   Age/Sex 63 y.o. male   MRN [id number]   Code Status FULL     After 5PM or if no immediate response to page, please call for cross-coverage  Attending/Team: Dr Ferrell/Lucinda See Patient List for primary contact information  Call [phone] to page    1st Call - Day Intern (R1):   Dr Becker 2nd Call - Day Sr. Resident (R2/R3):   Dr Rodriguez         Reason for interval visit  (Principal Problem)   Acute hypoxic respiratory failure    Interval Problem Daily Status Update  (24 hours)   Patient with ST changes per telemetry room, EKG shows <1 mm ST depression in inferior leads. Patient without acute chest pain or complaints, reports dyspnea has improved.  Will monitor for chest complaints, continue telemetry.  Cardiology note states patient scheduled for TAVR on Monday 3/18/19.  I/O shows -2.4L out, still requiring 3L O2 NC.  Continue daily lasix 40 mg IV.  Ambulatory oxygen evaluation shows patient requires 3L at rest and with ambulation.  Home oxygen orders placed.  Patient cleared for discharge home when home oxygen set up, TAVR scheduled for 3/18/19.    Review of Systems   Constitutional: Negative for chills, fever and malaise/fatigue.   Respiratory: Negative for cough, sputum production, shortness of breath and wheezing.    Cardiovascular: Negative for chest pain, palpitations, orthopnea, claudication and leg swelling.   Gastrointestinal: Negative for abdominal pain, constipation, diarrhea, nausea and vomiting.   Genitourinary: Negative for dysuria and frequency.   Musculoskeletal: Negative for back pain, joint pain and neck pain.   Skin: Negative for itching and rash.   Neurological: Negative for dizziness, sensory change, focal weakness and headaches.   Psychiatric/Behavioral: Negative for substance abuse.       Consultants/Specialty  Cardiology      Disposition  Inpatient, discharge home when home  oxygen set up. Patient is homeless.    Quality Measures  Quality-Core Measures   Reviewed items::  Labs reviewed, EKG reviewed and Medications reviewed  Johnson catheter::  No Johnson  DVT prophylaxis pharmacological::  Enoxaparin (Lovenox)          Physical Exam       Vitals:    03/15/19 0015 03/15/19 0530 03/15/19 0741 03/15/19 1200   BP: 108/72 105/71 121/76 102/68   Pulse: 87 72 85 84   Resp: 16 18 18 16   Temp: 36.5 °C (97.7 °F) 36.3 °C (97.3 °F) 36.9 °C (98.4 °F) 37.2 °C (99 °F)   TempSrc: Temporal Temporal Temporal Temporal   SpO2: 99% 100% 96% 98%   Weight:       Height:         Body mass index is 20.58 kg/m². Weight: 59.6 kg (131 lb 6.3 oz)  Oxygen Therapy:  Pulse Oximetry: 98 %, O2 (LPM): 3, O2 Delivery: Silicone Nasal Cannula    Physical Exam   Constitutional: He is oriented to person, place, and time and well-developed, well-nourished, and in no distress. No distress.   HENT:   Head: Normocephalic and atraumatic.   Mouth/Throat: Oropharynx is clear and moist.   Eyes: Pupils are equal, round, and reactive to light. Conjunctivae and EOM are normal. No scleral icterus.   Neck: Normal range of motion.   Cardiovascular: Normal rate and regular rhythm.  Exam reveals no friction rub.    Murmur heard.  Pulmonary/Chest: Effort normal. No respiratory distress. He has no wheezes. He has rales.   Abdominal: Soft. Bowel sounds are normal. He exhibits no distension. There is no tenderness. There is no rebound and no guarding.   Musculoskeletal: Normal range of motion. He exhibits no edema or tenderness.   Neurological: He is alert and oriented to person, place, and time. No cranial nerve deficit.   Skin: Skin is warm and dry. He is not diaphoretic.   Psychiatric: Affect and judgment normal.         Lab Data Review:         3/15/2019  3:54 PM    Recent Labs      03/14/19   2010  03/15/19   0148   SODIUM  136  135   POTASSIUM  4.2  4.1   CHLORIDE  100  99   CO2  29  29   BUN  15  17   CREATININE  1.12  1.17   MAGNESIUM  1.9    --    CALCIUM  9.7  9.5       Recent Labs      03/14/19   2010  03/15/19   0148   ALTSGPT   --   25   ASTSGOT   --   29   ALKPHOSPHAT   --   56   TBILIRUBIN   --   0.3   GLUCOSE  115*  124*       Recent Labs      03/14/19   2010  03/15/19   0147  03/15/19   0148   RBC  4.95   --   4.46*   HEMOGLOBIN  13.9*   --   12.8*   HEMATOCRIT  44.2   --   38.9*   PLATELETCT  314   --   277   PROTHROMBTM   --    --   14.3   APTT   --    --   28.9   INR   --    --   1.10   IRON   --    --   34*   FERRITIN   --   31.2   --    TOTIRONBC   --    --   356       Recent Labs      03/14/19   2010  03/15/19   0148   WBC  6.3  6.6   NEUTSPOLYS  45.30  63.60   LYMPHOCYTES  39.80  25.40   MONOCYTES  11.00  8.60   EOSINOPHILS  3.20  1.80   BASOPHILS  0.50  0.30   ASTSGOT   --   29   ALTSGPT   --   25   ALKPHOSPHAT   --   56   TBILIRUBIN   --   0.3           Assessment/Plan     Severe aortic stenosis  -See Acute respiratory failure.    Acute respiratory failure (HCC)  -Acute respiratory failure with O2 sat 70 on room air on admission, requiring supplemental O2.  -Admitted in January 2019 for acute exacerbation of HFpEF, planned TAVR for 3/11/2019 but missed elective procedure due to housing issues.  -12/30/2018 echo showed moderate concentric LVH, low normal LV systolic function.  Severe aortic stenosis (peak: 98 mmHg, mean: 56 mmHg).  RVSP 55 mmHg.  -CXR on this admission showed mid-lung opacity, more likely edema > pneumonia given acute onset of dyspnea in absence of preceding or subsequent ill symptoms, no leukocytosis, negative procalcitonin, improvement with diuresis.  -EKG showed sinus tachycardia, lateral ST depression likely related to severe AS / acute exacerbation of HFpEF, no chest pain, trop 0.05, BNP stable at 738.  -Current presentation of dyspnea improved with diuresis consistent with acute exacerbation of HFpEF in setting of severe AS.  -Assessed by cards, outpatient TAVR scheduled for Monday 3/18/19, patient does not need  "to be inpatient for this.  - Continue Telemetry, I/O, daily weights, fluid / Na restrictions.  - Lasix 40 mg IV daily  - Home oxygen setup underway, requires 3L O2 NC at rest and with ambulation  - Can discharge home if home oxygen setup.    Hepatitis C  -H/o untreated hepatitis C per 2017 notes, no hep panel on record.  -Patient asymptomatic at this time.  -Pending LFTs, hep panel.  -Follow up with PCP.    Hypomagnesemia  -Goal Mg >2.  -Replete PRN.    History of smoking  -Quit September 2018, previously 1/2 ppd x 40 years.    History of methamphetamine use  -Previously smoked meth, denied recent use.    (HFpEF) heart failure with preserved ejection fraction (HCC)  -See \"dyspnea.\"    HTN (hypertension)  -On home amlodipine.    Normocytic anemia  -Hb 13.9, stable, no apparent bleed.  -Folate, B12 normal  - iron low, TIBC normal, ferritin low normal        "

## 2019-03-15 NOTE — CONSULTS
Reason for Consult:  Asked by Dr Simms to see this patient with  Shortness of breath  Patient's PCP: NEY Ledesma    CC:   Chief Complaint   Patient presents with   • Shortness of Breath     pt states he was up to the bathroom when he felt sudden SOB. per EMS- spo2 in the 70's on room air       HPI: 63-year-old male patient well-known to our service for symptomatic severe aortic stenosis.  He was scheduled to undergo transcatheter aortic valve replacement several times, because of several issues like lack of housing, lack of phone but could not able to complete procedure.  He has history of drug abuse, currently abstinent from drugs, lives in a shelter.  Today presented to the emergency room because of sudden onset of shortness of breath associated with chest tightness, severe in intensity and persistent so he called ambulance.  His saturation was in 70s upon presentation, improved with oxygen administration.    Medications / Drug list prior to admission:  No current facility-administered medications on file prior to encounter.      Current Outpatient Prescriptions on File Prior to Encounter   Medication Sig Dispense Refill   • amLODIPine (NORVASC) 2.5 MG Tab Take 1 Tab by mouth every day. 30 Tab 3   • furosemide (LASIX) 20 MG Tab Take 1 Tab by mouth every day. 30 Tab 3   • potassium chloride SA (K-DUR) 10 MEQ Tab CR Take 1 Tab by mouth every day. 30 Tab 3       Current list of administered Medications:    Current Facility-Administered Medications:   •  senna-docusate (PERICOLACE or SENOKOT S) 8.6-50 MG per tablet 2 Tab, 2 Tab, Oral, BID **AND** polyethylene glycol/lytes (MIRALAX) PACKET 1 Packet, 1 Packet, Oral, QDAY PRN **AND** magnesium hydroxide (MILK OF MAGNESIA) suspension 30 mL, 30 mL, Oral, QDAY PRN **AND** bisacodyl (DULCOLAX) suppository 10 mg, 10 mg, Rectal, QDAY PRN, Fabiola Mancilla M.D.  •  acetaminophen (TYLENOL) tablet 650 mg, 650 mg, Oral, Q6HRS PRN, Fabiola Mancilla M.D.  •  guaiFENesin  "dextromethorphan (ROBITUSSIN DM) 100-10 MG/5ML syrup 10 mL, 10 mL, Oral, Q6HRS PRN, Fabiola Mancilla M.D.  •  [START ON 3/15/2019] amLODIPine (NORVASC) tablet 2.5 mg, 2.5 mg, Oral, DAILY, Fabiola Mancilla M.D.  •  hydrALAZINE (APRESOLINE) injection 10 mg, 10 mg, Intravenous, Q4HRS PRN, Fabiola Mancilla M.D.    Current Outpatient Prescriptions:   •  amLODIPine (NORVASC) 2.5 MG Tab, Take 1 Tab by mouth every day., Disp: 30 Tab, Rfl: 3  •  furosemide (LASIX) 20 MG Tab, Take 1 Tab by mouth every day., Disp: 30 Tab, Rfl: 3  •  potassium chloride SA (K-DUR) 10 MEQ Tab CR, Take 1 Tab by mouth every day., Disp: 30 Tab, Rfl: 3    Past Medical History:   Diagnosis Date   • Abnormality of left ventricle of heart     \"constricted left ventricle\"    • Aortic stenosis    • BPH (benign prostatic hyperplasia)    • Hypercholesteremia    • Hypertension        Past Surgical History:   Procedure Laterality Date   • TONSILLECTOMY     • ZZZ CARDIAC CATH         Family History   Problem Relation Age of Onset   • Heart Disease Mother        Social History     Social History   • Marital status: Single     Spouse name: N/A   • Number of children: N/A   • Years of education: N/A     Occupational History   • Not on file.     Social History Main Topics   • Smoking status: Current Every Day Smoker     Packs/day: 0.50     Years: 40.00     Types: Cigarettes   • Smokeless tobacco: Never Used   • Alcohol use Yes      Comment: occ   • Drug use: No      Comment: marijuana current,  meth last use 1 monht ago   • Sexual activity: Not on file     Other Topics Concern   • Not on file     Social History Narrative   • No narrative on file       ALLERGIES:  No Known Allergies    Review of systems:  A detailed review of symptoms was reviewed with patient. This is reviewed in H&P and PMH. ALL OTHERS reviewed and negative    Physical exam:  Patient Vitals for the past 24 hrs:   BP Temp Temp src Pulse Resp SpO2 Height Weight   03/14/19 2145 - - - 71 15 95 % - - " "  19 - - - - (!) 22 - - -   19 - - - 98 19 95 % - -   19 - - - 85 (!) 21 96 % - -   19 135/81 - - (!) 102 (!) 24 96 % - -   19 (!) 171/101 36.9 °C (98.4 °F) Temporal (!) 118 (!) 24 96 % - -   19 - - - - - - 1.702 m (5' 7\") 62.1 kg (137 lb)       General: No acute distress.   EYES: no jaundice  HEENT: OP clear   Neck: No bruits No JVD.   CVS:   RRR. S1 + S2. 3/6 systolic murmur in aortic area  Resp: bilateral crepitations.  Abdomen: Soft, NT, ND,  Skin: Grossly nothing acute no obvious rashes  Neurological: Alert, Moves all extremities, no cranial nerve defects on limited exam  Extremities: Pulse 2+ in b/l LE.  No edema. No cyanosis.       Data:  Laboratory studies:  Recent Results (from the past 24 hour(s))   EKG (NOW)    Collection Time: 19  8:07 PM   Result Value Ref Range    Report       [facility] Emergency Dept.    Test Date:  2019  Pt Name:    JAIMIE YAO              Department: ER  MRN:        [id number]                      Room:       [facility]  Gender:     Male                         Technician: STUDENT  :        1955                   Requested By:ER TRIAGE PROTOCOL  Order #:    [id number]                    Reading MD: Leonardo Simms MD    Measurements  Intervals                                Axis  Rate:       111                          P:          78  AK:         140                          QRS:        43  QRSD:       90                           T:          85  QT:         340  QTc:        462    Interpretive Statements  SINUS TACHYCARDIA  LEFT ATRIAL ABNORMALITY  LVH WITH SECONDARY REPOLARIZATION ABNORMALITY  ANTERIOR ST ELEVATION, PROBABLY DUE TO LVH  Compared to ECG 2018 19:23:28  Early repolarization now present  ST (T wave) deviation now present    Electronically Signed On 3- 21:25:15 PDT by Leonardo ivey MD     CBC WITH DIFFERENTIAL    Collection Time: 19  8:10 PM "   Result Value Ref Range    WBC 6.3 4.8 - 10.8 K/uL    RBC 4.95 4.70 - 6.10 M/uL    Hemoglobin 13.9 (L) 14.0 - 18.0 g/dL    Hematocrit 44.2 42.0 - 52.0 %    MCV 89.3 81.4 - 97.8 fL    MCH 28.1 27.0 - 33.0 pg    MCHC 31.4 (L) 33.7 - 35.3 g/dL    RDW 45.9 35.9 - 50.0 fL    Platelet Count 314 164 - 446 K/uL    MPV 9.9 9.0 - 12.9 fL    Neutrophils-Polys 45.30 44.00 - 72.00 %    Lymphocytes 39.80 22.00 - 41.00 %    Monocytes 11.00 0.00 - 13.40 %    Eosinophils 3.20 0.00 - 6.90 %    Basophils 0.50 0.00 - 1.80 %    Immature Granulocytes 0.20 0.00 - 0.90 %    Nucleated RBC 0.00 /100 WBC    Neutrophils (Absolute) 2.86 1.82 - 7.42 K/uL    Lymphs (Absolute) 2.51 1.00 - 4.80 K/uL    Monos (Absolute) 0.69 0.00 - 0.85 K/uL    Eos (Absolute) 0.20 0.00 - 0.51 K/uL    Baso (Absolute) 0.03 0.00 - 0.12 K/uL    Immature Granulocytes (abs) 0.01 0.00 - 0.11 K/uL    NRBC (Absolute) 0.00 K/uL   BASIC METABOLIC PANEL    Collection Time: 03/14/19  8:10 PM   Result Value Ref Range    Sodium 136 135 - 145 mmol/L    Potassium 4.2 3.6 - 5.5 mmol/L    Chloride 100 96 - 112 mmol/L    Co2 29 20 - 33 mmol/L    Glucose 115 (H) 65 - 99 mg/dL    Bun 15 8 - 22 mg/dL    Creatinine 1.12 0.50 - 1.40 mg/dL    Calcium 9.7 8.5 - 10.5 mg/dL    Anion Gap 7.0 0.0 - 11.9   TROPONIN    Collection Time: 03/14/19  8:10 PM   Result Value Ref Range    Troponin I 0.05 (H) 0.00 - 0.04 ng/mL   BNP    Collection Time: 03/14/19  8:10 PM   Result Value Ref Range    B Natriuretic Peptide 738 (H) 0 - 100 pg/mL   ESTIMATED GFR    Collection Time: 03/14/19  8:10 PM   Result Value Ref Range    GFR If African American >60 >60 mL/min/1.73 m 2    GFR If Non African American >60 >60 mL/min/1.73 m 2   Blood Culture,Hold    Collection Time: 03/14/19  8:10 PM   Result Value Ref Range    Blood Culture Hold Collected        Imaging:  DX-CHEST-PORTABLE (1 VIEW)   Final Result      1.  New LEFT mid lung opacity suspicious for pneumonia        CARDIAC STUDIES/PROCEDURES:     CARDIAC  CATHETERIZATION CONCLUSIONS (01/01/19)  1.  Severe aortic stenosis with peak to peak gradient of 80 mmHg, mean   gradient of 61 mmHg, calculated TYAA of 0.48 cm2.  2.  No angiographic evidence of coronary artery disease.  3.  Mildly reduced left ventricular systolic function with ejection fraction of 55%.  4.  Elevated left ventricular end diastolic pressure.  5.  Elevated right heart pressure with PA systolic pressure of 65 mmHg.  6.  Mildly dilated ascending aorta.  7.  Ectatic and diffusely atherosclerotic distal abdominal aorta with   bilateral iliofemoral system, moderate in caliber, bilateral nonobstructive   stenosis with tortuosity left greater than right.     CAROTID ULTRASOUND (01/03/19)  1.  Less than 50% stenosis in the right carotid bifurcation with no    evidence of internal carotid arterial stenosis.  2.  Normal left carotid duplex examination.  3.  Antegrade bilateral vertebral arterial flow.       CTA OF CHEST AND ABDOMEN (01/04/15)  1.  Mean aortic valvular area 584 sq mm  2.  The coronary arteries originate just greater than 10 mm above the aortic annulus  3.  Minimal diameter right external iliac artery 6.1 mm with minimal tortuosity  4.  Minimal diameter left external iliac artery 5.7 mm with moderate tortuosity  5.  3.7 x 3.5 cm infrarenal abdominal aortic aneurysm  6.  Hepatic fatty infiltration and moderate hepatomegaly  7.  2.6 cm benign cavernous hemangioma within the right lobe of the liver(study result reviewed)       ECHOCARDIOGRAM CONCLUSIONS (12/30/18)  Moderate concentric left ventricular hypertrophy.  Left ventricular systolic function is low normal.  Severe aortic stenosis.  Transvalvular gradients are - Peak: 98 mmHg, Mean: 56 mmHg.  Estimated right ventricular systolic pressure  is 55 mmHg.  Compared to the images of the study done on 7/19/17, previous severe   aortic stenosis is now critical aortic stenosis and previously normal   left ventricular ejection fraction is now mildly  reduced.      EKG : personally reviewed by me sinus tachycardia, PVC, lateral ST depression    All pertinent features of laboratory and imaging reviewed including primary images where applicable    Assessment / Plan:  1.  Critical aortic stenosis, acute heart failure NYHA class IV state C  2.  Hypertension  3.  History of methamphetamine abuse    Patient has critical aortic stenosis, now developed acute heart failure, untreated will likely result in mortality.  Diuresis with Lasix 40 mg IV.  I will discuss with Dr. Moser who will be rounding on him tomorrow, likely need to have transcatheter aortic valve replacement as an inpatient on Monday.    It is my pleasure to participate in the care of Mr. Espinosa.  Please do not hesitate to contact me with questions or concerns.    Lonnie De Souza    3/14/2019

## 2019-03-15 NOTE — SENIOR ADMIT NOTE
Senior Admission Note    In summary: Sylvain Espinosa is a 63 y.o. male with past medical history of AS pending TAVR, HLD, HTN, BPH, meth abuse, hepatitis C, and former smoker  presented to the ED with SOB that began this afternoon. Pt was planned for a TAVR this Monday but did not schedule. Seen by Cards in ED, will need TAVR as inpt. In the ED CBC/CMP showed slight anemia. Trop/BNP were elevated, EKG did not suggest acute ischemia. CXR showed LLL opacity. Vitals showed tachycardia and tachypnea making him 2/4 SIRS, he was also hypertensive and hypoxic requiring 3 lpm O2. Pt was then admitted for further mgmt of his conditions.    Pertinent physical exam findings:    Cards: ST with 4/6 systolic ejection murmur  Pulm: CTAB    Assessment and plan in summary:    1.Critical AS   - Cards following   - Will need TAVR while inpt   - CTM on tele with cardiac monitor    2.Hypoxia   - Likely pulm edema 2/2 critical AS, CAP less likely    - Procal ordered   - Cautious Lasix trial as pt is preload dependent     3.HTN   - Continue home amlodipine    - Afterload reducing agents for further control, Hydralazine PRN for now    For full plan, please see Intern note for details   Gonzalo Tejeda M.D.  PGY 2

## 2019-03-15 NOTE — ED TRIAGE NOTES
"Sylvain Espinosa  63 y.o.    Chief Complaint   Patient presents with   • Shortness of Breath     pt states he was up to the bathroom when he felt sudden SOB. per EMS- spo2 in the 70's on room air       BP (!) 171/101   Pulse (!) 118   Temp 36.9 °C (98.4 °F) (Temporal)   Resp (!) 24   Ht 1.702 m (5' 7\")   Wt 62.1 kg (137 lb)   SpO2 96%   BMI 21.46 kg/m²       Pt presents to ED via EMS with c/o SOB. Pt states he was walking to the bathroom when he felt sudden onset SOB. Pt currently takes Lasix daily, states he hasn't missed a dose. Pt denies cough/fever/chills. Per EMS- spo2 in the 70's on arrival on RA. Pt >95% on 3L via NC at this time. Pt denies CP. One SL Nitro administered by EMS. Pt placed on continuous cardiac monitoring and continuous spo2 monitoring.   "

## 2019-03-15 NOTE — RESPIRATORY CARE
COPD EDUCATION by COPD CLINICAL EDUCATOR  3/15/2019 at 9:14 AM by Humera Tam     Patient reviewed by COPD education team. Patient does not qualify for COPD program.

## 2019-03-16 ENCOUNTER — PATIENT OUTREACH (OUTPATIENT)
Dept: HEALTH INFORMATION MANAGEMENT | Facility: OTHER | Age: 64
End: 2019-03-16

## 2019-03-16 VITALS
TEMPERATURE: 98.8 F | RESPIRATION RATE: 18 BRPM | SYSTOLIC BLOOD PRESSURE: 108 MMHG | BODY MASS INDEX: 20.66 KG/M2 | OXYGEN SATURATION: 96 % | WEIGHT: 131.61 LBS | DIASTOLIC BLOOD PRESSURE: 68 MMHG | HEIGHT: 67 IN | HEART RATE: 83 BPM

## 2019-03-16 LAB
ANION GAP SERPL CALC-SCNC: 4 MMOL/L (ref 0–11.9)
BUN SERPL-MCNC: 28 MG/DL (ref 8–22)
CALCIUM SERPL-MCNC: 9.3 MG/DL (ref 8.5–10.5)
CHLORIDE SERPL-SCNC: 100 MMOL/L (ref 96–112)
CO2 SERPL-SCNC: 32 MMOL/L (ref 20–33)
CREAT SERPL-MCNC: 1.46 MG/DL (ref 0.5–1.4)
GLUCOSE SERPL-MCNC: 86 MG/DL (ref 65–99)
POTASSIUM SERPL-SCNC: 4.2 MMOL/L (ref 3.6–5.5)
SODIUM SERPL-SCNC: 136 MMOL/L (ref 135–145)

## 2019-03-16 PROCEDURE — 36415 COLL VENOUS BLD VENIPUNCTURE: CPT

## 2019-03-16 PROCEDURE — 80048 BASIC METABOLIC PNL TOTAL CA: CPT

## 2019-03-16 PROCEDURE — 700102 HCHG RX REV CODE 250 W/ 637 OVERRIDE(OP): Performed by: STUDENT IN AN ORGANIZED HEALTH CARE EDUCATION/TRAINING PROGRAM

## 2019-03-16 PROCEDURE — A9270 NON-COVERED ITEM OR SERVICE: HCPCS | Performed by: STUDENT IN AN ORGANIZED HEALTH CARE EDUCATION/TRAINING PROGRAM

## 2019-03-16 PROCEDURE — 700111 HCHG RX REV CODE 636 W/ 250 OVERRIDE (IP): Performed by: STUDENT IN AN ORGANIZED HEALTH CARE EDUCATION/TRAINING PROGRAM

## 2019-03-16 PROCEDURE — 99239 HOSP IP/OBS DSCHRG MGMT >30: CPT | Mod: GC | Performed by: INTERNAL MEDICINE

## 2019-03-16 RX ORDER — FUROSEMIDE 20 MG/1
20 TABLET ORAL DAILY
Qty: 30 TAB | Refills: 0 | Status: SHIPPED | OUTPATIENT
Start: 2019-03-16 | End: 2019-03-22 | Stop reason: SDUPTHER

## 2019-03-16 RX ORDER — FUROSEMIDE 20 MG/1
20 TABLET ORAL
Status: DISCONTINUED | OUTPATIENT
Start: 2019-03-17 | End: 2019-03-16 | Stop reason: HOSPADM

## 2019-03-16 RX ORDER — FERROUS SULFATE 325(65) MG
325 TABLET ORAL DAILY
Qty: 30 TAB | Refills: 0 | Status: SHIPPED | OUTPATIENT
Start: 2019-03-16 | End: 2019-04-15

## 2019-03-16 RX ADMIN — AMLODIPINE BESYLATE 2.5 MG: 5 TABLET ORAL at 05:21

## 2019-03-16 RX ADMIN — FUROSEMIDE 40 MG: 10 INJECTION, SOLUTION INTRAMUSCULAR; INTRAVENOUS at 05:20

## 2019-03-16 RX ADMIN — ENOXAPARIN SODIUM 40 MG: 100 INJECTION SUBCUTANEOUS at 05:20

## 2019-03-16 ASSESSMENT — ENCOUNTER SYMPTOMS
WHEEZING: 0
FOCAL WEAKNESS: 0
SHORTNESS OF BREATH: 0
VOMITING: 0
SENSORY CHANGE: 0
ABDOMINAL PAIN: 0
DIZZINESS: 0
BACK PAIN: 0
HEADACHES: 0
PALPITATIONS: 0
ORTHOPNEA: 0
DIARRHEA: 0
CONSTIPATION: 0
CHILLS: 0
NAUSEA: 0
COUGH: 0
CLAUDICATION: 0
FEVER: 0
SPUTUM PRODUCTION: 0
NECK PAIN: 0

## 2019-03-16 ASSESSMENT — LIFESTYLE VARIABLES: SUBSTANCE_ABUSE: 0

## 2019-03-16 NOTE — PROGRESS NOTES
Discharge order received, pt's home O2 tanks delivered and at bedside. Pt able to set up transport through Medicaid services. Discussed discharge instructions, including taking medications as prescribed and getting to scheduled TAVR at 0500 on Monday 3/18. Pt states that he has a ride set up to get to surgery. Given CHF calendar to track BP and weight, pt states understanding. Home medications retrieved from pharmacy and sent home with pt. Pt stable at time of discharge, escorted downstairs with RN in wheelchair with all home belongings.

## 2019-03-16 NOTE — PROGRESS NOTES
Assumed care of pt, beside report received from HEBERT Cano. Updated on POC, call light within reach all fall precautions within place. Bed locked and lowered. Pt instructed to call for assistance before getting up. All questions answered, no other needs at this time.

## 2019-03-16 NOTE — PROGRESS NOTES
Pt able to set up medicaid ride through [employer]. Waiting in room until cab arrival. Will monitor until ride arrives.

## 2019-03-16 NOTE — DISCHARGE INSTRUCTIONS
Discharge Instructions    Discharged to home by car with friend. Discharged via wheelchair, hospital escort: Yes.  Special equipment needed: Oxygen    Be sure to schedule a follow-up appointment with your primary care doctor or any specialists as instructed.     Discharge Plan: Please take all medications as prescribed by your doctor daily. Plan on checking in for TAVR surgery on Monday 3/18 at 5 AM.    Smoking Cessation Offered: Patient Refused  Influenza Vaccine Indication: Not indicated: Previously immunized this influenza season and > 8 years of age    I understand that a diet low in cholesterol, fat, and sodium is recommended for good health. Unless I have been given specific instructions below for another diet, I accept this instruction as my diet prescription.     Special Instructions:   HF Patient Discharge Instructions  · Monitor your weight daily, and maintain a weight chart, to track your weight changes.   · Activity as tolerated, unless your Doctor has ordered otherwise.   · Follow a low fat, low cholesterol, low salt diet unless instructed otherwise by your Doctor. Read the labels on the back of food products and track your intake of fat, cholesterol and salt.   · Fluid Restriction No. If a Fluid Restriction has been ordered by your Doctor, measure fluids with a measuring cup to ensure that you are not exceeding the restriction.   · No smoking.  · Oxygen Yes. If your Doctor has ordered that you wear Oxygen at home, it is important to wear it as ordered.  · Did you receive an explanation from staff on the importance of taking each of your medications and why it is necessary to keep taking them unless your doctor says to stop? Yes  · Were all of your questions answered about how to manage your heart failure and what to do if you have increased signs and symptoms after you go home? Yes  · Do you feel like your heart failure care team involved you in the care treatment plan and allowed you to make decisions  regarding your care while in the hospital and addressed any discharge needs you might have? Yes    See the educational handout provided at discharge for more information on monitoring your daily weight, activity and diet. This also explains more about Heart Failure, symptoms of a flare-up and some of the tests that you have undergone.     Warning Signs of a Flare-Up include:  · Swelling in the ankles or lower legs.  · Shortness of breath, while at rest, or while doing normal activities.   · Shortness of breath at night when in bed, or coughing in bed.   · Requiring more pillows to sleep at night, or needing to sit up at night to sleep.  · Feeling weak, dizzy or fatigued.     When to call your Doctor:  · Call [facility] seven days a week from 8:00 a.m. to 8:00 p.m. for medical questions [phone].  · Call your Primary Care Physician or Cardiologist if:   1. You experience any pain radiating to your jaw or neck.  2. You have any difficulty breathing.  3. You experience weight gain of 3 lbs in a day or 5 lbs in a week.   4. You feel any palpitations or irregular heartbeats.  5. You become dizzy or lose consciousness.   If you have had an angiogram or had a pacemaker or AICD placed, and experience:  1. Bleeding, drainage or swelling at the surgical / puncture site.  2. Fever greater than 100.0 F  3. Shock from internal defibrillator.  4. Cool and / or numb extremities.      · Is patient discharged on Warfarin / Coumadin?   No     Depression / Suicide Risk    As you are discharged from this [facility], it is important to learn how to keep safe from harming yourself.    Recognize the warning signs:  · Abrupt changes in personality, positive or negative- including increase in energy   · Giving away possessions  · Change in eating patterns- significant weight changes-  positive or negative  · Change in sleeping patterns- unable to sleep or sleeping all the time   · Unwillingness or inability to  communicate  · Depression  · Unusual sadness, discouragement and loneliness  · Talk of wanting to die  · Neglect of personal appearance   · Rebelliousness- reckless behavior  · Withdrawal from people/activities they love  · Confusion- inability to concentrate     If you or a loved one observes any of these behaviors or has concerns about self-harm, here's what you can do:  · Talk about it- your feelings and reasons for harming yourself  · Remove any means that you might use to hurt yourself (examples: pills, rope, extension cords, firearm)  · Get professional help from the community (Mental Health, Substance Abuse, psychological counseling)  · Do not be alone:Call your Safe Contact- someone whom you trust who will be there for you.  · Call your local CRISIS HOTLINE 124-9778 or 932-420-4675  · Call your local Children's Mobile Crisis Response Team [place] (390) 702-6072 or www.Auditude  · Call the toll free National Suicide Prevention Hotlines   · National Suicide Prevention Lifeline 137-967-PZEP (3485)  · National Hope Line Network 800-SUICIDE (969-2374)      Aortic Valve Stenosis  Aortic valve stenosis is a narrowing of the aortic valve. The aortic valve opens and closes to regulate blood flow between the lower left chamber of the heart (left ventricle) and the blood vessel that leads away from the heart (aorta). When the aortic valve becomes narrow, it makes it difficult for the heart to pump blood into the aorta, which causes the heart to work harder. The extra work can weaken the heart over time.  Aortic valve stenosis can range from mild to severe. If untreated, it can become more severe over time and can lead to heart failure.  What are the causes?  This condition may be caused by:  · Buildup of calcium around and on the valve. This can occur with aging. This is the most common cause of aortic valve stenosis.  · Birth defect.  · Rheumatic fever.  · Radiation to the chest.  What increases the  risk?  You may be more likely to develop this condition if:  · You are over the age of 65.  · You were born with an abnormal bicuspid valve.  What are the signs or symptoms?  You may have no symptoms until your condition becomes severe. It may take 10-20 years for mild or moderate aortic valve stenosis to become severe. Symptoms may include:  · Shortness of breath. This may get worse during physical activity.  · Feeling unusually weak and tired (fatigue).  · Extreme discomfort in the chest, neck, or arm (angina).  · A heartbeat that is irregular or faster than normal (palpitations).  · Dizziness or fainting. This may happen when you get physically tired or after you take certain heart medicines, such as nitroglycerin.  How is this diagnosed?  This condition may be diagnosed with:  · A physical exam.  · Echocardiogram. This is a type of imaging test that uses sound waves (ultrasound) to make an image of your heart. There are two types that may be used:  ¨ Transthoracic echocardiogram (TTE). This type of echocardiogram is noninvasive, and it is usually done first.  ¨ Transesophageal echocardiogram (JAYLYN). This type of echocardiogram is done by passing a flexible tube down your esophagus. The heart and the esophagus are close to each other, so your health care provider can take very clear, detailed pictures of the heart using this type of test.  · Cardiac catheterization. In this procedure, a thin, flexible tube (catheter) is passed through a large vein in your neck, groin, or arm. This procedure provides information about arteries, structures, blood pressure, and oxygen levels in your heart.  · Electrocardiogram (ECG). This records the electrical impulses of your heart and assesses heart function.  · Stress tests. These are tests that evaluate the blood supply to your heart and your heart’s response to exercise.  · Blood tests.  You may work with a health care provider who specializes in the heart (cardiologist).  How  is this treated?  Treatment depends on how severe your condition is and what your symptoms are. You will need to have your heart checked regularly to make sure that your condition is not getting worse or causing serious problems. If your condition is mild, no treatment may be needed.  Treatment may include:  · Medicines that help keep your heart rate regular.  · Medicines that thin your blood (anticoagulants) to prevent the formation of blood clots.  · Antibiotic medicines to help prevent infection.  · Surgery to replace your aortic valve. This is the most common treatment for aortic valve stenosis. Several types of surgeries are available. The surgery may be done through a large incision over your heart (open heart surgery), or it may be done using a minimally invasive technique (transcatheter aortic valve replacement, or TAVR).  Follow these instructions at home:  Lifestyle  · Limit alcohol intake to no more than 1 drink per day for nonpregnant women and 2 drinks per day for men. One drink equals 12 oz of beer, 5 oz of wine, or 1½ oz of hard liquor.  · Do not use any tobacco products, such as cigarettes, chewing tobacco, or e-cigarettes. If you need help quitting, ask your health care provider.  · Work with your health care provider to manage your blood pressure and cholesterol.  · Maintain a healthy weight.  Eating and drinking  · Follow instructions from your health care provider about eating or drinking restrictions.  ¨ Limit how much caffeine you drink. Caffeine can affect your heart's rate and rhythm.  · Drink enough fluid to keep your urine clear or pale yellow.  · Eat a heart-healthy diet. This should include plenty of fresh fruits and vegetables. If you eat meat, it should be lean cuts. Avoid foods that are:  ¨ High in salt, saturated fat, or sugar.  ¨ Canned or highly processed.  ¨ Fried.  Activity  · Return to your normal activities as told by your health care provider. Ask your health care provider what  activities are safe for you.  · Exercise regularly, as told by your health care provider. Ask your health care provider what types of exercise are safe for you.  · If your aortic valve stenosis is mild, you may need to avoid only very intense physical activity. The more severe your aortic valve stenosis is, the more activities you may need to avoid.  General instructions  · Take over-the-counter and prescription medicines only as told by your health care provider.  · If you are a woman and you plan to become pregnant, talk with your health care provider before you become pregnant.  · Tell all health care providers who care for you that you have aortic valve stenosis.  · Keep all follow-up visits as told by your health care provider. This is important.  Contact a health care provider if:  · You have a fever.  Get help right away if:  · You develop chest pain or tightness.  · You develop shortness of breath or difficulty breathing.  · You feel light-headed.  · You feel like you might faint.  · Your heartbeat is irregular or faster than normal.  These symptoms may represent a serious problem that is an emergency. Do not wait to see if the symptoms will go away. Get medical help right away. Call your local emergency services (911 in the [place] Do not drive yourself to the hospital.   This information is not intended to replace advice given to you by your health care provider. Make sure you discuss any questions you have with your health care provider.  Document Released: 09/15/2004 Document Revised: 05/25/2017 Document Reviewed: 11/20/2016  [employer] Interactive Patient Education © 2017 [employer]    Oxygen Use at Home  Oxygen can be prescribed for home use. The prescription will show the flow rate. This is how much oxygen is to be used per minute. This will be listed in liters per minute (LPM or L/M). A liter is a metric measurement of volume.  You will use oxygen therapy as directed. It can be used while exercising,  sleeping, or at rest. You may need oxygen continuously. Your health care provider may order a blood oxygen test (arterial blood gas or pulse oximetry test) that will show what your oxygen level is. Your health care provider will use these measurements to learn about your needs and follow your progress.  Home oxygen therapy is commonly used on patients with various lung (pulmonary) related conditions. Some of these conditions include:  · Asthma.  · Lung cancer.  · Pneumonia.  · Emphysema.  · Chronic bronchitis.  · Cystic fibrosis.  · Other lung diseases.  · Pulmonary fibrosis.  · Occupational lung disease.  · Heart failure.  · Chronic obstructive pulmonary disease (COPD).  3 COMMON WAYS OF PROVIDING OXYGEN THERAPY  · Gas: The gas form of oxygen is put into variously sized cylinders or tanks. The cylinders or oxygen tanks contain compressed oxygen. The cylinder is equipped with a regulator that controls the flow rate. Because the flow of oxygen out of the cylinder is constant, an oxygen conserving device may be attached to the system to avoid waste. This device releases the gas only when you inhale and cuts it off when you exhale. Oxygen can be provided in a small cylinder that can be carried with you. Large tanks are heavy and are only for stationary use. After use, empty tanks must be exchanged for full tanks.  · Liquid: The liquid form of oxygen is put into a container similar to a thermos. When released, the liquid converts to a gas and you breathe it in just like the compressed gas. This storage method takes up less space than the compressed gas cylinder, and you can transfer the liquid to a small, portable vessel at home. Liquid oxygen is more expensive than the compressed gas, and the vessel vents when not in use. An oxygen conserving device may be built into the vessel to conserve the oxygen. Liquid oxygen is very cold, around 297° below zero.  · Oxygen concentrator: This medical device filters oxygen from  "room air and gives almost 100% oxygen to the patient. Oxygen concentrators are powered by electricity. Benefits of this system are:  ¨ It does not need to be resupplied.  ¨ It is not as costly as liquid oxygen.  ¨ Extra tubing permits the user to move around easier.  There are several types of small, portable oxygen systems available which can help you remain active and mobile. You must have a cylinder of oxygen as a backup in the event of a power failure. Advise your electric power company that you are on oxygen therapy in order to get priority service when there is a power failure.  OXYGEN DELIVERY DEVICES  There are 3 common ways to deliver oxygen to your body.  · Nasal cannula. This is a 2-pronged device inserted in the nostrils that is connected to tubing carrying the oxygen. The tubing can rest on the ears or be attached to the frame of eyeglasses.  · Mask. People who need a high flow of oxygen generally use a mask.  · Transtracheal catheter. Transtracheal oxygen therapy requires the insertion of a small, flexible tube (catheter) in the windpipe (trachea). This catheter is held in place by a necklace. Since transtracheal oxygen bypasses the mouth, nose, and throat, a humidifier is absolutely required at flow rates of 1 LPM or greater.  OXYGEN USE SAFETY TIPS  · Never smoke while using oxygen. Oxygen does not burn or explode, but flammable materials will burn faster in the presence of oxygen.  · Keep a fire extinguisher close by. Let your fire department know that you have oxygen in your home.  · Warn visitors not to smoke near you when you are using oxygen. Put up \"no smoking\" signs in your home where you most often use the oxygen.  · When you go to a restaurant with your portable oxygen source, ask to be seated in the nonsmoking section.  · Stay at least 5 feet away from gas stoves, candles, lighted fireplaces, or other heat sources.  · Do not use materials that burn easily (flammable) while using your " oxygen.  · If you use an oxygen cylinder, make sure it is secured to some fixed object or in a stand. If you use liquid oxygen, make sure the vessel is kept upright to keep the oxygen from pouring out. Liquid oxygen is so cold it can hurt your skin.  · If you use an oxygen concentrator, call your electric company so you will be given priority service if your power goes out. Avoid using extension cords, if possible.  · Regularly test your smoke detectors at home to make sure they work. If you receive care in your home from a nurse or other health care provider, he or she may also check to make sure your smoke detectors work.  GUIDELINES FOR CLEANING YOUR EQUIPMENT  · Wash the nasal prongs with a liquid soap. Thoroughly rinse them once or twice a week.  · Replace the prongs every 2 to 4 weeks. If you have an infection (cold, pneumonia) change them when you are well.  · Your health care provider will give you instructions on how to clean your transtracheal catheter.  · The humidifier bottle should be washed with soap and warm water and rinsed thoroughly between each refill. Air-dry the bottle before filling it with sterile or distilled water. The bottle and its top should be disinfected after they are cleaned.  · If you use an oxygen concentrator, unplug the unit. Then wipe down the cabinet with a damp cloth and dry it daily. The air filter should be cleaned at least twice a week.  · Follow your home medical equipment and service company's directions for cleaning the compressor filter.  HOME CARE INSTRUCTIONS   · Do not change the flow of oxygen unless directed by your health care provider.  · Do not use alcohol or other sedating drugs unless instructed. They slow your breathing rate.  · Do not use materials that burn easily (flammable) while using your oxygen.  · Always keep a spare tank of oxygen. Plan ahead for holidays when you may not be able to get a prescription filled.  · Use water-based lubricants on your lips  or nostrils. Do not use an oil-based product like petroleum jelly.  · To prevent your cheeks or the skin behind your ears from becoming irritated, tuck some gauze under the tubing.  · If you have persistent redness under your nose, call your health care provider.  · When you no longer need oxygen, your doctor will have the oxygen discontinued. Oxygen is not addicting or habit forming.  · Use the oxygen as instructed. Too much oxygen can be harmful and too little will not give you the benefit you need.  · Shortness of breath is not always from a lack of oxygen. If your oxygen level is not the cause of your shortness of breath, taking oxygen will not help.  SEEK MEDICAL CARE IF:   · You have frequent headaches.  · You have shortness of breath or a lasting cough.  · You have anxiety.  · You are confused.  · You are drowsy or sleepy all the time.  · You develop an illness which aggravates your breathing.  · You cannot exercise.  · You are restless.  · You have blue lips or fingernails.  · You have difficult or irregular breathing and it is getting worse.  · You have a fever.     This information is not intended to replace advice given to you by your health care provider. Make sure you discuss any questions you have with your health care provider.     Document Released: 03/09/2005 Document Revised: 01/08/2016 Document Reviewed: 07/30/2014  [employer] Interactive Patient Education ©2016 [employer]      Heart Failure  Heart failure means your heart has trouble pumping blood. This makes it hard for your body to work well. Heart failure is usually a long-term (chronic) condition. You must take good care of yourself and follow your doctor's treatment plan.  HOME CARE  · Take your heart medicine as told by your doctor.  ¨ Do not stop taking medicine unless your doctor tells you to.  ¨ Do not skip any dose of medicine.  ¨ Refill your medicines before they run out.  ¨ Take other medicines only as told by your doctor or  pharmacist.  · Stay active if told by your doctor. The elderly and people with severe heart failure should talk with a doctor about physical activity.  · Eat heart-healthy foods. Choose foods that are without trans fat and are low in saturated fat, cholesterol, and salt (sodium). This includes fresh or frozen fruits and vegetables, fish, lean meats, fat-free or low-fat dairy foods, whole grains, and high-fiber foods. Lentils and dried peas and beans (legumes) are also good choices.  · Limit salt if told by your doctor.  · Cook in a healthy way. Roast, grill, broil, bake, poach, steam, or stir-ramirez foods.  · Limit fluids as told by your doctor.  · Weigh yourself every morning. Do this after you pee (urinate) and before you eat breakfast. Write down your weight to give to your doctor.  · Take your blood pressure and write it down if your doctor tells you to.  · Ask your doctor how to check your pulse. Check your pulse as told.  · Lose weight if told by your doctor.  · Stop smoking or chewing tobacco. Do not use gum or patches that help you quit without your doctor's approval.  · Schedule and go to doctor visits as told.  · Nonpregnant women should have no more than 1 drink a day. Men should have no more than 2 drinks a day. Talk to your doctor about drinking alcohol.  · Stop illegal drug use.  · Stay current with shots (immunizations).  · Manage your health conditions as told by your doctor.  · Learn to manage your stress.  · Rest when you are tired.  · If it is really hot outside:  ¨ Avoid intense activities.  ¨ Use air conditioning or fans, or get in a cooler place.  ¨ Avoid caffeine and alcohol.  ¨ Wear loose-fitting, lightweight, and light-colored clothing.  · If it is really cold outside:  ¨ Avoid intense activities.  ¨ Layer your clothing.  ¨ Wear mittens or gloves, a hat, and a scarf when going outside.  ¨ Avoid alcohol.  · Learn about heart failure and get support as needed.  · Get help to maintain or improve  your quality of life and your ability to care for yourself as needed.  GET HELP IF:   · You gain weight quickly.  · You are more short of breath than usual.  · You cannot do your normal activities.  · You tire easily.  · You cough more than normal, especially with activity.  · You have any or more puffiness (swelling) in areas such as your hands, feet, ankles, or belly (abdomen).  · You cannot sleep because it is hard to breathe.  · You feel like your heart is beating fast (palpitations).  · You get dizzy or light-headed when you stand up.  GET HELP RIGHT AWAY IF:   · You have trouble breathing.  · There is a change in mental status, such as becoming less alert or not being able to focus.  · You have chest pain or discomfort.  · You faint.  MAKE SURE YOU:   · Understand these instructions.  · Will watch your condition.  · Will get help right away if you are not doing well or get worse.  This information is not intended to replace advice given to you by your health care provider. Make sure you discuss any questions you have with your health care provider.  Document Released: 09/26/2009 Document Revised: 01/08/2016 Document Reviewed: 02/03/2014  [employer] Interactive Patient Education © 2017 [employer]

## 2019-03-16 NOTE — DISCHARGE PLANNING
Received Choice form at 1000  Agency/Facility Name: [facility]  Referral sent per Choice form @ 1006     on call, Rober, was contacted and given the referral as well

## 2019-03-16 NOTE — CARE PLAN
Problem: Safety  Goal: Will remain free from injury  Outcome: PROGRESSING AS EXPECTED  Oriented to call system and demonstrated understanding. Environmental hazards removed from bed to bathroom. Bed locked in low position with fall precautions in place and call light in reach.    Problem: Knowledge Deficit  Goal: Knowledge of disease process/condition, treatment plan, diagnostic tests, and medications will improve    Intervention: Explain information regarding disease process/condition, treatment plan, diagnostic tests, and medications and document in education  Discussed POC r/t TAVR procedure and Monday. Pt. Verbalized understanding.

## 2019-03-16 NOTE — PROGRESS NOTES
Internal Medicine Interval Note  Note Author: Aaron Becker M.D.     Name Sylvain Espinosa 1955   Age/Sex 63 y.o. male   MRN [id number]   Code Status FULL     After 5PM or if no immediate response to page, please call for cross-coverage  Attending/Team: Dr Ferrell/Lucinda See Patient List for primary contact information  Call [phone] to page    1st Call - Day Intern (R1):   Dr Becker 2nd Call - Day Sr. Resident (R2/R3):   Dr Rodriguez         Reason for interval visit  (Principal Problem)   Acute hypoxic respiratory failure    Interval Problem Daily Status Update  (24 hours)   Patient with ST changes per telemetry room, EKG shows <1 mm ST depression in inferior leads. Patient without acute chest pain or complaints, reports dyspnea has improved.  Will monitor for chest complaints, continue telemetry.  Cardiology note states patient scheduled for TAVR on Monday 3/18/19.  I/O shows -1.6L out, still requiring 2L O2 NC.  Continue daily lasix 20 mg PO upon discharge  Ambulatory oxygen evaluation shows patient requires 3L at rest and with ambulation.  Home oxygen orders placed.  Patient cleared for discharge home when home oxygen set up, TAVR scheduled for 3/18/19.    Review of Systems   Constitutional: Negative for chills, fever and malaise/fatigue.   Respiratory: Negative for cough, sputum production, shortness of breath and wheezing.    Cardiovascular: Negative for chest pain, palpitations, orthopnea, claudication and leg swelling.   Gastrointestinal: Negative for abdominal pain, constipation, diarrhea, nausea and vomiting.   Genitourinary: Negative for dysuria and frequency.   Musculoskeletal: Negative for back pain, joint pain and neck pain.   Skin: Negative for itching and rash.   Neurological: Negative for dizziness, sensory change, focal weakness and headaches.   Psychiatric/Behavioral: Negative for substance abuse.       Consultants/Specialty  Cardiology      Disposition  Inpatient,  discharge home when home oxygen set up. Patient is homeless.    Quality Measures  Quality-Core Measures   Reviewed items::  Labs reviewed, EKG reviewed and Medications reviewed  Johnson catheter::  No Johnson  DVT prophylaxis pharmacological::  Enoxaparin (Lovenox)          Physical Exam       Vitals:    03/15/19 2100 03/16/19 0100 03/16/19 0500 03/16/19 0844   BP: 141/94 107/78 105/57 107/63   Pulse: 68 89 83 86   Resp: 17 16 16 18   Temp: 36.5 °C (97.7 °F) 36.6 °C (97.9 °F) 36.7 °C (98 °F) 36.8 °C (98.3 °F)   TempSrc: Temporal Temporal Temporal Temporal   SpO2: 99% 90% 91% 96%   Weight:   59.7 kg (131 lb 9.8 oz)    Height:         Body mass index is 20.61 kg/m². Weight: 59.7 kg (131 lb 9.8 oz)  Oxygen Therapy:  Pulse Oximetry: 96 %, O2 (LPM): 3, O2 Delivery: Silicone Nasal Cannula    Physical Exam   Constitutional: He is oriented to person, place, and time and well-developed, well-nourished, and in no distress. No distress.   HENT:   Head: Normocephalic and atraumatic.   Mouth/Throat: Oropharynx is clear and moist.   Eyes: Pupils are equal, round, and reactive to light. Conjunctivae and EOM are normal. No scleral icterus.   Neck: Normal range of motion.   Cardiovascular: Normal rate and regular rhythm.  Exam reveals no friction rub.    Murmur heard.  Pulmonary/Chest: Effort normal. No respiratory distress. He has no wheezes. He has rales.   Abdominal: Soft. Bowel sounds are normal. He exhibits no distension. There is no tenderness. There is no rebound and no guarding.   Musculoskeletal: Normal range of motion. He exhibits no edema or tenderness.   Neurological: He is alert and oriented to person, place, and time. No cranial nerve deficit.   Skin: Skin is warm and dry. He is not diaphoretic.   Psychiatric: Affect and judgment normal.         Lab Data Review:         3/15/2019  3:54 PM    Recent Labs      03/14/19   2010  03/15/19   0148  03/16/19   0210   SODIUM  136  135  136   POTASSIUM  4.2  4.1  4.2   CHLORIDE  100   99  100   CO2  29  29  32   BUN  15  17  28*   CREATININE  1.12  1.17  1.46*   MAGNESIUM  1.9   --    --    CALCIUM  9.7  9.5  9.3       Recent Labs      03/14/19   2010  03/15/19   0148  03/16/19   0210   ALTSGPT   --   25   --    ASTSGOT   --   29   --    ALKPHOSPHAT   --   56   --    TBILIRUBIN   --   0.3   --    GLUCOSE  115*  124*  86       Recent Labs      03/14/19   2010  03/15/19   0147  03/15/19   0148   RBC  4.95   --   4.46*   HEMOGLOBIN  13.9*   --   12.8*   HEMATOCRIT  44.2   --   38.9*   PLATELETCT  314   --   277   PROTHROMBTM   --    --   14.3   APTT   --    --   28.9   INR   --    --   1.10   IRON   --    --   34*   FERRITIN   --   31.2   --    TOTIRONBC   --    --   356       Recent Labs      03/14/19   2010  03/15/19   0148   WBC  6.3  6.6   NEUTSPOLYS  45.30  63.60   LYMPHOCYTES  39.80  25.40   MONOCYTES  11.00  8.60   EOSINOPHILS  3.20  1.80   BASOPHILS  0.50  0.30   ASTSGOT   --   29   ALTSGPT   --   25   ALKPHOSPHAT   --   56   TBILIRUBIN   --   0.3           Assessment/Plan     Severe aortic stenosis  -See Acute respiratory failure.    Acute respiratory failure (HCC)  -Acute respiratory failure with O2 sat 70 on room air on admission, requiring supplemental O2.  -Admitted in January 2019 for acute exacerbation of HFpEF, planned TAVR for 3/11/2019 but missed elective procedure due to housing issues.  -12/30/2018 echo showed moderate concentric LVH, low normal LV systolic function.  Severe aortic stenosis (peak: 98 mmHg, mean: 56 mmHg).  RVSP 55 mmHg.  -CXR on this admission showed mid-lung opacity, more likely edema > pneumonia given acute onset of dyspnea in absence of preceding or subsequent ill symptoms, no leukocytosis, negative procalcitonin, improvement with diuresis.  -EKG showed sinus tachycardia, lateral ST depression likely related to severe AS / acute exacerbation of HFpEF, no chest pain, trop 0.05, BNP stable at 738.  -Current presentation of dyspnea improved with diuresis  "consistent with acute exacerbation of HFpEF in setting of severe AS.  -Assessed by cards, outpatient TAVR scheduled for Monday 3/18/19, patient does not need to be inpatient for this.  - Continue Telemetry, I/O, daily weights, fluid / Na restrictions.  - Lasix 20 mg PO daily  - Home oxygen setup underway, requires 2L O2 NC at rest and with ambulation  - Can discharge home if home oxygen setup.    Hepatitis C  -H/o untreated hepatitis C per 2017 notes, no hep panel on record.  -Patient asymptomatic at this time.  -Pending LFTs, hep panel.  -Follow up with PCP.    Hypomagnesemia  -Goal Mg >2.  -Replete PRN.    History of smoking  -Quit September 2018, previously 1/2 ppd x 40 years.    History of methamphetamine use  -Previously smoked meth, denied recent use.    (HFpEF) heart failure with preserved ejection fraction (HCC)  -See \"dyspnea.\"    HTN (hypertension)  -On home amlodipine.    Normocytic anemia  -Hb 13.9, stable, no apparent bleed.  -Folate, B12 normal  - iron low, TIBC normal, ferritin low normal        "

## 2019-03-16 NOTE — PROGRESS NOTES
Assumed care of pt, bedside report received from HEBERT Lobo. Pt awake, resting comfortably in bed in no acute distress. Pt denies pain or any needs at this time. Bed low and locked, call light within reach. Will monitor.

## 2019-03-16 NOTE — DISCHARGE PLANNING
Medical Social Work    Referral: Home oxygen order     Intervention: Pt signed choice form for [place]. Choice form faxed to [facility]    Plan: Wait for home oxygen to be approved

## 2019-03-18 ENCOUNTER — ANESTHESIA (OUTPATIENT)
Dept: SURGERY | Facility: MEDICAL CENTER | Age: 64
DRG: 267 | End: 2019-03-18
Payer: MEDICAID

## 2019-03-18 ENCOUNTER — APPOINTMENT (OUTPATIENT)
Dept: CARDIOLOGY | Facility: MEDICAL CENTER | Age: 64
DRG: 267 | End: 2019-03-18
Attending: INTERNAL MEDICINE
Payer: MEDICAID

## 2019-03-18 ENCOUNTER — HOSPITAL ENCOUNTER (INPATIENT)
Facility: MEDICAL CENTER | Age: 64
LOS: 1 days | DRG: 267 | End: 2019-03-19
Attending: INTERNAL MEDICINE | Admitting: INTERNAL MEDICINE
Payer: MEDICAID

## 2019-03-18 ENCOUNTER — APPOINTMENT (OUTPATIENT)
Dept: RADIOLOGY | Facility: MEDICAL CENTER | Age: 64
DRG: 267 | End: 2019-03-18
Attending: INTERNAL MEDICINE
Payer: MEDICAID

## 2019-03-18 ENCOUNTER — ANESTHESIA EVENT (OUTPATIENT)
Dept: SURGERY | Facility: MEDICAL CENTER | Age: 64
DRG: 267 | End: 2019-03-18
Payer: MEDICAID

## 2019-03-18 LAB
ABO GROUP BLD: NORMAL
ABO GROUP BLD: NORMAL
ACT BLD: 136 SEC (ref 74–137)
ACT BLD: 268 SEC (ref 74–137)
ALBUMIN SERPL BCP-MCNC: 3.2 G/DL (ref 3.2–4.9)
ALBUMIN/GLOB SERPL: 0.7 G/DL
ALP SERPL-CCNC: 42 U/L (ref 30–99)
ALT SERPL-CCNC: 23 U/L (ref 2–50)
ANION GAP SERPL CALC-SCNC: 6 MMOL/L (ref 0–11.9)
AST SERPL-CCNC: 32 U/L (ref 12–45)
BASOPHILS # BLD AUTO: 0.3 % (ref 0–1.8)
BASOPHILS # BLD: 0.02 K/UL (ref 0–0.12)
BILIRUB SERPL-MCNC: 0.2 MG/DL (ref 0.1–1.5)
BLD GP AB SCN SERPL QL: NORMAL
BNP SERPL-MCNC: 668 PG/ML (ref 0–100)
BUN SERPL-MCNC: 19 MG/DL (ref 8–22)
CALCIUM SERPL-MCNC: 7.4 MG/DL (ref 8.5–10.5)
CHLORIDE SERPL-SCNC: 106 MMOL/L (ref 96–112)
CO2 SERPL-SCNC: 26 MMOL/L (ref 20–33)
CREAT SERPL-MCNC: 0.98 MG/DL (ref 0.5–1.4)
EKG IMPRESSION: NORMAL
EOSINOPHIL # BLD AUTO: 0.17 K/UL (ref 0–0.51)
EOSINOPHIL NFR BLD: 2.9 % (ref 0–6.9)
ERYTHROCYTE [DISTWIDTH] IN BLOOD BY AUTOMATED COUNT: 44.6 FL (ref 35.9–50)
GLOBULIN SER CALC-MCNC: 4.3 G/DL (ref 1.9–3.5)
GLUCOSE SERPL-MCNC: 102 MG/DL (ref 65–99)
HCT VFR BLD AUTO: 35.3 % (ref 42–52)
HCV RNA SERPL NAA+PROBE-ACNC: ABNORMAL K[IU]/ML
HCV RNA SERPL NAA+PROBE-LOG IU: 4.21 LOG IU/ML
HCV RNA SERPL QL NAA+PROBE: DETECTED
HGB BLD-MCNC: 11.6 G/DL (ref 14–18)
IMM GRANULOCYTES # BLD AUTO: 0.02 K/UL (ref 0–0.11)
IMM GRANULOCYTES NFR BLD AUTO: 0.3 % (ref 0–0.9)
LV EJECT FRACT  99904: 55
LYMPHOCYTES # BLD AUTO: 1.36 K/UL (ref 1–4.8)
LYMPHOCYTES NFR BLD: 23.4 % (ref 22–41)
MCH RBC QN AUTO: 28.7 PG (ref 27–33)
MCHC RBC AUTO-ENTMCNC: 32.9 G/DL (ref 33.7–35.3)
MCV RBC AUTO: 87.4 FL (ref 81.4–97.8)
MONOCYTES # BLD AUTO: 0.52 K/UL (ref 0–0.85)
MONOCYTES NFR BLD AUTO: 8.9 % (ref 0–13.4)
NEUTROPHILS # BLD AUTO: 3.73 K/UL (ref 1.82–7.42)
NEUTROPHILS NFR BLD: 64.2 % (ref 44–72)
NRBC # BLD AUTO: 0 K/UL
NRBC BLD-RTO: 0 /100 WBC
PLATELET # BLD AUTO: 224 K/UL (ref 164–446)
PMV BLD AUTO: 10.3 FL (ref 9–12.9)
POTASSIUM SERPL-SCNC: 5.2 MMOL/L (ref 3.6–5.5)
PROT SERPL-MCNC: 7.5 G/DL (ref 6–8.2)
RBC # BLD AUTO: 4.04 M/UL (ref 4.7–6.1)
RH BLD: NORMAL
RH BLD: NORMAL
SODIUM SERPL-SCNC: 138 MMOL/L (ref 135–145)
WBC # BLD AUTO: 5.8 K/UL (ref 4.8–10.8)

## 2019-03-18 PROCEDURE — 83880 ASSAY OF NATRIURETIC PEPTIDE: CPT

## 2019-03-18 PROCEDURE — 86850 RBC ANTIBODY SCREEN: CPT

## 2019-03-18 PROCEDURE — C1760 CLOSURE DEV, VASC: HCPCS | Performed by: INTERNAL MEDICINE

## 2019-03-18 PROCEDURE — 160035 HCHG PACU - 1ST 60 MINS PHASE I: Performed by: INTERNAL MEDICINE

## 2019-03-18 PROCEDURE — 700101 HCHG RX REV CODE 250

## 2019-03-18 PROCEDURE — 502240 HCHG MISC OR SUPPLY RC 0272: Performed by: INTERNAL MEDICINE

## 2019-03-18 PROCEDURE — 93010 ELECTROCARDIOGRAM REPORT: CPT | Performed by: INTERNAL MEDICINE

## 2019-03-18 PROCEDURE — 160009 HCHG ANES TIME/MIN: Performed by: INTERNAL MEDICINE

## 2019-03-18 PROCEDURE — C1769 GUIDE WIRE: HCPCS | Performed by: INTERNAL MEDICINE

## 2019-03-18 PROCEDURE — 700101 HCHG RX REV CODE 250: Performed by: ANESTHESIOLOGY

## 2019-03-18 PROCEDURE — C1883 ADAPT/EXT, PACING/NEURO LEAD: HCPCS | Performed by: INTERNAL MEDICINE

## 2019-03-18 PROCEDURE — 770020 HCHG ROOM/CARE - TELE (206)

## 2019-03-18 PROCEDURE — 700111 HCHG RX REV CODE 636 W/ 250 OVERRIDE (IP): Performed by: ANESTHESIOLOGY

## 2019-03-18 PROCEDURE — 93005 ELECTROCARDIOGRAM TRACING: CPT | Performed by: INTERNAL MEDICINE

## 2019-03-18 PROCEDURE — 33361 REPLACE AORTIC VALVE PERQ: CPT | Mod: 62,Q0 | Performed by: INTERNAL MEDICINE

## 2019-03-18 PROCEDURE — 503001 HCHG PERFUSION: Performed by: INTERNAL MEDICINE

## 2019-03-18 PROCEDURE — 02RF38Z REPLACEMENT OF AORTIC VALVE WITH ZOOPLASTIC TISSUE, PERCUTANEOUS APPROACH: ICD-10-PCS | Performed by: INTERNAL MEDICINE

## 2019-03-18 PROCEDURE — 160042 HCHG SURGERY MINUTES - EA ADDL 1 MIN LEVEL 5: Performed by: INTERNAL MEDICINE

## 2019-03-18 PROCEDURE — 160036 HCHG PACU - EA ADDL 30 MINS PHASE I: Performed by: INTERNAL MEDICINE

## 2019-03-18 PROCEDURE — 160031 HCHG SURGERY MINUTES - 1ST 30 MINS LEVEL 5: Performed by: INTERNAL MEDICINE

## 2019-03-18 PROCEDURE — 93355 ECHO TRANSESOPHAGEAL (TEE): CPT

## 2019-03-18 PROCEDURE — 700102 HCHG RX REV CODE 250 W/ 637 OVERRIDE(OP): Performed by: NURSE PRACTITIONER

## 2019-03-18 PROCEDURE — 71045 X-RAY EXAM CHEST 1 VIEW: CPT

## 2019-03-18 PROCEDURE — 86901 BLOOD TYPING SEROLOGIC RH(D): CPT

## 2019-03-18 PROCEDURE — C1725 CATH, TRANSLUMIN NON-LASER: HCPCS | Performed by: INTERNAL MEDICINE

## 2019-03-18 PROCEDURE — A6402 STERILE GAUZE <= 16 SQ IN: HCPCS | Performed by: INTERNAL MEDICINE

## 2019-03-18 PROCEDURE — 503000 HCHG SUTURE, OHS: Performed by: INTERNAL MEDICINE

## 2019-03-18 PROCEDURE — 700111 HCHG RX REV CODE 636 W/ 250 OVERRIDE (IP)

## 2019-03-18 PROCEDURE — 110372 HCHG SHELL REV 278: Performed by: INTERNAL MEDICINE

## 2019-03-18 PROCEDURE — 700105 HCHG RX REV CODE 258: Performed by: NURSE PRACTITIONER

## 2019-03-18 PROCEDURE — A9270 NON-COVERED ITEM OR SERVICE: HCPCS | Performed by: NURSE PRACTITIONER

## 2019-03-18 PROCEDURE — 500002 HCHG ADHESIVE, DERMABOND: Performed by: INTERNAL MEDICINE

## 2019-03-18 PROCEDURE — 85347 COAGULATION TIME ACTIVATED: CPT

## 2019-03-18 PROCEDURE — 160048 HCHG OR STATISTICAL LEVEL 1-5: Performed by: INTERNAL MEDICINE

## 2019-03-18 PROCEDURE — 85025 COMPLETE CBC W/AUTO DIFF WBC: CPT

## 2019-03-18 PROCEDURE — 700105 HCHG RX REV CODE 258: Performed by: ANESTHESIOLOGY

## 2019-03-18 PROCEDURE — 80053 COMPREHEN METABOLIC PANEL: CPT

## 2019-03-18 PROCEDURE — C1894 INTRO/SHEATH, NON-LASER: HCPCS | Performed by: INTERNAL MEDICINE

## 2019-03-18 PROCEDURE — 86900 BLOOD TYPING SEROLOGIC ABO: CPT

## 2019-03-18 PROCEDURE — B24BZZ4 ULTRASONOGRAPHY OF HEART WITH AORTA, TRANSESOPHAGEAL: ICD-10-PCS | Performed by: INTERNAL MEDICINE

## 2019-03-18 PROCEDURE — 160002 HCHG RECOVERY MINUTES (STAT): Performed by: INTERNAL MEDICINE

## 2019-03-18 PROCEDURE — 33361 REPLACE AORTIC VALVE PERQ: CPT | Mod: 62,Q0 | Performed by: THORACIC SURGERY (CARDIOTHORACIC VASCULAR SURGERY)

## 2019-03-18 DEVICE — KIT TRANSCATHETER HEART VALVE SAPIEN-3 26MM: Type: IMPLANTABLE DEVICE | Status: FUNCTIONAL

## 2019-03-18 DEVICE — DEVICE CLSR 6FR HMST IMPL SLF STS PLUS ANGIOSEAL (10EA/CA): Type: IMPLANTABLE DEVICE | Status: FUNCTIONAL

## 2019-03-18 RX ORDER — FUROSEMIDE 20 MG/1
20 TABLET ORAL DAILY
Status: DISCONTINUED | OUTPATIENT
Start: 2019-03-18 | End: 2019-03-19 | Stop reason: HOSPADM

## 2019-03-18 RX ORDER — AMLODIPINE BESYLATE 2.5 MG/1
2.5 TABLET ORAL DAILY
Status: DISCONTINUED | OUTPATIENT
Start: 2019-03-18 | End: 2019-03-19 | Stop reason: HOSPADM

## 2019-03-18 RX ORDER — ONDANSETRON 2 MG/ML
4 INJECTION INTRAMUSCULAR; INTRAVENOUS EVERY 4 HOURS PRN
Status: DISCONTINUED | OUTPATIENT
Start: 2019-03-18 | End: 2019-03-19 | Stop reason: HOSPADM

## 2019-03-18 RX ORDER — REMIFENTANIL HYDROCHLORIDE 1 MG/ML
INJECTION, POWDER, LYOPHILIZED, FOR SOLUTION INTRAVENOUS
Status: DISCONTINUED | OUTPATIENT
Start: 2019-03-18 | End: 2019-03-18 | Stop reason: SURG

## 2019-03-18 RX ORDER — HYDROMORPHONE HYDROCHLORIDE 1 MG/ML
0.4 INJECTION, SOLUTION INTRAMUSCULAR; INTRAVENOUS; SUBCUTANEOUS
Status: DISCONTINUED | OUTPATIENT
Start: 2019-03-18 | End: 2019-03-18 | Stop reason: HOSPADM

## 2019-03-18 RX ORDER — HYDRALAZINE HYDROCHLORIDE 20 MG/ML
5 INJECTION INTRAMUSCULAR; INTRAVENOUS
Status: COMPLETED | OUTPATIENT
Start: 2019-03-18 | End: 2019-03-18

## 2019-03-18 RX ORDER — LIDOCAINE HYDROCHLORIDE 20 MG/ML
INJECTION, SOLUTION INFILTRATION; PERINEURAL
Status: DISCONTINUED | OUTPATIENT
Start: 2019-03-18 | End: 2019-03-18 | Stop reason: HOSPADM

## 2019-03-18 RX ORDER — ACETAMINOPHEN 325 MG/1
650 TABLET ORAL EVERY 6 HOURS PRN
Status: DISCONTINUED | OUTPATIENT
Start: 2019-03-18 | End: 2019-03-19 | Stop reason: HOSPADM

## 2019-03-18 RX ORDER — IODIXANOL 270 MG/ML
INJECTION, SOLUTION INTRAVASCULAR
Status: DISCONTINUED | OUTPATIENT
Start: 2019-03-18 | End: 2019-03-18 | Stop reason: HOSPADM

## 2019-03-18 RX ORDER — FERROUS SULFATE 325(65) MG
325 TABLET ORAL DAILY
Status: DISCONTINUED | OUTPATIENT
Start: 2019-03-18 | End: 2019-03-19 | Stop reason: HOSPADM

## 2019-03-18 RX ORDER — CEFAZOLIN SODIUM 1 G/3ML
INJECTION, POWDER, FOR SOLUTION INTRAMUSCULAR; INTRAVENOUS PRN
Status: DISCONTINUED | OUTPATIENT
Start: 2019-03-18 | End: 2019-03-18 | Stop reason: SURG

## 2019-03-18 RX ORDER — ATORVASTATIN CALCIUM 20 MG/1
20 TABLET, FILM COATED ORAL DAILY
Status: DISCONTINUED | OUTPATIENT
Start: 2019-03-18 | End: 2019-03-19 | Stop reason: HOSPADM

## 2019-03-18 RX ORDER — MEPERIDINE HYDROCHLORIDE 25 MG/ML
6.25 INJECTION INTRAMUSCULAR; INTRAVENOUS; SUBCUTANEOUS
Status: DISCONTINUED | OUTPATIENT
Start: 2019-03-18 | End: 2019-03-18 | Stop reason: HOSPADM

## 2019-03-18 RX ORDER — ONDANSETRON 2 MG/ML
4 INJECTION INTRAMUSCULAR; INTRAVENOUS
Status: DISCONTINUED | OUTPATIENT
Start: 2019-03-18 | End: 2019-03-18 | Stop reason: HOSPADM

## 2019-03-18 RX ORDER — LIDOCAINE HYDROCHLORIDE 40 MG/ML
SOLUTION TOPICAL PRN
Status: DISCONTINUED | OUTPATIENT
Start: 2019-03-18 | End: 2019-03-18 | Stop reason: SURG

## 2019-03-18 RX ORDER — OXYCODONE HCL 5 MG/5 ML
5 SOLUTION, ORAL ORAL
Status: DISCONTINUED | OUTPATIENT
Start: 2019-03-18 | End: 2019-03-18 | Stop reason: HOSPADM

## 2019-03-18 RX ORDER — ATORVASTATIN CALCIUM 20 MG/1
20 TABLET, FILM COATED ORAL DAILY
COMMUNITY
End: 2019-10-10

## 2019-03-18 RX ORDER — HEPARIN SODIUM,PORCINE 1000/ML
VIAL (ML) INJECTION PRN
Status: DISCONTINUED | OUTPATIENT
Start: 2019-03-18 | End: 2019-03-18 | Stop reason: SURG

## 2019-03-18 RX ORDER — PROTAMINE SULFATE 10 MG/ML
INJECTION, SOLUTION INTRAVENOUS PRN
Status: DISCONTINUED | OUTPATIENT
Start: 2019-03-18 | End: 2019-03-18 | Stop reason: SURG

## 2019-03-18 RX ORDER — DIPHENHYDRAMINE HCL 25 MG
25 TABLET ORAL NIGHTLY PRN
Status: DISCONTINUED | OUTPATIENT
Start: 2019-03-18 | End: 2019-03-19 | Stop reason: HOSPADM

## 2019-03-18 RX ORDER — HYDRALAZINE HYDROCHLORIDE 20 MG/ML
10 INJECTION INTRAMUSCULAR; INTRAVENOUS
Status: DISCONTINUED | OUTPATIENT
Start: 2019-03-18 | End: 2019-03-19 | Stop reason: HOSPADM

## 2019-03-18 RX ORDER — SODIUM CHLORIDE, SODIUM GLUCONATE, SODIUM ACETATE, POTASSIUM CHLORIDE AND MAGNESIUM CHLORIDE 526; 502; 368; 37; 30 MG/100ML; MG/100ML; MG/100ML; MG/100ML; MG/100ML
INJECTION, SOLUTION INTRAVENOUS
Status: DISCONTINUED | OUTPATIENT
Start: 2019-03-18 | End: 2019-03-18 | Stop reason: SURG

## 2019-03-18 RX ORDER — CLOPIDOGREL 300 MG/1
300 TABLET, FILM COATED ORAL ONCE
Status: COMPLETED | OUTPATIENT
Start: 2019-03-18 | End: 2019-03-18

## 2019-03-18 RX ORDER — DEXAMETHASONE SODIUM PHOSPHATE 4 MG/ML
INJECTION, SOLUTION INTRA-ARTICULAR; INTRALESIONAL; INTRAMUSCULAR; INTRAVENOUS; SOFT TISSUE PRN
Status: DISCONTINUED | OUTPATIENT
Start: 2019-03-18 | End: 2019-03-18 | Stop reason: SURG

## 2019-03-18 RX ORDER — HYDRALAZINE HYDROCHLORIDE 20 MG/ML
INJECTION INTRAMUSCULAR; INTRAVENOUS
Status: COMPLETED
Start: 2019-03-18 | End: 2019-03-18

## 2019-03-18 RX ORDER — DIPHENHYDRAMINE HYDROCHLORIDE 50 MG/ML
25 INJECTION INTRAMUSCULAR; INTRAVENOUS EVERY 6 HOURS PRN
Status: DISCONTINUED | OUTPATIENT
Start: 2019-03-18 | End: 2019-03-19 | Stop reason: HOSPADM

## 2019-03-18 RX ORDER — HYDROMORPHONE HYDROCHLORIDE 1 MG/ML
0.1 INJECTION, SOLUTION INTRAMUSCULAR; INTRAVENOUS; SUBCUTANEOUS
Status: DISCONTINUED | OUTPATIENT
Start: 2019-03-18 | End: 2019-03-18 | Stop reason: HOSPADM

## 2019-03-18 RX ORDER — SODIUM CHLORIDE, SODIUM LACTATE, POTASSIUM CHLORIDE, CALCIUM CHLORIDE 600; 310; 30; 20 MG/100ML; MG/100ML; MG/100ML; MG/100ML
INJECTION, SOLUTION INTRAVENOUS CONTINUOUS
Status: DISCONTINUED | OUTPATIENT
Start: 2019-03-18 | End: 2019-03-18 | Stop reason: HOSPADM

## 2019-03-18 RX ORDER — POTASSIUM CHLORIDE 20 MEQ/1
10 TABLET, EXTENDED RELEASE ORAL DAILY
Status: DISCONTINUED | OUTPATIENT
Start: 2019-03-18 | End: 2019-03-18

## 2019-03-18 RX ORDER — OXYCODONE HCL 5 MG/5 ML
10 SOLUTION, ORAL ORAL
Status: DISCONTINUED | OUTPATIENT
Start: 2019-03-18 | End: 2019-03-18 | Stop reason: HOSPADM

## 2019-03-18 RX ORDER — ONDANSETRON 2 MG/ML
INJECTION INTRAMUSCULAR; INTRAVENOUS PRN
Status: DISCONTINUED | OUTPATIENT
Start: 2019-03-18 | End: 2019-03-18 | Stop reason: SURG

## 2019-03-18 RX ORDER — CHOLECALCIFEROL (VITAMIN D3) 25 MCG
1 TABLET,CHEWABLE ORAL DAILY
COMMUNITY
End: 2019-10-10

## 2019-03-18 RX ORDER — SODIUM CHLORIDE 9 MG/ML
INJECTION, SOLUTION INTRAVENOUS CONTINUOUS
Status: DISPENSED | OUTPATIENT
Start: 2019-03-18 | End: 2019-03-18

## 2019-03-18 RX ORDER — SODIUM CHLORIDE, SODIUM LACTATE, POTASSIUM CHLORIDE, CALCIUM CHLORIDE 600; 310; 30; 20 MG/100ML; MG/100ML; MG/100ML; MG/100ML
INJECTION, SOLUTION INTRAVENOUS
Status: DISCONTINUED | OUTPATIENT
Start: 2019-03-18 | End: 2019-03-18 | Stop reason: SURG

## 2019-03-18 RX ORDER — HALOPERIDOL 5 MG/ML
1 INJECTION INTRAMUSCULAR
Status: DISCONTINUED | OUTPATIENT
Start: 2019-03-18 | End: 2019-03-18 | Stop reason: HOSPADM

## 2019-03-18 RX ORDER — HYDROMORPHONE HYDROCHLORIDE 1 MG/ML
0.2 INJECTION, SOLUTION INTRAMUSCULAR; INTRAVENOUS; SUBCUTANEOUS
Status: DISCONTINUED | OUTPATIENT
Start: 2019-03-18 | End: 2019-03-18 | Stop reason: HOSPADM

## 2019-03-18 RX ORDER — SODIUM CHLORIDE 9 MG/ML
INJECTION, SOLUTION INTRAVENOUS CONTINUOUS
Status: DISCONTINUED | OUTPATIENT
Start: 2019-03-18 | End: 2019-03-18

## 2019-03-18 RX ORDER — CLOPIDOGREL BISULFATE 75 MG/1
75 TABLET ORAL DAILY
Status: DISCONTINUED | OUTPATIENT
Start: 2019-03-19 | End: 2019-03-19 | Stop reason: HOSPADM

## 2019-03-18 RX ORDER — DIPHENHYDRAMINE HYDROCHLORIDE 50 MG/ML
12.5 INJECTION INTRAMUSCULAR; INTRAVENOUS
Status: DISCONTINUED | OUTPATIENT
Start: 2019-03-18 | End: 2019-03-18 | Stop reason: HOSPADM

## 2019-03-18 RX ADMIN — CLOPIDOGREL BISULFATE 300 MG: 300 TABLET, FILM COATED ORAL at 13:30

## 2019-03-18 RX ADMIN — PROTAMINE SULFATE 50 MG: 10 INJECTION, SOLUTION INTRAVENOUS at 11:42

## 2019-03-18 RX ADMIN — PROPOFOL 75 MCG/KG/MIN: 10 INJECTION, EMULSION INTRAVENOUS at 10:43

## 2019-03-18 RX ADMIN — SODIUM CHLORIDE: 9 INJECTION, SOLUTION INTRAVENOUS at 15:24

## 2019-03-18 RX ADMIN — LIDOCAINE HYDROCHLORIDE 4 ML: 40 SOLUTION TOPICAL at 10:57

## 2019-03-18 RX ADMIN — SODIUM CHLORIDE, POTASSIUM CHLORIDE, SODIUM LACTATE AND CALCIUM CHLORIDE: 600; 310; 30; 20 INJECTION, SOLUTION INTRAVENOUS at 10:40

## 2019-03-18 RX ADMIN — PROPOFOL 100 MG: 10 INJECTION, EMULSION INTRAVENOUS at 10:42

## 2019-03-18 RX ADMIN — EPHEDRINE SULFATE 10 MG: 50 INJECTION INTRAMUSCULAR; INTRAVENOUS; SUBCUTANEOUS at 11:10

## 2019-03-18 RX ADMIN — DEXAMETHASONE SODIUM PHOSPHATE 4 MG: 4 INJECTION, SOLUTION INTRAMUSCULAR; INTRAVENOUS at 11:10

## 2019-03-18 RX ADMIN — HYDRALAZINE HYDROCHLORIDE 5 MG: 20 INJECTION INTRAMUSCULAR; INTRAVENOUS at 12:30

## 2019-03-18 RX ADMIN — CEFAZOLIN 2 G: 330 INJECTION, POWDER, FOR SOLUTION INTRAMUSCULAR; INTRAVENOUS at 10:51

## 2019-03-18 RX ADMIN — AMLODIPINE BESYLATE 2.5 MG: 2.5 TABLET ORAL at 15:27

## 2019-03-18 RX ADMIN — FUROSEMIDE 20 MG: 20 TABLET ORAL at 15:22

## 2019-03-18 RX ADMIN — ONDANSETRON 4 MG: 2 INJECTION INTRAMUSCULAR; INTRAVENOUS at 11:15

## 2019-03-18 RX ADMIN — REMIFENTANIL HYDROCHLORIDE 0.25 MCG/KG/MIN: 1 INJECTION, POWDER, LYOPHILIZED, FOR SOLUTION INTRAVENOUS at 10:43

## 2019-03-18 RX ADMIN — HYDRALAZINE HYDROCHLORIDE 5 MG: 20 INJECTION INTRAMUSCULAR; INTRAVENOUS at 12:25

## 2019-03-18 RX ADMIN — EPHEDRINE SULFATE 10 MG: 50 INJECTION INTRAMUSCULAR; INTRAVENOUS; SUBCUTANEOUS at 11:15

## 2019-03-18 RX ADMIN — EPHEDRINE SULFATE 15 MG: 50 INJECTION INTRAMUSCULAR; INTRAVENOUS; SUBCUTANEOUS at 11:20

## 2019-03-18 RX ADMIN — ATORVASTATIN CALCIUM 20 MG: 20 TABLET, FILM COATED ORAL at 15:23

## 2019-03-18 RX ADMIN — HEPARIN SODIUM 10000 UNITS: 1000 INJECTION, SOLUTION INTRAVENOUS; SUBCUTANEOUS at 11:18

## 2019-03-18 RX ADMIN — FERROUS SULFATE TAB 325 MG (65 MG ELEMENTAL FE) 325 MG: 325 (65 FE) TAB at 15:23

## 2019-03-18 RX ADMIN — SODIUM CHLORIDE, SODIUM GLUCONATE, SODIUM ACETATE, POTASSIUM CHLORIDE AND MAGNESIUM CHLORIDE: 526; 502; 368; 37; 30 INJECTION, SOLUTION INTRAVENOUS at 11:00

## 2019-03-18 RX ADMIN — SUGAMMADEX 200 MG: 100 INJECTION, SOLUTION INTRAVENOUS at 11:45

## 2019-03-18 RX ADMIN — ROCURONIUM BROMIDE 50 MG: 10 INJECTION, SOLUTION INTRAVENOUS at 10:42

## 2019-03-18 ASSESSMENT — ENCOUNTER SYMPTOMS
PALPITATIONS: 0
PSYCHIATRIC NEGATIVE: 1
MUSCULOSKELETAL NEGATIVE: 1
NAUSEA: 0
NERVOUS/ANXIOUS: 0
DOUBLE VISION: 0
VOMITING: 0
FEVER: 0
BLURRED VISION: 0
CARDIOVASCULAR NEGATIVE: 1
COUGH: 0
WEIGHT LOSS: 0
EYES NEGATIVE: 1
MYALGIAS: 0
BRUISES/BLEEDS EASILY: 0
CHILLS: 0
CLAUDICATION: 0
SHORTNESS OF BREATH: 1
WEAKNESS: 1
GASTROINTESTINAL NEGATIVE: 1
DEPRESSION: 0
FOCAL WEAKNESS: 0
ABDOMINAL PAIN: 0
DIZZINESS: 0
HEADACHES: 0

## 2019-03-18 ASSESSMENT — LIFESTYLE VARIABLES
EVER FELT BAD OR GUILTY ABOUT YOUR DRINKING: NO
CONSUMPTION TOTAL: NEGATIVE
HAVE PEOPLE ANNOYED YOU BY CRITICIZING YOUR DRINKING: NO
HOW MANY TIMES IN THE PAST YEAR HAVE YOU HAD 5 OR MORE DRINKS IN A DAY: 0
ALCOHOL_USE: YES
HAVE YOU EVER FELT YOU SHOULD CUT DOWN ON YOUR DRINKING: NO
TOTAL SCORE: 0
TOTAL SCORE: 0
EVER_SMOKED: YES
TOTAL SCORE: 0
AVERAGE NUMBER OF DAYS PER WEEK YOU HAVE A DRINK CONTAINING ALCOHOL: 1
EVER HAD A DRINK FIRST THING IN THE MORNING TO STEADY YOUR NERVES TO GET RID OF A HANGOVER: NO
ON A TYPICAL DAY WHEN YOU DRINK ALCOHOL HOW MANY DRINKS DO YOU HAVE: 2

## 2019-03-18 ASSESSMENT — COGNITIVE AND FUNCTIONAL STATUS - GENERAL
DAILY ACTIVITIY SCORE: 24
MOBILITY SCORE: 24
SUGGESTED CMS G CODE MODIFIER MOBILITY: CH
SUGGESTED CMS G CODE MODIFIER DAILY ACTIVITY: CH

## 2019-03-18 ASSESSMENT — PAIN SCALES - GENERAL: PAIN_LEVEL: 0

## 2019-03-18 NOTE — OR NURSING
Await for transport.V/SWNL as per baseline.Bilat. groin sites remain without hematoma,left groin same with scant bleed-dressings intact.

## 2019-03-18 NOTE — PROGRESS NOTES
Pt transported to floor. Tele boxed placed, monitor room notified. Right radial, and bilateral femoral sites soft, clean, dry and intact. Pt alert and oriented, denied current pain. Post op vitals continued to floor. Will continue to monitor.

## 2019-03-18 NOTE — H&P
Physician H&P    Patient ID:  Sylvain Espinosa  [id number]  63 y.o. male  1955    History:  Primary Diagnosis: Outpatient TAVR.    HPI:  63-year-old male with past medical history significant for hypertension and methamphetamine abuse. Due to his symptoms he was scheduled for TAVR.    Past Medical History:  has a past medical history of Abnormality of left ventricle of heart; Aortic stenosis; BPH (benign prostatic hyperplasia); Hypercholesteremia; and Hypertension.  Past Surgical History:  has a past surgical history that includes tonsillectomy and zzz cardiac cath.  Past Social History:  reports that he has been smoking Cigarettes.  He has a 20.00 pack-year smoking history. He has never used smokeless tobacco. He reports that he drinks alcohol. He reports that he does not use drugs.  Past Family History:   Family History   Problem Relation Age of Onset   • Heart Disease Mother      Allergies: Patient has no known allergies.    Medications reviewed.    Current Medications:  Prior to Admission medications    Medication Sig Start Date End Date Taking? Authorizing Provider   ferrous sulfate 325 (65 Fe) MG tablet Take 1 Tab by mouth every day for 30 days. 3/16/19 4/15/19  Aaron Becker M.D.   furosemide (LASIX) 20 MG Tab Take 1 Tab by mouth every day for 30 days. 3/16/19 4/15/19  Aaron Becker M.D.   amLODIPine (NORVASC) 2.5 MG Tab Take 1 Tab by mouth every day. 2/6/19   Perla Orr, A.P.R.N.   potassium chloride SA (K-DUR) 10 MEQ Tab CR Take 1 Tab by mouth every day. 2/6/19   Perla Orr, A.P.R.N.       Review of Systems:  Review of Systems   Constitutional: Positive for malaise/fatigue. Negative for chills, fever and weight loss.   HENT: Negative.  Negative for hearing loss.    Eyes: Negative.  Negative for blurred vision and double vision.   Respiratory: Positive for shortness of breath. Negative for cough.    Cardiovascular: Negative.  Negative for chest pain, palpitations, claudication  and leg swelling.   Gastrointestinal: Negative.  Negative for abdominal pain, nausea and vomiting.   Genitourinary: Negative.  Negative for dysuria and urgency.   Musculoskeletal: Negative.  Negative for joint pain and myalgias.   Skin: Negative.  Negative for itching and rash.   Neurological: Positive for weakness. Negative for dizziness, focal weakness and headaches.   Endo/Heme/Allergies: Negative.  Does not bruise/bleed easily.   Psychiatric/Behavioral: Negative.  Negative for depression. The patient is not nervous/anxious.      Pulse (!) 102, temperature 36.3 °C (97.4 °F), temperature source Temporal, resp. rate 17, SpO2 93 %.    Physical Examination:  Physical Exam   Constitutional: He is oriented to person, place, and time. He appears well-developed and well-nourished.   HENT:   Head: Normocephalic and atraumatic.   Eyes: Pupils are equal, round, and reactive to light. Conjunctivae are normal.   Neck: Normal range of motion. Neck supple.   Cardiovascular: Normal rate and regular rhythm.    Murmur heard.  Pulmonary/Chest: Effort normal and breath sounds normal.   Abdominal: Soft. Bowel sounds are normal.   Musculoskeletal: Normal range of motion. He exhibits no edema.   Neurological: He is alert and oriented to person, place, and time.   Skin: Skin is warm and dry.   Psychiatric: He has a normal mood and affect.     CARDIAC STUDIES/PROCEDURES:     CARDIAC CATHETERIZATION CONCLUSIONS (01/01/19)  1.  Severe aortic stenosis with peak to peak gradient of 80 mmHg, mean   gradient of 61 mmHg, calculated TAYA of 0.48 cm2.  2.  No angiographic evidence of coronary artery disease.  3.  Mildly reduced left ventricular systolic function with ejection fraction of 55%.  4.  Elevated left ventricular end diastolic pressure.  5.  Elevated right heart pressure with PA systolic pressure of 65 mmHg.  6.  Mildly dilated ascending aorta.  7.  Ectatic and diffusely atherosclerotic distal abdominal aorta with   bilateral iliofemoral  system, moderate in caliber, bilateral nonobstructive   stenosis with tortuosity left greater than right.     CAROTID ULTRASOUND (01/03/19)  1.  Less than 50% stenosis in the right carotid bifurcation with no    evidence of internal carotid arterial stenosis.  2.  Normal left carotid duplex examination.  3.  Antegrade bilateral vertebral arterial flow.     CTA OF CHEST AND ABDOMEN (01/04/15)  1.  Mean aortic valvular area 584 sq mm  2.  The coronary arteries originate just greater than 10 mm above the aortic annulus  3.  Minimal diameter right external iliac artery 6.1 mm with minimal tortuosity  4.  Minimal diameter left external iliac artery 5.7 mm with moderate tortuosity  5.  3.7 x 3.5 cm infrarenal abdominal aortic aneurysm  6.  Hepatic fatty infiltration and moderate hepatomegaly  7.  2.6 cm benign cavernous hemangioma within the right lobe of the liver(study result reviewed)     ECHOCARDIOGRAM CONCLUSIONS (12/30/18)  Moderate concentric left ventricular hypertrophy.  Left ventricular systolic function is low normal.  Severe aortic stenosis.  Transvalvular gradients are - Peak: 98 mmHg, Mean: 56 mmHg.  Estimated right ventricular systolic pressure  is 55 mmHg.  Compared to the images of the study done on 7/19/17, previous severe   aortic stenosis is now critical aortic stenosis and previously normal   left ventricular ejection fraction is now mildly reduced.     EKG performed on (03/15/19) EKG shows sinus rhythm with left ventricular hypertrophy.  EKG performed on (12/29/18) EKG shows sinus tachycardia with Left ventricular hypertrophy.    Impression/Plan:    1. Aortic stenosis: He is symptomatic with his severe aortic stenosis, NYHA class IIIB. He is not a surgical candidate due to excessive risk with STS score of 3-5% range especially considering his active methamphetamine abuse per Dr. Yun. We will proceed with TAVR. He understands the risks and benefits and agrees with plan.  2. Methamphetamine use:  He has been drug free and he has completed his drug use treatment and assessment.      The risks, benefits, and alternatives to TAVR, general anesthesia and transesophageal echocardiogram were discussed in great detail. Specific risks mentioned include bleeding, infection, kidney damage, allergic reaction, cardiac perforation with possible tamponade requiring natalie-cardiocentesis or possible open heart surgery if the patient is a candidate. Lastly the risks of heart attack, stroke, and death were discussed; the risks of major complications includingall cause mortality of 2.2%, disabling stroke of 1.1%, the risk on new pacemaker of 12% and the risk of vascular complications of 4.1%. The patient verbalized understanding of these potential complications and wishes to proceed with this procedure. ([facility] [employer]).  The procedure will be performed completely in collaboration with cardiac surgery.     CC Mitzi Trivedi and Jocelyne Paredes    Pre Procedure Assessment:  <EXAMSHORT2>      Pre Procedure update:   No changes.    Dariel Moser  3/18/2019

## 2019-03-18 NOTE — OP REPORT
DATE OF SERVICE:  03/18/2019    REFERRING PHYSICIAN:  Mitzi Lee MD    PREOPERATIVE DIAGNOSES:  Severe aortic stenosis, hypertension, methamphetamine   abuse.    POSTOPERATIVE DIAGNOSES:  Severe aortic stenosis, hypertension,   methamphetamine abuse.    PROCEDURES:  Transcatheter aortic valve replacement (TAVR 26 mm S3 Nathan   pericardial valve, +2 mL post-implant dilatation) and intraoperative   transesophageal echocardiography.    SURGEONS:  Conor Yun MD and Dariel Moser MD    ANESTHESIOLOGIST:  Paul Funk MD    ANESTHESIA:  General endotracheal.    ESTIMATED BLOOD LOSS:  Minimal.    COMPLICATIONS:  None.    INDICATIONS:  The patient is a 63-year-old male with severe symptomatic aortic   stenosis.  Echocardiography showed peak and mean transvalvular gradients of   98 and 56 mmHg respectively.  His left ventricular ejection fraction was   approximately 55%.    DESCRIPTION OF PROCEDURE:  The patient was brought to the operating room and   placed on the operating room table in the supine position.  After successful   induction of general anesthesia and endotracheal intubation, the patient was   prepped and draped in the usual sterile fashion.  In collaboration with Dr. Moser, bilateral femoral ultrasound-guided arteriovenous access was obtained.    Dr. Moser positioned a temporary transvenous pacemaker in the right   ventricle and a pigtail catheter in the right coronary sinus.  The deployment   angle was determined.  A small 1 cm incision was made in the right groin and 2   Perclose sutures were deployed.  A 14-Mongolian eSheath was then placed in the   right common femoral artery and its tip was positioned in the abdominal aorta.    The aortic valve was crossed with a wire.  The deployment catheter with a 26   mm S3 Nathan pericardial valve at its tip was positioned across the aortic   annulus and Dr. Moser inflated the balloon during valve implantation.    Intraoperative transesophageal  echocardiography showed a mild paravalvular   leak.  The balloon was positioned across the aortic annulus once again and 2   additional mL of volume were added to the balloon, which was reinflated up to   nearly 9 atmospheres of pressure.  Wires and catheters were removed.    Intraoperative transesophageal echocardiography showed resolution of the   paravalvular leak.  The right femoral eSheath was removed and the Perclose   sutures were tightened down.  Dr. Moser will dictate the remainder of the   operation.  There were no apparent complications.  The patient tolerated the   procedure well and left the operating room in stable condition.       ____________________________________     Conor MARROQUIN    DD:  03/18/2019 12:03:31  DT:  03/18/2019 12:31:15    D#:  [id number]  Job#:  [id number]

## 2019-03-18 NOTE — PROGRESS NOTES
2 RN skin check     Pt has right radial art line site. Soft, CDI. Dressing in place   Bilateral femoral sheath sites. Soft , CDI. Right femoral site is open to air, left femoral site has transparent dressing in place. No signs of draining or hematoma   All other skin intact. No areas of concern.

## 2019-03-18 NOTE — PROGRESS NOTES
Pt educated on the importance of getting out of bed. Pt up in chair. Steady gait. Sites soft, CDI.   Pt educated on IS. Demonstrates appropriately.

## 2019-03-18 NOTE — CARE PLAN
Problem: Infection  Goal: Will remain free from infection    Intervention: Assess signs and symptoms of infection  Appropriate protocols and standards utilized to minimize likelihood of infection and the spread of infection. Proper hand hygiene utilized and pt and family members educated on hand hygiene.       Problem: Knowledge Deficit  Goal: Knowledge of disease process/condition, treatment plan, diagnostic tests, and medications will improve    Intervention: Assess knowledge level of disease process/condition, treatment plan, diagnostic tests, and medications  Pt educated on POC for the day, disease process, upcoming tests, and medication information. Will continue to answer questions and educate pt as necessary.

## 2019-03-18 NOTE — ANESTHESIA PROCEDURE NOTES
Arterial Line  Performed by: JAMI BARRERA  Authorized by: JAMI BARRERA     Start Time:  3/18/2019 10:33 AM  End Time:  3/18/2019 10:36 AM  Localization: ultrasound guidance  Image captured, interpreted and electronically stored.  Patient Location:  OR  Indication: continuous blood pressure monitoring    Catheter Size:  20 G  Seldinger Technique?: Yes    Laterality:  Right  Site:  Radial artery  Line Secured:  Antimicrobial disc, tape and transparent dressing  Events: patient tolerated procedure well with no complications

## 2019-03-18 NOTE — ANESTHESIA POSTPROCEDURE EVALUATION
Patient: ySlvain Espinosa    Procedure Summary     Date:  03/18/19 Room / Location:  [place] [id number] / SURGERY [facility]    Anesthesia Start:  1030 Anesthesia Stop:  1200    Procedures:       REPLACEMENT, AORTIC VALVE, TRANSCATHETER (N/A Groin)      ECHOCARDIOGRAM, TRANSESOPHAGEAL (Esophagus) Diagnosis:  (severe aortic stenosis)    Surgeon:  Dariel Moser M.D. Responsible Provider:  Paul Funk M.D.    Anesthesia Type:  general ASA Status:  4          Final Anesthesia Type: general  Last vitals  /81       Temp 36       Pulse 90     Resp 16       SpO2 99         Anesthesia Post Evaluation    Patient location during evaluation: PACU  Patient participation: complete - patient participated  Level of consciousness: awake and alert  Pain score: 0    Airway patency: patent  Anesthetic complications: no  Cardiovascular status: hemodynamically stable  Respiratory status: acceptable  Hydration status: euvolemic    PONV: none

## 2019-03-18 NOTE — OR SURGEON
Immediate Post OP Note    PreOp Diagnosis: Aortic stenosis, hypertension, methamphetamine abuse.    PostOp Diagnosis: Same    Procedure(s):  T AVR (26 mm S3 Nathan pericardial valve, +2 mL post implant dilatation)  JAYLYN    Surgeon(s):  JAMES Graham M.D.    Anesthesiologist/Type of Anesthesia:  Anesthesiologist: Paul Funk M.D.  Anesthesia Technician: Kina Hutchison/General    Surgical Staff:  Circulator: Kodak Allred R.N.; Mirna Hill R.N.; Srini Raymundo R.N.  Scrub Person: Sylvain Lopez  Cath Lab Tech: Chaitanya Juares    Specimens removed if any: None.    Estimated Blood Loss: Minimal.    Findings: Aortic stenosis.    Complications: None.        3/18/2019 11:56 AM Conor Yun M.D.

## 2019-03-18 NOTE — ANESTHESIA PROCEDURE NOTES
Airway  Date/Time: 3/18/2019 10:45 AM  Performed by: JAMI BARRERA  Authorized by: JAMI BARRERA     Location:  OR  Urgency:  Elective  Difficult Airway: No    Indications for Airway Management:  Anesthesia  Spontaneous Ventilation: absent    Sedation Level:  Deep  Preoxygenated: Yes    Patient Position:  Sniffing  Mask Difficulty Assessment:  2 - vent by mask + OA or adjuvant +/- NMBA  Final Airway Type:  Endotracheal airway  Final Endotracheal Airway:  ETT  Cuffed: Yes    Technique Used for Successful ETT Placement:  Direct laryngoscopy  Insertion Site:  Oral  Blade Type:  Buck  Laryngoscope Blade/Videolaryngoscope Blade Size:  3  ETT Size (mm):  7.0  Measured from:  Teeth  ETT to Teeth (cm):  23  Placement Verified by: auscultation and capnometry    Cormack-Lehane Classification:  Grade IIa - partial view of glottis  Number of Attempts at Approach:  1

## 2019-03-18 NOTE — PROGRESS NOTES
Med rec complete per pt with medications bedside  Interviewed pt with family at bedside with permission from pt  Allergies reviewed and updated.

## 2019-03-18 NOTE — ANESTHESIA TIME REPORT
Anesthesia Start and Stop Event Times     Date Time Event    3/18/2019 1030 Anesthesia Start     1200 Anesthesia Stop        Responsible Staff  03/18/19    Name Role Begin End    Paul Funk M.D. Anesth 1030 1200      Procedure: TAVR    Post op Diagnosis  Aortic stenosis    Premium Reason  Non-Premium      Exception Times    Start Time             End Time                     #                           Dr. Girffith

## 2019-03-18 NOTE — ANESTHESIA QCDR
2019 [facility] Clinical Data Registry (for Quality Improvement)     Postoperative nausea/vomiting risk protocol (Adult = 18 yrs and Pediatric 3-17 yrs)- (430 and 463)  General inhalation anesthetic (NOT TIVA) with PONV risk factors: Yes  Provision of anti-emetic therapy with at least 2 different classes of agents: Yes   Patient DID NOT receive anti-emetic therapy and reason is documented in Medical Record:  N/A    Multimodal Pain Management- (AQI59)  Patient undergoing Elective Surgery (i.e. Outpatient, or ASC, or Prescheduled Surgery prior to Hospital Admission): Yes  Use of Multimodal Pain Management, two or more drugs and/or interventions, NOT including systemic opioids: No   Exception: Documented allergy to multiple classes of analgesics: No        PACU assessment of acute postoperative pain prior to Anesthesia Care End- Applies to Patients Age = 18- (ABG7)  Initial PACU pain score is which of the following: < 7/10  Patient unable to report pain score: N/A    Post-anesthetic transfer of care checklist/protocol to PACU/ICU- (426 and 427)  Upon conclusion of case, patient transferred to which of the following locations: PACU/Non-ICU  Use of transfer checklist/protocol: Yes  Exclusion: Service Performed in Patient Hospital Room (and thus did not require transfer): N/A    PACU Reintubation- (AQI31)  General anesthesia requiring endotracheal intubation (ETT) along with subsequent extubation in OR or PACU: Yes  Required reintubation in the PACU: No   Extubation was a planned trial documented in the medical record prior to removal of the original airway device:  N/A    Unplanned admission to ICU related to anesthesia service up through end of PACU care- (MD51)  Unplanned admission to ICU (not initially anticipated at anesthesia start time): No

## 2019-03-18 NOTE — OP REPORT
DATE OF PROCEDURE: 03/18/19    REFERRING PHYSICIAN: Krystian Harrington    PROCEDURES:  1. Transcatheter aortic valve replacement.  2. Preclose closure.  3. Angio-Seal closure.  4. Ultrasound guided femoral artery and femoral vein access.    PRE PROCEDURAL DIAGNOSIS:  1. Severe symptomatic aortic stenosis, NYHA IIIB.    POST PROCEDURAL DIAGNOSIS:  1. Successful transcatheter aortic valve replacement (TAVR) with # 26 Nathan Lonnie S3 valve with 2 additional mls of volume, transfemoral approach, under general anesthesia.  2. Successful Preclose closure.  3. Successful Angio-Seal closure.    INDICATION:    63-year-old male with past medical history significant for hypertension and methamphetamine abuse. Due to his symptoms he was scheduled for TAVR.    DESCRIPTION OF PROCEDURE:  After informed consent was signed by the   patient, the patient was brought into the OR 19.  He was prepped and draped in   usual sterile manner.  Prior to the procedure, right radial arterial insertion, intubation   and transesophageal echocardiogram were performed by Paul Weston.    The initial timeout was performed.     A 6-Monegasque arterial sheath was placed in the right femoral artery by Modified Seldinger   Technique by myself. A 6-Monegasque sheath was placed in the left femoral artery and a 6-Monegasque sheath was placed in the left femoral vein by Dr. Yun.    Of note, all under sheath were placed under ultrasound guidance.     Through the venous femoral sheath, a temporary pacemaker was positioned at the   right ventricle.  Pacing was confirmed.  Through the left arterial sheath, a pigtail catheter   was positioned in the distal aorta and aortogram was performed. This catheter was advanced   to the ascending aorta at the valve level and aortogram was repeated. Over the right femoral   sheath PreClose closure was performed with 2 devices. An 8 -Monegasque Sheath was placed.   Through this 8-Monegasque sheath, an AL1 was positioned into the  ascending aorta. This catheter   and sheath were removed after the insertion of a Lunderquist wire.  Over the Lunderquist   wire l75-Bofbie expandable-sheath was advanced by myself.  IV heparin was given. Through   the expandable sheath,  an AL1 catheter was positioned at the ascending aorta.  The   Lunderquist wire was removed and exchanged for a straightwire. The AL1 catheter was   used to cross the aortic valve with this wire.  The AL1 catheter was exchanged for an   exchange length J wire.  Over this wire, a 6-Maldivian pigtail catheter was positioned into   the left ventricle. Through the pigtail catheter an Amplatz extra stiff wire was positioned   across  the aortic valve. Over the Amplatz wire  the Commander delivery system was   inserted.  The Lonnie S3 valve stent was loaded onto the balloon and positioned across   the aortic valve.  Rapid pacing was performed again and the stent was deployed. Due to   mild paravalvular leak 2 additional mls of volume was added and post dilation was performed.   The delivery system was removed.   The valve was deployed by Dr. Yun.    The patient tolerated the procedure well. At the end of procedure, all pacemaker wire,   pigtail catheters and sheaths were removed.  The right femoral arteriotomy site was closed   via the PreClose system.  The left femoral arteriotomy site was closed via Angio-Seal.   The patient was then extubated and transferred to PACU in stable condition.    IMPRESSION:  1. Successful transcatheter aortic valve replacement (TAVR) with # 26 Nathan Lonnie S3 valve with 2 additional mls of volume, transfemoral approach, under general anesthesia.  2. Successful Preclose closure.  3. Successful Angio-Seal closure.    RECOMMENDATION: Medical therapy with addition of Plavix for 3 months.

## 2019-03-18 NOTE — ANESTHESIA PREPROCEDURE EVALUATION
Relevant Problems   (+) HTN (hypertension)   (+) Hepatitis C   (+) Severe aortic stenosis       Physical Exam    Airway   Mallampati: II  TM distance: >3 FB  Neck ROM: full       Cardiovascular - normal exam  Rhythm: regular  Rate: normal  (+) murmur     Dental - normal exam         Pulmonary - normal exam  Breath sounds clear to auscultation     Abdominal    Neurological - normal exam                 Anesthesia Plan    ASA 4   ASA physical status 4 criteria: severe valve dysfunction    Plan - general       Airway plan will be ETT      Plan Factors:   Patient was not previously instructed to abstain from smoking on day of procedure.  Patient smoked on day of procedure.    Induction: intravenous    Postoperative Plan: Postoperative administration of opioids is intended.    Pertinent diagnostic labs and testing reviewed    Informed Consent:    Anesthetic plan and risks discussed with patient.    Use of blood products discussed with: patient whom consented to blood products.

## 2019-03-19 ENCOUNTER — APPOINTMENT (OUTPATIENT)
Dept: RADIOLOGY | Facility: MEDICAL CENTER | Age: 64
DRG: 267 | End: 2019-03-19
Attending: NURSE PRACTITIONER
Payer: MEDICAID

## 2019-03-19 ENCOUNTER — APPOINTMENT (OUTPATIENT)
Dept: CARDIOLOGY | Facility: MEDICAL CENTER | Age: 64
DRG: 267 | End: 2019-03-19
Attending: NURSE PRACTITIONER
Payer: MEDICAID

## 2019-03-19 VITALS
TEMPERATURE: 98.8 F | WEIGHT: 132.5 LBS | HEART RATE: 71 BPM | BODY MASS INDEX: 20.08 KG/M2 | OXYGEN SATURATION: 100 % | SYSTOLIC BLOOD PRESSURE: 124 MMHG | DIASTOLIC BLOOD PRESSURE: 74 MMHG | RESPIRATION RATE: 16 BRPM | HEIGHT: 68 IN

## 2019-03-19 LAB
ALBUMIN SERPL BCP-MCNC: 3.3 G/DL (ref 3.2–4.9)
ALBUMIN/GLOB SERPL: 0.9 G/DL
ALP SERPL-CCNC: 47 U/L (ref 30–99)
ALT SERPL-CCNC: 21 U/L (ref 2–50)
ANION GAP SERPL CALC-SCNC: 6 MMOL/L (ref 0–11.9)
AST SERPL-CCNC: 28 U/L (ref 12–45)
BILIRUB SERPL-MCNC: 0.3 MG/DL (ref 0.1–1.5)
BNP SERPL-MCNC: 311 PG/ML (ref 0–100)
BUN SERPL-MCNC: 22 MG/DL (ref 8–22)
CALCIUM SERPL-MCNC: 9.1 MG/DL (ref 8.5–10.5)
CHLORIDE SERPL-SCNC: 103 MMOL/L (ref 96–112)
CO2 SERPL-SCNC: 27 MMOL/L (ref 20–33)
CREAT SERPL-MCNC: 1.07 MG/DL (ref 0.5–1.4)
EKG IMPRESSION: NORMAL
ERYTHROCYTE [DISTWIDTH] IN BLOOD BY AUTOMATED COUNT: 45.1 FL (ref 35.9–50)
GLOBULIN SER CALC-MCNC: 3.6 G/DL (ref 1.9–3.5)
GLUCOSE SERPL-MCNC: 92 MG/DL (ref 65–99)
HCT VFR BLD AUTO: 36.9 % (ref 42–52)
HGB BLD-MCNC: 12.1 G/DL (ref 14–18)
LV EJECT FRACT  99904: 50
LV EJECT FRACT MOD 2C 99903: 36.97
LV EJECT FRACT MOD 4C 99902: 35.04
LV EJECT FRACT MOD BP 99901: 35.01
MCH RBC QN AUTO: 28.5 PG (ref 27–33)
MCHC RBC AUTO-ENTMCNC: 32.8 G/DL (ref 33.7–35.3)
MCV RBC AUTO: 87 FL (ref 81.4–97.8)
MORPHOLOGY BLD-IMP: NORMAL
PLATELET # BLD AUTO: 232 K/UL (ref 164–446)
PLATELET BLD QL SMEAR: NORMAL
PMV BLD AUTO: 10.1 FL (ref 9–12.9)
POTASSIUM SERPL-SCNC: 4.3 MMOL/L (ref 3.6–5.5)
PROT SERPL-MCNC: 6.9 G/DL (ref 6–8.2)
RBC # BLD AUTO: 4.24 M/UL (ref 4.7–6.1)
SODIUM SERPL-SCNC: 136 MMOL/L (ref 135–145)
WBC # BLD AUTO: 9.6 K/UL (ref 4.8–10.8)

## 2019-03-19 PROCEDURE — 93306 TTE W/DOPPLER COMPLETE: CPT

## 2019-03-19 PROCEDURE — 99239 HOSP IP/OBS DSCHRG MGMT >30: CPT | Performed by: INTERNAL MEDICINE

## 2019-03-19 PROCEDURE — 83880 ASSAY OF NATRIURETIC PEPTIDE: CPT

## 2019-03-19 PROCEDURE — 93306 TTE W/DOPPLER COMPLETE: CPT | Mod: 26 | Performed by: INTERNAL MEDICINE

## 2019-03-19 PROCEDURE — 93010 ELECTROCARDIOGRAM REPORT: CPT | Performed by: INTERNAL MEDICINE

## 2019-03-19 PROCEDURE — 80053 COMPREHEN METABOLIC PANEL: CPT

## 2019-03-19 PROCEDURE — 85027 COMPLETE CBC AUTOMATED: CPT

## 2019-03-19 PROCEDURE — 93005 ELECTROCARDIOGRAM TRACING: CPT | Performed by: NURSE PRACTITIONER

## 2019-03-19 PROCEDURE — A9270 NON-COVERED ITEM OR SERVICE: HCPCS | Performed by: NURSE PRACTITIONER

## 2019-03-19 PROCEDURE — 700102 HCHG RX REV CODE 250 W/ 637 OVERRIDE(OP): Performed by: NURSE PRACTITIONER

## 2019-03-19 PROCEDURE — 97535 SELF CARE MNGMENT TRAINING: CPT

## 2019-03-19 PROCEDURE — 36415 COLL VENOUS BLD VENIPUNCTURE: CPT

## 2019-03-19 PROCEDURE — 71045 X-RAY EXAM CHEST 1 VIEW: CPT

## 2019-03-19 RX ORDER — CLOPIDOGREL BISULFATE 75 MG/1
75 TABLET ORAL DAILY
Qty: 30 TAB | Refills: 2 | Status: SHIPPED | OUTPATIENT
Start: 2019-03-20 | End: 2019-10-10

## 2019-03-19 RX ADMIN — FERROUS SULFATE TAB 325 MG (65 MG ELEMENTAL FE) 325 MG: 325 (65 FE) TAB at 05:47

## 2019-03-19 RX ADMIN — CLOPIDOGREL 75 MG: 75 TABLET, FILM COATED ORAL at 05:47

## 2019-03-19 RX ADMIN — ASPIRIN 81 MG: 81 TABLET, COATED ORAL at 05:47

## 2019-03-19 RX ADMIN — FUROSEMIDE 20 MG: 20 TABLET ORAL at 05:47

## 2019-03-19 RX ADMIN — AMLODIPINE BESYLATE 2.5 MG: 2.5 TABLET ORAL at 05:47

## 2019-03-19 RX ADMIN — ATORVASTATIN CALCIUM 20 MG: 20 TABLET, FILM COATED ORAL at 05:47

## 2019-03-19 ASSESSMENT — ENCOUNTER SYMPTOMS
PALPITATIONS: 0
HEADACHES: 0
NERVOUS/ANXIOUS: 0
ABDOMINAL PAIN: 0
CONSTITUTIONAL NEGATIVE: 1
DIZZINESS: 0
COUGH: 0
MUSCULOSKELETAL NEGATIVE: 1
CHILLS: 0
CARDIOVASCULAR NEGATIVE: 1
FEVER: 0
VOMITING: 0
NAUSEA: 0
GASTROINTESTINAL NEGATIVE: 1
PSYCHIATRIC NEGATIVE: 1
RESPIRATORY NEGATIVE: 1
SHORTNESS OF BREATH: 0
EYES NEGATIVE: 1
WEAKNESS: 0
BRUISES/BLEEDS EASILY: 0
NEUROLOGICAL NEGATIVE: 1
MYALGIAS: 0

## 2019-03-19 ASSESSMENT — COGNITIVE AND FUNCTIONAL STATUS - GENERAL
MOBILITY SCORE: 24
SUGGESTED CMS G CODE MODIFIER MOBILITY: CH

## 2019-03-19 ASSESSMENT — GAIT ASSESSMENTS
GAIT LEVEL OF ASSIST: SUPERVISED
DISTANCE (FEET): 550
ASSISTIVE DEVICE: OTHER (COMMENTS)

## 2019-03-19 NOTE — THERAPY
"Physical Therapy Cardiac Rehab Evaluation completed.   Gait: Level Of Assist: Supervised with walking stick       Plan of Care: Patient with no further skilled PT needs in the acute care setting at this time  Discharge Recommendations: Equipment: No Equipment Needed. Post-acute therapy: Currently anticipate no further skilled therapy needs once patient is discharged from the inpatient setting.    See \"Rehab Therapy-Acute\" Patient Summary Report for complete documentation.    Patient is a 64 YO male that presented with symptomatic aortic stenosis and is s/p TAVR. EF 55%. Patient with PMHx significant for HTN, BPH, meth abuse. Patient able to tolerate approximately 550ft ambulation with walking stick, supervision, and 3L O2 and demonstrated appropriate hemodynamic response. Patient ascended/descended one flight of stairs with no signs or symptoms and appropriately paced activity. Patient demonstrated knowledge of home walking program and signs of failure response. No further acute PT needs.  "

## 2019-03-19 NOTE — PROGRESS NOTES
Monitor room notified this RN that client experienced 4 beats of VT. Patient was resting at the time and denied any chest pain, palpitations, SOB, or discomfort.     12 Hour CC

## 2019-03-19 NOTE — CARE PLAN
Problem: Safety  Goal: Will remain free from injury    Intervention: Provide assistance with mobility  Patient educated to use call light for assistance. Fall precautions in place. Staff will assist with mobilization. Hourly rounding in place.      Problem: Mobility  Goal: Risk for activity intolerance will decrease    Intervention: Assess and monitor signs of activity intolerance  Pt is POD 1, Pt encouraged to ambulate 4 times. Pt will get up from meals with or without assistance.

## 2019-03-19 NOTE — DISCHARGE INSTRUCTIONS
Discharge Instructions    Discharged to other by car with escort. Discharged via walking, hospital escort: Yes.  Special equipment needed: Oxygen    Be sure to schedule a follow-up appointment with your primary care doctor or any specialists as instructed.     Discharge Plan:   Diet Plan: Discussed  Activity Level: Discussed  Confirmed Follow up Appointment: Appointment Scheduled  Confirmed Symptoms Management: Discussed  Medication Reconciliation Updated: Yes  Influenza Vaccine Indication: Not indicated: Previously immunized this influenza season and > 8 years of age    I understand that a diet low in cholesterol, fat, and sodium is recommended for good health. Unless I have been given specific instructions below for another diet, I accept this instruction as my diet prescription.     Special Instructions:   HF Patient Discharge Instructions  · Monitor your weight daily, and maintain a weight chart, to track your weight changes.   · Activity as tolerated, unless your Doctor has ordered otherwise. Other activity order:.  · Follow a low fat, low cholesterol, low salt diet unless instructed otherwise by your Doctor. Read the labels on the back of food products and track your intake of fat, cholesterol and salt.   · Fluid Restriction No. If a Fluid Restriction has been ordered by your Doctor, measure fluids with a measuring cup to ensure that you are not exceeding the restriction.   · No smoking.  · Oxygen Yes. If your Doctor has ordered that you wear Oxygen at home, it is important to wear it as ordered.  · Did you receive an explanation from staff on the importance of taking each of your medications and why it is necessary to keep taking them unless your doctor says to stop? Yes  · Were all of your questions answered about how to manage your heart failure and what to do if you have increased signs and symptoms after you go home? Yes  · Do you feel like your heart failure care team involved you in the care treatment  plan and allowed you to make decisions regarding your care while in the hospital and addressed any discharge needs you might have? Yes    See the educational handout provided at discharge for more information on monitoring your daily weight, activity and diet. This also explains more about Heart Failure, symptoms of a flare-up and some of the tests that you have undergone.     Warning Signs of a Flare-Up include:  · Swelling in the ankles or lower legs.  · Shortness of breath, while at rest, or while doing normal activities.   · Shortness of breath at night when in bed, or coughing in bed.   · Requiring more pillows to sleep at night, or needing to sit up at night to sleep.  · Feeling weak, dizzy or fatigued.     When to call your Doctor:  · Call [facility] seven days a week from 8:00 a.m. to 8:00 p.m. for medical questions [phone].  · Call your Primary Care Physician or Cardiologist if:   1. You experience any pain radiating to your jaw or neck.  2. You have any difficulty breathing.  3. You experience weight gain of 3 lbs in a day or 5 lbs in a week.   4. You feel any palpitations or irregular heartbeats.  5. You become dizzy or lose consciousness.   If you have had an angiogram or had a pacemaker or AICD placed, and experience:  1. Bleeding, drainage or swelling at the surgical / puncture site.  2. Fever greater than 100.0 F  3. Shock from internal defibrillator.  4. Cool and / or numb extremities.     Transcatheter Aortic Valve Replacement (TAVR)    General Instructions:Take Medication as directed.  You will likely need to take aspirin and another blood thinner (antiplatelet medication) for a period.  You'll need to take the aspirin for the rest of your life.  Be sure your doctor knows about all other medicines you take, including over-the-counter medicines and dietary supplements.    Incision Care Instructions:  Look and feel the site(s) of your TAVR TODAY so you can recognize changes that  should be called to your doctor (see below).  It's normal for your incision to be bruised, itchy, or sore while it's healing.  Your incision may take a week or more to heal.  Bruising may occur.  Some of the discoloration may travel down the leg.  As it resolves, the color should change from blue to green to yellow-brown.  A small, round lump under the TAVR site may remain for up to 6 weeks.  Wash your incision site every day with warm water and soap.  Gently pat it dry.  Don't put powder, lotion, or ointment on the incision until it's healed.  Watch for oozing, green/yellow discharge from groin site, or abnormal bruising or swelling. Watch for dizziness/lightheadedness at home. No driving for one week. Take it easy for 2 weeks with no heavy lifting or heavy exertional activities. Please call our office for any questions/concerns [phone].    Activity:  No driving for one week  If you must take a long car ride (not as a ), stop every hour and walk around the car.  No lifting or pulling objects over 5 pounds for 4-5 days.  Warm showers or baths are permitted after the bandage is removed.  Walk regularly.  One of the best ways to get stronger is to walk.  Walk a little more each day.  Take someone with you until you feel OK to walk alone.    When to call your health care provider:  You develop a fever.  Redness, swelling, bleeding, warmth, or fluid draining at the incision site.  Bruising appears to be new or does not look like it is getting better.  The small, round lump in the groin in the area of the TAVR site increases in size.    Seek immediate medical help if you have any of the following symptoms:  1. You experience any leg numbness, aching, or discomfort  2. Chest pain or trouble breathing (call 911)  3. Sudden numbness or weakness in your face, arms, or legs (call 911)  4. Bowel movement that is bright red  5. Dizziness or fainting  6. Weight gain of more than 2 pounds in 24 hours or more than 5 pounds  in 1 week  7. Swelling in your hands, feet, or ankles  8. Shortness of breath that doesn't get better when you rest  9. Pain that gets worse or doesn't go away  10. Fast or irregular pulse       · Is patient discharged on Warfarin / Coumadin?   No     Depression / Suicide Risk    As you are discharged from this [facility] facility, it is important to learn how to keep safe from harming yourself.    Recognize the warning signs:  · Abrupt changes in personality, positive or negative- including increase in energy   · Giving away possessions  · Change in eating patterns- significant weight changes-  positive or negative  · Change in sleeping patterns- unable to sleep or sleeping all the time   · Unwillingness or inability to communicate  · Depression  · Unusual sadness, discouragement and loneliness  · Talk of wanting to die  · Neglect of personal appearance   · Rebelliousness- reckless behavior  · Withdrawal from people/activities they love  · Confusion- inability to concentrate     If you or a loved one observes any of these behaviors or has concerns about self-harm, here's what you can do:  · Talk about it- your feelings and reasons for harming yourself  · Remove any means that you might use to hurt yourself (examples: pills, rope, extension cords, firearm)  · Get professional help from the community (Mental Health, Substance Abuse, psychological counseling)  · Do not be alone:Call your Safe Contact- someone whom you trust who will be there for you.  · Call your local CRISIS HOTLINE 598-3075 or 111-539-0211  · Call your local Children's Mobile Crisis Response Team [place] (612) 449-4371 or www.[employer]  · Call the toll free National Suicide Prevention Hotlines   · National Suicide Prevention Lifeline 715-529-ZNJI (7049)  · National Hope Line Network 800-SUICIDE (025-6133)    Clopidogrel tablets  What is this medicine?  CLOPIDOGREL (kloh PID oh grel) helps to prevent blood clots. This medicine is used  to prevent heart attack, stroke, or other vascular events in people who are at high risk.  This medicine may be used for other purposes; ask your health care provider or pharmacist if you have questions.  COMMON BRAND NAME(S): Plavix  What should I tell my health care provider before I take this medicine?  They need to know if you have any of the following conditions:  -bleeding disorders  -bleeding in the brain  -having surgery  -history of stomach bleeding  -an unusual or allergic reaction to clopidogrel, other medicines, foods, dyes, or preservatives  -pregnant or trying to get pregnant  -breast-feeding  How should I use this medicine?  Take this medicine by mouth with a glass of water. Follow the directions on the prescription label. You may take this medicine with or without food. If it upsets your stomach, take it with food. Take your medicine at regular intervals. Do not take it more often than directed. Do not stop taking except on your doctor's advice.  A special MedGuide will be given to you by the pharmacist with each prescription and refill. Be sure to read this information carefully each time.  Talk to your pediatrician regarding the use of this medicine in children. Special care may be needed.  Overdosage: If you think you have taken too much of this medicine contact a poison control center or emergency room at once.  NOTE: This medicine is only for you. Do not share this medicine with others.  What if I miss a dose?  If you miss a dose, take it as soon as you can. If it is almost time for your next dose, take only that dose. Do not take double or extra doses.  What may interact with this medicine?  Do not take this medicine with the following medications:  -dasabuvir; ombitasvir; paritaprevir; ritonavir  -defibrotide  This medicine may also interact with the following medications:  -antiviral medicines for HIV or AIDS  -aspirin  -certain medicines for depression like citalopram, fluoxetine,  fluvoxamine  -certain medicines for fungal infections like ketoconazole, fluconazole, voriconazole  -certain medicines for seizures like felbamate, oxcarbazepine, phenytoin  -certain medicines for stomach problems like cimetidine, omeprazole, esomeprazole  -certain medicines that treat or prevent blood clots like warfarin, enoxaparin, dalteparin, apixaban, dabigatran, rivaroxaban, ticlopidine  -chloramphenicol  -cilostazol  -fluvastatin  -isoniazid  -modafinil  -nicardipine  -NSAIDS, medicines for pain and inflammation, like ibuprofen or naproxen  -quinine  -repaglinide  -tamoxifen  -tolbutamide  -topiramate  -torsemide  This list may not describe all possible interactions. Give your health care provider a list of all the medicines, herbs, non-prescription drugs, or dietary supplements you use. Also tell them if you smoke, drink alcohol, or use illegal drugs. Some items may interact with your medicine.  What should I watch for while using this medicine?  Visit your doctor or health care professional for regular check ups. Do not stop taking your medicine unless your doctor tells you to.  Notify your doctor or health care professional and seek emergency treatment if you develop breathing problems; changes in vision; chest pain; severe, sudden headache; pain, swelling, warmth in the leg; trouble speaking; sudden numbness or weakness of the face, arm or leg. These can be signs that your condition has gotten worse.  If you are going to have surgery or dental work, tell your doctor or health care professional that you are taking this medicine.  Certain genetic factors may reduce the effect of this medicine. Your doctor may use genetic tests to determine treatment.  What side effects may I notice from receiving this medicine?  Side effects that you should report to your doctor or health care professional as soon as possible:  -allergic reactions like skin rash, itching or hives, swelling of the face, lips, or  tongue  -signs and symptoms of bleeding such as bloody or black, tarry stools; red or dark-brown urine; spitting up blood or brown material that looks like coffee grounds; red spots on the skin; unusual bruising or bleeding from the eye, gums, or nose  -signs and symptoms of a blood clot such as breathing problems; changes in vision; chest pain; severe, sudden headache; pain, swelling, warmth in the leg; trouble speaking; sudden numbness or weakness of the face, arm or leg  Side effects that usually do not require medical attention (report to your doctor or health care professional if they continue or are bothersome):  -constipation  -diarrhea  -headache  -upset stomach  This list may not describe all possible side effects. Call your doctor for medical advice about side effects. You may report side effects to FDA at 0-908-FDA-4836.  Where should I keep my medicine?  Keep out of the reach of children.  Store at room temperature of 59 to 86 degrees F (15 to 30 degrees C). Throw away any unused medicine after the expiration date.  NOTE: This sheet is a summary. It may not cover all possible information. If you have questions about this medicine, talk to your doctor, pharmacist, or health care provider.  © 2018 [employer]/Gold Standard (2016-09-22 10:00:44)

## 2019-03-19 NOTE — CARE PLAN
Problem: Safety  Goal: Will remain free from falls  Outcome: PROGRESSING AS EXPECTED  Patient educated to use call light for assistance. Fall precautions in place. Staff will assist with mobilization. Hourly rounding in place.    Problem: Knowledge Deficit  Goal: Knowledge of disease process/condition, treatment plan, diagnostic tests, and medications will improve  Outcome: PROGRESSING AS EXPECTED  Provided patient with a discussion related to POC for POD#1. Discussed the importanct of ambulating 4x/day until discharge. Provided patient with a verbal discussion and printed handout related to after care instructions following a TAVR procedure. Patient verbalized understanding. Discussed early warning signs of Stroke. Patient verbalized understanding.

## 2019-03-19 NOTE — PROGRESS NOTES
Received bedside report from HEBERT Rashid, assumed care of pt on POD#0 S/P TAVR, VSS, pt assessment complete. Pt AAOx4, no c/o pain at this time. Bilat femoral site assessed; left femoral sheath site slightly hard to palpation with small bruising to site following ambulation. No signs of acute distress noted at this time. POC discussed with pt and verbalizes no questions. Pt denies any additional needs at this time. Bed in lowest position, bed alarm on, pt educated on fall risk and verbalized understanding, call light within reach, hourly rounding initiated.

## 2019-03-19 NOTE — PROGRESS NOTES
MS:    SR-ST (74 - 100) with Frequent PVCs  (7) instances of Sinus Pause lasting 1.0 to 1.2 seconds in duration; Avg pause = 1.08 seconds  0.12 / 0.08 / 0.40

## 2019-03-19 NOTE — PROGRESS NOTES
Pt discharged to [facility] shelter with friends. Discharge instructions reviewed, and signed. Pt escorted out with RN.

## 2019-03-19 NOTE — PROGRESS NOTES
Patient ambulated 2 Laps (800 ft) this morning at a brisk steady pace on 3 lpm NC. Patient was able to maintain conversation the entire time and had no complaints of dizziness, weakness, or SOB.

## 2019-03-19 NOTE — PROGRESS NOTES
Handoff report received. Assumed patient care. Patient is POD 1, pt ambulated this AM, will be up in chair for breakfast. Femoral sites CDI.  Denied pain. Patient not in distress, AAOX 4. Safety precautions in place. Call light and personal belongings within reach. Educated to call for assistance if needed.

## 2019-03-19 NOTE — DISCHARGE PLANNING
Anticipated Discharge Disposition: Home today.  Lives at [facility] & follows at [facility].      Action: Met with pt to assess needs.  Notified him that Plavix is $9 30 dy/$24 90 dys.  He will go to [facility], [facility] or [facility] to fill.he has 02 with Eula. Pt was +meth/alcohol.    Barriers to Discharge: none    Plan: DC home today to [facility]'s [facility], f/u with [facility].  He is hoping his sister will be here from [place] to pick him up later this week.  No further SS needs at this time.  Will remain avail.     Care Transition Team Assessment    Information Source  Orientation : Oriented x 4  Information Given By: Patient  Who is responsible for making decisions for patient? : Patient    Readmission Evaluation  Is this a readmission?: Yes - unplanned readmission    Elopement Risk  Legal Hold: No  Ambulatory or Self Mobile in Wheelchair: Yes  Disoriented: No  Psychiatric Symptoms: None  History of Wandering: No  Elopement this Admit: No  Vocalizing Wanting to Leave: No  Displays Behaviors, Body Language Wanting to Leave: No-Not at Risk for Elopement  Elopement Risk: Not at Risk for Elopement    Interdisciplinary Discharge Planning  Lives with - Patient's Self Care Capacity: Parents, Other (Comments) (Stays with parents in [place] occasionally, uses shelter)  Patient or legal guardian wants to designate a caregiver (see row info): No  Housing / Facility: Homeless  Prior Services: None    Discharge Preparedness  What is your plan after discharge?: (P) Other (comment)  What are your discharge supports?: (P) Parent, Sibling  Prior Functional Level: (P) Ambulatory, Independent with Activities of Daily Living  Difficulity with ADLs: (P) None  Difficulity with IADLs: (P) None    Functional Assesment  Prior Functional Level: (P) Ambulatory, Independent with Activities of Daily Living    Finances  Financial Barriers to Discharge: (P) No  Prescription Coverage: (P) No  Prescription Coverage Comments: (P) Medicaid FFS    Vision / Hearing  Impairment  Vision Impairment : Yes  Right Eye Vision: Impaired, Wears Glasses  Left Eye Vision: Impaired, Wears Glasses  Hearing Impairment : Yes  Hearing Impairment: Left Ear  Does Pt Need Special Equipment for the Hearing Impaired?: No    Values / Beliefs / Concerns  Spiritual Requests During Hospitalization: No         Domestic Abuse  Have you ever been the victim of abuse or violence?: No  Physical Abuse or Sexual Abuse: No  Verbal Abuse or Emotional Abuse: No  Possible Abuse Reported to:: Not Applicable    Psychological Assessment  History of Substance Abuse: (P) Alcohol, Methamphetamine

## 2019-03-19 NOTE — RESPIRATORY CARE
COPD EDUCATION by COPD CLINICAL EDUCATOR  3/19/2019 at 6:48 AM by Shhaana Chau     Patient reviewed by COPD education team. Patient does not qualify for COPD program.

## 2019-03-19 NOTE — DISCHARGE SUMMARY
PRIMARY DISCHARGE DIAGNOSIS: Status post transcatheter aortic valve replacement.    PROCEDURES:    1. Successful transcatheter aortic valve replacement (TAVR) with # 26 with 2 additional mls of volume, Edward Lonnie 3 valve, transfemoral approach under general anesthesia on 3/18/19  2. Intraoperative transesophageal echocardiogram showing Intraoperative JAYLYN during TAVR.  Baseline images show normal   biventricular systolic function. Left ventricular ejection fraction is visually estimated to be 55%.  Calcified aortic valve leaflets. Severe aortic stenosis. Severe aortic insufficiency. Post deployment images   show improved LV systolic function with EF closer to  45%.  A 26 mm Nathan Lonnie 3 TAVR valve is seen in the aortic position with normal   leaflet motion, no significant paravalvular leak, mean gradient 4mm Hg, and calculated effective orifice area of 3.2cm2.  Findings communicated at the time of exam.   3. Echocardiogram on 3/19/19 showing Left ventricular systolic function is low normal.  Moderate concentric left ventricular hypertrophy.  Severely dilated left atrium. Known TAVR aortic valve that is functioning normally with normal transvalvular gradients.  Transvalvular gradients are - Peak: 15 mmHg,  Mean: 9 mmHg. Compared to the images of the study done 12/30/18 - there has been   interval placement of a bioprosthetic aortic valve, which is   functioning normally (previously severe aortic stenosis).   4. CXR on 3/19/19 showing 1.  No acute cardiopulmonary disease. 2.  Loculated lucency at the left lung base, appearance suggests pulmonary blebs or loculated stable pneumothorax. 3.  Cardiomegaly  5. EKG on 3/19/19 showing NSR    HOSPITAL COURSE: The patient is a pleasant 63 year old male with severe symptomatic aortic stenosis, history methamphetamine abuse, and hepatitis C. Due to the patient's symptoms, the patient underwent successful TAVR described as above. Post-procedure, the patient did well. They  did not require IV diuresis during their stay and will continue on oral diuretics post-operatively. They were able to ambulate without difficulty.  No further events were noted during their stay. They are now off oxygen and are to be discharged to [facility] homeless shelter with his friend .    DISCHARGE MEDICATIONS:      Sylvain Espinosa   Home Medication Instructions JESSY:[id number]    Printed on:03/19/19 1825   Medication Information                      amLODIPine (NORVASC) 2.5 MG Tab  Take 1 Tab by mouth every day.             aspirin EC (ECOTRIN) 81 MG Tablet Delayed Response  Take 81 mg by mouth every day.             atorvastatin (LIPITOR) 20 MG Tab  Take 20 mg by mouth every day.             clopidogrel (PLAVIX) 75 MG Tab  Take 1 Tab by mouth every day.             Cyanocobalamin (B-12) 2500 MCG Tab  Take 1 Tab by mouth every day.             ferrous sulfate 325 (65 Fe) MG tablet  Take 1 Tab by mouth every day for 30 days.             furosemide (LASIX) 20 MG Tab  Take 1 Tab by mouth every day for 30 days.             Multiple Vitamins-Minerals (CENTRUM SILVER 50+MEN) Tab  Take 1 Tab by mouth every day.             potassium chloride SA (K-DUR) 10 MEQ Tab CR  Take 1 Tab by mouth every day.               New prescription of plavix cost patient at [facility] $4.     DISCHARGE INSTRUCTIONS: They are given discharge instructions on potential post-operative complications and symptoms to watch out for. Their groin sites were checked and were clean, dry, and intact. Patient or family to notify us for any complications noted on the discharge instructions. They will follow up with myself, Pavan RODRIGUEZ, on Friday in our cardiology office. They will then follow up with Dr. Moser with a repeat echocardiogram in one month for post TAVR assessment.

## 2019-03-19 NOTE — PROGRESS NOTES
Called MTM for patient to get a ride to the pharmacy and the [facility]'s shelter. Pt aware and the ride will be here at approximately 245 at [facility]

## 2019-03-19 NOTE — PROGRESS NOTES
Cardiology Follow Up Progress Note    Date of Service  3/19/2019    Attending Physician  Dariel Moser M.D.    Chief Complaint   Status post TAVR.    HPI  Sylvain Espinosa is a 63 y.o. male admitted 3/18/2019 with above.    Interim Events  No significant changes noted from cardiac standpoint within the past 24 hours.    Review of Systems  Review of Systems   Constitutional: Negative.  Negative for chills and fever.   HENT: Negative.  Negative for hearing loss.    Eyes: Negative.    Respiratory: Negative.  Negative for cough and shortness of breath.    Cardiovascular: Negative.  Negative for chest pain, palpitations and leg swelling.   Gastrointestinal: Negative.  Negative for abdominal pain, nausea and vomiting.   Genitourinary: Negative.  Negative for dysuria and urgency.   Musculoskeletal: Negative.  Negative for myalgias.   Skin: Negative.  Negative for rash.   Neurological: Negative.  Negative for dizziness, weakness and headaches.   Hematological: Does not bruise/bleed easily.   Psychiatric/Behavioral: Negative.  The patient is not nervous/anxious.        Vital signs in last 24 hours  Temp:  [36.2 °C (97.1 °F)-37.7 °C (99.9 °F)] 37 °C (98.6 °F)  Pulse:  [] 80  Resp:  [15-23] 16  BP: (122-151)/(65-90) 144/90  SpO2:  [92 %-100 %] 98 %    Physical Exam  Physical Exam   Constitutional: He is oriented to person, place, and time. He appears well-developed and well-nourished.   HENT:   Head: Normocephalic and atraumatic.   Eyes: Pupils are equal, round, and reactive to light. Conjunctivae are normal.   Neck: Normal range of motion. Neck supple.   Cardiovascular: Normal rate and regular rhythm.    Pulmonary/Chest: Effort normal and breath sounds normal.   Abdominal: Soft. Bowel sounds are normal.   Musculoskeletal: Normal range of motion. He exhibits no edema.   Cath sites are within normal limits.   Neurological: He is alert and oriented to person, place, and time.   Skin: Skin is warm and dry.   Psychiatric: He  has a normal mood and affect.       Lab Review  Lab Results   Component Value Date/Time    WBC 9.6 03/19/2019 02:21 AM    RBC 4.24 (L) 03/19/2019 02:21 AM    HEMOGLOBIN 12.1 (L) 03/19/2019 02:21 AM    HEMATOCRIT 36.9 (L) 03/19/2019 02:21 AM    MCV 87.0 03/19/2019 02:21 AM    MCH 28.5 03/19/2019 02:21 AM    MCHC 32.8 (L) 03/19/2019 02:21 AM    MPV 10.1 03/19/2019 02:21 AM      Lab Results   Component Value Date/Time    SODIUM 136 03/19/2019 02:21 AM    POTASSIUM 4.3 03/19/2019 02:21 AM    CHLORIDE 103 03/19/2019 02:21 AM    CO2 27 03/19/2019 02:21 AM    GLUCOSE 92 03/19/2019 02:21 AM    BUN 22 03/19/2019 02:21 AM    CREATININE 1.07 03/19/2019 02:21 AM      Lab Results   Component Value Date/Time    ASTSGOT 28 03/19/2019 02:21 AM    ALTSGPT 21 03/19/2019 02:21 AM     Lab Results   Component Value Date/Time    CHOLSTRLTOT 129 01/15/2015 11:37 PM    LDL 74 01/15/2015 11:37 PM    HDL 30 (A) 01/15/2015 11:37 PM    TRIGLYCERIDE 124 01/15/2015 11:37 PM    TROPONINI 0.07 (H) 03/15/2019 01:48 AM       Recent Labs      03/18/19   1205  03/19/19   0221   BNPBTYPENAT  668*  311*     Medications reviewed.      Current Facility-Administered Medications:   •  hydrALAZINE (APRESOLINE) injection 10 mg, 10 mg, Intravenous, Q30 MIN PRN, Redet Jose  •  acetaminophen (TYLENOL) tablet 650 mg, 650 mg, Oral, Q6HRS PRN, Redet Jose  •  diphenhydrAMINE (BENADRYL) tablet/capsule 25 mg, 25 mg, Oral, HS PRN, Redet Jose  •  ondansetron (ZOFRAN) syringe/vial injection 4 mg, 4 mg, Intravenous, Q4HRS PRN, Redet Jose  •  diphenhydrAMINE (BENADRYL) injection 25 mg, 25 mg, Intravenous, Q6HRS PRN, Redet Jose  •  [COMPLETED] clopidogrel (PLAVIX) tablet 300 mg, 300 mg, Oral, Once, 300 mg at 03/18/19 1330 **AND** clopidogrel (PLAVIX) tablet 75 mg, 75 mg, Oral, DAILY, Redet Jose, 75 mg at 03/19/19 0547  •  aspirin EC (ECOTRIN) tablet 81 mg, 81 mg, Oral, DAILY, Redet Jose, 81 mg at 03/19/19 0547  •  amLODIPine (NORVASC) tablet 2.5 mg, 2.5  mg, Oral, DAILY, Redet Jose, 2.5 mg at 03/19/19 0547  •  atorvastatin (LIPITOR) tablet 20 mg, 20 mg, Oral, DAILY, Redet Jose, 20 mg at 03/19/19 0547  •  ferrous sulfate tablet 325 mg, 325 mg, Oral, DAILY, Redet Jose, 325 mg at 03/19/19 0547  •  furosemide (LASIX) tablet 20 mg, 20 mg, Oral, DAILY, Redet Jose, 20 mg at 03/19/19 0547      Cardiac Imaging and Procedures Review  CARDIAC STUDIES/PROCEDURES:     CARDIAC CATHETERIZATION CONCLUSIONS (01/01/19)  1.  Severe aortic stenosis with peak to peak gradient of 80 mmHg, mean   gradient of 61 mmHg, calculated TAYA of 0.48 cm2.  2.  No angiographic evidence of coronary artery disease.  3.  Mildly reduced left ventricular systolic function with ejection fraction of 55%.  4.  Elevated left ventricular end diastolic pressure.  5.  Elevated right heart pressure with PA systolic pressure of 65 mmHg.  6.  Mildly dilated ascending aorta.  7.  Ectatic and diffusely atherosclerotic distal abdominal aorta with   bilateral iliofemoral system, moderate in caliber, bilateral nonobstructive   stenosis with tortuosity left greater than right.     CAROTID ULTRASOUND (01/03/19)  1.  Less than 50% stenosis in the right carotid bifurcation with no    evidence of internal carotid arterial stenosis.  2.  Normal left carotid duplex examination.  3.  Antegrade bilateral vertebral arterial flow.     CTA OF CHEST AND ABDOMEN (01/04/15)  1.  Mean aortic valvular area 584 sq mm  2.  The coronary arteries originate just greater than 10 mm above the aortic annulus  3.  Minimal diameter right external iliac artery 6.1 mm with minimal tortuosity  4.  Minimal diameter left external iliac artery 5.7 mm with moderate tortuosity  5.  3.7 x 3.5 cm infrarenal abdominal aortic aneurysm  6.  Hepatic fatty infiltration and moderate hepatomegaly  7.  2.6 cm benign cavernous hemangioma within the right lobe of the liver.    ECHOCARDIOGRAM CONCLUSIONS (03/19/19)  Results pending.  (study result  reviewed)     ECHOCARDIOGRAM CONCLUSIONS (12/30/18)  Moderate concentric left ventricular hypertrophy.  Left ventricular systolic function is low normal.  Severe aortic stenosis.  Transvalvular gradients are - Peak: 98 mmHg, Mean: 56 mmHg.  Estimated right ventricular systolic pressure  is 55 mmHg.  Compared to the images of the study done on 7/19/17, previous severe   aortic stenosis is now critical aortic stenosis and previously normal   left ventricular ejection fraction is now mildly reduced.     EKG performed on (03/19/19) was reviewed: EKG shows sinus rhythm with left ventricular hypertrophy.  EKG performed on (03/15/19) EKG shows sinus rhythm with left ventricular hypertrophy.  EKG performed on (12/29/18) EKG shows sinus tachycardia with Left ventricular hypertrophy.    TRANSESOPHAGEAL ECHOCARDIOGRAM CONCLUSIONS by Dr. Funk (03/18/19)  Intraoperative JAYLYN during TAVR.  Baseline images show normal   biventricular systolic function. Left ventricular ejection fraction is   visually estimated to be 55%.  Calcified aortic valve leaflets. Severe   aortic stenosis. Severe aortic insufficiency. Post deployment images   show improved LV systolic function with EF closer to  45%.  A 26 mm   Nathan Lonnie 3 TAVR valve is seen in the aortic position with normal   leaflet motion, no significant paravalvular leak, mean gradient 4mm Hg,   and calculated effective orifice area of 3.2cm2.  Findings   communicated at the time of exam.  (study result reviewed)    Assessment/Plan  1. Successful transcatheter aortic valve replacement (TAVR) with # 26 Nathan Lonnie S3 valve with 2 additional mls of volume, transfemoral approach, under general anesthesia (03/18/19): He is clinically doing well. We will repeat an echocardiogram. He will be discharged to home today.  2. Methamphetamine use: He has been drug free and he has completed his drug use treatment and assessment.     CC Mitzi Trivedi and Jocelyne Paredes  PA    Thank you for allowing me to participate in the care of this patient.  I will continue to follow this patient    Please contact me with any questions.    Dariel Moser M.D.   Cardiologist, [facility]  [phone]

## 2019-03-22 ENCOUNTER — OFFICE VISIT (OUTPATIENT)
Dept: CARDIOLOGY | Facility: MEDICAL CENTER | Age: 64
End: 2019-03-22
Payer: MEDICAID

## 2019-03-22 VITALS
BODY MASS INDEX: 19.51 KG/M2 | WEIGHT: 128.75 LBS | SYSTOLIC BLOOD PRESSURE: 142 MMHG | OXYGEN SATURATION: 93 % | DIASTOLIC BLOOD PRESSURE: 68 MMHG | HEIGHT: 68 IN | HEART RATE: 98 BPM

## 2019-03-22 DIAGNOSIS — I10 ESSENTIAL HYPERTENSION: Chronic | ICD-10-CM

## 2019-03-22 DIAGNOSIS — Z95.2 S/P TAVR (TRANSCATHETER AORTIC VALVE REPLACEMENT): ICD-10-CM

## 2019-03-22 DIAGNOSIS — F15.10 METHAMPHETAMINE ABUSE (HCC): ICD-10-CM

## 2019-03-22 LAB — EKG IMPRESSION: NORMAL

## 2019-03-22 PROCEDURE — 93000 ELECTROCARDIOGRAM COMPLETE: CPT | Performed by: INTERNAL MEDICINE

## 2019-03-22 PROCEDURE — 99214 OFFICE O/P EST MOD 30 MIN: CPT | Performed by: NURSE PRACTITIONER

## 2019-03-22 RX ORDER — FUROSEMIDE 20 MG/1
20 TABLET ORAL 2 TIMES DAILY
Qty: 60 TAB | Refills: 3 | Status: SHIPPED | OUTPATIENT
Start: 2019-03-22 | End: 2019-03-22 | Stop reason: SDUPTHER

## 2019-03-22 RX ORDER — FUROSEMIDE 20 MG/1
20 TABLET ORAL DAILY
Qty: 30 TAB | Refills: 3 | Status: SHIPPED | OUTPATIENT
Start: 2019-03-22 | End: 2019-10-10

## 2019-03-22 ASSESSMENT — ENCOUNTER SYMPTOMS
DIZZINESS: 0
LOSS OF CONSCIOUSNESS: 0
WHEEZING: 0
DOUBLE VISION: 0
WEAKNESS: 0
ORTHOPNEA: 0
PALPITATIONS: 0
COUGH: 0
SHORTNESS OF BREATH: 0
BLURRED VISION: 0
FOCAL WEAKNESS: 0
FALLS: 0
HEADACHES: 0

## 2019-03-22 NOTE — LETTER
"     [facility]   [address]  [place] [address]  Phone: [phone]  Fax: [phone]              Sylvain Espinosa  1955    Encounter Date: 3/22/2019    Pavan Nghome          PROGRESS NOTE:  Chief Complaint   Patient presents with   • Aortic Stenosis       Subjective:   Sylvain Espinosa is a 63 y.o. male who presents today for hospital follow up after TAVR.     He is patient of Dr. Mckeon in our clinic, HX:  severe symptomatic aortic stenosis, history methamphetamine abuse, and hepatitis C.     Patient underwent Successful transcatheter aortic valve replacement (TAVR) with # 26 Nathan Lonnie S3 valve with 2 additional mls of volume, transfemoral approach, under general anesthesia (03/18/19).  Postoperatively patient did well.  Echo showed normal function with normal transvalvular gradient of the known TAVR aortic valve.    He has had no episodes of chest pain, palpitations, dizziness/lightheadedness, shortness of breath, orthopnea, or peripheral edema. He is off oxygen and sating 93%.     Past Medical History:   Diagnosis Date   • Abnormality of left ventricle of heart     \"constricted left ventricle\"    • Aortic stenosis    • BPH (benign prostatic hyperplasia)    • Hypercholesteremia    • Hypertension      Past Surgical History:   Procedure Laterality Date   • TRANSCATHETER AORTIC VALVE REPLACEMENT N/A 3/18/2019    Procedure: REPLACEMENT, AORTIC VALVE, TRANSCATHETER;  Surgeon: Dariel Moser M.D.;  Location: SURGERY [facility];  Service: Cardiac   • JAYLYN  3/18/2019    Procedure: ECHOCARDIOGRAM, TRANSESOPHAGEAL;  Surgeon: Dariel Moser M.D.;  Location: SURGERY [facility];  Service: Cardiac   • TONSILLECTOMY     • ZZZ CARDIAC CATH       Family History   Problem Relation Age of Onset   • Heart Disease Mother      Social History     Social History   • Marital status: Single     Spouse name: N/A   • Number of children: N/A   • Years of education: N/A  "     Occupational History   • Not on file.     Social History Main Topics   • Smoking status: Former Smoker     Packs/day: 0.50     Years: 40.00     Types: Cigarettes   • Smokeless tobacco: Never Used      Comment: 10 months   • Alcohol use Yes      Comment: occ   • Drug use: Yes     Types: Inhaled, Marijuana      Comment: marijuana current,  meth last use 1 monht ago   • Sexual activity: Not on file     Other Topics Concern   • Not on file     Social History Narrative   • No narrative on file     No Known Allergies  Outpatient Encounter Prescriptions as of 3/22/2019   Medication Sig Dispense Refill   • furosemide (LASIX) 20 MG Tab Take 1 Tab by mouth every day. 30 Tab 3   • clopidogrel (PLAVIX) 75 MG Tab Take 1 Tab by mouth every day. 30 Tab 2   • aspirin EC (ECOTRIN) 81 MG Tablet Delayed Response Take 81 mg by mouth every day.     • atorvastatin (LIPITOR) 20 MG Tab Take 20 mg by mouth every day.     • Cyanocobalamin (B-12) 2500 MCG Tab Take 1 Tab by mouth every day.     • Multiple Vitamins-Minerals (CENTRUM SILVER 50+MEN) Tab Take 1 Tab by mouth every day.     • ferrous sulfate 325 (65 Fe) MG tablet Take 1 Tab by mouth every day for 30 days. 30 Tab 0   • amLODIPine (NORVASC) 2.5 MG Tab Take 1 Tab by mouth every day. 30 Tab 3   • potassium chloride SA (K-DUR) 10 MEQ Tab CR Take 1 Tab by mouth every day. 30 Tab 3   • [DISCONTINUED] furosemide (LASIX) 20 MG Tab Take 1 Tab by mouth 2 times a day. 60 Tab 3   • [DISCONTINUED] furosemide (LASIX) 20 MG Tab Take 1 Tab by mouth every day for 30 days. 30 Tab 0     No facility-administered encounter medications on file as of 3/22/2019.      Review of Systems   Constitutional: Negative for malaise/fatigue.   Eyes: Negative for blurred vision and double vision.   Respiratory: Negative for cough, shortness of breath and wheezing.    Cardiovascular: Negative for chest pain, palpitations, orthopnea and leg swelling.   Musculoskeletal: Negative for falls.   Neurological: Negative  "for dizziness, focal weakness, loss of consciousness, weakness and headaches.   All other systems reviewed and are negative.       Objective:   /68 (BP Location: Left arm, Patient Position: Sitting, BP Cuff Size: Adult)   Pulse 98   Ht 1.727 m (5' 8\")   Wt 58.4 kg (128 lb 12 oz)   SpO2 93%   BMI 19.58 kg/m²      Physical Exam   Constitutional: He is oriented to person, place, and time. He appears well-developed and well-nourished. No distress.   HENT:   Head: Normocephalic and atraumatic.   Eyes: Pupils are equal, round, and reactive to light.   Neck: No JVD present.   Cardiovascular: Normal rate and regular rhythm.    Murmur heard.  Pulmonary/Chest: Effort normal and breath sounds normal.   Abdominal: Soft. Bowel sounds are normal. He exhibits no distension.   Musculoskeletal: He exhibits no edema.   Neurological: He is alert and oriented to person, place, and time.   Skin: Skin is warm and dry. He is not diaphoretic.   Psychiatric: He has a normal mood and affect. His behavior is normal. Judgment and thought content normal.       Intraoperative transesophageal echocardiogram  3/18/19  showing Intraoperative JAYLYN during TAVR.  Baseline images show normal   biventricular systolic function. Left ventricular ejection fraction is visually estimated to be 55%.  Calcified aortic valve leaflets. Severe aortic stenosis. Severe aortic insufficiency. Post deployment images   show improved LV systolic function with EF closer to  45%.  A 26 mm Nathan Lonnie 3 TAVR valve is seen in the aortic position with normal   leaflet motion, no significant paravalvular leak, mean gradient 4mm Hg, and calculated effective orifice area of 3.2cm2.  Findings communicated at the time of exam.      Echocardiogram  3/19/19   showing Left ventricular systolic function is low normal.  Moderate concentric left ventricular hypertrophy.  Severely dilated left atrium. Known TAVR aortic valve that is functioning normally with normal " transvalvular gradients.Transvalvular gradients are - Peak: 15 mmHg,  Mean: 9 mmHg. Compared to the images of the study done 12/30/18 - there has been   interval placement of a bioprosthetic aortic valve, which is   functioning normally (previously severe aortic stenosis).     CARDIAC CATHETERIZATION CONCLUSIONS (01/01/19)  1.  Severe aortic stenosis with peak to peak gradient of 80 mmHg, mean   gradient of 61 mmHg, calculated TAYA of 0.48 cm2.  2.  No angiographic evidence of coronary artery disease.  3.  Mildly reduced left ventricular systolic function with ejection fraction of 55%.  4.  Elevated left ventricular end diastolic pressure.  5.  Elevated right heart pressure with PA systolic pressure of 65 mmHg.  6.  Mildly dilated ascending aorta.  7.  Ectatic and diffusely atherosclerotic distal abdominal aorta with   bilateral iliofemoral system, moderate in caliber, bilateral nonobstructive   stenosis with tortuosity left greater than right.     CAROTID ULTRASOUND (01/03/19)  1.  Less than 50% stenosis in the right carotid bifurcation with no    evidence of internal carotid arterial stenosis.  2.  Normal left carotid duplex examination.  3.  Antegrade bilateral vertebral arterial flow.     CTA OF CHEST AND ABDOMEN (01/04/15)  1.  Mean aortic valvular area 584 sq mm  2.  The coronary arteries originate just greater than 10 mm above the aortic annulus  3.  Minimal diameter right external iliac artery 6.1 mm with minimal tortuosity  4.  Minimal diameter left external iliac artery 5.7 mm with moderate tortuosity  5.  3.7 x 3.5 cm infrarenal abdominal aortic aneurysm  6.  Hepatic fatty infiltration and moderate hepatomegaly  7.  2.6 cm benign cavernous hemangioma within the right lobe of the liver.    Assessment:     1. S/P TAVR (transcatheter aortic valve replacement)  EKG   2. Methamphetamine abuse (HCC)     3. Essential hypertension  furosemide (LASIX) 20 MG Tab    DISCONTINUED: furosemide (LASIX) 20 MG Tab              Medical Decision Making:  Today's Assessment / Status / Plan:     1. S/p TAVR:  - patient doing well. Continue asa and plavix 75mg qd for 3 monthgs   - incision sites are clean, dry, and intact. Pulse 2+ bilateral.  - EKG today showed NSR  - no edema  - repeat ECHO in 1 month  - prophylactic antibiotics prior to any dental care: card provided to the patient and explained  - Cardiac Rehab consult placed  - 1 month follow up appointment with Dr. Moser  - 3 months follow up appointment with Dr. Mckeon    2. Meth abuse :  - sober     3. Hypertension:  - slightly elevated today due to rushing to the clinic due to late ride.    Follow up with Dr. Moser in a month, ECHO prior to next appointment\    Collaborating Provider: Dr. Shanti Jansen, P.A.  [address] [address]  VIA Facsimile: [phone]

## 2019-03-22 NOTE — PROGRESS NOTES
"Chief Complaint   Patient presents with   • Aortic Stenosis       Subjective:   Sylvain Espinosa is a 63 y.o. male who presents today for hospital follow up after TAVR.     He is patient of Dr. Mckeon in our clinic, HX:  severe symptomatic aortic stenosis, history methamphetamine abuse, and hepatitis C.     Patient underwent Successful transcatheter aortic valve replacement (TAVR) with # 26 Nathan Lonnie S3 valve with 2 additional mls of volume, transfemoral approach, under general anesthesia (03/18/19).  Postoperatively patient did well.  Echo showed normal function with normal transvalvular gradient of the known TAVR aortic valve.    He has had no episodes of chest pain, palpitations, dizziness/lightheadedness, shortness of breath, orthopnea, or peripheral edema. He is off oxygen and sating 93%.     Past Medical History:   Diagnosis Date   • Abnormality of left ventricle of heart     \"constricted left ventricle\"    • Aortic stenosis    • BPH (benign prostatic hyperplasia)    • Hypercholesteremia    • Hypertension      Past Surgical History:   Procedure Laterality Date   • TRANSCATHETER AORTIC VALVE REPLACEMENT N/A 3/18/2019    Procedure: REPLACEMENT, AORTIC VALVE, TRANSCATHETER;  Surgeon: Dariel Moser M.D.;  Location: SURGERY [facility];  Service: Cardiac   • JAYLYN  3/18/2019    Procedure: ECHOCARDIOGRAM, TRANSESOPHAGEAL;  Surgeon: Dariel Moser M.D.;  Location: SURGERY [facility];  Service: Cardiac   • TONSILLECTOMY     • ZZZ CARDIAC CATH       Family History   Problem Relation Age of Onset   • Heart Disease Mother      Social History     Social History   • Marital status: Single     Spouse name: N/A   • Number of children: N/A   • Years of education: N/A     Occupational History   • Not on file.     Social History Main Topics   • Smoking status: Former Smoker     Packs/day: 0.50     Years: 40.00     Types: Cigarettes   • Smokeless tobacco: Never Used      Comment: 10 months   • Alcohol use Yes      " Comment: occ   • Drug use: Yes     Types: Inhaled, Marijuana      Comment: marijuana current,  meth last use 1 monht ago   • Sexual activity: Not on file     Other Topics Concern   • Not on file     Social History Narrative   • No narrative on file     No Known Allergies  Outpatient Encounter Prescriptions as of 3/22/2019   Medication Sig Dispense Refill   • furosemide (LASIX) 20 MG Tab Take 1 Tab by mouth every day. 30 Tab 3   • clopidogrel (PLAVIX) 75 MG Tab Take 1 Tab by mouth every day. 30 Tab 2   • aspirin EC (ECOTRIN) 81 MG Tablet Delayed Response Take 81 mg by mouth every day.     • atorvastatin (LIPITOR) 20 MG Tab Take 20 mg by mouth every day.     • Cyanocobalamin (B-12) 2500 MCG Tab Take 1 Tab by mouth every day.     • Multiple Vitamins-Minerals (CENTRUM SILVER 50+MEN) Tab Take 1 Tab by mouth every day.     • ferrous sulfate 325 (65 Fe) MG tablet Take 1 Tab by mouth every day for 30 days. 30 Tab 0   • amLODIPine (NORVASC) 2.5 MG Tab Take 1 Tab by mouth every day. 30 Tab 3   • potassium chloride SA (K-DUR) 10 MEQ Tab CR Take 1 Tab by mouth every day. 30 Tab 3   • [DISCONTINUED] furosemide (LASIX) 20 MG Tab Take 1 Tab by mouth 2 times a day. 60 Tab 3   • [DISCONTINUED] furosemide (LASIX) 20 MG Tab Take 1 Tab by mouth every day for 30 days. 30 Tab 0     No facility-administered encounter medications on file as of 3/22/2019.      Review of Systems   Constitutional: Negative for malaise/fatigue.   Eyes: Negative for blurred vision and double vision.   Respiratory: Negative for cough, shortness of breath and wheezing.    Cardiovascular: Negative for chest pain, palpitations, orthopnea and leg swelling.   Musculoskeletal: Negative for falls.   Neurological: Negative for dizziness, focal weakness, loss of consciousness, weakness and headaches.   All other systems reviewed and are negative.       Objective:   /68 (BP Location: Left arm, Patient Position: Sitting, BP Cuff Size: Adult)   Pulse 98   Ht 1.727 m  "(5' 8\")   Wt 58.4 kg (128 lb 12 oz)   SpO2 93%   BMI 19.58 kg/m²     Physical Exam   Constitutional: He is oriented to person, place, and time. He appears well-developed and well-nourished. No distress.   HENT:   Head: Normocephalic and atraumatic.   Eyes: Pupils are equal, round, and reactive to light.   Neck: No JVD present.   Cardiovascular: Normal rate and regular rhythm.    Murmur heard.  Pulmonary/Chest: Effort normal and breath sounds normal.   Abdominal: Soft. Bowel sounds are normal. He exhibits no distension.   Musculoskeletal: He exhibits no edema.   Neurological: He is alert and oriented to person, place, and time.   Skin: Skin is warm and dry. He is not diaphoretic.   Psychiatric: He has a normal mood and affect. His behavior is normal. Judgment and thought content normal.       Intraoperative transesophageal echocardiogram  3/18/19  showing Intraoperative JAYLYN during TAVR.  Baseline images show normal   biventricular systolic function. Left ventricular ejection fraction is visually estimated to be 55%.  Calcified aortic valve leaflets. Severe aortic stenosis. Severe aortic insufficiency. Post deployment images   show improved LV systolic function with EF closer to  45%.  A 26 mm Nathan Lonnie 3 TAVR valve is seen in the aortic position with normal   leaflet motion, no significant paravalvular leak, mean gradient 4mm Hg, and calculated effective orifice area of 3.2cm2.  Findings communicated at the time of exam.      Echocardiogram  3/19/19   showing Left ventricular systolic function is low normal.  Moderate concentric left ventricular hypertrophy.  Severely dilated left atrium. Known TAVR aortic valve that is functioning normally with normal transvalvular gradients.Transvalvular gradients are - Peak: 15 mmHg,  Mean: 9 mmHg. Compared to the images of the study done 12/30/18 - there has been   interval placement of a bioprosthetic aortic valve, which is   functioning normally (previously severe " aortic stenosis).     CARDIAC CATHETERIZATION CONCLUSIONS (01/01/19)  1.  Severe aortic stenosis with peak to peak gradient of 80 mmHg, mean   gradient of 61 mmHg, calculated TAYA of 0.48 cm2.  2.  No angiographic evidence of coronary artery disease.  3.  Mildly reduced left ventricular systolic function with ejection fraction of 55%.  4.  Elevated left ventricular end diastolic pressure.  5.  Elevated right heart pressure with PA systolic pressure of 65 mmHg.  6.  Mildly dilated ascending aorta.  7.  Ectatic and diffusely atherosclerotic distal abdominal aorta with   bilateral iliofemoral system, moderate in caliber, bilateral nonobstructive   stenosis with tortuosity left greater than right.     CAROTID ULTRASOUND (01/03/19)  1.  Less than 50% stenosis in the right carotid bifurcation with no    evidence of internal carotid arterial stenosis.  2.  Normal left carotid duplex examination.  3.  Antegrade bilateral vertebral arterial flow.     CTA OF CHEST AND ABDOMEN (01/04/15)  1.  Mean aortic valvular area 584 sq mm  2.  The coronary arteries originate just greater than 10 mm above the aortic annulus  3.  Minimal diameter right external iliac artery 6.1 mm with minimal tortuosity  4.  Minimal diameter left external iliac artery 5.7 mm with moderate tortuosity  5.  3.7 x 3.5 cm infrarenal abdominal aortic aneurysm  6.  Hepatic fatty infiltration and moderate hepatomegaly  7.  2.6 cm benign cavernous hemangioma within the right lobe of the liver.    Assessment:     1. S/P TAVR (transcatheter aortic valve replacement)  EKG   2. Methamphetamine abuse (HCC)     3. Essential hypertension  furosemide (LASIX) 20 MG Tab    DISCONTINUED: furosemide (LASIX) 20 MG Tab       Medical Decision Making:  Today's Assessment / Status / Plan:     1. S/p TAVR:  - patient doing well. Continue asa and plavix 75mg qd for 3 monthgs   - incision sites are clean, dry, and intact. Pulse 2+ bilateral.  - EKG today showed NSR  - no edema  -  repeat ECHO in 1 month  - prophylactic antibiotics prior to any dental care: card provided to the patient and explained  - Cardiac Rehab consult placed  - 1 month follow up appointment with Dr. Moser  - 3 months follow up appointment with Dr. Mckeon    2. Meth abuse :  - sober     3. Hypertension:  - slightly elevated today due to rushing to the clinic due to late ride.    Follow up with Dr. Moser in a month, ECHO prior to next appointment\    Collaborating Provider: Dr. Moser

## 2019-04-12 ENCOUNTER — HOSPITAL ENCOUNTER (INPATIENT)
Dept: HOSPITAL 8 - ED | Age: 64
LOS: 12 days | Discharge: NURSING, REHAB OR LONG-TERM CARE | DRG: 853 | End: 2019-04-24
Attending: FAMILY MEDICINE | Admitting: FAMILY MEDICINE
Payer: MEDICAID

## 2019-04-12 VITALS — WEIGHT: 131.4 LBS | BODY MASS INDEX: 20.62 KG/M2 | HEIGHT: 67 IN

## 2019-04-12 DIAGNOSIS — J96.21: ICD-10-CM

## 2019-04-12 DIAGNOSIS — K05.10: ICD-10-CM

## 2019-04-12 DIAGNOSIS — B18.2: ICD-10-CM
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
## 2019-04-12 DIAGNOSIS — Z59.0: ICD-10-CM

## 2019-04-12 DIAGNOSIS — T82.6XXA: ICD-10-CM

## 2019-04-12 DIAGNOSIS — A40.8: Primary | ICD-10-CM

## 2019-04-12 DIAGNOSIS — D64.9: ICD-10-CM

## 2019-04-12 DIAGNOSIS — K02.9: ICD-10-CM

## 2019-04-12 DIAGNOSIS — Z95.3: ICD-10-CM
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
## 2019-04-12 DIAGNOSIS — I38: ICD-10-CM

## 2019-04-12 DIAGNOSIS — K04.7: ICD-10-CM

## 2019-04-12 DIAGNOSIS — J44.1: ICD-10-CM

## 2019-04-12 DIAGNOSIS — I10: ICD-10-CM

## 2019-04-12 DIAGNOSIS — J18.9: ICD-10-CM

## 2019-04-12 DIAGNOSIS — J44.0: ICD-10-CM

## 2019-04-12 DIAGNOSIS — N40.0: ICD-10-CM

## 2019-04-12 DIAGNOSIS — I25.10: ICD-10-CM

## 2019-04-12 DIAGNOSIS — Z87.01: ICD-10-CM

## 2019-04-12 LAB
ALBUMIN SERPL-MCNC: 2.6 G/DL (ref 3.4–5)
ALP SERPL-CCNC: 86 U/L (ref 45–117)
ALT SERPL-CCNC: 23 U/L (ref 12–78)
ANION GAP SERPL CALC-SCNC: 6 MMOL/L (ref 5–15)
BASOPHILS # BLD AUTO: 0.04 X10^3/UL (ref 0–0.1)
BASOPHILS NFR BLD AUTO: 0 % (ref 0–1)
BILIRUB SERPL-MCNC: 0.2 MG/DL (ref 0.2–1)
CALCIUM SERPL-MCNC: 8.3 MG/DL (ref 8.5–10.1)
CHLORIDE SERPL-SCNC: 100 MMOL/L (ref 98–107)
CREAT SERPL-MCNC: 0.98 MG/DL (ref 0.7–1.3)
EOSINOPHIL # BLD AUTO: 0.06 X10^3/UL (ref 0–0.4)
EOSINOPHIL NFR BLD AUTO: 0 % (ref 1–7)
ERYTHROCYTE [DISTWIDTH] IN BLOOD BY AUTOMATED COUNT: 16.2 % (ref 9.4–14.8)
LYMPHOCYTES # BLD AUTO: 1.19 X10^3/UL (ref 1–3.4)
LYMPHOCYTES NFR BLD AUTO: 9 % (ref 22–44)
MCH RBC QN AUTO: 28.3 PG (ref 27.5–34.5)
MCHC RBC AUTO-ENTMCNC: 33.8 G/DL (ref 33.2–36.2)
MCV RBC AUTO: 83.9 FL (ref 81–97)
MD: NO
MONOCYTES # BLD AUTO: 1.14 X10^3/UL (ref 0.2–0.8)
MONOCYTES NFR BLD AUTO: 9 % (ref 2–9)
NEUTROPHILS # BLD AUTO: 10.96 X10^3/UL (ref 1.8–6.8)
NEUTROPHILS NFR BLD AUTO: 82 % (ref 42–75)
PLATELET # BLD AUTO: 267 X10^3/UL (ref 130–400)
PMV BLD AUTO: 7.7 FL (ref 7.4–10.4)
PROT SERPL-MCNC: 7.7 G/DL (ref 6.4–8.2)
RBC # BLD AUTO: 3.47 X10^6/UL (ref 4.38–5.82)
TROPONIN I SERPL-MCNC: 0.03 NG/ML (ref 0–0.04)

## 2019-04-12 PROCEDURE — 70100 X-RAY EXAM OF JAW <4VIEWS: CPT

## 2019-04-12 PROCEDURE — 85651 RBC SED RATE NONAUTOMATED: CPT

## 2019-04-12 PROCEDURE — 85610 PROTHROMBIN TIME: CPT

## 2019-04-12 PROCEDURE — 85379 FIBRIN DEGRADATION QUANT: CPT

## 2019-04-12 PROCEDURE — 76705 ECHO EXAM OF ABDOMEN: CPT

## 2019-04-12 PROCEDURE — 85025 COMPLETE CBC W/AUTO DIFF WBC: CPT

## 2019-04-12 PROCEDURE — 80307 DRUG TEST PRSMV CHEM ANLYZR: CPT

## 2019-04-12 PROCEDURE — 94640 AIRWAY INHALATION TREATMENT: CPT

## 2019-04-12 PROCEDURE — 80053 COMPREHEN METABOLIC PANEL: CPT

## 2019-04-12 PROCEDURE — 83550 IRON BINDING TEST: CPT

## 2019-04-12 PROCEDURE — 99285 EMERGENCY DEPT VISIT HI MDM: CPT

## 2019-04-12 PROCEDURE — 93005 ELECTROCARDIOGRAM TRACING: CPT

## 2019-04-12 PROCEDURE — 83880 ASSAY OF NATRIURETIC PEPTIDE: CPT

## 2019-04-12 PROCEDURE — 71045 X-RAY EXAM CHEST 1 VIEW: CPT

## 2019-04-12 PROCEDURE — 36573 INSJ PICC RS&I 5 YR+: CPT

## 2019-04-12 PROCEDURE — 96375 TX/PRO/DX INJ NEW DRUG ADDON: CPT

## 2019-04-12 PROCEDURE — 36415 COLL VENOUS BLD VENIPUNCTURE: CPT

## 2019-04-12 PROCEDURE — 86140 C-REACTIVE PROTEIN: CPT

## 2019-04-12 PROCEDURE — 87077 CULTURE AEROBIC IDENTIFY: CPT

## 2019-04-12 PROCEDURE — 87040 BLOOD CULTURE FOR BACTERIA: CPT

## 2019-04-12 PROCEDURE — 84484 ASSAY OF TROPONIN QUANT: CPT

## 2019-04-12 PROCEDURE — 93306 TTE W/DOPPLER COMPLETE: CPT

## 2019-04-12 PROCEDURE — C1751 CATH, INF, PER/CENT/MIDLINE: HCPCS

## 2019-04-12 PROCEDURE — 80202 ASSAY OF VANCOMYCIN: CPT

## 2019-04-12 PROCEDURE — 83540 ASSAY OF IRON: CPT

## 2019-04-12 PROCEDURE — 80048 BASIC METABOLIC PNL TOTAL CA: CPT

## 2019-04-12 PROCEDURE — 87181 SC STD AGAR DILUTION PER AGT: CPT

## 2019-04-12 PROCEDURE — 71275 CT ANGIOGRAPHY CHEST: CPT

## 2019-04-12 PROCEDURE — 96374 THER/PROPH/DIAG INJ IV PUSH: CPT

## 2019-04-12 SDOH — ECONOMIC STABILITY - HOUSING INSECURITY: HOMELESSNESS: Z59.0

## 2019-04-12 NOTE — NUR
PT RESTING IN [facility]. PT'S AOX4. RESPS EVEN AND UNLABORED. ALL MONITORS IN 
PLACE. CALL LIGHT WITHIN REACH.

## 2019-04-12 NOTE — NUR
biba. report received from ems. pt c/o chest tightness x 2days with cough. hx 
of pneumonia. pt's aox4. resps even and unlabored. pt denies n/v/d at this 
time. all monitors in place. call light within reach. edmd at bedside to assess 
at this time.

## 2019-04-13 VITALS — SYSTOLIC BLOOD PRESSURE: 141 MMHG | DIASTOLIC BLOOD PRESSURE: 69 MMHG

## 2019-04-13 VITALS — SYSTOLIC BLOOD PRESSURE: 103 MMHG | DIASTOLIC BLOOD PRESSURE: 52 MMHG

## 2019-04-13 LAB
ERYTHROCYTE [SEDIMENTATION RATE] IN BLOOD BY WESTERGREN METHOD: 90 MM/HR (ref 0–10)
HCT (SEDRATE): 26.7 % (ref 39.2–51.8)
INR PPP: 1.12 (ref 0.93–1.1)
PROTHROMBIN TIME: 11.7 SECONDS (ref 9.6–11.5)
TROPONIN I SERPL-MCNC: 0.02 NG/ML (ref 0–0.04)
TROPONIN I SERPL-MCNC: 0.02 NG/ML (ref 0–0.04)

## 2019-04-13 RX ADMIN — Medication SCH MG: at 05:54

## 2019-04-13 RX ADMIN — ALBUTEROL SULFATE SCH MG: 2.5 SOLUTION RESPIRATORY (INHALATION) at 15:00

## 2019-04-13 RX ADMIN — ATORVASTATIN CALCIUM SCH MG: 40 TABLET, FILM COATED ORAL at 20:34

## 2019-04-13 RX ADMIN — LISINOPRIL SCH MG: 5 TABLET ORAL at 08:57

## 2019-04-13 RX ADMIN — FUROSEMIDE SCH MG: 10 INJECTION, SOLUTION INTRAVENOUS at 01:05

## 2019-04-13 RX ADMIN — FUROSEMIDE SCH MG: 10 INJECTION, SOLUTION INTRAVENOUS at 07:58

## 2019-04-13 RX ADMIN — Medication SCH MG: at 01:47

## 2019-04-13 RX ADMIN — CLOPIDOGREL SCH MG: 75 TABLET, FILM COATED ORAL at 08:56

## 2019-04-13 RX ADMIN — ENOXAPARIN SODIUM SCH MG: 40 INJECTION SUBCUTANEOUS at 16:55

## 2019-04-13 RX ADMIN — ALBUTEROL SULFATE SCH MG: 2.5 SOLUTION RESPIRATORY (INHALATION) at 10:39

## 2019-04-13 RX ADMIN — FUROSEMIDE SCH MG: 10 INJECTION, SOLUTION INTRAVENOUS at 16:55

## 2019-04-13 NOTE — NUR
PT RESTING IN [facility]. RESPS EVEN AND UNLABORED. ALL MONITORS IN PLACE. CALL 
LIGHT WITHIN REACH.

## 2019-04-13 NOTE — NUR
pt sleeping in hospital bed. resps even and unlabored. all monitors in place. 
call light within reach.

## 2019-04-13 NOTE — NUR
PT SLEEPING IN [facility]. RESPS EVEN AND UNLABORED. ALL MONITORS IN PLACE. CALL 
LIGHT WITHIN REACH.

## 2019-04-13 NOTE — NUR
RECEIVED BEDSIDE REPORT FROM [place].  PT DENIES ANY NEEDS AT THIS TIME.  WATCHING 
TV IN BED.  HR SINUS.  CALL LIGHT IN PLACE, IN HOSPITAL BED.

## 2019-04-13 NOTE — NUR
% BREAKFAST, WATCHING TV, CARDIAC MONITOR SINUS TACY ,  VERBALZIED 
NO NEEDS AT THIS TIME.  CALL LIGHT IN PLACE, DENIES PAIN OR SOB.

## 2019-04-14 VITALS — SYSTOLIC BLOOD PRESSURE: 96 MMHG | DIASTOLIC BLOOD PRESSURE: 68 MMHG

## 2019-04-14 VITALS — SYSTOLIC BLOOD PRESSURE: 120 MMHG | DIASTOLIC BLOOD PRESSURE: 71 MMHG

## 2019-04-14 VITALS — DIASTOLIC BLOOD PRESSURE: 79 MMHG | SYSTOLIC BLOOD PRESSURE: 146 MMHG

## 2019-04-14 VITALS — SYSTOLIC BLOOD PRESSURE: 126 MMHG | DIASTOLIC BLOOD PRESSURE: 64 MMHG

## 2019-04-14 LAB
ALBUMIN SERPL-MCNC: 2.2 G/DL (ref 3.4–5)
ALP SERPL-CCNC: 69 U/L (ref 45–117)
ALT SERPL-CCNC: 22 U/L (ref 12–78)
ANION GAP SERPL CALC-SCNC: 6 MMOL/L (ref 5–15)
BASOPHILS # BLD AUTO: 0.1 X10^3/UL (ref 0–0.1)
BASOPHILS NFR BLD AUTO: 1 % (ref 0–1)
BILIRUB SERPL-MCNC: 0.2 MG/DL (ref 0.2–1)
CALCIUM SERPL-MCNC: 8.4 MG/DL (ref 8.5–10.1)
CHLORIDE SERPL-SCNC: 102 MMOL/L (ref 98–107)
CREAT SERPL-MCNC: 0.94 MG/DL (ref 0.7–1.3)
EOSINOPHIL # BLD AUTO: 0.03 X10^3/UL (ref 0–0.4)
EOSINOPHIL NFR BLD AUTO: 0 % (ref 1–7)
ERYTHROCYTE [DISTWIDTH] IN BLOOD BY AUTOMATED COUNT: 15.9 % (ref 9.4–14.8)
LYMPHOCYTES # BLD AUTO: 1.12 X10^3/UL (ref 1–3.4)
LYMPHOCYTES NFR BLD AUTO: 7 % (ref 22–44)
MCH RBC QN AUTO: 28 PG (ref 27.5–34.5)
MCHC RBC AUTO-ENTMCNC: 33.2 G/DL (ref 33.2–36.2)
MCV RBC AUTO: 84.3 FL (ref 81–97)
MD: (no result)
MONOCYTES # BLD AUTO: 0.67 X10^3/UL (ref 0.2–0.8)
MONOCYTES NFR BLD AUTO: 4 % (ref 2–9)
NEUTROPHILS # BLD AUTO: 13.96 X10^3/UL (ref 1.8–6.8)
NEUTROPHILS NFR BLD AUTO: 88 % (ref 42–75)
PLATELET # BLD AUTO: 319 X10^3/UL (ref 130–400)
PMV BLD AUTO: 7.6 FL (ref 7.4–10.4)
PROT SERPL-MCNC: 7 G/DL (ref 6.4–8.2)
RBC # BLD AUTO: 3.35 X10^6/UL (ref 4.38–5.82)

## 2019-04-14 RX ADMIN — ACETAMINOPHEN PRN MG: 325 TABLET, FILM COATED ORAL at 16:55

## 2019-04-14 RX ADMIN — VANCOMYCIN HYDROCHLORIDE SCH MLS/HR: 1 INJECTION, SOLUTION INTRAVENOUS at 16:08

## 2019-04-14 RX ADMIN — CLOPIDOGREL SCH MG: 75 TABLET, FILM COATED ORAL at 08:40

## 2019-04-14 RX ADMIN — ALBUTEROL SULFATE SCH MG: 2.5 SOLUTION RESPIRATORY (INHALATION) at 11:35

## 2019-04-14 RX ADMIN — ALBUTEROL SULFATE SCH MG: 2.5 SOLUTION RESPIRATORY (INHALATION) at 20:02

## 2019-04-14 RX ADMIN — ENOXAPARIN SODIUM SCH MG: 40 INJECTION SUBCUTANEOUS at 16:08

## 2019-04-14 RX ADMIN — ALBUTEROL SULFATE SCH MG: 2.5 SOLUTION RESPIRATORY (INHALATION) at 11:00

## 2019-04-14 RX ADMIN — ALBUTEROL SULFATE SCH MG: 2.5 SOLUTION RESPIRATORY (INHALATION) at 15:09

## 2019-04-14 RX ADMIN — FUROSEMIDE SCH MG: 10 INJECTION, SOLUTION INTRAVENOUS at 08:40

## 2019-04-14 RX ADMIN — FUROSEMIDE SCH MG: 10 INJECTION, SOLUTION INTRAVENOUS at 16:08

## 2019-04-14 RX ADMIN — Medication SCH MG: at 06:03

## 2019-04-14 RX ADMIN — LISINOPRIL SCH MG: 5 TABLET ORAL at 08:40

## 2019-04-14 RX ADMIN — VANCOMYCIN HYDROCHLORIDE SCH MLS/HR: 1 INJECTION, SOLUTION INTRAVENOUS at 04:10

## 2019-04-14 RX ADMIN — ALBUTEROL SULFATE SCH MG: 2.5 SOLUTION RESPIRATORY (INHALATION) at 07:20

## 2019-04-14 RX ADMIN — ATORVASTATIN CALCIUM SCH MG: 40 TABLET, FILM COATED ORAL at 20:01

## 2019-04-15 VITALS — DIASTOLIC BLOOD PRESSURE: 79 MMHG | SYSTOLIC BLOOD PRESSURE: 137 MMHG

## 2019-04-15 VITALS — SYSTOLIC BLOOD PRESSURE: 125 MMHG | DIASTOLIC BLOOD PRESSURE: 75 MMHG

## 2019-04-15 VITALS — DIASTOLIC BLOOD PRESSURE: 77 MMHG | SYSTOLIC BLOOD PRESSURE: 126 MMHG

## 2019-04-15 VITALS — SYSTOLIC BLOOD PRESSURE: 132 MMHG | DIASTOLIC BLOOD PRESSURE: 86 MMHG

## 2019-04-15 LAB
ANION GAP SERPL CALC-SCNC: 5 MMOL/L (ref 5–15)
BASOPHILS # BLD AUTO: 0.03 X10^3/UL (ref 0–0.1)
BASOPHILS NFR BLD AUTO: 0 % (ref 0–1)
CALCIUM SERPL-MCNC: 8.5 MG/DL (ref 8.5–10.1)
CHLORIDE SERPL-SCNC: 104 MMOL/L (ref 98–107)
CREAT SERPL-MCNC: 0.92 MG/DL (ref 0.7–1.3)
EOSINOPHIL # BLD AUTO: 0.11 X10^3/UL (ref 0–0.4)
EOSINOPHIL NFR BLD AUTO: 1 % (ref 1–7)
ERYTHROCYTE [DISTWIDTH] IN BLOOD BY AUTOMATED COUNT: 15.9 % (ref 9.4–14.8)
LYMPHOCYTES # BLD AUTO: 1.41 X10^3/UL (ref 1–3.4)
LYMPHOCYTES NFR BLD AUTO: 15 % (ref 22–44)
MCH RBC QN AUTO: 28.4 PG (ref 27.5–34.5)
MCHC RBC AUTO-ENTMCNC: 34.1 G/DL (ref 33.2–36.2)
MCV RBC AUTO: 83.2 FL (ref 81–97)
MD: NO
MONOCYTES # BLD AUTO: 1.16 X10^3/UL (ref 0.2–0.8)
MONOCYTES NFR BLD AUTO: 12 % (ref 2–9)
NEUTROPHILS # BLD AUTO: 6.73 X10^3/UL (ref 1.8–6.8)
NEUTROPHILS NFR BLD AUTO: 71 % (ref 42–75)
PLATELET # BLD AUTO: 327 X10^3/UL (ref 130–400)
PMV BLD AUTO: 7.4 FL (ref 7.4–10.4)
RBC # BLD AUTO: 3.2 X10^6/UL (ref 4.38–5.82)
VANCOMYCIN,TROUGH: 34.6 MCG/ML (ref 5–10)

## 2019-04-15 RX ADMIN — ACETAMINOPHEN PRN MG: 325 TABLET, FILM COATED ORAL at 23:59

## 2019-04-15 RX ADMIN — LISINOPRIL SCH MG: 5 TABLET ORAL at 08:11

## 2019-04-15 RX ADMIN — Medication SCH MG: at 05:31

## 2019-04-15 RX ADMIN — ENOXAPARIN SODIUM SCH MG: 40 INJECTION SUBCUTANEOUS at 16:10

## 2019-04-15 RX ADMIN — FUROSEMIDE SCH MG: 10 INJECTION, SOLUTION INTRAVENOUS at 16:11

## 2019-04-15 RX ADMIN — VANCOMYCIN HYDROCHLORIDE SCH MLS/HR: 1 INJECTION, SOLUTION INTRAVENOUS at 03:31

## 2019-04-15 RX ADMIN — ACETAMINOPHEN PRN MG: 325 TABLET, FILM COATED ORAL at 17:48

## 2019-04-15 RX ADMIN — CEFTRIAXONE SCH MLS/HR: 2 INJECTION, SOLUTION INTRAVENOUS at 09:48

## 2019-04-15 RX ADMIN — ALBUTEROL SULFATE SCH MG: 2.5 SOLUTION RESPIRATORY (INHALATION) at 19:31

## 2019-04-15 RX ADMIN — CLOPIDOGREL SCH MG: 75 TABLET, FILM COATED ORAL at 08:11

## 2019-04-15 RX ADMIN — ALBUTEROL SULFATE SCH MG: 2.5 SOLUTION RESPIRATORY (INHALATION) at 10:40

## 2019-04-15 RX ADMIN — ALBUTEROL SULFATE SCH MG: 2.5 SOLUTION RESPIRATORY (INHALATION) at 13:55

## 2019-04-15 RX ADMIN — ATORVASTATIN CALCIUM SCH MG: 40 TABLET, FILM COATED ORAL at 20:07

## 2019-04-15 RX ADMIN — FUROSEMIDE SCH MG: 10 INJECTION, SOLUTION INTRAVENOUS at 08:11

## 2019-04-15 RX ADMIN — ALBUTEROL SULFATE SCH MG: 2.5 SOLUTION RESPIRATORY (INHALATION) at 06:50

## 2019-04-16 VITALS — SYSTOLIC BLOOD PRESSURE: 139 MMHG | DIASTOLIC BLOOD PRESSURE: 81 MMHG

## 2019-04-16 VITALS — DIASTOLIC BLOOD PRESSURE: 78 MMHG | SYSTOLIC BLOOD PRESSURE: 133 MMHG

## 2019-04-16 VITALS — DIASTOLIC BLOOD PRESSURE: 74 MMHG | SYSTOLIC BLOOD PRESSURE: 133 MMHG

## 2019-04-16 VITALS — DIASTOLIC BLOOD PRESSURE: 79 MMHG | SYSTOLIC BLOOD PRESSURE: 123 MMHG

## 2019-04-16 VITALS — DIASTOLIC BLOOD PRESSURE: 70 MMHG | SYSTOLIC BLOOD PRESSURE: 120 MMHG

## 2019-04-16 LAB
ANION GAP SERPL CALC-SCNC: 4 MMOL/L (ref 5–15)
BASOPHILS # BLD AUTO: 0.09 X10^3/UL (ref 0–0.1)
BASOPHILS NFR BLD AUTO: 1 % (ref 0–1)
CALCIUM SERPL-MCNC: 8.7 MG/DL (ref 8.5–10.1)
CHLORIDE SERPL-SCNC: 104 MMOL/L (ref 98–107)
CREAT SERPL-MCNC: 0.86 MG/DL (ref 0.7–1.3)
EOSINOPHIL # BLD AUTO: 0.16 X10^3/UL (ref 0–0.4)
EOSINOPHIL NFR BLD AUTO: 2 % (ref 1–7)
ERYTHROCYTE [DISTWIDTH] IN BLOOD BY AUTOMATED COUNT: 15.8 % (ref 9.4–14.8)
LYMPHOCYTES # BLD AUTO: 1.42 X10^3/UL (ref 1–3.4)
LYMPHOCYTES NFR BLD AUTO: 14 % (ref 22–44)
MCH RBC QN AUTO: 28.1 PG (ref 27.5–34.5)
MCHC RBC AUTO-ENTMCNC: 33.5 G/DL (ref 33.2–36.2)
MCV RBC AUTO: 84.1 FL (ref 81–97)
MD: NO
MONOCYTES # BLD AUTO: 0.88 X10^3/UL (ref 0.2–0.8)
MONOCYTES NFR BLD AUTO: 9 % (ref 2–9)
NEUTROPHILS # BLD AUTO: 7.55 X10^3/UL (ref 1.8–6.8)
NEUTROPHILS NFR BLD AUTO: 75 % (ref 42–75)
PLATELET # BLD AUTO: 383 X10^3/UL (ref 130–400)
PMV BLD AUTO: 7.6 FL (ref 7.4–10.4)
RBC # BLD AUTO: 3.53 X10^6/UL (ref 4.38–5.82)

## 2019-04-16 RX ADMIN — HEPARIN SODIUM SCH UNITS: 5000 INJECTION, SOLUTION INTRAVENOUS; SUBCUTANEOUS at 08:03

## 2019-04-16 RX ADMIN — ACETAMINOPHEN PRN MG: 325 TABLET, FILM COATED ORAL at 12:58

## 2019-04-16 RX ADMIN — Medication SCH MG: at 05:34

## 2019-04-16 RX ADMIN — ACETAMINOPHEN PRN MG: 325 TABLET, FILM COATED ORAL at 05:34

## 2019-04-16 RX ADMIN — FUROSEMIDE SCH MG: 10 INJECTION, SOLUTION INTRAVENOUS at 08:03

## 2019-04-16 RX ADMIN — FUROSEMIDE SCH MG: 10 INJECTION, SOLUTION INTRAVENOUS at 17:13

## 2019-04-16 RX ADMIN — CEFTRIAXONE SCH MLS/HR: 2 INJECTION, SOLUTION INTRAVENOUS at 08:04

## 2019-04-16 RX ADMIN — HEPARIN SODIUM SCH UNITS: 5000 INJECTION, SOLUTION INTRAVENOUS; SUBCUTANEOUS at 20:36

## 2019-04-16 RX ADMIN — ATORVASTATIN CALCIUM SCH MG: 40 TABLET, FILM COATED ORAL at 20:36

## 2019-04-16 RX ADMIN — LISINOPRIL SCH MG: 5 TABLET ORAL at 08:04

## 2019-04-16 RX ADMIN — ALBUTEROL SULFATE SCH MG: 2.5 SOLUTION RESPIRATORY (INHALATION) at 08:00

## 2019-04-16 RX ADMIN — ALBUTEROL SULFATE SCH MG: 2.5 SOLUTION RESPIRATORY (INHALATION) at 20:15

## 2019-04-16 RX ADMIN — CLOPIDOGREL SCH MG: 75 TABLET, FILM COATED ORAL at 08:04

## 2019-04-17 VITALS — DIASTOLIC BLOOD PRESSURE: 74 MMHG | SYSTOLIC BLOOD PRESSURE: 125 MMHG

## 2019-04-17 VITALS — DIASTOLIC BLOOD PRESSURE: 69 MMHG | SYSTOLIC BLOOD PRESSURE: 107 MMHG

## 2019-04-17 VITALS — SYSTOLIC BLOOD PRESSURE: 157 MMHG | DIASTOLIC BLOOD PRESSURE: 83 MMHG

## 2019-04-17 VITALS — SYSTOLIC BLOOD PRESSURE: 114 MMHG | DIASTOLIC BLOOD PRESSURE: 71 MMHG

## 2019-04-17 VITALS — DIASTOLIC BLOOD PRESSURE: 74 MMHG | SYSTOLIC BLOOD PRESSURE: 146 MMHG

## 2019-04-17 VITALS — DIASTOLIC BLOOD PRESSURE: 72 MMHG | SYSTOLIC BLOOD PRESSURE: 122 MMHG

## 2019-04-17 LAB
% IRON SATURATION: 22 % (ref 20–55)
IRON LEVEL: 42 MCG/DL (ref 65–175)
TIBC SERPL-MCNC: 194 MCG/DL (ref 250–450)

## 2019-04-17 PROCEDURE — 0CDXXZ1 EXTRACTION OF LOWER TOOTH, MULTIPLE, EXTERNAL APPROACH: ICD-10-PCS | Performed by: DENTIST

## 2019-04-17 PROCEDURE — 0NQV0ZZ REPAIR LEFT MANDIBLE, OPEN APPROACH: ICD-10-PCS | Performed by: DENTIST

## 2019-04-17 RX ADMIN — ATORVASTATIN CALCIUM SCH MG: 40 TABLET, FILM COATED ORAL at 20:38

## 2019-04-17 RX ADMIN — Medication SCH MG: at 06:17

## 2019-04-17 RX ADMIN — ACETAMINOPHEN PRN MG: 325 TABLET, FILM COATED ORAL at 17:39

## 2019-04-17 RX ADMIN — CARVEDILOL SCH MG: 6.25 TABLET, FILM COATED ORAL at 17:39

## 2019-04-17 RX ADMIN — CEFTRIAXONE SCH MLS/HR: 2 INJECTION, SOLUTION INTRAVENOUS at 08:15

## 2019-04-17 RX ADMIN — ACETAMINOPHEN PRN MG: 325 TABLET, FILM COATED ORAL at 22:34

## 2019-04-17 RX ADMIN — ALBUTEROL SULFATE SCH MG: 2.5 SOLUTION RESPIRATORY (INHALATION) at 07:40

## 2019-04-17 RX ADMIN — LISINOPRIL SCH MG: 5 TABLET ORAL at 11:30

## 2019-04-17 RX ADMIN — HEPARIN SODIUM SCH UNITS: 5000 INJECTION, SOLUTION INTRAVENOUS; SUBCUTANEOUS at 08:00

## 2019-04-17 RX ADMIN — HEPARIN SODIUM SCH UNITS: 5000 INJECTION, SOLUTION INTRAVENOUS; SUBCUTANEOUS at 20:38

## 2019-04-17 RX ADMIN — ALBUTEROL SULFATE SCH MG: 2.5 SOLUTION RESPIRATORY (INHALATION) at 21:00

## 2019-04-17 RX ADMIN — CLOPIDOGREL SCH MG: 75 TABLET, FILM COATED ORAL at 09:00

## 2019-04-18 VITALS — SYSTOLIC BLOOD PRESSURE: 134 MMHG | DIASTOLIC BLOOD PRESSURE: 66 MMHG

## 2019-04-18 VITALS — SYSTOLIC BLOOD PRESSURE: 138 MMHG | DIASTOLIC BLOOD PRESSURE: 74 MMHG

## 2019-04-18 VITALS — SYSTOLIC BLOOD PRESSURE: 160 MMHG | DIASTOLIC BLOOD PRESSURE: 81 MMHG

## 2019-04-18 VITALS — SYSTOLIC BLOOD PRESSURE: 132 MMHG | DIASTOLIC BLOOD PRESSURE: 66 MMHG

## 2019-04-18 VITALS — DIASTOLIC BLOOD PRESSURE: 88 MMHG | SYSTOLIC BLOOD PRESSURE: 154 MMHG

## 2019-04-18 RX ADMIN — ACETAMINOPHEN PRN MG: 325 TABLET, FILM COATED ORAL at 03:48

## 2019-04-18 RX ADMIN — ACETAMINOPHEN SCH MG: 325 TABLET, FILM COATED ORAL at 23:47

## 2019-04-18 RX ADMIN — ALBUTEROL SULFATE SCH MG: 2.5 SOLUTION RESPIRATORY (INHALATION) at 10:03

## 2019-04-18 RX ADMIN — ALBUTEROL SULFATE SCH MG: 2.5 SOLUTION RESPIRATORY (INHALATION) at 19:50

## 2019-04-18 RX ADMIN — AMOXICILLIN AND CLAVULANATE POTASSIUM SCH TAB: 500; 125 TABLET, FILM COATED ORAL at 11:22

## 2019-04-18 RX ADMIN — CARVEDILOL SCH MG: 6.25 TABLET, FILM COATED ORAL at 05:40

## 2019-04-18 RX ADMIN — ACETAMINOPHEN SCH MG: 325 TABLET, FILM COATED ORAL at 10:38

## 2019-04-18 RX ADMIN — CHLORHEXIDINE GLUCONATE 0.12% ORAL RINSE SCH ML: 1.2 LIQUID ORAL at 20:45

## 2019-04-18 RX ADMIN — KETOROLAC TROMETHAMINE PRN MG: 30 INJECTION, SOLUTION INTRAMUSCULAR at 10:39

## 2019-04-18 RX ADMIN — KETOROLAC TROMETHAMINE PRN MG: 30 INJECTION, SOLUTION INTRAMUSCULAR at 18:37

## 2019-04-18 RX ADMIN — CLOPIDOGREL SCH MG: 75 TABLET, FILM COATED ORAL at 08:44

## 2019-04-18 RX ADMIN — ACETAMINOPHEN SCH MG: 325 TABLET, FILM COATED ORAL at 16:07

## 2019-04-18 RX ADMIN — HEPARIN SODIUM SCH UNITS: 5000 INJECTION, SOLUTION INTRAVENOUS; SUBCUTANEOUS at 08:45

## 2019-04-18 RX ADMIN — CHLORHEXIDINE GLUCONATE 0.12% ORAL RINSE SCH ML: 1.2 LIQUID ORAL at 10:39

## 2019-04-18 RX ADMIN — AMOXICILLIN AND CLAVULANATE POTASSIUM SCH TAB: 500; 125 TABLET, FILM COATED ORAL at 18:37

## 2019-04-18 RX ADMIN — ATORVASTATIN CALCIUM SCH MG: 40 TABLET, FILM COATED ORAL at 20:45

## 2019-04-18 RX ADMIN — CEFTRIAXONE SCH MLS/HR: 2 INJECTION, SOLUTION INTRAVENOUS at 08:45

## 2019-04-18 RX ADMIN — CARVEDILOL SCH MG: 6.25 TABLET, FILM COATED ORAL at 18:37

## 2019-04-18 RX ADMIN — HEPARIN SODIUM SCH UNITS: 5000 INJECTION, SOLUTION INTRAVENOUS; SUBCUTANEOUS at 20:45

## 2019-04-18 RX ADMIN — LISINOPRIL SCH MG: 5 TABLET ORAL at 08:45

## 2019-04-19 VITALS — DIASTOLIC BLOOD PRESSURE: 67 MMHG | SYSTOLIC BLOOD PRESSURE: 124 MMHG

## 2019-04-19 VITALS — DIASTOLIC BLOOD PRESSURE: 94 MMHG | SYSTOLIC BLOOD PRESSURE: 159 MMHG

## 2019-04-19 VITALS — SYSTOLIC BLOOD PRESSURE: 156 MMHG | DIASTOLIC BLOOD PRESSURE: 82 MMHG

## 2019-04-19 VITALS — DIASTOLIC BLOOD PRESSURE: 82 MMHG | SYSTOLIC BLOOD PRESSURE: 152 MMHG

## 2019-04-19 VITALS — SYSTOLIC BLOOD PRESSURE: 105 MMHG | DIASTOLIC BLOOD PRESSURE: 55 MMHG

## 2019-04-19 VITALS — DIASTOLIC BLOOD PRESSURE: 87 MMHG | SYSTOLIC BLOOD PRESSURE: 155 MMHG

## 2019-04-19 RX ADMIN — ATORVASTATIN CALCIUM SCH MG: 40 TABLET, FILM COATED ORAL at 19:57

## 2019-04-19 RX ADMIN — CLOPIDOGREL SCH MG: 75 TABLET, FILM COATED ORAL at 08:18

## 2019-04-19 RX ADMIN — CARVEDILOL SCH MG: 6.25 TABLET, FILM COATED ORAL at 17:31

## 2019-04-19 RX ADMIN — LISINOPRIL SCH MG: 5 TABLET ORAL at 08:18

## 2019-04-19 RX ADMIN — AMOXICILLIN AND CLAVULANATE POTASSIUM SCH TAB: 500; 125 TABLET, FILM COATED ORAL at 10:53

## 2019-04-19 RX ADMIN — HEPARIN SODIUM SCH UNITS: 5000 INJECTION, SOLUTION INTRAVENOUS; SUBCUTANEOUS at 08:18

## 2019-04-19 RX ADMIN — CEFTRIAXONE SCH MLS/HR: 2 INJECTION, SOLUTION INTRAVENOUS at 08:18

## 2019-04-19 RX ADMIN — CHLORHEXIDINE GLUCONATE 0.12% ORAL RINSE SCH ML: 1.2 LIQUID ORAL at 20:47

## 2019-04-19 RX ADMIN — CARVEDILOL SCH MG: 6.25 TABLET, FILM COATED ORAL at 05:30

## 2019-04-19 RX ADMIN — LISINOPRIL SCH MG: 5 TABLET ORAL at 14:51

## 2019-04-19 RX ADMIN — ACETAMINOPHEN SCH MG: 325 TABLET, FILM COATED ORAL at 17:30

## 2019-04-19 RX ADMIN — ACETAMINOPHEN SCH MG: 325 TABLET, FILM COATED ORAL at 10:53

## 2019-04-19 RX ADMIN — CHLORHEXIDINE GLUCONATE 0.12% ORAL RINSE SCH ML: 1.2 LIQUID ORAL at 08:17

## 2019-04-19 RX ADMIN — KETOROLAC TROMETHAMINE PRN MG: 30 INJECTION, SOLUTION INTRAMUSCULAR at 15:43

## 2019-04-19 RX ADMIN — AMOXICILLIN AND CLAVULANATE POTASSIUM SCH TAB: 500; 125 TABLET, FILM COATED ORAL at 01:43

## 2019-04-19 RX ADMIN — KETOROLAC TROMETHAMINE PRN MG: 30 INJECTION, SOLUTION INTRAMUSCULAR at 01:43

## 2019-04-19 RX ADMIN — ACETAMINOPHEN SCH MG: 325 TABLET, FILM COATED ORAL at 23:17

## 2019-04-19 RX ADMIN — AMLODIPINE BESYLATE SCH MG: 2.5 TABLET ORAL at 14:52

## 2019-04-19 RX ADMIN — ALBUTEROL SULFATE SCH MG: 2.5 SOLUTION RESPIRATORY (INHALATION) at 18:51

## 2019-04-19 RX ADMIN — ACETAMINOPHEN SCH MG: 325 TABLET, FILM COATED ORAL at 05:30

## 2019-04-19 RX ADMIN — AMOXICILLIN AND CLAVULANATE POTASSIUM SCH TAB: 500; 125 TABLET, FILM COATED ORAL at 19:51

## 2019-04-19 RX ADMIN — ALBUTEROL SULFATE SCH MG: 2.5 SOLUTION RESPIRATORY (INHALATION) at 10:35

## 2019-04-19 RX ADMIN — HEPARIN SODIUM SCH UNITS: 5000 INJECTION, SOLUTION INTRAVENOUS; SUBCUTANEOUS at 19:51

## 2019-04-20 VITALS — SYSTOLIC BLOOD PRESSURE: 122 MMHG | DIASTOLIC BLOOD PRESSURE: 65 MMHG

## 2019-04-20 VITALS — DIASTOLIC BLOOD PRESSURE: 72 MMHG | SYSTOLIC BLOOD PRESSURE: 150 MMHG

## 2019-04-20 VITALS — DIASTOLIC BLOOD PRESSURE: 75 MMHG | SYSTOLIC BLOOD PRESSURE: 135 MMHG

## 2019-04-20 VITALS — SYSTOLIC BLOOD PRESSURE: 146 MMHG | DIASTOLIC BLOOD PRESSURE: 81 MMHG

## 2019-04-20 VITALS — SYSTOLIC BLOOD PRESSURE: 137 MMHG | DIASTOLIC BLOOD PRESSURE: 71 MMHG

## 2019-04-20 VITALS — DIASTOLIC BLOOD PRESSURE: 76 MMHG | SYSTOLIC BLOOD PRESSURE: 132 MMHG

## 2019-04-20 RX ADMIN — CHLORHEXIDINE GLUCONATE 0.12% ORAL RINSE SCH ML: 1.2 LIQUID ORAL at 21:49

## 2019-04-20 RX ADMIN — CHLORHEXIDINE GLUCONATE 0.12% ORAL RINSE SCH ML: 1.2 LIQUID ORAL at 08:28

## 2019-04-20 RX ADMIN — ALBUTEROL SULFATE SCH MG: 2.5 SOLUTION RESPIRATORY (INHALATION) at 09:00

## 2019-04-20 RX ADMIN — CARVEDILOL SCH MG: 6.25 TABLET, FILM COATED ORAL at 17:03

## 2019-04-20 RX ADMIN — CARVEDILOL SCH MG: 6.25 TABLET, FILM COATED ORAL at 05:06

## 2019-04-20 RX ADMIN — ACETAMINOPHEN SCH MG: 325 TABLET, FILM COATED ORAL at 05:06

## 2019-04-20 RX ADMIN — LISINOPRIL SCH MG: 5 TABLET ORAL at 08:29

## 2019-04-20 RX ADMIN — AMOXICILLIN AND CLAVULANATE POTASSIUM SCH TAB: 500; 125 TABLET, FILM COATED ORAL at 10:15

## 2019-04-20 RX ADMIN — AMOXICILLIN AND CLAVULANATE POTASSIUM SCH TAB: 500; 125 TABLET, FILM COATED ORAL at 03:21

## 2019-04-20 RX ADMIN — ACETAMINOPHEN SCH MG: 325 TABLET, FILM COATED ORAL at 23:25

## 2019-04-20 RX ADMIN — ALBUTEROL SULFATE SCH MG: 2.5 SOLUTION RESPIRATORY (INHALATION) at 19:58

## 2019-04-20 RX ADMIN — ATORVASTATIN CALCIUM SCH MG: 40 TABLET, FILM COATED ORAL at 19:29

## 2019-04-20 RX ADMIN — CEFTRIAXONE SCH MLS/HR: 2 INJECTION, SOLUTION INTRAVENOUS at 08:29

## 2019-04-20 RX ADMIN — CLOPIDOGREL SCH MG: 75 TABLET, FILM COATED ORAL at 08:29

## 2019-04-20 RX ADMIN — HEPARIN SODIUM SCH UNITS: 5000 INJECTION, SOLUTION INTRAVENOUS; SUBCUTANEOUS at 19:29

## 2019-04-20 RX ADMIN — AMOXICILLIN AND CLAVULANATE POTASSIUM SCH TAB: 500; 125 TABLET, FILM COATED ORAL at 19:29

## 2019-04-20 RX ADMIN — AMLODIPINE BESYLATE SCH MG: 2.5 TABLET ORAL at 08:29

## 2019-04-20 RX ADMIN — HEPARIN SODIUM SCH UNITS: 5000 INJECTION, SOLUTION INTRAVENOUS; SUBCUTANEOUS at 08:28

## 2019-04-20 RX ADMIN — ACETAMINOPHEN SCH MG: 325 TABLET, FILM COATED ORAL at 17:03

## 2019-04-20 RX ADMIN — ACETAMINOPHEN SCH MG: 325 TABLET, FILM COATED ORAL at 10:15

## 2019-04-21 VITALS — DIASTOLIC BLOOD PRESSURE: 70 MMHG | SYSTOLIC BLOOD PRESSURE: 113 MMHG

## 2019-04-21 VITALS — DIASTOLIC BLOOD PRESSURE: 87 MMHG | SYSTOLIC BLOOD PRESSURE: 151 MMHG

## 2019-04-21 VITALS — DIASTOLIC BLOOD PRESSURE: 82 MMHG | SYSTOLIC BLOOD PRESSURE: 144 MMHG

## 2019-04-21 VITALS — DIASTOLIC BLOOD PRESSURE: 84 MMHG | SYSTOLIC BLOOD PRESSURE: 147 MMHG

## 2019-04-21 VITALS — SYSTOLIC BLOOD PRESSURE: 148 MMHG | DIASTOLIC BLOOD PRESSURE: 84 MMHG

## 2019-04-21 RX ADMIN — LISINOPRIL SCH MG: 5 TABLET ORAL at 08:48

## 2019-04-21 RX ADMIN — CHLORHEXIDINE GLUCONATE 0.12% ORAL RINSE SCH ML: 1.2 LIQUID ORAL at 20:32

## 2019-04-21 RX ADMIN — AMOXICILLIN AND CLAVULANATE POTASSIUM SCH TAB: 500; 125 TABLET, FILM COATED ORAL at 04:09

## 2019-04-21 RX ADMIN — ACETAMINOPHEN SCH MG: 325 TABLET, FILM COATED ORAL at 22:55

## 2019-04-21 RX ADMIN — AMOXICILLIN AND CLAVULANATE POTASSIUM SCH TAB: 500; 125 TABLET, FILM COATED ORAL at 22:55

## 2019-04-21 RX ADMIN — CHLORHEXIDINE GLUCONATE 0.12% ORAL RINSE SCH ML: 1.2 LIQUID ORAL at 08:47

## 2019-04-21 RX ADMIN — CARVEDILOL SCH MG: 6.25 TABLET, FILM COATED ORAL at 06:29

## 2019-04-21 RX ADMIN — AMLODIPINE BESYLATE SCH MG: 2.5 TABLET ORAL at 08:48

## 2019-04-21 RX ADMIN — ACETAMINOPHEN SCH MG: 325 TABLET, FILM COATED ORAL at 17:00

## 2019-04-21 RX ADMIN — CEFTRIAXONE SCH MLS/HR: 2 INJECTION, SOLUTION INTRAVENOUS at 08:47

## 2019-04-21 RX ADMIN — HEPARIN SODIUM SCH UNITS: 5000 INJECTION, SOLUTION INTRAVENOUS; SUBCUTANEOUS at 08:47

## 2019-04-21 RX ADMIN — ACETAMINOPHEN SCH MG: 325 TABLET, FILM COATED ORAL at 06:28

## 2019-04-21 RX ADMIN — CLOPIDOGREL SCH MG: 75 TABLET, FILM COATED ORAL at 08:48

## 2019-04-21 RX ADMIN — CARVEDILOL SCH MG: 6.25 TABLET, FILM COATED ORAL at 18:10

## 2019-04-21 RX ADMIN — AMOXICILLIN AND CLAVULANATE POTASSIUM SCH TAB: 500; 125 TABLET, FILM COATED ORAL at 15:10

## 2019-04-21 RX ADMIN — ACETAMINOPHEN SCH MG: 325 TABLET, FILM COATED ORAL at 15:11

## 2019-04-21 RX ADMIN — HEPARIN SODIUM SCH UNITS: 5000 INJECTION, SOLUTION INTRAVENOUS; SUBCUTANEOUS at 20:32

## 2019-04-21 RX ADMIN — ALBUTEROL SULFATE SCH MG: 2.5 SOLUTION RESPIRATORY (INHALATION) at 09:00

## 2019-04-21 RX ADMIN — ATORVASTATIN CALCIUM SCH MG: 40 TABLET, FILM COATED ORAL at 20:32

## 2019-04-22 VITALS — DIASTOLIC BLOOD PRESSURE: 83 MMHG | SYSTOLIC BLOOD PRESSURE: 145 MMHG

## 2019-04-22 VITALS — DIASTOLIC BLOOD PRESSURE: 85 MMHG | SYSTOLIC BLOOD PRESSURE: 135 MMHG

## 2019-04-22 VITALS — SYSTOLIC BLOOD PRESSURE: 145 MMHG | DIASTOLIC BLOOD PRESSURE: 87 MMHG

## 2019-04-22 VITALS — SYSTOLIC BLOOD PRESSURE: 129 MMHG | DIASTOLIC BLOOD PRESSURE: 72 MMHG

## 2019-04-22 VITALS — DIASTOLIC BLOOD PRESSURE: 90 MMHG | SYSTOLIC BLOOD PRESSURE: 163 MMHG

## 2019-04-22 LAB
ALBUMIN SERPL-MCNC: 2.8 G/DL (ref 3.4–5)
ALP SERPL-CCNC: 71 U/L (ref 45–117)
ALT SERPL-CCNC: 34 U/L (ref 12–78)
ANION GAP SERPL CALC-SCNC: 5 MMOL/L (ref 5–15)
BASOPHILS # BLD AUTO: 0.33 X10^3/UL (ref 0–0.1)
BASOPHILS NFR BLD AUTO: 3 % (ref 0–1)
BILIRUB SERPL-MCNC: 0.4 MG/DL (ref 0.2–1)
CALCIUM SERPL-MCNC: 9.5 MG/DL (ref 8.5–10.1)
CHLORIDE SERPL-SCNC: 101 MMOL/L (ref 98–107)
CREAT SERPL-MCNC: 1.01 MG/DL (ref 0.7–1.3)
CRP SERPL-MCNC: 1 MG/DL (ref 0.02–0.49)
EOSINOPHIL # BLD AUTO: 0.16 X10^3/UL (ref 0–0.4)
EOSINOPHIL NFR BLD AUTO: 1 % (ref 1–7)
ERYTHROCYTE [DISTWIDTH] IN BLOOD BY AUTOMATED COUNT: 16.9 % (ref 9.4–14.8)
ERYTHROCYTE [SEDIMENTATION RATE] IN BLOOD BY WESTERGREN METHOD: 118 MM/HR (ref 0–10)
HCT (SEDRATE): 32.6 % (ref 39.2–51.8)
LYMPHOCYTES # BLD AUTO: 1.48 X10^3/UL (ref 1–3.4)
LYMPHOCYTES NFR BLD AUTO: 13 % (ref 22–44)
MCH RBC QN AUTO: 28.4 PG (ref 27.5–34.5)
MCHC RBC AUTO-ENTMCNC: 33.7 G/DL (ref 33.2–36.2)
MCV RBC AUTO: 84.2 FL (ref 81–97)
MD: NO
MONOCYTES # BLD AUTO: 0.78 X10^3/UL (ref 0.2–0.8)
MONOCYTES NFR BLD AUTO: 7 % (ref 2–9)
NEUTROPHILS # BLD AUTO: 8.48 X10^3/UL (ref 1.8–6.8)
NEUTROPHILS NFR BLD AUTO: 76 % (ref 42–75)
PLATELET # BLD AUTO: 457 X10^3/UL (ref 130–400)
PMV BLD AUTO: 7.3 FL (ref 7.4–10.4)
PROT SERPL-MCNC: 9 G/DL (ref 6.4–8.2)
RBC # BLD AUTO: 3.91 X10^6/UL (ref 4.38–5.82)

## 2019-04-22 RX ADMIN — AMOXICILLIN AND CLAVULANATE POTASSIUM SCH TAB: 500; 125 TABLET, FILM COATED ORAL at 08:09

## 2019-04-22 RX ADMIN — ACETAMINOPHEN SCH MG: 325 TABLET, FILM COATED ORAL at 22:45

## 2019-04-22 RX ADMIN — CHLORHEXIDINE GLUCONATE 0.12% ORAL RINSE SCH ML: 1.2 LIQUID ORAL at 21:45

## 2019-04-22 RX ADMIN — AMOXICILLIN AND CLAVULANATE POTASSIUM SCH TAB: 500; 125 TABLET, FILM COATED ORAL at 14:40

## 2019-04-22 RX ADMIN — CLOPIDOGREL SCH MG: 75 TABLET, FILM COATED ORAL at 08:10

## 2019-04-22 RX ADMIN — CEFTRIAXONE SCH MLS/HR: 2 INJECTION, SOLUTION INTRAVENOUS at 08:09

## 2019-04-22 RX ADMIN — CARVEDILOL SCH MG: 6.25 TABLET, FILM COATED ORAL at 06:21

## 2019-04-22 RX ADMIN — AMOXICILLIN AND CLAVULANATE POTASSIUM SCH TAB: 500; 125 TABLET, FILM COATED ORAL at 22:47

## 2019-04-22 RX ADMIN — AMLODIPINE BESYLATE SCH MG: 2.5 TABLET ORAL at 08:10

## 2019-04-22 RX ADMIN — ATORVASTATIN CALCIUM SCH MG: 40 TABLET, FILM COATED ORAL at 21:45

## 2019-04-22 RX ADMIN — HEPARIN SODIUM SCH UNITS: 5000 INJECTION, SOLUTION INTRAVENOUS; SUBCUTANEOUS at 08:09

## 2019-04-22 RX ADMIN — ACETAMINOPHEN SCH MG: 325 TABLET, FILM COATED ORAL at 06:21

## 2019-04-22 RX ADMIN — ACETAMINOPHEN SCH MG: 325 TABLET, FILM COATED ORAL at 11:04

## 2019-04-22 RX ADMIN — HEPARIN SODIUM SCH UNITS: 5000 INJECTION, SOLUTION INTRAVENOUS; SUBCUTANEOUS at 21:45

## 2019-04-22 RX ADMIN — CHLORHEXIDINE GLUCONATE 0.12% ORAL RINSE SCH ML: 1.2 LIQUID ORAL at 08:06

## 2019-04-22 RX ADMIN — CARVEDILOL SCH MG: 12.5 TABLET, FILM COATED ORAL at 17:12

## 2019-04-22 RX ADMIN — LISINOPRIL SCH MG: 5 TABLET ORAL at 08:09

## 2019-04-22 RX ADMIN — ACETAMINOPHEN SCH MG: 325 TABLET, FILM COATED ORAL at 17:00

## 2019-04-23 VITALS — DIASTOLIC BLOOD PRESSURE: 75 MMHG | SYSTOLIC BLOOD PRESSURE: 116 MMHG

## 2019-04-23 VITALS — SYSTOLIC BLOOD PRESSURE: 114 MMHG | DIASTOLIC BLOOD PRESSURE: 63 MMHG

## 2019-04-23 VITALS — SYSTOLIC BLOOD PRESSURE: 99 MMHG | DIASTOLIC BLOOD PRESSURE: 61 MMHG

## 2019-04-23 VITALS — DIASTOLIC BLOOD PRESSURE: 60 MMHG | SYSTOLIC BLOOD PRESSURE: 106 MMHG

## 2019-04-23 RX ADMIN — CARVEDILOL SCH MG: 12.5 TABLET, FILM COATED ORAL at 05:48

## 2019-04-23 RX ADMIN — CHLORHEXIDINE GLUCONATE 0.12% ORAL RINSE SCH ML: 1.2 LIQUID ORAL at 09:37

## 2019-04-23 RX ADMIN — ACETAMINOPHEN SCH MG: 325 TABLET, FILM COATED ORAL at 05:00

## 2019-04-23 RX ADMIN — ACETAMINOPHEN SCH MG: 325 TABLET, FILM COATED ORAL at 17:40

## 2019-04-23 RX ADMIN — CHLORHEXIDINE GLUCONATE 0.12% ORAL RINSE SCH ML: 1.2 LIQUID ORAL at 22:26

## 2019-04-23 RX ADMIN — ACETAMINOPHEN SCH MG: 325 TABLET, FILM COATED ORAL at 11:35

## 2019-04-23 RX ADMIN — HEPARIN SODIUM SCH UNITS: 5000 INJECTION, SOLUTION INTRAVENOUS; SUBCUTANEOUS at 22:25

## 2019-04-23 RX ADMIN — AMOXICILLIN AND CLAVULANATE POTASSIUM SCH TAB: 500; 125 TABLET, FILM COATED ORAL at 09:37

## 2019-04-23 RX ADMIN — LISINOPRIL SCH MG: 5 TABLET ORAL at 09:37

## 2019-04-23 RX ADMIN — ATORVASTATIN CALCIUM SCH MG: 40 TABLET, FILM COATED ORAL at 22:26

## 2019-04-23 RX ADMIN — AMLODIPINE BESYLATE SCH MG: 2.5 TABLET ORAL at 09:37

## 2019-04-23 RX ADMIN — HEPARIN SODIUM SCH UNITS: 5000 INJECTION, SOLUTION INTRAVENOUS; SUBCUTANEOUS at 09:36

## 2019-04-23 RX ADMIN — ACETAMINOPHEN SCH MG: 325 TABLET, FILM COATED ORAL at 23:30

## 2019-04-23 RX ADMIN — CLOPIDOGREL SCH MG: 75 TABLET, FILM COATED ORAL at 09:37

## 2019-04-23 RX ADMIN — CARVEDILOL SCH MG: 12.5 TABLET, FILM COATED ORAL at 17:40

## 2019-04-23 RX ADMIN — CEFTRIAXONE SCH MLS/HR: 2 INJECTION, SOLUTION INTRAVENOUS at 09:37

## 2019-04-24 VITALS — DIASTOLIC BLOOD PRESSURE: 91 MMHG | SYSTOLIC BLOOD PRESSURE: 152 MMHG

## 2019-04-24 VITALS — SYSTOLIC BLOOD PRESSURE: 142 MMHG | DIASTOLIC BLOOD PRESSURE: 87 MMHG

## 2019-04-24 VITALS — DIASTOLIC BLOOD PRESSURE: 68 MMHG | SYSTOLIC BLOOD PRESSURE: 102 MMHG

## 2019-04-24 RX ADMIN — AMLODIPINE BESYLATE SCH MG: 2.5 TABLET ORAL at 09:23

## 2019-04-24 RX ADMIN — HEPARIN SODIUM SCH UNITS: 5000 INJECTION, SOLUTION INTRAVENOUS; SUBCUTANEOUS at 09:24

## 2019-04-24 RX ADMIN — CARVEDILOL SCH MG: 12.5 TABLET, FILM COATED ORAL at 06:06

## 2019-04-24 RX ADMIN — CHLORHEXIDINE GLUCONATE 0.12% ORAL RINSE SCH ML: 1.2 LIQUID ORAL at 09:23

## 2019-04-24 RX ADMIN — LISINOPRIL SCH MG: 5 TABLET ORAL at 09:23

## 2019-04-24 RX ADMIN — CEFTRIAXONE SCH MLS/HR: 2 INJECTION, SOLUTION INTRAVENOUS at 09:23

## 2019-04-24 RX ADMIN — ACETAMINOPHEN SCH MG: 325 TABLET, FILM COATED ORAL at 06:06

## 2019-04-24 RX ADMIN — CLOPIDOGREL SCH MG: 75 TABLET, FILM COATED ORAL at 09:23

## 2019-04-25 ENCOUNTER — TELEPHONE (OUTPATIENT)
Dept: CARDIOLOGY | Facility: MEDICAL CENTER | Age: 64
End: 2019-04-25

## 2019-04-25 NOTE — TELEPHONE ENCOUNTER
"Received call from patient reporting that he has just discharged from [facility] 4/24/19 to [facility]. He reports that he had 3 infected teeth that had to be abstracted due to an infection. He reports that the infection was into his blood. He states that he was informed there was \"no vegetation on my valve\" but that he will be arranged for 3 months of IV antibiotic therapy at the [facility] for such. He wanted to assure our heart team was aware of such.    Assured patient that I will update our heart team. Encouraged him to reach out with any questions or concerns and let him know our team has him in our thoughts.   "

## 2019-08-21 ENCOUNTER — DOCUMENTATION (OUTPATIENT)
Dept: CARDIOLOGY | Facility: MEDICAL CENTER | Age: 64
End: 2019-08-21

## 2019-08-21 NOTE — PROGRESS NOTES
Request sent to [facility] for all records of pts recent stay with all cardiac records. Pt has follow up appt with JOSE GUADALUPE on 8/27/19.

## 2019-09-10 DIAGNOSIS — Z95.2 S/P TAVR (TRANSCATHETER AORTIC VALVE REPLACEMENT): ICD-10-CM

## 2019-10-09 ENCOUNTER — APPOINTMENT (OUTPATIENT)
Dept: RADIOLOGY | Facility: MEDICAL CENTER | Age: 64
DRG: 314 | End: 2019-10-09
Attending: STUDENT IN AN ORGANIZED HEALTH CARE EDUCATION/TRAINING PROGRAM
Payer: MEDICAID

## 2019-10-09 ENCOUNTER — APPOINTMENT (OUTPATIENT)
Dept: RADIOLOGY | Facility: MEDICAL CENTER | Age: 64
DRG: 314 | End: 2019-10-09
Attending: EMERGENCY MEDICINE
Payer: MEDICAID

## 2019-10-09 ENCOUNTER — HOSPITAL ENCOUNTER (INPATIENT)
Facility: MEDICAL CENTER | Age: 64
LOS: 7 days | DRG: 314 | End: 2019-10-17
Attending: EMERGENCY MEDICINE | Admitting: HOSPITALIST
Payer: MEDICAID

## 2019-10-09 DIAGNOSIS — N28.0 RENAL INFARCT (HCC): ICD-10-CM

## 2019-10-09 DIAGNOSIS — D64.9 ANEMIA, UNSPECIFIED TYPE: ICD-10-CM

## 2019-10-09 DIAGNOSIS — E86.0 DEHYDRATION: ICD-10-CM

## 2019-10-09 DIAGNOSIS — N17.9 AKI (ACUTE KIDNEY INJURY) (HCC): ICD-10-CM

## 2019-10-09 LAB
ALBUMIN SERPL BCP-MCNC: 2.9 G/DL (ref 3.2–4.9)
ALBUMIN/GLOB SERPL: 0.6 G/DL
ALP SERPL-CCNC: 104 U/L (ref 30–99)
ALT SERPL-CCNC: 12 U/L (ref 2–50)
ANION GAP SERPL CALC-SCNC: 9 MMOL/L (ref 0–11.9)
AST SERPL-CCNC: 19 U/L (ref 12–45)
BASOPHILS # BLD AUTO: 0.2 % (ref 0–1.8)
BASOPHILS # BLD: 0.04 K/UL (ref 0–0.12)
BILIRUB SERPL-MCNC: 0.3 MG/DL (ref 0.1–1.5)
BUN SERPL-MCNC: 33 MG/DL (ref 8–22)
CALCIUM SERPL-MCNC: 8.7 MG/DL (ref 8.5–10.5)
CHLORIDE SERPL-SCNC: 101 MMOL/L (ref 96–112)
CO2 SERPL-SCNC: 23 MMOL/L (ref 20–33)
CREAT SERPL-MCNC: 0.96 MG/DL (ref 0.5–1.4)
EOSINOPHIL # BLD AUTO: 0.01 K/UL (ref 0–0.51)
EOSINOPHIL NFR BLD: 0.1 % (ref 0–6.9)
ERYTHROCYTE [DISTWIDTH] IN BLOOD BY AUTOMATED COUNT: 44.5 FL (ref 35.9–50)
GLOBULIN SER CALC-MCNC: 4.6 G/DL (ref 1.9–3.5)
GLUCOSE BLD-MCNC: 121 MG/DL (ref 65–99)
GLUCOSE SERPL-MCNC: 114 MG/DL (ref 65–99)
HCT VFR BLD AUTO: 31.3 % (ref 42–52)
HGB BLD-MCNC: 10 G/DL (ref 14–18)
IMM GRANULOCYTES # BLD AUTO: 0.11 K/UL (ref 0–0.11)
IMM GRANULOCYTES NFR BLD AUTO: 0.6 % (ref 0–0.9)
LYMPHOCYTES # BLD AUTO: 1.42 K/UL (ref 1–4.8)
LYMPHOCYTES NFR BLD: 7.3 % (ref 22–41)
MCH RBC QN AUTO: 27.5 PG (ref 27–33)
MCHC RBC AUTO-ENTMCNC: 31.9 G/DL (ref 33.7–35.3)
MCV RBC AUTO: 86 FL (ref 81.4–97.8)
MONOCYTES # BLD AUTO: 1.49 K/UL (ref 0–0.85)
MONOCYTES NFR BLD AUTO: 7.7 % (ref 0–13.4)
NEUTROPHILS # BLD AUTO: 16.37 K/UL (ref 1.82–7.42)
NEUTROPHILS NFR BLD: 84.1 % (ref 44–72)
NRBC # BLD AUTO: 0 K/UL
NRBC BLD-RTO: 0 /100 WBC
PLATELET # BLD AUTO: 296 K/UL (ref 164–446)
PMV BLD AUTO: 9.3 FL (ref 9–12.9)
POTASSIUM SERPL-SCNC: 4.4 MMOL/L (ref 3.6–5.5)
PROT SERPL-MCNC: 7.5 G/DL (ref 6–8.2)
RBC # BLD AUTO: 3.64 M/UL (ref 4.7–6.1)
SODIUM SERPL-SCNC: 133 MMOL/L (ref 135–145)
WBC # BLD AUTO: 19.4 K/UL (ref 4.8–10.8)

## 2019-10-09 PROCEDURE — 81001 URINALYSIS AUTO W/SCOPE: CPT

## 2019-10-09 PROCEDURE — 99285 EMERGENCY DEPT VISIT HI MDM: CPT

## 2019-10-09 PROCEDURE — 85025 COMPLETE CBC W/AUTO DIFF WBC: CPT

## 2019-10-09 PROCEDURE — 80307 DRUG TEST PRSMV CHEM ANLYZR: CPT

## 2019-10-09 PROCEDURE — 80053 COMPREHEN METABOLIC PANEL: CPT

## 2019-10-09 PROCEDURE — 700105 HCHG RX REV CODE 258: Performed by: STUDENT IN AN ORGANIZED HEALTH CARE EDUCATION/TRAINING PROGRAM

## 2019-10-09 PROCEDURE — 83615 LACTATE (LD) (LDH) ENZYME: CPT

## 2019-10-09 PROCEDURE — 71045 X-RAY EXAM CHEST 1 VIEW: CPT

## 2019-10-09 PROCEDURE — 82962 GLUCOSE BLOOD TEST: CPT

## 2019-10-09 PROCEDURE — 73600 X-RAY EXAM OF ANKLE: CPT | Mod: LT

## 2019-10-09 RX ORDER — SODIUM CHLORIDE 9 MG/ML
1000 INJECTION, SOLUTION INTRAVENOUS ONCE
Status: COMPLETED | OUTPATIENT
Start: 2019-10-09 | End: 2019-10-10

## 2019-10-09 RX ORDER — SODIUM CHLORIDE, SODIUM LACTATE, POTASSIUM CHLORIDE, CALCIUM CHLORIDE 600; 310; 30; 20 MG/100ML; MG/100ML; MG/100ML; MG/100ML
INJECTION, SOLUTION INTRAVENOUS CONTINUOUS
Status: DISCONTINUED | OUTPATIENT
Start: 2019-10-09 | End: 2019-10-10

## 2019-10-09 RX ADMIN — SODIUM CHLORIDE 1000 ML: 9 INJECTION, SOLUTION INTRAVENOUS at 23:30

## 2019-10-09 ASSESSMENT — LIFESTYLE VARIABLES
HAVE YOU EVER FELT YOU SHOULD CUT DOWN ON YOUR DRINKING: NO
EVER HAD A DRINK FIRST THING IN THE MORNING TO STEADY YOUR NERVES TO GET RID OF A HANGOVER: NO
HAVE PEOPLE ANNOYED YOU BY CRITICIZING YOUR DRINKING: NO
TOTAL SCORE: 0
EVER FELT BAD OR GUILTY ABOUT YOUR DRINKING: NO
TOTAL SCORE: 0
TOTAL SCORE: 0
CONSUMPTION TOTAL: INCOMPLETE
DO YOU DRINK ALCOHOL: YES

## 2019-10-10 ENCOUNTER — APPOINTMENT (OUTPATIENT)
Dept: RADIOLOGY | Facility: MEDICAL CENTER | Age: 64
DRG: 314 | End: 2019-10-10
Attending: INTERNAL MEDICINE
Payer: MEDICAID

## 2019-10-10 ENCOUNTER — APPOINTMENT (OUTPATIENT)
Dept: CARDIOLOGY | Facility: MEDICAL CENTER | Age: 64
DRG: 314 | End: 2019-10-10
Attending: HOSPITALIST
Payer: MEDICAID

## 2019-10-10 ENCOUNTER — APPOINTMENT (OUTPATIENT)
Dept: RADIOLOGY | Facility: MEDICAL CENTER | Age: 64
DRG: 314 | End: 2019-10-10
Attending: HOSPITALIST
Payer: MEDICAID

## 2019-10-10 PROBLEM — I63.9 CVA (CEREBRAL VASCULAR ACCIDENT) (HCC): Status: ACTIVE | Noted: 2019-10-10

## 2019-10-10 PROBLEM — D72.829 LEUKOCYTOSIS: Status: ACTIVE | Noted: 2019-10-10

## 2019-10-10 PROBLEM — J44.9 COPD (CHRONIC OBSTRUCTIVE PULMONARY DISEASE) (HCC): Status: ACTIVE | Noted: 2019-10-10

## 2019-10-10 PROBLEM — E87.1 HYPONATREMIA: Status: ACTIVE | Noted: 2019-10-10

## 2019-10-10 PROBLEM — I63.89: Status: ACTIVE | Noted: 2019-10-10

## 2019-10-10 PROBLEM — D32.9 MENINGIOMA (HCC): Status: ACTIVE | Noted: 2019-10-10

## 2019-10-10 PROBLEM — I71.40 AAA (ABDOMINAL AORTIC ANEURYSM) (HCC): Status: ACTIVE | Noted: 2019-10-10

## 2019-10-10 PROBLEM — N28.0 RENAL INFARCT (HCC): Status: ACTIVE | Noted: 2019-10-10

## 2019-10-10 PROBLEM — I74.9 EMBOLIC INFARCTION (HCC): Status: ACTIVE | Noted: 2019-10-10

## 2019-10-10 PROBLEM — R79.9 ELEVATED BUN: Status: ACTIVE | Noted: 2019-10-10

## 2019-10-10 LAB
AMPHET UR QL SCN: NEGATIVE
APPEARANCE UR: CLEAR
BACTERIA #/AREA URNS HPF: NEGATIVE /HPF
BARBITURATES UR QL SCN: NEGATIVE
BENZODIAZ UR QL SCN: NEGATIVE
BILIRUB UR QL STRIP.AUTO: NEGATIVE
BZE UR QL SCN: NEGATIVE
CANNABINOIDS UR QL SCN: NEGATIVE
COLOR UR: YELLOW
EPI CELLS #/AREA URNS HPF: NEGATIVE /HPF
EST. AVERAGE GLUCOSE BLD GHB EST-MCNC: 126 MG/DL
FERRITIN SERPL-MCNC: 180.2 NG/ML (ref 22–322)
FOLATE SERPL-MCNC: 15.9 NG/ML
GLUCOSE UR STRIP.AUTO-MCNC: NEGATIVE MG/DL
HBA1C MFR BLD: 6 % (ref 0–5.6)
HGB RETIC QN AUTO: 26.3 PG/CELL (ref 29–35)
HIV 1+2 AB+HIV1 P24 AG SERPL QL IA: NON REACTIVE
HYALINE CASTS #/AREA URNS LPF: ABNORMAL /LPF
IMM RETICS NFR: 14.6 % (ref 9.3–17.4)
IRON SATN MFR SERPL: ABNORMAL % (ref 15–55)
IRON SERPL-MCNC: <10 UG/DL (ref 50–180)
KETONES UR STRIP.AUTO-MCNC: NEGATIVE MG/DL
LDH SERPL L TO P-CCNC: 287 U/L (ref 107–266)
LEUKOCYTE ESTERASE UR QL STRIP.AUTO: NEGATIVE
LV EJECT FRACT  99904: 75
LV EJECT FRACT MOD 2C 99903: 63.31
LV EJECT FRACT MOD 4C 99902: 71.58
LV EJECT FRACT MOD BP 99901: 65.37
METHADONE UR QL SCN: NEGATIVE
MICRO URNS: ABNORMAL
NITRITE UR QL STRIP.AUTO: NEGATIVE
OPIATES UR QL SCN: NEGATIVE
OXYCODONE UR QL SCN: NEGATIVE
PCP UR QL SCN: NEGATIVE
PH UR STRIP.AUTO: 5 [PH] (ref 5–8)
PROPOXYPH UR QL SCN: NEGATIVE
PROT UR QL STRIP: NEGATIVE MG/DL
RBC # URNS HPF: ABNORMAL /HPF
RBC UR QL AUTO: ABNORMAL
RETICS # AUTO: 0.03 M/UL (ref 0.04–0.06)
RETICS/RBC NFR: 0.8 % (ref 0.8–2.1)
SP GR UR STRIP.AUTO: 1.02
TIBC SERPL-MCNC: 210 UG/DL (ref 250–450)
UROBILINOGEN UR STRIP.AUTO-MCNC: 1 MG/DL
VIT B12 SERPL-MCNC: 1207 PG/ML (ref 211–911)
WBC #/AREA URNS HPF: ABNORMAL /HPF

## 2019-10-10 PROCEDURE — 700111 HCHG RX REV CODE 636 W/ 250 OVERRIDE (IP): Performed by: INTERNAL MEDICINE

## 2019-10-10 PROCEDURE — 87077 CULTURE AEROBIC IDENTIFY: CPT

## 2019-10-10 PROCEDURE — 700102 HCHG RX REV CODE 250 W/ 637 OVERRIDE(OP): Performed by: HOSPITALIST

## 2019-10-10 PROCEDURE — 85046 RETICYTE/HGB CONCENTRATE: CPT

## 2019-10-10 PROCEDURE — 70450 CT HEAD/BRAIN W/O DYE: CPT

## 2019-10-10 PROCEDURE — 93306 TTE W/DOPPLER COMPLETE: CPT | Mod: 26 | Performed by: INTERNAL MEDICINE

## 2019-10-10 PROCEDURE — 83540 ASSAY OF IRON: CPT

## 2019-10-10 PROCEDURE — 96367 TX/PROPH/DG ADDL SEQ IV INF: CPT

## 2019-10-10 PROCEDURE — 74177 CT ABD & PELVIS W/CONTRAST: CPT

## 2019-10-10 PROCEDURE — 87150 DNA/RNA AMPLIFIED PROBE: CPT | Mod: 91

## 2019-10-10 PROCEDURE — 770020 HCHG ROOM/CARE - TELE (206)

## 2019-10-10 PROCEDURE — 700111 HCHG RX REV CODE 636 W/ 250 OVERRIDE (IP): Performed by: HOSPITALIST

## 2019-10-10 PROCEDURE — 87186 SC STD MICRODIL/AGAR DIL: CPT

## 2019-10-10 PROCEDURE — 700117 HCHG RX CONTRAST REV CODE 255: Performed by: EMERGENCY MEDICINE

## 2019-10-10 PROCEDURE — 83036 HEMOGLOBIN GLYCOSYLATED A1C: CPT

## 2019-10-10 PROCEDURE — 83550 IRON BINDING TEST: CPT

## 2019-10-10 PROCEDURE — 97166 OT EVAL MOD COMPLEX 45 MIN: CPT

## 2019-10-10 PROCEDURE — 92610 EVALUATE SWALLOWING FUNCTION: CPT

## 2019-10-10 PROCEDURE — 99223 1ST HOSP IP/OBS HIGH 75: CPT | Performed by: HOSPITALIST

## 2019-10-10 PROCEDURE — 87389 HIV-1 AG W/HIV-1&-2 AB AG IA: CPT

## 2019-10-10 PROCEDURE — 97163 PT EVAL HIGH COMPLEX 45 MIN: CPT

## 2019-10-10 PROCEDURE — 70551 MRI BRAIN STEM W/O DYE: CPT

## 2019-10-10 PROCEDURE — 700105 HCHG RX REV CODE 258: Performed by: STUDENT IN AN ORGANIZED HEALTH CARE EDUCATION/TRAINING PROGRAM

## 2019-10-10 PROCEDURE — 96366 THER/PROPH/DIAG IV INF ADDON: CPT

## 2019-10-10 PROCEDURE — 82746 ASSAY OF FOLIC ACID SERUM: CPT

## 2019-10-10 PROCEDURE — A9270 NON-COVERED ITEM OR SERVICE: HCPCS | Performed by: HOSPITALIST

## 2019-10-10 PROCEDURE — 700105 HCHG RX REV CODE 258: Performed by: HOSPITALIST

## 2019-10-10 PROCEDURE — 96365 THER/PROPH/DIAG IV INF INIT: CPT

## 2019-10-10 PROCEDURE — 82607 VITAMIN B-12: CPT

## 2019-10-10 PROCEDURE — 82728 ASSAY OF FERRITIN: CPT

## 2019-10-10 PROCEDURE — 99255 IP/OBS CONSLTJ NEW/EST HI 80: CPT | Performed by: PSYCHIATRY & NEUROLOGY

## 2019-10-10 PROCEDURE — 93306 TTE W/DOPPLER COMPLETE: CPT

## 2019-10-10 PROCEDURE — 87040 BLOOD CULTURE FOR BACTERIA: CPT

## 2019-10-10 RX ORDER — PROMETHAZINE HYDROCHLORIDE 25 MG/1
12.5-25 SUPPOSITORY RECTAL EVERY 4 HOURS PRN
Status: DISCONTINUED | OUTPATIENT
Start: 2019-10-10 | End: 2019-10-17 | Stop reason: HOSPADM

## 2019-10-10 RX ORDER — ONDANSETRON 2 MG/ML
4 INJECTION INTRAMUSCULAR; INTRAVENOUS EVERY 4 HOURS PRN
Status: DISCONTINUED | OUTPATIENT
Start: 2019-10-10 | End: 2019-10-17 | Stop reason: HOSPADM

## 2019-10-10 RX ORDER — CLOPIDOGREL BISULFATE 75 MG/1
75 TABLET ORAL DAILY
Status: DISCONTINUED | OUTPATIENT
Start: 2019-10-10 | End: 2019-10-10

## 2019-10-10 RX ORDER — ONDANSETRON 4 MG/1
4 TABLET, ORALLY DISINTEGRATING ORAL EVERY 4 HOURS PRN
Status: DISCONTINUED | OUTPATIENT
Start: 2019-10-10 | End: 2019-10-17 | Stop reason: HOSPADM

## 2019-10-10 RX ORDER — ASPIRIN 81 MG/1
324 TABLET, CHEWABLE ORAL DAILY
Status: DISCONTINUED | OUTPATIENT
Start: 2019-10-10 | End: 2019-10-11

## 2019-10-10 RX ORDER — PROMETHAZINE HYDROCHLORIDE 25 MG/1
12.5-25 TABLET ORAL EVERY 4 HOURS PRN
Status: DISCONTINUED | OUTPATIENT
Start: 2019-10-10 | End: 2019-10-17 | Stop reason: HOSPADM

## 2019-10-10 RX ORDER — AMOXICILLIN 250 MG
2 CAPSULE ORAL 2 TIMES DAILY
Status: DISCONTINUED | OUTPATIENT
Start: 2019-10-10 | End: 2019-10-17 | Stop reason: HOSPADM

## 2019-10-10 RX ORDER — POLYETHYLENE GLYCOL 3350 17 G/17G
1 POWDER, FOR SOLUTION ORAL
Status: DISCONTINUED | OUTPATIENT
Start: 2019-10-10 | End: 2019-10-17 | Stop reason: HOSPADM

## 2019-10-10 RX ORDER — ATORVASTATIN CALCIUM 80 MG/1
80 TABLET, FILM COATED ORAL EVERY EVENING
Status: DISCONTINUED | OUTPATIENT
Start: 2019-10-10 | End: 2019-10-11

## 2019-10-10 RX ORDER — LABETALOL HYDROCHLORIDE 5 MG/ML
10 INJECTION, SOLUTION INTRAVENOUS EVERY 4 HOURS PRN
Status: DISCONTINUED | OUTPATIENT
Start: 2019-10-10 | End: 2019-10-17 | Stop reason: HOSPADM

## 2019-10-10 RX ORDER — ASPIRIN 300 MG/1
300 SUPPOSITORY RECTAL DAILY
Status: DISCONTINUED | OUTPATIENT
Start: 2019-10-10 | End: 2019-10-11

## 2019-10-10 RX ORDER — PROCHLORPERAZINE EDISYLATE 5 MG/ML
5-10 INJECTION INTRAMUSCULAR; INTRAVENOUS EVERY 4 HOURS PRN
Status: DISCONTINUED | OUTPATIENT
Start: 2019-10-10 | End: 2019-10-17 | Stop reason: HOSPADM

## 2019-10-10 RX ORDER — BISACODYL 10 MG
10 SUPPOSITORY, RECTAL RECTAL
Status: DISCONTINUED | OUTPATIENT
Start: 2019-10-10 | End: 2019-10-17 | Stop reason: HOSPADM

## 2019-10-10 RX ORDER — AMLODIPINE BESYLATE 5 MG/1
2.5 TABLET ORAL DAILY
Status: DISCONTINUED | OUTPATIENT
Start: 2019-10-10 | End: 2019-10-10

## 2019-10-10 RX ORDER — ATORVASTATIN CALCIUM 20 MG/1
20 TABLET, FILM COATED ORAL DAILY
Status: DISCONTINUED | OUTPATIENT
Start: 2019-10-10 | End: 2019-10-10

## 2019-10-10 RX ORDER — HYDRALAZINE HYDROCHLORIDE 20 MG/ML
10 INJECTION INTRAMUSCULAR; INTRAVENOUS
Status: DISCONTINUED | OUTPATIENT
Start: 2019-10-10 | End: 2019-10-17 | Stop reason: HOSPADM

## 2019-10-10 RX ORDER — SODIUM CHLORIDE 9 MG/ML
INJECTION, SOLUTION INTRAVENOUS CONTINUOUS
Status: DISCONTINUED | OUTPATIENT
Start: 2019-10-10 | End: 2019-10-12

## 2019-10-10 RX ORDER — ASPIRIN 325 MG
325 TABLET ORAL DAILY
Status: DISCONTINUED | OUTPATIENT
Start: 2019-10-10 | End: 2019-10-11

## 2019-10-10 RX ADMIN — IOHEXOL 80 ML: 350 INJECTION, SOLUTION INTRAVENOUS at 00:22

## 2019-10-10 RX ADMIN — VANCOMYCIN HYDROCHLORIDE 800 MG: 500 INJECTION, POWDER, LYOPHILIZED, FOR SOLUTION INTRAVENOUS at 17:04

## 2019-10-10 RX ADMIN — SODIUM CHLORIDE: 9 INJECTION, SOLUTION INTRAVENOUS at 04:43

## 2019-10-10 RX ADMIN — NICOTINE 7 MG: 7 PATCH, EXTENDED RELEASE TRANSDERMAL at 12:42

## 2019-10-10 RX ADMIN — ASPIRIN 325 MG: 325 TABLET, FILM COATED ORAL at 12:42

## 2019-10-10 RX ADMIN — VANCOMYCIN HYDROCHLORIDE 1300 MG: 500 INJECTION, POWDER, LYOPHILIZED, FOR SOLUTION INTRAVENOUS at 05:16

## 2019-10-10 RX ADMIN — SENNOSIDES, DOCUSATE SODIUM 2 TABLET: 50; 8.6 TABLET, FILM COATED ORAL at 17:03

## 2019-10-10 RX ADMIN — CEFTRIAXONE SODIUM 2 G: 2 INJECTION, POWDER, FOR SOLUTION INTRAMUSCULAR; INTRAVENOUS at 04:42

## 2019-10-10 RX ADMIN — ENOXAPARIN SODIUM 60 MG: 100 INJECTION SUBCUTANEOUS at 17:03

## 2019-10-10 RX ADMIN — SODIUM CHLORIDE: 9 INJECTION, SOLUTION INTRAVENOUS at 17:04

## 2019-10-10 RX ADMIN — ATORVASTATIN CALCIUM 80 MG: 80 TABLET, FILM COATED ORAL at 17:03

## 2019-10-10 RX ADMIN — SODIUM CHLORIDE: 9 INJECTION, SOLUTION INTRAVENOUS at 10:54

## 2019-10-10 RX ADMIN — SODIUM CHLORIDE, POTASSIUM CHLORIDE, SODIUM LACTATE AND CALCIUM CHLORIDE: 600; 310; 30; 20 INJECTION, SOLUTION INTRAVENOUS at 01:16

## 2019-10-10 ASSESSMENT — GAIT ASSESSMENTS: GAIT LEVEL OF ASSIST: UNABLE TO PARTICIPATE

## 2019-10-10 ASSESSMENT — COGNITIVE AND FUNCTIONAL STATUS - GENERAL
MOVING TO AND FROM BED TO CHAIR: A LOT
PERSONAL GROOMING: A LOT
SUGGESTED CMS G CODE MODIFIER MOBILITY: CL
DRESSING REGULAR LOWER BODY CLOTHING: A LOT
SUGGESTED CMS G CODE MODIFIER DAILY ACTIVITY: CK
TOILETING: A LOT
SUGGESTED CMS G CODE MODIFIER MOBILITY: CL
STANDING UP FROM CHAIR USING ARMS: A LOT
WALKING IN HOSPITAL ROOM: A LOT
DRESSING REGULAR LOWER BODY CLOTHING: A LOT
HELP NEEDED FOR BATHING: A LOT
CLIMB 3 TO 5 STEPS WITH RAILING: A LOT
MOVING TO AND FROM BED TO CHAIR: A LOT
CLIMB 3 TO 5 STEPS WITH RAILING: TOTAL
DAILY ACTIVITIY SCORE: 14
WALKING IN HOSPITAL ROOM: A LOT
DAILY ACTIVITIY SCORE: 12
STANDING UP FROM CHAIR USING ARMS: A LOT
MOVING FROM LYING ON BACK TO SITTING ON SIDE OF FLAT BED: UNABLE
DRESSING REGULAR UPPER BODY CLOTHING: A LOT
MOBILITY SCORE: 10
EATING MEALS: A LITTLE
MOBILITY SCORE: 14
TOILETING: TOTAL
SUGGESTED CMS G CODE MODIFIER DAILY ACTIVITY: CL
TURNING FROM BACK TO SIDE WHILE IN FLAT BAD: A LOT
PERSONAL GROOMING: A LOT
MOVING FROM LYING ON BACK TO SITTING ON SIDE OF FLAT BED: A LOT
HELP NEEDED FOR BATHING: A LOT
DRESSING REGULAR UPPER BODY CLOTHING: A LOT

## 2019-10-10 ASSESSMENT — LIFESTYLE VARIABLES
TOTAL SCORE: 0
CONSUMPTION TOTAL: NEGATIVE
ALCOHOL_USE: YES
ON A TYPICAL DAY WHEN YOU DRINK ALCOHOL HOW MANY DRINKS DO YOU HAVE: 1
EVER_SMOKED: YES
DOES PATIENT WANT TO STOP DRINKING: NO
EVER FELT BAD OR GUILTY ABOUT YOUR DRINKING: NO
HOW MANY TIMES IN THE PAST YEAR HAVE YOU HAD 5 OR MORE DRINKS IN A DAY: 0
TOTAL SCORE: 0
HAVE PEOPLE ANNOYED YOU BY CRITICIZING YOUR DRINKING: NO
HAVE YOU EVER FELT YOU SHOULD CUT DOWN ON YOUR DRINKING: NO
TOTAL SCORE: 0
EVER HAD A DRINK FIRST THING IN THE MORNING TO STEADY YOUR NERVES TO GET RID OF A HANGOVER: NO
AVERAGE NUMBER OF DAYS PER WEEK YOU HAVE A DRINK CONTAINING ALCOHOL: 1

## 2019-10-10 ASSESSMENT — ACTIVITIES OF DAILY LIVING (ADL): TOILETING: INDEPENDENT

## 2019-10-10 ASSESSMENT — PAIN SCALES - WONG BAKER
WONGBAKER_NUMERICALRESPONSE: HURTS A LITTLE MORE
WONGBAKER_NUMERICALRESPONSE: HURTS A WHOLE LOT

## 2019-10-10 NOTE — ASSESSMENT & PLAN NOTE
-Please secondary to septic emboli.  -Continue antibiotics for now.   -Holding off on anticoagulation for now.

## 2019-10-10 NOTE — CONSULTS
Neurology    64 yr old man with neuroimaging revealing for stroke.Timeline is unclear.    ED and hospitalist requesting consultation to address secondary stroke prevention measures.    Patient to be seen with full neurology consultation in the morning by the day team.    Sekou Zhang MD  Director, Comprehensive Stroke [facility], [facility]  Neurohospitalist, [facility]  Clinical  of Neurology, [employer]  (t) [phone] (f) [phone]

## 2019-10-10 NOTE — ASSESSMENT & PLAN NOTE
-Likely anemia of chronic disease, with low iron, high ferritin, normal folate and B12.   -Monitor hemoglobin closely, with restrictive transfusion strategy, transfuse if hemoglobin drops below 7.

## 2019-10-10 NOTE — THERAPY
"  Speech Language Therapy Clinical Swallow Evaluation completed.  Functional Status: Patient seen for CSE in the setting of evolving occipital infarct. He was asleep upon arrival to the room, however, was able to rouse easily and agreeable to participation. Oriented to self and location. Unable to correctly state the year or reason for admission. Oral mech exam with mildly reduced ROM of the jaw, improved with PO trials. Mildly reduced ROM of the tongue. Patient has poor dentition. Swallow palpated as complete. PO trials of ice, thin liquids, nectar thick liquids, pureed consistencies, and soft mechanical solids. Patient was impulsive, taking large, consecutive swallows of liquids and large bites of foods, despite frequent cueing. No overt s/sx concerning for aspiration or difficulty with liquids or pureed consistencies. Mastication was timely and swallow palpated as timely, but patient did have throat clearing x2 with a mixed consistency solid. Recommend a modified diet and monitoring during meals to support slowed PO intake. Please contact SLP with any concerns. SLP following. Patient may benefit from cognitive-linguistic eval.    Recommendations - Diet: Diet / Liquid Recommendation: Dysphagia II, Thin Liquid                          Strategies: Monitor during meals, No Straws and Head of Bed at 90 Degrees                          Medication Administration: Medication Administration : Whole with Liquid Wash  Plan of Care: Will benefit from Speech Therapy 3 times per week  Post-Acute Therapy: Recommend outpatient or home health transitional care services for continued speech therapy services      See \"Rehab Therapy-Acute\" Patient Summary Report for complete documentation.   "

## 2019-10-10 NOTE — ASSESSMENT & PLAN NOTE
-Appears to be embolic.  Not a candidate for TPA as unknown onset.  Discontinue asa the patient is a high risk of hemorrhagic conversion according to neurology  -MRI of the brain found multiple cerebellar acute and subacute emboli,  MRI also found a large left PCA infarct  -Monitor closely, with neuro checks every 4 hours per CVA protocol.     PT/OT/SLP to evaluate.   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

## 2019-10-10 NOTE — ED TRIAGE NOTES
Pt arrives via EMS with concern for failure to care for self.  Friends called EMS d/t pt staying in abandoned car for 4-5 days, not eating or moving, not staying warm.  Last PO yesterday. BG 57 for EMS, 15g oral glucose given by EMS.  Pt had unknown heart surgery within last year.   C/o ankle pain and abd pain TTP.  Takes ASA, plavix at home, has albuterol inhaler for asthma.  Denies SI

## 2019-10-10 NOTE — ED NOTES
Pt heard to be moaning in room, call light in lap, pinching end of penis with squirts of urine erupting over blankets and legs.  Cleansing provided, clean sheets provided, pt education on call light use and urinal use re-inforced. Pt indicates understanding.

## 2019-10-10 NOTE — ED NOTES
After RN neuro assessment, pt moaning and with panicked expression on face. Pt able to use urinal with RN assistance to clear blankets out of the way. Clean urinal provided.

## 2019-10-10 NOTE — ED PROVIDER NOTES
ED Provider Note    Scribed for Abhay Sinha M.D. by Sylvain Soto. 10/9/2019, 10:53 PM.    Primary care provider: NEY Ledesma  Means of arrival: EMS  History obtained from: Patient  History limited by: none    CHIEF COMPLAINT  Chief Complaint   Patient presents with   • Failure to Thrive     laying in car for days, not eating       HPI  Sylvain Espinosa is a 64 y.o. male who presents to the Emergency Department for failure to thrive onset several days ago. The patient is homeless and has been staying in a car for the last several days. He complains of abdominal pain in the epigastric region for the last 2 days, and notes it is cramp-like in quality.  He additionally notes that his left ankle is swollen and tender. No associated symptoms noted. No alleviating factors noted. He denies any recent falls. He denies any nausea, vomiting, diarrhea, fevers, chills, cough, and sore throat. He reports a history of coronary artery disease, as well as untreated hepatitis C. He had a TAVR done in March 2019. He states that he takes his daily medications when he can, but that it is difficult for him to get them sometimes    REVIEW OF SYSTEMS  See HPI for further details. All other systems are negative.     PAST MEDICAL HISTORY   has a past medical history of Abnormality of left ventricle of heart, Aortic stenosis, BPH (benign prostatic hyperplasia), Hypercholesteremia, and Hypertension.    SURGICAL HISTORY   has a past surgical history that includes tonsillectomy; zzz cardiac cath; transcatheter aortic valve replacement (N/A, 3/18/2019); and silvina (3/18/2019).    SOCIAL HISTORY  Social History     Tobacco Use   • Smoking status: Former Smoker     Packs/day: 0.50     Years: 40.00     Pack years: 20.00     Types: Cigarettes   • Smokeless tobacco: Never Used   • Tobacco comment: 10 months   Substance Use Topics   • Alcohol use: Yes     Comment: occ   • Drug use: Yes     Types: Inhaled, Marijuana     Comment: marijuana  "current,  meth last use 1 monht ago      Social History     Substance and Sexual Activity   Drug Use Yes   • Types: Inhaled, Marijuana    Comment: marijuana current,  meth last use 1 monht ago       FAMILY HISTORY  Family History   Problem Relation Age of Onset   • Heart Disease Mother        CURRENT MEDICATIONS  Reviewed.  See Encounter Summary.     ALLERGIES  No Known Allergies    PHYSICAL EXAM  VITAL SIGNS: /75   Pulse (!) 103   Resp 20   Ht 1.727 m (5' 8\")   Wt 54.4 kg (120 lb)   BMI 18.25 kg/m²   Constitutional: Alert in no apparent distress.  HENT: No signs of trauma, Bilateral external ears normal, Nose normal.   Eyes: Pupils are equal and reactive, Conjunctiva normal, Non-icteric.   Neck: Normal range of motion, No tenderness, Supple, No stridor.   Cardiovascular: Regular rate and rhythm, no murmurs.   Thorax & Lungs: Normal breath sounds, No respiratory distress, No wheezing, No chest tenderness.   Abdomen: Bowel sounds normal, Soft, No tenderness, No masses, No pulsatile masses. No peritoneal signs.  Skin: Warm, Dry, No erythema, No rash.   Back: No bony tenderness, No CVA tenderness.   Extremities: Intact distal pulses, No edema, No tenderness, No cyanosis  Musculoskeletal: Good range of motion in all major joints. No tenderness to palpation or major deformities noted.   Neurologic: Alert , Normal motor function, Normal sensory function, No focal deficits noted.   Psychiatric: Affect normal, Judgment normal, Mood normal.         DIAGNOSTIC STUDIES / PROCEDURES     LABS  Results for orders placed or performed during the hospital encounter of 10/09/19   CBC WITH DIFFERENTIAL   Result Value Ref Range    WBC 19.4 (H) 4.8 - 10.8 K/uL    RBC 3.64 (L) 4.70 - 6.10 M/uL    Hemoglobin 10.0 (L) 14.0 - 18.0 g/dL    Hematocrit 31.3 (L) 42.0 - 52.0 %    MCV 86.0 81.4 - 97.8 fL    MCH 27.5 27.0 - 33.0 pg    MCHC 31.9 (L) 33.7 - 35.3 g/dL    RDW 44.5 35.9 - 50.0 fL    Platelet Count 296 164 - 446 K/uL    MPV " 9.3 9.0 - 12.9 fL    Neutrophils-Polys 84.10 (H) 44.00 - 72.00 %    Lymphocytes 7.30 (L) 22.00 - 41.00 %    Monocytes 7.70 0.00 - 13.40 %    Eosinophils 0.10 0.00 - 6.90 %    Basophils 0.20 0.00 - 1.80 %    Immature Granulocytes 0.60 0.00 - 0.90 %    Nucleated RBC 0.00 /100 WBC    Neutrophils (Absolute) 16.37 (H) 1.82 - 7.42 K/uL    Lymphs (Absolute) 1.42 1.00 - 4.80 K/uL    Monos (Absolute) 1.49 (H) 0.00 - 0.85 K/uL    Eos (Absolute) 0.01 0.00 - 0.51 K/uL    Baso (Absolute) 0.04 0.00 - 0.12 K/uL    Immature Granulocytes (abs) 0.11 0.00 - 0.11 K/uL    NRBC (Absolute) 0.00 K/uL   COMP METABOLIC PANEL   Result Value Ref Range    Sodium 133 (L) 135 - 145 mmol/L    Potassium 4.4 3.6 - 5.5 mmol/L    Chloride 101 96 - 112 mmol/L    Co2 23 20 - 33 mmol/L    Anion Gap 9.0 0.0 - 11.9    Glucose 114 (H) 65 - 99 mg/dL    Bun 33 (H) 8 - 22 mg/dL    Creatinine 0.96 0.50 - 1.40 mg/dL    Calcium 8.7 8.5 - 10.5 mg/dL    AST(SGOT) 19 12 - 45 U/L    ALT(SGPT) 12 2 - 50 U/L    Alkaline Phosphatase 104 (H) 30 - 99 U/L    Total Bilirubin 0.3 0.1 - 1.5 mg/dL    Albumin 2.9 (L) 3.2 - 4.9 g/dL    Total Protein 7.5 6.0 - 8.2 g/dL    Globulin 4.6 (H) 1.9 - 3.5 g/dL    A-G Ratio 0.6 g/dL   URINALYSIS CULTURE, IF INDICATED   Result Value Ref Range    Color Yellow     Character Clear     Specific Gravity 1.020 <1.035    Ph 5.0 5.0 - 8.0    Glucose Negative Negative mg/dL    Ketones Negative Negative mg/dL    Protein Negative Negative mg/dL    Bilirubin Negative Negative    Urobilinogen, Urine 1.0 Negative    Nitrite Negative Negative    Leukocyte Esterase Negative Negative    Occult Blood Small (A) Negative    Micro Urine Req Microscopic    ESTIMATED GFR   Result Value Ref Range    GFR If African American >60 >60 mL/min/1.73 m 2    GFR If Non African American >60 >60 mL/min/1.73 m 2   URINE DRUG SCREEN   Result Value Ref Range    Amphetamines Urine Negative Negative    Barbiturates Negative Negative    Benzodiazepines Negative Negative     Cocaine Metabolite Negative Negative    Methadone Negative Negative    Opiates Negative Negative    Oxycodone Negative Negative    Phencyclidine -Pcp Negative Negative    Propoxyphene Negative Negative    Cannabinoid Metab Negative Negative   URINE MICROSCOPIC (W/UA)   Result Value Ref Range    WBC 2-5 (A) /hpf    RBC 5-10 (A) /hpf    Bacteria Negative None /hpf    Epithelial Cells Negative /hpf    Hyaline Cast 3-5 (A) /lpf   ACCU-CHEK GLUCOSE   Result Value Ref Range    Glucose - Accu-Ck 121 (H) 65 - 99 mg/dL       All labs were reviewed by me.    RADIOLOGY  CT-ABDOMEN-PELVIS WITH   Final Result         1.  Low-density changes in the inferior pole of the right kidney, appearance most compatible with area of renal infarct.   2.  Fusiform infrarenal abdominal aortic aneurysm, recommend radiographic follow-up and surveillance as clinically appropriate.   3.  Peripherally enhancing low-density right hepatic lobe lesion, appearance most typical of hemangioma.   4.  Scattered emphysema   5.  Cardiomegaly   6.  Atherosclerosis      These findings were discussed with the patient's clinician, Abhay Sinha, on 10/10/2019 1:08 AM.      CT-HEAD W/O   Final Result         1.  New area of low-density in the left occipital lobe tracking along the medial aspect of the left temporal lobe, appearance compatible with evolving infarct.   2.  Multiple peripheral calcified masses, appearance most typical of meningiomas   3.  Hyperdensity in the subcutaneous soft tissues posterior to the neck in the midline, similar to prior study. Could represent thickening of the nuchal ligament or soft tissue mass. Correlate with exam.      DX-ANKLE 2- VIEWS LEFT   Final Result      No radiographic evidence of acute traumatic injury.      DX-CHEST-PORTABLE (1 VIEW)   Final Result         1.  No acute cardiopulmonary disease.   2.  Chronic underlying lung disease with pulmonary blebs of the left lung base.   3.  Atherosclerosis      EC-ECHOCARDIOGRAM  COMPLETE W/O CONT    (Results Pending)     The radiologist's interpretation of all radiological studies and images have been reviewed by me.    COURSE & MEDICAL DECISION MAKING  Pertinent Labs & Imaging studies reviewed. (See chart for details)    10:30 PM - Patient seen and examined at bedside by resident. Patient will be treated with NS infusion 1 L. Ordered UA culture if indicated, CMP, CBC w/, accu-chek glucose, eGFR, DX chest to evaluate his symptoms.     HYDRATION: Based on the patient's presentation of Dehydration the patient was given IV fluids. IV Hydration was used because oral hydration was not adequate alone. Upon recheck following hydration, the patient was improved.    10:53 PM - Discussed with the resident about his findings regarding the patient. Ordered urine drug screen to further evaluate.    11:00 PM - Patient seen and evaluated at bedside. Ordered LR infusion continuous for the patient's dehydration. Ordered CT head, CT abdomen pelvis with contrast, DX ankle left to further evaluate the patient's symptoms.    1:25 AM Spoke with Dr. Wolf, Hospitalist, about the patient's condition. He agrees to admit the patient.    0 300: I spoke with  with neurology about the above CT findings.  At this point we will not proceed with heparin given the initial finding of the renal infarct.  Patient will be given aspirin only.     CRITICAL CARE  The very real possibilty of a deterioration of this patient's condition required the highest level of my preparedness for sudden, emergent intervention.  I provided critical care services, which included medication orders, frequent reevaluations of the patient's condition and response to treatment, ordering and reviewing test results, and discussing the case with various consultants.  The critical care time associated with the care of the patient was 45 minutes. Review chart for interventions. This time is exclusive of any other billable procedures.        Decision Making:  This is a 64 y.o. year old male who presents with above complaint.  History is difficult secondary patient's current mentation and clinical condition.  Patient was started on IV fluids given his initial exam showing dry mucous membranes and evidence of likely dehydration especially given his poor state of health and nutrition as well as hydration.  Prior evaluation confirms TATI.  He also has some anemia started on Lipitor evaluation.  Most clots into the leukocytosis is likely this renal infarct.  Interestingly the patient is on anticoagulation yet this is still happening.  Recent TAVR.  May be having embolic event.  He will be brought in under hospitalist for ongoing inpatient care at this time.    DISPOSITION:  Patient will be admitted to Dr. Wolf in guarded condition.      FINAL IMPRESSION  1. Renal infarct (HCC)    2. Dehydration    3. TATI (acute kidney injury) (HCC)    4. Anemia, unspecified type          I, Sylvain Soto (Robert), am scribing for, and in the presence of, Abhay Sinha M.D..    Electronically signed by: Sylvain Soto (Robert), 10/9/2019    IAbhay M.D. personally performed the services described in this documentation, as scribed by Sylvain Soto in my presence, and it is both accurate and complete. C    The note accurately reflects work and decisions made by me.  Abhay Sinha  10/10/2019  1:56 AM

## 2019-10-10 NOTE — ASSESSMENT & PLAN NOTE
Resolved with ivf   If you are a smoker, it is important for your health to stop smoking. Please be aware that second hand smoke is also harmful.

## 2019-10-10 NOTE — ASSESSMENT & PLAN NOTE
- Noted on imaging.  Not in acute exacerbation.  Continue respiratory protocol, as needed oxygen supplement.

## 2019-10-10 NOTE — THERAPY
"Physical Therapy Evaluation completed.   Bed Mobility:  Supine to Sit: Moderate Assist (HOB elevated, used bed rail)  Transfers: Sit to Stand: Moderate Assist  Gait: Level Of Assist: Unable to Participate  Plan of Care: Will benefit from Physical Therapy 5 times per week  Discharge Recommendations: Equipment: Will Continue to Assess for Equipment Needs. Post-acute therapy: see below.    See \"Rehab Therapy-Acute\" Patient Summary Report for complete documentation.    Patient is a 63 YO male that was admitted 10/10 following complaints of abdominal pain and ankle pain and with AMS. PMHx significant for HTN, meth use, TAVR, hep C, hemangioma of liver, BPH. Imaging revealed several cerebral and cerebellar infarcts. He presented to PT with pain, impaired motor control, impaired balance and coordination, impaired attention and cognition, functional weakness, and decreased activity tolerance which are limiting his ability to safely perform mobility. He required extra time and repetitive verbal cueing for all mobility and complained of L ankle/leg pain throughout session. He performed supine>sitting EOB with mod A and was able to perform transfer EOB to chair with FWW, hop to gait, and mod A. Recommend post acute placement prior to return home. Will follow.  "

## 2019-10-10 NOTE — PROGRESS NOTES
"Pharmacy Kinetics 64 y.o. male on vancomycin day # 1 10/10/2019    Vancomycin New Start    1300 mg iv loading dose administered 10/10/19 @ 0516    Provider specified end date: TBD     Indication for Treatment:   Rule out bacteremia / infective endocarditis     Pertinent history per medical record:   Admitted on 10/9/2019 for concern regarding failure to thrive and care for self. Pt's friends called EMS as pt has been residing in an abandoned vehicle for the last several days, without moving around much or eating. He is homeless. He complains of abdominal pain. Head CT demonstrates an area of evolving infarct, likely a subacute CVA. CT A/P shows renal infarct. Given multiple areas of potential embolic showering and leukocytosis, empiric antibiotics have been initiated.     Imaging studies:   10/9 CXR: no acute cardiopulmonary process     10/10 CT head: new area of low-density in the left occipital lobe tracking along the medial aspect of the left temporal lobe, appearance compatible with evolving infarct     10/10 CT A/P: Low-density changes in the inferior pole of the right kidney, appearance most compatible with area of renal infarct     Other antibiotics:   Rocephin 2 gm IV q24h     Allergies:  Patient has no known allergies.     List concerns for renal function:   Frailty, IV contrast 10/10, BUN:SCr > 20:1, hypoalbuminemia/malnourishment    Pertinent cultures to date:   10/10/19 PBCs - in process     MRSA nares swab if pneumonia is a concern (ordered/positive/negative/n-a): NA    Recent Labs     10/09/19  2230   WBC 19.4*   NEUTSPOLYS 84.10*     Recent Labs     10/09/19  2230   BUN 33*   CREATININE 0.96   ALBUMIN 2.9*     Intake/Output Summary (Last 24 hours) at 10/10/2019 0322  Last data filed at 10/10/2019 0116  Gross per 24 hour   Intake 1000 ml   Output --   Net 1000 ml      /71   Pulse (!) 112   Resp 18   Ht 1.727 m (5' 8\")   Wt 52.3 kg (115 lb 4.8 oz)   SpO2 97%  No data recorded.      A/P  "   1. Vancomycin dose change: start 800 mg IV q12h (04, 16)   2. Next vancomycin level: 10/11 @ 1500 (ordered)   3. Goal trough: 16-20  4. Comments: Patient with multiple risk factors for drug accumulation and associated toxicity noted, primarily body habitus and history consistent with malnourishment. Will initiate q12h regimen and recommend checking trough prior to the fourth total dose (before steady state is achieved) to ensure adequate drug clearance. Pharmacy will continue to monitor and adjust dosing or recommend de-escalation as appropriate.        Ze Sinha, PharmD

## 2019-10-10 NOTE — THERAPY
"Occupational Therapy Evaluation completed.   Functional Status:  Max A supine to sit.  Max A sock management.  Pt attempted stand with max A but unable to achieve full stand due to L ankle pain.  Mod A gown change.  Pt's UE's appear weak but equal.  Pt reporting impaired vision and had difficulty tracking across midline to L.  Max A to return to supine.  Plan of Care: Will benefit from Occupational Therapy 5 times per week  Discharge Recommendations:  Equipment: Will Continue to Assess for Equipment Needs. .Recommend post-acute placement for additional occupational therapy services prior to discharge home. Patient can tolerate post-acute therapies at a 5x/week frequency.    Pt is 63 y/o M seen for OT evaluation.  Pt admitted with several embolic infarcts, including occipital and temporal lobes, and a renal infarct.  Pt has hx of HTN, meth use, TAVR, hep C, liver hemangioma, AAA, and COPD.  Pt currently presents with confusion, weakness, and impaired vision; impacting self-care and functional mobility.  Pt also has ankle pain which limited OOB activity.  Pt will continue to benefit from acute OT services.     See \"Rehab Therapy-Acute\" Patient Summary Report for complete documentation.    "

## 2019-10-10 NOTE — H&P
Hospital Medicine History & Physical Note    Date of Service  10/10/2019    Primary Care Physician  GARETH Ledesma.    Consultants  none    Code Status  full    Chief Complaint  abd pain, failure to thrive    History of Presenting Illness  64 y.o. male who presented 10/9/2019 with past medical history of hypertension, methamphetamine abuse, previous history of TAVR, hepatitis C and hemangioma of the liver who presents altered mental status.  At the time my examination this patient is disheveled in appearance and is a very poor historian.  He has multiple complaints including abdominal pain and ankle pain.  He denies any numbness tingling or weakness.  Denies any changes in speech. he has been staying in an abandoned car, for the last for 5 days apparently, he is homeless.  Otherwise he has no known alleviating or exacerbating factors to his symptoms history is significantly limited due to patient's change in mental status and poor historian.  In the emergency department he is found to have multiple infarcts and will be admitted to the hospital for further management.    Review of Systems  Review of Systems   Unable to perform ROS: Mental status change       Past Medical History   has a past medical history of Abnormality of left ventricle of heart, Aortic stenosis, BPH (benign prostatic hyperplasia), Hypercholesteremia, and Hypertension.    Surgical History   has a past surgical history that includes tonsillectomy; zzz cardiac cath; transcatheter aortic valve replacement (N/A, 3/18/2019); and silvina (3/18/2019).     Family History  family history includes Heart Disease in his mother.     Social History   reports that he has quit smoking. His smoking use included cigarettes. He has a 20.00 pack-year smoking history. He has never used smokeless tobacco. He reports that he drinks alcohol. He reports that he has current or past drug history. Drugs: Inhaled and Marijuana.    Allergies  No Known  Allergies    Medications  Prior to Admission Medications   Prescriptions Last Dose Informant Patient Reported? Taking?   Cyanocobalamin (B-12) 2500 MCG Tab  Rx Bottle (For Med Information) Yes No   Sig: Take 1 Tab by mouth every day.   Multiple Vitamins-Minerals (CENTRUM SILVER 50+MEN) Tab  Rx Bottle (For Med Information) Yes No   Sig: Take 1 Tab by mouth every day.   amLODIPine (NORVASC) 2.5 MG Tab  Rx Bottle (For Med Information) No No   Sig: Take 1 Tab by mouth every day.   aspirin EC (ECOTRIN) 81 MG Tablet Delayed Response  Rx Bottle (For Med Information) Yes No   Sig: Take 81 mg by mouth every day.   atorvastatin (LIPITOR) 20 MG Tab  Rx Bottle (For Med Information) Yes No   Sig: Take 20 mg by mouth every day.   clopidogrel (PLAVIX) 75 MG Tab   No No   Sig: Take 1 Tab by mouth every day.   furosemide (LASIX) 20 MG Tab   No No   Sig: Take 1 Tab by mouth every day.   potassium chloride SA (K-DUR) 10 MEQ Tab CR  Rx Bottle (For Med Information) No No   Sig: Take 1 Tab by mouth every day.      Facility-Administered Medications: None       Physical Exam  Pulse:  [103-115] 111  Resp:  [18-20] 18  BP: (108-140)/(63-88) 140/63  SpO2:  [92 %-98 %] 97 %    Physical Exam   Constitutional:   Disheveled and unkept   HENT:   Head: Normocephalic and atraumatic.   Eyes: Pupils are equal, round, and reactive to light. Conjunctivae and EOM are normal.   Neck: Normal range of motion. Neck supple. No JVD present.   Cardiovascular: Normal rate, regular rhythm, normal heart sounds and intact distal pulses.   No murmur heard.  Pulmonary/Chest: Effort normal and breath sounds normal. No respiratory distress. He exhibits no tenderness.   Abdominal: Soft. Bowel sounds are normal. He exhibits no distension. There is tenderness.   Mild suprapubic ttp    Musculoskeletal: Normal range of motion. He exhibits no edema.   Neurological: He is alert. No cranial nerve deficit. He exhibits normal muscle tone.   No focal deficits, no changes in  speech or facial droop   Skin: Skin is warm and dry. No erythema.   Psychiatric:   Abnormal thought content, poor historian    Nursing note and vitals reviewed.      Laboratory:  Recent Labs     10/09/19  2230   WBC 19.4*   RBC 3.64*   HEMOGLOBIN 10.0*   HEMATOCRIT 31.3*   MCV 86.0   MCH 27.5   MCHC 31.9*   RDW 44.5   PLATELETCT 296   MPV 9.3     Recent Labs     10/09/19  2230   SODIUM 133*   POTASSIUM 4.4   CHLORIDE 101   CO2 23   GLUCOSE 114*   BUN 33*   CREATININE 0.96   CALCIUM 8.7     Recent Labs     10/09/19  2230   ALTSGPT 12   ASTSGOT 19   ALKPHOSPHAT 104*   TBILIRUBIN 0.3   GLUCOSE 114*         No results for input(s): NTPROBNP in the last 72 hours.      No results for input(s): TROPONINT in the last 72 hours.    Urinalysis:    Recent Labs     10/09/19  2356   SPECGRAVITY 1.020   GLUCOSEUR Negative   KETONES Negative   NITRITE Negative   LEUKESTERAS Negative   WBCURINE 2-5*   RBCURINE 5-10*   BACTERIA Negative   EPITHELCELL Negative        Imaging:  CT-ABDOMEN-PELVIS WITH   Final Result         1.  Low-density changes in the inferior pole of the right kidney, appearance most compatible with area of renal infarct.   2.  Fusiform infrarenal abdominal aortic aneurysm, recommend radiographic follow-up and surveillance as clinically appropriate.   3.  Peripherally enhancing low-density right hepatic lobe lesion, appearance most typical of hemangioma.   4.  Scattered emphysema   5.  Cardiomegaly   6.  Atherosclerosis      These findings were discussed with the patient's clinician, Abhay iSnha, on 10/10/2019 1:08 AM.      CT-HEAD W/O   Final Result         1.  New area of low-density in the left occipital lobe tracking along the medial aspect of the left temporal lobe, appearance compatible with evolving infarct.   2.  Multiple peripheral calcified masses, appearance most typical of meningiomas   3.  Hyperdensity in the subcutaneous soft tissues posterior to the neck in the midline, similar to prior study.  Could represent thickening of the nuchal ligament or soft tissue mass. Correlate with exam.      DX-ANKLE 2- VIEWS LEFT   Final Result      No radiographic evidence of acute traumatic injury.      DX-CHEST-PORTABLE (1 VIEW)   Final Result         1.  No acute cardiopulmonary disease.   2.  Chronic underlying lung disease with pulmonary blebs of the left lung base.   3.  Atherosclerosis      EC-ECHOCARDIOGRAM COMPLETE W/O CONT    (Results Pending)   MR-BRAIN-W/O    (Results Pending)         Assessment/Plan:  I anticipate this patient will require at least two midnights for appropriate medical management, necessitating inpatient admission.    * Embolic infarction (HCC)  Assessment & Plan  Unclear etiology of this patients infarctions, he has acute evolving occipital infarction as well as renal infarction  He will need blood cultures, empiric abx for now  Echocardiogram   Will hold on anticoagulation at this time due to inability to accurately diagnose the infarctions, could be septic emboli  Cardiac monitoring to r/o a fibb   Neuro consulted by ERP   May need ID consult in the am depending on results   HIV test ordered    HTN (hypertension)- (present on admission)  Assessment & Plan  Allowing for permissive htn     Meningioma (HCC)  Assessment & Plan  Multiple meningioma noted on CT scan, check MRI for further evaluation     AAA (abdominal aortic aneurysm) (HCC)  Assessment & Plan  Noted on CT scan, needs oupatient follow up     COPD (chronic obstructive pulmonary disease) (HCC)  Assessment & Plan  Noted on imaging, needs outpatient follow up    Infarction of temporal lobe (HCC)  Assessment & Plan  Unknown timeline of onset  Will cont with asa full dose, statin   Echo, MRI brain   CTA head and neck   Permissive htn for now  A1c, lipid pnael   Cont with Empiric abx as this could be infectious in origin  Neuro checks    Leukocytosis  Assessment & Plan  Unclear if this is infectious vs due to infarct  Cont to  montior    Hyponatremia  Assessment & Plan  Hypovolemic, cont with IVF and monitor    Elevated BUN  Assessment & Plan  Appears hypovolemic, cont with IVF and montior    Renal infarct (HCC)  Assessment & Plan  Renal infarction unclear etiology   Cont with abx   Consider anticoagulation as appropriate    S/P TAVR (transcatheter aortic valve replacement)- (present on admission)  Assessment & Plan  Hx of     History of methamphetamine use- (present on admission)  Assessment & Plan  Hx of, denies any use at this time     Anemia- (present on admission)  Assessment & Plan  Lab workup ordered, no evidence of acute bleeding   Cont to montior    Hemangioma of liver- (present on admission)  Assessment & Plan  Noted on Ct scan and hx of     Hepatitis C- (present on admission)  Assessment & Plan  Hx of       VTE prophylaxis: scd

## 2019-10-10 NOTE — ED NOTES
RN informed by assist RN that pt scooted to end of bed, sat up at end of bed, and had small episode of incontinence onto floor. Urinal provided, pt able to urinate into urinal. Pt in NAD. Awaiting transport to floor.

## 2019-10-10 NOTE — ED NOTES
Medication Reconciliation updated and complete per pt at bedside  Allergies reviewed and updated          Pt reports NO Prescription or OTC medications

## 2019-10-10 NOTE — ASSESSMENT & PLAN NOTE
-Multiple meningioma noted.  Appears stable.  Needs continued outpatient surveillance and monitoring.

## 2019-10-10 NOTE — ASSESSMENT & PLAN NOTE
Has both renal and cerebral infarct.  Vegetation seen on cardiac echo  Cultures positive for gram-positive cocci  Continue vancomycin  Place PICC 10/16, BCx negative

## 2019-10-10 NOTE — PROGRESS NOTES
Report received from ED RN. Updated on POC.  Assumed care of patient upon arrival to unit. Patient currently A & O x 2; on roomair; up x2 assist, unsteady; with complaints of acute ankle pain with movement. Pt placed on monitor, monitor room notified, . Patient oriented to unit and to call light system. Call light within reach. Pt educated to fall risk. Fall precautions in place. Pt provided with personal grooming items. Bed locked and in lowest position. All questions answered. No other needs indicated at this time.

## 2019-10-10 NOTE — PROGRESS NOTES
Hospital Medicine Daily Progress Note    Date of Service  10/10/2019    Chief Complaint  Altered mentation    Hospital Course    64 y.o. male with hypertension, meth use, history of TAVR, chronic hepatitis C, admitted 10/9/2019 with altered mentation, along with multiple complaints of abdominal pain, and ankle pain.  Reportedly, he has been staying in an abandoned car for the last 5 days and currently homeless.  CT scan of the head in the ED showed multiple infarcts in the left occipital lobe, and left temporal lobe.  Initial labs showed WBC of 19,400, and sodium of 133.  Urinalysis did not show any significant pyuria.  CT scan of the abdomen pelvis also showed low-density changes in the inferior pole of the right kidney, most compatible with area of renal infarct, along with fusiform infrarenal abdominal aortic aneurysm, and liver hemangioma.  Ankle x-ray showed no acute traumatic injury.  Chest x-ray showed nothing acute.  Embolic infarcts were suspected, patient started on empiric antibiotics.  Echocardiogram was ordered, and HIV was sent. Neurology was consulted, and he started on aspirin and statin.  He is also started on IV fluids.           Interval Problem Update  10/10/2019 -patient seen and admitted by my partner, Dr. Wolf this morning.  I reviewed the patient's chart. There were no significant overnight events. Remains hemodynamically stable and afebrile.  He is tachycardic. Stable on RA.  Iron is low at less than 10.  Ferritin is high at 180.  B12 and folate are normal.  Urine drug screen was negative.  Brain MRI showed multifocal acute/subacute cerebral and cerebellar infarcts involving multiple vascular distributions most suggestive of embolic etiology, along with multiple small dural based masses most consistent with calcified meningiomas.      Consultants/Specialty  Neurology    Code Status  Full    Disposition  Monitor on telemetry    Review of Systems  ROS     Physical Exam  Temp:  [37.6 °C (99.7  °F)-37.9 °C (100.2 °F)] 37.8 °C (100 °F)  Pulse:  [] 108  Resp:  [18-20] 18  BP: (108-176)/(63-93) 160/93  SpO2:  [92 %-100 %] 95 %    Physical Exam    Fluids    Intake/Output Summary (Last 24 hours) at 10/10/2019 1316  Last data filed at 10/10/2019 0600  Gross per 24 hour   Intake 1100 ml   Output 500 ml   Net 600 ml       Laboratory  Recent Labs     10/09/19  2230   WBC 19.4*   RBC 3.64*   HEMOGLOBIN 10.0*   HEMATOCRIT 31.3*   MCV 86.0   MCH 27.5   MCHC 31.9*   RDW 44.5   PLATELETCT 296   MPV 9.3     Recent Labs     10/09/19  2230   SODIUM 133*   POTASSIUM 4.4   CHLORIDE 101   CO2 23   GLUCOSE 114*   BUN 33*   CREATININE 0.96   CALCIUM 8.7                   Imaging  MR-BRAIN-W/O   Final Result      1.  Multifocal acute/subacute cerebral and cerebellar infarcts involving multiple vascular distributions most suggestive of embolic etiology. This includes moderate to large left PCA infarct with associated small areas of hemorrhage in the left thalamus    and parieto-occipital region.   2.  Mild age-related atrophy.   3.  Multiple small dural based masses most consistent with calcified meningiomas correlating with head CT findings.      CT-ABDOMEN-PELVIS WITH   Final Result         1.  Low-density changes in the inferior pole of the right kidney, appearance most compatible with area of renal infarct.   2.  Fusiform infrarenal abdominal aortic aneurysm, recommend radiographic follow-up and surveillance as clinically appropriate.   3.  Peripherally enhancing low-density right hepatic lobe lesion, appearance most typical of hemangioma.   4.  Scattered emphysema   5.  Cardiomegaly   6.  Atherosclerosis      These findings were discussed with the patient's clinician, Abhay Sinha, on 10/10/2019 1:08 AM.      CT-HEAD W/O   Final Result         1.  New area of low-density in the left occipital lobe tracking along the medial aspect of the left temporal lobe, appearance compatible with evolving infarct.   2.  Multiple  peripheral calcified masses, appearance most typical of meningiomas   3.  Hyperdensity in the subcutaneous soft tissues posterior to the neck in the midline, similar to prior study. Could represent thickening of the nuchal ligament or soft tissue mass. Correlate with exam.      DX-ANKLE 2- VIEWS LEFT   Final Result      No radiographic evidence of acute traumatic injury.      DX-CHEST-PORTABLE (1 VIEW)   Final Result         1.  No acute cardiopulmonary disease.   2.  Chronic underlying lung disease with pulmonary blebs of the left lung base.   3.  Atherosclerosis      EC-ECHOCARDIOGRAM COMPLETE W/O CONT    (Results Pending)   US-CAROTID DOPPLER BILAT    (Results Pending)        Assessment/Plan  * CVA (cerebral vascular accident) (HCC)- (present on admission)  Assessment & Plan  -Appears to be embolic.  Not a candidate for TPA as unknown onset.  -Continue empiric full dose aspitin, along with high-intensity statin with Lipitor 80 mg daily.  Check lipid profile and hemoglobin A1c for further risk stratification.  -will obtain MRI of the brain to rule out acute CVA.  Will also get echocardiogram and carotid US for completion.  Monitor on telemetry to rule out occult arrhythmia/A. fib.  -Monitor closely, with neuro checks every 4 hours per CVA protocol.  -Maintain on permissive hypertension in the acute phase in the next 24-48 hour, and allow blood pressure to go as high as 220/120 to preserve cerebral perfusion. Hold home antihypertensives for now.   -will get PT/OT/SLP to evaluate.   -Continue on empiric antibiotics in case this is infectious in origin.  Follow blood cultures.    Embolic infarction (HCC)- (present on admission)  Assessment & Plan  -Unclear etiology.  Has both renal and cerebral infarct. ?  Infectious.  -Continue empiric antibiotics for now.  Follow blood cultures.  Check echocardiogram.  -Holding off on anticoagulation, unless blood clot or atrial fibrillation is found.   -Pending HIV  testing.    Renal infarct (HCC)- (present on admission)  Assessment & Plan  -Unclear etiology, but likely embolic. ?  Infectious versus atherosclerotic.  -Continue empiric antibiotics for now.  Follow blood cultures.  -Holding off on anticoagulation for now.     Leukocytosis- (present on admission)  Assessment & Plan  -Unclear if this is infectious vs stress related due to infarct.  -Continue empiric antibiotics for now.  Follow blood cultures.  Trend WBC count.  Watch for fevers.    Hyponatremia- (present on admission)  Assessment & Plan  -Likely due to hypovolemia, may have component of SIADH due to acute intracranial process.   -Continue IV fluids.  -Monitor sodium levels.    Elevated BUN- (present on admission)  Assessment & Plan  -Likely due to hypovolemia.  Continue IV fluids.  BMP in the morning .    Meningioma (HCC)- (present on admission)  Assessment & Plan  -Multiple meningioma noted.  Appears stable.  Needs continued outpatient surveillance and monitoring.    AAA (abdominal aortic aneurysm) (HCC)- (present on admission)  Assessment & Plan  -Needs continued outpatient surveillance and follow-up.    COPD (chronic obstructive pulmonary disease) (HCC)- (present on admission)  Assessment & Plan  - Noted on imaging.  Not in acute exacerbation.  Continue respiratory protocol, as needed oxygen supplement.    S/P TAVR (transcatheter aortic valve replacement)- (present on admission)  Assessment & Plan  -Stable.  Outpatient follow-up.  -Check echocardiogram.    History of methamphetamine use- (present on admission)  Assessment & Plan  -Urine drug screen is negative.    Anemia- (present on admission)  Assessment & Plan  -Likely anemia of chronic disease, with low iron, high ferritin, normal folate and B12.   -Monitor hemoglobin closely, with restrictive transfusion strategy, transfuse if hemoglobin drops below 7.    Hemangioma of liver- (present on admission)  Assessment & Plan  -Stable.  Monitor.    Hepatitis C-  (present on admission)  Assessment & Plan  -Chronic.  Outpatient follow-up.    HTN (hypertension)- (present on admission)  Assessment & Plan  -Holding antihypertensives for now for permissive hypertension.  Monitor blood pressure trend closely with as needed IV labetalol and hydralazine for blood pressure beyond 220/120.       VTE prophylaxis: lovenox SQ

## 2019-10-10 NOTE — CARE PLAN
Problem: Safety  Goal: Will remain free from injury  Outcome: PROGRESSING AS EXPECTED  Note:   Pt presenting with generalized weakness and excessive discomfort with movement of lower extremities. Pt educated to fall risk. Fall precautions in place. Seizure precautions in place.     Problem: Communication  Goal: The ability to communicate needs accurately and effectively will improve  Outcome: PROGRESSING SLOWER THAN EXPECTED  Intervention: [place] patient and significant other/support system to call light to alert staff of needs  Flowsheets (Taken 10/10/2019 1207)  Oriented to:: All of the Following : Location of Bathroom, Visiting Policy, Unit Routine, Call Light and Bedside Controls, Bedside Rail Policy, Smoking Policy, Rights and Responsibilities, Bedside Report, and Patient Education Notebook  Note:   Attempted orienting pt to unit and to call light system. Pt highly fatigued and disoriented at this time. Pt did not verbalize understanding  Intervention: Educate patient and significant other/support system about the plan of care, procedures, treatments, medications and allow for questions  Flowsheets (Taken 10/10/2019 1207)  Pt & Family Have Been Educated on Methods Available to Report Concerns Related to Care, Treatment, Services, and Patient Safety Issues: Yes  Note:   Attempted to discuss daily POC. Attempted to discuss neuro-checks and NIHSS procedures. Pt highly fatigued at this time. Pt disoriented to situation and time. Will continue to reorient and monitor.

## 2019-10-10 NOTE — DISCHARGE PLANNING
Acute [facility] Transitional Care Coordination     Referral from:  Dr. Wolf   Facesheet indicates: Medicaid     Potential Rehab Diagnosis: Per MRI Multifocal acute/subacute cerebral and cerebellar infarcts involving multiple vascular distributions most suggestive of embolic etiology. This includes moderate to large left PCA infarct with associated small areas of hemorrhage in the left thalamus   and parieto-occipital region       D/C support: per face sheet    [address]   Indicating limited support.     Will follow for additional work up therapy evaluation and SW evaluation.      Physiatry consultation pendedper protocol.        Free Text:      Thank you for referral.

## 2019-10-11 ENCOUNTER — HOSPITAL ENCOUNTER (INPATIENT)
Facility: REHABILITATION | Age: 64
End: 2019-10-11
Attending: PHYSICAL MEDICINE & REHABILITATION | Admitting: PHYSICAL MEDICINE & REHABILITATION
Payer: MEDICAID

## 2019-10-11 ENCOUNTER — APPOINTMENT (OUTPATIENT)
Dept: RADIOLOGY | Facility: MEDICAL CENTER | Age: 64
DRG: 314 | End: 2019-10-11
Attending: INTERNAL MEDICINE
Payer: MEDICAID

## 2019-10-11 ENCOUNTER — APPOINTMENT (OUTPATIENT)
Dept: RADIOLOGY | Facility: MEDICAL CENTER | Age: 64
DRG: 314 | End: 2019-10-11
Attending: PHYSICAL MEDICINE & REHABILITATION
Payer: MEDICAID

## 2019-10-11 ENCOUNTER — PATIENT OUTREACH (OUTPATIENT)
Dept: HEALTH INFORMATION MANAGEMENT | Facility: OTHER | Age: 64
End: 2019-10-11

## 2019-10-11 PROBLEM — I99.8 ISCHEMIC LEG: Status: ACTIVE | Noted: 2019-10-11

## 2019-10-11 PROBLEM — I33.0 ACUTE BACTERIAL ENDOCARDITIS: Status: ACTIVE | Noted: 2019-10-10

## 2019-10-11 LAB
ALBUMIN SERPL BCP-MCNC: 2.2 G/DL (ref 3.2–4.9)
ALBUMIN/GLOB SERPL: 0.6 G/DL
ALP SERPL-CCNC: 72 U/L (ref 30–99)
ALT SERPL-CCNC: 7 U/L (ref 2–50)
ANION GAP SERPL CALC-SCNC: 5 MMOL/L (ref 0–11.9)
AST SERPL-CCNC: 12 U/L (ref 12–45)
BASOPHILS # BLD AUTO: 0.2 % (ref 0–1.8)
BASOPHILS # BLD: 0.03 K/UL (ref 0–0.12)
BILIRUB SERPL-MCNC: 0.3 MG/DL (ref 0.1–1.5)
BUN SERPL-MCNC: 17 MG/DL (ref 8–22)
CALCIUM SERPL-MCNC: 7.6 MG/DL (ref 8.5–10.5)
CHLORIDE SERPL-SCNC: 103 MMOL/L (ref 96–112)
CHOLEST SERPL-MCNC: 120 MG/DL (ref 100–199)
CO2 SERPL-SCNC: 21 MMOL/L (ref 20–33)
CREAT SERPL-MCNC: 0.75 MG/DL (ref 0.5–1.4)
CRP SERPL HS-MCNC: 7.77 MG/DL (ref 0–0.75)
EOSINOPHIL # BLD AUTO: 0.06 K/UL (ref 0–0.51)
EOSINOPHIL NFR BLD: 0.3 % (ref 0–6.9)
ERYTHROCYTE [DISTWIDTH] IN BLOOD BY AUTOMATED COUNT: 43.3 FL (ref 35.9–50)
ERYTHROCYTE [SEDIMENTATION RATE] IN BLOOD BY WESTERGREN METHOD: 96 MM/HOUR (ref 0–20)
GLOBULIN SER CALC-MCNC: 3.5 G/DL (ref 1.9–3.5)
GLUCOSE SERPL-MCNC: 102 MG/DL (ref 65–99)
HCT VFR BLD AUTO: 25 % (ref 42–52)
HDLC SERPL-MCNC: 11 MG/DL
HGB BLD-MCNC: 8.1 G/DL (ref 14–18)
IMM GRANULOCYTES # BLD AUTO: 0.14 K/UL (ref 0–0.11)
IMM GRANULOCYTES NFR BLD AUTO: 0.8 % (ref 0–0.9)
LDLC SERPL CALC-MCNC: 81 MG/DL
LYMPHOCYTES # BLD AUTO: 1.55 K/UL (ref 1–4.8)
LYMPHOCYTES NFR BLD: 8.8 % (ref 22–41)
MCH RBC QN AUTO: 27.1 PG (ref 27–33)
MCHC RBC AUTO-ENTMCNC: 32.3 G/DL (ref 33.7–35.3)
MCV RBC AUTO: 84.1 FL (ref 81.4–97.8)
MONOCYTES # BLD AUTO: 1.28 K/UL (ref 0–0.85)
MONOCYTES NFR BLD AUTO: 7.2 % (ref 0–13.4)
NEUTROPHILS # BLD AUTO: 14.64 K/UL (ref 1.82–7.42)
NEUTROPHILS NFR BLD: 82.7 % (ref 44–72)
NRBC # BLD AUTO: 0 K/UL
NRBC BLD-RTO: 0 /100 WBC
OSMOLALITY SERPL: 274 MOSM/KG H2O (ref 278–298)
PLATELET # BLD AUTO: 255 K/UL (ref 164–446)
PMV BLD AUTO: 9.4 FL (ref 9–12.9)
POTASSIUM SERPL-SCNC: 3.9 MMOL/L (ref 3.6–5.5)
PROCALCITONIN SERPL-MCNC: 0.16 NG/ML
PROT SERPL-MCNC: 5.7 G/DL (ref 6–8.2)
RBC # BLD AUTO: 2.95 M/UL (ref 4.7–6.1)
SODIUM SERPL-SCNC: 129 MMOL/L (ref 135–145)
TRIGL SERPL-MCNC: 142 MG/DL (ref 0–149)
VANCOMYCIN TROUGH SERPL-MCNC: 11 UG/ML (ref 10–20)
WBC # BLD AUTO: 17.7 K/UL (ref 4.8–10.8)

## 2019-10-11 PROCEDURE — 73610 X-RAY EXAM OF ANKLE: CPT | Mod: LT

## 2019-10-11 PROCEDURE — A9270 NON-COVERED ITEM OR SERVICE: HCPCS | Performed by: HOSPITALIST

## 2019-10-11 PROCEDURE — 80053 COMPREHEN METABOLIC PANEL: CPT

## 2019-10-11 PROCEDURE — 770020 HCHG ROOM/CARE - TELE (206)

## 2019-10-11 PROCEDURE — 92526 ORAL FUNCTION THERAPY: CPT

## 2019-10-11 PROCEDURE — 36415 COLL VENOUS BLD VENIPUNCTURE: CPT

## 2019-10-11 PROCEDURE — 85652 RBC SED RATE AUTOMATED: CPT

## 2019-10-11 PROCEDURE — 80202 ASSAY OF VANCOMYCIN: CPT

## 2019-10-11 PROCEDURE — 83930 ASSAY OF BLOOD OSMOLALITY: CPT

## 2019-10-11 PROCEDURE — 99255 IP/OBS CONSLTJ NEW/EST HI 80: CPT | Performed by: INTERNAL MEDICINE

## 2019-10-11 PROCEDURE — 80061 LIPID PANEL: CPT

## 2019-10-11 PROCEDURE — 85025 COMPLETE CBC W/AUTO DIFF WBC: CPT

## 2019-10-11 PROCEDURE — 87040 BLOOD CULTURE FOR BACTERIA: CPT

## 2019-10-11 PROCEDURE — 700102 HCHG RX REV CODE 250 W/ 637 OVERRIDE(OP): Performed by: HOSPITALIST

## 2019-10-11 PROCEDURE — 93880 EXTRACRANIAL BILAT STUDY: CPT

## 2019-10-11 PROCEDURE — 700105 HCHG RX REV CODE 258: Performed by: HOSPITALIST

## 2019-10-11 PROCEDURE — 700111 HCHG RX REV CODE 636 W/ 250 OVERRIDE (IP): Performed by: HOSPITALIST

## 2019-10-11 PROCEDURE — 99254 IP/OBS CNSLTJ NEW/EST MOD 60: CPT | Performed by: INTERNAL MEDICINE

## 2019-10-11 PROCEDURE — 97530 THERAPEUTIC ACTIVITIES: CPT

## 2019-10-11 PROCEDURE — 99232 SBSQ HOSP IP/OBS MODERATE 35: CPT | Performed by: PSYCHIATRY & NEUROLOGY

## 2019-10-11 PROCEDURE — 76882 US LMTD JT/FCL EVL NVASC XTR: CPT | Mod: LT

## 2019-10-11 PROCEDURE — 84145 PROCALCITONIN (PCT): CPT

## 2019-10-11 PROCEDURE — 99233 SBSQ HOSP IP/OBS HIGH 50: CPT | Performed by: HOSPITALIST

## 2019-10-11 PROCEDURE — 99254 IP/OBS CNSLTJ NEW/EST MOD 60: CPT | Performed by: PHYSICAL MEDICINE & REHABILITATION

## 2019-10-11 PROCEDURE — 86140 C-REACTIVE PROTEIN: CPT

## 2019-10-11 PROCEDURE — 700111 HCHG RX REV CODE 636 W/ 250 OVERRIDE (IP): Performed by: INTERNAL MEDICINE

## 2019-10-11 RX ORDER — ACETAMINOPHEN 325 MG/1
650 TABLET ORAL EVERY 6 HOURS PRN
Status: DISCONTINUED | OUTPATIENT
Start: 2019-10-11 | End: 2019-10-17 | Stop reason: HOSPADM

## 2019-10-11 RX ORDER — ATORVASTATIN CALCIUM 40 MG/1
40 TABLET, FILM COATED ORAL EVERY EVENING
Status: DISCONTINUED | OUTPATIENT
Start: 2019-10-12 | End: 2019-10-17 | Stop reason: HOSPADM

## 2019-10-11 RX ADMIN — NICOTINE 7 MG: 7 PATCH, EXTENDED RELEASE TRANSDERMAL at 04:36

## 2019-10-11 RX ADMIN — SODIUM CHLORIDE: 9 INJECTION, SOLUTION INTRAVENOUS at 19:32

## 2019-10-11 RX ADMIN — ENOXAPARIN SODIUM 60 MG: 100 INJECTION SUBCUTANEOUS at 04:36

## 2019-10-11 RX ADMIN — ENOXAPARIN SODIUM 60 MG: 100 INJECTION SUBCUTANEOUS at 17:12

## 2019-10-11 RX ADMIN — ASPIRIN 325 MG: 325 TABLET, FILM COATED ORAL at 04:37

## 2019-10-11 RX ADMIN — CEFTRIAXONE SODIUM 2 G: 2 INJECTION, POWDER, FOR SOLUTION INTRAMUSCULAR; INTRAVENOUS at 04:36

## 2019-10-11 RX ADMIN — VANCOMYCIN HYDROCHLORIDE 800 MG: 500 INJECTION, POWDER, LYOPHILIZED, FOR SOLUTION INTRAVENOUS at 03:08

## 2019-10-11 RX ADMIN — VANCOMYCIN HYDROCHLORIDE 800 MG: 500 INJECTION, POWDER, LYOPHILIZED, FOR SOLUTION INTRAVENOUS at 17:12

## 2019-10-11 RX ADMIN — ATORVASTATIN CALCIUM 80 MG: 80 TABLET, FILM COATED ORAL at 17:12

## 2019-10-11 RX ADMIN — SENNOSIDES, DOCUSATE SODIUM 2 TABLET: 50; 8.6 TABLET, FILM COATED ORAL at 04:37

## 2019-10-11 RX ADMIN — SODIUM CHLORIDE: 9 INJECTION, SOLUTION INTRAVENOUS at 06:43

## 2019-10-11 RX ADMIN — ACETAMINOPHEN 650 MG: 325 TABLET, FILM COATED ORAL at 22:18

## 2019-10-11 RX ADMIN — SODIUM CHLORIDE: 9 INJECTION, SOLUTION INTRAVENOUS at 00:34

## 2019-10-11 ASSESSMENT — ENCOUNTER SYMPTOMS
CONSTIPATION: 0
COUGH: 0
DIZZINESS: 1
SORE THROAT: 0
PND: 0
BRUISES/BLEEDS EASILY: 0
DIARRHEA: 0
WHEEZING: 0
STRIDOR: 0
TINGLING: 0
CHILLS: 1
SHORTNESS OF BREATH: 0
PHOTOPHOBIA: 0
HEMOPTYSIS: 0
ABDOMINAL PAIN: 0
MYALGIAS: 0
NAUSEA: 0
NECK PAIN: 0
WEAKNESS: 0
TREMORS: 0
BLOOD IN STOOL: 0
FEVER: 1
FOCAL WEAKNESS: 1
HEADACHES: 0
PALPITATIONS: 0
DIAPHORESIS: 1
HALLUCINATIONS: 0
VOMITING: 0
DOUBLE VISION: 0
SPUTUM PRODUCTION: 0
ORTHOPNEA: 0
FALLS: 0
HEARTBURN: 0
SINUS PAIN: 0
POLYDIPSIA: 0
CLAUDICATION: 0
WEIGHT LOSS: 0
BLURRED VISION: 0
DEPRESSION: 0
BACK PAIN: 0
FLANK PAIN: 0
EYE PAIN: 0

## 2019-10-11 ASSESSMENT — LIFESTYLE VARIABLES: SUBSTANCE_ABUSE: 0

## 2019-10-11 ASSESSMENT — COGNITIVE AND FUNCTIONAL STATUS - GENERAL
SUGGESTED CMS G CODE MODIFIER MOBILITY: CL
TURNING FROM BACK TO SIDE WHILE IN FLAT BAD: A LOT
STANDING UP FROM CHAIR USING ARMS: A LOT
CLIMB 3 TO 5 STEPS WITH RAILING: TOTAL
MOVING FROM LYING ON BACK TO SITTING ON SIDE OF FLAT BED: A LOT
WALKING IN HOSPITAL ROOM: A LOT
MOVING TO AND FROM BED TO CHAIR: A LOT
MOBILITY SCORE: 11

## 2019-10-11 ASSESSMENT — GAIT ASSESSMENTS: GAIT LEVEL OF ASSIST: UNABLE TO PARTICIPATE

## 2019-10-11 NOTE — DOCUMENTATION QUERY
[facility]                                                                       Query Response Note      PATIENT:               JAIMIE YAO  ACCT #:                  [id number]  MRN:                     [id number]  :                      1955  ADMIT DATE:       10/9/2019 10:11 PM  DISCH DATE:          RESPONDING  PROVIDER #:        [id number]           QUERY TEXT:    Encephalopathy is documented in the Medical Record. Please specify type.    NOTE:  If an appropriate response is not listed below, please respond with a new note.    The patient's Clinical Indicators include:  ? Encephalopathy is documented without specification.    ? Treatments: neurology consult, MRI brain  ? Risk factors:  cerebral infarction, infarction of kidney, stimulant abuse, COPD, TATI, homeless, hepatitis C  Options provided:   -- Alcoholic encephalopathy   -- Anoxic encephalopathy   -- Due to medications or drugs   -- Hepatic encephalopathy with coma   -- Hepatic encephalopathy without coma   -- Hypertensive encephalopathy   -- Metabolic encephalopathy   -- Septic encephalopathy   -- Toxic encephalopathy   -- Wernicke?s encephalopathy   -- Other type of encephalopathy   -- Encephalopathy is inherent to stroke   -- Unable to determine      Query created by: Martha Valentine on 10/11/2019 9:09 AM    RESPONSE TEXT:    Metabolic encephalopathy          Electronically signed by:  LESLIE HUGHES 10/11/2019 9:22 AM

## 2019-10-11 NOTE — PROGRESS NOTES
[facility] Neurology Progress Note:     Interval History: No acute events overnight.  No complaints today.    Objective:   Vitals:    10/10/19 2052 10/11/19 0055 10/11/19 0531 10/11/19 0831   BP: (!) 164/99 116/74 112/65 126/69   Pulse: (!) 107 (!) 120 (!) 105 91   Resp: 19 18 18 18   Temp: 37.2 °C (99 °F) 37.1 °C (98.7 °F) 36.6 °C (97.9 °F) 36.7 °C (98.1 °F)   TempSrc: Temporal Temporal Temporal Temporal   SpO2: 96% 93% 94% 96%   Weight:       Height:           Labs:     Lab Results   Component Value Date/Time    PROTHROMBTM 14.3 03/15/2019 01:48 AM    INR 1.10 03/15/2019 01:48 AM      Lab Results   Component Value Date/Time    WBC 17.7 (H) 10/11/2019 02:36 AM    RBC 2.95 (L) 10/11/2019 02:36 AM    HEMOGLOBIN 8.1 (L) 10/11/2019 02:36 AM    HEMATOCRIT 25.0 (L) 10/11/2019 02:36 AM    MCV 84.1 10/11/2019 02:36 AM    MCH 27.1 10/11/2019 02:36 AM    MCHC 32.3 (L) 10/11/2019 02:36 AM    MPV 9.4 10/11/2019 02:36 AM    NEUTSPOLYS 82.70 (H) 10/11/2019 02:36 AM    LYMPHOCYTES 8.80 (L) 10/11/2019 02:36 AM    MONOCYTES 7.20 10/11/2019 02:36 AM    EOSINOPHILS 0.30 10/11/2019 02:36 AM    BASOPHILS 0.20 10/11/2019 02:36 AM      Lab Results   Component Value Date/Time    SODIUM 129 (L) 10/11/2019 02:36 AM    POTASSIUM 3.9 10/11/2019 02:36 AM    CHLORIDE 103 10/11/2019 02:36 AM    CO2 21 10/11/2019 02:36 AM    GLUCOSE 102 (H) 10/11/2019 02:36 AM    BUN 17 10/11/2019 02:36 AM    CREATININE 0.75 10/11/2019 02:36 AM      Lab Results   Component Value Date/Time    CHOLSTRLTOT 120 10/11/2019 02:36 AM    LDL 81 10/11/2019 02:36 AM    HDL 11 (A) 10/11/2019 02:36 AM    TRIGLYCERIDE 142 10/11/2019 02:36 AM       Lab Results   Component Value Date/Time    ALKPHOSPHAT 72 10/11/2019 02:36 AM    ASTSGOT 12 10/11/2019 02:36 AM    ALTSGPT 7 10/11/2019 02:36 AM    TBILIRUBIN 0.3 10/11/2019 02:36 AM        Imaging/Testing:   No new neuroimaging to review    Physical Exam:     General: Ill appearing 63 y/o male in bed in NAD, somnolent  Cardio: Normal  S1/S2. No peripheral edema.   Pulm: CTAX2. No respiratory distress.   Skin: Warm, dry, no rashes or lesions   Psychiatric: Unable to assess  HEENT: Atraumatic head, normal sclera and conjunctiva, moist oral mucosa. No lid lag.  Abdomen: Soft, non tender. No masses or hepatosplenomegaly.    Neurologic:  Mental Status: Somnolent but arousable. Able to follow limited commands. Speech is dysarthric. Appears to prefer L side.   Cranial Nerves: PERRL, EOMi. Face symmetric  Motor: Normal muscle tone/bulk. Strength grossly 5/5 throughout.   Reflexes: Deferred  Coordination: Unable to assess  Sensation: Deferred  Gait/Station: Deferred/unable to assess    Assessment/Plan:    Sylvain Espinosa is a 64 y.o. male with history of aortic stenosis, BPH, HLD, HTN, chronic hepatitis, meth use recently homeless presenting to the hospital for failure to thrive and consulted for stroke. MR Brain W/O CST. Overall, given echodensity on TTE picture is c/w endocarditis. Cardiology was consulted and they agree with this assessment. Unfortunately, it appears the prognosis is quite poor given his poor candidacy for further surgical intervention.    Plan:  -Cardiology consulted.  -Consult ID for abx tx recommendations  -LDL 81, initiate statin goal < 70  -Hgb A1C 6.0. At goal.  -goal SBP outpt <130  -No ASA or Plavix indicated, as strokes from endocarditis have a high risk for hemorrhagic transformation.  -Neurology to follow as needed. Signing off for now.  -Plan discussed with consulting physician and patient's nurse.     Myke Matthews M.D., Diplomat of the American Board of Psychiatry and Neurology  Diplomat of [employer]BP[employer] Epilepsy Subspecialty   Assistant Clinical Professor, [facility] Neurology Consultant

## 2019-10-11 NOTE — CONSULTS
Physical Medicine and Rehabilitation Consultation         Initial Consult      Date of Consultation: 10/11/2019  Consulting provider: Celestina Wolf MD   Reason for consultation: assess for acute inpatient rehab appropriateness  LOS: 1 Day(s)    Chief complaint: failure to thrive     HPI: The patient is a 64 y.o. right hand dominant male with a past medical history of aortic stenosis, BPH, hyperlipidemia, hypertension, chronic hepatitis C, meth use, TAVR in March 2019 recently homeless living in an abandoned vehicle;  who presented on 10/9/2019 10:11 PM with failure to thrive with complaints of epigastric abdominal pain, swollen and tender left ankle.  Initial work-up with CT in the ED showed multiple infarcts in the left occipital lobe, and left temporal lobe.  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  Low iron, high ferritin; likely anemia of chronic disease.  Patient also noted to have symptoms of encephalopathy on 10/10. Cardiology consult on 10/11 reveals he likely has endocarditis and will need to be managed by ID, also in that note recommended to have palliative as his situation is likely terminal.     The patient currently reports feeling confused. States he was living in a car that belonged to a friend, Patricia. He doesn't remember what type of vehicle it was. He also cant remember when he last had a BM. Reports no pain except for his left ankle.     ROS:  Pertinent positives are listed in HPI, all other systems reviewed and are negative      Social Hx:  Homeless, living in a friends car.   Employment: not working   Tobacco: quit this year, has 20-pack-year smoking history  Alcohol: very little   Drugs: Marijuana, methamphetamines    Current level of function:  The patient was evaluated by acute care Physical Therapy, Occupational Therapy and Speech Language Pathology; currently requiring moderate  "assistance for mobility and maximal assistance for ADLs, also with ongoing swallowing deficits.    PMH:  Past Medical History:   Diagnosis Date   • Abnormality of left ventricle of heart     \"constricted left ventricle\"    • Aortic stenosis    • BPH (benign prostatic hyperplasia)    • Hypercholesteremia    • Hypertension        PSH:  Past Surgical History:   Procedure Laterality Date   • TRANSCATHETER AORTIC VALVE REPLACEMENT N/A 3/18/2019    Procedure: REPLACEMENT, AORTIC VALVE, TRANSCATHETER;  Surgeon: Dariel Moser M.D.;  Location: SURGERY [facility];  Service: Cardiac   • JAYLYN  3/18/2019    Procedure: ECHOCARDIOGRAM, TRANSESOPHAGEAL;  Surgeon: Dariel Moser M.D.;  Location: SURGERY [facility];  Service: Cardiac   • TONSILLECTOMY     • ZZZ CARDIAC CATH         FHX:  Family History   Problem Relation Age of Onset   • Heart Disease Mother        Medications:  Current Facility-Administered Medications   Medication Dose   • senna-docusate (PERICOLACE or SENOKOT S) 8.6-50 MG per tablet 2 Tab  2 Tab    And   • polyethylene glycol/lytes (MIRALAX) PACKET 1 Packet  1 Packet    And   • magnesium hydroxide (MILK OF MAGNESIA) suspension 30 mL  30 mL    And   • bisacodyl (DULCOLAX) suppository 10 mg  10 mg   • NS infusion     • ondansetron (ZOFRAN) syringe/vial injection 4 mg  4 mg   • ondansetron (ZOFRAN ODT) dispertab 4 mg  4 mg   • promethazine (PHENERGAN) tablet 12.5-25 mg  12.5-25 mg   • promethazine (PHENERGAN) suppository 12.5-25 mg  12.5-25 mg   • prochlorperazine (COMPAZINE) injection 5-10 mg  5-10 mg   • Pharmacy consult request - Allow for permissive hypertension: SBP up to 220 mmHg/DBP up to 120 mmHg x 48 hours     • atorvastatin (LIPITOR) tablet 80 mg  80 mg   • aspirin (ASA) tablet 325 mg  325 mg    Or   • aspirin (ASA) chewable tab 324 mg  324 mg    Or   • aspirin (ASA) suppository 300 mg  300 mg   • MD Alert...Vancomycin per Pharmacy     • cefTRIAXone (ROCEPHIN) 2 g in  mL IVPB  2 g   • " "nicotine (NICODERM) 7 MG/24HR 7 mg  7 mg   • vancomycin (VANCOCIN) 800 mg in  mL IVPB  800 mg   • labetalol (NORMODYNE,TRANDATE) injection 10 mg  10 mg    Or   • hydrALAZINE (APRESOLINE) injection 10 mg  10 mg   • enoxaparin (LOVENOX) inj 60 mg  60 mg       Allergies:  No Known Allergies    Physical Exam:  Vitals: /69   Pulse 91   Temp 36.7 °C (98.1 °F) (Temporal)   Resp 18   Ht 1.727 m (5' 8\")   Wt 52.3 kg (115 lb 4.8 oz)   SpO2 96%   Gen: NAD  Head:  NC/AT  Eyes/ Nose/ Mouth: PERRLA, moist mucous membranes  Cardio: RRR, no mumurs  Pulm: CTAB, with normal respiratory effort  Abd: Soft NTND, active bowel sounds,   Ext: No peripheral edema. No calf tenderness. No clubbing/cyanosis. +dorsal pedalis pulses bilaterally.    Left ankle: tenderness in medial aspect of the achilles tendon, area outlined in pen. Soft tissue irregularities detected.    Mental status: answers questions appropriately follows commands, oriented to person and place only.   Speech: fluent, no aphasia or dysarthria. Low volume.     CRANIAL NERVES:  2,3: visual acuity grossly intact, PERRL  3,4,6: EOMI bilaterally, no nystagmus or diplopia  5: sensation intact to light touch bilaterally and symmetric  7: no facial asymmetry  8: hearing grossly intact  9,10: symmetric palate elevation  11: SCM/Trapezius strength 5/5 bilaterally  12: tongue protrudes midline    Motor:      Shoulder flexors:  Bilateral -  5/5  Elbow flexors:  Bilateral -  5/5  Wrist extensors:  Bilateral -  5/5  Elbow extensors:  Bilateral -  5/5  Finger flexors:  Bilateral -  5/5  Finger abductors:  Bilateral -  5/5  Hip flexors:  Bilateral -  5/5  Knee ext:  Bilateral -  5/5  Dorsiflexors:  Bilateral -  5/5  EHL:  Bilateral -  5/5  Plantar flexors:  Bilateral -  5/5     Sensory:   intact to light touch through out    DTRs: 2+ in bilateral biceps, triceps, brachioradialis, 2+ in bilateral patellar and achilles tendons  No clonus at bilateral ankles  Negative babinski " b/l  Negative Allred b/l     Tone: no spasticity noted, no cogwheeling noted    Labs: Reviewed and significant for   Recent Labs     10/09/19  2230 10/10/19  0618 10/11/19  0236   RBC 3.64*  --  2.95*   HEMOGLOBIN 10.0*  --  8.1*   HEMATOCRIT 31.3*  --  25.0*   PLATELETCT 296  --  255   IRON  --  <10*  --    FERRITIN  --  180.2  --    TOTIRONBC  --  210*  --      Recent Labs     10/09/19  2230 10/11/19  0236   SODIUM 133* 129*   POTASSIUM 4.4 3.9   CHLORIDE 101 103   CO2 23 21   GLUCOSE 114* 102*   BUN 33* 17   CREATININE 0.96 0.75   CALCIUM 8.7 7.6*     Recent Results (from the past 24 hour(s))   EC-ECHOCARDIOGRAM COMPLETE W/O CONT    Collection Time: 10/10/19  2:26 PM   Result Value Ref Range    Eject.Frac. MOD BP 65.37     Eject.Frac. MOD 4C 71.58     Eject.Frac. MOD 2C 63.31     Left Ventrical Ejection Fraction 75    CBC with Differential    Collection Time: 10/11/19  2:36 AM   Result Value Ref Range    WBC 17.7 (H) 4.8 - 10.8 K/uL    RBC 2.95 (L) 4.70 - 6.10 M/uL    Hemoglobin 8.1 (L) 14.0 - 18.0 g/dL    Hematocrit 25.0 (L) 42.0 - 52.0 %    MCV 84.1 81.4 - 97.8 fL    MCH 27.1 27.0 - 33.0 pg    MCHC 32.3 (L) 33.7 - 35.3 g/dL    RDW 43.3 35.9 - 50.0 fL    Platelet Count 255 164 - 446 K/uL    MPV 9.4 9.0 - 12.9 fL    Neutrophils-Polys 82.70 (H) 44.00 - 72.00 %    Lymphocytes 8.80 (L) 22.00 - 41.00 %    Monocytes 7.20 0.00 - 13.40 %    Eosinophils 0.30 0.00 - 6.90 %    Basophils 0.20 0.00 - 1.80 %    Immature Granulocytes 0.80 0.00 - 0.90 %    Nucleated RBC 0.00 /100 WBC    Neutrophils (Absolute) 14.64 (H) 1.82 - 7.42 K/uL    Lymphs (Absolute) 1.55 1.00 - 4.80 K/uL    Monos (Absolute) 1.28 (H) 0.00 - 0.85 K/uL    Eos (Absolute) 0.06 0.00 - 0.51 K/uL    Baso (Absolute) 0.03 0.00 - 0.12 K/uL    Immature Granulocytes (abs) 0.14 (H) 0.00 - 0.11 K/uL    NRBC (Absolute) 0.00 K/uL   Comp Metabolic Panel (CMP)    Collection Time: 10/11/19  2:36 AM   Result Value Ref Range    Sodium 129 (L) 135 - 145 mmol/L    Potassium  3.9 3.6 - 5.5 mmol/L    Chloride 103 96 - 112 mmol/L    Co2 21 20 - 33 mmol/L    Anion Gap 5.0 0.0 - 11.9    Glucose 102 (H) 65 - 99 mg/dL    Bun 17 8 - 22 mg/dL    Creatinine 0.75 0.50 - 1.40 mg/dL    Calcium 7.6 (L) 8.5 - 10.5 mg/dL    AST(SGOT) 12 12 - 45 U/L    ALT(SGPT) 7 2 - 50 U/L    Alkaline Phosphatase 72 30 - 99 U/L    Total Bilirubin 0.3 0.1 - 1.5 mg/dL    Albumin 2.2 (L) 3.2 - 4.9 g/dL    Total Protein 5.7 (L) 6.0 - 8.2 g/dL    Globulin 3.5 1.9 - 3.5 g/dL    A-G Ratio 0.6 g/dL   Lipid Profile    Collection Time: 10/11/19  2:36 AM   Result Value Ref Range    Cholesterol,Tot 120 100 - 199 mg/dL    Triglycerides 142 0 - 149 mg/dL    HDL 11 (A) >=40 mg/dL    LDL 81 <100 mg/dL   ESTIMATED GFR    Collection Time: 10/11/19  2:36 AM   Result Value Ref Range    GFR If African American >60 >60 mL/min/1.73 m 2    GFR If Non African American >60 >60 mL/min/1.73 m 2       Imaging:   MRI brain 10/10,019  1.  Multifocal acute/subacute cerebral and cerebellar infarcts involving multiple vascular distributions most suggestive of embolic etiology. This includes moderate to large left PCA infarct with associated small areas of hemorrhage in the left thalamus   and parieto-occipital region.  2.  Mild age-related atrophy.  3.  Multiple small dural based masses most consistent with calcified meningiomas correlating with head CT findings.    ASSESSMENT:  Patient is a 64 y.o. male admitted with failure to thrive, now with endocarditis, encephalopathy,  Left ankle pain, and debility/deconditioning.     Rehabilitation: Impaired ADLs and mobility  Patient is not a candidate for IPR due to lack of stable discharge. Patient does have needs with PT/OT/SLP and has medical complexity, so if his discharge situation improves then please let us know.     Debility  - PT/OT/SLP while in house   - Consider alternative discharge scenarios such as SNF    Left Ankle pain   - Area of tenderness outlined in pen on patients left medial achilles  tendon area  - XR rankle to assess for ischemic necrosis, calcified mass, possible infected joint, hematoma, or fracture   - US left ankle     Encephalopathy   - Workup per Neuro   - Encourage daytime wakefulness   - Minimize interruptions at night     Endocarditis   - ID to consult   - Palliative to consult   - Leukocytosis 17.7    Hyponatremia  - Sodium 129  - Possible SIADH given recent stroke   - Recommend salt tabs daily, fluid restriction   - Monitor closely, may be contributing to confusion     Anemia  - Hemoglobin 8.1  - Monitor, transfuse if <7.0    Dyslipidemia  - HDL of 11    Bowel program  - Patient is constipated, likely related to immobility and dehydration, recommend the following:  - Senokot 1 tab nightly  - MiraLAX 1 packet twice daily PRN  - Milk of magnesia 30mL daily if no bowel movement in greater than 24 hours  - Dulcolax suppository 10 mg if patient goes more than 48 hours without a bowel movement  - Fleet enema if patient goes more than 72 hours without a bowel movement    DVT Prophylaxis:   - lovenox 60mg inj daily       Discussed with pt, summarized hospitalization and care, options for next step of care  Labs reviewed   Imaging personally reviewed and MRI brain shows left occipital parietal strokes of patchy ischemia consistent with embolic etiology  Discussed with team about recommendations     Thank you for allowing us to participate in the care of this patient.     Saqib Zarate, DO   Physical Medicine and Rehabilitation   10/11/2019

## 2019-10-11 NOTE — PROGRESS NOTES
Bedside report received from night shift RN. Pt is A&O x 3. Disoriented to time. Pt. Is in bed. Pt. Updated on plan of care for the day. All questions answered. Pt.s bed is locked, in lowest position, with bed alarms on. Pt. Has no other needs at this time.

## 2019-10-11 NOTE — CARE PLAN
Problem: Safety  Goal: Will remain free from falls  Outcome: PROGRESSING AS EXPECTED  Intervention: Implement fall precautions  Note:   Bed locked. In lowest position, alarms on. Call light and personal belongings within reach. Side rails up.      Problem: Knowledge Deficit  Goal: Knowledge of disease process/condition, treatment plan, diagnostic tests, and medications will improve  Outcome: PROGRESSING AS EXPECTED  Intervention: Assess knowledge level of disease process/condition, treatment plan, diagnostic tests, and medications  Note:   Educated pt on plan of care for the day, treatment, medications and diagnosis. Pt verbalizes understanding and is compliant.

## 2019-10-11 NOTE — DISCHARGE PLANNING
[facility] to consult. Friends called EMS d/t pt staying in abandoned car for 4-5 days, not eating or moving, not staying warm.

## 2019-10-11 NOTE — THERAPY
"Speech Language Therapy dysphagia treatment completed.     Functional Status:  Pt with delayed responses, eyes widened, slow to visually track clinician and make eye contact when cued. AOX2, unable to increase level of orientation when given choices. Hoarse quality of voice, which pt states is his baseline quality of voicing. Pt administered self PO trials thin liquid via cup and straw, puree, and solid/regular textures. No cues were required for pt to take single, small bites and sips at a time. Minimal lingual oral stasis after swallow with solid/regular textures, which cleared when pt self-presented with thin liquid wash. Laryngeal elevation was complete to palpation. Throat clearing noted after swallow in 1/8 trials regular texture. No other clinical s/o aspiration demonstrated throughout PO intake. Recommend a diet upgrade to Dysphagia 3, continue thin liquids, continue to monitor pt during PO intake.     Recommendations: 1) Upgrade diet to Dysphagia 3, thin 2) Monitor pt during PO intake; small bites/sips 3) Meds whole in thin      Plan of Care: Will benefit from Speech Therapy 3 times per week    Post-Acute Therapy: Recommend outpatient or home health transitional care services for continued speech therapy services    See \"Rehab Therapy-Acute\" Patient Summary Report for complete documentation.     "

## 2019-10-11 NOTE — PROGRESS NOTES
· 2 RN skin check complete with HEBERT Garcia.  · Devices in place: condom cath, PIV, tele leads  · Skin assessed under devices: intact and blanching  · Confirmed pressure ulcers found on: NONE  · New potential pressure ulcers noted on: NONE  · The following interventions in place: q2 turn prompting; moisturizer offered at bedside  · Generalized integumentary is intact and blanching  · Generalized scabbing noted up upper extremities  · BL feet calloused  · Sacrum with noted hyperpigmentation, intact

## 2019-10-11 NOTE — CARE PLAN
Problem: Nutritional:  Goal: Achieve adequate nutritional intake  Description  Patient will consume >50% of meals  Outcome: PROGRESSING SLOWER THAN EXPECTED     See RD note.

## 2019-10-11 NOTE — DIETARY
"Nutrition services: Day 1 of admit.  Sylvain Espinosa is a 64 y.o. male with admitting DX of encephalopathy.   Consult received for low BMI and MST 3.     Spoke with pt at bedside. He reports that prior to admission he had a low appetite and was consuming <50% of his normal intake since March. Pt stated UBW 150lb but unsure of when he was most recently at this weight. Since admission he states his appetite has been \"okay.\" Pt agreeable to supplements. Observed pt consumed >50% of lunch meal.     Assessment:  Height: 172.7 cm (5' 8\")  Weight: 52.3 kg (115 lb 4.8 oz)  Body mass index is 17.53 kg/m²., BMI classification: Underweight  Diet/Intake: Regular; no PO intake documentation.     Evaluation:   1. 10.2% wt loss noted in chart review since 3/22 (significant).  2. 17% wt loss noted in chart since 10/2018 (notable)  3. Hx: Stroke, hyponatremia, elevated BUN, anemia, HTN  4. Meds: Lipitor, nicoderm    5. Labs: sodium 129, glucose 102, calcium 7.6, A1c 6.0%    Malnutrition Risk: Pt with severe malnutrition suspected to be in the context of chronic disease related to COPD as evidenced by <50% estimated energy needs for >1 month and wt loss >10% over 6 months.     Recommendations/Plan:   1. Request supplement order from MD for supplements between meals.   2. Encourage intake of Boost supplements.   3. Document intake of all meals and supplements as % taken in ADL's to provide interdisciplinary communication across all shifts.   4. Monitor weight.  5. Nutrition rep will continue to see patient for ongoing meal and snack preferences.     RD to follow.             "

## 2019-10-11 NOTE — CONSULTS
"ADULT INFECTIOUS DISEASE CONSULT     Date of Service: 10/11/2019    Consult Requested By: Chloé Blankenship M.D.    Reason for Consultation: Staph bacteremia    History of Present Illness:   Sylvain Espinosa is a 64 y.o. -American male who is homeless, history of TAVR has been seen by us in the past for epididymitis, prostatitis and seminal abscess.  He had undergone TAVR on 3/18/2019 for severe symptomatic aortic stenosis.  He has been living in his car.  He came into the emergency room on 10/10/2019 for abdominal pain.  His CT scan showed renal infarct he also noted that his left ankle was swollen.  His x-ray was negative.  He has been seen by physical medicine.  Neuro saw him for CVA in the multiple vascular distributions.  He had a WBC count of 19,000.  His echo is showing a vegetation on the prostatic valve.  His blood cultures are positive for staph species.  In view of that infectious disease consult has been called.  Review Of Systems:  Cannot be obtained since patient is uncooperative.        PMH:   Past Medical History:   Diagnosis Date   • Abnormality of left ventricle of heart     \"constricted left ventricle\"    • Aortic stenosis    • BPH (benign prostatic hyperplasia)    • Hypercholesteremia    • Hypertension          PSH:  Past Surgical History:   Procedure Laterality Date   • TRANSCATHETER AORTIC VALVE REPLACEMENT N/A 3/18/2019    Procedure: REPLACEMENT, AORTIC VALVE, TRANSCATHETER;  Surgeon: Dariel Moser M.D.;  Location: SURGERY [facility];  Service: Cardiac   • JAYLYN  3/18/2019    Procedure: ECHOCARDIOGRAM, TRANSESOPHAGEAL;  Surgeon: Draiel Moser M.D.;  Location: SURGERY [facility];  Service: Cardiac   • TONSILLECTOMY     • ZZZ CARDIAC CATH         FAMILY HX:  Family History   Problem Relation Age of Onset   • Heart Disease Mother        SOCIAL HX:  Social History     Socioeconomic History   • Marital status: Single     Spouse name: Not on file   • Number of children: Not on file "   • Years of education: Not on file   • Highest education level: Not on file   Occupational History   • Not on file   Social Needs   • Financial resource strain: Not on file   • Food insecurity:     Worry: Not on file     Inability: Not on file   • Transportation needs:     Medical: Not on file     Non-medical: Not on file   Tobacco Use   • Smoking status: Former Smoker     Packs/day: 0.50     Years: 40.00     Pack years: 20.00     Types: Cigarettes   • Smokeless tobacco: Never Used   • Tobacco comment: 10 months   Substance and Sexual Activity   • Alcohol use: Yes     Comment: occ   • Drug use: Yes     Types: Inhaled, Marijuana     Comment: marijuana current,  meth last use 1 monht ago   • Sexual activity: Not on file   Lifestyle   • Physical activity:     Days per week: Not on file     Minutes per session: Not on file   • Stress: Not on file   Relationships   • Social connections:     Talks on phone: Not on file     Gets together: Not on file     Attends Hinduism service: Not on file     Active member of club or organization: Not on file     Attends meetings of clubs or organizations: Not on file     Relationship status: Not on file   • Intimate partner violence:     Fear of current or ex partner: Not on file     Emotionally abused: Not on file     Physically abused: Not on file     Forced sexual activity: Not on file   Other Topics Concern   • Not on file   Social History Narrative   • Not on file     Social History     Tobacco Use   Smoking Status Former Smoker   • Packs/day: 0.50   • Years: 40.00   • Pack years: 20.00   • Types: Cigarettes   Smokeless Tobacco Never Used   Tobacco Comment    10 months     Social History     Substance and Sexual Activity   Alcohol Use Yes    Comment: occ       Allergies/Intolerances:  No Known Allergies    History reviewed from the chart    Other Current Medications:    Current Facility-Administered Medications:   •  senna-docusate (PERICOLACE or SENOKOT S) 8.6-50 MG per tablet  2 Tab, 2 Tab, Oral, BID, 2 Tab at 10/11/19 0437 **AND** polyethylene glycol/lytes (MIRALAX) PACKET 1 Packet, 1 Packet, Oral, QDAY PRN **AND** magnesium hydroxide (MILK OF MAGNESIA) suspension 30 mL, 30 mL, Oral, QDAY PRN **AND** bisacodyl (DULCOLAX) suppository 10 mg, 10 mg, Rectal, QDAY PRN, Celestina Wolf M.D.  •  NS infusion, , Intravenous, Continuous, Celestina Wolf M.D., Last Rate: 150 mL/hr at 10/11/19 0643  •  ondansetron (ZOFRAN) syringe/vial injection 4 mg, 4 mg, Intravenous, Q4HRS PRN, Celestina Wolf M.D.  •  ondansetron (ZOFRAN ODT) dispertab 4 mg, 4 mg, Oral, Q4HRS PRN, Celestina Wolf M.D.  •  promethazine (PHENERGAN) tablet 12.5-25 mg, 12.5-25 mg, Oral, Q4HRS PRN, Celestina Wolf M.D.  •  promethazine (PHENERGAN) suppository 12.5-25 mg, 12.5-25 mg, Rectal, Q4HRS PRN, Celestina Wolf M.D.  •  prochlorperazine (COMPAZINE) injection 5-10 mg, 5-10 mg, Intravenous, Q4HRS PRN, Celestina Wolf M.D.  •  Pharmacy consult request - Allow for permissive hypertension: SBP up to 220 mmHg/DBP up to 120 mmHg x 48 hours, , Other, PHARMACY TO DOSE, Celestina Wolf M.D.  •  atorvastatin (LIPITOR) tablet 80 mg, 80 mg, Oral, Q EVENING, Celestina Wolf M.D., 80 mg at 10/10/19 1703  •  aspirin (ASA) tablet 325 mg, 325 mg, Oral, DAILY, 325 mg at 10/11/19 0437 **OR** aspirin (ASA) chewable tab 324 mg, 324 mg, Oral, DAILY **OR** aspirin (ASA) suppository 300 mg, 300 mg, Rectal, DAILY, Celestina Wolf M.D.  •  MD Alert...Vancomycin per Pharmacy, , Other, PHARMACY TO DOSE, Celestina Wolf M.D.  •  cefTRIAXone (ROCEPHIN) 2 g in  mL IVPB, 2 g, Intravenous, Q24HRS, Celestina Wolf M.D., Stopped at 10/11/19 0506  •  nicotine (NICODERM) 7 MG/24HR 7 mg, 7 mg, Transdermal, Daily-0600, Celestina Wolf M.D., 7 mg at 10/11/19 0436  •  vancomycin (VANCOCIN) 800 mg in  mL IVPB, 800 mg, Intravenous, Q12HR, Celestina Wolf M.D., Stopped at 10/11/19 0408  •  labetalol (NORMODYNE,TRANDATE) injection 10  "mg, 10 mg, Intravenous, Q4HRS PRN **OR** hydrALAZINE (APRESOLINE) injection 10 mg, 10 mg, Intravenous, Q2HRS PRN, Blanco Barajas M.D.  •  enoxaparin (LOVENOX) inj 60 mg, 60 mg, Subcutaneous, Q12HRS, Blanco Barajas M.D., 60 mg at 10/11/19 0436  [unfilled]    Most Recent Vital Signs:  /69   Pulse 91   Temp 36.7 °C (98.1 °F) (Temporal)   Resp 18   Ht 1.727 m (5' 8\")   Wt 52.3 kg (115 lb 4.8 oz)   SpO2 96%   BMI 17.53 kg/m²   Temp  Av.3 °C (99.1 °F)  Min: 36.6 °C (97.9 °F)  Max: 37.9 °C (100.2 °F)    Physical Exam:  General: Thin male, disheveled and looks chronically ill  HEENT: sclera anicteric, PERRL, EOMI, MMM, no oral lesions  Neck: supple, no lymphadenopathy  Chest: CTAB, no r/r/w, normal work of breathing.  Cardiac: Regular, tachycardic plus murmur heard  Abdomen: + bowel sounds, soft, non-tender,   Extremities: Unable to examine because of patient being uncooperative  Skin: no rashes or erythema  Neuro: Alert but uncooperative.  Moves all extremities    Pertinent Lab Results:  Recent Labs     10/09/19  2230 10/11/19  0236   WBC 19.4* 17.7*      Recent Labs     10/09/19  2230 10/11/19  0236   HEMOGLOBIN 10.0* 8.1*   HEMATOCRIT 31.3* 25.0*   MCV 86.0 84.1   MCH 27.5 27.1   PLATELETCT 296 255         Recent Labs     10/09/19  2230 10/11/19  0236   SODIUM 133* 129*   POTASSIUM 4.4 3.9   CHLORIDE 101 103   CO2 23 21   CREATININE 0.96 0.75        Recent Labs     10/09/19  2230 10/11/19  0236   ALBUMIN 2.9* 2.2*        Pertinent Micro:  Results     Procedure Component Value Units Date/Time    BLOOD CULTURE x2 [[id number]]  (Abnormal) Collected:  10/10/19 0330    Order Status:  Completed Specimen:  Blood from Peripheral Updated:  10/11/19 1207     Significant Indicator POS     Source BLD     Site PERIPHERAL     Culture Result Growth detected by Bactec instrument. 10/11/2019  12:04    Narrative:       Per Hospital Policy: Only change Specimen Src: to \"Line\" if  specified by physician " "order.  Right AC    BLOOD CULTURE x2 [[id number]]  (Abnormal) Collected:  10/10/19 0331    Order Status:  Completed Specimen:  Blood from Peripheral Updated:  10/11/19 1103     Significant Indicator POS     Source BLD     Site PERIPHERAL     Culture Result No Growth  Note: Blood cultures are incubated for 5 days and  are monitored continuously.Positive blood cultures  are called to the RN and reported as soon as  they are identified.  Growth detected by Bactec instrument. 10/11/2019  11:01  Gram Stain: Gram positive cocci: Possible Staphylococcus sp.      Narrative:       Per Hospital Policy: Only change Specimen Src: to \"Line\" if  specified by physician order.  Left Hand    BLOOD CULTURE [[id number]]     Order Status:  Sent Specimen:  Blood from Peripheral     BLOOD CULTURE [[id number]]     Order Status:  Sent Specimen:  Blood from Peripheral     URINALYSIS CULTURE, IF INDICATED [[id number]]  (Abnormal) Collected:  10/09/19 2356    Order Status:  Completed Specimen:  Urine Updated:  10/10/19 0016     Color Yellow     Character Clear     Specific Gravity 1.020     Ph 5.0     Glucose Negative mg/dL      Ketones Negative mg/dL      Protein Negative mg/dL      Bilirubin Negative     Urobilinogen, Urine 1.0     Nitrite Negative     Leukocyte Esterase Negative     Occult Blood Small     Micro Urine Req Microscopic    URINALYSIS (UA) [[id number]]     Order Status:  Canceled Specimen:  Urine         Blood Culture Hold   Date Value Ref Range Status   03/14/2019 Collected  Final        Studies:  Ct-abdomen-pelvis With    Result Date: 10/10/2019  10/9/2019 11:57 PM HISTORY/REASON FOR EXAM: Abd pain, unspecified TECHNIQUE/EXAM DESCRIPTION:  CT abdomen and pelvis with contrast. Sequential axial CT images were obtained from lung bases to the proximal femurs after the uneventful administration of 80 cc Omnipaque 350 intravenous contrast. Low dose optimization technique was utilized for this CT exam including automated exposure control " and adjustment of the mA and/or kV according to patient size. COMPARISON:  January 4, 2019 CT ABDOMEN FINDINGS: Linear densities are seen within the lung bases, appearance favors atelectasis. Scattered emphysematous changes are seen. Cardiomegaly is noted. The liver is normal in contour. The liver is normal in size. No intrahepatic biliary ductal dilatation is noted. Peripherally enhancing 2.0 cm low-density lesion is seen in the right hepatic lobe. The gallbladder appears within normal limits. The spleen is unremarkable. The pancreas is grossly normal. Bilateral adrenal glands appear within normal limits. Low-density changes in the inferior pole of the right kidney are seen, new since prior study. The ureters are normal in caliber along their visualized course. The colon appears within normal limits. The appendix is not definitively identified. The small bowel is unremarkable. The bony structures are age appropriate. Atherosclerotic calcifications are seen. There is 3.5 cm fusiform infrarenal abdominal aortic aneurysm identified CT PELVIS FINDINGS: The bladder is within normal limits.     1.  Low-density changes in the inferior pole of the right kidney, appearance most compatible with area of renal infarct. 2.  Fusiform infrarenal abdominal aortic aneurysm, recommend radiographic follow-up and surveillance as clinically appropriate. 3.  Peripherally enhancing low-density right hepatic lobe lesion, appearance most typical of hemangioma. 4.  Scattered emphysema 5.  Cardiomegaly 6.  Atherosclerosis These findings were discussed with the patient's clinician, Abhay Sinha, on 10/10/2019 1:08 AM.    Ct-head W/o    Result Date: 10/10/2019  10/9/2019 11:57 PM HISTORY/REASON FOR EXAM: Altered mental status TECHNIQUE/EXAM DESCRIPTION:  CT of the head without contrast. Sequential axial images were obtained from the vertex to the skull base without contrast. Up to date radiation dose reduction adjustments have been utilized  to meet ALARA standards for radiation dose reduction. COMPARISON:  July 4, 2017 FINDINGS: The brain appears normal in volume and morphology. The ventricles are normal in caliber and configuration. Calcified 2.3 cm mass along the left temporal lobe is seen extra-axially. 1.7 cm mass along the periphery is seen adjacent to the right temporal lobe. 1.4 cm mass is seen adjacent to the left temporoparietal region on the right. New area of low-density is seen in the left occipital lobe tracking along the medial aspect of the left temporal lobe. There are no abnormal extra axial fluid collections  or extra axial hemorrhage identified. The visualized paranasal sinuses and mastoid air cells are well aerated bilaterally. No depressed calvarial fractures are identified. The visualized globes and retrobulbar soft tissues appear within normal limits. There is hyperdensity identified in the subcutaneous soft tissues of the posterior neck, similar to prior study.     1.  New area of low-density in the left occipital lobe tracking along the medial aspect of the left temporal lobe, appearance compatible with evolving infarct. 2.  Multiple peripheral calcified masses, appearance most typical of meningiomas 3.  Hyperdensity in the subcutaneous soft tissues posterior to the neck in the midline, similar to prior study. Could represent thickening of the nuchal ligament or soft tissue mass. Correlate with exam.    Dx-ankle 2- Views Left    Result Date: 10/9/2019  10/9/2019 11:15 PM HISTORY/REASON FOR EXAM:  Pain/Deformity Following Trauma. TECHNIQUE/EXAM DESCRIPTION AND NUMBER OF VIEWS:  2 views of the LEFT ankle. COMPARISON: None. FINDINGS: There is no visualized fracture, subluxation, or dislocation identified.     No radiographic evidence of acute traumatic injury.    Dx-ankle 3+ Views Left    Result Date: 10/11/2019  10/11/2019 1:25 PM HISTORY/REASON FOR EXAM:  Left ankle pain TECHNIQUE/EXAM DESCRIPTION AND NUMBER OF VIEWS:  3 views  of the LEFT ankle. COMPARISON: 10/09/2019 FINDINGS: Bone mineralization is normal.  There is no evidence of fracture or dislocation.  There is no soft tissue swelling seen. The ankle mortise is well-maintained.     No evidence of fracture or dislocation.    Dx-chest-portable (1 View)    Result Date: 10/9/2019    10/9/2019 10:24 PM HISTORY/REASON FOR EXAM: Shortness of Breath TECHNIQUE/EXAM DESCRIPTION:  Single AP view of the chest. COMPARISON: March 19, 2019 FINDINGS: The cardiac silhouette appears within normal limits. Atherosclerotic calcification of the aorta is noted.  The central pulmonary vasculature appears normal. The lungs appear well expanded bilaterally.  Linear densities of the bilateral lung bases are seen. Left lung base pulmonary blebs are noted, similar to prior study. No significant pleural effusions are identified. The bony structures appear age-appropriate.     1.  No acute cardiopulmonary disease. 2.  Chronic underlying lung disease with pulmonary blebs of the left lung base. 3.  Atherosclerosis    Mr-brain-w/o    Result Date: 10/10/2019  10/10/2019 7:26 AM HISTORY/REASON FOR EXAM:  Altered mental status; Okay for patient to be off telemetry monitoring for MRI. TECHNIQUE/EXAM DESCRIPTION: MRI of the brain without contrast. T1 sagittal, T2 fast spin-echo axial, T1 coronal, FLAIR coronal, diffusion-weighted and apparent diffusion coefficient (ADC map) axial images were obtained of the whole brain. The study was performed on a [place] [employer] 1.5 Nataly MRI scanner. COMPARISON:  Noncontrast head CT earlier in the day FINDINGS: Study is limited by motion artifact. There are multiple acute/subacute infarcts with restricted diffusion and T2 hyperintensity consistent with cytotoxic edema. These involve multiple vascular distributions suggesting embolic etiology. There is a moderate to large left posterior cerebral artery territory infarct involving the left occipital lobe, left thalamus and left hippocampus.  There are also multiple small infarcts seen within the bilateral frontoparietal white matter, right parietal and occipital cortex and in the bilateral cerebellar hemispheres. There is susceptibility artifact in the left thalamus and punctate focus in the left occipital lobe consistent with small areas of hemorrhage. There are multiple dural based masses demonstrating low T2 signal along the bilateral convexities most consistent with calcified change most correlating to CT findings. There is mild localized mass effect. No brain edema is seen. There is no midline shift or hydrocephalus. No extra-axial fluid collection. Proximal vascular flow voids are patent. Mucosal thickening and small mucous retention cysts within the maxillary sinuses. Mastoids are clear. Orbital contents are symmetric.     1.  Multifocal acute/subacute cerebral and cerebellar infarcts involving multiple vascular distributions most suggestive of embolic etiology. This includes moderate to large left PCA infarct with associated small areas of hemorrhage in the left thalamus and parieto-occipital region. 2.  Mild age-related atrophy. 3.  Multiple small dural based masses most consistent with calcified meningiomas correlating with head CT findings.    Us-carotid Doppler Bilat    Result Date: 10/11/2019   Carotid Duplex  Report  Vascular Laboratory  CONCLUSIONS  Mild bilateral internal carotid artery stenosis (<50%).  JAIMIE YAO  Exam Date:     10/11/2019 07:47  Room #:     Inpatient  Priority:     Routine  Ht (in):             Wt (lb):  Ordering Physician:        LESLIE HUGHES  Referring Physician:       [id number] Rodriguez  Sonographer:               Anisha Solis RVT, RDCS  Study Type:                Complete Bilateral  Technical Quality:         Good  Age:    64    Gender:     M  MRN:    [id number]  :    1955      BSA:  Indications:     CVA  CPT Codes:       22692  ICD Codes:       436  History:          Altered mental status  Limitations:  Right Brachial BP:              /  Left Brachial BP:             /  RIGHT  Plaque          Plaque         Velocity (cm/s)  Characteristics Composition    Syst/Diast                                 85   / 32      Distal ICA                                 81   / 31      Mid ICA  Irregular      Heterogeneo     86   / 34      Prox ICA                 us                                 59   / 17      Distal CCA                                      /         Mid CCA                                 68   / 15      Prox CCA                                 98   / 13      ECA  Waveform:                                     SCA  LEFT  Plaque         Plaque          Velocity (cm/s)  CharacteristicsComposition     Syst/Diast                                 103  / 36      Distal ICA                                 96   / 34      Mid ICA  Smooth         Heterogeneo     66   / 20      Prox ICA                 us                                 69   / 14      Distal CCA                                      /         Mid CCA                                 75   / 17      Prox CCA                                 105  / 17      ECA  Waveform:                                     SCA          1.3          ICA/CCA       1.4        Antegrade      Vertebral   Antegrade         50   / 21     cm/s        67    / 23  FINDINGS  Right carotid.  Mild, heterogeneous plaque of the bifurcation extending into the internal  carotid.  Velocities are consistent with < 50% stenosis of the internal carotid  artery.  Antegrade right vertebral flow.  Left carotid.  Mild, heterogeneous plaque of the bifurcation extending into the internal  carotid.  Velocities are consistent with < 50% stenosis of the internal carotid  artery.  Antegrade left vertebral flow.  Krystian William MD  (Electronically Signed)  Final Date:      11 October 2019                   09:00    Us-extremity Non Vascular Unilateral Left    Result Date:  10/11/2019  10/11/2019 1:32 PM HISTORY/REASON FOR EXAM:  soft tissue irregularity on medial left ankle around the achilles tendon, area outlined in pen. TECHNIQUE/EXAM DESCRIPTION AND NUMBER OF VIEWS: Limited ultrasound of the left ankle in the area of clinical abnormality. COMPARISON: None FINDINGS: Real-time grayscale and color Doppler sonography was performed. In the medial left ankle there is a tubular structure which appears to represent a vessel. There is echogenic material within this without significant color flow. This may represent the posterior tibial artery with thrombus. No mass or fluid collection is seen.     A presumed vessel, probably the posterior tibial artery in the medial left ankle with intraluminal thrombus. These findings were discussed with ANG WOODY on 10/11/2019 3:45 PM.    Ec-echocardiogram Complete W/o Cont    Result Date: 10/10/2019  Transthoracic Echo Report Echocardiography Laboratory CONCLUSIONS Hyperdynamic left ventricular systolic function. Left ventricular ejection fraction is visually estimated to be greater than 75%. Severe concentric left ventricular hypertrophy. Known TAVR aortic valve with normal leaflet excursion with increased gradients most likely related to hyperdynamic flow. Echodensity prosthetic valve leaflet 0.7 x 0.9 cm. Compared to the images of the study done 3/29/2019 there is an echodensity of the prosthetic valve leaflet seen on current study however there is no comparable view on the previous study; increased prosthetic valve gradients related to increased velocity due to change in hyperdynamic left ventricular contractility. JAIMIE YAO Exam Date:         10/10/2019                    13:15 Exam Location:     Inpatient Priority:          Routine Ordering Physician:        SHANNON BATRES Referring Physician:       [id number] Winters Sonographer:               Isidoro Dillard Age:    64     Gender:    M MRN:    [id number] :    1955 BSA:    1.65   Ht  (in):    68     Wt (lb):    120 Exam Type:     Complete Indications:     Prosthetic Valve ICD Codes:       V43.3 CPT Codes:       93440 BP:   140    /   63     HR:   100 Technical Quality:       Good MEASUREMENTS  (Male / Female) Normal Values 2D ECHO LV Diastolic Diameter PLAX        3.4 cm                4.2 - 5.9 / 3.9 - 5.3 cm LV Systolic Diameter PLAX         2.2 cm                2.1 - 4.0 cm IVS Diastolic Thickness           1.8 cm                LVPW Diastolic Thickness          1.5 cm                LVOT Diameter                     1.8 cm                Estimated LV Ejection Fraction    75 %                  LV Ejection Fraction MOD BP       65.4 %                >= 55  % LV Ejection Fraction MOD 4C       71.6 %                LV Ejection Fraction MOD 2C       63.3 %                IVC Diameter                      1.1 cm                DOPPLER AV Peak Velocity                  3.5 m/s               AV Peak Gradient                  48.2 mmHg             AV Mean Gradient                  27.9 mmHg             LVOT Peak Velocity                1.4 m/s               AV Area Cont Eq vti               1.3 cm???               MV Velocity Time Integral         33.8 cm               Mitral E Point Velocity           0.76 m/s              Mitral E to A Ratio               0.55                  MV Pressure Half Time             39.2 ms               MV Area PHT                       5.6 cm???               MV Deceleration Time              135 ms                TR Peak Velocity                  300 cm/s              PV Peak Velocity                  1.3 m/s               PV Peak Gradient                  6.9 mmHg              RVOT Peak Velocity                1.1 m/s               * Indicates values subject to auto-interpretation LV EF:  75    % FINDINGS Left Ventricle Normal left ventricular chamber size. Severe concentric left ventricular hypertrophy. Hyperdynamic left ventricular systolic function. Left  ventricular ejection fraction is visually estimated to be greater than 75%.    Normal regional wall motion. Indeterminate diastolic function. Right Ventricle Normal right ventricular size. Normal right ventricular systolic function. Right Atrium Enlarged right atrium. Normal inferior vena cava size and inspiratory collapse. Left Atrium Severely dilated left atrium. Left atrial volume index is 63  mL/sq m. Mitral Valve Mitral annular calcification. Calcification of the mitral valve leaflets. Mild mitral stenosis. Trace mitral regurgitation. Aortic Valve Echodensity prosthetic valve leaflet 0.7 x 0.9 cm.  Known TAVR aortic valve with normal leaflet excursion with increased gradients most likely related to hyperdynamic flow. Vmax is 3.8 m/s. Transvalvular gradients are - Peak: 63 mmHg, Mean: 36 mmHg. Dimensionless index is 0.43.  No paravalvular leak. Tricuspid Valve Structurally normal tricuspid valve. No tricuspid stenosis. Trace tricuspid regurgitation. Pulmonic Valve Structurally normal pulmonic valve without significant stenosis or regurgitation. Pericardium Normal pericardium without effusion. Aorta The aortic root is normal.  Ascending aorta diameter is 3.0 cm. Qasim Love M.D. (Electronically Signed) Final Date:     10 October 2019                 14:52  IMPRESSION:     Staph bacteremia  Prosthetic valve endocarditis  Leukocytosis  CVA  Hepatitis C  Homelessness  Methamphetamine use  Noncompliance  TAVR  Renal infarct  PLAN:   Sylvain Espinosa is a 64 y.o. male with recent TAVR being admitted with abdominal pain.  The CT scan shows renal infarct.  His MRI of the brain is consistent with multiple infarcts.  His blood cultures are positive for coagulase-negative staph.  Patient is noncooperative with me for examination as well as history.  Continue with the vancomycin.  We will discontinue the Rocephin soon.  Continue with the supportive measures.  Repeat blood cultures x2.  Check sed rate and  CRP.      Discussed with IM. Will continue to follow    Karla Mabry M.D.     Please note that this dictation was created using voice recognition software. I have worked with technical experts from [facility] to optimize the interface.  I have made every reasonable attempt to correct obvious errors, but there may be errors of grammar and possibly content that I did not discover before finalizing the note.

## 2019-10-11 NOTE — PROGRESS NOTES
Community Health Worker Intake  • Social determinates of health intake incomplete.    Plan:  Pt. Is presenting altered mental status. CHW Laurent will refer pt. To [facility].

## 2019-10-11 NOTE — PROGRESS NOTES
Monitor Summary  Rhythm: ST  Rate: 101 - 108  Ectopy: (R)(O)(F)PVC; (O)PAC  0.12 / 0.08 / 0.30

## 2019-10-11 NOTE — RESPIRATORY CARE
COPD EDUCATION by COPD CLINICAL EDUCATOR  10/11/2019 at 7:06 AM by Vidhya Lopez     Patient reviewed by COPD education team. Patient does not have a formal diagnosis of COPD and quit smoking, therefore does not qualify for the COPD program.

## 2019-10-11 NOTE — DISCHARGE PLANNING
Patient is eligible for Medicaid Meds to Beds at discharge Monday-Friday 8 a.m. - 4 p.m. Preferred pharmacy changed to [facility]. Please call [phone] prior to discharge.

## 2019-10-11 NOTE — CONSULTS
Hospital Neurology Consult:    Referring Physician: Blanco Barajas M.D.    Reason for consultation: Stroke    HPI: Sylvain Espinosa is a 64 y.o. male with history of aortic stenosis, BPH, HLD, HTN, chronic hepatitis, meth use recently homeless presenting to the hospital for failure to thrive and consulted for stroke. Hx obtained mostly from chart as the patient was unable to provide good hx. On admission patient was found to have a WBC of 19 and encephalopathy. CT abdomen pelvis showed changes in the inferior pole of R kidney. History unable to be obtained due to encephalopathy.    ROS:     As above. All other systems reviewed and are negative.    Past Medical History:    has a past medical history of Abnormality of left ventricle of heart, Aortic stenosis, BPH (benign prostatic hyperplasia), Hypercholesteremia, and Hypertension.    FHx:  family history includes Heart Disease in his mother.    SHx:   reports that he has quit smoking. His smoking use included cigarettes. He has a 20.00 pack-year smoking history. He has never used smokeless tobacco. He reports that he drinks alcohol. He reports that he has current or past drug history. Drugs: Inhaled and Marijuana.    Allergies:  No Known Allergies    Medications:    Current Facility-Administered Medications:   •  senna-docusate (PERICOLACE or SENOKOT S) 8.6-50 MG per tablet 2 Tab, 2 Tab, Oral, BID, 2 Tab at 10/10/19 1703 **AND** polyethylene glycol/lytes (MIRALAX) PACKET 1 Packet, 1 Packet, Oral, QDAY PRN **AND** magnesium hydroxide (MILK OF MAGNESIA) suspension 30 mL, 30 mL, Oral, QDAY PRN **AND** bisacodyl (DULCOLAX) suppository 10 mg, 10 mg, Rectal, QDAY PRN, Celestina Wolf M.D.  •  NS infusion, , Intravenous, Continuous, Celestina Wolf M.D., Last Rate: 150 mL/hr at 10/10/19 1704  •  ondansetron (ZOFRAN) syringe/vial injection 4 mg, 4 mg, Intravenous, Q4HRS PRN, Celestina Wolf M.D.  •  ondansetron (ZOFRAN ODT) dispertab 4 mg, 4 mg, Oral, Q4HRS PRN,  Celestina Wolf M.D.  •  promethazine (PHENERGAN) tablet 12.5-25 mg, 12.5-25 mg, Oral, Q4HRS PRN, Celestina Wolf M.D.  •  promethazine (PHENERGAN) suppository 12.5-25 mg, 12.5-25 mg, Rectal, Q4HRS PRN, Celestina Wolf M.D.  •  prochlorperazine (COMPAZINE) injection 5-10 mg, 5-10 mg, Intravenous, Q4HRS PRN, Celestina Wolf M.D.  •  Pharmacy consult request - Allow for permissive hypertension: SBP up to 220 mmHg/DBP up to 120 mmHg x 48 hours, , Other, PHARMACY TO DOSE, Celestina Wolf M.D.  •  atorvastatin (LIPITOR) tablet 80 mg, 80 mg, Oral, Q EVENING, Celestina Wolf M.D., 80 mg at 10/10/19 1703  •  aspirin (ASA) tablet 325 mg, 325 mg, Oral, DAILY, 325 mg at 10/10/19 1242 **OR** aspirin (ASA) chewable tab 324 mg, 324 mg, Oral, DAILY **OR** aspirin (ASA) suppository 300 mg, 300 mg, Rectal, DAILY, Celestina oWlf M.D.  •  MD Alert...Vancomycin per Pharmacy, , Other, PHARMACY TO DOSE, Celestina Wolf M.D.  •  cefTRIAXone (ROCEPHIN) 2 g in  mL IVPB, 2 g, Intravenous, Q24HRS, Celestina Wolf M.D., Stopped at 10/10/19 0512  •  nicotine (NICODERM) 7 MG/24HR 7 mg, 7 mg, Transdermal, Daily-0600, Celestina Wolf M.D., 7 mg at 10/10/19 1242  •  vancomycin (VANCOCIN) 800 mg in  mL IVPB, 800 mg, Intravenous, Q12HR, Celestina Wolf M.D., Last Rate: 250 mL/hr at 10/10/19 1704, 800 mg at 10/10/19 1704  •  labetalol (NORMODYNE,TRANDATE) injection 10 mg, 10 mg, Intravenous, Q4HRS PRN **OR** hydrALAZINE (APRESOLINE) injection 10 mg, 10 mg, Intravenous, Q2HRS PRN, Blanco Barajas M.D.  •  enoxaparin (LOVENOX) inj 60 mg, 60 mg, Subcutaneous, Q12HRS, Blanco Barajas M.D., 60 mg at 10/10/19 1703    Vitals:   Vitals:    10/10/19 0900 10/10/19 1025 10/10/19 1135 10/10/19 1624   BP: 147/72 155/87 160/93 110/70   Pulse: 94 97 (!) 108 (!) 105   Resp:  20 18 20   Temp:  37.9 °C (100.2 °F) 37.8 °C (100 °F) 37.2 °C (99 °F)   TempSrc:  Temporal Temporal Temporal   SpO2: 100% 95% 95% 98%   Weight:       Height:            Labs:  Lab Results   Component Value Date/Time    PROTHROMBTM 14.3 03/15/2019 01:48 AM    INR 1.10 03/15/2019 01:48 AM      Lab Results   Component Value Date/Time    WBC 19.4 (H) 10/09/2019 10:30 PM    RBC 3.64 (L) 10/09/2019 10:30 PM    HEMOGLOBIN 10.0 (L) 10/09/2019 10:30 PM    HEMATOCRIT 31.3 (L) 10/09/2019 10:30 PM    MCV 86.0 10/09/2019 10:30 PM    MCH 27.5 10/09/2019 10:30 PM    MCHC 31.9 (L) 10/09/2019 10:30 PM    MPV 9.3 10/09/2019 10:30 PM    NEUTSPOLYS 84.10 (H) 10/09/2019 10:30 PM    LYMPHOCYTES 7.30 (L) 10/09/2019 10:30 PM    MONOCYTES 7.70 10/09/2019 10:30 PM    EOSINOPHILS 0.10 10/09/2019 10:30 PM    BASOPHILS 0.20 10/09/2019 10:30 PM      Lab Results   Component Value Date/Time    SODIUM 133 (L) 10/09/2019 10:30 PM    POTASSIUM 4.4 10/09/2019 10:30 PM    CHLORIDE 101 10/09/2019 10:30 PM    CO2 23 10/09/2019 10:30 PM    GLUCOSE 114 (H) 10/09/2019 10:30 PM    BUN 33 (H) 10/09/2019 10:30 PM    CREATININE 0.96 10/09/2019 10:30 PM      Lab Results   Component Value Date/Time    CHOLSTRLTOT 129 01/15/2015 11:37 PM    LDL 74 01/15/2015 11:37 PM    HDL 30 (A) 01/15/2015 11:37 PM    TRIGLYCERIDE 124 01/15/2015 11:37 PM       Lab Results   Component Value Date/Time    ALKPHOSPHAT 104 (H) 10/09/2019 10:30 PM    ASTSGOT 19 10/09/2019 10:30 PM    ALTSGPT 12 10/09/2019 10:30 PM    TBILIRUBIN 0.3 10/09/2019 10:30 PM      Imaging/Testing:  MR Brain W/O CST personally reviewed w/ evidence of infarcts involving multiple vascular distribution.     TTE on 10/10/19 reviewed in chart w/ presence of hypoechoic mass on prosthetic aortic valve.     Physical Exam:     General: Ill appearing 63 y/o male in NAD  Cardio: Normal S1/S2. No peripheral edema.   Pulm: CTAX2. No respiratory distress.   Skin: Warm, dry, no rashes or lesions   Psychiatric: Unable to assess due to encephalopathy.   HEENT: Atraumatic head, normal sclera and conjunctiva, moist oral mucosa. No lid lag.  Abdomen: Soft, non tender. No masses or  hepatosplenomegaly.    Neurologic:  Mental Status: Somnolent but arousable. Able to follow limited commands. Speech is dysarthric. Appears to prefer L side.   Cranial Nerves: PERRL, EOMi. Face symmetric  Motor: Normal muscle tone/bulk. Strength grossly 5/5 throughout.   Reflexes: Deferred  Coordination: Unable to assess  Sensation: Deferred  Gait/Station: Deferred/unable to assess    Assessment/Plan:    Sylvain Espinosa is a 64 y.o. male with history of aortic stenosis, BPH, HLD, HTN, chronic hepatitis, meth use recently homeless presenting to the hospital for failure to thrive and consulted for stroke. MR Brain W/O CST showed strokes in multiple vascular distributions. Given echodensity on aortic valve, elevated WBC, and renal infarct this raises suspicion for endocarditis. Etiology of stroke in this regard cardioembolic.     Plan:  -Blood cultures  -Obtain JAYLYN  -Consult ID if JAYLYN shows vegetations.  -Obtain LDL and Hgb A1C  -No aspirin/plavix until etiology of strokes more clearly defined.  -Will continue to follow  -Plan discussed with consulting physician and patient's nurse.       Myke Matthews M.D., Diplomat of the American Board of Psychiatry and Neurology  Diplomat of [employer] Epilepsy Subspecialty   Assistant Clinical Professor, [facility] Neurology Consultant

## 2019-10-11 NOTE — CONSULTS
Reason for Consult:  Asked by Dr Chloé Blankenship M.D. to see this patient with stroke in the setting of TAVR with prior endocarditis  Patient's PCP: NEY Ledesma    CC:   Chief Complaint   Patient presents with   • Failure to Thrive     laying in car for days, not eating       HPI: This is a 64-year-old homeless gentleman who had TAVR this year for aortic stenosis and was admitted at [facility] a month afterwards with increased gradients and concern for endocarditis with bacteremia related to a dental source he had surgery at that time for his dentistry problems he never followed up with the TAVR program who now presents with altered mental status delirium found to have multiple embolic stroke and an increased gradient on his valve now comparing to that of 6 months ago.  Consider    Medications / Drug list prior to admission:  No current facility-administered medications on file prior to encounter.      No current outpatient medications on file prior to encounter.       Current list of administered Medications:    Current Facility-Administered Medications:   •  senna-docusate (PERICOLACE or SENOKOT S) 8.6-50 MG per tablet 2 Tab, 2 Tab, Oral, BID, 2 Tab at 10/11/19 0437 **AND** polyethylene glycol/lytes (MIRALAX) PACKET 1 Packet, 1 Packet, Oral, QDAY PRN **AND** magnesium hydroxide (MILK OF MAGNESIA) suspension 30 mL, 30 mL, Oral, QDAY PRN **AND** bisacodyl (DULCOLAX) suppository 10 mg, 10 mg, Rectal, QDAY PRN, Celestina Wolf M.D.  •  NS infusion, , Intravenous, Continuous, Celestina Wolf M.D., Last Rate: 150 mL/hr at 10/11/19 0643  •  ondansetron (ZOFRAN) syringe/vial injection 4 mg, 4 mg, Intravenous, Q4HRS PRN, Celestina Wolf M.D.  •  ondansetron (ZOFRAN ODT) dispertab 4 mg, 4 mg, Oral, Q4HRS PRN, Celestina Wolf M.D.  •  promethazine (PHENERGAN) tablet 12.5-25 mg, 12.5-25 mg, Oral, Q4HRS PRN, Mohammad Y Hanif, M.D.  •  promethazine (PHENERGAN) suppository 12.5-25 mg, 12.5-25 mg, Rectal,  "Q4HRS PRN, Celestina Wolf M.D.  •  prochlorperazine (COMPAZINE) injection 5-10 mg, 5-10 mg, Intravenous, Q4HRS PRN, Celestina Wolf M.D.  •  Pharmacy consult request - Allow for permissive hypertension: SBP up to 220 mmHg/DBP up to 120 mmHg x 48 hours, , Other, PHARMACY TO DOSE, Celestina Wolf M.D.  •  atorvastatin (LIPITOR) tablet 80 mg, 80 mg, Oral, Q EVENING, Celestina Wolf M.D., 80 mg at 10/10/19 1703  •  aspirin (ASA) tablet 325 mg, 325 mg, Oral, DAILY, 325 mg at 10/11/19 0437 **OR** aspirin (ASA) chewable tab 324 mg, 324 mg, Oral, DAILY **OR** aspirin (ASA) suppository 300 mg, 300 mg, Rectal, DAILY, Celestina Wolf M.D.  •  MD Alert...Vancomycin per Pharmacy, , Other, PHARMACY TO DOSE, Celestina Wolf M.D.  •  cefTRIAXone (ROCEPHIN) 2 g in  mL IVPB, 2 g, Intravenous, Q24HRS, Celestina Wolf M.D., Stopped at 10/11/19 0506  •  nicotine (NICODERM) 7 MG/24HR 7 mg, 7 mg, Transdermal, Daily-0600, Celestina Wolf M.D., 7 mg at 10/11/19 0436  •  vancomycin (VANCOCIN) 800 mg in  mL IVPB, 800 mg, Intravenous, Q12HR, Celestina Wolf M.D., Stopped at 10/11/19 0408  •  labetalol (NORMODYNE,TRANDATE) injection 10 mg, 10 mg, Intravenous, Q4HRS PRN **OR** hydrALAZINE (APRESOLINE) injection 10 mg, 10 mg, Intravenous, Q2HRS PRN, Blanco Barajas M.D.  •  enoxaparin (LOVENOX) inj 60 mg, 60 mg, Subcutaneous, Q12HRS, Blanco Barajas M.D., 60 mg at 10/11/19 0436    Past Medical History:   Diagnosis Date   • Abnormality of left ventricle of heart     \"constricted left ventricle\"    • Aortic stenosis    • BPH (benign prostatic hyperplasia)    • Hypercholesteremia    • Hypertension        Past Surgical History:   Procedure Laterality Date   • TRANSCATHETER AORTIC VALVE REPLACEMENT N/A 3/18/2019    Procedure: REPLACEMENT, AORTIC VALVE, TRANSCATHETER;  Surgeon: Dariel Moser M.D.;  Location: [facility]GERY [facility];  Service: Cardiac   • JAYLYN  3/18/2019    Procedure: ECHOCARDIOGRAM, TRANSESOPHAGEAL;  " Surgeon: Dariel Moser M.D.;  Location: SURGERY [facility];  Service: Cardiac   • TONSILLECTOMY     • ZZZ CARDIAC CATH         Family History   Problem Relation Age of Onset   • Heart Disease Mother      Patient family history was personally reviewed, no pertinent family history to current presentation    Social History     Socioeconomic History   • Marital status: Single     Spouse name: Not on file   • Number of children: Not on file   • Years of education: Not on file   • Highest education level: Not on file   Occupational History   • Not on file   Social Needs   • Financial resource strain: Not on file   • Food insecurity:     Worry: Not on file     Inability: Not on file   • Transportation needs:     Medical: Not on file     Non-medical: Not on file   Tobacco Use   • Smoking status: Former Smoker     Packs/day: 0.50     Years: 40.00     Pack years: 20.00     Types: Cigarettes   • Smokeless tobacco: Never Used   • Tobacco comment: 10 months   Substance and Sexual Activity   • Alcohol use: Yes     Comment: occ   • Drug use: Yes     Types: Inhaled, Marijuana     Comment: marijuana current,  meth last use 1 monht ago   • Sexual activity: Not on file   Lifestyle   • Physical activity:     Days per week: Not on file     Minutes per session: Not on file   • Stress: Not on file   Relationships   • Social connections:     Talks on phone: Not on file     Gets together: Not on file     Attends Latter day service: Not on file     Active member of club or organization: Not on file     Attends meetings of clubs or organizations: Not on file     Relationship status: Not on file   • Intimate partner violence:     Fear of current or ex partner: Not on file     Emotionally abused: Not on file     Physically abused: Not on file     Forced sexual activity: Not on file   Other Topics Concern   • Not on file   Social History Narrative   • Not on file       ALLERGIES:  No Known Allergies    Review of systems:  Unable to reliably  obtain given his acute delirium he has no acute complaints  Physical exam:  Patient Vitals for the past 24 hrs:   BP Temp Temp src Pulse Resp SpO2   10/11/19 0831 126/69 36.7 °C (98.1 °F) Temporal 91 18 96 %   10/11/19 0531 112/65 36.6 °C (97.9 °F) Temporal (!) 105 18 94 %   10/11/19 0055 116/74 37.1 °C (98.7 °F) Temporal (!) 120 18 93 %   10/10/19 2052 (!) 164/99 37.2 °C (99 °F) Temporal (!) 107 19 96 %   10/10/19 1624 110/70 37.2 °C (99 °F) Temporal (!) 105 20 98 %   10/10/19 1135 160/93 37.8 °C (100 °F) Temporal (!) 108 18 95 %     General: No acute distress.   EYES: no jaundice  HEENT: OP clear   Neck: No bruits No JVD.   CVS:   RRR. S1 + S2.  Systolic ejection murmur  Resp: CTAB. No wheezing or crackles/rhonchi.  Abdomen: Soft, NT, ND,  Skin: Grossly nothing acute no obvious rashes  Neurological: Alert, he is not oriented to month or year but does know he is at [facility] moves all extremities, no cranial nerve defects on limited exam  Extremities: Pulse 2+ in b/l LE.  no edema. No cyanosis.       Data:  Laboratory studies personally reviewed by me:  Recent Results (from the past 24 hour(s))   EC-ECHOCARDIOGRAM COMPLETE W/O CONT    Collection Time: 10/10/19  2:26 PM   Result Value Ref Range    Eject.Frac. MOD BP 65.37     Eject.Frac. MOD 4C 71.58     Eject.Frac. MOD 2C 63.31     Left Ventrical Ejection Fraction 75    CBC with Differential    Collection Time: 10/11/19  2:36 AM   Result Value Ref Range    WBC 17.7 (H) 4.8 - 10.8 K/uL    RBC 2.95 (L) 4.70 - 6.10 M/uL    Hemoglobin 8.1 (L) 14.0 - 18.0 g/dL    Hematocrit 25.0 (L) 42.0 - 52.0 %    MCV 84.1 81.4 - 97.8 fL    MCH 27.1 27.0 - 33.0 pg    MCHC 32.3 (L) 33.7 - 35.3 g/dL    RDW 43.3 35.9 - 50.0 fL    Platelet Count 255 164 - 446 K/uL    MPV 9.4 9.0 - 12.9 fL    Neutrophils-Polys 82.70 (H) 44.00 - 72.00 %    Lymphocytes 8.80 (L) 22.00 - 41.00 %    Monocytes 7.20 0.00 - 13.40 %    Eosinophils 0.30 0.00 - 6.90 %    Basophils 0.20 0.00 - 1.80 %    Immature  Granulocytes 0.80 0.00 - 0.90 %    Nucleated RBC 0.00 /100 WBC    Neutrophils (Absolute) 14.64 (H) 1.82 - 7.42 K/uL    Lymphs (Absolute) 1.55 1.00 - 4.80 K/uL    Monos (Absolute) 1.28 (H) 0.00 - 0.85 K/uL    Eos (Absolute) 0.06 0.00 - 0.51 K/uL    Baso (Absolute) 0.03 0.00 - 0.12 K/uL    Immature Granulocytes (abs) 0.14 (H) 0.00 - 0.11 K/uL    NRBC (Absolute) 0.00 K/uL   Comp Metabolic Panel (CMP)    Collection Time: 10/11/19  2:36 AM   Result Value Ref Range    Sodium 129 (L) 135 - 145 mmol/L    Potassium 3.9 3.6 - 5.5 mmol/L    Chloride 103 96 - 112 mmol/L    Co2 21 20 - 33 mmol/L    Anion Gap 5.0 0.0 - 11.9    Glucose 102 (H) 65 - 99 mg/dL    Bun 17 8 - 22 mg/dL    Creatinine 0.75 0.50 - 1.40 mg/dL    Calcium 7.6 (L) 8.5 - 10.5 mg/dL    AST(SGOT) 12 12 - 45 U/L    ALT(SGPT) 7 2 - 50 U/L    Alkaline Phosphatase 72 30 - 99 U/L    Total Bilirubin 0.3 0.1 - 1.5 mg/dL    Albumin 2.2 (L) 3.2 - 4.9 g/dL    Total Protein 5.7 (L) 6.0 - 8.2 g/dL    Globulin 3.5 1.9 - 3.5 g/dL    A-G Ratio 0.6 g/dL   Lipid Profile    Collection Time: 10/11/19  2:36 AM   Result Value Ref Range    Cholesterol,Tot 120 100 - 199 mg/dL    Triglycerides 142 0 - 149 mg/dL    HDL 11 (A) >=40 mg/dL    LDL 81 <100 mg/dL   ESTIMATED GFR    Collection Time: 10/11/19  2:36 AM   Result Value Ref Range    GFR If African American >60 >60 mL/min/1.73 m 2    GFR If Non African American >60 >60 mL/min/1.73 m 2       Imaging:  US-CAROTID DOPPLER BILAT   Final Result      EC-ECHOCARDIOGRAM COMPLETE W/O CONT   Final Result      MR-BRAIN-W/O   Final Result      1.  Multifocal acute/subacute cerebral and cerebellar infarcts involving multiple vascular distributions most suggestive of embolic etiology. This includes moderate to large left PCA infarct with associated small areas of hemorrhage in the left thalamus    and parieto-occipital region.   2.  Mild age-related atrophy.   3.  Multiple small dural based masses most consistent with calcified meningiomas  correlating with head CT findings.      CT-ABDOMEN-PELVIS WITH   Final Result         1.  Low-density changes in the inferior pole of the right kidney, appearance most compatible with area of renal infarct.   2.  Fusiform infrarenal abdominal aortic aneurysm, recommend radiographic follow-up and surveillance as clinically appropriate.   3.  Peripherally enhancing low-density right hepatic lobe lesion, appearance most typical of hemangioma.   4.  Scattered emphysema   5.  Cardiomegaly   6.  Atherosclerosis      These findings were discussed with the patient's clinician, Abhay Sinha, on 10/10/2019 1:08 AM.      CT-HEAD W/O   Final Result         1.  New area of low-density in the left occipital lobe tracking along the medial aspect of the left temporal lobe, appearance compatible with evolving infarct.   2.  Multiple peripheral calcified masses, appearance most typical of meningiomas   3.  Hyperdensity in the subcutaneous soft tissues posterior to the neck in the midline, similar to prior study. Could represent thickening of the nuchal ligament or soft tissue mass. Correlate with exam.      DX-ANKLE 2- VIEWS LEFT   Final Result      No radiographic evidence of acute traumatic injury.      DX-CHEST-PORTABLE (1 VIEW)   Final Result         1.  No acute cardiopulmonary disease.   2.  Chronic underlying lung disease with pulmonary blebs of the left lung base.   3.  Atherosclerosis              EKG : personally reviewed by me from April shows sinus rhythm    Telemetry shows no evidence of arrhythmia    All pertinent features of laboratory and imaging reviewed including primary images where applicable      Principal Problem:    CVA (cerebral vascular accident) (HCC) POA: Yes  Active Problems:    Renal infarct (HCC) POA: Yes    Embolic infarction (HCC) POA: Yes    Elevated BUN POA: Yes    Hyponatremia POA: Yes    Leukocytosis POA: Yes    HTN (hypertension) (Chronic) POA: Yes    Hepatitis C (Chronic) POA: Yes       Overview: Recommend follow up as outpatients    Hemangioma of liver POA: Yes    Anemia POA: Yes    History of methamphetamine use POA: Yes    S/P TAVR (transcatheter aortic valve replacement) POA: Yes    COPD (chronic obstructive pulmonary disease) (HCC) POA: Yes    AAA (abdominal aortic aneurysm) (HCC) POA: Yes    Meningioma (HCC) POA: Yes  Resolved Problems:    * No resolved hospital problems. 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  There is nothing to offer for his TAVR endocarditis he is at high risk for recurrent infections and was not a surgical candidate based on the evaluation in March prior to his TAVR he is also had long-term no follow-up despite multiple attempts to make appointments and schedule the procedure.    Cardiology will sign off, please make an appointment with the TAVR nurse practitioner on discharge    I personally discussed his case with  Dr Chloé Blankenship M.D.    It is my pleasure to participate in the care of Mr. Espinosa.  Please do not hesitate to contact me with questions or concerns.    Pilo Lao MD PhD [facility]  Cardiologist [facility]    10/11/2019    Please note that this dictation was created using voice recognition software. I have worked with consultants from the vendor as well as technical experts from [facility] to optimize the interface. I have made every reasonable attempt to correct obvious errors, but I expect that there are errors of grammar and possibly content I did not discover before finalizing the note.

## 2019-10-11 NOTE — THERAPY
"Physical Therapy Treatment completed.   Bed Mobility:  Supine to Sit: Minimal Assist (used rail)  Transfers: Sit to Stand: Minimal Assist (max verbal cueing for technique given LLE pain)  Gait: Level Of Assist: Unable to Participate  Plan of Care: Will benefit from Physical Therapy 5 times per week  Discharge Recommendations: Equipment: Will Continue to Assess for Equipment Needs. Post-acute therapy: see below.    See \"Rehab Therapy-Acute\" Patient Summary Report for complete documentation.     Patient with improved cognition this session but continues to be limited by pain, impaired balance and coordination, and decreased activity tolerance. He was also still lethargic but question if behavioral component. He was on bed pan upon entry and reported he had BM, required min A for bed mobility and assist for pericare. He was able to move sitting>standing and take side steps up EOB with FWW and min A and max verbal cueing for technique. He was unable to bear weight through LLE due to pain, no obvious edema but some warmth to touch in posterior distal calf. Continue to recommend post acute placement; will follow.  "

## 2019-10-12 ENCOUNTER — APPOINTMENT (OUTPATIENT)
Dept: RADIOLOGY | Facility: MEDICAL CENTER | Age: 64
DRG: 314 | End: 2019-10-12
Attending: HOSPITALIST
Payer: MEDICAID

## 2019-10-12 LAB
OSMOLALITY UR: 276 MOSM/KG H2O (ref 300–900)
SODIUM UR-SCNC: 43 MMOL/L

## 2019-10-12 PROCEDURE — 99233 SBSQ HOSP IP/OBS HIGH 50: CPT | Performed by: INTERNAL MEDICINE

## 2019-10-12 PROCEDURE — 700102 HCHG RX REV CODE 250 W/ 637 OVERRIDE(OP): Performed by: HOSPITALIST

## 2019-10-12 PROCEDURE — 93922 UPR/L XTREMITY ART 2 LEVELS: CPT

## 2019-10-12 PROCEDURE — A9270 NON-COVERED ITEM OR SERVICE: HCPCS | Performed by: HOSPITALIST

## 2019-10-12 PROCEDURE — 84300 ASSAY OF URINE SODIUM: CPT

## 2019-10-12 PROCEDURE — 700111 HCHG RX REV CODE 636 W/ 250 OVERRIDE (IP): Performed by: HOSPITALIST

## 2019-10-12 PROCEDURE — 700105 HCHG RX REV CODE 258: Performed by: HOSPITALIST

## 2019-10-12 PROCEDURE — 93926 LOWER EXTREMITY STUDY: CPT | Mod: LT

## 2019-10-12 PROCEDURE — 99232 SBSQ HOSP IP/OBS MODERATE 35: CPT | Performed by: HOSPITALIST

## 2019-10-12 PROCEDURE — 770020 HCHG ROOM/CARE - TELE (206)

## 2019-10-12 PROCEDURE — 83935 ASSAY OF URINE OSMOLALITY: CPT

## 2019-10-12 RX ORDER — LISINOPRIL 5 MG/1
10 TABLET ORAL
Status: DISCONTINUED | OUTPATIENT
Start: 2019-10-12 | End: 2019-10-13

## 2019-10-12 RX ADMIN — CEFTRIAXONE SODIUM 2 G: 2 INJECTION, POWDER, FOR SOLUTION INTRAMUSCULAR; INTRAVENOUS at 06:06

## 2019-10-12 RX ADMIN — NICOTINE 7 MG: 7 PATCH, EXTENDED RELEASE TRANSDERMAL at 06:06

## 2019-10-12 RX ADMIN — VANCOMYCIN HYDROCHLORIDE 800 MG: 500 INJECTION, POWDER, LYOPHILIZED, FOR SOLUTION INTRAVENOUS at 04:40

## 2019-10-12 RX ADMIN — SODIUM CHLORIDE: 9 INJECTION, SOLUTION INTRAVENOUS at 09:07

## 2019-10-12 RX ADMIN — LISINOPRIL 10 MG: 5 TABLET ORAL at 16:55

## 2019-10-12 RX ADMIN — ACETAMINOPHEN 650 MG: 325 TABLET, FILM COATED ORAL at 23:20

## 2019-10-12 RX ADMIN — ATORVASTATIN CALCIUM 40 MG: 40 TABLET, FILM COATED ORAL at 16:55

## 2019-10-12 RX ADMIN — SODIUM CHLORIDE: 9 INJECTION, SOLUTION INTRAVENOUS at 01:57

## 2019-10-12 RX ADMIN — VANCOMYCIN HYDROCHLORIDE 1200 MG: 500 INJECTION, POWDER, LYOPHILIZED, FOR SOLUTION INTRAVENOUS at 13:48

## 2019-10-12 ASSESSMENT — PATIENT HEALTH QUESTIONNAIRE - PHQ9
6. FEELING BAD ABOUT YOURSELF - OR THAT YOU ARE A FAILURE OR HAVE LET YOURSELF OR YOUR FAMILY DOWN: SEVERAL DAYS
1. LITTLE INTEREST OR PLEASURE IN DOING THINGS: SEVERAL DAYS
2. FEELING DOWN, DEPRESSED, IRRITABLE, OR HOPELESS: SEVERAL DAYS
9. THOUGHTS THAT YOU WOULD BE BETTER OFF DEAD, OR OF HURTING YOURSELF: SEVERAL DAYS
7. TROUBLE CONCENTRATING ON THINGS, SUCH AS READING THE NEWSPAPER OR WATCHING TELEVISION: SEVERAL DAYS
5. POOR APPETITE OR OVEREATING: SEVERAL DAYS
SUM OF ALL RESPONSES TO PHQ QUESTIONS 1-9: 8
4. FEELING TIRED OR HAVING LITTLE ENERGY: SEVERAL DAYS
8. MOVING OR SPEAKING SO SLOWLY THAT OTHER PEOPLE COULD HAVE NOTICED. OR THE OPPOSITE, BEING SO FIGETY OR RESTLESS THAT YOU HAVE BEEN MOVING AROUND A LOT MORE THAN USUAL: SEVERAL DAYS
3. TROUBLE FALLING OR STAYING ASLEEP OR SLEEPING TOO MUCH: NOT AT ALL
SUM OF ALL RESPONSES TO PHQ9 QUESTIONS 1 AND 2: 2

## 2019-10-12 ASSESSMENT — ENCOUNTER SYMPTOMS
VOMITING: 0
NAUSEA: 0
ABDOMINAL PAIN: 0
FEVER: 0
HEADACHES: 0

## 2019-10-12 NOTE — PROGRESS NOTES
"Pharmacy Kinetics 64 y.o. male on vancomycin day # 2   10/12/2019    Currently on Vancomycin 800 mg iv q12hr (0400 1600)  Provider specified end date: TBD    Indication for Treatment: bacteremia, r/o infective endocarditis     Pertinent history per medical record: Admitted on 10/9/2019 for Encephalopathy. 64 y.o. MIGUEL ANGEL M who is homeless. Recent TAVR on 3/18/2019 for severe symptomatic aortic stenosis. He came into the emergency room on 10/10/2019 for abdominal pain, admit diagnosis: encephalopathy. CT ABD/Pelvis: possible renal infarct. Neuro saw him for CVA. CT head: left occipital lobe tracking along the medial aspect of the left temporal lobe compatible with evolving infarct. ID following.     Other antibiotics: Rocephin 2 g iv q24hr     Allergies: Patient has no known allergies.     List concerns for renal function: none     Pertinent cultures to date:   10/10/19:PBCx2:Growth detected: Negative for SA and MRSA  10/11/19:PBCx2:NGTD    MRSA nares swab if pneumonia is a concern (ordered/positive/negative/n-a): na    Recent Labs     10/09/19  2230 10/11/19  0236   WBC 19.4* 17.7*   NEUTSPOLYS 84.10* 82.70*     Recent Labs     10/09/19  2230 10/11/19  0236   BUN 33* 17   CREATININE 0.96 0.75   ALBUMIN 2.9* 2.2*     Recent Labs     10/11/19  1515   VANCOTROUGH 11.0       Intake/Output Summary (Last 24 hours) at 10/12/2019 1004  Last data filed at 10/11/2019 2350  Gross per 24 hour   Intake --   Output 1200 ml   Net -1200 ml      /85   Pulse 98   Temp 37.6 °C (99.7 °F) (Tympanic)   Resp 16   Ht 1.727 m (5' 8\")   Wt 59.8 kg (131 lb 13.4 oz)   SpO2 95%  Temp (24hrs), Av.4 °C (99.3 °F), Min:37.1 °C (98.7 °F), Max:37.6 °C (99.7 °F)      A/P   1. Vancomycin dose change: Vancomycin 1200 mg iv q12hr (0200 1400)  2. Next vancomycin level: 10/13/19@1330  3. Goal trough: 16-20 mcg/mL    4. Comments: Leukocytosis, cx listed above. Tmax 99.7. Renal indices decreased. Maintenance dose increased, level below goal. ID " following     Liang Swartz PharmD BCPS

## 2019-10-12 NOTE — PROGRESS NOTES
Infectious Disease Progress Note    Author: Karla Mabry M.D. Date & Time of service: 10/12/2019  11:54 AM    Chief Complaint:  Prosthetic valve endocarditis    Interval History:  10/12/2019-no fevers.  Patient is slightly more interactive.  Getting vascular studies done.  WBC remains high at 17,000.  Creatinine is 0.75  Labs Reviewed.    Review of Systems:  Review of Systems   Unable to perform ROS: Other (Patient is a very poor historian)   Constitutional: Negative for fever.   Gastrointestinal: Negative for abdominal pain, nausea and vomiting.   Musculoskeletal: Positive for joint pain.        Complains of some ankle pain   Neurological: Negative for headaches.       Hemodynamics:  Temp (24hrs), Av.4 °C (99.4 °F), Min:37.2 °C (99 °F), Max:37.6 °C (99.7 °F)  Temperature: 37.6 °C (99.7 °F)  Pulse  Av.6  Min: 91  Max: 120   Blood Pressure: 137/85       Physical Exam:  Physical Exam   Constitutional: No distress.   Disheveled   HENT:   Mouth/Throat: No oropharyngeal exudate.   Eyes: Pupils are equal, round, and reactive to light. No scleral icterus.   Neck: Neck supple.   Cardiovascular: Regular rhythm.   Murmur heard.  Pulmonary/Chest: He has no wheezes. He has no rales.   Abdominal: Soft. There is no tenderness. There is no rebound and no guarding.   Musculoskeletal: He exhibits no edema or tenderness.   Lymphadenopathy:     He has no cervical adenopathy.   Neurological: He is alert.   No focal neurological deficit  Poor historian   Skin: Skin is warm. No erythema.   Vitals reviewed.      Meds:    Current Facility-Administered Medications:   •  vancomycin  •  atorvastatin  •  enoxaparin (LOVENOX) injection  •  acetaminophen  •  senna-docusate **AND** polyethylene glycol/lytes **AND** magnesium hydroxide **AND** bisacodyl  •  NS  •  ondansetron  •  ondansetron  •  promethazine  •  promethazine  •  prochlorperazine  •  MD Alert...Vancomycin per Pharmacy  •  cefTRIAXone (ROCEPHIN) IV  •  nicotine  •   labetalol **OR** hydrALAZINE    Labs:  Recent Labs     10/09/19  2230 10/11/19  0236   WBC 19.4* 17.7*   RBC 3.64* 2.95*   HEMOGLOBIN 10.0* 8.1*   HEMATOCRIT 31.3* 25.0*   MCV 86.0 84.1   MCH 27.5 27.1   RDW 44.5 43.3   PLATELETCT 296 255   MPV 9.3 9.4   NEUTSPOLYS 84.10* 82.70*   LYMPHOCYTES 7.30* 8.80*   MONOCYTES 7.70 7.20   EOSINOPHILS 0.10 0.30   BASOPHILS 0.20 0.20     Recent Labs     10/09/19  2230 10/11/19  0236   SODIUM 133* 129*   POTASSIUM 4.4 3.9   CHLORIDE 101 103   CO2 23 21   GLUCOSE 114* 102*   BUN 33* 17     Recent Labs     10/09/19  2230 10/11/19  0236   ALBUMIN 2.9* 2.2*   TBILIRUBIN 0.3 0.3   ALKPHOSPHAT 104* 72   TOTPROTEIN 7.5 5.7*   ALTSGPT 12 7   ASTSGOT 19 12   CREATININE 0.96 0.75       Imaging:  Ct-abdomen-pelvis With    Result Date: 10/10/2019  10/9/2019 11:57 PM HISTORY/REASON FOR EXAM: Abd pain, unspecified TECHNIQUE/EXAM DESCRIPTION:  CT abdomen and pelvis with contrast. Sequential axial CT images were obtained from lung bases to the proximal femurs after the uneventful administration of 80 cc Omnipaque 350 intravenous contrast. Low dose optimization technique was utilized for this CT exam including automated exposure control and adjustment of the mA and/or kV according to patient size. COMPARISON:  January 4, 2019 CT ABDOMEN FINDINGS: Linear densities are seen within the lung bases, appearance favors atelectasis. Scattered emphysematous changes are seen. Cardiomegaly is noted. The liver is normal in contour. The liver is normal in size. No intrahepatic biliary ductal dilatation is noted. Peripherally enhancing 2.0 cm low-density lesion is seen in the right hepatic lobe. The gallbladder appears within normal limits. The spleen is unremarkable. The pancreas is grossly normal. Bilateral adrenal glands appear within normal limits. Low-density changes in the inferior pole of the right kidney are seen, new since prior study. The ureters are normal in caliber along their visualized course.  The colon appears within normal limits. The appendix is not definitively identified. The small bowel is unremarkable. The bony structures are age appropriate. Atherosclerotic calcifications are seen. There is 3.5 cm fusiform infrarenal abdominal aortic aneurysm identified CT PELVIS FINDINGS: The bladder is within normal limits.     1.  Low-density changes in the inferior pole of the right kidney, appearance most compatible with area of renal infarct. 2.  Fusiform infrarenal abdominal aortic aneurysm, recommend radiographic follow-up and surveillance as clinically appropriate. 3.  Peripherally enhancing low-density right hepatic lobe lesion, appearance most typical of hemangioma. 4.  Scattered emphysema 5.  Cardiomegaly 6.  Atherosclerosis These findings were discussed with the patient's clinician, Abhay Sinha, on 10/10/2019 1:08 AM.    Ct-head W/o    Result Date: 10/10/2019  10/9/2019 11:57 PM HISTORY/REASON FOR EXAM: Altered mental status TECHNIQUE/EXAM DESCRIPTION:  CT of the head without contrast. Sequential axial images were obtained from the vertex to the skull base without contrast. Up to date radiation dose reduction adjustments have been utilized to meet ALARA standards for radiation dose reduction. COMPARISON:  July 4, 2017 FINDINGS: The brain appears normal in volume and morphology. The ventricles are normal in caliber and configuration. Calcified 2.3 cm mass along the left temporal lobe is seen extra-axially. 1.7 cm mass along the periphery is seen adjacent to the right temporal lobe. 1.4 cm mass is seen adjacent to the left temporoparietal region on the right. New area of low-density is seen in the left occipital lobe tracking along the medial aspect of the left temporal lobe. There are no abnormal extra axial fluid collections  or extra axial hemorrhage identified. The visualized paranasal sinuses and mastoid air cells are well aerated bilaterally. No depressed calvarial fractures are identified. The  visualized globes and retrobulbar soft tissues appear within normal limits. There is hyperdensity identified in the subcutaneous soft tissues of the posterior neck, similar to prior study.     1.  New area of low-density in the left occipital lobe tracking along the medial aspect of the left temporal lobe, appearance compatible with evolving infarct. 2.  Multiple peripheral calcified masses, appearance most typical of meningiomas 3.  Hyperdensity in the subcutaneous soft tissues posterior to the neck in the midline, similar to prior study. Could represent thickening of the nuchal ligament or soft tissue mass. Correlate with exam.    Dx-ankle 2- Views Left    Result Date: 10/9/2019  10/9/2019 11:15 PM HISTORY/REASON FOR EXAM:  Pain/Deformity Following Trauma. TECHNIQUE/EXAM DESCRIPTION AND NUMBER OF VIEWS:  2 views of the LEFT ankle. COMPARISON: None. FINDINGS: There is no visualized fracture, subluxation, or dislocation identified.     No radiographic evidence of acute traumatic injury.    Dx-ankle 3+ Views Left    Result Date: 10/11/2019  10/11/2019 1:25 PM HISTORY/REASON FOR EXAM:  Left ankle pain TECHNIQUE/EXAM DESCRIPTION AND NUMBER OF VIEWS:  3 views of the LEFT ankle. COMPARISON: 10/09/2019 FINDINGS: Bone mineralization is normal.  There is no evidence of fracture or dislocation.  There is no soft tissue swelling seen. The ankle mortise is well-maintained.     No evidence of fracture or dislocation.    Dx-chest-portable (1 View)    Result Date: 10/9/2019    10/9/2019 10:24 PM HISTORY/REASON FOR EXAM: Shortness of Breath TECHNIQUE/EXAM DESCRIPTION:  Single AP view of the chest. COMPARISON: March 19, 2019 FINDINGS: The cardiac silhouette appears within normal limits. Atherosclerotic calcification of the aorta is noted.  The central pulmonary vasculature appears normal. The lungs appear well expanded bilaterally.  Linear densities of the bilateral lung bases are seen. Left lung base pulmonary blebs are noted,  similar to prior study. No significant pleural effusions are identified. The bony structures appear age-appropriate.     1.  No acute cardiopulmonary disease. 2.  Chronic underlying lung disease with pulmonary blebs of the left lung base. 3.  Atherosclerosis    Mr-brain-w/o    Result Date: 10/10/2019  10/10/2019 7:26 AM HISTORY/REASON FOR EXAM:  Altered mental status; Okay for patient to be off telemetry monitoring for MRI. TECHNIQUE/EXAM DESCRIPTION: MRI of the brain without contrast. T1 sagittal, T2 fast spin-echo axial, T1 coronal, FLAIR coronal, diffusion-weighted and apparent diffusion coefficient (ADC map) axial images were obtained of the whole brain. The study was performed on a [employer] Signa 1.5 Nataly MRI scanner. COMPARISON:  Noncontrast head CT earlier in the day FINDINGS: Study is limited by motion artifact. There are multiple acute/subacute infarcts with restricted diffusion and T2 hyperintensity consistent with cytotoxic edema. These involve multiple vascular distributions suggesting embolic etiology. There is a moderate to large left posterior cerebral artery territory infarct involving the left occipital lobe, left thalamus and left hippocampus. There are also multiple small infarcts seen within the bilateral frontoparietal white matter, right parietal and occipital cortex and in the bilateral cerebellar hemispheres. There is susceptibility artifact in the left thalamus and punctate focus in the left occipital lobe consistent with small areas of hemorrhage. There are multiple dural based masses demonstrating low T2 signal along the bilateral convexities most consistent with calcified change most correlating to CT findings. There is mild localized mass effect. No brain edema is seen. There is no midline shift or hydrocephalus. No extra-axial fluid collection. Proximal vascular flow voids are patent. Mucosal thickening and small mucous retention cysts within the maxillary sinuses. Mastoids are clear.  Orbital contents are symmetric.     1.  Multifocal acute/subacute cerebral and cerebellar infarcts involving multiple vascular distributions most suggestive of embolic etiology. This includes moderate to large left PCA infarct with associated small areas of hemorrhage in the left thalamus and parieto-occipital region. 2.  Mild age-related atrophy. 3.  Multiple small dural based masses most consistent with calcified meningiomas correlating with head CT findings.    Us-michaela Single Level Bilat    Result Date: 10/12/2019   Vascular Laboratory  Conclusions  Right.  No evidence of arterial insufficiency.  Left.  There is no evidence of arterial disease demonstrated (MICHAELA is .9-1.0).  JAIMIE YAO  Age:    64    Gender:     M  MRN:    [id number]  :    1955      BSA:  Exam Date:     10/12/2019 10:33  Room #:     Inpatient  Priority:     Routine  Ht (in):             Wt (lb):  Ordering Physician:        MASHA RUBIO  Referring Physician:       MASHA RUBIO  Sonographer:               Umer Lao RVT  Study Type:                Complete Bilateral  Technical Quality:         Adequate  Indications:     Pain in Limb  CPT Codes:       62512  ICD Codes:       729.5  History:         Bilateral lower extremity pain; history of AAA  Limitations:                 RIGHT  Waveform            Systolic BPs (mmHg)                             151           Brachial  Triphasic                                Common Femoral  Triphasic                  175           Posterior Tibial  Triphasic                  168           Dorsalis Pedis                                           Peroneal                             1.16          MICHAELA                                           TBI                       LEFT  Waveform        Systolic BPs (mmHg)                             145           Brachial  Triphasic                                Common Femoral  Bi, non-                   131           Posterior Tibial  reversed  Bi, non-                    142           Dorsalis Pedis  reversed                                           Peroneal                             0.94          BETSY                                           TBI  Findings  Right.  Doppler waveform of the common femoral artery is of high amplitude and  triphasic.  Doppler waveforms at the ankle are brisk and triphasic.  Ankle-brachial index is normal.  Left.  Doppler waveform of the common femoral artery is of high amplitude and  triphasic.  Doppler waveforms at the ankle are brisk and biphasic without reversal of  flow.  Ankle-brachial index is normal, but reduced compared to the right lower  extremity.    Krystian William MD  (Electronically Signed)  Final Date:      2019                   10:59    Us-carotid Doppler Bilat    Result Date: 10/11/2019   Carotid Duplex  Report  Vascular Laboratory  CONCLUSIONS  Mild bilateral internal carotid artery stenosis (<50%).  JAIMIE YAO  Exam Date:     10/11/2019 07:47  Room #:     Inpatient  Priority:     Routine  Ht (in):             Wt (lb):  Ordering Physician:        LESLIE HUGHES  Referring Physician:       [id number]  Sonographer:               Anisha Solis RVT, RDCS  Study Type:                Complete Bilateral  Technical Quality:         Good  Age:    64    Gender:     M  MRN:    [id number]  :    1955      BSA:  Indications:     CVA  CPT Codes:       39029  ICD Codes:       436  History:         Altered mental status  Limitations:  Right Brachial BP:              /  Left Brachial BP:             /  RIGHT  Plaque          Plaque         Velocity (cm/s)  Characteristics Composition    Syst/Diast                                 85   / 32      Distal ICA                                 81   / 31      Mid ICA  Irregular      Heterogeneo     86   / 34      Prox ICA                 us                                 59   / 17      Distal CCA                                      /          Mid CCA                                 68   / 15      Prox CCA                                 98   / 13      ECA  Waveform:                                     SCA  LEFT  Plaque         Plaque          Velocity (cm/s)  CharacteristicsComposition     Syst/Diast                                 103  / 36      Distal ICA                                 96   / 34      Mid ICA  Smooth         Heterogeneo     66   / 20      Prox ICA                 us                                 69   / 14      Distal CCA                                      /         Mid CCA                                 75   / 17      Prox CCA                                 105  / 17      ECA  Waveform:                                     SCA          1.3          ICA/CCA       1.4        Antegrade      Vertebral   Antegrade         50   / 21     cm/s        67    / 23  FINDINGS  Right carotid.  Mild, heterogeneous plaque of the bifurcation extending into the internal  carotid.  Velocities are consistent with < 50% stenosis of the internal carotid  artery.  Antegrade right vertebral flow.  Left carotid.  Mild, heterogeneous plaque of the bifurcation extending into the internal  carotid.  Velocities are consistent with < 50% stenosis of the internal carotid  artery.  Antegrade left vertebral flow.  Krystian William MD  (Electronically Signed)  Final Date:      11 October 2019                   09:00    Us-extremity Non Vascular Unilateral Left    Result Date: 10/11/2019  10/11/2019 1:32 PM HISTORY/REASON FOR EXAM:  soft tissue irregularity on medial left ankle around the achilles tendon, area outlined in pen. TECHNIQUE/EXAM DESCRIPTION AND NUMBER OF VIEWS: Limited ultrasound of the left ankle in the area of clinical abnormality. COMPARISON: None FINDINGS: Real-time grayscale and color Doppler sonography was performed. In the medial left ankle there is a tubular structure which appears to represent a vessel. There is echogenic material  within this without significant color flow. This may represent the posterior tibial artery with thrombus. No mass or fluid collection is seen.     A presumed vessel, probably the posterior tibial artery in the medial left ankle with intraluminal thrombus. These findings were discussed with ANG WOODY on 10/11/2019 3:45 PM.    Ec-echocardiogram Complete W/o Cont    Result Date: 10/10/2019  Transthoracic Echo Report Echocardiography Laboratory CONCLUSIONS Hyperdynamic left ventricular systolic function. Left ventricular ejection fraction is visually estimated to be greater than 75%. Severe concentric left ventricular hypertrophy. Known TAVR aortic valve with normal leaflet excursion with increased gradients most likely related to hyperdynamic flow. Echodensity prosthetic valve leaflet 0.7 x 0.9 cm. Compared to the images of the study done 3/29/2019 there is an echodensity of the prosthetic valve leaflet seen on current study however there is no comparable view on the previous study; increased prosthetic valve gradients related to increased velocity due to change in hyperdynamic left ventricular contractility. JAIMIE YAO Exam Date:         10/10/2019                    13:15 Exam Location:     Inpatient Priority:          Routine Ordering Physician:        SHANNON BATRES Referring Physician:       [id number] Sonographer:               Isidoro Dillard Age:    64     Gender:    M MRN:    [id number] :    1955 BSA:    1.65   Ht (in):    68     Wt (lb):    120 Exam Type:     Complete Indications:     Prosthetic Valve ICD Codes:       V43.3 CPT Codes:       39378 BP:   140    /   63     HR:   100 Technical Quality:       Good MEASUREMENTS  (Male / Female) Normal Values 2D ECHO LV Diastolic Diameter PLAX        3.4 cm                4.2 - 5.9 / 3.9 - 5.3 cm LV Systolic Diameter PLAX         2.2 cm                2.1 - 4.0 cm IVS Diastolic Thickness           1.8 cm                LVPW Diastolic Thickness           1.5 cm                LVOT Diameter                     1.8 cm                Estimated LV Ejection Fraction    75 %                  LV Ejection Fraction MOD BP       65.4 %                >= 55  % LV Ejection Fraction MOD 4C       71.6 %                LV Ejection Fraction MOD 2C       63.3 %                IVC Diameter                      1.1 cm                DOPPLER AV Peak Velocity                  3.5 m/s               AV Peak Gradient                  48.2 mmHg             AV Mean Gradient                  27.9 mmHg             LVOT Peak Velocity                1.4 m/s               AV Area Cont Eq vti               1.3 cm???               MV Velocity Time Integral         33.8 cm               Mitral E Point Velocity           0.76 m/s              Mitral E to A Ratio               0.55                  MV Pressure Half Time             39.2 ms               MV Area PHT                       5.6 cm???               MV Deceleration Time              135 ms                TR Peak Velocity                  300 cm/s              PV Peak Velocity                  1.3 m/s               PV Peak Gradient                  6.9 mmHg              RVOT Peak Velocity                1.1 m/s               * Indicates values subject to auto-interpretation LV EF:  75    % FINDINGS Left Ventricle Normal left ventricular chamber size. Severe concentric left ventricular hypertrophy. Hyperdynamic left ventricular systolic function. Left ventricular ejection fraction is visually estimated to be greater than 75%.    Normal regional wall motion. Indeterminate diastolic function. Right Ventricle Normal right ventricular size. Normal right ventricular systolic function. Right Atrium Enlarged right atrium. Normal inferior vena cava size and inspiratory collapse. Left Atrium Severely dilated left atrium. Left atrial volume index is 63  mL/sq m. Mitral Valve Mitral annular calcification. Calcification of the mitral valve  "leaflets. Mild mitral stenosis. Trace mitral regurgitation. Aortic Valve Echodensity prosthetic valve leaflet 0.7 x 0.9 cm.  Known TAVR aortic valve with normal leaflet excursion with increased gradients most likely related to hyperdynamic flow. Vmax is 3.8 m/s. Transvalvular gradients are - Peak: 63 mmHg, Mean: 36 mmHg. Dimensionless index is 0.43.  No paravalvular leak. Tricuspid Valve Structurally normal tricuspid valve. No tricuspid stenosis. Trace tricuspid regurgitation. Pulmonic Valve Structurally normal pulmonic valve without significant stenosis or regurgitation. Pericardium Normal pericardium without effusion. Aorta The aortic root is normal.  Ascending aorta diameter is 3.0 cm. Qasim Love M.D. (Electronically Signed) Final Date:     10 October 2019                 14:52      Micro:  Results     Procedure Component Value Units Date/Time    BLOOD CULTURE [[id number]] Collected:  10/11/19 1515    Order Status:  Completed Specimen:  Blood from Peripheral Updated:  10/12/19 0920     Significant Indicator NEG     Source BLD     Site PERIPHERAL     Culture Result No Growth  Note: Blood cultures are incubated for 5 days and  are monitored continuously.Positive blood cultures  are called to the RN and reported as soon as  they are identified.      Narrative:       Per Hospital Policy: Only change Specimen Src: to \"Line\" if  specified by physician order.  Right Forearm/Arm    BLOOD CULTURE [[id number]] Collected:  10/11/19 2107    Order Status:  Completed Specimen:  Blood from Peripheral Updated:  10/12/19 0920     Significant Indicator NEG     Source BLD     Site PERIPHERAL     Culture Result No Growth  Note: Blood cultures are incubated for 5 days and  are monitored continuously.Positive blood cultures  are called to the RN and reported as soon as  they are identified.      Narrative:       Per Hospital Policy: Only change Specimen Src: to \"Line\" if  specified by physician order.  Right AC    BLOOD " "CULTURE x2 [[id number]]  (Abnormal) Collected:  10/10/19 0331    Order Status:  Completed Specimen:  Blood from Peripheral Updated:  10/11/19 1331     Significant Indicator POS     Source BLD     Site PERIPHERAL     Culture Result Growth detected by Bactec instrument. 10/11/2019  11:01  Negative for Staphylococcus aureus and MRSA by PCR. Correlate  ongoing need for antibiotics with clinical condition.  Further report to follow.      Narrative:       CALL  Erwin  183 tel. [phone],  CALLED  183 tel. [phone] 10/11/2019, 13:30, RB PERF. RESULTS CALLED  TO:[phone]  Per Hospital Policy: Only change Specimen Src: to \"Line\" if  specified by physician order.  Left Hand    BLOOD CULTURE x2 [[id number]]  (Abnormal) Collected:  10/10/19 0330    Order Status:  Completed Specimen:  Blood from Peripheral Updated:  10/11/19 1207     Significant Indicator POS     Source BLD     Site PERIPHERAL     Culture Result Growth detected by Bactec instrument. 10/11/2019  12:04    Narrative:       Per Hospital Policy: Only change Specimen Src: to \"Line\" if  specified by physician order.  Right AC    BLOOD CULTURE [[id number]] Collected:  10/11/19 0000    Order Status:  Canceled Specimen:  Other from Peripheral     BLOOD CULTURE [[id number]] Collected:  10/11/19 0000    Order Status:  Canceled Specimen:  Other from Peripheral     URINALYSIS CULTURE, IF INDICATED [[id number]]  (Abnormal) Collected:  10/09/19 2356    Order Status:  Completed Specimen:  Urine Updated:  10/10/19 0016     Color Yellow     Character Clear     Specific Gravity 1.020     Ph 5.0     Glucose Negative mg/dL      Ketones Negative mg/dL      Protein Negative mg/dL      Bilirubin Negative     Urobilinogen, Urine 1.0     Nitrite Negative     Leukocyte Esterase Negative     Occult Blood Small     Micro Urine Req Microscopic    URINALYSIS (UA) [[id number]]     Order Status:  Canceled Specimen:  Urine           Assessment:  Active Hospital Problems    Diagnosis   • *Acute bacterial " endocarditis [I33.0]   • Renal infarct (HCC) [N28.0]   • CVA (cerebral vascular accident) (HCC) [I63.9]   • Elevated BUN [R79.9]   • Hyponatremia [E87.1]   • Leukocytosis [D72.829]   • COPD (chronic obstructive pulmonary disease) (HCC) [J44.9]   • AAA (abdominal aortic aneurysm) (HCC) [I71.4]   • Meningioma (HCC) [D32.9]   • S/P TAVR (transcatheter aortic valve replacement) [Z95.2]   • History of methamphetamine use [Z87.898]   • Anemia [D64.9]   • Hemangioma of liver [D18.03]   • HTN (hypertension) [I10]   • Hepatitis C [B19.20]   • Ischemic leg [I99.8]       Plan:  Prosthetic valve endocarditis  On 10/10/2019 patient has positive blood cultures-one set has only triggered the alarm but no bacteria isolated and the other set is coagulase-negative staph  But he does have an echodensity on prosthetic valve leaflet on TTE done on 10/10/2019  Repeat blood cultures are negative on 10/11/2019  Patient is currently on Vanco and Rocephin-discontinue the Rocephin  Sed rate is 96 and CRP is 7.77  Continue with the vancomycin    Hepatitis C  Patient has history of methamphetamine use  Urine tox was negative on admission  Can be treated at that later date    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

## 2019-10-12 NOTE — ASSESSMENT & PLAN NOTE
Ultrasound found evidence of ischemic leg  Vascular surgery was consulted--> no surgical intervention recommended at this time

## 2019-10-12 NOTE — PROGRESS NOTES
Hospital Medicine Daily Progress Note    Date of Service  10/12/2019    Chief Complaint  Altered mentation    Hospital Course ( From  progress note)    64 y.o. male with hypertension, meth use, history of TAVR, chronic hepatitis C, admitted 10/9/2019 with altered mentation, along with multiple complaints of abdominal pain, and ankle pain.  Reportedly, he has been staying in an abandoned car for the last 5 days and currently homeless.  CT scan of the head in the ED showed multiple infarcts in the left occipital lobe, and left temporal lobe.  Initial labs showed WBC of 19,400, and sodium of 133.  Urinalysis did not show any significant pyuria.  CT scan of the abdomen pelvis also showed low-density changes in the inferior pole of the right kidney, most compatible with area of renal infarct, along with fusiform infrarenal abdominal aortic aneurysm, and liver hemangioma.  Ankle x-ray showed no acute traumatic injury.  Chest x-ray showed nothing acute.  Embolic infarcts were suspected, patient started on empiric antibiotics.  Echocardiogram was ordered, and HIV was sent. Neurology was consulted, and he started on aspirin and statin.  He is also started on IV fluids.           Interval Problem Update  10/11: Patient seen and examined by me today.  Patient was found to be lethargic.  He is producing good amounts of urine and no elevation in creatinine was noted.  Echocardiogram found evidence of vegetations at the TAVR.  Cardiology infectious disease were consulted.  His blood cultures came back positive for gram-positive cocci.  Ultrasound may have find evidence of a posterior tibial artery occlusion.  Vascular surgery was then consulted.      10/12 he didn't wasn't to talk today    Noted vascular md  Assessment    Case d/w dr gautam ID    Afebrile    No overnight events  Consultants/Specialty  Neurology    Code Status  Full    Disposition  Monitor on telemetry    Review of Systems  Review of Systems   Unable to  perform ROS: Medical condition        Physical Exam  Temp:  [37 °C (98.6 °F)-37.6 °C (99.7 °F)] 37 °C (98.6 °F)  Pulse:  [] 115  Resp:  [16-22] 16  BP: (113-151)/(64-96) 134/84  SpO2:  [95 %-100 %] 95 %    Physical Exam   Constitutional: He is oriented to person, place, and time. He appears well-developed and well-nourished.   HENT:   Head: Normocephalic and atraumatic.   Mouth/Throat: No oropharyngeal exudate.   Eyes: Conjunctivae are normal. No scleral icterus.   Neck: Normal range of motion. Neck supple. No JVD present. No tracheal deviation present. No thyromegaly present.   Cardiovascular: Normal rate, regular rhythm and intact distal pulses. Exam reveals no gallop and no friction rub.   Murmur (Systolic aortic murmur) heard.  Pulmonary/Chest: Effort normal and breath sounds normal. No stridor. No respiratory distress. He has no wheezes. He has no rales. He exhibits no tenderness.   Abdominal: Soft. Bowel sounds are normal. He exhibits no distension and no mass. There is no tenderness. There is no rebound and no guarding.   Musculoskeletal: Normal range of motion. He exhibits no edema.   Lymphadenopathy:     He has no cervical adenopathy.   Neurological: He is alert and oriented to person, place, and time. He has normal reflexes. No cranial nerve deficit.   Nursing note and vitals reviewed.      Fluids    Intake/Output Summary (Last 24 hours) at 10/12/2019 1543  Last data filed at 10/12/2019 0907  Gross per 24 hour   Intake 6417.5 ml   Output 1200 ml   Net 5217.5 ml       Laboratory  Recent Labs     10/09/19  2230 10/11/19  0236   WBC 19.4* 17.7*   RBC 3.64* 2.95*   HEMOGLOBIN 10.0* 8.1*   HEMATOCRIT 31.3* 25.0*   MCV 86.0 84.1   MCH 27.5 27.1   MCHC 31.9* 32.3*   RDW 44.5 43.3   PLATELETCT 296 255   MPV 9.3 9.4     Recent Labs     10/09/19  2230 10/11/19  0236   SODIUM 133* 129*   POTASSIUM 4.4 3.9   CHLORIDE 101 103   CO2 23 21   GLUCOSE 114* 102*   BUN 33* 17   CREATININE 0.96 0.75   CALCIUM 8.7 7.6*              Recent Labs     10/11/19  0236   TRIGLYCERIDE 142   HDL 11*   LDL 81       Imaging  US-BETSY SINGLE LEVEL BILAT   Final Result      US-EXTREMITY NON VASCULAR UNILATERAL LEFT   Final Result      A presumed vessel, probably the posterior tibial artery in the medial left ankle with intraluminal thrombus.   These findings were discussed with ANG WOODY on 10/11/2019 3:45 PM.      DX-ANKLE 3+ VIEWS LEFT   Final Result      No evidence of fracture or dislocation.      US-CAROTID DOPPLER BILAT   Final Result      EC-ECHOCARDIOGRAM COMPLETE W/O CONT   Final Result      MR-BRAIN-W/O   Final Result      1.  Multifocal acute/subacute cerebral and cerebellar infarcts involving multiple vascular distributions most suggestive of embolic etiology. This includes moderate to large left PCA infarct with associated small areas of hemorrhage in the left thalamus    and parieto-occipital region.   2.  Mild age-related atrophy.   3.  Multiple small dural based masses most consistent with calcified meningiomas correlating with head CT findings.      CT-ABDOMEN-PELVIS WITH   Final Result         1.  Low-density changes in the inferior pole of the right kidney, appearance most compatible with area of renal infarct.   2.  Fusiform infrarenal abdominal aortic aneurysm, recommend radiographic follow-up and surveillance as clinically appropriate.   3.  Peripherally enhancing low-density right hepatic lobe lesion, appearance most typical of hemangioma.   4.  Scattered emphysema   5.  Cardiomegaly   6.  Atherosclerosis      These findings were discussed with the patient's clinician, Abhay Sinha, on 10/10/2019 1:08 AM.      CT-HEAD W/O   Final Result         1.  New area of low-density in the left occipital lobe tracking along the medial aspect of the left temporal lobe, appearance compatible with evolving infarct.   2.  Multiple peripheral calcified masses, appearance most typical of meningiomas   3.  Hyperdensity in the  subcutaneous soft tissues posterior to the neck in the midline, similar to prior study. Could represent thickening of the nuchal ligament or soft tissue mass. Correlate with exam.      DX-ANKLE 2- VIEWS LEFT   Final Result      No radiographic evidence of acute traumatic injury.      DX-CHEST-PORTABLE (1 VIEW)   Final Result         1.  No acute cardiopulmonary disease.   2.  Chronic underlying lung disease with pulmonary blebs of the left lung base.   3.  Atherosclerosis      US-EXTREMITY ARTERY LOWER UNILAT LEFT    (Results Pending)        Assessment/Plan  * Acute bacterial endocarditis- (present on admission)  Assessment & Plan  Has both renal and cerebral infarct.  Vegetation seen on cardiac echo    -Pending HIV testing.  Cultures positive for gram-positive cocci  Continue ceftriaxone and vancomycin      CVA (cerebral vascular accident) (HCC)- (present on admission)  Assessment & Plan  -Appears to be embolic.  Not a candidate for TPA as unknown onset.  Discontinue asa the patient is a high risk of hemorrhagic conversion according to neurology  -MRI of the brain found multiple cerebellar acute and subacute emboli,  MRI also found a large left PCA infarct  -Monitor closely, with neuro checks every 4 hours per CVA protocol.     PT/OT/SLP to evaluate.   -This is likely secondary to septic emboli from endocarditis      Renal infarct (HCC)- (present on admission)  Assessment & Plan  -Please secondary to septic emboli.  -Continue antibiotics for now.   -Holding off on anticoagulation for now.     Leukocytosis- (present on admission)  Assessment & Plan    -Continue antibiotics     Hyponatremia- (present on admission)  Assessment & Plan  -Likely due to hypovolemia,      Elevated BUN- (present on admission)  Assessment & Plan  Resolved with ivf    Ischemic leg- (present on admission)  Assessment & Plan  Ultrasound found evidence of ischemic leg    Vascular surgery was consulted--> no surgical intervention recommended at  this time      Meningioma (HCC)- (present on admission)  Assessment & Plan  -Multiple meningioma noted.  Appears stable.  Needs continued outpatient surveillance and monitoring.    AAA (abdominal aortic aneurysm) (HCC)- (present on admission)  Assessment & Plan  -Needs continued outpatient surveillance and follow-up.    COPD (chronic obstructive pulmonary disease) (HCC)- (present on admission)  Assessment & Plan  - Noted on imaging.  Not in acute exacerbation.  Continue respiratory protocol, as needed oxygen supplement.    S/P TAVR (transcatheter aortic valve replacement)- (present on admission)  Assessment & Plan  -with vegetations    On iv abx      History of methamphetamine use- (present on admission)  Assessment & Plan  -Urine drug screen is negative.    Anemia- (present on admission)  Assessment & Plan  -Likely anemia of chronic disease, with low iron, high ferritin, normal folate and B12.   -Monitor hemoglobin closely, with restrictive transfusion strategy, transfuse if hemoglobin drops below 7.    Hemangioma of liver- (present on admission)  Assessment & Plan  -Stable.  Monitor.    Hepatitis C- (present on admission)  Assessment & Plan  -Chronic.  Outpatient follow-up.    HTN (hypertension)- (present on admission)  Assessment & Plan  Control BP     lisinopril 10mg po daily started on 10/12       VTE prophylaxis: lovenox SQ    Check am cbc, bmp

## 2019-10-12 NOTE — PROGRESS NOTES
Hospital Medicine Daily Progress Note    Date of Service  10/11/2019    Chief Complaint  Altered mentation    Hospital Course ( From  progress note)    64 y.o. male with hypertension, meth use, history of TAVR, chronic hepatitis C, admitted 10/9/2019 with altered mentation, along with multiple complaints of abdominal pain, and ankle pain.  Reportedly, he has been staying in an abandoned car for the last 5 days and currently homeless.  CT scan of the head in the ED showed multiple infarcts in the left occipital lobe, and left temporal lobe.  Initial labs showed WBC of 19,400, and sodium of 133.  Urinalysis did not show any significant pyuria.  CT scan of the abdomen pelvis also showed low-density changes in the inferior pole of the right kidney, most compatible with area of renal infarct, along with fusiform infrarenal abdominal aortic aneurysm, and liver hemangioma.  Ankle x-ray showed no acute traumatic injury.  Chest x-ray showed nothing acute.  Embolic infarcts were suspected, patient started on empiric antibiotics.  Echocardiogram was ordered, and HIV was sent. Neurology was consulted, and he started on aspirin and statin.  He is also started on IV fluids.           Interval Problem Update  10/11: Patient seen and examined by me today.  Patient was found to be lethargic.  He is producing good amounts of urine and no elevation in creatinine was noted.  Echocardiogram found evidence of vegetations at the TAVR.  Cardiology infectious disease were consulted.  His blood cultures came back positive for gram-positive cocci.  Ultrasound may have find evidence of a posterior tibial artery occlusion.  Vascular surgery was then consulted.    Consultants/Specialty  Neurology    Code Status  Full    Disposition  Monitor on telemetry    Review of Systems  Review of Systems   Constitutional: Positive for chills, diaphoresis, fever and malaise/fatigue. Negative for weight loss.   HENT: Negative for congestion, ear  discharge, ear pain, hearing loss, nosebleeds, sinus pain, sore throat and tinnitus.    Eyes: Negative for blurred vision, double vision, photophobia and pain.   Respiratory: Negative for cough, hemoptysis, sputum production, shortness of breath, wheezing and stridor.    Cardiovascular: Positive for leg swelling. Negative for chest pain, palpitations, orthopnea, claudication and PND.   Gastrointestinal: Negative for abdominal pain, blood in stool, constipation, diarrhea, heartburn, melena, nausea and vomiting.   Genitourinary: Negative for dysuria, flank pain, frequency, hematuria and urgency.   Musculoskeletal: Negative for back pain, falls, joint pain, myalgias and neck pain.   Skin: Negative for itching and rash.   Neurological: Positive for dizziness and focal weakness. Negative for tingling, tremors, weakness and headaches.   Endo/Heme/Allergies: Negative for environmental allergies and polydipsia. Does not bruise/bleed easily.   Psychiatric/Behavioral: Negative for depression, hallucinations, substance abuse and suicidal ideas.        Physical Exam  Temp:  [36.6 °C (97.9 °F)-37.6 °C (99.7 °F)] 37.6 °C (99.7 °F)  Pulse:  [] 91  Resp:  [16-19] 16  BP: (112-164)/(64-99) 113/64  SpO2:  [92 %-100 %] 100 %    Physical Exam   Constitutional: He is oriented to person, place, and time. He appears well-developed and well-nourished.   HENT:   Head: Normocephalic and atraumatic.   Mouth/Throat: No oropharyngeal exudate.   Eyes: Conjunctivae are normal. No scleral icterus.   Neck: Normal range of motion. Neck supple. No JVD present. No tracheal deviation present. No thyromegaly present.   Cardiovascular: Normal rate, regular rhythm and intact distal pulses. Exam reveals no gallop and no friction rub.   Murmur (Systolic aortic murmur) heard.  Pulmonary/Chest: Effort normal and breath sounds normal. No stridor. No respiratory distress. He has no wheezes. He has no rales. He exhibits no tenderness.   Abdominal: Soft.  Bowel sounds are normal. He exhibits no distension and no mass. There is no tenderness. There is no rebound and no guarding.   Musculoskeletal: Normal range of motion. He exhibits no edema.   Lymphadenopathy:     He has no cervical adenopathy.   Neurological: He is alert and oriented to person, place, and time. He has normal reflexes. No cranial nerve deficit.   5 out of 5 strength in all extremity's   Nursing note and vitals reviewed.      Fluids    Intake/Output Summary (Last 24 hours) at 10/11/2019 1735  Last data filed at 10/11/2019 0033  Gross per 24 hour   Intake 2682.5 ml   Output 600 ml   Net 2082.5 ml       Laboratory  Recent Labs     10/09/19  2230 10/11/19  0236   WBC 19.4* 17.7*   RBC 3.64* 2.95*   HEMOGLOBIN 10.0* 8.1*   HEMATOCRIT 31.3* 25.0*   MCV 86.0 84.1   MCH 27.5 27.1   MCHC 31.9* 32.3*   RDW 44.5 43.3   PLATELETCT 296 255   MPV 9.3 9.4     Recent Labs     10/09/19  2230 10/11/19  0236   SODIUM 133* 129*   POTASSIUM 4.4 3.9   CHLORIDE 101 103   CO2 23 21   GLUCOSE 114* 102*   BUN 33* 17   CREATININE 0.96 0.75   CALCIUM 8.7 7.6*             Recent Labs     10/11/19  0236   TRIGLYCERIDE 142   HDL 11*   LDL 81       Imaging  US-EXTREMITY NON VASCULAR UNILATERAL LEFT   Final Result      A presumed vessel, probably the posterior tibial artery in the medial left ankle with intraluminal thrombus.   These findings were discussed with ANG WOODY on 10/11/2019 3:45 PM.      DX-ANKLE 3+ VIEWS LEFT   Final Result      No evidence of fracture or dislocation.      US-CAROTID DOPPLER BILAT   Final Result      EC-ECHOCARDIOGRAM COMPLETE W/O CONT   Final Result      MR-BRAIN-W/O   Final Result      1.  Multifocal acute/subacute cerebral and cerebellar infarcts involving multiple vascular distributions most suggestive of embolic etiology. This includes moderate to large left PCA infarct with associated small areas of hemorrhage in the left thalamus    and parieto-occipital region.   2.  Mild age-related  atrophy.   3.  Multiple small dural based masses most consistent with calcified meningiomas correlating with head CT findings.      CT-ABDOMEN-PELVIS WITH   Final Result         1.  Low-density changes in the inferior pole of the right kidney, appearance most compatible with area of renal infarct.   2.  Fusiform infrarenal abdominal aortic aneurysm, recommend radiographic follow-up and surveillance as clinically appropriate.   3.  Peripherally enhancing low-density right hepatic lobe lesion, appearance most typical of hemangioma.   4.  Scattered emphysema   5.  Cardiomegaly   6.  Atherosclerosis      These findings were discussed with the patient's clinician, Abhay Sinha, on 10/10/2019 1:08 AM.      CT-HEAD W/O   Final Result         1.  New area of low-density in the left occipital lobe tracking along the medial aspect of the left temporal lobe, appearance compatible with evolving infarct.   2.  Multiple peripheral calcified masses, appearance most typical of meningiomas   3.  Hyperdensity in the subcutaneous soft tissues posterior to the neck in the midline, similar to prior study. Could represent thickening of the nuchal ligament or soft tissue mass. Correlate with exam.      DX-ANKLE 2- VIEWS LEFT   Final Result      No radiographic evidence of acute traumatic injury.      DX-CHEST-PORTABLE (1 VIEW)   Final Result         1.  No acute cardiopulmonary disease.   2.  Chronic underlying lung disease with pulmonary blebs of the left lung base.   3.  Atherosclerosis      US-EXTREMITY ARTERY LOWER BILAT W/BETSY (COMBO)    (Results Pending)        Assessment/Plan  * Acute bacterial endocarditis- (present on admission)  Assessment & Plan  Has both renal and cerebral infarct.  Vegetation seen on cardiac echo  -Holding off on anticoagulation, unless blood clot or atrial fibrillation is found.   -Pending HIV testing.  Cultures positive for gram-positive cocci  Continue ceftriaxone and vancomycin  IV vancomycin to cover  probable strep and MRSA  Dose of vancomycin will be titrated to serum trough levels to ensure adequate levels and reduce risk of vancomycin toxicity  Goal trough 15-20  Case discussed with infectious disease and cardiology    CVA (cerebral vascular accident) (HCC)- (present on admission)  Assessment & Plan  -Appears to be embolic.  Not a candidate for TPA as unknown onset.  Discontinue asa the patient is a high risk of hemorrhagic conversion according to neurology  -MRI of the brain found multiple cerebellar acute and subacute emboli,  MRI also found a large left PCA infarct  -Monitor closely, with neuro checks every 4 hours per CVA protocol.  -Maintain on permissive hypertension in the acute phase in the next 24-48 hour, and allow blood pressure to go as high as 220/120 to preserve cerebral perfusion. Hold home antihypertensives for now.   -will get PT/OT/SLP to evaluate.   -This is likely secondary to septic emboli from endocarditis  Case discussed with neurology and physiatry    Renal infarct (HCC)- (present on admission)  Assessment & Plan  -Please secondary to septic emboli.  -Continue antibiotics for now.  Follow blood cultures.  -Holding off on anticoagulation for now.     Leukocytosis- (present on admission)  Assessment & Plan  -Unclear if this is infectious vs stress related due to infarct.  -Continue empiric antibiotics for now.  Follow blood cultures.  Trend WBC count.  Watch for fevers.    Hyponatremia- (present on admission)  Assessment & Plan  -Likely due to hypovolemia, may have component of SIADH due to acute intracranial process.   Check urine and serum osmolarity  Check urine sodium      Elevated BUN- (present on admission)  Assessment & Plan  -Likely due to hypovolemia.  Continue IV fluids.  BMP in the morning .    Ischemic leg  Assessment & Plan  Ultrasound found evidence of ischemic leg  Will order appropriate arterial Doppler study of both legs.  Vascular surgery was consulted      Meningioma  (HCC)- (present on admission)  Assessment & Plan  -Multiple meningioma noted.  Appears stable.  Needs continued outpatient surveillance and monitoring.    AAA (abdominal aortic aneurysm) (HCC)- (present on admission)  Assessment & Plan  -Needs continued outpatient surveillance and follow-up.    COPD (chronic obstructive pulmonary disease) (HCC)- (present on admission)  Assessment & Plan  - Noted on imaging.  Not in acute exacerbation.  Continue respiratory protocol, as needed oxygen supplement.    S/P TAVR (transcatheter aortic valve replacement)- (present on admission)  Assessment & Plan  -Stable.  Outpatient follow-up.  echocardiogram-found increased flow velocities and a possible vegetation  Cardiologist consulted for further recommendations    History of methamphetamine use- (present on admission)  Assessment & Plan  -Urine drug screen is negative.    Anemia- (present on admission)  Assessment & Plan  -Likely anemia of chronic disease, with low iron, high ferritin, normal folate and B12.   -Monitor hemoglobin closely, with restrictive transfusion strategy, transfuse if hemoglobin drops below 7.    Hemangioma of liver- (present on admission)  Assessment & Plan  -Stable.  Monitor.    Hepatitis C- (present on admission)  Assessment & Plan  -Chronic.  Outpatient follow-up.    HTN (hypertension)- (present on admission)  Assessment & Plan  -Holding antihypertensives for now for permissive hypertension.  Monitor blood pressure trend closely with as needed IV labetalol and hydralazine for blood pressure beyond 220/120.       VTE prophylaxis: lovenox SQ

## 2019-10-12 NOTE — CONSULTS
"        Date of Service  10/12/2019    Reason For Consult  Septic emboli, possible L PT occlusiong    Requesting Physician  Celestina Wolf MD    Consulting Physician  Liang Brice M.D.    Primary Care Physician  GARETH Ledesma.    Chief Complaint  No specific complaints    History of Present Illness  Mr. Espinosa is a 64 y.o. male w/ hx of meth abuse, HCV, hx of TAVR,who presented 10/9/2019 with AMS and HTN. Pt currently has AMS, majority of HPI obtained from review of records.    Workup suggested a stroke and echo confirmed an aortic valve vegetation c/w endocarditis. Pt subsequently found to have embolization to multiple vascular beds including the brain, kidney, and suspect the left PT.    Pt currently denies any lower extremity pain, numbness or weakness.    Review of Systems  Review of Systems   Unable to perform ROS: Mental acuity       Past Medical History  Past Medical History:   Diagnosis Date   • Abnormality of left ventricle of heart     \"constricted left ventricle\"    • Aortic stenosis    • BPH (benign prostatic hyperplasia)    • Hypercholesteremia    • Hypertension        Surgical History  Past Surgical History:   Procedure Laterality Date   • TRANSCATHETER AORTIC VALVE REPLACEMENT N/A 3/18/2019    Procedure: REPLACEMENT, AORTIC VALVE, TRANSCATHETER;  Surgeon: Dariel Moser M.D.;  Location: SURGERY [facility];  Service: Cardiac   • JAYLYN  3/18/2019    Procedure: ECHOCARDIOGRAM, TRANSESOPHAGEAL;  Surgeon: Dariel Moser M.D.;  Location: SURGERY [facility];  Service: Cardiac   • TONSILLECTOMY     • ZZZ CARDIAC CATH          Family History  Family History   Problem Relation Age of Onset   • Heart Disease Mother         Social History  Social History     Socioeconomic History   • Marital status: Single     Spouse name: Not on file   • Number of children: Not on file   • Years of education: Not on file   • Highest education level: Not on file   Occupational History   • Not on file   Social " Needs   • Financial resource strain: Not on file   • Food insecurity:     Worry: Not on file     Inability: Not on file   • Transportation needs:     Medical: Not on file     Non-medical: Not on file   Tobacco Use   • Smoking status: Former Smoker     Packs/day: 0.50     Years: 40.00     Pack years: 20.00     Types: Cigarettes   • Smokeless tobacco: Never Used   • Tobacco comment: 10 months   Substance and Sexual Activity   • Alcohol use: Yes     Comment: occ   • Drug use: Yes     Types: Inhaled, Marijuana     Comment: marijuana current,  meth last use 1 monht ago   • Sexual activity: Not on file   Lifestyle   • Physical activity:     Days per week: Not on file     Minutes per session: Not on file   • Stress: Not on file   Relationships   • Social connections:     Talks on phone: Not on file     Gets together: Not on file     Attends Confucianism service: Not on file     Active member of club or organization: Not on file     Attends meetings of clubs or organizations: Not on file     Relationship status: Not on file   • Intimate partner violence:     Fear of current or ex partner: Not on file     Emotionally abused: Not on file     Physically abused: Not on file     Forced sexual activity: Not on file   Other Topics Concern   • Not on file   Social History Narrative   • Not on file       Medications  None       Current Facility-Administered Medications   Medication Dose Route Frequency Provider Last Rate Last Dose   • atorvastatin (LIPITOR) tablet 40 mg  40 mg Oral Q EVENING Chloé Blankenship M.D.       • enoxaparin (LOVENOX) inj 40 mg  40 mg Subcutaneous DAILY Cholé Blankenship M.D.       • acetaminophen (TYLENOL) tablet 650 mg  650 mg Oral Q6HRS PRN Tiff Calixto M.D.   650 mg at 10/11/19 2218   • senna-docusate (PERICOLACE or SENOKOT S) 8.6-50 MG per tablet 2 Tab  2 Tab Oral BID Celestina Wolf M.D.   Stopped at 10/12/19 0600    And   • polyethylene glycol/lytes (MIRALAX) PACKET 1 Packet  1 Packet Oral QDAY PRN  Celestina Wolf M.D.        And   • magnesium hydroxide (MILK OF MAGNESIA) suspension 30 mL  30 mL Oral QDAY PRN Celestina Wolf M.D.        And   • bisacodyl (DULCOLAX) suppository 10 mg  10 mg Rectal QDAY PRN Celestina Wolf M.D.       • NS infusion   Intravenous Continuous Celestina Wolf M.D. 150 mL/hr at 10/12/19 0907     • ondansetron (ZOFRAN) syringe/vial injection 4 mg  4 mg Intravenous Q4HRS PRN Celestina Wolf M.D.       • ondansetron (ZOFRAN ODT) dispertab 4 mg  4 mg Oral Q4HRS PRN Celestina Wolf M.D.       • promethazine (PHENERGAN) tablet 12.5-25 mg  12.5-25 mg Oral Q4HRS PRN Celestina Wolf M.D.       • promethazine (PHENERGAN) suppository 12.5-25 mg  12.5-25 mg Rectal Q4HRS PRN Celestina Wolf M.D.       • prochlorperazine (COMPAZINE) injection 5-10 mg  5-10 mg Intravenous Q4HRS PRN Celestina Wolf M.D.       • MD Alert...Vancomycin per Pharmacy   Other PHARMACY TO DOSE Celestina Wolf M.D.       • cefTRIAXone (ROCEPHIN) 2 g in  mL IVPB  2 g Intravenous Q24HRS Celestina Wolf M.D.   Stopped at 10/12/19 0636   • nicotine (NICODERM) 7 MG/24HR 7 mg  7 mg Transdermal Daily-0600 Celestina Wolf M.D.   7 mg at 10/12/19 0606   • vancomycin (VANCOCIN) 800 mg in  mL IVPB  800 mg Intravenous Q12HR Celestina Wolf M.D.   Stopped at 10/12/19 0540   • labetalol (NORMODYNE,TRANDATE) injection 10 mg  10 mg Intravenous Q4HRS PRN Blanco Braajas M.D.        Or   • hydrALAZINE (APRESOLINE) injection 10 mg  10 mg Intravenous Q2HRS PRN Blanco Barajas M.D.           Allergies  No Known Allergies      Physical Exam  Temp:  [37.1 °C (98.7 °F)-37.6 °C (99.7 °F)] 37.6 °C (99.7 °F)  Pulse:  [] 98  Resp:  [16-22] 16  BP: (113-151)/(64-96) 137/85  SpO2:  [92 %-100 %] 95 %    Pulse/Extremity Exam:    Femorals:        Right: palpable       Left palpable    Pedal Pulses:       Right DP palpable       Right PT palpable       Left DP palpable       Left PT biphasic    Wounds:  None    General appearance: NAD, conversing appropriately  Psych: Normal affect, mood, judgement  Neuro: CN II-XII grossly intact.   Neck: full range of motion  Lungs: No inspiratory stridor or wheezing  CV: RRR  Abdomen: Soft, NT/ND  Skin: No rashes    Labs Reviewed Today:  Recent Labs     10/09/19  2230 10/11/19  0236   WBC 19.4* 17.7*   RBC 3.64* 2.95*   HEMOGLOBIN 10.0* 8.1*   HEMATOCRIT 31.3* 25.0*   MCV 86.0 84.1   MCH 27.5 27.1   MCHC 31.9* 32.3*   RDW 44.5 43.3   PLATELETCT 296 255   MPV 9.3 9.4     Recent Labs     10/09/19  2230 10/11/19  0236   SODIUM 133* 129*   POTASSIUM 4.4 3.9   CHLORIDE 101 103   CO2 23 21   GLUCOSE 114* 102*   BUN 33* 17   CREATININE 0.96 0.75   CALCIUM 8.7 7.6*     Recent Labs     10/09/19  2230 10/11/19  0236   ALTSGPT 12 7   ASTSGOT 19 12   ALKPHOSPHAT 104* 72   TBILIRUBIN 0.3 0.3   GLUCOSE 114* 102*             Recent Labs     10/11/19  0236   TRIGLYCERIDE 142   HDL 11*   LDL 81     No results for input(s): TROPONINI in the last 72 hours.    Urinalysis:    Recent Labs     10/09/19  2356   SPECGRAVITY 1.020   GLUCOSEUR Negative   KETONES Negative   NITRITE Negative   LEUKESTERAS Negative   WBCURINE 2-5*   RBCURINE 5-10*   BACTERIA Negative   EPITHELCELL Negative        Imaging Reviewed Today:  I personally reviewed all non-invasive vascular testing including images, x-rays, tracings, arterial waveforms, and duplex exams relevant to this admission. My interpretation is below:    ABIs: R 1.16   L 0.94      Assessment/Plan & Medical Decision-Making    64M w/ multiple medical issues including endocarditis s/p embolization to multiple vascular beds. Regarding the left leg, he has a palpable DP pulse and strong biphasic signal in the PT and BETSY is normal.    No indication for intervention at this point.  Would defer antiplatelet regimen to neuro as his CVA should take precedence.    Thank you for the consult. Please call w/ questions.    Liang Brice MD  Vascular Surgeon  [facility]  Office: [phone]

## 2019-10-12 NOTE — CARE PLAN
On assessment patient is A&Ox3-4 however lethargic and weak. NIH 2 with morning assessment. IV ABX administered per orders and ID following. Arterial US completed and vascular surgery notified.      MONITOR SUMMARY    .14/.08/.28     NSR/ST     Ectopy: (r/o) PVC PSVT 169       Problem: Infection  Goal: Will remain free from infection  Outcome: PROGRESSING AS EXPECTED     Problem: Mobility  Goal: Risk for activity intolerance will decrease  Outcome: PROGRESSING AS EXPECTED       12hr Chart Check

## 2019-10-12 NOTE — CARE PLAN
Problem: Communication  Goal: The ability to communicate needs accurately and effectively will improve  Outcome: PROGRESSING AS EXPECTED     Problem: Safety  Goal: Will remain free from injury  Outcome: PROGRESSING AS EXPECTED     Problem: Bowel/Gastric:  Goal: Normal bowel function is maintained or improved  Outcome: PROGRESSING AS EXPECTED     Problem: Pain Management  Goal: Pain level will decrease to patient's comfort goal  Outcome: PROGRESSING AS EXPECTED

## 2019-10-13 LAB
ANION GAP SERPL CALC-SCNC: 5 MMOL/L (ref 0–11.9)
BUN SERPL-MCNC: 11 MG/DL (ref 8–22)
CALCIUM SERPL-MCNC: 7.8 MG/DL (ref 8.5–10.5)
CHLORIDE SERPL-SCNC: 103 MMOL/L (ref 96–112)
CO2 SERPL-SCNC: 22 MMOL/L (ref 20–33)
CREAT SERPL-MCNC: 0.75 MG/DL (ref 0.5–1.4)
ERYTHROCYTE [DISTWIDTH] IN BLOOD BY AUTOMATED COUNT: 44.7 FL (ref 35.9–50)
GLUCOSE SERPL-MCNC: 111 MG/DL (ref 65–99)
HCT VFR BLD AUTO: 24.4 % (ref 42–52)
HGB BLD-MCNC: 7.8 G/DL (ref 14–18)
MCH RBC QN AUTO: 27.4 PG (ref 27–33)
MCHC RBC AUTO-ENTMCNC: 32 G/DL (ref 33.7–35.3)
MCV RBC AUTO: 85.6 FL (ref 81.4–97.8)
PLATELET # BLD AUTO: 236 K/UL (ref 164–446)
PMV BLD AUTO: 10.3 FL (ref 9–12.9)
POTASSIUM SERPL-SCNC: 3.8 MMOL/L (ref 3.6–5.5)
RBC # BLD AUTO: 2.85 M/UL (ref 4.7–6.1)
SODIUM SERPL-SCNC: 130 MMOL/L (ref 135–145)
VANCOMYCIN TROUGH SERPL-MCNC: 15 UG/ML (ref 10–20)
WBC # BLD AUTO: 14.7 K/UL (ref 4.8–10.8)

## 2019-10-13 PROCEDURE — 700102 HCHG RX REV CODE 250 W/ 637 OVERRIDE(OP): Performed by: INTERNAL MEDICINE

## 2019-10-13 PROCEDURE — 700102 HCHG RX REV CODE 250 W/ 637 OVERRIDE(OP): Performed by: HOSPITALIST

## 2019-10-13 PROCEDURE — A9270 NON-COVERED ITEM OR SERVICE: HCPCS | Performed by: INTERNAL MEDICINE

## 2019-10-13 PROCEDURE — 700111 HCHG RX REV CODE 636 W/ 250 OVERRIDE (IP): Performed by: HOSPITALIST

## 2019-10-13 PROCEDURE — A9270 NON-COVERED ITEM OR SERVICE: HCPCS | Performed by: HOSPITALIST

## 2019-10-13 PROCEDURE — 80048 BASIC METABOLIC PNL TOTAL CA: CPT

## 2019-10-13 PROCEDURE — 36415 COLL VENOUS BLD VENIPUNCTURE: CPT

## 2019-10-13 PROCEDURE — 99232 SBSQ HOSP IP/OBS MODERATE 35: CPT | Performed by: HOSPITALIST

## 2019-10-13 PROCEDURE — 85027 COMPLETE CBC AUTOMATED: CPT

## 2019-10-13 PROCEDURE — 700105 HCHG RX REV CODE 258: Performed by: HOSPITALIST

## 2019-10-13 PROCEDURE — 99233 SBSQ HOSP IP/OBS HIGH 50: CPT | Performed by: INTERNAL MEDICINE

## 2019-10-13 PROCEDURE — 770020 HCHG ROOM/CARE - TELE (206)

## 2019-10-13 PROCEDURE — 80202 ASSAY OF VANCOMYCIN: CPT

## 2019-10-13 RX ORDER — LISINOPRIL 20 MG/1
20 TABLET ORAL
Status: DISCONTINUED | OUTPATIENT
Start: 2019-10-14 | End: 2019-10-14

## 2019-10-13 RX ORDER — RIFAMPIN 300 MG/1
300 CAPSULE ORAL 2 TIMES DAILY
Status: DISCONTINUED | OUTPATIENT
Start: 2019-10-13 | End: 2019-10-17 | Stop reason: HOSPADM

## 2019-10-13 RX ADMIN — RIFAMPIN 300 MG: 300 CAPSULE ORAL at 17:57

## 2019-10-13 RX ADMIN — ENOXAPARIN SODIUM 40 MG: 100 INJECTION SUBCUTANEOUS at 05:22

## 2019-10-13 RX ADMIN — SENNOSIDES, DOCUSATE SODIUM 2 TABLET: 50; 8.6 TABLET, FILM COATED ORAL at 17:56

## 2019-10-13 RX ADMIN — VANCOMYCIN HYDROCHLORIDE 1200 MG: 500 INJECTION, POWDER, LYOPHILIZED, FOR SOLUTION INTRAVENOUS at 02:19

## 2019-10-13 RX ADMIN — VANCOMYCIN HYDROCHLORIDE 1200 MG: 500 INJECTION, POWDER, LYOPHILIZED, FOR SOLUTION INTRAVENOUS at 13:37

## 2019-10-13 RX ADMIN — CEFTRIAXONE SODIUM 2 G: 2 INJECTION, POWDER, FOR SOLUTION INTRAMUSCULAR; INTRAVENOUS at 05:21

## 2019-10-13 RX ADMIN — NICOTINE 7 MG: 7 PATCH, EXTENDED RELEASE TRANSDERMAL at 05:22

## 2019-10-13 RX ADMIN — ATORVASTATIN CALCIUM 40 MG: 40 TABLET, FILM COATED ORAL at 17:56

## 2019-10-13 RX ADMIN — LISINOPRIL 10 MG: 5 TABLET ORAL at 05:22

## 2019-10-13 RX ADMIN — ACETAMINOPHEN 650 MG: 325 TABLET, FILM COATED ORAL at 20:00

## 2019-10-13 ASSESSMENT — ENCOUNTER SYMPTOMS
TINGLING: 0
MYALGIAS: 1
HEARTBURN: 0
DIZZINESS: 0
PALPITATIONS: 0
ABDOMINAL PAIN: 0
DOUBLE VISION: 0
SHORTNESS OF BREATH: 0
PHOTOPHOBIA: 0
HEADACHES: 0
HEMOPTYSIS: 0
NAUSEA: 0
VOMITING: 0
PSYCHIATRIC NEGATIVE: 1
SPUTUM PRODUCTION: 0
BLURRED VISION: 0
COUGH: 0
FEVER: 0

## 2019-10-13 NOTE — PROGRESS NOTES
Assumed care of patient and bedside report received from previous RN. Plan of care discussed with patient. All concerns voiced at this time. Patient is alert and oriented x 4. VSS. Patient educated on importance of calling, bed controls on, bed locked and in lowest position, call light with patient. Patient is in rhythm: SR/ST.

## 2019-10-13 NOTE — PROGRESS NOTES
Infectious Disease Progress Note    Author: Karla Mabry M.D. Date & Time of service: 10/13/2019  1:30 PM    Chief Complaint:  Prosthetic valve endocarditis    Interval History:  10/12/2019-no fevers.  Patient is slightly more interactive.  Getting vascular studies done.  WBC remains high at 17,000.  Creatinine is 0.75  10/13-  Low grade fevers upto 100.2. WBC 14.7  Labs Reviewed.    Review of Systems:  Review of Systems   Unable to perform ROS: Other (Patient is a very poor historian)   Constitutional: Negative for fever.   Respiratory: Negative for cough and shortness of breath.    Cardiovascular: Negative for chest pain.   Gastrointestinal: Negative for abdominal pain, nausea and vomiting.   Musculoskeletal: Positive for joint pain.        Complains of left ankle pain   Neurological: Negative for headaches.       Hemodynamics:  Temp (24hrs), Av.3 °C (99.2 °F), Min:36.5 °C (97.7 °F), Max:37.9 °C (100.2 °F)  Temperature: 37.7 °C (99.9 °F)  Pulse  Av.6  Min: 88  Max: 123   Blood Pressure: 145/94       Physical Exam:  Physical Exam   Constitutional: He is oriented to person, place, and time. No distress.   Disheveled   HENT:   Mouth/Throat: No oropharyngeal exudate.   Eyes: Pupils are equal, round, and reactive to light. No scleral icterus.   Neck: Neck supple.   Cardiovascular: Regular rhythm.   Murmur heard.  Pulmonary/Chest: He has no wheezes. He has no rales.   Abdominal: Soft. There is no tenderness. There is no rebound and no guarding.   Musculoskeletal: He exhibits tenderness. He exhibits no edema.   Left ankle tenderness   Lymphadenopathy:     He has no cervical adenopathy.   Neurological: He is alert and oriented to person, place, and time.   No focal neurological deficit  Poor historian   Skin: Skin is warm. No erythema.   Vitals reviewed.      Meds:    Current Facility-Administered Medications:   •  vancomycin  •  lisinopril  •  atorvastatin  •  enoxaparin (LOVENOX) injection  •   acetaminophen  •  senna-docusate **AND** polyethylene glycol/lytes **AND** magnesium hydroxide **AND** bisacodyl  •  ondansetron  •  ondansetron  •  promethazine  •  promethazine  •  prochlorperazine  •  MD Alert...Vancomycin per Pharmacy  •  cefTRIAXone (ROCEPHIN) IV  •  nicotine  •  labetalol **OR** hydrALAZINE    Labs:  Recent Labs     10/11/19  0236 10/13/19  0221   WBC 17.7* 14.7*   RBC 2.95* 2.85*   HEMOGLOBIN 8.1* 7.8*   HEMATOCRIT 25.0* 24.4*   MCV 84.1 85.6   MCH 27.1 27.4   RDW 43.3 44.7   PLATELETCT 255 236   MPV 9.4 10.3   NEUTSPOLYS 82.70*  --    LYMPHOCYTES 8.80*  --    MONOCYTES 7.20  --    EOSINOPHILS 0.30  --    BASOPHILS 0.20  --      Recent Labs     10/11/19  0236 10/13/19  0221   SODIUM 129* 130*   POTASSIUM 3.9 3.8   CHLORIDE 103 103   CO2 21 22   GLUCOSE 102* 111*   BUN 17 11     Recent Labs     10/11/19  0236 10/13/19  0221   ALBUMIN 2.2*  --    TBILIRUBIN 0.3  --    ALKPHOSPHAT 72  --    TOTPROTEIN 5.7*  --    ALTSGPT 7  --    ASTSGOT 12  --    CREATININE 0.75 0.75       Imaging:  Ct-abdomen-pelvis With    Result Date: 10/10/2019  10/9/2019 11:57 PM HISTORY/REASON FOR EXAM: Abd pain, unspecified TECHNIQUE/EXAM DESCRIPTION:  CT abdomen and pelvis with contrast. Sequential axial CT images were obtained from lung bases to the proximal femurs after the uneventful administration of 80 cc Omnipaque 350 intravenous contrast. Low dose optimization technique was utilized for this CT exam including automated exposure control and adjustment of the mA and/or kV according to patient size. COMPARISON:  January 4, 2019 CT ABDOMEN FINDINGS: Linear densities are seen within the lung bases, appearance favors atelectasis. Scattered emphysematous changes are seen. Cardiomegaly is noted. The liver is normal in contour. The liver is normal in size. No intrahepatic biliary ductal dilatation is noted. Peripherally enhancing 2.0 cm low-density lesion is seen in the right hepatic lobe. The gallbladder appears within  normal limits. The spleen is unremarkable. The pancreas is grossly normal. Bilateral adrenal glands appear within normal limits. Low-density changes in the inferior pole of the right kidney are seen, new since prior study. The ureters are normal in caliber along their visualized course. The colon appears within normal limits. The appendix is not definitively identified. The small bowel is unremarkable. The bony structures are age appropriate. Atherosclerotic calcifications are seen. There is 3.5 cm fusiform infrarenal abdominal aortic aneurysm identified CT PELVIS FINDINGS: The bladder is within normal limits.     1.  Low-density changes in the inferior pole of the right kidney, appearance most compatible with area of renal infarct. 2.  Fusiform infrarenal abdominal aortic aneurysm, recommend radiographic follow-up and surveillance as clinically appropriate. 3.  Peripherally enhancing low-density right hepatic lobe lesion, appearance most typical of hemangioma. 4.  Scattered emphysema 5.  Cardiomegaly 6.  Atherosclerosis These findings were discussed with the patient's clinician, Abhay Sniha, on 10/10/2019 1:08 AM.    Ct-head W/o    Result Date: 10/10/2019  10/9/2019 11:57 PM HISTORY/REASON FOR EXAM: Altered mental status TECHNIQUE/EXAM DESCRIPTION:  CT of the head without contrast. Sequential axial images were obtained from the vertex to the skull base without contrast. Up to date radiation dose reduction adjustments have been utilized to meet ALARA standards for radiation dose reduction. COMPARISON:  July 4, 2017 FINDINGS: The brain appears normal in volume and morphology. The ventricles are normal in caliber and configuration. Calcified 2.3 cm mass along the left temporal lobe is seen extra-axially. 1.7 cm mass along the periphery is seen adjacent to the right temporal lobe. 1.4 cm mass is seen adjacent to the left temporoparietal region on the right. New area of low-density is seen in the left occipital lobe  tracking along the medial aspect of the left temporal lobe. There are no abnormal extra axial fluid collections  or extra axial hemorrhage identified. The visualized paranasal sinuses and mastoid air cells are well aerated bilaterally. No depressed calvarial fractures are identified. The visualized globes and retrobulbar soft tissues appear within normal limits. There is hyperdensity identified in the subcutaneous soft tissues of the posterior neck, similar to prior study.     1.  New area of low-density in the left occipital lobe tracking along the medial aspect of the left temporal lobe, appearance compatible with evolving infarct. 2.  Multiple peripheral calcified masses, appearance most typical of meningiomas 3.  Hyperdensity in the subcutaneous soft tissues posterior to the neck in the midline, similar to prior study. Could represent thickening of the nuchal ligament or soft tissue mass. Correlate with exam.    Dx-ankle 2- Views Left    Result Date: 10/9/2019  10/9/2019 11:15 PM HISTORY/REASON FOR EXAM:  Pain/Deformity Following Trauma. TECHNIQUE/EXAM DESCRIPTION AND NUMBER OF VIEWS:  2 views of the LEFT ankle. COMPARISON: None. FINDINGS: There is no visualized fracture, subluxation, or dislocation identified.     No radiographic evidence of acute traumatic injury.    Dx-ankle 3+ Views Left    Result Date: 10/11/2019  10/11/2019 1:25 PM HISTORY/REASON FOR EXAM:  Left ankle pain TECHNIQUE/EXAM DESCRIPTION AND NUMBER OF VIEWS:  3 views of the LEFT ankle. COMPARISON: 10/09/2019 FINDINGS: Bone mineralization is normal.  There is no evidence of fracture or dislocation.  There is no soft tissue swelling seen. The ankle mortise is well-maintained.     No evidence of fracture or dislocation.    Dx-chest-portable (1 View)    Result Date: 10/9/2019    10/9/2019 10:24 PM HISTORY/REASON FOR EXAM: Shortness of Breath TECHNIQUE/EXAM DESCRIPTION:  Single AP view of the chest. COMPARISON: March 19, 2019 FINDINGS: The cardiac  silhouette appears within normal limits. Atherosclerotic calcification of the aorta is noted.  The central pulmonary vasculature appears normal. The lungs appear well expanded bilaterally.  Linear densities of the bilateral lung bases are seen. Left lung base pulmonary blebs are noted, similar to prior study. No significant pleural effusions are identified. The bony structures appear age-appropriate.     1.  No acute cardiopulmonary disease. 2.  Chronic underlying lung disease with pulmonary blebs of the left lung base. 3.  Atherosclerosis    Mr-brain-w/o    Result Date: 10/10/2019  10/10/2019 7:26 AM HISTORY/REASON FOR EXAM:  Altered mental status; Okay for patient to be off telemetry monitoring for MRI. TECHNIQUE/EXAM DESCRIPTION: MRI of the brain without contrast. T1 sagittal, T2 fast spin-echo axial, T1 coronal, FLAIR coronal, diffusion-weighted and apparent diffusion coefficient (ADC map) axial images were obtained of the whole brain. The study was performed on a [employer] Sign[employer] 1.5 Nataly MRI scanner. COMPARISON:  Noncontrast head CT earlier in the day FINDINGS: Study is limited by motion artifact. There are multiple acute/subacute infarcts with restricted diffusion and T2 hyperintensity consistent with cytotoxic edema. These involve multiple vascular distributions suggesting embolic etiology. There is a moderate to large left posterior cerebral artery territory infarct involving the left occipital lobe, left thalamus and left hippocampus. There are also multiple small infarcts seen within the bilateral frontoparietal white matter, right parietal and occipital cortex and in the bilateral cerebellar hemispheres. There is susceptibility artifact in the left thalamus and punctate focus in the left occipital lobe consistent with small areas of hemorrhage. There are multiple dural based masses demonstrating low T2 signal along the bilateral convexities most consistent with calcified change most correlating to CT  findings. There is mild localized mass effect. No brain edema is seen. There is no midline shift or hydrocephalus. No extra-axial fluid collection. Proximal vascular flow voids are patent. Mucosal thickening and small mucous retention cysts within the maxillary sinuses. Mastoids are clear. Orbital contents are symmetric.     1.  Multifocal acute/subacute cerebral and cerebellar infarcts involving multiple vascular distributions most suggestive of embolic etiology. This includes moderate to large left PCA infarct with associated small areas of hemorrhage in the left thalamus and parieto-occipital region. 2.  Mild age-related atrophy. 3.  Multiple small dural based masses most consistent with calcified meningiomas correlating with head CT findings.    Us-michaela Single Level Bilat    Result Date: 10/12/2019   Vascular Laboratory  Conclusions  Right.  No evidence of arterial insufficiency.  Left.  There is no evidence of arterial disease demonstrated (MICHAELA is .9-1.0).  JAIMIE YAO  Age:    64    Gender:     M  MRN:    [id number]  :    1955      BSA:  Exam Date:     10/12/2019 10:33  Room #:     Inpatient  Priority:     Routine  Ht (in):             Wt (lb):  Ordering Physician:        MASHA RUBIO  Referring Physician:       MASHA RUBIO  Sonographer:               Umer Lao RVT  Study Type:                Complete Bilateral  Technical Quality:         Adequate  Indications:     Pain in Limb  CPT Codes:       19313  ICD Codes:       729.5  History:         Bilateral lower extremity pain; history of AAA  Limitations:                 RIGHT  Waveform            Systolic BPs (mmHg)                             151           Brachial  Triphasic                                Common Femoral  Triphasic                  175           Posterior Tibial  Triphasic                  168           Dorsalis Pedis                                           Peroneal                             1.16          MICHAELA                                            TBI                       LEFT  Waveform        Systolic BPs (mmHg)                             145           Brachial  Triphasic                                Common Femoral  Bi, non-                   131           Posterior Tibial  reversed  Bi, non-                   142           Dorsalis Pedis  reversed                                           Peroneal                             0.94          BETSY                                           TBI  Findings  Right.  Doppler waveform of the common femoral artery is of high amplitude and  triphasic.  Doppler waveforms at the ankle are brisk and triphasic.  Ankle-brachial index is normal.  Left.  Doppler waveform of the common femoral artery is of high amplitude and  triphasic.  Doppler waveforms at the ankle are brisk and biphasic without reversal of  flow.  Ankle-brachial index is normal, but reduced compared to the right lower  extremity.    Krystian William MD  (Electronically Signed)  Final Date:      2019                   10:59    Us-carotid Doppler Bilat    Result Date: 10/11/2019   Carotid Duplex  Report  Vascular Laboratory  CONCLUSIONS  Mild bilateral internal carotid artery stenosis (<50%).  JAIMIE YAO  Exam Date:     10/11/2019 07:47  Room #:     Inpatient  Priority:     Routine  Ht (in):             Wt (lb):  Ordering Physician:        LESLIE HUGHES  Referring Physician:       [id number] Rodriguez  Sonographer:               Anisha Solis RVT, RDCS  Study Type:                Complete Bilateral  Technical Quality:         Good  Age:    64    Gender:     M  MRN:    [id number]  :    1955      BSA:  Indications:     CVA  CPT Codes:       13142  ICD Codes:       436  History:         Altered mental status  Limitations:  Right Brachial BP:              /  Left Brachial BP:             /  RIGHT  Plaque          Plaque         Velocity (cm/s)  Characteristics Composition     Syst/Diast                                 85   / 32      Distal ICA                                 81   / 31      Mid ICA  Irregular      Heterogeneo     86   / 34      Prox ICA                 us                                 59   / 17      Distal CCA                                      /         Mid CCA                                 68   / 15      Prox CCA                                 98   / 13      ECA  Waveform:                                     SCA  LEFT  Plaque         Plaque          Velocity (cm/s)  CharacteristicsComposition     Syst/Diast                                 103  / 36      Distal ICA                                 96   / 34      Mid ICA  Smooth         Heterogeneo     66   / 20      Prox ICA                 us                                 69   / 14      Distal CCA                                      /         Mid CCA                                 75   / 17      Prox CCA                                 105  / 17      ECA  Waveform:                                     SCA          1.3          ICA/CCA       1.4        Antegrade      Vertebral   Antegrade         50   / 21     cm/s        67    / 23  FINDINGS  Right carotid.  Mild, heterogeneous plaque of the bifurcation extending into the internal  carotid.  Velocities are consistent with < 50% stenosis of the internal carotid  artery.  Antegrade right vertebral flow.  Left carotid.  Mild, heterogeneous plaque of the bifurcation extending into the internal  carotid.  Velocities are consistent with < 50% stenosis of the internal carotid  artery.  Antegrade left vertebral flow.  Krystian William MD  (Electronically Signed)  Final Date:      11 October 2019                   09:00    Us-extremity Non Vascular Unilateral Left    Result Date: 10/11/2019  10/11/2019 1:32 PM HISTORY/REASON FOR EXAM:  soft tissue irregularity on medial left ankle around the achilles tendon, area outlined in pen. TECHNIQUE/EXAM DESCRIPTION AND NUMBER OF  VIEWS: Limited ultrasound of the left ankle in the area of clinical abnormality. COMPARISON: None FINDINGS: Real-time grayscale and color Doppler sonography was performed. In the medial left ankle there is a tubular structure which appears to represent a vessel. There is echogenic material within this without significant color flow. This may represent the posterior tibial artery with thrombus. No mass or fluid collection is seen.     A presumed vessel, probably the posterior tibial artery in the medial left ankle with intraluminal thrombus. These findings were discussed with ANG WOODY on 10/11/2019 3:45 PM.    Ec-echocardiogram Complete W/o Cont    Result Date: 10/10/2019  Transthoracic Echo Report Echocardiography Laboratory CONCLUSIONS Hyperdynamic left ventricular systolic function. Left ventricular ejection fraction is visually estimated to be greater than 75%. Severe concentric left ventricular hypertrophy. Known TAVR aortic valve with normal leaflet excursion with increased gradients most likely related to hyperdynamic flow. Echodensity prosthetic valve leaflet 0.7 x 0.9 cm. Compared to the images of the study done 3/29/2019 there is an echodensity of the prosthetic valve leaflet seen on current study however there is no comparable view on the previous study; increased prosthetic valve gradients related to increased velocity due to change in hyperdynamic left ventricular contractility. LAURAMARILYN JAIMIE Exam Date:         10/10/2019                    13:15 Exam Location:     Inpatient Priority:          Routine Ordering Physician:        SHANNON BATRES Referring Physician:       [id number] Sonographer:               Isidoro Dillard Age:    64     Gender:    M MRN:    [id number] :    1955 BSA:    1.65   Ht (in):    68     Wt (lb):    120 Exam Type:     Complete Indications:     Prosthetic Valve ICD Codes:       V43.3 CPT Codes:       35739 BP:   140    /   63     HR:   100 Technical Quality:        Good MEASUREMENTS  (Male / Female) Normal Values 2D ECHO LV Diastolic Diameter PLAX        3.4 cm                4.2 - 5.9 / 3.9 - 5.3 cm LV Systolic Diameter PLAX         2.2 cm                2.1 - 4.0 cm IVS Diastolic Thickness           1.8 cm                LVPW Diastolic Thickness          1.5 cm                LVOT Diameter                     1.8 cm                Estimated LV Ejection Fraction    75 %                  LV Ejection Fraction MOD BP       65.4 %                >= 55  % LV Ejection Fraction MOD 4C       71.6 %                LV Ejection Fraction MOD 2C       63.3 %                IVC Diameter                      1.1 cm                DOPPLER AV Peak Velocity                  3.5 m/s               AV Peak Gradient                  48.2 mmHg             AV Mean Gradient                  27.9 mmHg             LVOT Peak Velocity                1.4 m/s               AV Area Cont Eq vti               1.3 cm???               MV Velocity Time Integral         33.8 cm               Mitral E Point Velocity           0.76 m/s              Mitral E to A Ratio               0.55                  MV Pressure Half Time             39.2 ms               MV Area PHT                       5.6 cm???               MV Deceleration Time              135 ms                TR Peak Velocity                  300 cm/s              PV Peak Velocity                  1.3 m/s               PV Peak Gradient                  6.9 mmHg              RVOT Peak Velocity                1.1 m/s               * Indicates values subject to auto-interpretation LV EF:  75    % FINDINGS Left Ventricle Normal left ventricular chamber size. Severe concentric left ventricular hypertrophy. Hyperdynamic left ventricular systolic function. Left ventricular ejection fraction is visually estimated to be greater than 75%.    Normal regional wall motion. Indeterminate diastolic function. Right Ventricle Normal right ventricular size. Normal right  "ventricular systolic function. Right Atrium Enlarged right atrium. Normal inferior vena cava size and inspiratory collapse. Left Atrium Severely dilated left atrium. Left atrial volume index is 63  mL/sq m. Mitral Valve Mitral annular calcification. Calcification of the mitral valve leaflets. Mild mitral stenosis. Trace mitral regurgitation. Aortic Valve Echodensity prosthetic valve leaflet 0.7 x 0.9 cm.  Known TAVR aortic valve with normal leaflet excursion with increased gradients most likely related to hyperdynamic flow. Vmax is 3.8 m/s. Transvalvular gradients are - Peak: 63 mmHg, Mean: 36 mmHg. Dimensionless index is 0.43.  No paravalvular leak. Tricuspid Valve Structurally normal tricuspid valve. No tricuspid stenosis. Trace tricuspid regurgitation. Pulmonic Valve Structurally normal pulmonic valve without significant stenosis or regurgitation. Pericardium Normal pericardium without effusion. Aorta The aortic root is normal.  Ascending aorta diameter is 3.0 cm. Qasim Love M.D. (Electronically Signed) Final Date:     10 October 2019                 14:52      Micro:  Results     Procedure Component Value Units Date/Time    BLOOD CULTURE x2 [[id number]]  (Abnormal) Collected:  10/10/19 0330    Order Status:  Completed Specimen:  Blood from Peripheral Updated:  10/12/19 1325     Significant Indicator POS     Source BLD     Site PERIPHERAL     Culture Result Growth detected by Bactec instrument. 10/11/2019  12:04  Gram Stain: Gram positive cocci: Possible Staphylococcus sp.      Narrative:       Per Hospital Policy: Only change Specimen Src: to \"Line\" if  specified by physician order.  Right AC    BLOOD CULTURE x2 [[id number]]  (Abnormal) Collected:  10/10/19 0331    Order Status:  Completed Specimen:  Blood from Peripheral Updated:  10/12/19 1324     Significant Indicator POS     Source BLD     Site PERIPHERAL     Culture Result Growth detected by Bactec instrument. 10/11/2019  11:01  Negative for " "Staphylococcus aureus and MRSA by PCR. Correlate  ongoing need for antibiotics with clinical condition.  Further report to follow.      Narrative:       CALL  Erwin  183 tel. [phone],  CALLED  183 tel. [phone] 10/11/2019, 13:30, RB PERF. RESULTS CALLED  TO:[id number]  Per Hospital Policy: Only change Specimen Src: to \"Line\" if  specified by physician order.  Left Hand    BLOOD CULTURE [[id number]] Collected:  10/11/19 1515    Order Status:  Completed Specimen:  Blood from Peripheral Updated:  10/12/19 0920     Significant Indicator NEG     Source BLD     Site PERIPHERAL     Culture Result No Growth  Note: Blood cultures are incubated for 5 days and  are monitored continuously.Positive blood cultures  are called to the RN and reported as soon as  they are identified.      Narrative:       Per Hospital Policy: Only change Specimen Src: to \"Line\" if  specified by physician order.  Right Forearm/Arm    BLOOD CULTURE [[id number]] Collected:  10/11/19 2107    Order Status:  Completed Specimen:  Blood from Peripheral Updated:  10/12/19 0920     Significant Indicator NEG     Source BLD     Site PERIPHERAL     Culture Result No Growth  Note: Blood cultures are incubated for 5 days and  are monitored continuously.Positive blood cultures  are called to the RN and reported as soon as  they are identified.      Narrative:       Per Hospital Policy: Only change Specimen Src: to \"Line\" if  specified by physician order.  Right AC    BLOOD CULTURE [[id number]] Collected:  10/11/19 0000    Order Status:  Canceled Specimen:  Other from Peripheral     BLOOD CULTURE [[id number]] Collected:  10/11/19 0000    Order Status:  Canceled Specimen:  Other from Peripheral     URINALYSIS CULTURE, IF INDICATED [[id number]]  (Abnormal) Collected:  10/09/19 2356    Order Status:  Completed Specimen:  Urine Updated:  10/10/19 0016     Color Yellow     Character Clear     Specific Gravity 1.020     Ph 5.0     Glucose Negative mg/dL      Ketones Negative " mg/dL      Protein Negative mg/dL      Bilirubin Negative     Urobilinogen, Urine 1.0     Nitrite Negative     Leukocyte Esterase Negative     Occult Blood Small     Micro Urine Req Microscopic    URINALYSIS (UA) [[id number]]     Order Status:  Canceled Specimen:  Urine           Assessment:  Active Hospital Problems    Diagnosis   • *Acute bacterial endocarditis [I33.0]   • Renal infarct (HCC) [N28.0]   • CVA (cerebral vascular accident) (HCC) [I63.9]   • Elevated BUN [R79.9]   • Hyponatremia [E87.1]   • Leukocytosis [D72.829]   • COPD (chronic obstructive pulmonary disease) (HCC) [J44.9]   • AAA (abdominal aortic aneurysm) (HCC) [I71.4]   • Meningioma (HCC) [D32.9]   • S/P TAVR (transcatheter aortic valve replacement) [Z95.2]   • History of methamphetamine use [Z87.898]   • Anemia [D64.9]   • Hemangioma of liver [D18.03]   • HTN (hypertension) [I10]   • Hepatitis C [B19.20]   • Ischemic leg [I99.8]       Plan:  Prosthetic valve endocarditis- s/p TAVR  On 10/10/2019 patient has positive blood cultures-now the second set is also showing GPC. One set is showing staph species.-likely CNS  But he does have an echodensity on prosthetic valve leaflet on TTE done on 10/10/2019  Repeat blood cultures are negative on 10/11/2019  Patient is currently on Vanco and Rocephin-discontinue the Rocephin  Sed rate is 96 and CRP is 7.77  Continue with the vancomycin. 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

## 2019-10-13 NOTE — PALLIATIVE CARE
Palliative Care follow-up  Received call back from pts sister, Mona. Spoke with Mona on pts current admit as her and her mother were unaware of pts admission. Pt has lived with them in the past, as recently as earlier this year.   Provided clinical update to Mona. Spoke about pts problems with addiction.     Mona stated that it would be up to her mother to determine if the pt would be able to DC to their home again but that she is currently hospitalized and did not wish to upset her and possibly complicate her stay at this time in order to get an answer right now. Supported her wishes and let her know that I will update the Unit SW/CM RN tomorrow on the situation.  Spoke about long term outlook for the pt. Mona very aware of the pts problems but does still remain attached to her brother. Mona agreed to keep in touch as her mothers POC evolves and once she is stable she will update their mother on the current situation to see what her thoughts are about possibly allowing the pt to DC to their home after a SNF stay.     Plan: TBD, will update regular unit SW tomorrow, Monday 10/14, on pts families situation. Will update the pt also on his families thoughts.     Thank you for allowing Palliative Care to participate in this patient's care. Please feel free to call [phone] with any questions or concerns.

## 2019-10-13 NOTE — PROGRESS NOTES
Hospital Medicine Daily Progress Note    Date of Service  10/13/2019    Chief Complaint  Altered mentation    Hospital Course ( From  progress note)    64 y.o. male with hypertension, meth use, history of TAVR, chronic hepatitis C, admitted 10/9/2019 with altered mentation, along with multiple complaints of abdominal pain, and ankle pain.  Reportedly, he has been staying in an abandoned car for the last 5 days and currently homeless.  CT scan of the head in the ED showed multiple infarcts in the left occipital lobe, and left temporal lobe.  Initial labs showed WBC of 19,400, and sodium of 133.  Urinalysis did not show any significant pyuria.  CT scan of the abdomen pelvis also showed low-density changes in the inferior pole of the right kidney, most compatible with area of renal infarct, along with fusiform infrarenal abdominal aortic aneurysm, and liver hemangioma.  Ankle x-ray showed no acute traumatic injury.  Chest x-ray showed nothing acute.  Embolic infarcts were suspected, patient started on empiric antibiotics.  Echocardiogram was ordered, and HIV was sent. Neurology was consulted, and he started on aspirin and statin.  He is also started on IV fluids.           Interval Problem Update  10/11: Patient seen and examined by me today.  Patient was found to be lethargic.  He is producing good amounts of urine and no elevation in creatinine was noted.  Echocardiogram found evidence of vegetations at the TAVR.  Cardiology infectious disease were consulted.  His blood cultures came back positive for gram-positive cocci.  Ultrasound may have find evidence of a posterior tibial artery occlusion.  Vascular surgery was then consulted.      10/12 he didn't wasn't to talk today    Noted vascular md  Assessment    Case d/w dr gautam ID    Afebrile    No overnight events    10/13     arterial study shows arterial occlusion in left leg. Vascular md is aware. Pulse still intact    Case d/w dr lemus id- stop iv  rocephin    bp is still elevated    He is weak    afebrile    Consultants/Specialty  Neurology    Code Status  Full    Disposition  Monitor on telemetry    Review of Systems  Review of Systems   Constitutional: Positive for malaise/fatigue.   HENT: Negative for hearing loss and tinnitus.    Eyes: Negative for blurred vision, double vision and photophobia.   Respiratory: Negative for cough, hemoptysis and sputum production.    Cardiovascular: Negative for chest pain and palpitations.   Gastrointestinal: Negative for heartburn and nausea.   Genitourinary: Negative for dysuria and urgency.   Musculoskeletal: Positive for joint pain and myalgias.   Neurological: Negative for dizziness, tingling and headaches.   Psychiatric/Behavioral: Negative.    All other systems reviewed and are negative.       Physical Exam  Temp:  [36.5 °C (97.7 °F)-37.9 °C (100.2 °F)] 37.7 °C (99.9 °F)  Pulse:  [] 123  Resp:  [16-20] 20  BP: (132-152)/(68-94) 145/94  SpO2:  [93 %-97 %] 95 %    Physical Exam   Constitutional: He is oriented to person, place, and time. He appears well-developed and well-nourished.   HENT:   Head: Normocephalic and atraumatic.   Mouth/Throat: No oropharyngeal exudate.   Eyes: Conjunctivae are normal. No scleral icterus.   Neck: Normal range of motion. Neck supple. No JVD present. No tracheal deviation present. No thyromegaly present.   Cardiovascular: Normal rate, regular rhythm and intact distal pulses. Exam reveals no gallop and no friction rub.   Murmur (Systolic aortic murmur) heard.  Pulmonary/Chest: Effort normal and breath sounds normal. No stridor. No respiratory distress. He has no wheezes. He has no rales. He exhibits no tenderness.   Abdominal: Soft. Bowel sounds are normal. He exhibits no distension and no mass. There is no tenderness. There is no rebound and no guarding.   Musculoskeletal: Normal range of motion. He exhibits tenderness (left ankle). He exhibits no edema.   Lymphadenopathy:     He  has no cervical adenopathy.   Neurological: He is alert and oriented to person, place, and time. He has normal reflexes. No cranial nerve deficit.   Nursing note and vitals reviewed.      Fluids    Intake/Output Summary (Last 24 hours) at 10/13/2019 1634  Last data filed at 10/13/2019 0551  Gross per 24 hour   Intake 900 ml   Output 1500 ml   Net -600 ml       Laboratory  Recent Labs     10/11/19  0236 10/13/19  0221   WBC 17.7* 14.7*   RBC 2.95* 2.85*   HEMOGLOBIN 8.1* 7.8*   HEMATOCRIT 25.0* 24.4*   MCV 84.1 85.6   MCH 27.1 27.4   MCHC 32.3* 32.0*   RDW 43.3 44.7   PLATELETCT 255 236   MPV 9.4 10.3     Recent Labs     10/11/19  0236 10/13/19  0221   SODIUM 129* 130*   POTASSIUM 3.9 3.8   CHLORIDE 103 103   CO2 21 22   GLUCOSE 102* 111*   BUN 17 11   CREATININE 0.75 0.75   CALCIUM 7.6* 7.8*             Recent Labs     10/11/19  0236   TRIGLYCERIDE 142   HDL 11*   LDL 81       Imaging  US-EXTREMITY ARTERY LOWER UNILAT LEFT   Final Result      US-BETSY SINGLE LEVEL BILAT   Final Result      US-EXTREMITY NON VASCULAR UNILATERAL LEFT   Final Result      A presumed vessel, probably the posterior tibial artery in the medial left ankle with intraluminal thrombus.   These findings were discussed with ANG WOODY on 10/11/2019 3:45 PM.      DX-ANKLE 3+ VIEWS LEFT   Final Result      No evidence of fracture or dislocation.      US-CAROTID DOPPLER BILAT   Final Result      EC-ECHOCARDIOGRAM COMPLETE W/O CONT   Final Result      MR-BRAIN-W/O   Final Result      1.  Multifocal acute/subacute cerebral and cerebellar infarcts involving multiple vascular distributions most suggestive of embolic etiology. This includes moderate to large left PCA infarct with associated small areas of hemorrhage in the left thalamus    and parieto-occipital region.   2.  Mild age-related atrophy.   3.  Multiple small dural based masses most consistent with calcified meningiomas correlating with head CT findings.      CT-ABDOMEN-PELVIS WITH   Final  Result         1.  Low-density changes in the inferior pole of the right kidney, appearance most compatible with area of renal infarct.   2.  Fusiform infrarenal abdominal aortic aneurysm, recommend radiographic follow-up and surveillance as clinically appropriate.   3.  Peripherally enhancing low-density right hepatic lobe lesion, appearance most typical of hemangioma.   4.  Scattered emphysema   5.  Cardiomegaly   6.  Atherosclerosis      These findings were discussed with the patient's clinician, Abhay Sinha, on 10/10/2019 1:08 AM.      CT-HEAD W/O   Final Result         1.  New area of low-density in the left occipital lobe tracking along the medial aspect of the left temporal lobe, appearance compatible with evolving infarct.   2.  Multiple peripheral calcified masses, appearance most typical of meningiomas   3.  Hyperdensity in the subcutaneous soft tissues posterior to the neck in the midline, similar to prior study. Could represent thickening of the nuchal ligament or soft tissue mass. Correlate with exam.      DX-ANKLE 2- VIEWS LEFT   Final Result      No radiographic evidence of acute traumatic injury.      DX-CHEST-PORTABLE (1 VIEW)   Final Result         1.  No acute cardiopulmonary disease.   2.  Chronic underlying lung disease with pulmonary blebs of the left lung base.   3.  Atherosclerosis           Assessment/Plan  * Acute bacterial endocarditis- (present on admission)  Assessment & Plan  Has both renal and cerebral infarct.  Vegetation seen on cardiac echo    -  Cultures positive for gram-positive cocci  Continue  vancomycin      CVA (cerebral vascular accident) (HCC)- (present on admission)  Assessment & Plan  -Appears to be embolic.  Not a candidate for TPA as unknown onset.  Discontinue asa the patient is a high risk of hemorrhagic conversion according to neurology  -MRI of the brain found multiple cerebellar acute and subacute emboli,  MRI also found a large left PCA infarct  -Monitor  closely, with neuro checks every 4 hours per CVA protocol.     PT/OT/SLP to evaluate.   -This is likely secondary to septic emboli from endocarditis      Renal infarct (HCC)- (present on admission)  Assessment & Plan  -Please secondary to septic emboli.  -Continue antibiotics for now.   -Holding off on anticoagulation for now.     Leukocytosis- (present on admission)  Assessment & Plan    -Continue antibiotics     Hyponatremia- (present on admission)  Assessment & Plan  -Likely due to hypovolemia,      Elevated BUN- (present on admission)  Assessment & Plan  Resolved with ivf    Ischemic leg- (present on admission)  Assessment & Plan  Ultrasound found evidence of ischemic leg    Vascular surgery was consulted--> no surgical intervention recommended at this time      Meningioma (HCC)- (present on admission)  Assessment & Plan  -Multiple meningioma noted.  Appears stable.  Needs continued outpatient surveillance and monitoring.    AAA (abdominal aortic aneurysm) (HCC)- (present on admission)  Assessment & Plan  -Needs continued outpatient surveillance and follow-up.    COPD (chronic obstructive pulmonary disease) (HCC)- (present on admission)  Assessment & Plan  - Noted on imaging.  Not in acute exacerbation.  Continue respiratory protocol, as needed oxygen supplement.    S/P TAVR (transcatheter aortic valve replacement)- (present on admission)  Assessment & Plan  -with vegetations    On iv abx      History of methamphetamine use- (present on admission)  Assessment & Plan  -Urine drug screen is negative.    Anemia- (present on admission)  Assessment & Plan  -Likely anemia of chronic disease, with low iron, high ferritin, normal folate and B12.   -Monitor hemoglobin closely, with restrictive transfusion strategy, transfuse if hemoglobin drops below 7.    Hemangioma of liver- (present on admission)  Assessment & Plan  -Stable.  Monitor.    Hepatitis C- (present on admission)  Assessment & Plan  -Chronic.  Outpatient  follow-up.    HTN (hypertension)- (present on admission)  Assessment & Plan  Control BP     lisinopril 10mg po daily started on 10/12  Increased to lisinopril 20qd       VTE prophylaxis: lovenox SQ    Check am cbc, bmp

## 2019-10-13 NOTE — PROGRESS NOTES
"Pharmacy Kinetics 64 y.o. male on vancomycin day # 3    10/13/2019    Currently on Vancomycin 1200 mg iv q12hr (0200 1400)  Provider specified end date: TBD     Indication for Treatment: bacteremia, infective endocarditis      Pertinent history per medical record: Admitted on 10/9/2019 for Encephalopathy. 64 y.o. MIGUEL ANGEL M who is homeless. Recent TAVR on 3/18/2019 for severe symptomatic aortic stenosis. He came into the emergency room on 10/10/2019 for abdominal pain, admit diagnosis: encephalopathy. CT ABD/Pelvis: possible renal infarct. Neuro saw him for CVA. CT head: left occipital lobe tracking along the medial aspect of the left temporal lobe compatible with evolving infarct. ID following.      Other antibiotics: Rocephin 2 g iv q24hr      Allergies: Patient has no known allergies.      List concerns for renal function: none      Pertinent cultures to date:   10/10/19:PBCx2:Growth detected:[id number] Staphylococcus sp;[id number] for SA and MRSA  10/11/19:PBCx2:NGTD     MRSA nares swab if pneumonia is a concern (ordered/positive/negative/n-a): na    Recent Labs     10/11/19  0236 10/13/19  0221   WBC 17.7* 14.7*   NEUTSPOLYS 82.70*  --      Recent Labs     10/11/19  0236 10/13/19  0221   BUN 17 11   CREATININE 0.75 0.75   ALBUMIN 2.2*  --      Recent Labs     10/11/19  1515   VANCOTROUGH 11.0       Intake/Output Summary (Last 24 hours) at 10/13/2019 0921  Last data filed at 10/13/2019 0551  Gross per 24 hour   Intake 900 ml   Output 1500 ml   Net -600 ml      /84   Pulse (!) 114   Temp 37.8 °C (100 °F) (Temporal)   Resp 20   Ht 1.727 m (5' 8\")   Wt 63.7 kg (140 lb 6.9 oz)   SpO2 95%  Temp (24hrs), Av.2 °C (99 °F), Min:36.5 °C (97.7 °F), Max:37.9 °C (100.2 °F)      A/P        1. Vancomycin dose change: No change  2. Next vancomycin level: 10/13/19@1330  3. Goal trough: 16-20 mcg/mL    4. Comments: Leukocytosis (decreasing trend), cx listed above, follow up cx NG, Tmax 100.2. Renal indices stable, " level today. ID following;Plan: continue vancomycin , discontinue Rocephin.      Liang Swartz PharmD BCPS    Update: 10/13/19@1750:       10/13/2019 13:33   Vancomycin Trough 15.0     A/P        5. Vancomycin dose change: Vancomycin 900 mg iv q8hr (0200 1000 1800)  6. Next vancomycin level: 10/15/19@0130  7. Goal trough: 16-20 mcg/mL    8. Comments: Level below goal, increased maintenance dose. Follow up level ordered.      Liang Swartz PharmD BCPS                                   no

## 2019-10-13 NOTE — CARE PLAN
Problem: Communication  Goal: The ability to communicate needs accurately and effectively will improve  Outcome: PROGRESSING AS EXPECTED     Problem: Safety  Goal: Will remain free from injury  Outcome: PROGRESSING AS EXPECTED     Problem: Bowel/Gastric:  Goal: Normal bowel function is maintained or improved  Outcome: PROGRESSING AS EXPECTED     Problem: Respiratory:  Goal: Respiratory status will improve  Outcome: PROGRESSING AS EXPECTED     Problem: Mobility  Goal: Risk for activity intolerance will decrease  Outcome: PROGRESSING AS EXPECTED  Intervention: Encourage patient to increase activity level in collaboration with Interdisciplinary Team  Note:   Patient transferred to bed side [facility] with 2 person assist. Patient weak on left ankle from injury. PT/OT consult appreciated. Pt encouraged to ambulate today. Pt agrees.     Problem: Psychosocial Needs:  Goal: Level of anxiety will decrease  Outcome: PROGRESSING AS EXPECTED     Problem: Urinary Elimination:  Goal: Ability to reestablish a normal urinary elimination pattern will improve  Outcome: PROGRESSING AS EXPECTED

## 2019-10-13 NOTE — CONSULTS
"Reason for PC Consult: Advance Care Planning    Consulted by: Dr Blankenship    Assessment:  General: 63 yo male admitted on 10/9/2019 for Encephalopathy. PMH: Abnormality of left ventricle of heart, Aortic stenosis, BPH, Hypercholesteremia, HTN.     Pt presents to the ED with failure to thrive, abdominal pain increasing over approx 2 days, and left ankle pain. Pt reports untreated Hep C  Pt is homeless and has been staying in a car, not eating or drinking well.   Pt had TAVR in March 2019    Dyspnea: No,  pt denies, resp rate 20, 95% on room air  Last BM: 10/12/19    Pain: No, currently denies  Depression: Unable to determine   Dementia: No      Spiritual:  Is Voodoo or spirituality important for coping with this illness? No    Has a  or spiritual provider visit been requested? No    Palliative Performance Scale: 50%    Advance Directive: None on file.     DPOA: No, NOK Sierra Espinosa [phone], mother   POLST: None on file.     Code Status: Full    Outcome:  Met with the pt at the bedside. Introduced self and the role of Palliative care. Spoke with pt on current admit and his understanding of current infectious process. Pt unable to verbalize current complications. Spoke with pt on current echo which showed vegetation with no surgical options. Spoke about pts noncompliance and meth use. Spoke further on embolic strokes and kidney infarct, spoke about likely recurrence of infectious process. Spoke again on need to not use meth and remain sober.     Spoke about code status, explained Full code, CPR, Intubation, vs DNR and DNI.   Pt stated \"I'm not that old, I'm ready to give up. I want to have a chance.\".     Spoke further on pts current need for compliance for any type of meaningful time. Asked the pt if he had ever completed an AD. Pt was unsure what an AD was. Explained AD and the assistance it can provide for his wishes, pt declined at this time.  Pt states that he has lived \"off and on\" with his sister, " "Mona, and his mother Sierra, in [place]. Pt states that Mona takes care of their mother and is younger than he is. Pt stated that he feels they will take him in again, after he has\"some rehab\". Pr provided permission to call and speak with Mona and provide her with an update on pts status and his desire to be able to DC to their home at some time in the future.   Let pt know I will call Mona, update the MD on his wishes, and continue to check in with him to discuss POC.     Updated: MD, Unit SW    Plan: TBD, pt hopefull for DC to SNF and then home to family.     Thank you for allowing Palliative Care to participate in this patient's care. Please feel free to call [phone] with any questions or concerns.  "

## 2019-10-14 LAB
ANION GAP SERPL CALC-SCNC: 7 MMOL/L (ref 0–11.9)
BUN SERPL-MCNC: 10 MG/DL (ref 8–22)
CALCIUM SERPL-MCNC: 8.2 MG/DL (ref 8.5–10.5)
CHLORIDE SERPL-SCNC: 99 MMOL/L (ref 96–112)
CO2 SERPL-SCNC: 27 MMOL/L (ref 20–33)
CREAT SERPL-MCNC: 0.76 MG/DL (ref 0.5–1.4)
ERYTHROCYTE [DISTWIDTH] IN BLOOD BY AUTOMATED COUNT: 43.7 FL (ref 35.9–50)
GLUCOSE SERPL-MCNC: 99 MG/DL (ref 65–99)
HCT VFR BLD AUTO: 27.2 % (ref 42–52)
HGB BLD-MCNC: 9 G/DL (ref 14–18)
MCH RBC QN AUTO: 28 PG (ref 27–33)
MCHC RBC AUTO-ENTMCNC: 33.1 G/DL (ref 33.7–35.3)
MCV RBC AUTO: 84.7 FL (ref 81.4–97.8)
PLATELET # BLD AUTO: 313 K/UL (ref 164–446)
PMV BLD AUTO: 9.1 FL (ref 9–12.9)
POTASSIUM SERPL-SCNC: 3.8 MMOL/L (ref 3.6–5.5)
RBC # BLD AUTO: 3.21 M/UL (ref 4.7–6.1)
SODIUM SERPL-SCNC: 133 MMOL/L (ref 135–145)
VANCOMYCIN TROUGH SERPL-MCNC: 16.6 UG/ML (ref 10–20)
WBC # BLD AUTO: 14.9 K/UL (ref 4.8–10.8)

## 2019-10-14 PROCEDURE — 700111 HCHG RX REV CODE 636 W/ 250 OVERRIDE (IP): Performed by: HOSPITALIST

## 2019-10-14 PROCEDURE — 80048 BASIC METABOLIC PNL TOTAL CA: CPT

## 2019-10-14 PROCEDURE — A9270 NON-COVERED ITEM OR SERVICE: HCPCS | Performed by: HOSPITALIST

## 2019-10-14 PROCEDURE — 700102 HCHG RX REV CODE 250 W/ 637 OVERRIDE(OP): Performed by: INTERNAL MEDICINE

## 2019-10-14 PROCEDURE — 85027 COMPLETE CBC AUTOMATED: CPT

## 2019-10-14 PROCEDURE — 99233 SBSQ HOSP IP/OBS HIGH 50: CPT | Performed by: INTERNAL MEDICINE

## 2019-10-14 PROCEDURE — 36415 COLL VENOUS BLD VENIPUNCTURE: CPT

## 2019-10-14 PROCEDURE — 700102 HCHG RX REV CODE 250 W/ 637 OVERRIDE(OP): Performed by: HOSPITALIST

## 2019-10-14 PROCEDURE — 97530 THERAPEUTIC ACTIVITIES: CPT

## 2019-10-14 PROCEDURE — 700111 HCHG RX REV CODE 636 W/ 250 OVERRIDE (IP): Performed by: INTERNAL MEDICINE

## 2019-10-14 PROCEDURE — A9270 NON-COVERED ITEM OR SERVICE: HCPCS | Performed by: INTERNAL MEDICINE

## 2019-10-14 PROCEDURE — 99232 SBSQ HOSP IP/OBS MODERATE 35: CPT | Performed by: HOSPITALIST

## 2019-10-14 PROCEDURE — 80202 ASSAY OF VANCOMYCIN: CPT

## 2019-10-14 PROCEDURE — 770020 HCHG ROOM/CARE - TELE (206)

## 2019-10-14 PROCEDURE — 700105 HCHG RX REV CODE 258: Performed by: HOSPITALIST

## 2019-10-14 PROCEDURE — 700105 HCHG RX REV CODE 258: Performed by: INTERNAL MEDICINE

## 2019-10-14 RX ORDER — LISINOPRIL 20 MG/1
40 TABLET ORAL
Status: DISCONTINUED | OUTPATIENT
Start: 2019-10-15 | End: 2019-10-17 | Stop reason: HOSPADM

## 2019-10-14 RX ADMIN — ACETAMINOPHEN 650 MG: 325 TABLET, FILM COATED ORAL at 08:39

## 2019-10-14 RX ADMIN — ACETAMINOPHEN 650 MG: 325 TABLET, FILM COATED ORAL at 18:14

## 2019-10-14 RX ADMIN — SENNOSIDES, DOCUSATE SODIUM 2 TABLET: 50; 8.6 TABLET, FILM COATED ORAL at 18:14

## 2019-10-14 RX ADMIN — LISINOPRIL 20 MG: 20 TABLET ORAL at 04:21

## 2019-10-14 RX ADMIN — VANCOMYCIN HYDROCHLORIDE 900 MG: 500 INJECTION, POWDER, LYOPHILIZED, FOR SOLUTION INTRAVENOUS at 10:31

## 2019-10-14 RX ADMIN — VANCOMYCIN HYDROCHLORIDE 900 MG: 500 INJECTION, POWDER, LYOPHILIZED, FOR SOLUTION INTRAVENOUS at 02:13

## 2019-10-14 RX ADMIN — SENNOSIDES, DOCUSATE SODIUM 2 TABLET: 50; 8.6 TABLET, FILM COATED ORAL at 04:21

## 2019-10-14 RX ADMIN — NICOTINE 7 MG: 7 PATCH, EXTENDED RELEASE TRANSDERMAL at 04:21

## 2019-10-14 RX ADMIN — RIFAMPIN 300 MG: 300 CAPSULE ORAL at 18:14

## 2019-10-14 RX ADMIN — ATORVASTATIN CALCIUM 40 MG: 40 TABLET, FILM COATED ORAL at 18:14

## 2019-10-14 RX ADMIN — VANCOMYCIN HYDROCHLORIDE 900 MG: 500 INJECTION, POWDER, LYOPHILIZED, FOR SOLUTION INTRAVENOUS at 18:15

## 2019-10-14 RX ADMIN — ENOXAPARIN SODIUM 40 MG: 100 INJECTION SUBCUTANEOUS at 04:21

## 2019-10-14 RX ADMIN — RIFAMPIN 300 MG: 300 CAPSULE ORAL at 04:21

## 2019-10-14 ASSESSMENT — ENCOUNTER SYMPTOMS
FEVER: 1
BLURRED VISION: 0
SPUTUM PRODUCTION: 0
COUGH: 0
HEADACHES: 0
NAUSEA: 0
DIZZINESS: 0
PHOTOPHOBIA: 0
MYALGIAS: 1
TINGLING: 0
HEARTBURN: 0
PSYCHIATRIC NEGATIVE: 1
PALPITATIONS: 0
HEMOPTYSIS: 0
DOUBLE VISION: 0

## 2019-10-14 ASSESSMENT — COGNITIVE AND FUNCTIONAL STATUS - GENERAL
STANDING UP FROM CHAIR USING ARMS: A LITTLE
CLIMB 3 TO 5 STEPS WITH RAILING: A LOT
SUGGESTED CMS G CODE MODIFIER MOBILITY: CK
WALKING IN HOSPITAL ROOM: A LITTLE
MOVING FROM LYING ON BACK TO SITTING ON SIDE OF FLAT BED: A LOT
MOVING TO AND FROM BED TO CHAIR: A LITTLE
TURNING FROM BACK TO SIDE WHILE IN FLAT BAD: A LITTLE
MOBILITY SCORE: 16

## 2019-10-14 ASSESSMENT — GAIT ASSESSMENTS
GAIT LEVEL OF ASSIST: MINIMAL ASSIST
DISTANCE (FEET): 30
DEVIATION: OTHER (COMMENT)
ASSISTIVE DEVICE: FRONT WHEEL WALKER

## 2019-10-14 ASSESSMENT — PAIN SCALES - WONG BAKER: WONGBAKER_NUMERICALRESPONSE: HURTS EVEN MORE

## 2019-10-14 NOTE — CARE PLAN
Problem: Safety  Goal: Will remain free from injury  Outcome: PROGRESSING AS EXPECTED     Problem: Safety  Goal: Will remain free from falls  Outcome: PROGRESSING AS EXPECTED

## 2019-10-14 NOTE — PALLIATIVE CARE
Palliative Care follow-up  Met with pt to provide update that his sister, Mona, had called back and that currently his mother was unable to provide consent for him to DC to their home after SNF. Let Sylvain know that his mother was not seriously ill, per his sister, but that she would reach out once their mother was back at home.     Updated: Unit BLAKE Darlin    Plan: Possible DC to SNF for abx treatment.     Thank you for allowing Palliative Care to participate in this patient's care. Please feel free to call [phone] with any questions or concerns.

## 2019-10-14 NOTE — PROGRESS NOTES
Report received from Sylvain AMBROSIO, patient care assumed. Patient is awake, alert and oriented x2, disoriented to time and situation. Room air, visible chest rise/fall. Complains of left foot pain, medication per MAR. No s/s of distress. Tele monitor on. Bed low, locked position. Bed alarm on. Call light in reach.     Initial assessment completed. Updated on plan of care. No questions at this time.

## 2019-10-14 NOTE — PROGRESS NOTES
Infectious Disease Progress Note    Author: Anisha Lincoln M.D. Date & Time of service: 10/14/2019  10:33 AM    Chief Complaint:  Prosthetic valve endocarditis    Interval History:  64 y.o. male with HTN, meth use, TAVR, chronic hep C, admitted 10/9/2019 with AMS. Found to have mult embolic infarcts  10/12/2019-no fevers.  Patient is slightly more interactive.  Getting vascular studies done.  WBC remains high at 17,000.  Creatinine is 0.75  10/13-  Low grade fevers upto 100.2. WBC 14.7  10/14 Tmax 101.7, WBC 14.9 somnolent No acute distress. No acute events overnight  Labs Reviewed.    Review of Systems:  Review of Systems   Unable to perform ROS: Other (Patient is a very poor historian)   Constitutional: Positive for fever.   Musculoskeletal: Positive for joint pain.        Complains of left ankle pain       Hemodynamics:  Temp (24hrs), Av.7 °C (99.8 °F), Min:36.9 °C (98.5 °F), Max:38.7 °C (101.7 °F)  Temperature: 38 °C (100.4 °F)  Pulse  Av.5  Min: 88  Max: 123   Blood Pressure: 154/102       Physical Exam:  Physical Exam   Constitutional: No distress.   disheveled   Eyes: Left eye exhibits no discharge.   Neck: No JVD present.   Cardiovascular:   Murmur heard.  tachycardic   Pulmonary/Chest: Effort normal. No stridor. No respiratory distress. He has no wheezes. He has no rales.   Abdominal: Soft. He exhibits no distension. There is no tenderness.   Musculoskeletal: He exhibits tenderness.   Neurological:   somnolent   Skin: Skin is warm. No rash noted. He is not diaphoretic.   Nursing note and vitals reviewed.      Meds:    Current Facility-Administered Medications:   •  riFAMPin  •  lisinopril  •  vancomycin  •  atorvastatin  •  enoxaparin (LOVENOX) injection  •  acetaminophen  •  senna-docusate **AND** polyethylene glycol/lytes **AND** magnesium hydroxide **AND** bisacodyl  •  ondansetron  •  ondansetron  •  promethazine  •  promethazine  •  prochlorperazine  •  MD Alert...Vancomycin per  Pharmacy  •  nicotine  •  labetalol **OR** hydrALAZINE    Labs:  Recent Labs     10/13/19  0221 10/14/19  0305   WBC 14.7* 14.9*   RBC 2.85* 3.21*   HEMOGLOBIN 7.8* 9.0*   HEMATOCRIT 24.4* 27.2*   MCV 85.6 84.7   MCH 27.4 28.0   RDW 44.7 43.7   PLATELETCT 236 313   MPV 10.3 9.1     Recent Labs     10/13/19  0221 10/14/19  0305   SODIUM 130* 133*   POTASSIUM 3.8 3.8   CHLORIDE 103 99   CO2 22 27   GLUCOSE 111* 99   BUN 11 10     Recent Labs     10/13/19  0221 10/14/19  0305   CREATININE 0.75 0.76       Imaging:  Ct-abdomen-pelvis With  Result Date: 10/10/2019  1FINDINGS: Linear densities are seen within the lung bases, appearance favors atelectasis. Scattered emphysematous changes are seen. Cardiomegaly is noted. The liver is normal in contour. The liver is normal in size. No intrahepatic biliary ductal dilatation is noted. Peripherally enhancing 2.0 cm low-density lesion is seen in the right hepatic lobe. The gallbladder appears within normal limits. The spleen is unremarkable. The pancreas is grossly normal. Bilateral adrenal glands appear within normal limits. Low-density changes in the inferior pole of the right kidney are seen, new since prior study. The ureters are normal in caliber along their visualized course. The colon appears within normal limits. The appendix is not definitively identified. The small bowel is unremarkable. The bony structures are age appropriate. Atherosclerotic calcifications are seen. There is 3.5 cm fusiform infrarenal abdominal aortic aneurysm identified CT PELVIS FINDINGS: The bladder is within normal limits.     1.  Low-density changes in the inferior pole of the right kidney, appearance most compatible with area of renal infarct. 2.  Fusiform infrarenal abdominal aortic aneurysm, recommend radiographic follow-up and surveillance as clinically appropriate. 3.  Peripherally enhancing low-density right hepatic lobe lesion, appearance most typical of hemangioma. 4.  Scattered emphysema  5.  Cardiomegaly 6.  Atherosclerosis These findings were discussed with the patient's clinician, Abhay Sinha, on 10/10/2019 1:08 AM.    Ct-head W/o    Result Date: 10/10/2019  10/9/2019 11:57 PM HISTORY/REASON FOR EXAM: Altered mental status TECHNIQUE/EXAM DESCRIPTION:  CT of the head without contrast. Sequential axial images were obtained from the vertex to the skull base without contrast. Up to date radiation dose reduction adjustments have been utilized to meet ALARA standards for radiation dose reduction. COMPARISON:  July 4, 2017 FINDINGS: The brain appears normal in volume and morphology. The ventricles are normal in caliber and configuration. Calcified 2.3 cm mass along the left temporal lobe is seen extra-axially. 1.7 cm mass along the periphery is seen adjacent to the right temporal lobe. 1.4 cm mass is seen adjacent to the left temporoparietal region on the right. New area of low-density is seen in the left occipital lobe tracking along the medial aspect of the left temporal lobe. There are no abnormal extra axial fluid collections  or extra axial hemorrhage identified. The visualized paranasal sinuses and mastoid air cells are well aerated bilaterally. No depressed calvarial fractures are identified. The visualized globes and retrobulbar soft tissues appear within normal limits. There is hyperdensity identified in the subcutaneous soft tissues of the posterior neck, similar to prior study.     1.  New area of low-density in the left occipital lobe tracking along the medial aspect of the left temporal lobe, appearance compatible with evolving infarct. 2.  Multiple peripheral calcified masses, appearance most typical of meningiomas 3.  Hyperdensity in the subcutaneous soft tissues posterior to the neck in the midline, similar to prior study. Could represent thickening of the nuchal ligament or soft tissue mass. Correlate with exam.    Dx-ankle 2- Views Left    Result Date: 10/9/2019  10/9/2019 11:15 PM  HISTORY/REASON FOR EXAM:  Pain/Deformity Following Trauma. TECHNIQUE/EXAM DESCRIPTION AND NUMBER OF VIEWS:  2 views of the LEFT ankle. COMPARISON: None. FINDINGS: There is no visualized fracture, subluxation, or dislocation identified.     No radiographic evidence of acute traumatic injury.    Dx-ankle 3+ Views Left    Result Date: 10/11/2019  10/11/2019 1:25 PM HISTORY/REASON FOR EXAM:  Left ankle pain TECHNIQUE/EXAM DESCRIPTION AND NUMBER OF VIEWS:  3 views of the LEFT ankle. COMPARISON: 10/09/2019 FINDINGS: Bone mineralization is normal.  There is no evidence of fracture or dislocation.  There is no soft tissue swelling seen. The ankle mortise is well-maintained.     No evidence of fracture or dislocation.    Dx-chest-portable (1 View)    Result Date: 10/9/2019    10/9/2019 10:24 PM HISTORY/REASON FOR EXAM: Shortness of Breath TECHNIQUE/EXAM DESCRIPTION:  Single AP view of the chest. COMPARISON: March 19, 2019 FINDINGS: The cardiac silhouette appears within normal limits. Atherosclerotic calcification of the aorta is noted.  The central pulmonary vasculature appears normal. The lungs appear well expanded bilaterally.  Linear densities of the bilateral lung bases are seen. Left lung base pulmonary blebs are noted, similar to prior study. No significant pleural effusions are identified. The bony structures appear age-appropriate.     1.  No acute cardiopulmonary disease. 2.  Chronic underlying lung disease with pulmonary blebs of the left lung base. 3.  Atherosclerosis    Mr-brain-w/o    Result Date: 10/10/2019  10/10/2019 7:26 AM HISTORY/REASON FOR EXAM:  Altered mental status; Okay for patient to be off telemetry monitoring for MRI. TECHNIQUE/EXAM DESCRIPTION: MRI of the brain without contrast. T1 sagittal, T2 fast spin-echo axial, T1 coronal, FLAIR coronal, diffusion-weighted and apparent diffusion coefficient (ADC map) axial images were obtained of the whole brain. The study was performed on a Blockchain 1.5  Nataly MRI scanner. COMPARISON:  Noncontrast head CT earlier in the day FINDINGS: Study is limited by motion artifact. There are multiple acute/subacute infarcts with restricted diffusion and T2 hyperintensity consistent with cytotoxic edema. These involve multiple vascular distributions suggesting embolic etiology. There is a moderate to large left posterior cerebral artery territory infarct involving the left occipital lobe, left thalamus and left hippocampus. There are also multiple small infarcts seen within the bilateral frontoparietal white matter, right parietal and occipital cortex and in the bilateral cerebellar hemispheres. There is susceptibility artifact in the left thalamus and punctate focus in the left occipital lobe consistent with small areas of hemorrhage. There are multiple dural based masses demonstrating low T2 signal along the bilateral convexities most consistent with calcified change most correlating to CT findings. There is mild localized mass effect. No brain edema is seen. There is no midline shift or hydrocephalus. No extra-axial fluid collection. Proximal vascular flow voids are patent. Mucosal thickening and small mucous retention cysts within the maxillary sinuses. Mastoids are clear. Orbital contents are symmetric.     1.  Multifocal acute/subacute cerebral and cerebellar infarcts involving multiple vascular distributions most suggestive of embolic etiology. This includes moderate to large left PCA infarct with associated small areas of hemorrhage in the left thalamus and parieto-occipital region. 2.  Mild age-related atrophy. 3.  Multiple small dural based masses most consistent with calcified meningiomas correlating with head CT findings.    Us-betsy Single Level Bilat  Result Date: 10/12/2019   Vascular Laboratory  Conclusions  Right.  No evidence of arterial insufficiency.  Left.  There is no evidence of arterial disease demonstrated (BETSY is .9-1.0).   Findings  Right.  Doppler  waveform of the common femoral artery is of high amplitude and  triphasic.  Doppler waveforms at the ankle are brisk and triphasic.  Ankle-brachial index is normal.  Left.  Doppler waveform of the common femoral artery is of high amplitude and  triphasic.  Doppler waveforms at the ankle are brisk and biphasic without reversal of  flow.  Ankle-brachial index is normal, but reduced compared to the right lower  extremity.    Krystian William MD  (Electronically Signed)  Final Date:      2019                   10:59    Us-carotid Doppler Bilat    Result Date: 10/11/2019   Carotid Duplex  Report  Vascular Laboratory  CONCLUSIONS  Mild bilateral internal carotid artery stenosis (<50%).  JAIMIE YAO  Exam Date:     10/11/2019 07:47  Room #:     Inpatient  Priority:     Routine  Ht (in):             Wt (lb):  Ordering Physician:        LESLIE HUGHES  Referring Physician:       [id number]  Sonographer:               Anisha Solis RVT, RDCS  Study Type:                Complete Bilateral  Technical Quality:         Good  Age:    64    Gender:     M  MRN:    [id number]  :    1955      BSA:  Indications:     CVA  CPT Codes:       69148  ICD Codes:       436  History:         Altered mental status  Limitations:  Right Brachial BP:              /  Left Brachial BP:             /  RIGHT  Plaque          Plaque         Velocity (cm/s)  Characteristics Composition    Syst/Diast                                 85   / 32      Distal ICA                                 81   / 31      Mid ICA  Irregular      Heterogeneo     86   / 34      Prox ICA                 us                                 59   / 17      Distal CCA                                      /         Mid CCA                                 68   / 15      Prox CCA                                 98   / 13      ECA  Waveform:                                     SCA  LEFT  Plaque         Plaque          Velocity  (cm/s)  CharacteristicsComposition     Syst/Diast                                 103  / 36      Distal ICA                                 96   / 34      Mid ICA  Smooth         Heterogeneo     66   / 20      Prox ICA                 us                                 69   / 14      Distal CCA                                      /         Mid CCA                                 75   / 17      Prox CCA                                 105  / 17      ECA  Waveform:                                     SCA          1.3          ICA/CCA       1.4        Antegrade      Vertebral   Antegrade         50   / 21     cm/s        67    / 23  FINDINGS  Right carotid.  Mild, heterogeneous plaque of the bifurcation extending into the internal  carotid.  Velocities are consistent with < 50% stenosis of the internal carotid  artery.  Antegrade right vertebral flow.  Left carotid.  Mild, heterogeneous plaque of the bifurcation extending into the internal  carotid.  Velocities are consistent with < 50% stenosis of the internal carotid  artery.  Antegrade left vertebral flow.  Krystian William MD  (Electronically Signed)  Final Date:      11 October 2019                   09:00    Us-extremity Non Vascular Unilateral Left    Result Date: 10/11/2019  10/11/2019 1:32 PM HISTORY/REASON FOR EXAM:  soft tissue irregularity on medial left ankle around the achilles tendon, area outlined in pen. TECHNIQUE/EXAM DESCRIPTION AND NUMBER OF VIEWS: Limited ultrasound of the left ankle in the area of clinical abnormality. COMPARISON: None FINDINGS: Real-time grayscale and color Doppler sonography was performed. In the medial left ankle there is a tubular structure which appears to represent a vessel. There is echogenic material within this without significant color flow. This may represent the posterior tibial artery with thrombus. No mass or fluid collection is seen.     A presumed vessel, probably the posterior tibial artery in the medial left  ankle with intraluminal thrombus. These findings were discussed with ANG WOODY on 10/11/2019 3:45 PM.    Ec-echocardiogram Complete W/o Cont    Result Date: 10/10/2019  Transthoracic Echo Report Echocardiography Laboratory CONCLUSIONS Hyperdynamic left ventricular systolic function. Left ventricular ejection fraction is visually estimated to be greater than 75%. Severe concentric left ventricular hypertrophy. Known TAVR aortic valve with normal leaflet excursion with increased gradients most likely related to hyperdynamic flow. Echodensity prosthetic valve leaflet 0.7 x 0.9 cm. Compared to the images of the study done 3/29/2019 there is an echodensity of the prosthetic valve leaflet seen on current study however there is no comparable view on the previous study; increased prosthetic valve gradients related to increased velocity due to change in hyperdynamic left ventricular contractility. JAIMIE YAO Exam Date:         10/10/2019                    13:15 Exam  LV EF:  75    % FINDINGS Left Ventricle Normal left ventricular chamber size. Severe concentric left ventricular hypertrophy. Hyperdynamic left ventricular systolic function. Left ventricular ejection fraction is visually estimated to be greater than 75%.    Normal regional wall motion. Indeterminate diastolic function. Right Ventricle Normal right ventricular size. Normal right ventricular systolic function. Right Atrium Enlarged right atrium. Normal inferior vena cava size and inspiratory collapse. Left Atrium Severely dilated left atrium. Left atrial volume index is 63  mL/sq m. Mitral Valve Mitral annular calcification. Calcification of the mitral valve leaflets. Mild mitral stenosis. Trace mitral regurgitation. Aortic Valve Echodensity prosthetic valve leaflet 0.7 x 0.9 cm.  Known TAVR aortic valve with normal leaflet excursion with increased gradients most likely related to hyperdynamic flow. Vmax is 3.8 m/s. Transvalvular gradients are - Peak:  "63 mmHg, Mean: 36 mmHg. Dimensionless index is 0.43.  No paravalvular leak. Tricuspid Valve Structurally normal tricuspid valve. No tricuspid stenosis. Trace tricuspid regurgitation. Pulmonic Valve Structurally normal pulmonic valve without significant stenosis or regurgitation. Pericardium Normal pericardium without effusion. Aorta The aortic root is normal.  Ascending aorta diameter is 3.0 cm. Qasim Love M.D. (Electronically Signed) Final Date:     10 October 2019                 14:52      Micro:  Results     Procedure Component Value Units Date/Time    BLOOD CULTURE x2 [[id number]]  (Abnormal) Collected:  10/10/19 0330    Order Status:  Completed Specimen:  Blood from Peripheral Updated:  10/12/19 1325     Significant Indicator POS     Source BLD     Site PERIPHERAL     Culture Result Growth detected by Bactec instrument. 10/11/2019  12:04  Gram Stain: Gram positive cocci: Possible Staphylococcus sp.      Narrative:       Per Hospital Policy: Only change Specimen Src: to \"Line\" if  specified by physician order.  Right AC    BLOOD CULTURE x2 [[id number]]  (Abnormal) Collected:  10/10/19 0331    Order Status:  Completed Specimen:  Blood from Peripheral Updated:  10/12/19 1324     Significant Indicator POS     Source BLD     Site PERIPHERAL     Culture Result Growth detected by Bactec instrument. 10/11/2019  11:01  Negative for Staphylococcus aureus and MRSA by PCR. Correlate  ongoing need for antibiotics with clinical condition.  Further report to follow.      Narrative:       CALL  [place]  183 tel. [phone],  CALLED  183 tel. [phone] 10/11/2019, 13:30, RB PERF. RESULTS CALLED  TO:[id number]  Per Hospital Policy: Only change Specimen Src: to \"Line\" if  specified by physician order.  Left Hand    BLOOD CULTURE [[id number]] Collected:  10/11/19 1515    Order Status:  Completed Specimen:  Blood from Peripheral Updated:  10/12/19 0920     Significant Indicator NEG     Source BLD     Site PERIPHERAL     Culture " "Result No Growth  Note: Blood cultures are incubated for 5 days and  are monitored continuously.Positive blood cultures  are called to the RN and reported as soon as  they are identified.      Narrative:       Per Hospital Policy: Only change Specimen Src: to \"Line\" if  specified by physician order.  Right Forearm/Arm    BLOOD CULTURE [[id number]] Collected:  10/11/19 2107    Order Status:  Completed Specimen:  Blood from Peripheral Updated:  10/12/19 0920     Significant Indicator NEG     Source BLD     Site PERIPHERAL     Culture Result No Growth  Note: Blood cultures are incubated for 5 days and  are monitored continuously.Positive blood cultures  are called to the RN and reported as soon as  they are identified.      Narrative:       Per Hospital Policy: Only change Specimen Src: to \"Line\" if  specified by physician order.  Right AC    BLOOD CULTURE [[id number]] Collected:  10/11/19 0000    Order Status:  Canceled Specimen:  Other from Peripheral     BLOOD CULTURE [[id number]] Collected:  10/11/19 0000    Order Status:  Canceled Specimen:  Other from Peripheral     URINALYSIS CULTURE, IF INDICATED [[id number]]  (Abnormal) Collected:  10/09/19 2356    Order Status:  Completed Specimen:  Urine Updated:  10/10/19 0016     Color Yellow     Character Clear     Specific Gravity 1.020     Ph 5.0     Glucose Negative mg/dL      Ketones Negative mg/dL      Protein Negative mg/dL      Bilirubin Negative     Urobilinogen, Urine 1.0     Nitrite Negative     Leukocyte Esterase Negative     Occult Blood Small     Micro Urine Req Microscopic    URINALYSIS (UA) [[id number]]     Order Status:  Canceled Specimen:  Urine           Assessment:  Active Hospital Problems    Diagnosis   • *Acute bacterial endocarditis [I33.0]   • Renal infarct ([facility]) [N28.0]   • CVA (cerebral vascular accident) ([facility]) [I63.9]   • Leukocytosis [D72.829]   • COPD (chronic obstructive pulmonary disease) ([facility]) [J44.9]   • AAA (abdominal aortic aneurysm) ([facility]) " [I71.4]   • Meningioma ([facility]) [D32.9]   • S/P TAVR (transcatheter aortic valve replacement) [Z95.2]   • History of methamphetamine use [Z87.898]   • Hepatitis C [B19.20]   • Ischemic leg [I99.8]       Plan:  Prosthetic valve endocarditis- s/p TAVR  Febrile  Persistent leukocytosis  Blood cxs 10/10 +staph, still no ID  TTE+echodensity on prosthetic valve leaflet on TTE done on 10/10/2019  Repeat blood cultures are negative on 10/11/2019  JAYLYN if feasible  Continue vanco and rif-monitor renal function closely  Anticipate 6 week course    Leukocytosis, persistent  Multifactorial  Monitor    Left ankle pain  Due to vascular embolus  Continue treatment per vascular    Hepatitis C, chronic  HIV neg  Treatment with GI as outpatient if indicated    CVA, 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

## 2019-10-14 NOTE — CARE PLAN
Problem: Nutritional:  Goal: Achieve adequate nutritional intake  Description  Patient will consume >50% of meals   Outcome: MET     PO intake generally % for all documented meals.    RD to follow weekly.

## 2019-10-14 NOTE — PROGRESS NOTES
Handoff received from previous nurse Gates at the patients bedside. Patient was resting comfortably and the bed was in a low and locked position. Call light in reach. Needs met at this time.

## 2019-10-14 NOTE — CARE PLAN
Problem: Knowledge Deficit  Goal: Knowledge of disease process/condition, treatment plan, diagnostic tests, and medications will improve  10/14/2019 1227 by Radha Dennis R.N.  Outcome: PROGRESSING AS EXPECTED  Note:   Patient updated on plan of care. All medications discussed with patient prior to administration. Patient verbalizes understanding, no questions at this time. A&Ox2, needs constant reinforcement of POC.     Problem: Safety  Goal: Will remain free from injury  10/14/2019 1227 by Radha Dennis R.N.  Outcome: PROGRESSING AS EXPECTED  Note:   Patient free from falls and injuries at this time. Bed low locked position. Call light in reach. Patient is A&Ox2, uses call light appropriately to call for assistance. OOB with one assist and FWW. Treaded footwear applied. Bed alarm on. Personal belongings in reach.

## 2019-10-14 NOTE — PROGRESS NOTES
"Pharmacy Kinetics 64 y.o. male on vancomycin day # 4 10/14/2019    Currently on Vancomycin 900 mg iv q12hr (0200, 1000, 1800)  Provider specified end date: TBD     Indication for Treatment: bacteremia, infective endocarditis      Pertinent history per medical record: Admitted on 10/9/2019 for Encephalopathy. 64 y.o. MIGUEL ANGEL STANLEY who is homeless. Recent TAVR on 3/18/2019 for severe symptomatic aortic stenosis. He came into the emergency room on 10/10/2019 for abdominal pain, admit diagnosis: encephalopathy. CT ABD/Pelvis: possible renal infarct. Neuro saw him for CVA. CT head: left occipital lobe tracking along the medial aspect of the left temporal lobe compatible with evolving infarct. ECHO found to have density on prosthetic valve. ID following.      Other antibiotics: rifampin 300mg BID (10/13 -10/23)     Allergies: Patient has no known allergies.      List concerns for renal function: none      Pertinent cultures to date:   10/10/19:PBCx2:Growth detected:5pe1Tasdhkkf Staphylococcus sp;7my7Pnscdkos for SA and MRSA  10/11/19:PBCx2:NGTD     MRSA nares swab if pneumonia is a concern (ordered/positive/negative/n-a): na    Recent Labs     10/13/19  0221 10/14/19  0305   WBC 14.7* 14.9*     Recent Labs     10/13/19  0221 10/14/19  0305   BUN 11 10   CREATININE 0.75 0.76     Recent Labs     10/11/19  1515 10/13/19  1333 10/14/19  0923   VANCOTROUGH 11.0 15.0 16.6       Intake/Output Summary (Last 24 hours) at 10/14/2019 1039  Last data filed at 10/14/2019 0800  Gross per 24 hour   Intake --   Output 800 ml   Net -800 ml      /102   Pulse (!) 107   Temp 38 °C (100.4 °F) (Temporal)   Resp 17   Ht 1.727 m (5' 8\")   Wt 63.9 kg (140 lb 14 oz)   SpO2 96%  Temp (24hrs), Av.7 °C (99.8 °F), Min:36.9 °C (98.5 °F), Max:38.7 °C (101.7 °F)      A/P   1. Vancomycin dose change: none  2. Next vancomycin level: 2-3 or sooner if clinically indicated  3. Goal trough: 16-20 mcg/mL  4. Comments: Duration to complete 19 per ID " (4 weeks s/p negative blood cx). Rifampin added.  Trough therapeutic.  SCr stable, but WNL remains elevated.  Will assess trough in 2-3 days.    Philippe Voss, DianaD, BCPS

## 2019-10-14 NOTE — PROGRESS NOTES
Monitor Summary  Rhythm/Rate: Sinus Rhythm to Sinus Tachycardia   Ectopy: Rare PVCs and Occasional PACs  IL:0.12  QRS:0.08 QT:0.34

## 2019-10-15 LAB
ANION GAP SERPL CALC-SCNC: 5 MMOL/L (ref 0–11.9)
BUN SERPL-MCNC: 11 MG/DL (ref 8–22)
CALCIUM SERPL-MCNC: 8.4 MG/DL (ref 8.5–10.5)
CHLORIDE SERPL-SCNC: 97 MMOL/L (ref 96–112)
CO2 SERPL-SCNC: 28 MMOL/L (ref 20–33)
CREAT SERPL-MCNC: 0.83 MG/DL (ref 0.5–1.4)
ERYTHROCYTE [DISTWIDTH] IN BLOOD BY AUTOMATED COUNT: 42.6 FL (ref 35.9–50)
GLUCOSE SERPL-MCNC: 95 MG/DL (ref 65–99)
HCT VFR BLD AUTO: 27.7 % (ref 42–52)
HGB BLD-MCNC: 9.3 G/DL (ref 14–18)
MCH RBC QN AUTO: 27.8 PG (ref 27–33)
MCHC RBC AUTO-ENTMCNC: 33.6 G/DL (ref 33.7–35.3)
MCV RBC AUTO: 82.9 FL (ref 81.4–97.8)
PLATELET # BLD AUTO: 325 K/UL (ref 164–446)
PMV BLD AUTO: 8.9 FL (ref 9–12.9)
POTASSIUM SERPL-SCNC: 3.6 MMOL/L (ref 3.6–5.5)
RBC # BLD AUTO: 3.34 M/UL (ref 4.7–6.1)
SODIUM SERPL-SCNC: 130 MMOL/L (ref 135–145)
VANCOMYCIN TROUGH SERPL-MCNC: 20.1 UG/ML (ref 10–20)
WBC # BLD AUTO: 13 K/UL (ref 4.8–10.8)

## 2019-10-15 PROCEDURE — 770020 HCHG ROOM/CARE - TELE (206)

## 2019-10-15 PROCEDURE — 700111 HCHG RX REV CODE 636 W/ 250 OVERRIDE (IP): Performed by: INTERNAL MEDICINE

## 2019-10-15 PROCEDURE — 700105 HCHG RX REV CODE 258: Performed by: INTERNAL MEDICINE

## 2019-10-15 PROCEDURE — A9270 NON-COVERED ITEM OR SERVICE: HCPCS | Performed by: HOSPITALIST

## 2019-10-15 PROCEDURE — 80048 BASIC METABOLIC PNL TOTAL CA: CPT

## 2019-10-15 PROCEDURE — 97535 SELF CARE MNGMENT TRAINING: CPT

## 2019-10-15 PROCEDURE — 700102 HCHG RX REV CODE 250 W/ 637 OVERRIDE(OP): Performed by: INTERNAL MEDICINE

## 2019-10-15 PROCEDURE — 700102 HCHG RX REV CODE 250 W/ 637 OVERRIDE(OP): Performed by: HOSPITALIST

## 2019-10-15 PROCEDURE — 80202 ASSAY OF VANCOMYCIN: CPT

## 2019-10-15 PROCEDURE — 99233 SBSQ HOSP IP/OBS HIGH 50: CPT | Performed by: INTERNAL MEDICINE

## 2019-10-15 PROCEDURE — 87040 BLOOD CULTURE FOR BACTERIA: CPT

## 2019-10-15 PROCEDURE — 36415 COLL VENOUS BLD VENIPUNCTURE: CPT

## 2019-10-15 PROCEDURE — 99232 SBSQ HOSP IP/OBS MODERATE 35: CPT | Performed by: HOSPITALIST

## 2019-10-15 PROCEDURE — 85027 COMPLETE CBC AUTOMATED: CPT

## 2019-10-15 PROCEDURE — 700111 HCHG RX REV CODE 636 W/ 250 OVERRIDE (IP): Performed by: HOSPITALIST

## 2019-10-15 PROCEDURE — 700105 HCHG RX REV CODE 258: Performed by: HOSPITALIST

## 2019-10-15 PROCEDURE — A9270 NON-COVERED ITEM OR SERVICE: HCPCS | Performed by: INTERNAL MEDICINE

## 2019-10-15 RX ADMIN — VANCOMYCIN HYDROCHLORIDE 900 MG: 500 INJECTION, POWDER, LYOPHILIZED, FOR SOLUTION INTRAVENOUS at 09:58

## 2019-10-15 RX ADMIN — RIFAMPIN 300 MG: 300 CAPSULE ORAL at 04:35

## 2019-10-15 RX ADMIN — VANCOMYCIN HYDROCHLORIDE 800 MG: 500 INJECTION, POWDER, LYOPHILIZED, FOR SOLUTION INTRAVENOUS at 17:04

## 2019-10-15 RX ADMIN — VANCOMYCIN HYDROCHLORIDE 900 MG: 500 INJECTION, POWDER, LYOPHILIZED, FOR SOLUTION INTRAVENOUS at 02:57

## 2019-10-15 RX ADMIN — ENOXAPARIN SODIUM 40 MG: 100 INJECTION SUBCUTANEOUS at 04:35

## 2019-10-15 RX ADMIN — ATORVASTATIN CALCIUM 40 MG: 40 TABLET, FILM COATED ORAL at 17:04

## 2019-10-15 RX ADMIN — NICOTINE 7 MG: 7 PATCH, EXTENDED RELEASE TRANSDERMAL at 04:35

## 2019-10-15 RX ADMIN — LISINOPRIL 40 MG: 20 TABLET ORAL at 04:35

## 2019-10-15 RX ADMIN — ACETAMINOPHEN 650 MG: 325 TABLET, FILM COATED ORAL at 18:50

## 2019-10-15 RX ADMIN — RIFAMPIN 300 MG: 300 CAPSULE ORAL at 17:04

## 2019-10-15 ASSESSMENT — ENCOUNTER SYMPTOMS
COUGH: 0
DOUBLE VISION: 0
CHILLS: 0
FEVER: 0
PALPITATIONS: 0
MYALGIAS: 1
BLURRED VISION: 0
HEADACHES: 0
NERVOUS/ANXIOUS: 0
PSYCHIATRIC NEGATIVE: 1
SPUTUM PRODUCTION: 0
FEVER: 1
HEMOPTYSIS: 0
PHOTOPHOBIA: 0

## 2019-10-15 ASSESSMENT — COGNITIVE AND FUNCTIONAL STATUS - GENERAL
DRESSING REGULAR LOWER BODY CLOTHING: A LITTLE
DAILY ACTIVITIY SCORE: 17
EATING MEALS: A LITTLE
SUGGESTED CMS G CODE MODIFIER DAILY ACTIVITY: CK
TOILETING: A LITTLE
HELP NEEDED FOR BATHING: A LOT
PERSONAL GROOMING: A LITTLE
DRESSING REGULAR UPPER BODY CLOTHING: A LITTLE

## 2019-10-15 NOTE — PROGRESS NOTES
Handoff received from previous nurse Radha at the patients bedside. Patient was resting comfortably and the bed was in a low and locked position. Call light in reach. Needs met at this time.

## 2019-10-15 NOTE — PROGRESS NOTES
Infectious Disease Progress Note    Author: Anisha Lincoln M.D. Date & Time of service: 10/15/2019  3:34 PM    Chief Complaint:  Prosthetic valve endocarditis    Interval History:  64 y.o. male with HTN, meth use, TAVR, chronic hep C, admitted 10/9/2019 with AMS. Found to have mult embolic infarcts  10/12/2019-no fevers.  Patient is slightly more interactive.  Getting vascular studies done.  WBC remains high at 17,000.  Creatinine is 0.75  10/13-  Low grade fevers upto 100.2. WBC 14.7  10/14 Tmax 101.7, WBC 14.9 somnolent No acute distress. No acute events overnight  10/15 Tmax 99.7 WBC 13 more awake but not following commands-obtunded  Labs Reviewed.    Review of Systems:  Review of Systems   Unable to perform ROS: Critical illness (Patient is a very poor historian)   Constitutional: Positive for fever.   Musculoskeletal: Positive for joint pain and myalgias.        Pain BLE       Hemodynamics:  Temp (24hrs), Av.1 °C (98.8 °F), Min:36.3 °C (97.4 °F), Max:37.6 °C (99.7 °F)  Temperature: 37.6 °C (99.7 °F)  Pulse  Av.5  Min: 84  Max: 123   Blood Pressure: 146/84       Physical Exam:  Physical Exam   Constitutional:   disheveled   HENT:   Nose: Nose normal.   Mouth/Throat: No oropharyngeal exudate.   Eyes: Pupils are equal, round, and reactive to light. EOM are normal. No scleral icterus.   Neck: Neck supple. No JVD present.   Cardiovascular: Normal rate.   ectopy   Pulmonary/Chest: Effort normal. No stridor. No respiratory distress. He has no wheezes.   Abdominal: Soft. He exhibits no distension. There is no tenderness. There is no rebound.   Musculoskeletal: He exhibits tenderness. He exhibits no edema.   Neurological:   Awake, not following commands   Skin: Skin is warm.   Nursing note and vitals reviewed.      Meds:    Current Facility-Administered Medications:   •  vancomycin  •  lisinopril  •  riFAMPin  •  atorvastatin  •  enoxaparin (LOVENOX) injection  •  acetaminophen  •  senna-docusate **AND**  polyethylene glycol/lytes **AND** magnesium hydroxide **AND** bisacodyl  •  ondansetron  •  ondansetron  •  promethazine  •  promethazine  •  prochlorperazine  •  MD Alert...Vancomycin per Pharmacy  •  nicotine  •  labetalol **OR** hydrALAZINE    Labs:  Recent Labs     10/13/19  0221 10/14/19  0305 10/15/19  0117   WBC 14.7* 14.9* 13.0*   RBC 2.85* 3.21* 3.34*   HEMOGLOBIN 7.8* 9.0* 9.3*   HEMATOCRIT 24.4* 27.2* 27.7*   MCV 85.6 84.7 82.9   MCH 27.4 28.0 27.8   RDW 44.7 43.7 42.6   PLATELETCT 236 313 325   MPV 10.3 9.1 8.9*     Recent Labs     10/13/19  0221 10/14/19  0305 10/15/19  0117   SODIUM 130* 133* 130*   POTASSIUM 3.8 3.8 3.6   CHLORIDE 103 99 97   CO2 22 27 28   GLUCOSE 111* 99 95   BUN 11 10 11     Recent Labs     10/13/19  0221 10/14/19  0305 10/15/19  0117   CREATININE 0.75 0.76 0.83       Imaging:  Ct-abdomen-pelvis With  Result Date: 10/10/2019  1FINDINGS: Linear densities are seen within the lung bases, appearance favors atelectasis. Scattered emphysematous changes are seen. Cardiomegaly is noted. The liver is normal in contour. The liver is normal in size. No intrahepatic biliary ductal dilatation is noted. Peripherally enhancing 2.0 cm low-density lesion is seen in the right hepatic lobe. The gallbladder appears within normal limits. The spleen is unremarkable. The pancreas is grossly normal. Bilateral adrenal glands appear within normal limits. Low-density changes in the inferior pole of the right kidney are seen, new since prior study. The ureters are normal in caliber along their visualized course. The colon appears within normal limits. The appendix is not definitively identified. The small bowel is unremarkable. The bony structures are age appropriate. Atherosclerotic calcifications are seen. There is 3.5 cm fusiform infrarenal abdominal aortic aneurysm identified CT PELVIS FINDINGS: The bladder is within normal limits.     1.  Low-density changes in the inferior pole of the right kidney,  appearance most compatible with area of renal infarct. 2.  Fusiform infrarenal abdominal aortic aneurysm, recommend radiographic follow-up and surveillance as clinically appropriate. 3.  Peripherally enhancing low-density right hepatic lobe lesion, appearance most typical of hemangioma. 4.  Scattered emphysema 5.  Cardiomegaly 6.  Atherosclerosis These findings were discussed with the patient's clinician, Abhay Sinha, on 10/10/2019 1:08 AM.    Ct-head W/o    Result Date: 10/10/2019  10/9/2019 11:57 PM HISTORY/REASON FOR EXAM: Altered mental status TECHNIQUE/EXAM DESCRIPTION:  CT of the head without contrast. Sequential axial images were obtained from the vertex to the skull base without contrast. Up to date radiation dose reduction adjustments have been utilized to meet ALARA standards for radiation dose reduction. COMPARISON:  July 4, 2017 FINDINGS: The brain appears normal in volume and morphology. The ventricles are normal in caliber and configuration. Calcified 2.3 cm mass along the left temporal lobe is seen extra-axially. 1.7 cm mass along the periphery is seen adjacent to the right temporal lobe. 1.4 cm mass is seen adjacent to the left temporoparietal region on the right. New area of low-density is seen in the left occipital lobe tracking along the medial aspect of the left temporal lobe. There are no abnormal extra axial fluid collections  or extra axial hemorrhage identified. The visualized paranasal sinuses and mastoid air cells are well aerated bilaterally. No depressed calvarial fractures are identified. The visualized globes and retrobulbar soft tissues appear within normal limits. There is hyperdensity identified in the subcutaneous soft tissues of the posterior neck, similar to prior study.     1.  New area of low-density in the left occipital lobe tracking along the medial aspect of the left temporal lobe, appearance compatible with evolving infarct. 2.  Multiple peripheral calcified masses,  appearance most typical of meningiomas 3.  Hyperdensity in the subcutaneous soft tissues posterior to the neck in the midline, similar to prior study. Could represent thickening of the nuchal ligament or soft tissue mass. Correlate with exam.    Dx-ankle 2- Views Left    Result Date: 10/9/2019  10/9/2019 11:15 PM HISTORY/REASON FOR EXAM:  Pain/Deformity Following Trauma. TECHNIQUE/EXAM DESCRIPTION AND NUMBER OF VIEWS:  2 views of the LEFT ankle. COMPARISON: None. FINDINGS: There is no visualized fracture, subluxation, or dislocation identified.     No radiographic evidence of acute traumatic injury.    Dx-ankle 3+ Views Left    Result Date: 10/11/2019  10/11/2019 1:25 PM HISTORY/REASON FOR EXAM:  Left ankle pain TECHNIQUE/EXAM DESCRIPTION AND NUMBER OF VIEWS:  3 views of the LEFT ankle. COMPARISON: 10/09/2019 FINDINGS: Bone mineralization is normal.  There is no evidence of fracture or dislocation.  There is no soft tissue swelling seen. The ankle mortise is well-maintained.     No evidence of fracture or dislocation.    Dx-chest-portable (1 View)    Result Date: 10/9/2019    10/9/2019 10:24 PM HISTORY/REASON FOR EXAM: Shortness of Breath TECHNIQUE/EXAM DESCRIPTION:  Single AP view of the chest. COMPARISON: March 19, 2019 FINDINGS: The cardiac silhouette appears within normal limits. Atherosclerotic calcification of the aorta is noted.  The central pulmonary vasculature appears normal. The lungs appear well expanded bilaterally.  Linear densities of the bilateral lung bases are seen. Left lung base pulmonary blebs are noted, similar to prior study. No significant pleural effusions are identified. The bony structures appear age-appropriate.     1.  No acute cardiopulmonary disease. 2.  Chronic underlying lung disease with pulmonary blebs of the left lung base. 3.  Atherosclerosis    Mr-brain-w/o  Result Date: 10/10/2019  FINDINGS: Study is limited by motion artifact. There are multiple acute/subacute infarcts with  restricted diffusion and T2 hyperintensity consistent with cytotoxic edema. These involve multiple vascular distributions suggesting embolic etiology. There is a moderate to large left posterior cerebral artery territory infarct involving the left occipital lobe, left thalamus and left hippocampus. There are also multiple small infarcts seen within the bilateral frontoparietal white matter, right parietal and occipital cortex and in the bilateral cerebellar hemispheres. There is susceptibility artifact in the left thalamus and punctate focus in the left occipital lobe consistent with small areas of hemorrhage. There are multiple dural based masses demonstrating low T2 signal along the bilateral convexities most consistent with calcified change most correlating to CT findings. There is mild localized mass effect. No brain edema is seen. There is no midline shift or hydrocephalus. No extra-axial fluid collection. Proximal vascular flow voids are patent. Mucosal thickening and small mucous retention cysts within the maxillary sinuses. Mastoids are clear. Orbital contents are symmetric.     1.  Multifocal acute/subacute cerebral and cerebellar infarcts involving multiple vascular distributions most suggestive of embolic etiology. This includes moderate to large left PCA infarct with associated small areas of hemorrhage in the left thalamus and parieto-occipital region. 2.  Mild age-related atrophy. 3.  Multiple small dural based masses most consistent with calcified meningiomas correlating with head CT findings.    Us-betsy Single Level Bilat  Result Date: 10/12/2019   Vascular Laboratory  Conclusions  Right.  No evidence of arterial insufficiency.  Left.  There is no evidence of arterial disease demonstrated (BETSY is .9-1.0).   Findings  Right.  Doppler waveform of the common femoral artery is of high amplitude and  triphasic.  Doppler waveforms at the ankle are brisk and triphasic.  Ankle-brachial index is normal.  Left.   Doppler waveform of the common femoral artery is of high amplitude and  triphasic.  Doppler waveforms at the ankle are brisk and biphasic without reversal of  flow.  Ankle-brachial index is normal, but reduced compared to the right lower  extremity.    Krystian William MD  (Electronically Signed)  Final Date:      2019                   10:59    Us-carotid Doppler Bilat    Result Date: 10/11/2019   Carotid Duplex  Report  Vascular Laboratory  CONCLUSIONS  Mild bilateral internal carotid artery stenosis (<50%).  JAIMIE YAO  Exam Date:     10/11/2019 07:47  Room #:     Inpatient  Priority:     Routine  Ht (in):             Wt (lb):  Ordering Physician:        LESLIE HUGHES  Referring Physician:       [id number]  Sonographer:               Anisha Solis RVT, RDCS  Study Type:                Complete Bilateral  Technical Quality:         Good  Age:    64    Gender:     M  MRN:    [id number]  :    1955      BSA:  Indications:     CVA  CPT Codes:       46152  ICD Codes:       436  History:         Altered mental status  Limitations:  Right Brachial BP:              /  Left Brachial BP:             /  RIGHT  Plaque          Plaque         Velocity (cm/s)  Characteristics Composition    Syst/Diast                                 85   / 32      Distal ICA                                 81   / 31      Mid ICA  Irregular      Heterogeneo     86   / 34      Prox ICA                 us                                 59   / 17      Distal CCA                                      /         Mid CCA                                 68   / 15      Prox CCA                                 98   / 13      ECA  Waveform:                                     SCA  LEFT  Plaque         Plaque          Velocity (cm/s)  CharacteristicsComposition     Syst/Diast                                 103  / 36      Distal ICA                                 96   / 34      Mid ICA  Smooth          Heterogeneo     66   / 20      Prox ICA                 us                                 69   / 14      Distal CCA                                      /         Mid CCA                                 75   / 17      Prox CCA                                 105  / 17      ECA  Waveform:                                     SCA          1.3          ICA/CCA       1.4        Antegrade      Vertebral   Antegrade         50   / 21     cm/s        67    / 23  FINDINGS  Right carotid.  Mild, heterogeneous plaque of the bifurcation extending into the internal  carotid.  Velocities are consistent with < 50% stenosis of the internal carotid  artery.  Antegrade right vertebral flow.  Left carotid.  Mild, heterogeneous plaque of the bifurcation extending into the internal  carotid.  Velocities are consistent with < 50% stenosis of the internal carotid  artery.  Antegrade left vertebral flow.  Krystian William MD  (Electronically Signed)  Final Date:      11 October 2019                   09:00    Us-extremity Non Vascular Unilateral Left    Result Date: 10/11/2019  10/11/2019 1:32 PM HISTORY/REASON FOR EXAM:  soft tissue irregularity on medial left ankle around the achilles tendon, area outlined in pen. TECHNIQUE/EXAM DESCRIPTION AND NUMBER OF VIEWS: Limited ultrasound of the left ankle in the area of clinical abnormality. COMPARISON: None FINDINGS: Real-time grayscale and color Doppler sonography was performed. In the medial left ankle there is a tubular structure which appears to represent a vessel. There is echogenic material within this without significant color flow. This may represent the posterior tibial artery with thrombus. No mass or fluid collection is seen.     A presumed vessel, probably the posterior tibial artery in the medial left ankle with intraluminal thrombus. These findings were discussed with ANG WOODY on 10/11/2019 3:45 PM.    Ec-echocardiogram Complete W/o Cont    Result Date:  10/10/2019  Transthoracic Echo Report Echocardiography Laboratory CONCLUSIONS Hyperdynamic left ventricular systolic function. Left ventricular ejection fraction is visually estimated to be greater than 75%. Severe concentric left ventricular hypertrophy. Known TAVR aortic valve with normal leaflet excursion with increased gradients most likely related to hyperdynamic flow. Echodensity prosthetic valve leaflet 0.7 x 0.9 cm. Compared to the images of the study done 3/29/2019 there is an echodensity of the prosthetic valve leaflet seen on current study however there is no comparable view on the previous study; increased prosthetic valve gradients related to increased velocity due to change in hyperdynamic left ventricular contractility. NAJMA JAIMIE Exam Date:         10/10/2019                    13:15 Exam  LV EF:  75    % FINDINGS Left Ventricle Normal left ventricular chamber size. Severe concentric left ventricular hypertrophy. Hyperdynamic left ventricular systolic function. Left ventricular ejection fraction is visually estimated to be greater than 75%.    Normal regional wall motion. Indeterminate diastolic function. Right Ventricle Normal right ventricular size. Normal right ventricular systolic function. Right Atrium Enlarged right atrium. Normal inferior vena cava size and inspiratory collapse. Left Atrium Severely dilated left atrium. Left atrial volume index is 63  mL/sq m. Mitral Valve Mitral annular calcification. Calcification of the mitral valve leaflets. Mild mitral stenosis. Trace mitral regurgitation. Aortic Valve Echodensity prosthetic valve leaflet 0.7 x 0.9 cm.  Known TAVR aortic valve with normal leaflet excursion with increased gradients most likely related to hyperdynamic flow. Vmax is 3.8 m/s. Transvalvular gradients are - Peak: 63 mmHg, Mean: 36 mmHg. Dimensionless index is 0.43.  No paravalvular leak. Tricuspid Valve Structurally normal tricuspid valve. No tricuspid stenosis. Trace  "tricuspid regurgitation. Pulmonic Valve Structurally normal pulmonic valve without significant stenosis or regurgitation. Pericardium Normal pericardium without effusion. Aorta The aortic root is normal.  Ascending aorta diameter is 3.0 cm. Qasim Love M.D. (Electronically Signed) Final Date:     10 October 2019                 14:52      Micro:  Results     Procedure Component Value Units Date/Time    BLOOD CULTURE [[id number]]     Order Status:  Sent Specimen:  Blood from Peripheral     BLOOD CULTURE [[id number]]     Order Status:  Sent Specimen:  Blood from Peripheral     BLOOD CULTURE x2 [[id number]]  (Abnormal) Collected:  10/10/19 0330    Order Status:  Completed Specimen:  Blood from Peripheral Updated:  10/14/19 1658     Significant Indicator POS     Source BLD     Site PERIPHERAL     Culture Result Growth detected by Bactec instrument. 10/11/2019  12:04      Coagulase-negative Staphylococcus species    Narrative:       Per Hospital Policy: Only change Specimen Src: to \"Line\" if  specified by physician order.  Right AC    BLOOD CULTURE x2 [[id number]]  (Abnormal) Collected:  10/10/19 0331    Order Status:  Completed Specimen:  Blood from Peripheral Updated:  10/14/19 1503     Significant Indicator POS     Source BLD     Site PERIPHERAL     Culture Result Growth detected by Bactec instrument. 10/11/2019  11:01  Negative for Staphylococcus aureus and MRSA by PCR. Correlate  ongoing need for antibiotics with clinical condition.  Further report to follow.        Coagulase-negative Staphylococcus species  Susceptibilities in progress      Narrative:       CALL  Erwin  183 tel. [phone],  CALLED  [phone] tel. [phone] 10/11/2019, 13:30, RB PERF. RESULTS CALLED  TO:[phone]  Per Hospital Policy: Only change Specimen Src: to \"Line\" if  specified by physician order.  Left Hand    BLOOD CULTURE [[id number]] Collected:  10/11/19 1515    Order Status:  Completed Specimen:  Blood from Peripheral Updated:  10/12/19 0920 " "    Significant Indicator NEG     Source BLD     Site PERIPHERAL     Culture Result No Growth  Note: Blood cultures are incubated for 5 days and  are monitored continuously.Positive blood cultures  are called to the RN and reported as soon as  they are identified.      Narrative:       Per Hospital Policy: Only change Specimen Src: to \"Line\" if  specified by physician order.  Right Forearm/Arm    BLOOD CULTURE [[id number]] Collected:  10/11/19 2107    Order Status:  Completed Specimen:  Blood from Peripheral Updated:  10/12/19 0920     Significant Indicator NEG     Source BLD     Site PERIPHERAL     Culture Result No Growth  Note: Blood cultures are incubated for 5 days and  are monitored continuously.Positive blood cultures  are called to the RN and reported as soon as  they are identified.      Narrative:       Per Hospital Policy: Only change Specimen Src: to \"Line\" if  specified by physician order.  Right AC    BLOOD CULTURE [[id number]] Collected:  10/11/19 0000    Order Status:  Canceled Specimen:  Other from Peripheral     BLOOD CULTURE [[id number]] Collected:  10/11/19 0000    Order Status:  Canceled Specimen:  Other from Peripheral     URINALYSIS CULTURE, IF INDICATED [[id number]]  (Abnormal) Collected:  10/09/19 2356    Order Status:  Completed Specimen:  Urine Updated:  10/10/19 0016     Color Yellow     Character Clear     Specific Gravity 1.020     Ph 5.0     Glucose Negative mg/dL      Ketones Negative mg/dL      Protein Negative mg/dL      Bilirubin Negative     Urobilinogen, Urine 1.0     Nitrite Negative     Leukocyte Esterase Negative     Occult Blood Small     Micro Urine Req Microscopic    URINALYSIS (UA) [[id number]]     Order Status:  Canceled Specimen:  Urine           Assessment:  Active Hospital Problems    Diagnosis   • *Acute bacterial endocarditis [I33.0]   • Renal infarct ([facility]) [N28.0]   • CVA (cerebral vascular accident) ([facility]) [I63.9]   • Leukocytosis [D72.829]   • COPD (chronic obstructive " pulmonary disease) ([facility]) [J44.9]   • AAA (abdominal aortic aneurysm) ([facility]) [I71.4]   • Meningioma ([facility]) [D32.9]   • S/P TAVR (transcatheter aortic valve replacement) [Z95.2]   • History of methamphetamine use [Z87.898]   • Hepatitis C [B19.20]   • Ischemic leg [I99.8]       Plan:  Prosthetic valve endocarditis- s/p TAVR. On treatment  Febrile  Persistent leukocytosis  Blood cxs 10/10 +staph, still no ID  TTE+echodensity on prosthetic valve leaflet on TTE done on 10/10/2019  Repeat blood cultures are negative on 10/11/2019-repeat due to fever and leukocytosis  JAYLYN if feasible  Continue vanco and rif-monitor renal function closely  Anticipate 6 week course   Anticipate stop date 11/22/2019    Leukocytosis, persistent  Multifactorial  Monitor and check blood cxs as above    Left ankle pain  Due to vascular embolus  Continue treatment per vascular    Hepatitis C, chronic  HIV neg  Treatment with GI as outpatient if indicated    CVA, 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  In review of yesterday's note dated  10/14/2019, there are no changes except as documented above.

## 2019-10-15 NOTE — PROGRESS NOTES
"Pharmacy Kinetics 64 y.o. male on vancomycin day # 5 10/15/2019    Currently on Vancomycin 900 mg iv q12hr (0200, 1000, 1800)   Provider specified end date: 19 per ID    Indication for Treatment: bacteremia, infective endocarditis     Pertinent history per medical record: Admitted on 10/9/2019 for Encephalopathy. 64 y.o. MIGUEL ANGEL STANLEY who is homeless. Recent TAVR on 3/18/2019 for severe symptomatic aortic stenosis. He came into the emergency room on 10/10/2019 for abdominal pain, admit diagnosis: encephalopathy. CT ABD/Pelvis: possible renal infarct. Neuro saw him for CVA. CT head: left occipital lobe tracking along the medial aspect of the left temporal lobe compatible with evolving infarct. ECHO found to have density on prosthetic valve. ID following.     Other antibiotics: rifampin 300mg BID (10/13 -10/23)     Allergies: Patient has no known allergies.     List concerns for renal function: prolonged vancomycin use    Pertinent cultures to date:   10/10/19:PBCx2: 2 of 2 CoNS  10/11/19:PBCx2:NGTD     MRSA nares swab if pneumonia is a concern (ordered/positive/negative/n-a): na      Recent Labs     10/13/19  0221 10/14/19  0305 10/15/19  0117   WBC 14.7* 14.9* 13.0*     Recent Labs     10/13/19  0221 10/14/19  0305 10/15/19  0117   BUN 11 10 11   CREATININE 0.75 0.76 0.83     Recent Labs     10/13/19  1333 10/14/19  0923 10/15/19  0117   VANCOTROUGH 15.0 16.6 20.1*   No intake or output data in the 24 hours ending 10/15/19 0942   /89   Pulse (!) 118   Temp 37.6 °C (99.7 °F) (Temporal)   Resp 20   Ht 1.727 m (5' 8\")   Wt 63.8 kg (140 lb 10.5 oz)   SpO2 92%  Temp (24hrs), Av.2 °C (99 °F), Min:36.3 °C (97.4 °F), Max:38 °C (100.4 °F)      A/P   1. Vancomycin dose change: reduce to 800mg q8hr (, 10, 18)  2. Next vancomycin level: in 2-3 days or sooner if clinically indicated  3. Goal trough: 16-20 mcg/mL  4. Comments: Will reduce slightly to addend increasing trough levels.  Plan for vancomycin through " 11/14/19 per ID.  Will assess trough in 2-3 days.    Philippe Voss, DianaD, BCPS

## 2019-10-15 NOTE — THERAPY
"Physical Therapy Treatment completed.   Bed Mobility:  Supine to Sit: Supervised (HOB flat, no rail)  Transfers: Sit to Stand: Minimal Assist  Gait: Level Of Assist: Minimal Assist with Front-Wheel Walker       Plan of Care: Will benefit from Physical Therapy 5 times per week  Discharge Recommendations: Equipment: Will Continue to Assess for Equipment Needs. Post-acute therapy: see below.    See \"Rehab Therapy-Acute\" Patient Summary Report for complete documentation.     Patient progressing with functional mobility. He was able to bear weight through LLE though still reported some pain in foot. He ambulated approximately 30ft with FWW and min A. He continues to be limited by impaired arousal and cognition; required repetitive verbal cueing to attend to task. Continue to recommend post acute placement. Will follow.  "

## 2019-10-15 NOTE — DOCUMENTATION QUERY
[facility]                                                                       Query Response Note      PATIENT:               JAIMIE YAO  ACCT #:                  [id number]  MRN:                     [id number]  :                      1955  ADMIT DATE:       10/9/2019 10:11 PM  DISCH DATE:          RESPONDING  PROVIDER #:        [id number]           QUERY TEXT:    Severe malnutrition is documented in the registered dietician note dated 10/11/19. Can a diagnosis be given to support this finding.     NOTE:  If an appropriate response is not listed below, please respond with a new note.    The patient's Clinical Indicators include:  ? Malnutrition Risk: Patient with severe malnutrition suspected to be in the context of chronic disease related to COPD as evidenced by <50% estimated energy needs for >1 month and weight loss >10% over 6 months. per registered dietician consult note dated 10/11/19  ? Treatments: registered dietician consult and interventions  ? Risk factors:  Cerebral infarct, metabolic encephalopathy, infection due to cardiac valve prosthesis, homelessness, failure to thrive, history of stimulant abuse,  Options provided:   -- Agree with registered dietician assessment of severe protein calorie malnutrition   -- Disagree with registered dietician assessment of severe protein calorie malnutrition   -- Unable to determine      Query created by: Martha Valentine on 10/14/2019 3:02 PM    RESPONSE TEXT:    Agree with registered dietician assessment of severe protein calorie malnutrition          Electronically signed by:  TALA GLOVER 10/14/2019 10:32 PM

## 2019-10-15 NOTE — PROGRESS NOTES
Monitor Summary  Rhythm/Rate: Sinus Rhythm to Sinus Tachycardia   Ectopy: Rare PVCs and Rare Bigemony  CA: 0.16 QRS:0.08 QT:0.38

## 2019-10-15 NOTE — PROGRESS NOTES
Pt stable this morning. Pt disoriented to time and event. Went over POC with pt, explained reason for hospitalization. No needs or concerns at this time. Will continue to monitor.

## 2019-10-15 NOTE — PROGRESS NOTES
Helped pt sit up for lunch. Frequent orientation provided, especially concerning is POC and reason for admission.

## 2019-10-15 NOTE — PROGRESS NOTES
Hospital Medicine Daily Progress Note    Date of Service  10/14/2019    Chief Complaint  Altered mentation    Hospital Course ( From  progress note)    64 y.o. male with hypertension, meth use, history of TAVR, chronic hepatitis C, admitted 10/9/2019 with altered mentation, along with multiple complaints of abdominal pain, and ankle pain.  Reportedly, he has been staying in an abandoned car for the last 5 days and currently homeless.  CT scan of the head in the ED showed multiple infarcts in the left occipital lobe, and left temporal lobe.  Initial labs showed WBC of 19,400, and sodium of 133.  Urinalysis did not show any significant pyuria.  CT scan of the abdomen pelvis also showed low-density changes in the inferior pole of the right kidney, most compatible with area of renal infarct, along with fusiform infrarenal abdominal aortic aneurysm, and liver hemangioma.  Ankle x-ray showed no acute traumatic injury.  Chest x-ray showed nothing acute.  Embolic infarcts were suspected, patient started on empiric antibiotics.  Echocardiogram was ordered, and HIV was sent. Neurology was consulted, and he started on aspirin and statin.  He is also started on IV fluids.           Interval Problem Update  10/11: Patient seen and examined by me today.  Patient was found to be lethargic.  He is producing good amounts of urine and no elevation in creatinine was noted.  Echocardiogram found evidence of vegetations at the TAVR.  Cardiology infectious disease were consulted.  His blood cultures came back positive for gram-positive cocci.  Ultrasound may have find evidence of a posterior tibial artery occlusion.  Vascular surgery was then consulted.      10/12 he didn't wasn't to talk today    Noted vascular md  Assessment    Case d/w dr gautam ID    Afebrile    No overnight events    10/13     arterial study shows arterial occlusion in left leg. Vascular md is aware. Pulse still intact    Case d/w dr lemus id- stop iv  rocephin    bp is still elevated    He is weak    Afebrile      10/14 very poor historian    Left foot pain    Weak    Noted id md notes    bp is still high    Consultants/Specialty  Neurology    Code Status  Full    Disposition  Will need snf    Review of Systems  Review of Systems   Constitutional: Positive for malaise/fatigue.   HENT: Negative for hearing loss and tinnitus.    Eyes: Negative for blurred vision, double vision and photophobia.   Respiratory: Negative for cough, hemoptysis and sputum production.    Cardiovascular: Negative for chest pain and palpitations.   Gastrointestinal: Negative for heartburn and nausea.   Genitourinary: Negative for dysuria and urgency.   Musculoskeletal: Positive for joint pain and myalgias.   Neurological: Negative for dizziness, tingling and headaches.   Psychiatric/Behavioral: Negative.    All other systems reviewed and are negative.       Physical Exam  Temp:  [36.9 °C (98.5 °F)-38 °C (100.4 °F)] 38 °C (100.4 °F)  Pulse:  [] 93  Resp:  [16-20] 16  BP: (146-172)/() 168/95  SpO2:  [91 %-97 %] 96 %    Physical Exam   Constitutional: He is oriented to person, place, and time. He appears well-developed and well-nourished.   HENT:   Head: Normocephalic and atraumatic.   Mouth/Throat: No oropharyngeal exudate.   Eyes: Conjunctivae are normal. No scleral icterus.   Neck: Normal range of motion. Neck supple. No JVD present. No tracheal deviation present. No thyromegaly present.   Cardiovascular: Normal rate, regular rhythm and intact distal pulses. Exam reveals no gallop and no friction rub.   Murmur (Systolic aortic murmur) heard.  Pulmonary/Chest: Effort normal and breath sounds normal. No stridor. No respiratory distress. He has no wheezes. He has no rales. He exhibits no tenderness.   Abdominal: Soft. Bowel sounds are normal. He exhibits no distension and no mass. There is no tenderness. There is no rebound and no guarding.   Musculoskeletal: Normal range of  motion. He exhibits tenderness (left ankle). He exhibits no edema.   Lymphadenopathy:     He has no cervical adenopathy.   Neurological: He is alert and oriented to person, place, and time. He has normal reflexes. No cranial nerve deficit.   Nursing note and vitals reviewed.      Fluids    Intake/Output Summary (Last 24 hours) at 10/14/2019 1921  Last data filed at 10/14/2019 0800  Gross per 24 hour   Intake --   Output 800 ml   Net -800 ml       Laboratory  Recent Labs     10/13/19  0221 10/14/19  0305   WBC 14.7* 14.9*   RBC 2.85* 3.21*   HEMOGLOBIN 7.8* 9.0*   HEMATOCRIT 24.4* 27.2*   MCV 85.6 84.7   MCH 27.4 28.0   MCHC 32.0* 33.1*   RDW 44.7 43.7   PLATELETCT 236 313   MPV 10.3 9.1     Recent Labs     10/13/19  0221 10/14/19  0305   SODIUM 130* 133*   POTASSIUM 3.8 3.8   CHLORIDE 103 99   CO2 22 27   GLUCOSE 111* 99   BUN 11 10   CREATININE 0.75 0.76   CALCIUM 7.8* 8.2*                   Imaging  US-EXTREMITY ARTERY LOWER UNILAT LEFT   Final Result      US-BETSY SINGLE LEVEL BILAT   Final Result      US-EXTREMITY NON VASCULAR UNILATERAL LEFT   Final Result      A presumed vessel, probably the posterior tibial artery in the medial left ankle with intraluminal thrombus.   These findings were discussed with ANG WOODY on 10/11/2019 3:45 PM.      DX-ANKLE 3+ VIEWS LEFT   Final Result      No evidence of fracture or dislocation.      US-CAROTID DOPPLER BILAT   Final Result      EC-ECHOCARDIOGRAM COMPLETE W/O CONT   Final Result      MR-BRAIN-W/O   Final Result      1.  Multifocal acute/subacute cerebral and cerebellar infarcts involving multiple vascular distributions most suggestive of embolic etiology. This includes moderate to large left PCA infarct with associated small areas of hemorrhage in the left thalamus    and parieto-occipital region.   2.  Mild age-related atrophy.   3.  Multiple small dural based masses most consistent with calcified meningiomas correlating with head CT findings.       CT-ABDOMEN-PELVIS WITH   Final Result         1.  Low-density changes in the inferior pole of the right kidney, appearance most compatible with area of renal infarct.   2.  Fusiform infrarenal abdominal aortic aneurysm, recommend radiographic follow-up and surveillance as clinically appropriate.   3.  Peripherally enhancing low-density right hepatic lobe lesion, appearance most typical of hemangioma.   4.  Scattered emphysema   5.  Cardiomegaly   6.  Atherosclerosis      These findings were discussed with the patient's clinician, Abhay Sinha, on 10/10/2019 1:08 AM.      CT-HEAD W/O   Final Result         1.  New area of low-density in the left occipital lobe tracking along the medial aspect of the left temporal lobe, appearance compatible with evolving infarct.   2.  Multiple peripheral calcified masses, appearance most typical of meningiomas   3.  Hyperdensity in the subcutaneous soft tissues posterior to the neck in the midline, similar to prior study. Could represent thickening of the nuchal ligament or soft tissue mass. Correlate with exam.      DX-ANKLE 2- VIEWS LEFT   Final Result      No radiographic evidence of acute traumatic injury.      DX-CHEST-PORTABLE (1 VIEW)   Final Result         1.  No acute cardiopulmonary disease.   2.  Chronic underlying lung disease with pulmonary blebs of the left lung base.   3.  Atherosclerosis           Assessment/Plan  * Acute bacterial endocarditis- (present on admission)  Assessment & Plan  Has both renal and cerebral infarct.  Vegetation seen on cardiac echo    -  Cultures positive for gram-positive cocci  Continue  vancomycin      CVA (cerebral vascular accident) (HCC)- (present on admission)  Assessment & Plan  -Appears to be embolic.  Not a candidate for TPA as unknown onset.  Discontinue asa the patient is a high risk of hemorrhagic conversion according to neurology  -MRI of the brain found multiple cerebellar acute and subacute emboli,  MRI also found a large  left PCA infarct  -Monitor closely, with neuro checks every 4 hours per CVA protocol.     PT/OT/SLP to evaluate.   -This is likely secondary to septic emboli from endocarditis      Renal infarct (HCC)- (present on admission)  Assessment & Plan  -Please secondary to septic emboli.  -Continue antibiotics for now.   -Holding off on anticoagulation for now.     Leukocytosis- (present on admission)  Assessment & Plan    -Continue antibiotics     Hyponatremia- (present on admission)  Assessment & Plan  -Likely due to hypovolemia,      Elevated BUN- (present on admission)  Assessment & Plan  Resolved with ivf    Ischemic leg- (present on admission)  Assessment & Plan  Ultrasound found evidence of ischemic leg    Vascular surgery was consulted--> no surgical intervention recommended at this time      Meningioma (HCC)- (present on admission)  Assessment & Plan  -Multiple meningioma noted.  Appears stable.  Needs continued outpatient surveillance and monitoring.    AAA (abdominal aortic aneurysm) (HCC)- (present on admission)  Assessment & Plan  -Needs continued outpatient surveillance and follow-up.    COPD (chronic obstructive pulmonary disease) (HCC)- (present on admission)  Assessment & Plan  - Noted on imaging.  Not in acute exacerbation.  Continue respiratory protocol, as needed oxygen supplement.    S/P TAVR (transcatheter aortic valve replacement)- (present on admission)  Assessment & Plan  -with vegetations    On iv abx      History of methamphetamine use- (present on admission)  Assessment & Plan  -Urine drug screen is negative.    Anemia- (present on admission)  Assessment & Plan  -Likely anemia of chronic disease, with low iron, high ferritin, normal folate and B12.   -Monitor hemoglobin closely, with restrictive transfusion strategy, transfuse if hemoglobin drops below 7.    Hemangioma of liver- (present on admission)  Assessment & Plan  -Stable.  Monitor.    Hepatitis C- (present on admission)  Assessment &  Plan  -Chronic.  Outpatient follow-up.    HTN (hypertension)- (present on admission)  Assessment & Plan  Control BP       Increased to lisinopril 40qd       VTE prophylaxis: lovenox SQ    Check am cbc, bmp

## 2019-10-15 NOTE — PROGRESS NOTES
Hospital Medicine Daily Progress Note    Date of Service  10/15/2019    Chief Complaint  Altered mentation    Hospital Course ( From  progress note)    64 y.o. male with hypertension, meth use, history of TAVR, chronic hepatitis C, admitted 10/9/2019 with altered mentation, along with multiple complaints of abdominal pain, and ankle pain.  Reportedly, he has been staying in an abandoned car for the last 5 days and currently homeless.  CT scan of the head in the ED showed multiple infarcts in the left occipital lobe, and left temporal lobe.  Initial labs showed WBC of 19,400, and sodium of 133.  Urinalysis did not show any significant pyuria.  CT scan of the abdomen pelvis also showed low-density changes in the inferior pole of the right kidney, most compatible with area of renal infarct, along with fusiform infrarenal abdominal aortic aneurysm, and liver hemangioma.  Ankle x-ray showed no acute traumatic injury.  Chest x-ray showed nothing acute.  Embolic infarcts were suspected, patient started on empiric antibiotics.  Echocardiogram was ordered, and HIV was sent. Neurology was consulted, and he started on aspirin and statin.  He is also started on IV fluids.         Interval Problem Update  10/11: Patient seen and examined by me today.  Patient was found to be lethargic.  He is producing good amounts of urine and no elevation in creatinine was noted.  Echocardiogram found evidence of vegetations at the TAVR.  Cardiology infectious disease were consulted.  His blood cultures came back positive for gram-positive cocci.  Ultrasound may have find evidence of a posterior tibial artery occlusion.  Vascular surgery was then consulted.    10/15:  Seen and examined  Watching TV, no complaints of SOB or CP  Poor historian, sluggish speech, not wanting to talk much  Reviewed chart  BP improving  Planning for long term ABX, working on placement    Consultants/Specialty  Neurology    Code  Status  Full    Disposition  Will need [facility]    Review of Systems  Review of Systems   Constitutional: Positive for malaise/fatigue. Negative for chills and fever.   HENT: Negative for hearing loss and tinnitus.    Eyes: Negative for blurred vision, double vision and photophobia.   Respiratory: Negative for cough, hemoptysis and sputum production.    Cardiovascular: Negative for chest pain and palpitations.   Genitourinary: Negative for dysuria and urgency.   Musculoskeletal: Positive for joint pain and myalgias.   Neurological: Negative for headaches.   Psychiatric/Behavioral: Negative.  The patient is not nervous/anxious.    All other systems reviewed and are negative.     Physical Exam  Temp:  [36.3 °C (97.4 °F)-38 °C (100.4 °F)] 36.9 °C (98.4 °F)  Pulse:  [] 101  Resp:  [16-18] 18  BP: (133-168)/(78-95) 145/82  SpO2:  [92 %-97 %] 93 %    Physical Exam   Constitutional: He is oriented to person, place, and time. He appears well-developed and well-nourished.   HENT:   Head: Normocephalic and atraumatic.   Mouth/Throat: No oropharyngeal exudate.   Eyes: Conjunctivae are normal. No scleral icterus.   Neck: Normal range of motion. Neck supple. No JVD present. No tracheal deviation present. No thyromegaly present.   Cardiovascular: Normal rate, regular rhythm and intact distal pulses. Exam reveals no gallop and no friction rub.   Murmur (Systolic aortic murmur) heard.  Pulmonary/Chest: Effort normal and breath sounds normal. No stridor. No respiratory distress. He has no wheezes. He has no rales. He exhibits no tenderness.   Abdominal: Soft. Bowel sounds are normal. He exhibits no distension and no mass. There is no tenderness. There is no rebound and no guarding.   Musculoskeletal: Normal range of motion. He exhibits tenderness (left ankle). He exhibits no edema.   Lymphadenopathy:     He has no cervical adenopathy.   Neurological: He is alert and oriented to person, place, and time. He has normal reflexes. No  cranial nerve deficit.   Nursing note and vitals reviewed.      Fluids  No intake or output data in the 24 hours ending 10/15/19 0815    Laboratory  Recent Labs     10/13/19  0221 10/14/19  0305 10/15/19  0117   WBC 14.7* 14.9* 13.0*   RBC 2.85* 3.21* 3.34*   HEMOGLOBIN 7.8* 9.0* 9.3*   HEMATOCRIT 24.4* 27.2* 27.7*   MCV 85.6 84.7 82.9   MCH 27.4 28.0 27.8   MCHC 32.0* 33.1* 33.6*   RDW 44.7 43.7 42.6   PLATELETCT 236 313 325   MPV 10.3 9.1 8.9*     Recent Labs     10/13/19  0221 10/14/19  0305 10/15/19  0117   SODIUM 130* 133* 130*   POTASSIUM 3.8 3.8 3.6   CHLORIDE 103 99 97   CO2 22 27 28   GLUCOSE 111* 99 95   BUN 11 10 11   CREATININE 0.75 0.76 0.83   CALCIUM 7.8* 8.2* 8.4*                   Imaging  US-EXTREMITY ARTERY LOWER UNILAT LEFT   Final Result      US-BETSY SINGLE LEVEL BILAT   Final Result      US-EXTREMITY NON VASCULAR UNILATERAL LEFT   Final Result      A presumed vessel, probably the posterior tibial artery in the medial left ankle with intraluminal thrombus.   These findings were discussed with ANG WOODY on 10/11/2019 3:45 PM.      DX-ANKLE 3+ VIEWS LEFT   Final Result      No evidence of fracture or dislocation.      US-CAROTID DOPPLER BILAT   Final Result      EC-ECHOCARDIOGRAM COMPLETE W/O CONT   Final Result      MR-BRAIN-W/O   Final Result      1.  Multifocal acute/subacute cerebral and cerebellar infarcts involving multiple vascular distributions most suggestive of embolic etiology. This includes moderate to large left PCA infarct with associated small areas of hemorrhage in the left thalamus    and parieto-occipital region.   2.  Mild age-related atrophy.   3.  Multiple small dural based masses most consistent with calcified meningiomas correlating with head CT findings.      CT-ABDOMEN-PELVIS WITH   Final Result         1.  Low-density changes in the inferior pole of the right kidney, appearance most compatible with area of renal infarct.   2.  Fusiform infrarenal abdominal aortic  aneurysm, recommend radiographic follow-up and surveillance as clinically appropriate.   3.  Peripherally enhancing low-density right hepatic lobe lesion, appearance most typical of hemangioma.   4.  Scattered emphysema   5.  Cardiomegaly   6.  Atherosclerosis      These findings were discussed with the patient's clinician, Abhay Sinha, on 10/10/2019 1:08 AM.      CT-HEAD W/O   Final Result         1.  New area of low-density in the left occipital lobe tracking along the medial aspect of the left temporal lobe, appearance compatible with evolving infarct.   2.  Multiple peripheral calcified masses, appearance most typical of meningiomas   3.  Hyperdensity in the subcutaneous soft tissues posterior to the neck in the midline, similar to prior study. Could represent thickening of the nuchal ligament or soft tissue mass. Correlate with exam.      DX-ANKLE 2- VIEWS LEFT   Final Result      No radiographic evidence of acute traumatic injury.      DX-CHEST-PORTABLE (1 VIEW)   Final Result         1.  No acute cardiopulmonary disease.   2.  Chronic underlying lung disease with pulmonary blebs of the left lung base.   3.  Atherosclerosis           Assessment/Plan  * Acute bacterial endocarditis- (present on admission)  Assessment & Plan  Has both renal and cerebral infarct.  Vegetation seen on cardiac echo    -  Cultures positive for gram-positive cocci  Continue  vancomycin      CVA (cerebral vascular accident) (HCC)- (present on admission)  Assessment & Plan  -Appears to be embolic.  Not a candidate for TPA as unknown onset.  Discontinue asa the patient is a high risk of hemorrhagic conversion according to neurology  -MRI of the brain found multiple cerebellar acute and subacute emboli,  MRI also found a large left PCA infarct  -Monitor closely, with neuro checks every 4 hours per CVA protocol.     PT/OT/SLP to evaluate.   -This is likely secondary to septic emboli from endocarditis      Renal infarct (HCC)- (present  on admission)  Assessment & Plan  -Please secondary to septic emboli.  -Continue antibiotics for now.   -Holding off on anticoagulation for now.     Leukocytosis- (present on admission)  Assessment & Plan    -Continue antibiotics     Hyponatremia- (present on admission)  Assessment & Plan  -Likely due to hypovolemia,      Elevated BUN- (present on admission)  Assessment & Plan  Resolved with ivf    Ischemic leg- (present on admission)  Assessment & Plan  Ultrasound found evidence of ischemic leg    Vascular surgery was consulted--> no surgical intervention recommended at this time      Meningioma (HCC)- (present on admission)  Assessment & Plan  -Multiple meningioma noted.  Appears stable.  Needs continued outpatient surveillance and monitoring.    AAA (abdominal aortic aneurysm) (HCC)- (present on admission)  Assessment & Plan  -Needs continued outpatient surveillance and follow-up.    COPD (chronic obstructive pulmonary disease) (HCC)- (present on admission)  Assessment & Plan  - Noted on imaging.  Not in acute exacerbation.  Continue respiratory protocol, as needed oxygen supplement.    S/P TAVR (transcatheter aortic valve replacement)- (present on admission)  Assessment & Plan  -with vegetations    On iv abx      History of methamphetamine use- (present on admission)  Assessment & Plan  -Urine drug screen is negative.    Anemia- (present on admission)  Assessment & Plan  -Likely anemia of chronic disease, with low iron, high ferritin, normal folate and B12.   -Monitor hemoglobin closely, with restrictive transfusion strategy, transfuse if hemoglobin drops below 7.    Hemangioma of liver- (present on admission)  Assessment & Plan  -Stable.  Monitor.    Hepatitis C- (present on admission)  Assessment & Plan  -Chronic.  Outpatient follow-up.    HTN (hypertension)- (present on admission)  Assessment & Plan  Control BP       Increased to lisinopril 40qd     VTE prophylaxis: lovenox SQ    Check am cbc, bmp

## 2019-10-16 ENCOUNTER — APPOINTMENT (OUTPATIENT)
Dept: RADIOLOGY | Facility: MEDICAL CENTER | Age: 64
DRG: 314 | End: 2019-10-16
Attending: HOSPITALIST
Payer: MEDICAID

## 2019-10-16 LAB
BACTERIA BLD CULT: NORMAL
BACTERIA BLD CULT: NORMAL
SIGNIFICANT IND 70042: NORMAL
SIGNIFICANT IND 70042: NORMAL
SITE SITE: NORMAL
SITE SITE: NORMAL
SOURCE SOURCE: NORMAL
SOURCE SOURCE: NORMAL

## 2019-10-16 PROCEDURE — A9270 NON-COVERED ITEM OR SERVICE: HCPCS | Performed by: HOSPITALIST

## 2019-10-16 PROCEDURE — 700102 HCHG RX REV CODE 250 W/ 637 OVERRIDE(OP): Performed by: HOSPITALIST

## 2019-10-16 PROCEDURE — A9270 NON-COVERED ITEM OR SERVICE: HCPCS | Performed by: INTERNAL MEDICINE

## 2019-10-16 PROCEDURE — 99233 SBSQ HOSP IP/OBS HIGH 50: CPT | Performed by: INTERNAL MEDICINE

## 2019-10-16 PROCEDURE — 700111 HCHG RX REV CODE 636 W/ 250 OVERRIDE (IP): Performed by: HOSPITALIST

## 2019-10-16 PROCEDURE — 36573 INSJ PICC RS&I 5 YR+: CPT

## 2019-10-16 PROCEDURE — 99232 SBSQ HOSP IP/OBS MODERATE 35: CPT | Performed by: HOSPITALIST

## 2019-10-16 PROCEDURE — 05HY33Z INSERTION OF INFUSION DEVICE INTO UPPER VEIN, PERCUTANEOUS APPROACH: ICD-10-PCS | Performed by: HOSPITALIST

## 2019-10-16 PROCEDURE — 770020 HCHG ROOM/CARE - TELE (206)

## 2019-10-16 PROCEDURE — 700105 HCHG RX REV CODE 258: Performed by: HOSPITALIST

## 2019-10-16 PROCEDURE — 700102 HCHG RX REV CODE 250 W/ 637 OVERRIDE(OP): Performed by: INTERNAL MEDICINE

## 2019-10-16 RX ADMIN — ATORVASTATIN CALCIUM 40 MG: 40 TABLET, FILM COATED ORAL at 17:06

## 2019-10-16 RX ADMIN — RIFAMPIN 300 MG: 300 CAPSULE ORAL at 04:30

## 2019-10-16 RX ADMIN — VANCOMYCIN HYDROCHLORIDE 800 MG: 500 INJECTION, POWDER, LYOPHILIZED, FOR SOLUTION INTRAVENOUS at 08:57

## 2019-10-16 RX ADMIN — RIFAMPIN 300 MG: 300 CAPSULE ORAL at 17:06

## 2019-10-16 RX ADMIN — VANCOMYCIN HYDROCHLORIDE 800 MG: 500 INJECTION, POWDER, LYOPHILIZED, FOR SOLUTION INTRAVENOUS at 02:15

## 2019-10-16 RX ADMIN — NICOTINE 7 MG: 7 PATCH, EXTENDED RELEASE TRANSDERMAL at 04:30

## 2019-10-16 RX ADMIN — ENOXAPARIN SODIUM 40 MG: 100 INJECTION SUBCUTANEOUS at 04:30

## 2019-10-16 RX ADMIN — VANCOMYCIN HYDROCHLORIDE 800 MG: 500 INJECTION, POWDER, LYOPHILIZED, FOR SOLUTION INTRAVENOUS at 17:06

## 2019-10-16 RX ADMIN — LISINOPRIL 40 MG: 20 TABLET ORAL at 04:30

## 2019-10-16 RX ADMIN — SENNOSIDES, DOCUSATE SODIUM 2 TABLET: 50; 8.6 TABLET, FILM COATED ORAL at 17:06

## 2019-10-16 ASSESSMENT — ENCOUNTER SYMPTOMS
FEVER: 0
HEADACHES: 0
PSYCHIATRIC NEGATIVE: 1
SPUTUM PRODUCTION: 0
PALPITATIONS: 0
MYALGIAS: 1
NERVOUS/ANXIOUS: 0
COUGH: 0
HEMOPTYSIS: 0
CHILLS: 0

## 2019-10-16 NOTE — DISCHARGE PLANNING
Care Transition Team Assessment    LSW met with pt at bedside to complete assessment and discuss discharge plans/needs. Pt previously was homeless staying [facility]overflow [facility]. Pt's family (mom and sister) live in [place], [place]. It is possible pt maybe able to stay with them or may need SNF for long-term antibiotics. Pt previously independent of ADLs/iADLs with no DME. Pt is familiar with resources provided in the community via [employer] and through [employer]. Pt indicated he does not have an Advanced Directive and does not report interest in documents at this time.     LSW discussed CHOICE for SNF. Pt agreeable to have referral sent to all local SNF. Pt signed CHOICE. CHOICE was faxed to [facility] ext [phone].    Information Source  Orientation : Disoriented to Time, Disoriented to Event  Information Given By: Patient  Informant's Name: Sylvain  Who is responsible for making decisions for patient? : Patient    Readmission Evaluation  Is this a readmission?: No    Elopement Risk  Legal Hold: No  Ambulatory or Self Mobile in Wheelchair: No-Not an Elopement Risk  Disoriented: Time-At Risk for Elopement, Situation-At Risk for Elopement  Psychiatric Symptoms: None  History of Wandering: No  Elopement this Admit: No  Vocalizing Wanting to Leave: No  Displays Behaviors, Body Language Wanting to Leave: No-Not at Risk for Elopement  Elopement Risk: Not at Risk for Elopement    Interdisciplinary Discharge Planning  Does Admitting Nurse Feel This Could be a Complex Discharge?: Yes  Primary Care Physician: Jocelyne Bauer  Lives with - Patient's Self Care Capacity: Unable To Determine At This Time  Patient or legal guardian wants to designate a caregiver (see row info): No  Housing / Facility: Homeless  Mobility Issues: Yes  Prior Services: None  Assistance Needed: Yes     Discharge Preparedness  What is your plan after discharge?: Skilled nursing facility  Prior Functional Level: Ambulatory,  Independent with Activities of Daily Living, Independent with Medication Management  Difficulity with ADLs: None  Difficulity with IADLs: None    Functional Assesment  Prior Functional Level: Ambulatory, Independent with Activities of Daily Living, Independent with Medication Management    Finances  Prescription Coverage: Yes    Advance Directive  Advance Directive?: None    Domestic Abuse  Have you ever been the victim of abuse or violence?: No  Physical Abuse or Sexual Abuse: No  Verbal Abuse or Emotional Abuse: No  Possible Abuse Reported to:: Not Applicable    Psychological Assessment  History of Substance Abuse: Alcohol, Methamphetamine, Cocaine  Date Last Used - Alcohol: 10/10/19  Date Last Used - Methamphetamine: 10/10/19    Discharge Risks or Barriers  Discharge risks or barriers?: No PCP, Substance abuse, Lives alone, no community support, Complex medical needs, Non-adherence to medication or treatment, Homeless / couch surfing    Anticipated Discharge Information  Anticipated discharge disposition: Acute rehab, SNF

## 2019-10-16 NOTE — THERAPY
"Occupational Therapy Treatment completed with focus on ADLs, ADL transfers and patient education.  Functional Status:  Pt supine in bed on arrival.  Pt with very delayed responses & requires extra time to process all tasks.  Pt was Min A for supine to sit EOB.  Pt donned hospital pants with Min A & extra time.  Pt amb to bathroom with FWW & Min A due to pain in his left foot/ankle.  Once pt in bathroom he reported being to fatigued to stand at sink.  Provided pt with chair to perform seated grooming tasks at sink.  Again pt required an excessive amount of time & extra V/Q's to complete basic grooming tasks.  Pt amb back to bedside chair & encouraged to be OOB for all meals.  Pt has poor tolerance for ADL's.  Plan of Care: Will benefit from Occupational Therapy 3 times per week  Discharge Recommendations:  Equipment Will Continue to Assess for Equipment Needs. Post-acute therapy Discharge to a transitional care facility for continued skilled therapy services.    See \"Rehab Therapy-Acute\" Patient Summary Report for complete documentation.   "

## 2019-10-16 NOTE — PROGRESS NOTES
Pt resting comfortably this morning. No needs at this time. Blood cultures are negative, will talk to Dr. Cardoso about central line for long term ABX.

## 2019-10-16 NOTE — PROGRESS NOTES
Handoff received from previous nurse Sammi at the patients bedside. Patient was resting comfortably and the bed was in a low and locked position. Call light in reach. Needs met at this time.

## 2019-10-16 NOTE — PROGRESS NOTES
Hospital Medicine Daily Progress Note    Date of Service  10/16/2019    Chief Complaint  Altered mentation    Hospital Course ( From  progress note)    64 y.o. male with hypertension, meth use, history of TAVR, chronic hepatitis C, admitted 10/9/2019 with altered mentation, along with multiple complaints of abdominal pain, and ankle pain.  Reportedly, he has been staying in an abandoned car for the last 5 days and currently homeless.  CT scan of the head in the ED showed multiple infarcts in the left occipital lobe, and left temporal lobe.  Initial labs showed WBC of 19,400, and sodium of 133.  Urinalysis did not show any significant pyuria.  CT scan of the abdomen pelvis also showed low-density changes in the inferior pole of the right kidney, most compatible with area of renal infarct, along with fusiform infrarenal abdominal aortic aneurysm, and liver hemangioma.  Ankle x-ray showed no acute traumatic injury.  Chest x-ray showed nothing acute.  Embolic infarcts were suspected, patient started on empiric antibiotics.  Echocardiogram was ordered, and HIV was sent. Neurology was consulted, and he started on aspirin and statin.  He is also started on IV fluids.         Interval Problem Update  10/11: Patient seen and examined by me today.  Patient was found to be lethargic.  He is producing good amounts of urine and no elevation in creatinine was noted.  Echocardiogram found evidence of vegetations at the TAVR.  Cardiology infectious disease were consulted.  His blood cultures came back positive for gram-positive cocci.  Ultrasound may have find evidence of a posterior tibial artery occlusion.  Vascular surgery was then consulted.    10/15:  Seen and examined  Watching TV, no complaints of SOB or CP  Poor historian, sluggish speech, not wanting to talk much  Reviewed chart  BP improving  Planning for long term ABX, working on placement    10/16:  Seen and examined  Sleeping soundly, no complaints  Still  sluggish speech  BCx negative   PICC today, d/w RN  Anticipate 6 week course   Anticipate stop date 11/22/2019    Consultants/Specialty  Neurology    Code Status  Full    Disposition  Will need snf    Review of Systems  Review of Systems   Constitutional: Positive for malaise/fatigue. Negative for chills and fever.   HENT: Negative for hearing loss and tinnitus.    Respiratory: Negative for cough, hemoptysis and sputum production.    Cardiovascular: Negative for chest pain and palpitations.   Musculoskeletal: Positive for joint pain and myalgias.   Neurological: Negative for headaches.   Psychiatric/Behavioral: Negative.  The patient is not nervous/anxious.    All other systems reviewed and are negative.     Physical Exam  Temp:  [37.1 °C (98.7 °F)-37.6 °C (99.7 °F)] 37.1 °C (98.8 °F)  Pulse:  [] 93  Resp:  [16-20] 16  BP: (119-146)/(63-84) 146/79  SpO2:  [92 %-96 %] 95 %    Physical Exam   Constitutional: He is oriented to person, place, and time. No distress.   Body mass index is 21.39 kg/m².   HENT:   Head: Normocephalic and atraumatic.   Mouth/Throat: Oropharynx is clear and moist.   Eyes: Conjunctivae and EOM are normal. No scleral icterus.   Neck: Normal range of motion. Neck supple.   Cardiovascular: Normal rate, regular rhythm and intact distal pulses. Exam reveals no gallop and no friction rub.   Murmur (Systolic aortic murmur) heard.  Pulmonary/Chest: Effort normal. No stridor. No respiratory distress. He has no wheezes.   Abdominal: Soft. He exhibits no distension. There is no tenderness. There is no rebound.   Musculoskeletal: Normal range of motion. He exhibits tenderness (left ankle). He exhibits no edema.   Neurological: He is alert and oriented to person, place, and time. He has normal reflexes. No cranial nerve deficit.   Skin: Skin is warm. No rash noted. He is not diaphoretic.   Nursing note and vitals reviewed.    Fluids    Intake/Output Summary (Last 24 hours) at 10/16/2019 0922  Last data  filed at 10/16/2019 0700  Gross per 24 hour   Intake --   Output 900 ml   Net -900 ml       Laboratory  Recent Labs     10/14/19  0305 10/15/19  0117   WBC 14.9* 13.0*   RBC 3.21* 3.34*   HEMOGLOBIN 9.0* 9.3*   HEMATOCRIT 27.2* 27.7*   MCV 84.7 82.9   MCH 28.0 27.8   MCHC 33.1* 33.6*   RDW 43.7 42.6   PLATELETCT 313 325   MPV 9.1 8.9*     Recent Labs     10/14/19  0305 10/15/19  0117   SODIUM 133* 130*   POTASSIUM 3.8 3.6   CHLORIDE 99 97   CO2 27 28   GLUCOSE 99 95   BUN 10 11   CREATININE 0.76 0.83   CALCIUM 8.2* 8.4*                   Imaging  US-EXTREMITY ARTERY LOWER UNILAT LEFT   Final Result      US-BETSY SINGLE LEVEL BILAT   Final Result      US-EXTREMITY NON VASCULAR UNILATERAL LEFT   Final Result      A presumed vessel, probably the posterior tibial artery in the medial left ankle with intraluminal thrombus.   These findings were discussed with ANG WOOYD on 10/11/2019 3:45 PM.      DX-ANKLE 3+ VIEWS LEFT   Final Result      No evidence of fracture or dislocation.      US-CAROTID DOPPLER BILAT   Final Result      EC-ECHOCARDIOGRAM COMPLETE W/O CONT   Final Result      MR-BRAIN-W/O   Final Result      1.  Multifocal acute/subacute cerebral and cerebellar infarcts involving multiple vascular distributions most suggestive of embolic etiology. This includes moderate to large left PCA infarct with associated small areas of hemorrhage in the left thalamus    and parieto-occipital region.   2.  Mild age-related atrophy.   3.  Multiple small dural based masses most consistent with calcified meningiomas correlating with head CT findings.      CT-ABDOMEN-PELVIS WITH   Final Result         1.  Low-density changes in the inferior pole of the right kidney, appearance most compatible with area of renal infarct.   2.  Fusiform infrarenal abdominal aortic aneurysm, recommend radiographic follow-up and surveillance as clinically appropriate.   3.  Peripherally enhancing low-density right hepatic lobe lesion, appearance  most typical of hemangioma.   4.  Scattered emphysema   5.  Cardiomegaly   6.  Atherosclerosis      These findings were discussed with the patient's clinician, Abhay Sinha, on 10/10/2019 1:08 AM.      CT-HEAD W/O   Final Result         1.  New area of low-density in the left occipital lobe tracking along the medial aspect of the left temporal lobe, appearance compatible with evolving infarct.   2.  Multiple peripheral calcified masses, appearance most typical of meningiomas   3.  Hyperdensity in the subcutaneous soft tissues posterior to the neck in the midline, similar to prior study. Could represent thickening of the nuchal ligament or soft tissue mass. Correlate with exam.      DX-ANKLE 2- VIEWS LEFT   Final Result      No radiographic evidence of acute traumatic injury.      DX-CHEST-PORTABLE (1 VIEW)   Final Result         1.  No acute cardiopulmonary disease.   2.  Chronic underlying lung disease with pulmonary blebs of the left lung base.   3.  Atherosclerosis      IR-MIDLINE CATHETER INSERTION WO GUIDANCE > AGE 3    (Results Pending)        Assessment/Plan  * Acute bacterial endocarditis- (present on admission)  Assessment & Plan  Has both renal and cerebral infarct.  Vegetation seen on cardiac echo  Cultures positive for gram-positive cocci  Continue vancomycin  Place PICC 10/16, BCx negative      CVA (cerebral vascular accident) (HCC)- (present on admission)  Assessment & Plan  -Appears to be embolic.  Not a candidate for TPA as unknown onset.  Discontinue asa the patient is a high risk of hemorrhagic conversion according to neurology  -MRI of the brain found multiple cerebellar acute and subacute emboli,  MRI also found a large left PCA infarct  -Monitor closely, with neuro checks every 4 hours per CVA protocol.     PT/OT/SLP to evaluate.   -This is likely secondary to septic emboli from endocarditis      Renal infarct (HCC)- (present on admission)  Assessment & Plan  -Please secondary to septic  emboli.  -Continue antibiotics for now.   -Holding off on anticoagulation for now.     Leukocytosis- (present on admission)  Assessment & Plan  Continue antibiotics     Hyponatremia- (present on admission)  Assessment & Plan  -Likely due to hypovolemia,      Elevated BUN- (present on admission)  Assessment & Plan  Resolved with ivf    Ischemic leg- (present on admission)  Assessment & Plan  Ultrasound found evidence of ischemic leg  Vascular surgery was consulted--> no surgical intervention recommended at this time      Meningioma (HCC)- (present on admission)  Assessment & Plan  -Multiple meningioma noted.  Appears stable.  Needs continued outpatient surveillance and monitoring.    AAA (abdominal aortic aneurysm) (HCC)- (present on admission)  Assessment & Plan  -Needs continued outpatient surveillance and follow-up.    COPD (chronic obstructive pulmonary disease) (HCC)- (present on admission)  Assessment & Plan  - Noted on imaging.  Not in acute exacerbation.  Continue respiratory protocol, as needed oxygen supplement.    S/P TAVR (transcatheter aortic valve replacement)- (present on admission)  Assessment & Plan  -with vegetations    On iv abx      History of methamphetamine use- (present on admission)  Assessment & Plan  -Urine drug screen is negative.    Anemia- (present on admission)  Assessment & Plan  -Likely anemia of chronic disease, with low iron, high ferritin, normal folate and B12.   -Monitor hemoglobin closely, with restrictive transfusion strategy, transfuse if hemoglobin drops below 7.    Hemangioma of liver- (present on admission)  Assessment & Plan  -Stable.  Monitor.    Hepatitis C- (present on admission)  Assessment & Plan  -Chronic.  Outpatient follow-up.    HTN (hypertension)- (present on admission)  Assessment & Plan  Control BP  Increased to lisinopril 40qd     VTE prophylaxis: lovenox SQ    QOD CBC, CMP  High complexity

## 2019-10-16 NOTE — DISCHARGE PLANNING
Received Choice form at [phone]  Agency/Facility Name: [facility]  Referral sent per Choice form @ [phone]

## 2019-10-16 NOTE — PROGRESS NOTES
Monitor Summary  Rhythm/Rate: Sinus Rhythm to Sinus Tachycardia   Ectopy: Rare PVCs and Rare PACs  IL: 0.20 QRS:0.08 QT:0.32

## 2019-10-16 NOTE — PROCEDURES
Vascular Access Team     Date of Insertion: 10/16/19  Arm Circumference: 25  Internal length: 42  External Length: hub  Vein Occupancy %: 36  Reason for PICC: antibiotcis  Labs: WBC 13, , BUN 11, Cr 0.83, GFR >60, INR none    Consents confirmed, vessel patency confirmed with ultrasound. Risks and benefits of procedure explained to patient and education regarding central line associated bloodstream infections provided. Questions answered.     PICC placed in LUE per licensed provider order with ultrasound guidance.  4 Fr, 01 lumen PICC placed in brachial vein after 01 attempt(s). 2 mL of 1% lidocaine injected intradermally, 21 gauge microintroducer needle and modified Seldinger technique used. 42 cm catheter inserted with good blood return. Secured at hub cm marker. Each lumen flushed without resistance with 10 mL 0.9% normal saline. PICC line secured with Biopatch and Tegaderm.     PICC tip placement location is confirmed by nurse to be in the Superior Vena Cava (SVC) utilizing 3CG technology. PICC line is appropriate for use at this time. Patient tolerated procedure well, without complications.  Patient condition relayed to unit RN or ordering physician via this post procedure note in the EMR.     Ultrasound images uploaded to PACS and viewable in the EMR - yes  Ultrasound imaged printed and placed in paper chart - no     3D FUTURE VISION II Power PICC ref # [id number], Lot # [id number]

## 2019-10-16 NOTE — DISCHARGE PLANNING
Agency/Facility Name: [facility]  Spoke To: Vidhya  Outcome: Per Vidhya, a liaison will need to meet with the patient before accepting.

## 2019-10-16 NOTE — PROGRESS NOTES
"Pharmacy Kinetics 64 y.o. male on vancomycin day # 6 10/16/2019    Currently on Vancomycin 800mg q8hr (02, 10, 18)  Provider specified end date: 19    Indication for Treatment: infective endocarditis, bacteremia    Pertinent history per medical record: Admitted on 10/9/2019 for encephalopathy.  Recent TAVR on 3/18/2019 for severe symptomatic aortic stenosis. ECHO found to have density on prosthetic valve. Peripheral blood cultures positive for CoNS.  ID following.     Other antibiotics: rifampin 300mg BID (10/13 -10/23)     Allergies: Patient has no known allergies.     List concerns for renal function: prolonged vancomycin use    Pertinent cultures to date:   10/10/19:PBCx2: 2 of 2 CoNS  10/11/19:PBCx2:NGTD     MRSA nares swab if pneumonia is a concern (ordered/positive/negative/n-a): N/A    Recent Labs     10/14/19  0305 10/15/19  0117   WBC 14.9* 13.0*     Recent Labs     10/14/19  0305 10/15/19  0117   BUN 10 11   CREATININE 0.76 0.83     Recent Labs     10/13/19  1333 10/14/19  0923 10/15/19  0117   VANCOTROUGH 15.0 16.6 20.1*       Intake/Output Summary (Last 24 hours) at 10/16/2019 0831  Last data filed at 10/16/2019 0700  Gross per 24 hour   Intake --   Output 900 ml   Net -900 ml      /79   Pulse 93   Temp 37.1 °C (98.8 °F) (Oral)   Resp 16   Ht 1.727 m (5' 8\")   Wt 63.8 kg (140 lb 10.5 oz)   SpO2 95%  Temp (24hrs), Av.2 °C (99 °F), Min:37.1 °C (98.7 °F), Max:37.6 °C (99.7 °F)      A/P   1. Vancomycin dose change: none  2. Next vancomycin level: 2-3 days or sooner if clinically indicated  3. Goal trough: 16-20 mcg/mL  4. Comments: Anticipated stop date adjusted to 19 per ID.  No new culture results.  Tolerating vancomycin regimen.  Will assess trough in 2-3 days.    Philippe Voss, DianaD, BCPS        "

## 2019-10-16 NOTE — PROGRESS NOTES
Infectious Disease Progress Note    Author: Anisha Lincoln M.D. Date & Time of service: 10/16/2019  1:04 PM    Chief Complaint:  Prosthetic valve endocarditis    Interval History:  64 y.o. male with HTN, meth use, TAVR, chronic hep C, admitted 10/9/2019 with AMS. Found to have mult embolic infarcts  10/12/2019-no fevers.  Patient is slightly more interactive.  Getting vascular studies done.  WBC remains high at 17,000.  Creatinine is 0.75  10/13-  Low grade fevers upto 100.2. WBC 14.7  10/14 Tmax 101.7, WBC 14.9 somnolent No acute distress. No acute events overnight  10/15 Tmax 99.7 WBC 13 more awake but not following commands-obtunded  10/16 AF WBC 13 blood cx results from 10/10 changed to micrococcus. Plan for PICC. Somnolent today  Labs Reviewed.    Review of Systems:  Review of Systems   Unable to perform ROS: Mental status change (Patient is a very poor historian)   Constitutional: Negative for fever.       Hemodynamics:  Temp (24hrs), Av.1 °C (98.8 °F), Min:37.1 °C (98.7 °F), Max:37.3 °C (99.1 °F)  Temperature: 37.1 °C (98.8 °F)  Pulse  Av.2  Min: 84  Max: 123   Blood Pressure: 139/76       Physical Exam:  Physical Exam   Constitutional: No distress.   Eyes: Left eye exhibits no discharge.   Neck: No JVD present.   Cardiovascular:   tachycardic   Pulmonary/Chest: Effort normal. No stridor. No respiratory distress. He has no wheezes. He has no rales.   Abdominal: He exhibits no distension. There is no tenderness. There is no rebound.   Musculoskeletal: He exhibits edema.   Neurological:   somnolent   Skin: He is not diaphoretic.   Nursing note and vitals reviewed.      Meds:    Current Facility-Administered Medications:   •  vancomycin  •  lisinopril  •  riFAMPin  •  atorvastatin  •  enoxaparin (LOVENOX) injection  •  acetaminophen  •  senna-docusate **AND** polyethylene glycol/lytes **AND** magnesium hydroxide **AND** bisacodyl  •  ondansetron  •  ondansetron  •  promethazine  •  promethazine  •   prochlorperazine  •  MD Alert...Vancomycin per Pharmacy  •  nicotine  •  labetalol **OR** hydrALAZINE    Labs:  Recent Labs     10/14/19  0305 10/15/19  0117   WBC 14.9* 13.0*   RBC 3.21* 3.34*   HEMOGLOBIN 9.0* 9.3*   HEMATOCRIT 27.2* 27.7*   MCV 84.7 82.9   MCH 28.0 27.8   RDW 43.7 42.6   PLATELETCT 313 325   MPV 9.1 8.9*     Recent Labs     10/14/19  0305 10/15/19  0117   SODIUM 133* 130*   POTASSIUM 3.8 3.6   CHLORIDE 99 97   CO2 27 28   GLUCOSE 99 95   BUN 10 11     Recent Labs     10/14/19  0305 10/15/19  0117   CREATININE 0.76 0.83       Imaging:  Ct-abdomen-pelvis With  Result Date: 10/10/2019  1FINDINGS: Linear densities are seen within the lung bases, appearance favors atelectasis. Scattered emphysematous changes are seen. Cardiomegaly is noted. The liver is normal in contour. The liver is normal in size. No intrahepatic biliary ductal dilatation is noted. Peripherally enhancing 2.0 cm low-density lesion is seen in the right hepatic lobe. The gallbladder appears within normal limits. The spleen is unremarkable. The pancreas is grossly normal. Bilateral adrenal glands appear within normal limits. Low-density changes in the inferior pole of the right kidney are seen, new since prior study. The ureters are normal in caliber along their visualized course. The colon appears within normal limits. The appendix is not definitively identified. The small bowel is unremarkable. The bony structures are age appropriate. Atherosclerotic calcifications are seen. There is 3.5 cm fusiform infrarenal abdominal aortic aneurysm identified CT PELVIS FINDINGS: The bladder is within normal limits.     1.  Low-density changes in the inferior pole of the right kidney, appearance most compatible with area of renal infarct. 2.  Fusiform infrarenal abdominal aortic aneurysm, recommend radiographic follow-up and surveillance as clinically appropriate. 3.  Peripherally enhancing low-density right hepatic lobe lesion, appearance most  typical of hemangioma. 4.  Scattered emphysema 5.  Cardiomegaly 6.  Atherosclerosis These findings were discussed with the patient's clinician, Abhay Sinha, on 10/10/2019 1:08 AM.    Ct-head W/o    Result Date: 10/10/2019  10/9/2019 11:57 PM HISTORY/REASON FOR EXAM: Altered mental status TECHNIQUE/EXAM DESCRIPTION:  CT of the head without contrast. Sequential axial images were obtained from the vertex to the skull base without contrast. Up to date radiation dose reduction adjustments have been utilized to meet ALARA standards for radiation dose reduction. COMPARISON:  July 4, 2017 FINDINGS: The brain appears normal in volume and morphology. The ventricles are normal in caliber and configuration. Calcified 2.3 cm mass along the left temporal lobe is seen extra-axially. 1.7 cm mass along the periphery is seen adjacent to the right temporal lobe. 1.4 cm mass is seen adjacent to the left temporoparietal region on the right. New area of low-density is seen in the left occipital lobe tracking along the medial aspect of the left temporal lobe. There are no abnormal extra axial fluid collections  or extra axial hemorrhage identified. The visualized paranasal sinuses and mastoid air cells are well aerated bilaterally. No depressed calvarial fractures are identified. The visualized globes and retrobulbar soft tissues appear within normal limits. There is hyperdensity identified in the subcutaneous soft tissues of the posterior neck, similar to prior study.     1.  New area of low-density in the left occipital lobe tracking along the medial aspect of the left temporal lobe, appearance compatible with evolving infarct. 2.  Multiple peripheral calcified masses, appearance most typical of meningiomas 3.  Hyperdensity in the subcutaneous soft tissues posterior to the neck in the midline, similar to prior study. Could represent thickening of the nuchal ligament or soft tissue mass. Correlate with exam.    Dx-ankle 2- Views  Left    Result Date: 10/9/2019  10/9/2019 11:15 PM HISTORY/REASON FOR EXAM:  Pain/Deformity Following Trauma. TECHNIQUE/EXAM DESCRIPTION AND NUMBER OF VIEWS:  2 views of the LEFT ankle. COMPARISON: None. FINDINGS: There is no visualized fracture, subluxation, or dislocation identified.     No radiographic evidence of acute traumatic injury.    Dx-ankle 3+ Views Left    Result Date: 10/11/2019  10/11/2019 1:25 PM HISTORY/REASON FOR EXAM:  Left ankle pain TECHNIQUE/EXAM DESCRIPTION AND NUMBER OF VIEWS:  3 views of the LEFT ankle. COMPARISON: 10/09/2019 FINDINGS: Bone mineralization is normal.  There is no evidence of fracture or dislocation.  There is no soft tissue swelling seen. The ankle mortise is well-maintained.     No evidence of fracture or dislocation.    Dx-chest-portable (1 View)    Result Date: 10/9/2019    10/9/2019 10:24 PM HISTORY/REASON FOR EXAM: Shortness of Breath TECHNIQUE/EXAM DESCRIPTION:  Single AP view of the chest. COMPARISON: March 19, 2019 FINDINGS: The cardiac silhouette appears within normal limits. Atherosclerotic calcification of the aorta is noted.  The central pulmonary vasculature appears normal. The lungs appear well expanded bilaterally.  Linear densities of the bilateral lung bases are seen. Left lung base pulmonary blebs are noted, similar to prior study. No significant pleural effusions are identified. The bony structures appear age-appropriate.     1.  No acute cardiopulmonary disease. 2.  Chronic underlying lung disease with pulmonary blebs of the left lung base. 3.  Atherosclerosis    Mr-brain-w/o  Result Date: 10/10/2019  FINDINGS: Study is limited by motion artifact. There are multiple acute/subacute infarcts with restricted diffusion and T2 hyperintensity consistent with cytotoxic edema. These involve multiple vascular distributions suggesting embolic etiology. There is a moderate to large left posterior cerebral artery territory infarct involving the left occipital lobe,  left thalamus and left hippocampus. There are also multiple small infarcts seen within the bilateral frontoparietal white matter, right parietal and occipital cortex and in the bilateral cerebellar hemispheres. There is susceptibility artifact in the left thalamus and punctate focus in the left occipital lobe consistent with small areas of hemorrhage. There are multiple dural based masses demonstrating low T2 signal along the bilateral convexities most consistent with calcified change most correlating to CT findings. There is mild localized mass effect. No brain edema is seen. There is no midline shift or hydrocephalus. No extra-axial fluid collection. Proximal vascular flow voids are patent. Mucosal thickening and small mucous retention cysts within the maxillary sinuses. Mastoids are clear. Orbital contents are symmetric.     1.  Multifocal acute/subacute cerebral and cerebellar infarcts involving multiple vascular distributions most suggestive of embolic etiology. This includes moderate to large left PCA infarct with associated small areas of hemorrhage in the left thalamus and parieto-occipital region. 2.  Mild age-related atrophy. 3.  Multiple small dural based masses most consistent with calcified meningiomas correlating with head CT findings.    Us-betsy Single Level Bilat  Result Date: 10/12/2019   Vascular Laboratory  Conclusions  Right.  No evidence of arterial insufficiency.  Left.  There is no evidence of arterial disease demonstrated (BETSY is .9-1.0).   Findings  Right.  Doppler waveform of the common femoral artery is of high amplitude and  triphasic.  Doppler waveforms at the ankle are brisk and triphasic.  Ankle-brachial index is normal.  Left.  Doppler waveform of the common femoral artery is of high amplitude and  triphasic.  Doppler waveforms at the ankle are brisk and biphasic without reversal of  flow.  Ankle-brachial index is normal, but reduced compared to the right lower  extremity.    Krystian POLLOCK  Stewart HERRERA  (Electronically Signed)  Final Date:      12 October 2019                   10:59    Us-carotid Doppler Bilat    Result Date: 10/11/2019   Carotid Duplex  Report  Vascular Laboratory  CONCLUSIONS  Mild bilateral internal carotid artery stenosis (<50%).  FINDINGS  Right carotid.  Mild, heterogeneous plaque of the bifurcation extending into the internal  carotid.  Velocities are consistent with < 50% stenosis of the internal carotid  artery.  Antegrade right vertebral flow.  Left carotid.  Mild, heterogeneous plaque of the bifurcation extending into the internal  carotid.  Velocities are consistent with < 50% stenosis of the internal carotid  artery.  Antegrade left vertebral flow.  Krystian William MD  (Electronically Signed)  Final Date:      11 October 2019                   09:00    Us-extremity Non Vascular Unilateral Left    Result Date: 10/11/2019  10/11/2019 1:32 PM HISTORY/REASON FOR EXAM:  soft tissue irregularity on medial left ankle around the achilles tendon, area outlined in pen. TECHNIQUE/EXAM DESCRIPTION AND NUMBER OF VIEWS: Limited ultrasound of the left ankle in the area of clinical abnormality. COMPARISON: None FINDINGS: Real-time grayscale and color Doppler sonography was performed. In the medial left ankle there is a tubular structure which appears to represent a vessel. There is echogenic material within this without significant color flow. This may represent the posterior tibial artery with thrombus. No mass or fluid collection is seen.     A presumed vessel, probably the posterior tibial artery in the medial left ankle with intraluminal thrombus. These findings were discussed with ANG WOODY on 10/11/2019 3:45 PM.    Ec-echocardiogram Complete W/o Cont    Result Date: 10/10/2019  Transthoracic Echo Report Echocardiography Laboratory CONCLUSIONS Hyperdynamic left ventricular systolic function. Left ventricular ejection fraction is visually estimated to be greater than 75%. Severe  concentric left ventricular hypertrophy. Known TAVR aortic valve with normal leaflet excursion with increased gradients most likely related to hyperdynamic flow. Echodensity prosthetic valve leaflet 0.7 x 0.9 cm. Compared to the images of the study done 3/29/2019 there is an echodensity of the prosthetic valve leaflet seen on current study however there is no comparable view on the previous study; increased prosthetic valve gradients related to increased velocity due to change in hyperdynamic left ventricular contractility. JAIMIE YAO Exam Date:         10/10/2019                    13:15 Exam  LV EF:  75    % FINDINGS Left Ventricle Normal left ventricular chamber size. Severe concentric left ventricular hypertrophy. Hyperdynamic left ventricular systolic function. Left ventricular ejection fraction is visually estimated to be greater than 75%.    Normal regional wall motion. Indeterminate diastolic function. Right Ventricle Normal right ventricular size. Normal right ventricular systolic function. Right Atrium Enlarged right atrium. Normal inferior vena cava size and inspiratory collapse. Left Atrium Severely dilated left atrium. Left atrial volume index is 63  mL/sq m. Mitral Valve Mitral annular calcification. Calcification of the mitral valve leaflets. Mild mitral stenosis. Trace mitral regurgitation. Aortic Valve Echodensity prosthetic valve leaflet 0.7 x 0.9 cm.  Known TAVR aortic valve with normal leaflet excursion with increased gradients most likely related to hyperdynamic flow. Vmax is 3.8 m/s. Transvalvular gradients are - Peak: 63 mmHg, Mean: 36 mmHg. Dimensionless index is 0.43.  No paravalvular leak. Tricuspid Valve Structurally normal tricuspid valve. No tricuspid stenosis. Trace tricuspid regurgitation. Pulmonic Valve Structurally normal pulmonic valve without significant stenosis or regurgitation. Pericardium Normal pericardium without effusion. Aorta The aortic root is normal.  Ascending  "aorta diameter is 3.0 cm. Qasim Love M.D. (Electronically Signed) Final Date:     10 October 2019                 14:52      Micro:  Results     Procedure Component Value Units Date/Time    BLOOD CULTURE x2 [[id number]]  (Abnormal) Collected:  10/10/19 0331    Order Status:  Completed Specimen:  Blood from Peripheral Updated:  10/16/19 1102     Significant Indicator POS     Source BLD     Site PERIPHERAL     Culture Result Growth detected by Bactec instrument. 10/11/2019  11:01  Negative for Staphylococcus aureus and MRSA by PCR. Correlate  ongoing need for antibiotics with clinical condition.  Further report to follow.        Micrococcus species  Susceptibilities in progress      Narrative:       CALL  Er[place]  183 tel. [phone],  CALLED  183 tel. [phone] 10/11/2019, 13:30, RB PERF. RESULTS CALLED  TO:[phone]  Per Hospital Policy: Only change Specimen Src: to \"Line\" if  specified by physician order.  Left Hand    BLOOD CULTURE x2 [[id number]]  (Abnormal) Collected:  10/10/19 0330    Order Status:  Completed Specimen:  Blood from Peripheral Updated:  10/16/19 1101     Significant Indicator POS     Source BLD     Site PERIPHERAL     Culture Result Growth detected by Bactec instrument. 10/11/2019  12:04      Micrococcus species  Susceptibilities in progress      Narrative:       Per Hospital Policy: Only change Specimen Src: to \"Line\" if  specified by physician order.  Right AC    BLOOD CULTURE [[id number]] Collected:  10/15/19 3954    Order Status:  Completed Specimen:  Blood from Peripheral Updated:  10/16/19 0756     Significant Indicator NEG     Source BLD     Site PERIPHERAL     Culture Result No Growth  Note: Blood cultures are incubated for 5 days and  are monitored continuously.Positive blood cultures  are called to the RN and reported as soon as  they are identified.      Narrative:       Per Hospital Policy: Only change Specimen Src: to \"Line\" if  specified by physician order.  Right Hand    BLOOD " "CULTURE [[id number]] Collected:  10/15/19 1734    Order Status:  Completed Specimen:  Blood from Peripheral Updated:  10/16/19 0756     Significant Indicator NEG     Source BLD     Site PERIPHERAL     Culture Result No Growth  Note: Blood cultures are incubated for 5 days and  are monitored continuously.Positive blood cultures  are called to the RN and reported as soon as  they are identified.      Narrative:       Per Hospital Policy: Only change Specimen Src: to \"Line\" if  specified by physician order.  Right AC    BLOOD CULTURE [[id number]] Collected:  10/11/19 1515    Order Status:  Completed Specimen:  Blood from Peripheral Updated:  10/12/19 0920     Significant Indicator NEG     Source BLD     Site PERIPHERAL     Culture Result No Growth  Note: Blood cultures are incubated for 5 days and  are monitored continuously.Positive blood cultures  are called to the RN and reported as soon as  they are identified.      Narrative:       Per Hospital Policy: Only change Specimen Src: to \"Line\" if  specified by physician order.  Right Forearm/Arm    BLOOD CULTURE [[id number]] Collected:  10/11/19 2107    Order Status:  Completed Specimen:  Blood from Peripheral Updated:  10/12/19 0920     Significant Indicator NEG     Source BLD     Site PERIPHERAL     Culture Result No Growth  Note: Blood cultures are incubated for 5 days and  are monitored continuously.Positive blood cultures  are called to the RN and reported as soon as  they are identified.      Narrative:       Per Hospital Policy: Only change Specimen Src: to \"Line\" if  specified by physician order.  Right AC    BLOOD CULTURE [[id number]] Collected:  10/11/19 0000    Order Status:  Canceled Specimen:  Other from Peripheral     BLOOD CULTURE [[id number]] Collected:  10/11/19 0000    Order Status:  Canceled Specimen:  Other from Peripheral     URINALYSIS CULTURE, IF INDICATED [[id number]]  (Abnormal) Collected:  10/09/19 2356    Order Status:  Completed Specimen:  Urine " Updated:  10/10/19 0016     Color Yellow     Character Clear     Specific Gravity 1.020     Ph 5.0     Glucose Negative mg/dL      Ketones Negative mg/dL      Protein Negative mg/dL      Bilirubin Negative     Urobilinogen, Urine 1.0     Nitrite Negative     Leukocyte Esterase Negative     Occult Blood Small     Micro Urine Req Microscopic    URINALYSIS (UA) [[id number]]     Order Status:  Canceled Specimen:  Urine           Assessment:  Active Hospital Problems    Diagnosis   • *Acute bacterial endocarditis [I33.0]   • Renal infarct (HCC) [N28.0]   • CVA (cerebral vascular accident) (HCC) [I63.9]   • Leukocytosis [D72.829]   • COPD (chronic obstructive pulmonary disease) (HCC) [J44.9]   • AAA (abdominal aortic aneurysm) (HCC) [I71.4]   • Meningioma (HCC) [D32.9]   • S/P TAVR (transcatheter aortic valve replacement) [Z95.2]   • History of methamphetamine use [Z87.898]   • Hepatitis C [B19.20]   • Ischemic leg [I99.8]       Plan:  Prosthetic valve endocarditis- s/p TAVR. 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  In review of yesterday's note dated  10/15/2019, there are no changes  except as documented above.

## 2019-10-17 VITALS
RESPIRATION RATE: 20 BRPM | OXYGEN SATURATION: 96 % | WEIGHT: 130.29 LBS | DIASTOLIC BLOOD PRESSURE: 82 MMHG | TEMPERATURE: 99.4 F | HEIGHT: 68 IN | HEART RATE: 98 BPM | SYSTOLIC BLOOD PRESSURE: 149 MMHG | BODY MASS INDEX: 19.75 KG/M2

## 2019-10-17 LAB
ALBUMIN SERPL BCP-MCNC: 2.4 G/DL (ref 3.2–4.9)
ALBUMIN/GLOB SERPL: 0.5 G/DL
ALP SERPL-CCNC: 69 U/L (ref 30–99)
ALT SERPL-CCNC: 10 U/L (ref 2–50)
ANION GAP SERPL CALC-SCNC: 8 MMOL/L (ref 0–11.9)
AST SERPL-CCNC: 15 U/L (ref 12–45)
BILIRUB SERPL-MCNC: 0.3 MG/DL (ref 0.1–1.5)
BUN SERPL-MCNC: 12 MG/DL (ref 8–22)
CALCIUM SERPL-MCNC: 8.7 MG/DL (ref 8.5–10.5)
CHLORIDE SERPL-SCNC: 97 MMOL/L (ref 96–112)
CO2 SERPL-SCNC: 26 MMOL/L (ref 20–33)
CREAT SERPL-MCNC: 0.8 MG/DL (ref 0.5–1.4)
ERYTHROCYTE [DISTWIDTH] IN BLOOD BY AUTOMATED COUNT: 44.9 FL (ref 35.9–50)
GLOBULIN SER CALC-MCNC: 4.5 G/DL (ref 1.9–3.5)
GLUCOSE SERPL-MCNC: 86 MG/DL (ref 65–99)
HCT VFR BLD AUTO: 24.4 % (ref 42–52)
HGB BLD-MCNC: 8.1 G/DL (ref 14–18)
MCH RBC QN AUTO: 27.9 PG (ref 27–33)
MCHC RBC AUTO-ENTMCNC: 33.2 G/DL (ref 33.7–35.3)
MCV RBC AUTO: 84.1 FL (ref 81.4–97.8)
PLATELET # BLD AUTO: 338 K/UL (ref 164–446)
PMV BLD AUTO: 8.9 FL (ref 9–12.9)
POTASSIUM SERPL-SCNC: 3.8 MMOL/L (ref 3.6–5.5)
PROT SERPL-MCNC: 6.9 G/DL (ref 6–8.2)
RBC # BLD AUTO: 2.9 M/UL (ref 4.7–6.1)
SODIUM SERPL-SCNC: 131 MMOL/L (ref 135–145)
WBC # BLD AUTO: 14.7 K/UL (ref 4.8–10.8)

## 2019-10-17 PROCEDURE — 80053 COMPREHEN METABOLIC PANEL: CPT

## 2019-10-17 PROCEDURE — A9270 NON-COVERED ITEM OR SERVICE: HCPCS | Performed by: HOSPITALIST

## 2019-10-17 PROCEDURE — 700102 HCHG RX REV CODE 250 W/ 637 OVERRIDE(OP): Performed by: HOSPITALIST

## 2019-10-17 PROCEDURE — 700111 HCHG RX REV CODE 636 W/ 250 OVERRIDE (IP): Performed by: HOSPITALIST

## 2019-10-17 PROCEDURE — 85027 COMPLETE CBC AUTOMATED: CPT

## 2019-10-17 PROCEDURE — 700105 HCHG RX REV CODE 258: Performed by: HOSPITALIST

## 2019-10-17 PROCEDURE — 99233 SBSQ HOSP IP/OBS HIGH 50: CPT | Performed by: INTERNAL MEDICINE

## 2019-10-17 PROCEDURE — 99239 HOSP IP/OBS DSCHRG MGMT >30: CPT | Performed by: HOSPITALIST

## 2019-10-17 PROCEDURE — A9270 NON-COVERED ITEM OR SERVICE: HCPCS | Performed by: INTERNAL MEDICINE

## 2019-10-17 PROCEDURE — 700102 HCHG RX REV CODE 250 W/ 637 OVERRIDE(OP): Performed by: INTERNAL MEDICINE

## 2019-10-17 RX ORDER — ATORVASTATIN CALCIUM 40 MG/1
40 TABLET, FILM COATED ORAL EVERY EVENING
Qty: 30 TAB
Start: 2019-10-17 | End: 2020-05-19

## 2019-10-17 RX ORDER — LISINOPRIL 40 MG/1
40 TABLET ORAL DAILY
Qty: 30 TAB
Start: 2019-10-18 | End: 2020-05-19

## 2019-10-17 RX ORDER — RIFAMPIN 300 MG/1
300 CAPSULE ORAL 2 TIMES DAILY
Qty: 12 CAP | Refills: 0
Start: 2019-10-17 | End: 2019-10-23

## 2019-10-17 RX ORDER — ACETAMINOPHEN 325 MG/1
650 TABLET ORAL EVERY 6 HOURS PRN
Qty: 30 TAB | Refills: 0
Start: 2019-10-17 | End: 2020-03-21

## 2019-10-17 RX ADMIN — RIFAMPIN 300 MG: 300 CAPSULE ORAL at 05:23

## 2019-10-17 RX ADMIN — ENOXAPARIN SODIUM 40 MG: 100 INJECTION SUBCUTANEOUS at 05:22

## 2019-10-17 RX ADMIN — NICOTINE 7 MG: 7 PATCH, EXTENDED RELEASE TRANSDERMAL at 05:23

## 2019-10-17 RX ADMIN — VANCOMYCIN HYDROCHLORIDE 800 MG: 500 INJECTION, POWDER, LYOPHILIZED, FOR SOLUTION INTRAVENOUS at 11:01

## 2019-10-17 RX ADMIN — LISINOPRIL 40 MG: 20 TABLET ORAL at 05:22

## 2019-10-17 RX ADMIN — VANCOMYCIN HYDROCHLORIDE 800 MG: 500 INJECTION, POWDER, LYOPHILIZED, FOR SOLUTION INTRAVENOUS at 01:54

## 2019-10-17 ASSESSMENT — ENCOUNTER SYMPTOMS
FEVER: 0
MYALGIAS: 1
VOMITING: 0

## 2019-10-17 NOTE — PROGRESS NOTES
Received report from day shift RNLaura. Assumed care of pt. Pt reports no pain at this time. Updated pt on plan of care. Pt resting comfortably in bed. Bed alarm in place. Educated on use of call light. Hourly rounding and continuous monitoring in place.

## 2019-10-17 NOTE — CARE PLAN
Problem: Communication  Goal: The ability to communicate needs accurately and effectively will improve  Outcome: PROGRESSING AS EXPECTED   Pt whiteboard updated on plan of care  Pt encouraged to call for assistance  Pt encouraged to voice any concerns or questions regarding care plan  Pt updated on plan of care as it develops and changes     Problem: Safety  Goal: Will remain free from injury  Outcome: PROGRESSING AS EXPECTED   Treaded socks in use  Call light within reach and patient calls appropriately  Medication education provided prior to administration  Medications administered as ordered  Bed alarm on and bed locked in lowest position

## 2019-10-17 NOTE — PROGRESS NOTES
"Pharmacy Kinetics 64 y.o. male on vancomycin day # 7 10/17/2019    Currently on Vancomycin 800mg q8hr (, 10, )  Provider specified end date: 19    Indication for Treatment: infective endocarditis S/P TAVR, bacteremia    Pertinent history per medical record: Admitted on 10/9/2019 for Admitted on 10/9/2019 for encephalopathy.  Recent TAVR on 3/18/2019 for severe symptomatic aortic stenosis. ECHO found to have density on prosthetic valve. Peripheral blood cultures positive for CoNS.  ID following.     Other antibiotics: rifampin 300mg BID (10/13 -10/23)     Allergies: Patient has no known allergies.     List concerns for renal function: prolonged vancomycin use.    Pertinent cultures to date:   10/10/19:PBCx2: 2 of 2 CoNS  10/11/19:PBCx2:NGTD     MRSA nares swab if pneumonia is a concern (ordered/positive/negative/n-a): N/A    Recent Labs     10/15/19  0117 10/17/19  0205   WBC 13.0* 14.7*     Recent Labs     10/15/19  0117 10/17/19  0205   BUN 11 12   CREATININE 0.83 0.80   ALBUMIN  --  2.4*     Recent Labs     10/15/19  0117   VANCOTROUGH 20.1*       Intake/Output Summary (Last 24 hours) at 10/17/2019 1113  Last data filed at 10/17/2019 0527  Gross per 24 hour   Intake --   Output 1590 ml   Net -1590 ml      /82   Pulse 98   Temp 37.4 °C (99.4 °F) (Temporal)   Resp 20   Ht 1.727 m (5' 8\")   Wt 59.1 kg (130 lb 4.7 oz)   SpO2 96%  Temp (24hrs), Av.1 °C (98.8 °F), Min:36.8 °C (98.2 °F), Max:37.4 °C (99.4 °F)      A/P   1. Vancomycin dose change: not indicated  2. Next vancomycin level: 10/18 at 0930, ordered  3. Goal trough: 16-20 mcg/mL        4. Comments: Anticipated stop date adjusted to 19 per ID.  No new culture results.  Tolerating vancomycin regimen.  Will assess trough on .      Sheba Tavarez, PharmD   Reviewed by Gary Fontanez PharmD    "

## 2019-10-17 NOTE — DISCHARGE PLANNING
Anticipated Discharge Disposition: [facility]    Action: RN LESVIA received MD signature and patient signature on [facility]. Transfer packet placed with patient's chart, bedside RN aware.    Barriers to Discharge: none    Plan: Patient to discharge to [facility] at [facility]

## 2019-10-17 NOTE — DISCHARGE INSTRUCTIONS
Discharge Instructions    Discharged to other by medical transportation with escort. Discharged via wheelchair, hospital escort: Yes.  Special equipment needed: Not Applicable    Be sure to schedule a follow-up appointment with your primary care doctor or any specialists as instructed.     Discharge Plan:   Diet Plan: Discussed  Activity Level: Discussed  Confirmed Follow up Appointment: Appointment Scheduled  Confirmed Symptoms Management: Discussed  Medication Reconciliation Updated: Yes  Influenza Vaccine Indication: Not indicated: Previously immunized this influenza season and > 8 years of age    I understand that a diet low in cholesterol, fat, and sodium is recommended for good health. Unless I have been given specific instructions below for another diet, I accept this instruction as my diet prescription.   Other diet: Regular as tolerated    Special Instructions: None    · Is patient discharged on Warfarin / Coumadin?   No     Depression / Suicide Risk    As you are discharged from this [facility] facility, it is important to learn how to keep safe from harming yourself.    Recognize the warning signs:  · Abrupt changes in personality, positive or negative- including increase in energy   · Giving away possessions  · Change in eating patterns- significant weight changes-  positive or negative  · Change in sleeping patterns- unable to sleep or sleeping all the time   · Unwillingness or inability to communicate  · Depression  · Unusual sadness, discouragement and loneliness  · Talk of wanting to die  · Neglect of personal appearance   · Rebelliousness- reckless behavior  · Withdrawal from people/activities they love  · Confusion- inability to concentrate     If you or a loved one observes any of these behaviors or has concerns about self-harm, here's what you can do:  · Talk about it- your feelings and reasons for harming yourself  · Remove any means that you might use to hurt yourself (examples: pills, rope,  extension cords, firearm)  · Get professional help from the community (Mental Health, Substance Abuse, psychological counseling)  · Do not be alone:Call your Safe Contact- someone whom you trust who will be there for you.  · Call your local CRISIS HOTLINE 676-2523 or 948-933-4795  · Call your local Children's Mobile Crisis Response Team [place] (638) 198-0200 or www.nexTune  · Call the toll free National Suicide Prevention Hotlines   · National Suicide Prevention Lifeline 163-249-OBDR (3124)  · [employer] Line Network 800-SUICIDE (433-7255)      Endocarditis  Endocarditis is an infection of the inner layer of the heart (endocardium) or an infection of the heart valves. Endocarditis can cause growths inside the heart or on the heart valves. Over time, these growths can destroy heart tissue and cause heart failure or problems with heart rhythm. They can also cause stroke if they break away and form a blood clot in the brain. Early treatment offers the best chance for curing endocarditis and preventing complications.  What are the causes?  This condition may be caused by:  · Germs that normally live in or on your body. The germs that most commonly cause endocarditis are bacteria.  · A fungus.  What increases the risk?  This condition is more likely to develop in people who have:  · A heart defect.  · Artificial (prosthetic) heart valves.  · An abnormal or damaged heart valve.  · A history of endocarditis.  Having certain procedures may also increase the risk of germs getting into the heart or bloodstream.  What are the signs or symptoms?  Symptoms of this condition may start suddenly, or they may start slowly and gradually get worse.  Symptoms include:  · Fever.  · Chills.  · Night sweats.  · Muscle aches.  · Fatigue.  · Weakness.  · Shortness of breath.  · Chest pain.  · Blood spots in the eyes.  · Bleeding under the fingernails or toenails.  · Painless red spots on the palms.  · Painful lumps in the  fingertips or toes.  · Swelling in the feet or ankles.  How is this diagnosed?  This condition may be diagnosed based on:  · A physical exam. Your health care provider will listen to your heart to check for abnormal heart sounds (murmur). He or she may also use a scope to check for bleeding at the back of your eyes (retinas).  · Tests. They may include:  ¨ Blood tests to look for the germs that cause endocarditis.  ¨ Imaging tests. A chest x-ray, CT scan, or echocardiogram may be used to create an image of your heart. A type of echocardiogram called a transesophageal echocardiogram may be done to look at certain heart valves more closely.  How is this treated?  Treatment for this condition depends on the cause of the endocarditis. Treatment may include:  · Antibiotic medicines. These may be given through a tube into one of your veins (IV antibiotics) or taken by mouth. You may need to be on more than one antibiotic medicine.  · Surgery to replace your heart valve. You may need surgery if:  ¨ The endocarditis does not respond to treatment.  ¨ You develop complications.  ¨ Your heart valve is severely damaged.  Follow these instructions at home:  Medicines  · Take over-the-counter and prescription medicines only as told by your health care provider.  · If you were prescribed an antibiotic medicine, take it as told by your health care provider. Do not stop taking the antibiotic even if you start to feel better. You may need to be on intravenous antibiotics for several weeks.  · Do not use IV drugs unless it is part of your medical treatment.  Lifestyle  · Do not get tattoos or body piercings.  · Practice good oral hygiene. This includes:  ¨ Brushing and flossing regularly.  ¨ Scheduling routine dental appointments.  · Do not use any products that contain nicotine or tobacco, such as cigarettes and e-cigarettes. If you need help quitting, ask your health care provider.  · Limit alcohol intake to no more than 1 drink a  day for nonpregnant women and 2 drinks a day for men. One drink equals 12 oz of beer, 5 oz of wine, or 1½ oz of hard liquor.  General instructions  · Let your health care provider know before you have any dental or surgical procedures. You may need to take antibiotics before the procedure.  · Tell all of your health care providers, including your dentist, that you have had endocarditis.  · Gradually resume your usual activities.  · Keep all follow-up visits as told by your health care provider. This is important.  Contact a health care provider if:  · You have a fever.  · Your symptoms do not improve.  · Your symptoms get worse.  · Your symptoms come back.  Get help right away if:  · You have trouble breathing.  · You have chest pain.  · You have symptoms of a stroke. These include:  ¨ Sudden weakness.  ¨ Numbness.  ¨ Confusion.  ¨ Trouble talking or understanding.  ¨ A severe headache.  Summary  · Endocarditis is an infection of the inner layer of the heart (endocardium) or heart valves. It is caused by bacteria or a fungus.  · Having certain heart conditions or procedures may increase the risk of endocarditis.  · Antibiotics are an important treatment for endocarditis. Take these medicines as told by your health care provider. Do not stop taking them even if you start to feel better.  · Tell all of your health care providers, including your dentist, that you have had endocarditis.  This information is not intended to replace advice given to you by your health care provider. Make sure you discuss any questions you have with your health care provider.  Document Released: 12/18/2006 Document Revised: 09/29/2017 Document Reviewed: 09/29/2017  Elsevier Interactive Patient Education © 2017 [employer]      Ischemic Stroke  An ischemic stroke (cerebrovascular accident, or CVA) is the sudden death of brain tissue that occurs when an area of the brain does not get enough oxygen. It is a medical emergency that must be  treated right away. An ischemic stroke can cause permanent loss of brain function. This can cause problems with how different parts of your body function.  What are the causes?  This condition is caused by a decrease of oxygen supply to an area of the brain, which may be the result of:  · A small blood clot (embolus) or a buildup of plaque in the blood vessels (atherosclerosis) that blocks blood flow in the brain.  · An abnormal heart rhythm (atrial fibrillation).  · A blocked or damaged artery in the head or neck.  What increases the risk?  Certain factors may make you more likely to develop this condition. Some of these factors are things that you can change, such as:  · Obesity.  · Smoking cigarettes.  · Taking oral birth control, especially if you also use tobacco.  · Physical inactivity.  · Excessive alcohol use.  · Use of illegal drugs, especially cocaine and methamphetamine.  Other risk factors include:  · High blood pressure (hypertension).  · High cholesterol.  · Diabetes mellitus.  · Heart disease.  · Being , , , or .  · Being over age 60.  · Family history of stroke.  · Previous history of blood clots, stroke, or transient ischemic attack (TIA).  · Sickle cell disease.  · Being a woman with a history of preeclampsia.  · Migraine headache.  · Sleep apnea.  · Irregular heartbeats, such as atrial fibrillation.  · Chronic inflammatory diseases, such as rheumatoid arthritis or lupus.  · Blood clotting disorders (hypercoagulable state).  What are the signs or symptoms?  Symptoms of this condition usually develop suddenly, or you may notice them after waking up from sleep. Symptoms may include sudden:  · Weakness or numbness in your face, arm, or leg, especially on one side of your body.  · Trouble walking or difficulty moving your arms or legs.  · Loss of balance or coordination.  · Confusion.  · Slurred speech (dysarthria).  · Trouble speaking, understanding  speech, or both (aphasia).  · Vision changes--such as double vision, blurred vision, or loss of vision--in one or both eyes.  · Dizziness.  · Nausea and vomiting.  · Severe headache with no known cause. The headache is often described as the worst headache ever experienced.  If possible, make note of the exact time that you last felt like your normal self and what time your symptoms started. Tell your health care provider.  If symptoms come and go, this could be a sign of a warning stroke, or TIA. Get help right away, even if you feel better.  How is this diagnosed?  This condition may be diagnosed based on:  · Your symptoms, your medical history, and a physical exam.  · CT scan of the brain.  · MRI.  · CT angiogram. This test uses a computer to take X-rays of your arteries. A dye may be injected into your blood to show the inside of your blood vessels more clearly.  · MRI angiogram. This is a type of MRI that is used to evaluate the blood vessels.  · Cerebral angiogram. This test uses X-rays and a dye to show the blood vessels in the brain and neck.  You may need to see a health care provider who specializes in stroke care. A stroke specialist can be seen in person or through communication using telephone or television technology (telemedicine).  Other tests may also be done to find the cause of the stroke, such as:  · Electrocardiogram (ECG).  · Continuous heart monitoring.  · Echocardiogram.  · Carotid ultrasound.  · A scan of the brain circulation.  · Blood tests.  · Sleep study to check for sleep apnea.  How is this treated?  Treatment for this condition will depend on the duration, severity, and cause of your symptoms and on the area of the brain affected. It is very important to get treatment at the first sign of stroke symptoms. Some treatments work better if they are done within 3-6 hours of the onset of stroke symptoms. These initial treatments may include:  · Aspirin.  · Medicines to control blood  pressure.  · Medicine given by injection to dissolve the blood clot (thrombolytic).  · Treatments given directly to the affected artery to remove or dissolve the blood clot.  Other treatment options may include:  · Oxygen.  · IV fluids.  · Medicines to thin the blood (anticoagulants or antiplatelets).  · Procedures to increase blood flow.  Medicines and changes to your diet may be used to help treat and manage risk factors for stroke, such as diabetes, high cholesterol, and high blood pressure.  After a stroke, you may work with physical, speech, mental health, or occupational therapists to help you recover.  Follow these instructions at home:  Medicines  · Take over-the-counter and prescription medicines only as told by your health care provider.  · If you were told to take a medicine to thin your blood, such as aspirin or an anticoagulant, take it exactly as told by your health care provider.  ¨ Taking too much blood-thinning medicine can cause bleeding.  ¨ If you do not take enough blood-thinning medicine, you will not have the protection that you need against another stroke and other problems.  · Understand the side effects of taking anticoagulant medicine. When taking this type of medicine, make sure you:  ¨ Hold pressure over any cuts for longer than usual.  ¨ Tell your dentist and other health care providers that you are taking anticoagulants before you have any procedures that may cause bleeding.  ¨ Avoid activities that may cause trauma or injury.  Eating and drinking  · Follow instructions from your health care provider about diet.  · Eat healthy foods.  · If your ability to swallow was affected by the stroke, you may need to take steps to avoid choking, such as:  ¨ Taking small bites when eating.  ¨ Eating foods that are soft or pureed.  Safety  · Follow instructions from your health care team about physical activity.  · Use a walker or cane as told by your health care provider.  · Take steps to create a  safe home environment in order to reduce the risk of falls. This may include:  ¨ Having your home looked at by specialists.  ¨ Installing grab bars in the bedroom and bathroom.  ¨ Using safety equipment, such as raised toilets and a seat in the shower.  General instructions  · Do not use any tobacco products, such as cigarettes, chewing tobacco, and e-cigarettes. If you need help quitting, ask your health care provider.  · Limit alcohol intake to no more than 1 drink a day for nonpregnant women and 2 drinks a day for men. One drink equals 12 oz of beer, 5 oz of wine, or 1½ oz of hard liquor.  · If you need help to stop using drugs or alcohol, ask your health care provider about a referral to a program or specialist.  · Maintain an active and healthy lifestyle. Get regular exercise as told by your health care provider.  · Keep all follow-up visits as told by your health care provider, including visits with all specialists on your health care team. This is important.  How is this prevented?  Your risk of another stroke can be decreased by managing high blood pressure, high cholesterol, diabetes, heart disease, sleep apnea, and obesity. It can also be decreased by quitting smoking, limiting alcohol, and staying physically active.  Your health care provider will continue to work with you on measures to prevent short-term and long-term complications of stroke.  Get help right away if:  You have:  · Sudden weakness or numbness in your face, arm, or leg, especially on one side of your body.  · Sudden confusion.  · Sudden trouble speaking, understanding, or both (aphasia).  · Sudden trouble seeing with one or both eyes.  · Sudden trouble walking or difficulty moving your arms or legs.  · Sudden dizziness.  · Sudden loss of balance or coordination.  · Sudden, severe headache with no known cause.  · A partial or total loss of consciousness.  · A seizure.  Any of these symptoms may represent a serious problem that is an  emergency. Do not wait to see if the symptoms will go away. Get medical help right away. Call your local emergency services (911 in [place] Do not drive yourself to the hospital.   This information is not intended to replace advice given to you by your health care provider. Make sure you discuss any questions you have with your health care provider.  Document Released: 12/18/2006 Document Revised: 05/30/2017 Document Reviewed: 03/15/2017  Elsevier Interactive Patient Education © 2017 [employer]

## 2019-10-17 NOTE — DISCHARGE PLANNING
Received Transport Form @ 0903  Spoke to Dispatch @ [facility]    Transport is scheduled for 10/17/19 @1300 going to [facility].

## 2019-10-17 NOTE — PROGRESS NOTES
Report called to Jocelyn AMBROSIO at [facility]. All question answered at this time.

## 2019-10-17 NOTE — CARE PLAN
Problem: Safety  Goal: Will remain free from injury  Outcome: PROGRESSING AS EXPECTED     Problem: Infection  Goal: Will remain free from infection  Outcome: PROGRESSING AS EXPECTED     Problem: Venous Thromboembolism (VTW)/Deep Vein Thrombosis (DVT) Prevention:  Goal: Patient will participate in Venous Thrombosis (VTE)/Deep Vein Thrombosis (DVT)Prevention Measures  Outcome: PROGRESSING AS EXPECTED     Problem: Bowel/Gastric:  Goal: Normal bowel function is maintained or improved  Outcome: PROGRESSING AS EXPECTED     Problem: Communication  Goal: The ability to communicate needs accurately and effectively will improve  Outcome: PROGRESSING SLOWER THAN EXPECTED     Problem: Knowledge Deficit  Goal: Knowledge of disease process/condition, treatment plan, diagnostic tests, and medications will improve  Outcome: PROGRESSING SLOWER THAN EXPECTED     Problem: Discharge Barriers/Planning  Goal: Patient's continuum of care needs will be met  Outcome: PROGRESSING SLOWER THAN EXPECTED     Problem: Mobility  Goal: Risk for activity intolerance will decrease  Outcome: PROGRESSING SLOWER THAN EXPECTED

## 2019-10-17 NOTE — DISCHARGE SUMMARY
Discharge Summary    CHIEF COMPLAINT ON ADMISSION  Chief Complaint   Patient presents with   • Failure to Thrive     laying in car for days, not eating       Reason for Admission  EMS     CODE STATUS  Full Code    HPI & HOSPITAL COURSE  64 y.o. male with hypertension, meth use, history of TAVR, chronic hepatitis C, admitted 10/9/2019 with altered mentation, along with multiple complaints of abdominal pain, and ankle pain.  Reportedly, he has been staying in an abandoned car for the last 5 days and currently homeless.  CT scan of the head in the ED showed multiple infarcts in the left occipital lobe, and left temporal lobe.  Initial labs showed WBC of 19,400, and sodium of 133.  Urinalysis did not show any significant pyuria.      CT scan of the abdomen pelvis also showed low-density changes in the inferior pole of the right kidney, most compatible with area of renal infarct, along with fusiform infrarenal abdominal aortic aneurysm, and liver hemangioma.  Ankle x-ray showed no acute traumatic injury.  Chest x-ray showed nothing acute.  Embolic infarcts were suspected, patient started on empiric antibiotics.  Echocardiogram was ordered, and HIV was sent. Found to have vegetation on aortic valve from TTE on 10/10/2019. ID consulted for ABx guidance. Neurology consulted as well, no recommendations for ASA or plavix given high risk for hemorrhagic transformation.     Last negative BCx from 10/15/2019, PICC placed 10/16. BCx from 10/10 growing Micrococcus species.    Will need 6 weeks of IV abx, stop date for IV vanco is 11/22/2019. Patient has sister named Mona who is now aware of his hospitalization, potential discharge to her home after SNF stay.      Therefore, he is discharged in guarded and stable condition to skilled nursing facility.    The patient met 2-midnight criteria for an inpatient stay at the time of discharge.      FOLLOW UP ITEMS POST DISCHARGE  Continue Abx  Mgmt as per SNF    DISCHARGE  DIAGNOSES  Principal Problem:    Acute bacterial endocarditis POA: Yes  Active Problems:    Renal infarct (HCC) POA: Yes    CVA (cerebral vascular accident) (HCC) POA: Yes    Elevated BUN POA: Yes    Hyponatremia POA: Yes    Leukocytosis POA: Yes    Ischemic leg POA: Yes    HTN (hypertension) (Chronic) POA: Yes    Hepatitis C (Chronic) POA: Yes      Overview: Recommend follow up as outpatients    Hemangioma of liver POA: Yes    Anemia POA: Yes    History of methamphetamine use POA: Yes    S/P TAVR (transcatheter aortic valve replacement) POA: Yes    COPD (chronic obstructive pulmonary disease) (HCC) POA: Yes    AAA (abdominal aortic aneurysm) (HCC) POA: Yes    Meningioma (HCC) POA: Yes  Resolved Problems:    * No resolved hospital problems. *      FOLLOW UP  No future appointments.  [address] [address]  [phone]          MEDICATIONS ON DISCHARGE     Medication List      START taking these medications      Instructions   acetaminophen 325 MG Tabs  Commonly known as:  TYLENOL   Take 2 Tabs by mouth every 6 hours as needed.  Dose:  650 mg     atorvastatin 40 MG Tabs  Commonly known as:  LIPITOR   Take 1 Tab by mouth every evening.  Dose:  40 mg     lisinopril 40 MG tablet  Start taking on:  10/18/2019  Commonly known as:  PRINIVIL, ZESTRIL   Take 1 Tab by mouth every day.  Dose:  40 mg     MD Alert...Vancomycin per Pharmacy   1 Each by Other route PHARMACY TO DOSE.  Dose:  1 Each     nicotine 7 MG/24HR Pt24  Commonly known as:  NICODERM   Apply 1 Patch to skin as directed every 24 hours.  Dose:  1 Patch     NS SOLN 250 mL with vancomycin 5 g SOLR 800 mg   800 mg by Intravenous route every 8 hours for 28 days.  Dose:  800 mg     riFAMPin 300 MG Caps  Commonly known as:  RIFADINE   Take 1 Cap by mouth 2 Times a Day for 6 days.  Dose:  300 mg            Allergies  No Known Allergies    DIET  Orders Placed This Encounter   Procedures   • Diet Order Regular     Standing  Status:   Standing     Number of Occurrences:   1     Order Specific Question:   Diet:     Answer:   Regular [1]     Order Specific Question:   Texture/Fiber modifications:     Answer:   Dysphagia 3(Mechanical Soft)specify fluid consistency(question 6) [3]     Order Specific Question:   Consistency/Fluid modifications:     Answer:   Thin Liquids [3]       ACTIVITY  As tolerated and directed by skilled nursing.  Weight bearing as tolerated    LINES, DRAINS, AND WOUNDS  This is an automated list. Peripheral IVs will be removed prior to discharge.  Peripheral IV 10/15/19 20 G Anterior;Proximal;Right Forearm (Active)   Site Assessment Clean;Dry;Intact 10/17/2019  8:00 AM   Dressing Type Occlusive;Transparent 10/17/2019  8:00 AM   Line Status Flushed;Saline locked 10/17/2019  8:00 AM   Dressing Status Clean;Dry;Intact 10/17/2019  8:00 AM   Dressing Intervention N/A 10/17/2019  8:00 AM   Infiltration Grading ([facility] [place] 0 10/17/2019  8:00 AM   Phlebitis Scale ([facility] Only) 0 10/17/2019  8:00 AM       PICC Single Lumen 10/16/19 Left Brachial (Active)   Site Assessment Clean;Dry;Intact 10/17/2019  8:00 AM   Line Status Flushed;Scrubbed the hub prior to access;Saline locked 10/17/2019  8:00 AM   Extremity Circumference (cm) 25 cm 10/16/2019 11:20 AM   Dressing Type Biopatch;Occlusive;Securing device;Transparent 10/17/2019  8:00 AM   Dressing Status Clean;Dry;Intact 10/17/2019  8:00 AM   Dressing Intervention N/A 10/17/2019  8:00 AM   Dressing Change Due 10/19/19 10/17/2019  8:00 AM   Date IV Connector(s) Changed 10/16/19 10/17/2019  8:00 AM   NEXT IV Connector(s) Change 10/19/19 10/17/2019  8:00 AM   Line Necessity Assessed Antibiotic Therapy Greater than 7 Days 10/17/2019  8:00 AM   $ Single Lumen PICC Charge Single kit used 10/16/2019 11:20 AM         PICC Single Lumen 10/16/19 Left Brachial (Active)   Site Assessment Clean;Dry;Intact 10/17/2019  8:00 AM   Line Status Flushed;Scrubbed the hub prior to access;Saline  locked 10/17/2019  8:00 AM   Extremity Circumference (cm) 25 cm 10/16/2019 11:20 AM   Dressing Type Biopatch;Occlusive;Securing device;Transparent 10/17/2019  8:00 AM   Dressing Status Clean;Dry;Intact 10/17/2019  8:00 AM   Dressing Intervention N/A 10/17/2019  8:00 AM   Dressing Change Due 10/19/19 10/17/2019  8:00 AM   Date IV Connector(s) Changed 10/16/19 10/17/2019  8:00 AM   NEXT IV Connector(s) Change 10/19/19 10/17/2019  8:00 AM   Line Necessity Assessed Antibiotic Therapy Greater than 7 Days 10/17/2019  8:00 AM   $ Single Lumen PICC Charge Single kit used 10/16/2019 11:20 AM     Peripheral IV 10/15/19 20 G Anterior;Proximal;Right Forearm (Active)   Site Assessment Clean;Dry;Intact 10/17/2019  8:00 AM   Dressing Type Occlusive;Transparent 10/17/2019  8:00 AM   Line Status Flushed;Saline locked 10/17/2019  8:00 AM   Dressing Status Clean;Dry;Intact 10/17/2019  8:00 AM   Dressing Intervention N/A 10/17/2019  8:00 AM   Infiltration Grading ([facility] [place] 0 10/17/2019  8:00 AM   Phlebitis Scale ([facility] Only) 0 10/17/2019  8:00 AM               MENTAL STATUS ON TRANSFER  Level of Consciousness: Alert  Orientation : Disoriented to Time, Disoriented to Event  Speech: Speech Clear    CONSULTATIONS  Neurology  Vascular   Cardiology  Infectious Disease    PROCEDURES  TTE    LABORATORY  Lab Results   Component Value Date    SODIUM 131 (L) 10/17/2019    POTASSIUM 3.8 10/17/2019    CHLORIDE 97 10/17/2019    CO2 26 10/17/2019    GLUCOSE 86 10/17/2019    BUN 12 10/17/2019    CREATININE 0.80 10/17/2019        Lab Results   Component Value Date    WBC 14.7 (H) 10/17/2019    HEMOGLOBIN 8.1 (L) 10/17/2019    HEMATOCRIT 24.4 (L) 10/17/2019    PLATELETCT 338 10/17/2019        Total time of the discharge process exceeds 45 minutes.

## 2019-10-17 NOTE — PROGRESS NOTES
Pt discharged to [facility], leaving the unit with Gem with [facility] medical transport. Pt verbally acknowledges all discharge instructions, medications, and medications regimen, home medications given to Gem with [facility]. Pt gathered all personal belongings, Tele box and IV have been removed, pt discharged with upper left arm PICC line in place. All questions and needs have been met at this time.

## 2019-10-17 NOTE — PROGRESS NOTES
Infectious Disease Progress Note    Author: Anisha Lincoln M.D. Date & Time of service: 10/17/2019  11:52 AM    Chief Complaint:  Prosthetic valve endocarditis    Interval History:  64 y.o. male with HTN, meth use, TAVR, chronic hep C, admitted 10/9/2019 with AMS. Found to have mult embolic infarcts  10/12/2019-no fevers.  Patient is slightly more interactive.  Getting vascular studies done.  WBC remains high at 17,000.  Creatinine is 0.75  10/13-  Low grade fevers upto 100.2. WBC 14.7  10/14 Tmax 101.7, WBC 14.9 somnolent No acute distress. No acute events overnight  10/15 Tmax 99.7 WBC 13 more awake but not following commands-obtunded  10/16 AF WBC 13 blood cx results from 10/10 changed to micrococcus. Plan for PICC. Somnolent today  10/17 AF (99.4) WBC 14 got PICC-generalized myalgias-no other new complaints  Labs Reviewed.    Review of Systems:  Review of Systems   Unable to perform ROS: Mental status change (Patient is a very poor historian)   Constitutional: Negative for fever.   Gastrointestinal: Negative for vomiting.   Musculoskeletal: Positive for myalgias.   Limited    Hemodynamics:  Temp (24hrs), Av.1 °C (98.8 °F), Min:36.8 °C (98.2 °F), Max:37.4 °C (99.4 °F)  Temperature: 37.4 °C (99.4 °F)  Pulse  Av.4  Min: 84  Max: 123   Blood Pressure: 149/82       Physical Exam:  Physical Exam   Constitutional: No distress.   Eyes: Pupils are equal, round, and reactive to light. EOM are normal. No scleral icterus.   Neck: No JVD present.   Cardiovascular: Normal rate.   Murmur heard.  Pulmonary/Chest: Effort normal and breath sounds normal. No stridor. No respiratory distress. He has no wheezes. He has no rales.   Abdominal: Soft. He exhibits no distension. There is no tenderness. There is no rebound and no guarding.   Musculoskeletal: He exhibits edema and tenderness.   LUE PICC nontender   Lymphadenopathy:     He has no cervical adenopathy.   Neurological: He is alert. No cranial nerve deficit.    Skin: Skin is warm. He is not diaphoretic.   Nursing note and vitals reviewed.      Meds:    Current Facility-Administered Medications:   •  vancomycin  •  lisinopril  •  riFAMPin  •  atorvastatin  •  enoxaparin (LOVENOX) injection  •  acetaminophen  •  senna-docusate **AND** polyethylene glycol/lytes **AND** magnesium hydroxide **AND** bisacodyl  •  ondansetron  •  ondansetron  •  promethazine  •  promethazine  •  prochlorperazine  •  MD Alert...Vancomycin per Pharmacy  •  nicotine  •  labetalol **OR** hydrALAZINE    Labs:  Recent Labs     10/15/19  0117 10/17/19  0205   WBC 13.0* 14.7*   RBC 3.34* 2.90*   HEMOGLOBIN 9.3* 8.1*   HEMATOCRIT 27.7* 24.4*   MCV 82.9 84.1   MCH 27.8 27.9   RDW 42.6 44.9   PLATELETCT 325 338   MPV 8.9* 8.9*     Recent Labs     10/15/19  0117 10/17/19  0205   SODIUM 130* 131*   POTASSIUM 3.6 3.8   CHLORIDE 97 97   CO2 28 26   GLUCOSE 95 86   BUN 11 12     Recent Labs     10/15/19  0117 10/17/19  0205   ALBUMIN  --  2.4*   TBILIRUBIN  --  0.3   ALKPHOSPHAT  --  69   TOTPROTEIN  --  6.9   ALTSGPT  --  10   ASTSGOT  --  15   CREATININE 0.83 0.80       Imaging:  Ct-abdomen-pelvis With  Result Date: 10/10/2019  1FINDINGS: Linear densities are seen within the lung bases, appearance favors atelectasis. Scattered emphysematous changes are seen. Cardiomegaly is noted. The liver is normal in contour. The liver is normal in size. No intrahepatic biliary ductal dilatation is noted. Peripherally enhancing 2.0 cm low-density lesion is seen in the right hepatic lobe. The gallbladder appears within normal limits. The spleen is unremarkable. The pancreas is grossly normal. Bilateral adrenal glands appear within normal limits. Low-density changes in the inferior pole of the right kidney are seen, new since prior study. The ureters are normal in caliber along their visualized course. The colon appears within normal limits. The appendix is not definitively identified. The small bowel is unremarkable. The  bony structures are age appropriate. Atherosclerotic calcifications are seen. There is 3.5 cm fusiform infrarenal abdominal aortic aneurysm identified CT PELVIS FINDINGS: The bladder is within normal limits.     1.  Low-density changes in the inferior pole of the right kidney, appearance most compatible with area of renal infarct. 2.  Fusiform infrarenal abdominal aortic aneurysm, recommend radiographic follow-up and surveillance as clinically appropriate. 3.  Peripherally enhancing low-density right hepatic lobe lesion, appearance most typical of hemangioma. 4.  Scattered emphysema 5.  Cardiomegaly 6.  Atherosclerosis These findings were discussed with the patient's clinician, Abhay Sinha, on 10/10/2019 1:08 AM.    Ct-head W/o    Result Date: 10/10/2019  10/9/2019 11:57 PM HISTORY/REASON FOR EXAM: Altered mental status TECHNIQUE/EXAM DESCRIPTION:  CT of the head without contrast. Sequential axial images were obtained from the vertex to the skull base without contrast. Up to date radiation dose reduction adjustments have been utilized to meet ALARA standards for radiation dose reduction. COMPARISON:  July 4, 2017 FINDINGS: The brain appears normal in volume and morphology. The ventricles are normal in caliber and configuration. Calcified 2.3 cm mass along the left temporal lobe is seen extra-axially. 1.7 cm mass along the periphery is seen adjacent to the right temporal lobe. 1.4 cm mass is seen adjacent to the left temporoparietal region on the right. New area of low-density is seen in the left occipital lobe tracking along the medial aspect of the left temporal lobe. There are no abnormal extra axial fluid collections  or extra axial hemorrhage identified. The visualized paranasal sinuses and mastoid air cells are well aerated bilaterally. No depressed calvarial fractures are identified. The visualized globes and retrobulbar soft tissues appear within normal limits. There is hyperdensity identified in the  subcutaneous soft tissues of the posterior neck, similar to prior study.     1.  New area of low-density in the left occipital lobe tracking along the medial aspect of the left temporal lobe, appearance compatible with evolving infarct. 2.  Multiple peripheral calcified masses, appearance most typical of meningiomas 3.  Hyperdensity in the subcutaneous soft tissues posterior to the neck in the midline, similar to prior study. Could represent thickening of the nuchal ligament or soft tissue mass. Correlate with exam.    Dx-ankle 2- Views Left    Result Date: 10/9/2019  10/9/2019 11:15 PM HISTORY/REASON FOR EXAM:  Pain/Deformity Following Trauma. TECHNIQUE/EXAM DESCRIPTION AND NUMBER OF VIEWS:  2 views of the LEFT ankle. COMPARISON: None. FINDINGS: There is no visualized fracture, subluxation, or dislocation identified.     No radiographic evidence of acute traumatic injury.    Dx-ankle 3+ Views Left    Result Date: 10/11/2019  10/11/2019 1:25 PM HISTORY/REASON FOR EXAM:  Left ankle pain TECHNIQUE/EXAM DESCRIPTION AND NUMBER OF VIEWS:  3 views of the LEFT ankle. COMPARISON: 10/09/2019 FINDINGS: Bone mineralization is normal.  There is no evidence of fracture or dislocation.  There is no soft tissue swelling seen. The ankle mortise is well-maintained.     No evidence of fracture or dislocation.    Dx-chest-portable (1 View)    Result Date: 10/9/2019    10/9/2019 10:24 PM HISTORY/REASON FOR EXAM: Shortness of Breath TECHNIQUE/EXAM DESCRIPTION:  Single AP view of the chest. COMPARISON: March 19, 2019 FINDINGS: The cardiac silhouette appears within normal limits. Atherosclerotic calcification of the aorta is noted.  The central pulmonary vasculature appears normal. The lungs appear well expanded bilaterally.  Linear densities of the bilateral lung bases are seen. Left lung base pulmonary blebs are noted, similar to prior study. No significant pleural effusions are identified. The bony structures appear age-appropriate.      1.  No acute cardiopulmonary disease. 2.  Chronic underlying lung disease with pulmonary blebs of the left lung base. 3.  Atherosclerosis    Mr-brain-w/o  Result Date: 10/10/2019  FINDINGS: Study is limited by motion artifact. There are multiple acute/subacute infarcts with restricted diffusion and T2 hyperintensity consistent with cytotoxic edema. These involve multiple vascular distributions suggesting embolic etiology. There is a moderate to large left posterior cerebral artery territory infarct involving the left occipital lobe, left thalamus and left hippocampus. There are also multiple small infarcts seen within the bilateral frontoparietal white matter, right parietal and occipital cortex and in the bilateral cerebellar hemispheres. There is susceptibility artifact in the left thalamus and punctate focus in the left occipital lobe consistent with small areas of hemorrhage. There are multiple dural based masses demonstrating low T2 signal along the bilateral convexities most consistent with calcified change most correlating to CT findings. There is mild localized mass effect. No brain edema is seen. There is no midline shift or hydrocephalus. No extra-axial fluid collection. Proximal vascular flow voids are patent. Mucosal thickening and small mucous retention cysts within the maxillary sinuses. Mastoids are clear. Orbital contents are symmetric.     1.  Multifocal acute/subacute cerebral and cerebellar infarcts involving multiple vascular distributions most suggestive of embolic etiology. This includes moderate to large left PCA infarct with associated small areas of hemorrhage in the left thalamus and parieto-occipital region. 2.  Mild age-related atrophy. 3.  Multiple small dural based masses most consistent with calcified meningiomas correlating with head CT findings.    Us-michaela Single Level Bilat  Result Date: 10/12/2019   Vascular Laboratory  Conclusions  Right.  No evidence of arterial insufficiency.   Left.  There is no evidence of arterial disease demonstrated (BETSY is .9-1.0).   Findings  Right.  Doppler waveform of the common femoral artery is of high amplitude and  triphasic.  Doppler waveforms at the ankle are brisk and triphasic.  Ankle-brachial index is normal.  Left.  Doppler waveform of the common femoral artery is of high amplitude and  triphasic.  Doppler waveforms at the ankle are brisk and biphasic without reversal of  flow.  Ankle-brachial index is normal, but reduced compared to the right lower  extremity.    Krystian William MD  (Electronically Signed)  Final Date:      12 October 2019                   10:59    Us-carotid Doppler Bilat    Result Date: 10/11/2019   Carotid Duplex  Report  Vascular Laboratory  CONCLUSIONS  Mild bilateral internal carotid artery stenosis (<50%).  FINDINGS  Right carotid.  Mild, heterogeneous plaque of the bifurcation extending into the internal  carotid.  Velocities are consistent with < 50% stenosis of the internal carotid  artery.  Antegrade right vertebral flow.  Left carotid.  Mild, heterogeneous plaque of the bifurcation extending into the internal  carotid.  Velocities are consistent with < 50% stenosis of the internal carotid  artery.  Antegrade left vertebral flow.  Krystian William MD  (Electronically Signed)  Final Date:      11 October 2019                   09:00    Us-extremity Non Vascular Unilateral Left    Result Date: 10/11/2019  10/11/2019 1:32 PM HISTORY/REASON FOR EXAM:  soft tissue irregularity on medial left ankle around the achilles tendon, area outlined in pen. TECHNIQUE/EXAM DESCRIPTION AND NUMBER OF VIEWS: Limited ultrasound of the left ankle in the area of clinical abnormality. COMPARISON: None FINDINGS: Real-time grayscale and color Doppler sonography was performed. In the medial left ankle there is a tubular structure which appears to represent a vessel. There is echogenic material within this without significant color flow. This may  represent the posterior tibial artery with thrombus. No mass or fluid collection is seen.     A presumed vessel, probably the posterior tibial artery in the medial left ankle with intraluminal thrombus. These findings were discussed with ANG WOODY on 10/11/2019 3:45 PM.    Ec-echocardiogram Complete W/o Cont    Result Date: 10/10/2019  Transthoracic Echo Report Echocardiography Laboratory CONCLUSIONS Hyperdynamic left ventricular systolic function. Left ventricular ejection fraction is visually estimated to be greater than 75%. Severe concentric left ventricular hypertrophy. Known TAVR aortic valve with normal leaflet excursion with increased gradients most likely related to hyperdynamic flow. Echodensity prosthetic valve leaflet 0.7 x 0.9 cm. Compared to the images of the study done 3/29/2019 there is an echodensity of the prosthetic valve leaflet seen on current study however there is no comparable view on the previous study; increased prosthetic valve gradients related to increased velocity due to change in hyperdynamic left ventricular contractility. JAIMIE YAO Exam Date:         10/10/2019                    13:15 Exam  LV EF:  75    % FINDINGS Left Ventricle Normal left ventricular chamber size. Severe concentric left ventricular hypertrophy. Hyperdynamic left ventricular systolic function. Left ventricular ejection fraction is visually estimated to be greater than 75%.    Normal regional wall motion. Indeterminate diastolic function. Right Ventricle Normal right ventricular size. Normal right ventricular systolic function. Right Atrium Enlarged right atrium. Normal inferior vena cava size and inspiratory collapse. Left Atrium Severely dilated left atrium. Left atrial volume index is 63  mL/sq m. Mitral Valve Mitral annular calcification. Calcification of the mitral valve leaflets. Mild mitral stenosis. Trace mitral regurgitation. Aortic Valve Echodensity prosthetic valve leaflet 0.7 x 0.9 cm.  Known  "TAVR aortic valve with normal leaflet excursion with increased gradients most likely related to hyperdynamic flow. Vmax is 3.8 m/s. Transvalvular gradients are - Peak: 63 mmHg, Mean: 36 mmHg. Dimensionless index is 0.43.  No paravalvular leak. Tricuspid Valve Structurally normal tricuspid valve. No tricuspid stenosis. Trace tricuspid regurgitation. Pulmonic Valve Structurally normal pulmonic valve without significant stenosis or regurgitation. Pericardium Normal pericardium without effusion. Aorta The aortic root is normal.  Ascending aorta diameter is 3.0 cm. Qasim Love M.D. (Electronically Signed) Final Date:     10 October 2019                 14:52      Micro:  Results     Procedure Component Value Units Date/Time    BLOOD CULTURE [[id number]] Collected:  10/11/19 2107    Order Status:  Completed Specimen:  Blood from Peripheral Updated:  10/16/19 2259     Significant Indicator NEG     Source BLD     Site PERIPHERAL     Culture Result No growth after 5 days of incubation.    Narrative:       Per Hospital Policy: Only change Specimen Src: to \"Line\" if  specified by physician order.  Right AC    BLOOD CULTURE [[id number]] Collected:  10/11/19 1515    Order Status:  Completed Specimen:  Blood from Peripheral Updated:  10/16/19 1859     Significant Indicator NEG     Source BLD     Site PERIPHERAL     Culture Result No growth after 5 days of incubation.    Narrative:       Per Hospital Policy: Only change Specimen Src: to \"Line\" if  specified by physician order.  Right Forearm/Arm    BLOOD CULTURE x2 [[id number]]  (Abnormal) Collected:  10/10/19 0331    Order Status:  Completed Specimen:  Blood from Peripheral Updated:  10/16/19 1102     Significant Indicator POS     Source BLD     Site PERIPHERAL     Culture Result Growth detected by Bactec instrument. 10/11/2019  11:01  Negative for Staphylococcus aureus and MRSA by PCR. Correlate  ongoing need for antibiotics with clinical condition.  Further report to " "follow.        Micrococcus species  Susceptibilities in progress      Narrative:       CALL  Erwin  183 tel. [phone],  CALLED  183 tel. [phone] 10/11/2019, 13:30, RB PERF. RESULTS CALLED  TO:[phone]  Per Hospital Policy: Only change Specimen Src: to \"Line\" if  specified by physician order.  Left Hand    BLOOD CULTURE x2 [[id number]]  (Abnormal) Collected:  10/10/19 0330    Order Status:  Completed Specimen:  Blood from Peripheral Updated:  10/16/19 1101     Significant Indicator POS     Source BLD     Site PERIPHERAL     Culture Result Growth detected by Bactec instrument. 10/11/2019  12:04      Micrococcus species  Susceptibilities in progress      Narrative:       Per Hospital Policy: Only change Specimen Src: to \"Line\" if  specified by physician order.  Right AC    BLOOD CULTURE [[id number]] Collected:  10/15/19 1734    Order Status:  Completed Specimen:  Blood from Peripheral Updated:  10/16/19 0756     Significant Indicator NEG     Source BLD     Site PERIPHERAL     Culture Result No Growth  Note: Blood cultures are incubated for 5 days and  are monitored continuously.Positive blood cultures  are called to the RN and reported as soon as  they are identified.      Narrative:       Per Hospital Policy: Only change Specimen Src: to \"Line\" if  specified by physician order.  Right Hand    BLOOD CULTURE [[id number]] Collected:  10/15/19 1734    Order Status:  Completed Specimen:  Blood from Peripheral Updated:  10/16/19 0756     Significant Indicator NEG     Source BLD     Site PERIPHERAL     Culture Result No Growth  Note: Blood cultures are incubated for 5 days and  are monitored continuously.Positive blood cultures  are called to the RN and reported as soon as  they are identified.      Narrative:       Per Hospital Policy: Only change Specimen Src: to \"Line\" if  specified by physician order.  Right AC    BLOOD CULTURE [[id number]] Collected:  10/11/19 0000    Order Status:  Canceled Specimen:  Other from " Peripheral     BLOOD CULTURE [[id number]] Collected:  10/11/19 0000    Order Status:  Canceled Specimen:  Other from Peripheral           Assessment:  Active Hospital Problems    Diagnosis   • *Acute bacterial endocarditis [I33.0]   • Renal infarct (HCC) [N28.0]   • CVA (cerebral vascular accident) (HCC) [I63.9]   • Leukocytosis [D72.829]   • COPD (chronic obstructive pulmonary disease) (HCC) [J44.9]   • AAA (abdominal aortic aneurysm) (HCC) [I71.4]   • Meningioma (HCC) [D32.9]   • S/P TAVR (transcatheter aortic valve replacement) [Z95.2]   • History of methamphetamine use [Z87.898]   • Hepatitis C [B19.20]   • Ischemic leg [I99.8]       Plan:  Prosthetic valve endocarditis- s/p TAVR. 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  In review of yesterday's note dated 10/16/2019 , there are no changes except as documented above.

## 2019-10-24 LAB — TEST NAME 95000: NORMAL

## 2019-10-27 LAB
BACTERIA BLD CULT: ABNORMAL
SIGNIFICANT IND 70042: ABNORMAL
SIGNIFICANT IND 70042: ABNORMAL
SITE SITE: ABNORMAL
SITE SITE: ABNORMAL
SOURCE SOURCE: ABNORMAL
SOURCE SOURCE: ABNORMAL

## 2019-10-30 ENCOUNTER — TELEPHONE (OUTPATIENT)
Dept: INFECTIOUS DISEASES | Facility: MEDICAL CENTER | Age: 64
End: 2019-10-30

## 2019-10-31 ENCOUNTER — TELEPHONE (OUTPATIENT)
Dept: INFECTIOUS DISEASES | Facility: MEDICAL CENTER | Age: 64
End: 2019-10-31

## 2019-10-31 NOTE — TELEPHONE ENCOUNTER
Called and LM for [facility] to return my call to schedule a hospital follow up appointment for pt with Infectious Disease.  -AMP

## 2019-11-11 ENCOUNTER — OFFICE VISIT (OUTPATIENT)
Dept: INFECTIOUS DISEASES | Facility: MEDICAL CENTER | Age: 64
End: 2019-11-11
Payer: MEDICAID

## 2019-11-11 VITALS
HEART RATE: 81 BPM | SYSTOLIC BLOOD PRESSURE: 130 MMHG | WEIGHT: 120.2 LBS | OXYGEN SATURATION: 97 % | HEIGHT: 67 IN | DIASTOLIC BLOOD PRESSURE: 64 MMHG | TEMPERATURE: 99 F | BODY MASS INDEX: 18.87 KG/M2

## 2019-11-11 DIAGNOSIS — F15.91 HISTORY OF METHAMPHETAMINE USE: ICD-10-CM

## 2019-11-11 DIAGNOSIS — N28.0 RENAL INFARCT (HCC): ICD-10-CM

## 2019-11-11 DIAGNOSIS — I33.0 ACUTE BACTERIAL ENDOCARDITIS: ICD-10-CM

## 2019-11-11 DIAGNOSIS — B18.2 CHRONIC HEPATITIS C WITHOUT HEPATIC COMA (HCC): Chronic | ICD-10-CM

## 2019-11-11 PROCEDURE — 99213 OFFICE O/P EST LOW 20 MIN: CPT | Performed by: INTERNAL MEDICINE

## 2019-11-11 RX ORDER — RIFAMPIN 300 MG/1
300 CAPSULE ORAL 2 TIMES DAILY
COMMUNITY
End: 2020-05-19

## 2019-11-11 NOTE — PROGRESS NOTES
"Chief Complaint   Patient presents with   • Hospital Follow-up     Prosthetic valve endocarditis       Infectious Disease clinic follow-up:  Patient is a 64 y.o. male in the clinic today for ID FU appointment.   64 y.o. -American male who is homeless, history of TAVR has been seen by us in the past for epididymitis, prostatitis and seminal abscess.  He had undergone TAVR on 3/18/2019 for severe symptomatic aortic stenosis.  He has been living in his car.  He came into the emergency room on 10/10/2019 for abdominal pain.  His CT scan showed renal infarct he also noted that his left ankle was swollen.  His x-ray was negative.  He has been seen by physical medicine.  Neuro saw him for CVA in the multiple vascular distributions.  He had a WBC count of 19,000.  His echo showed a vegetation on the prosthetic valve.    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  He was discharged on 6 weeks of IV vancomycin and rifampin through 11/22/2019.  11/11/2019-patient has come back for follow-up.  Denies any new issues.  Tolerating the antibiotics without any problems.  Still at [place].  Primary Care Provider: NEY Ledesma     REVIEW OF SYSTEMS:    Constitutional: Negative for fever and malaise/fatigue.   HENT: Negative for hearing loss and visual changes   Eyes: Negative for blurred vision, double vision and photophobia.   Respiratory: Negative for cough.   Cardiovascular: Negative for chest pain and leg swelling.   Gastrointestinal: Negative for nausea, vomiting and diarrhea.   Musculoskeletal: Negative for myalgias and back pain.  The left ankle still hurts off and on  Skin: Negative for rash.   Neurological: Negative for sensory change, focal weakness and headaches.     ALLERGIES:  No Known Allergies     PAST MEDICAL HISTORY:   Past Medical History:   Diagnosis Date   • Abnormality of left ventricle of heart     \"constricted left ventricle\"    • Aortic stenosis    • BPH (benign prostatic " "hyperplasia)    • Hypercholesteremia    • Hypertension        PAST SURGICAL HISTORY:    Past Surgical History:   Procedure Laterality Date   • TRANSCATHETER AORTIC VALVE REPLACEMENT N/A 3/18/2019    Procedure: REPLACEMENT, AORTIC VALVE, TRANSCATHETER;  Surgeon: Dariel Moser M.D.;  Location: SURGERY [facility];  Service: Cardiac   • JAYLYN  3/18/2019    Procedure: ECHOCARDIOGRAM, TRANSESOPHAGEAL;  Surgeon: Dariel Moser M.D.;  Location: SURGERY [facility];  Service: Cardiac   • TONSILLECTOMY     • ZZZ CARDIAC CATH          MEDICATIONS:   Current Outpatient Medications   Medication Sig Dispense Refill   • metoprolol (LOPRESSOR) 25 MG Tab Take 25 mg by mouth 2 times a day.     • acetaminophen (TYLENOL) 325 MG Tab Take 2 Tabs by mouth every 6 hours as needed. 30 Tab 0   • lisinopril (PRINIVIL, ZESTRIL) 40 MG tablet Take 1 Tab by mouth every day. 30 Tab    • NS SOLN 250 mL with vancomycin 5 g SOLR 800 mg 800 mg by Intravenous route every 8 hours for 28 days.     • riFAMPin (RIFADINE) 300 MG Cap Take 300 mg by mouth 2 times a day.     • atorvastatin (LIPITOR) 40 MG Tab Take 1 Tab by mouth every evening. (Patient not taking: Reported on 11/11/2019) 30 Tab    • Vancomycin HCl (MD ALERT...VANCOMYCIN PER PHARMACY) 1 Each by Other route PHARMACY TO DOSE.     • nicotine (NICODERM) 7 MG/24HR PATCH 24 HR Apply 1 Patch to skin as directed every 24 hours. (Patient not taking: Reported on 11/11/2019) 30 Patch      No current facility-administered medications for this visit.         LABORATORY DATA:       PHYSICAL EXAMINATION:PE:      /64 (BP Location: Right arm, Patient Position: Sitting, BP Cuff Size: Adult)   Pulse 81   Temp 37.2 °C (99 °F) (Temporal)   Ht 1.702 m (5' 7\")   Wt 54.5 kg (120 lb 3.2 oz)   SpO2 97%   BMI 18.83 kg/m²        Constitutional: patient is oriented to person, place, and time. He appears well-developed and well-nourished. No distress  Eyes: Conjunctivae normal and EOM are normal. Pupils " are equal, round, and reactive to light.   Mouth/Throat: Lips without lesions, good dentition, oropharynx is clear and moist.  Neck: Trachea midline. Normal range of motion. Neck supple. No masses  Cardiovascular: Normal rate, regular rhythm, normal heart sounds and intact distal pulses. Plus murmur  Pulmonary/Chest: No respiratory distress. Unlabored respiratory effort, lungs clear to auscultation. No wheezes or rales.   Abdominal: Soft, non tender. BS + x 4. No masses or hepatosplenomegaly.   Musculoskeletal: Normal range of motion. No tenderness, swelling, erythema, deformity noted. picc line site clean.  Neurological: He is alert and oriented to person, place, and time. No cranial nerve deficit. Coordination normal.   Skin: Skin is warm and dry. Good turgor. No rashes visable.  Psychiatric: He has a normal mood and affect. His behavior is normal.        ASSESSMENT:  1. Acute bacterial endocarditis     2. History of methamphetamine use     3. Renal infarct (HCC)     4. Chronic hepatitis C without hepatic coma (HCC)          RECOMMENDATIONS:    At this time I would recommend to finish the course of vancomycin and rifampin through 11/22/2019.  He needs a JAYLYN before he finishes his antibiotics.  I would recommend giving him p.o. penicillin afterwards.  Continue to monitor the labs.  We will see him back in 2 weeks.    No follow-ups on file.

## 2019-11-12 ENCOUNTER — DOCUMENTATION (OUTPATIENT)
Dept: INFECTIOUS DISEASES | Facility: MEDICAL CENTER | Age: 64
End: 2019-11-12

## 2019-11-12 NOTE — PROGRESS NOTES
I spoke to Dr. Moser regarding getting a JAYLYN.  They will schedule the patient for JAYLYN prior to ending the antibiotics.

## 2019-11-13 ENCOUNTER — TELEPHONE (OUTPATIENT)
Dept: CARDIOLOGY | Facility: MEDICAL CENTER | Age: 64
End: 2019-11-13

## 2019-11-13 DIAGNOSIS — I35.8 ENDOCARDITIS OF AORTIC VALVE: ICD-10-CM

## 2019-11-13 NOTE — TELEPHONE ENCOUNTER
KRISHNA Gomez at [facility] for her to call back to schedule pt for a JAYLYN w/ Dr. Funk.

## 2019-11-14 ENCOUNTER — HOSPITAL ENCOUNTER (INPATIENT)
Dept: HOSPITAL 8 - ED | Age: 64
LOS: 3 days | Discharge: NURSING, REHAB OR LONG-TERM CARE | DRG: 198 | End: 2019-11-17
Attending: FAMILY MEDICINE | Admitting: HOSPITALIST
Payer: MEDICAID

## 2019-11-14 VITALS — BODY MASS INDEX: 19.93 KG/M2 | HEIGHT: 67 IN | WEIGHT: 126.99 LBS

## 2019-11-14 DIAGNOSIS — Z95.2: ICD-10-CM

## 2019-11-14 DIAGNOSIS — I10: ICD-10-CM

## 2019-11-14 DIAGNOSIS — J44.9: ICD-10-CM

## 2019-11-14 DIAGNOSIS — E78.5: ICD-10-CM

## 2019-11-14 DIAGNOSIS — R07.9: Primary | ICD-10-CM

## 2019-11-14 DIAGNOSIS — N40.0: ICD-10-CM

## 2019-11-14 DIAGNOSIS — B19.20: ICD-10-CM

## 2019-11-14 DIAGNOSIS — K21.9: ICD-10-CM

## 2019-11-14 DIAGNOSIS — Z87.891: ICD-10-CM

## 2019-11-14 DIAGNOSIS — D64.9: ICD-10-CM

## 2019-11-14 DIAGNOSIS — D72.829: ICD-10-CM

## 2019-11-14 DIAGNOSIS — I25.10: ICD-10-CM

## 2019-11-14 LAB
ALBUMIN SERPL-MCNC: 2.6 G/DL (ref 3.4–5)
ALP SERPL-CCNC: 99 U/L (ref 45–117)
ALT SERPL-CCNC: 18 U/L (ref 12–78)
ANION GAP SERPL CALC-SCNC: 5 MMOL/L (ref 5–15)
BASOPHILS # BLD AUTO: 0.01 X10^3/UL (ref 0–0.1)
BASOPHILS NFR BLD AUTO: 0 % (ref 0–1)
BILIRUB SERPL-MCNC: 0.2 MG/DL (ref 0.2–1)
CALCIUM SERPL-MCNC: 8.8 MG/DL (ref 8.5–10.1)
CHLORIDE SERPL-SCNC: 101 MMOL/L (ref 98–107)
CREAT SERPL-MCNC: 1.33 MG/DL (ref 0.7–1.3)
EOSINOPHIL # BLD AUTO: 0.38 X10^3/UL (ref 0–0.4)
EOSINOPHIL NFR BLD AUTO: 3 % (ref 1–7)
ERYTHROCYTE [DISTWIDTH] IN BLOOD BY AUTOMATED COUNT: 15.7 % (ref 9.4–14.8)
LYMPHOCYTES # BLD AUTO: 1.19 X10^3/UL (ref 1–3.4)
LYMPHOCYTES NFR BLD AUTO: 10 % (ref 22–44)
MCH RBC QN AUTO: 27.3 PG (ref 27.5–34.5)
MCHC RBC AUTO-ENTMCNC: 32.4 G/DL (ref 33.2–36.2)
MCV RBC AUTO: 84.2 FL (ref 81–97)
MD: NO
MONOCYTES # BLD AUTO: 0.33 X10^3/UL (ref 0.2–0.8)
MONOCYTES NFR BLD AUTO: 3 % (ref 2–9)
NEUTROPHILS # BLD AUTO: 10.07 X10^3/UL (ref 1.8–6.8)
NEUTROPHILS NFR BLD AUTO: 84 % (ref 42–75)
PLATELET # BLD AUTO: 251 X10^3/UL (ref 130–400)
PMV BLD AUTO: 7.6 FL (ref 7.4–10.4)
PROT SERPL-MCNC: 8.5 G/DL (ref 6.4–8.2)
RBC # BLD AUTO: 3.27 X10^6/UL (ref 4.38–5.82)
TROPONIN I SERPL-MCNC: 0.03 NG/ML (ref 0–0.04)

## 2019-11-14 PROCEDURE — 84439 ASSAY OF FREE THYROXINE: CPT

## 2019-11-14 PROCEDURE — 83036 HEMOGLOBIN GLYCOSYLATED A1C: CPT

## 2019-11-14 PROCEDURE — 85025 COMPLETE CBC W/AUTO DIFF WBC: CPT

## 2019-11-14 PROCEDURE — 80061 LIPID PANEL: CPT

## 2019-11-14 PROCEDURE — 80202 ASSAY OF VANCOMYCIN: CPT

## 2019-11-14 PROCEDURE — 87081 CULTURE SCREEN ONLY: CPT

## 2019-11-14 PROCEDURE — 80048 BASIC METABOLIC PNL TOTAL CA: CPT

## 2019-11-14 PROCEDURE — 84443 ASSAY THYROID STIM HORMONE: CPT

## 2019-11-14 PROCEDURE — 93306 TTE W/DOPPLER COMPLETE: CPT

## 2019-11-14 PROCEDURE — 83735 ASSAY OF MAGNESIUM: CPT

## 2019-11-14 PROCEDURE — 93005 ELECTROCARDIOGRAM TRACING: CPT

## 2019-11-14 PROCEDURE — 83880 ASSAY OF NATRIURETIC PEPTIDE: CPT

## 2019-11-14 PROCEDURE — 84484 ASSAY OF TROPONIN QUANT: CPT

## 2019-11-14 PROCEDURE — 85379 FIBRIN DEGRADATION QUANT: CPT

## 2019-11-14 PROCEDURE — 36415 COLL VENOUS BLD VENIPUNCTURE: CPT

## 2019-11-14 PROCEDURE — 71045 X-RAY EXAM CHEST 1 VIEW: CPT

## 2019-11-14 PROCEDURE — 71275 CT ANGIOGRAPHY CHEST: CPT

## 2019-11-14 PROCEDURE — 80053 COMPREHEN METABOLIC PANEL: CPT

## 2019-11-14 PROCEDURE — 99285 EMERGENCY DEPT VISIT HI MDM: CPT

## 2019-11-14 NOTE — NUR
PT BACK FROM CT. NAD NOTED. RESTING COMFORTABLY. DENIES CP, SOB. VSS. SR ON 
MONITOR. CALL LIGHT IN REACH. FALL PRECAUTIONS IN PLACE.

## 2019-11-14 NOTE — NUR
BEDSIDE REPORT AND CARE FROM DEMETRIO MG. ASSESSMENT COMPLETED. A&OX4. DR. PHAM 
AT BEDSIDE FOR EVALUATION, AWAITING ORDERS. PT REPORTS "EARLIER TODAY I WAS 
HAVING SOME SHARP PAINS IN MY CHEST, FELT DIZZY AND A LITTLE SHORT OF BREATH 
BUT NOW I FEEL FINE, THE PAIN AND DIZZINESS ARE GONE." CONT PULSE OX, BP, 
CARDIAC MONITORS IN PLACE. NSR ON MONITOR. VSS. DENIES CP, SOB, DIZZINESS, HA, 
N/V/D. CALL LIGHT IN REACH. FALL PRECAUTIONS IN PLACE. SIDE RAILS UPX2. A&OX4. 
ASSESSMENT COMPLETED.

## 2019-11-14 NOTE — NUR
PT IS TELE HOLD, HOSPITAL BED REQUESTED FOR COMFORT VS ED GURNEY PT CURRENTLY 
ON. RESTING IN POSITION OF COMFORT. ADMITTING PROVIDER AT BEDSIDE FOR 
EVALUATION. PT ASSISTED WITH URINAL, DECLINED TO AMBULATE TO RESTROOM. VSS. SR 
ON MONITOR. CONTINUES TO DENY ANY CP, SOB OR PAIN AT THIS TIME. CALL LIGHT IN 
REACH. FALL PRECAUTIONS IN PLACE.

## 2019-11-14 NOTE — NUR
DR. PHAM AT BEDSIDE DISCUSSING POC WITH PT. AWAITING CTA. RESTING COMFORTABLY, 
DOZING INTERMITTENTLY, VSS. DENIES ANY CP, SOB, DIZZINESS. NO PIV TO BE PLACED 
PER ERP, PT HAS PICC LINE IN PLACE PTA TO ER AND CT STAFF MAY USE PICC LINE PER 
DR. PHAM AND CT OKAY. CALL LIGHT IN REACH. FALL PRECAUTIONS IN PLACE. DENIES 
NEED TO USE RESTROOM.

## 2019-11-15 VITALS — SYSTOLIC BLOOD PRESSURE: 123 MMHG | DIASTOLIC BLOOD PRESSURE: 70 MMHG

## 2019-11-15 VITALS — DIASTOLIC BLOOD PRESSURE: 69 MMHG | SYSTOLIC BLOOD PRESSURE: 123 MMHG

## 2019-11-15 LAB
ALBUMIN SERPL-MCNC: 2.5 G/DL (ref 3.4–5)
ALP SERPL-CCNC: 92 U/L (ref 45–117)
ALT SERPL-CCNC: 15 U/L (ref 12–78)
ANION GAP SERPL CALC-SCNC: 5 MMOL/L (ref 5–15)
BASOPHILS # BLD AUTO: 0.03 X10^3/UL (ref 0–0.1)
BASOPHILS NFR BLD AUTO: 0 % (ref 0–1)
BILIRUB SERPL-MCNC: 0.2 MG/DL (ref 0.2–1)
CALCIUM SERPL-MCNC: 8.4 MG/DL (ref 8.5–10.1)
CHLORIDE SERPL-SCNC: 105 MMOL/L (ref 98–107)
CHOL/HDL RATIO: 4.4
CREAT SERPL-MCNC: 1.21 MG/DL (ref 0.7–1.3)
EOSINOPHIL # BLD AUTO: 0.4 X10^3/UL (ref 0–0.4)
EOSINOPHIL NFR BLD AUTO: 4 % (ref 1–7)
ERYTHROCYTE [DISTWIDTH] IN BLOOD BY AUTOMATED COUNT: 16.2 % (ref 9.4–14.8)
EST. AVERAGE GLUCOSE BLD GHB EST-MCNC: 108 MG/DL (ref 0–126)
HBA1C MFR BLD: 5.4 % (ref 4.2–6.3)
HDL CHOL %: 23 % (ref 26–37)
HDL CHOLESTEROL (DIRECT): 36 MG/DL (ref 40–60)
LDL CHOLESTEROL,CALCULATED: 94 MG/DL (ref 54–169)
LDLC/HDLC SERPL: 2.6 {RATIO} (ref 0.5–3)
LYMPHOCYTES # BLD AUTO: 1.76 X10^3/UL (ref 1–3.4)
LYMPHOCYTES NFR BLD AUTO: 16 % (ref 22–44)
MCH RBC QN AUTO: 27.5 PG (ref 27.5–34.5)
MCHC RBC AUTO-ENTMCNC: 32.7 G/DL (ref 33.2–36.2)
MCV RBC AUTO: 84.2 FL (ref 81–97)
MD: NO
MONOCYTES # BLD AUTO: 0.27 X10^3/UL (ref 0.2–0.8)
MONOCYTES NFR BLD AUTO: 2 % (ref 2–9)
NEUTROPHILS # BLD AUTO: 8.61 X10^3/UL (ref 1.8–6.8)
NEUTROPHILS NFR BLD AUTO: 78 % (ref 42–75)
PLATELET # BLD AUTO: 255 X10^3/UL (ref 130–400)
PMV BLD AUTO: 7.8 FL (ref 7.4–10.4)
PROT SERPL-MCNC: 7.8 G/DL (ref 6.4–8.2)
RBC # BLD AUTO: 3.17 X10^6/UL (ref 4.38–5.82)
T4 FREE SERPL-MCNC: 0.95 NG/DL (ref 0.76–1.46)
TRIGL SERPL-MCNC: 148 MG/DL (ref 50–200)
TROPONIN I SERPL-MCNC: 0.04 NG/ML (ref 0–0.04)
TROPONIN I SERPL-MCNC: 0.07 NG/ML (ref 0–0.04)
TROPONIN I SERPL-MCNC: 0.15 NG/ML (ref 0–0.04)
TROPONIN I SERPL-MCNC: 0.15 NG/ML (ref 0–0.04)
VLDLC SERPL CALC-MCNC: 30 MG/DL (ref 0–25)

## 2019-11-15 RX ADMIN — METOPROLOL TARTRATE SCH MG: 25 TABLET, FILM COATED ORAL at 20:41

## 2019-11-15 RX ADMIN — HEPARIN SODIUM SCH UNITS: 5000 INJECTION, SOLUTION INTRAVENOUS; SUBCUTANEOUS at 17:58

## 2019-11-15 RX ADMIN — METOPROLOL TARTRATE SCH MG: 25 TABLET, FILM COATED ORAL at 09:00

## 2019-11-15 RX ADMIN — LISINOPRIL SCH MG: 40 TABLET ORAL at 09:53

## 2019-11-15 RX ADMIN — FAMOTIDINE SCH MG: 20 TABLET, FILM COATED ORAL at 20:41

## 2019-11-15 RX ADMIN — HEPARIN SODIUM SCH UNITS: 5000 INJECTION, SOLUTION INTRAVENOUS; SUBCUTANEOUS at 09:53

## 2019-11-15 RX ADMIN — ASPIRIN SCH MG: 325 TABLET, DELAYED RELEASE ORAL at 06:00

## 2019-11-15 RX ADMIN — HEPARIN SODIUM SCH UNITS: 5000 INJECTION, SOLUTION INTRAVENOUS; SUBCUTANEOUS at 01:34

## 2019-11-15 NOTE — NUR
PATIENT MEDICATED PER EMAR, HELD METOPROLOL DUE TO LOW BP AT 95/51. 

VERIFIED WITH ADMITTING MD OKAY TO EAT, ATE BREAKFAST. PATIENT BACK TO REST, 
CALL LIGHT IN REACH.

## 2019-11-15 NOTE — NUR
PT RESTING ON HOSPITAL BED WITH EYES CLOSED. RESPIRATIONS EVEN AND UNLABORED. 
NO ACUTE DISTRESS. NO REQUESTS AT THIS TIME.

## 2019-11-15 NOTE — NUR
HOSPITAL BED RECEIVED FOR COMFORT, PT TRANSFERRED TO HOSPITAL, AMBULATED TO 
STAND AND TRANSFER SELF WITH MINIMAL ASSISTANCE. PT ALSO STOOD AT BEDSIDE AND 
USED URINAL. RESTING IN POSITION OF COMFORT. CONTINUES TO DENY CP, SOB AND ANY 
PAIN OR DIZZINESS. VSS. SR ON MONITOR. CALL LIGHT IN REACH. FALL PRECAUTIONS IN 
PLACE, SIDE RAILS UP. PT CONT. TO BE TELE HOLD, AWAITING ROOM ASSIGNMENT ON 
FLOOR. REMAINS A&OX4.

## 2019-11-15 NOTE — NUR
PT RESTING ON HOSPITAL BED. RESPIRATIONS EVEN AND UNLABORED. NO ACUTE DISTRESS. 
NO REQUESTS AT THIS TIME.

## 2019-11-16 VITALS — DIASTOLIC BLOOD PRESSURE: 83 MMHG | SYSTOLIC BLOOD PRESSURE: 146 MMHG

## 2019-11-16 VITALS — DIASTOLIC BLOOD PRESSURE: 77 MMHG | SYSTOLIC BLOOD PRESSURE: 134 MMHG

## 2019-11-16 VITALS — DIASTOLIC BLOOD PRESSURE: 66 MMHG | SYSTOLIC BLOOD PRESSURE: 118 MMHG

## 2019-11-16 VITALS — SYSTOLIC BLOOD PRESSURE: 128 MMHG | DIASTOLIC BLOOD PRESSURE: 77 MMHG

## 2019-11-16 LAB
<PLATELET ESTIMATE>: ADEQUATE
<PLT MORPHOLOGY>: (no result)
ANION GAP SERPL CALC-SCNC: 6 MMOL/L (ref 5–15)
ANISOCYTOSIS BLD QL SMEAR: (no result)
BAND#(MANUAL): 0.1 X10^3/UL
CALCIUM SERPL-MCNC: 8.7 MG/DL (ref 8.5–10.1)
CHLORIDE SERPL-SCNC: 105 MMOL/L (ref 98–107)
CREAT SERPL-MCNC: 1.24 MG/DL (ref 0.7–1.3)
EOS#(MANUAL): 0.69 X10^3/UL (ref 0–0.4)
EOS% (MANUAL): 7 % (ref 1–7)
ERYTHROCYTE [DISTWIDTH] IN BLOOD BY AUTOMATED COUNT: 16.1 % (ref 9.4–14.8)
LYMPH#(MANUAL): 1.47 X10^3/UL (ref 1–3.4)
LYMPHS% (MANUAL): 15 % (ref 22–44)
MCH RBC QN AUTO: 27.4 PG (ref 27.5–34.5)
MCHC RBC AUTO-ENTMCNC: 32.2 G/DL (ref 33.2–36.2)
MCV RBC AUTO: 85.1 FL (ref 81–97)
MD: YES
MONOS#(MANUAL): 0.98 X10^3/UL (ref 0.3–2.7)
MONOS% (MANUAL): 10 % (ref 2–9)
NEUTS BAND NFR BLD: 1 % (ref 0–7)
PLATELET # BLD AUTO: 274 X10^3/UL (ref 130–400)
PMV BLD AUTO: 7.8 FL (ref 7.4–10.4)
POLYCHROMASIA BLD QL SMEAR: (no result)
RBC # BLD AUTO: 3.19 X10^6/UL (ref 4.38–5.82)
SEG#(MANUAL): 6.57 X10^3/UL (ref 1.8–6.8)
SEGS% (MANUAL): 67 % (ref 42–75)

## 2019-11-16 RX ADMIN — HEPARIN SODIUM SCH UNITS: 5000 INJECTION, SOLUTION INTRAVENOUS; SUBCUTANEOUS at 18:20

## 2019-11-16 RX ADMIN — CHLORHEXIDINE GLUCONATE 0.12% ORAL RINSE SCH ML: 1.2 LIQUID ORAL at 22:35

## 2019-11-16 RX ADMIN — METOPROLOL TARTRATE SCH MG: 25 TABLET, FILM COATED ORAL at 22:21

## 2019-11-16 RX ADMIN — CARVEDILOL SCH MG: 12.5 TABLET, FILM COATED ORAL at 18:21

## 2019-11-16 RX ADMIN — HEPARIN SODIUM SCH UNITS: 5000 INJECTION, SOLUTION INTRAVENOUS; SUBCUTANEOUS at 02:21

## 2019-11-16 RX ADMIN — FAMOTIDINE SCH MG: 20 TABLET, FILM COATED ORAL at 22:20

## 2019-11-16 RX ADMIN — LISINOPRIL SCH MG: 40 TABLET ORAL at 08:52

## 2019-11-16 RX ADMIN — ASPIRIN SCH MG: 325 TABLET, DELAYED RELEASE ORAL at 05:18

## 2019-11-16 RX ADMIN — HEPARIN SODIUM SCH UNITS: 5000 INJECTION, SOLUTION INTRAVENOUS; SUBCUTANEOUS at 10:41

## 2019-11-16 RX ADMIN — METOPROLOL TARTRATE SCH MG: 25 TABLET, FILM COATED ORAL at 08:52

## 2019-11-17 VITALS — SYSTOLIC BLOOD PRESSURE: 96 MMHG | DIASTOLIC BLOOD PRESSURE: 53 MMHG

## 2019-11-17 VITALS — DIASTOLIC BLOOD PRESSURE: 77 MMHG | SYSTOLIC BLOOD PRESSURE: 144 MMHG

## 2019-11-17 VITALS — SYSTOLIC BLOOD PRESSURE: 118 MMHG | DIASTOLIC BLOOD PRESSURE: 68 MMHG

## 2019-11-17 VITALS — SYSTOLIC BLOOD PRESSURE: 113 MMHG | DIASTOLIC BLOOD PRESSURE: 65 MMHG

## 2019-11-17 RX ADMIN — ASPIRIN SCH MG: 325 TABLET, DELAYED RELEASE ORAL at 05:40

## 2019-11-17 RX ADMIN — CARVEDILOL SCH MG: 12.5 TABLET, FILM COATED ORAL at 05:41

## 2019-11-17 RX ADMIN — CHLORHEXIDINE GLUCONATE 0.12% ORAL RINSE SCH ML: 1.2 LIQUID ORAL at 10:10

## 2019-11-17 RX ADMIN — LISINOPRIL SCH MG: 40 TABLET ORAL at 10:21

## 2019-11-17 RX ADMIN — METOPROLOL TARTRATE SCH MG: 25 TABLET, FILM COATED ORAL at 10:21

## 2019-11-17 RX ADMIN — HEPARIN SODIUM SCH UNITS: 5000 INJECTION, SOLUTION INTRAVENOUS; SUBCUTANEOUS at 02:00

## 2019-11-17 RX ADMIN — HEPARIN SODIUM SCH UNITS: 5000 INJECTION, SOLUTION INTRAVENOUS; SUBCUTANEOUS at 10:10

## 2019-11-25 ENCOUNTER — OFFICE VISIT (OUTPATIENT)
Dept: INFECTIOUS DISEASES | Facility: MEDICAL CENTER | Age: 64
End: 2019-11-25
Payer: MEDICAID

## 2019-11-25 VITALS
DIASTOLIC BLOOD PRESSURE: 60 MMHG | RESPIRATION RATE: 16 BRPM | HEART RATE: 77 BPM | SYSTOLIC BLOOD PRESSURE: 110 MMHG | OXYGEN SATURATION: 99 % | TEMPERATURE: 99.3 F

## 2019-11-25 DIAGNOSIS — Z95.2 S/P TAVR (TRANSCATHETER AORTIC VALVE REPLACEMENT): ICD-10-CM

## 2019-11-25 DIAGNOSIS — I38 PROSTHETIC VALVE ENDOCARDITIS, SUBSEQUENT ENCOUNTER: ICD-10-CM

## 2019-11-25 DIAGNOSIS — B18.2 CHRONIC HEPATITIS C WITHOUT HEPATIC COMA (HCC): ICD-10-CM

## 2019-11-25 DIAGNOSIS — T82.6XXD PROSTHETIC VALVE ENDOCARDITIS, SUBSEQUENT ENCOUNTER: ICD-10-CM

## 2019-11-25 PROCEDURE — 99214 OFFICE O/P EST MOD 30 MIN: CPT | Performed by: INTERNAL MEDICINE

## 2019-11-25 RX ORDER — PREDNISONE 20 MG/1
20 TABLET ORAL DAILY
COMMUNITY
End: 2020-01-24

## 2019-11-25 RX ORDER — AMLODIPINE BESYLATE 2.5 MG/1
2.5 TABLET ORAL DAILY
COMMUNITY
End: 2020-05-19

## 2019-11-25 ASSESSMENT — ENCOUNTER SYMPTOMS
DIARRHEA: 0
ABDOMINAL PAIN: 0
CHILLS: 0
NAUSEA: 0
FEVER: 0
DIZZINESS: 0
HEADACHES: 0
VOMITING: 0
SHORTNESS OF BREATH: 0
COUGH: 0

## 2019-11-25 ASSESSMENT — PAIN SCALES - GENERAL: PAINLEVEL: NO PAIN

## 2019-11-25 NOTE — PROGRESS NOTES
Infectious Disease Follow up Note      Subjective:     Chief Complaint   Patient presents with   • Follow-Up     Acute bacterial endocarditis     Interval History:   Mr. Espinosa is a 64-year-old  homeless male with a history of chronic hepatitis C, a TAVR who was previously seen by the ID service in the past for epididymitis, prostatitis and seminal abscess.  He underwent his TAVR on 3/18/2019 for severe symptomatic aortic stenosis.  Patient was admitted in October for abdominal pain.  CT scan showed renal infarcts.  TTE showed a vegetation on his prosthetic valve and his blood cultures on 10/10/2019 were positive for Rothia mucilaginosa (penicillin sensitive).  Repeat blood cultures on 10/11/2019 were negative.  Patient was transferred to skilled nursing facility to complete a 6-week course of IV vancomycin and oral rifampin through plan stop date of 11/22/2019 followed by plans for p.o. penicillin thereafter..  There were plans for a JAYLYN as well.    Hospital records reviewed.    Patient here for follow-up today.  Patient states he continues to be on vancomycin and rifampin.  However his PICC line was removed last week.  Patient denies any fevers, chills, nausea, vomiting, abdominal pain or diarrhea.  He denies any cough, chest pain or shortness of breath.  He has not followed up with his cardiologist for a JAYLYN.  No recent labs for review.  He states that he will be discharged from the nursing facility once he completes his IV antibiotics and then subsequently will move to [place] where he has much more support.    Review of Systems   Constitutional: Negative for chills and fever.   Respiratory: Negative for cough and shortness of breath.    Cardiovascular: Negative for chest pain.   Gastrointestinal: Negative for abdominal pain, diarrhea, nausea and vomiting.   Musculoskeletal: Positive for joint pain.   Neurological: Negative for dizziness and headaches.   All other systems reviewed and are  "negative.      Past Medical History:   Diagnosis Date   • Abnormality of left ventricle of heart     \"constricted left ventricle\"    • Aortic stenosis    • BPH (benign prostatic hyperplasia)    • Hypercholesteremia    • Hypertension    Bacterial endocarditis    Past Surgical History:   Procedure Laterality Date   • TRANSCATHETER AORTIC VALVE REPLACEMENT N/A 3/18/2019    Procedure: REPLACEMENT, AORTIC VALVE, TRANSCATHETER;  Surgeon: Dariel Moser M.D.;  Location: SURGERY [facility];  Service: Cardiac   • JAYLYN  3/18/2019    Procedure: ECHOCARDIOGRAM, TRANSESOPHAGEAL;  Surgeon: Dariel Moser M.D.;  Location: SURGERY [facility];  Service: Cardiac   • TONSILLECTOMY     • ZZZ CARDIAC CATH         Allergies: No Known Allergies      Medications:  Current Outpatient Medications on File Prior to Visit   Medication Sig Dispense Refill   • riFAMPin (RIFADINE) 300 MG Cap Take 300 mg by mouth 2 times a day.     • metoprolol (LOPRESSOR) 25 MG Tab Take 25 mg by mouth 2 times a day.     • acetaminophen (TYLENOL) 325 MG Tab Take 2 Tabs by mouth every 6 hours as needed. 30 Tab 0   • atorvastatin (LIPITOR) 40 MG Tab Take 1 Tab by mouth every evening. (Patient not taking: Reported on 11/11/2019) 30 Tab    • lisinopril (PRINIVIL, ZESTRIL) 40 MG tablet Take 1 Tab by mouth every day. 30 Tab    • Vancomycin HCl (MD ALERT...VANCOMYCIN PER PHARMACY) 1 Each by Other route PHARMACY TO DOSE.     • nicotine (NICODERM) 7 MG/24HR PATCH 24 HR Apply 1 Patch to skin as directed every 24 hours. (Patient not taking: Reported on 11/11/2019) 30 Patch      No current facility-administered medications on file prior to visit.        ROS  As documented above in my HPI       Objective:     PE:  /60 (BP Location: Right arm, Patient Position: Sitting, BP Cuff Size: Adult)   Pulse 77   Temp 37.4 °C (99.3 °F) (Temporal)   Resp 16   SpO2 99%      Vital signs reviewed  Constitutional: patient is oriented to person, place, and time. Appears " well-developed and well-nourished. No distress  Eyes: Conjunctivae normal and EOM are normal. Pupils are equal, round, and reactive to light.   Mouth/Throat: Lips without lesions, good dentition, oropharynx is clear and moist.  Neck: Trachea midline. Normal range of motion. Neck supple. No masses  Cardiovascular: Normal rate, regular rhythm, +intact distal pulses. +murmur, but no gallop, or friction rub. No edema.  Pulmonary/Chest: No respiratory distress. Unlabored respiratory effort, lungs clear to auscultation. No wheezes or rales.   Abdominal: Soft, non tender. BS + x 4. No masses or hepatosplenomegaly.   Musculoskeletal: Normal range of motion. No tenderness, swelling, erythema, deformity noted.  Neurological: alert and oriented to person, place, and time. No cranial nerve deficit. Coordination normal. Moves all extremities  Skin: Skin is warm and dry. Good turgor. No rashes visable.  Psychiatric: Normal mood and affect. Behavior is normal.     LABS:  WBC   Date/Time Value Ref Range Status   10/17/2019 02:05 AM 14.7 (H) 4.8 - 10.8 K/uL Final     RBC   Date/Time Value Ref Range Status   10/17/2019 02:05 AM 2.90 (L) 4.70 - 6.10 M/uL Final     Hemoglobin   Date/Time Value Ref Range Status   10/17/2019 02:05 AM 8.1 (L) 14.0 - 18.0 g/dL Final     Hematocrit   Date/Time Value Ref Range Status   10/17/2019 02:05 AM 24.4 (L) 42.0 - 52.0 % Final     MCV   Date/Time Value Ref Range Status   10/17/2019 02:05 AM 84.1 81.4 - 97.8 fL Final     MCH   Date/Time Value Ref Range Status   10/17/2019 02:05 AM 27.9 27.0 - 33.0 pg Final     MCHC   Date/Time Value Ref Range Status   10/17/2019 02:05 AM 33.2 (L) 33.7 - 35.3 g/dL Final     MPV   Date/Time Value Ref Range Status   10/17/2019 02:05 AM 8.9 (L) 9.0 - 12.9 fL Final        Sodium   Date/Time Value Ref Range Status   10/17/2019 02:05  (L) 135 - 145 mmol/L Final     Potassium   Date/Time Value Ref Range Status   10/17/2019 02:05 AM 3.8 3.6 - 5.5 mmol/L Final     Chloride    Date/Time Value Ref Range Status   10/17/2019 02:05 AM 97 96 - 112 mmol/L Final     Co2   Date/Time Value Ref Range Status   10/17/2019 02:05 AM 26 20 - 33 mmol/L Final     Glucose   Date/Time Value Ref Range Status   10/17/2019 02:05 AM 86 65 - 99 mg/dL Final     Bun   Date/Time Value Ref Range Status   10/17/2019 02:05 AM 12 8 - 22 mg/dL Final     Creatinine   Date/Time Value Ref Range Status   10/17/2019 02:05 AM 0.80 0.50 - 1.40 mg/dL Final     Alkaline Phosphatase   Date/Time Value Ref Range Status   10/17/2019 02:05 AM 69 30 - 99 U/L Final     AST(SGOT)   Date/Time Value Ref Range Status   10/17/2019 02:05 AM 15 12 - 45 U/L Final     ALT(SGPT)   Date/Time Value Ref Range Status   10/17/2019 02:05 AM 10 2 - 50 U/L Final     Total Bilirubin   Date/Time Value Ref Range Status   10/17/2019 02:05 AM 0.3 0.1 - 1.5 mg/dL Final        No results found for: CPKTOTAL     MICRO:  Blood Culture Hold   Date Value Ref Range Status   03/14/2019 Collected  Final        IMAGING STUDIES:  TTE 10/10/19  CONCLUSIONS  Hyperdynamic left ventricular systolic function.  Left ventricular ejection fraction is visually estimated to be greater   than 75%.  Severe concentric left ventricular hypertrophy.  Known TAVR aortic valve with normal leaflet excursion with increased   gradients most likely related to hyperdynamic flow.  Echodensity prosthetic valve leaflet 0.7 x 0.9 cm.  Compared to the images of the study done 3/29/2019 there is an   echodensity of the prosthetic valve leaflet seen on current study   however there is no comparable view on the previous study; increased   prosthetic valve gradients related to increased velocity due to change   in hyperdynamic left ventricular contractility.     Assessment/Plan:     Problem List Items Addressed This Visit     Hepatitis C (Chronic). Outpatient GI evaluation    S/P TAVR (transcatheter aortic valve replacement)      Other Visit Diagnoses     Prosthetic valve endocarditis, subsequent  encounter.  Patient was supposed to complete IV vancomycin and p.o. rifampin on 11/22/2019 but apparently continues on these medications at this time.  He has completed a 6-week course of IV antibiotics and thus can discontinue vancomycin and rifampin today.  We will need to obtain latest labs for review.  Will start p.o. penicillin  mg twice daily for chronic suppression.  Anticipate 3 to 6 months and will reevaluate.  He needs to follow-up with his cardiologist for a JAYLYN.          Follow up: 2 months. RTC sooner if needed. FU with PCP for ongoing chronic medical conditions.     Myrtle Vincent M.D.

## 2019-11-26 NOTE — TELEPHONE ENCOUNTER
KRISHNA Mesa @ [facility] [phone] ext [phone] to c/b to schedule JAYLYN per Dr. Moser's request and to be done with Dr. Funk.

## 2019-11-27 ENCOUNTER — TELEPHONE (OUTPATIENT)
Dept: CARDIOLOGY | Facility: MEDICAL CENTER | Age: 64
End: 2019-11-27

## 2019-11-27 NOTE — TELEPHONE ENCOUNTER
Patient is scheduled on 12-13-19 for a JAYLYN w/Dr. Funk per Dr. Moser's request. No meds to stop and patient to check in at 12:00 for a 2:00 procedure. Updated H&P to be done on admit by NP. Pre admit to call. Patient is at [facility]. Speak to [place] [phone] ext [phone].

## 2019-12-12 NOTE — TELEPHONE ENCOUNTER
Notified HARMAN Caballero to do updated H&P on admit on 12-13-19 at 12:00 for JAYLYN w/Dr. Funk ordered by Dr. Moser.

## 2020-01-24 ENCOUNTER — APPOINTMENT (OUTPATIENT)
Dept: RADIOLOGY | Facility: MEDICAL CENTER | Age: 65
End: 2020-01-24
Attending: EMERGENCY MEDICINE
Payer: MEDICAID

## 2020-01-24 ENCOUNTER — HOSPITAL ENCOUNTER (EMERGENCY)
Facility: MEDICAL CENTER | Age: 65
End: 2020-01-24
Attending: EMERGENCY MEDICINE
Payer: MEDICAID

## 2020-01-24 VITALS
RESPIRATION RATE: 16 BRPM | BODY MASS INDEX: 19.61 KG/M2 | HEIGHT: 68 IN | HEART RATE: 83 BPM | TEMPERATURE: 98.2 F | DIASTOLIC BLOOD PRESSURE: 80 MMHG | OXYGEN SATURATION: 95 % | WEIGHT: 129.41 LBS | SYSTOLIC BLOOD PRESSURE: 139 MMHG

## 2020-01-24 DIAGNOSIS — J06.9 UPPER RESPIRATORY TRACT INFECTION, UNSPECIFIED TYPE: ICD-10-CM

## 2020-01-24 PROCEDURE — 99284 EMERGENCY DEPT VISIT MOD MDM: CPT | Mod: 25

## 2020-01-24 PROCEDURE — 71045 X-RAY EXAM CHEST 1 VIEW: CPT

## 2020-01-24 RX ORDER — FUROSEMIDE 20 MG/1
20 TABLET ORAL 2 TIMES DAILY
COMMUNITY
End: 2020-05-19

## 2020-01-24 RX ORDER — POTASSIUM CHLORIDE 750 MG/1
10 CAPSULE, EXTENDED RELEASE ORAL DAILY
COMMUNITY
Start: 2019-12-17 | End: 2020-05-19

## 2020-01-24 SDOH — HEALTH STABILITY: MENTAL HEALTH: HOW MANY STANDARD DRINKS CONTAINING ALCOHOL DO YOU HAVE ON A TYPICAL DAY?: 1 OR 2

## 2020-01-24 SDOH — HEALTH STABILITY: MENTAL HEALTH: HOW OFTEN DO YOU HAVE 6 OR MORE DRINKS ON ONE OCCASION?: NEVER

## 2020-01-24 SDOH — HEALTH STABILITY: MENTAL HEALTH: HOW OFTEN DO YOU HAVE A DRINK CONTAINING ALCOHOL?: MONTHLY OR LESS

## 2020-01-24 NOTE — ED TRIAGE NOTES
Chief Complaint   Patient presents with   • Flu Like Symptoms     x2 days   • Cough     productive, clear musous   • Generalized Body Aches     Patient to triage via wheelchaisr, patient A&O x4.  Mask applied, verbalized understanding.      Explained wait time and triage process to pt. Pt placed back in lobby, told to notify ED tech or triage RN of any changes, verbalized understanding.

## 2020-01-24 NOTE — ED NOTES
Called to patient’s bedside per nursing request for c/o tachypnea (RR>30) on 4LNC and pt stating his abdomen is “soft” and he can’t lie on it. Upon arrival O2 sat unable to be obtained respiratory called STAT and high flow placed temporarily until BIPAP placed. O2 sat on high flow noted to be 70% improved to mid 90s on BIPAP, but pt still tachypneic. BP 130s/90s, HR 120s. CCU fellow called to assess pt as pt known to him from admission. Abd exam noted to be diffusely tender and distended. Lasix 40mg IVP x 2 given, rectal temp 100.8F. Gen Surgery team called to assess pt and STAT abd Xray and CXR reviewed by them with no acute abdominal intervention at this time. STAT labs sent and labs revealed Lactate 3.4(from 2.5) with rest of labs still pending. Decision was made to move pt to CCU for closer monitoring. VSS.  Discharge instructions given- verbalizes understanding.   Ambulatory to lobby with steady gait.      Called to patient’s bedside per nursing request for c/o tachypnea (RR>30) on 4LNC and pt stating his abdomen is “soft” and he can’t lie on it. Upon arrival O2 sat unable to be obtained respiratory called STAT and high flow placed temporarily until BIPAP placed. O2 sat on high flow noted to be 70% improved to mid 90s on BIPAP, but pt still tachypneic. BP 130s/90s, HR 120s. CCU fellow called to assess pt as pt known to him from admission. Abd exam noted to be diffusely tender and distended. Lasix 40mg IVP x 2 given, rectal temp 100.8F. Gen Surgery team called to assess pt and STAT abd Xray and CXR reviewed by them with no acute abdominal intervention at this time. STAT labs sent and labs revealed Lactate 3.4(from 2.5) with rest of labs still pending. IVF ordered for 100cc/hr as per fellow. Decision was made to move pt to CCU for closer monitoring.

## 2020-01-24 NOTE — ED PROVIDER NOTES
"ED Provider Note    CHIEF COMPLAINT  Chief Complaint   Patient presents with   • Flu Like Symptoms     x2 days   • Cough     productive, clear musous   • Generalized Body Aches       HPI  Sylvain Espinosa is a 64 y.o. male who presents with cough, congestion, runny nose.  Symptoms started 2 or 3 days ago.  Cough is productive of clear mucus.  Has body aches.  He still felt slightly nauseous and has a sore throat.  States he does smoke cigarettes, but stopped when he started getting sick.  He denies having chest pain.  No shortness of breath.  No leg swelling leg pain orthopnea or PND.  No abdominal pain.    Medical records reviewed.  Patient was admitted to this hospital on 10/17 with weakness and identified to have what appears to be bacterial endocarditis.  History of hepatitis C, TAVR, prostatitis, epididymitis, renal infarct.  Transferred to a SNF.  Completed 6 weeks course of vancomycin and rifampin.  After completion of IV antibiotics the patient was planned to be on penicillin 500 mg twice daily for chronic suppression.  Patient reports he has been compliant with this.    REVIEW OF SYSTEMS  As per HPI, otherwise a 10 point review of systems is negative    PAST MEDICAL HISTORY  Past Medical History:   Diagnosis Date   • Abnormality of left ventricle of heart     \"constricted left ventricle\"    • Aortic stenosis    • BPH (benign prostatic hyperplasia)    • Hypercholesteremia    • Hypertension        SOCIAL HISTORY  Social History     Tobacco Use   • Smoking status: Current Some Day Smoker     Packs/day: 0.50     Years: 40.00     Pack years: 20.00     Types: Cigarettes   • Smokeless tobacco: Never Used   Substance Use Topics   • Alcohol use: Yes     Frequency: Monthly or less     Drinks per session: 1 or 2     Binge frequency: Never     Comment: occ   • Drug use: Yes     Types: Inhaled, Marijuana     Comment: marijuana current,  meth last use past month       SURGICAL HISTORY  Past Surgical History: " "  Procedure Laterality Date   • TRANSCATHETER AORTIC VALVE REPLACEMENT N/A 3/18/2019    Procedure: REPLACEMENT, AORTIC VALVE, TRANSCATHETER;  Surgeon: Dariel Moser M.D.;  Location: SURGERY [facility];  Service: Cardiac   • JAYLYN  3/18/2019    Procedure: ECHOCARDIOGRAM, TRANSESOPHAGEAL;  Surgeon: Dariel Moser M.D.;  Location: SURGERY [facility];  Service: Cardiac   • TONSILLECTOMY     • ZZZ CARDIAC CATH         CURRENT MEDICATIONS  Home Medications     Reviewed by Hailey Chau R.N. (Registered Nurse) on 01/24/20 at 1312  Med List Status: Partial   Medication Last Dose Status   acetaminophen (TYLENOL) 325 MG Tab prn Active   amLODIPine (NORVASC) 2.5 MG Tab 1/23/2020 Active   atorvastatin (LIPITOR) 40 MG Tab 1/23/2020 Active   furosemide (LASIX) 20 MG Tab 1/23/2020 Active   lisinopril (PRINIVIL, ZESTRIL) 40 MG tablet 1/23/2020 Active   metoprolol (LOPRESSOR) 25 MG Tab 1/23/2020 Active   nicotine (NICODERM) 7 MG/24HR PATCH 24 HR  Active   POTASSIUM PO 1/23/2020 Active   riFAMPin (RIFADINE) 300 MG Cap not taking Active   Vancomycin HCl (MD ALERT...VANCOMYCIN PER PHARMACY) not taking Active                ALLERGIES  No Known Allergies    PHYSICAL EXAM  VITAL SIGNS: /82   Pulse 96   Temp 36.8 °C (98.2 °F) (Temporal)   Resp 18   Ht 1.715 m (5' 7.5\")   Wt 58.7 kg (129 lb 6.6 oz)   SpO2 97%   BMI 19.97 kg/m²    Constitutional: Awake and alert  HENT:  Atraumatic, Normocephalic.nares with mucosal edema bilaterally.  No purulent discharge.  Pharynx mild erythema no exudate asymmetry or swelling.  Eyes: Normal inspection  Neck: Supple  Cardiovascular: Normal heart rate, Normal rhythm.  Symmetric peripheral pulses.   Thorax & Lungs: No respiratory distress, No wheezing, No rales, No rhonchi, No chest tenderness.   Abdomen: Bowel sounds normal, soft, non-distended, nontender, no mass  Skin: Warm, Dry, No rash.   Back: No tenderness, No CVA tenderness.   Extremities: No clubbing, cyanosis, edema, no " Homans or cords   Neurologic: Grossly normal   Psychiatric: Anxious appearing    RADIOLOGY/PROCEDURES  DX-CHEST-PORTABLE (1 VIEW)   Final Result      Linear bibasilar atelectasis.           Imaging is interpreted by radiologist    COURSE & MEDICAL DECISION MAKING  Patient presents with cough, congestion runny nose and body aches.  He presents after 48 hours of symptoms and is out of time window for treatment of influenza should this be the cause of his symptoms.  His vital signs are stable.  Not hypoxic.  I obtained a chest x-ray and is negative for focal infiltrate.  At this point this appears to be a viral illness.  Of advised push fluids, Tylenol and/or ibuprofen as needed.  He will continue his penicillin prophylaxis.  I have given strict precautions for him to return the ER for any difficulty breathing, worsening symptoms, new or concerning symptoms.    FINAL IMPRESSION  1.  Viral upper respiratory infection      This dictation was created using voice recognition software. The accuracy of the dictation is limited to the abilities of the software.  The nursing notes were reviewed and certain aspects of this information were incorporated into this note.      Electronically signed by: Carlos Mcknight M.D., 1/24/2020 1:52 PM

## 2020-01-28 ENCOUNTER — DOCUMENTATION (OUTPATIENT)
Dept: INFECTIOUS DISEASES | Facility: MEDICAL CENTER | Age: 65
End: 2020-01-28

## 2020-01-28 ENCOUNTER — TELEPHONE (OUTPATIENT)
Dept: INFECTIOUS DISEASES | Facility: MEDICAL CENTER | Age: 65
End: 2020-01-28

## 2020-01-28 NOTE — TELEPHONE ENCOUNTER
Called and LM for pt to return my call to reschedule a missed follow up appointment. Pt is homeless and has no phone. LM with pt's mothers phone machine. HAYDER

## 2020-03-21 ENCOUNTER — HOSPITAL ENCOUNTER (EMERGENCY)
Facility: MEDICAL CENTER | Age: 65
End: 2020-03-21
Attending: EMERGENCY MEDICINE
Payer: MEDICAID

## 2020-03-21 ENCOUNTER — APPOINTMENT (OUTPATIENT)
Dept: RADIOLOGY | Facility: MEDICAL CENTER | Age: 65
End: 2020-03-21
Attending: EMERGENCY MEDICINE
Payer: MEDICAID

## 2020-03-21 VITALS
SYSTOLIC BLOOD PRESSURE: 98 MMHG | OXYGEN SATURATION: 96 % | HEART RATE: 67 BPM | WEIGHT: 122.8 LBS | RESPIRATION RATE: 18 BRPM | TEMPERATURE: 97.6 F | DIASTOLIC BLOOD PRESSURE: 50 MMHG | HEIGHT: 67 IN | BODY MASS INDEX: 19.27 KG/M2

## 2020-03-21 DIAGNOSIS — R05.9 COUGH: ICD-10-CM

## 2020-03-21 DIAGNOSIS — B34.9 VIRAL SYNDROME: ICD-10-CM

## 2020-03-21 DIAGNOSIS — R19.7 DIARRHEA OF PRESUMED INFECTIOUS ORIGIN: ICD-10-CM

## 2020-03-21 LAB
ALBUMIN SERPL BCP-MCNC: 4.1 G/DL (ref 3.2–4.9)
ALBUMIN/GLOB SERPL: 0.7 G/DL
ALP SERPL-CCNC: 94 U/L (ref 30–99)
ALT SERPL-CCNC: 12 U/L (ref 2–50)
ANION GAP SERPL CALC-SCNC: 15 MMOL/L (ref 7–16)
APPEARANCE UR: CLEAR
AST SERPL-CCNC: 25 U/L (ref 12–45)
BASOPHILS # BLD AUTO: 0.1 % (ref 0–1.8)
BASOPHILS # BLD: 0.01 K/UL (ref 0–0.12)
BILIRUB SERPL-MCNC: 0.5 MG/DL (ref 0.1–1.5)
BILIRUB UR QL STRIP.AUTO: NEGATIVE
BUN SERPL-MCNC: 37 MG/DL (ref 8–22)
CALCIUM SERPL-MCNC: 10.1 MG/DL (ref 8.5–10.5)
CHLORIDE SERPL-SCNC: 100 MMOL/L (ref 96–112)
CO2 SERPL-SCNC: 22 MMOL/L (ref 20–33)
COLOR UR: YELLOW
CREAT SERPL-MCNC: 1.79 MG/DL (ref 0.5–1.4)
EOSINOPHIL # BLD AUTO: 0.13 K/UL (ref 0–0.51)
EOSINOPHIL NFR BLD: 1.6 % (ref 0–6.9)
ERYTHROCYTE [DISTWIDTH] IN BLOOD BY AUTOMATED COUNT: 50 FL (ref 35.9–50)
GLOBULIN SER CALC-MCNC: 5.9 G/DL (ref 1.9–3.5)
GLUCOSE SERPL-MCNC: 81 MG/DL (ref 65–99)
GLUCOSE UR STRIP.AUTO-MCNC: NEGATIVE MG/DL
HCT VFR BLD AUTO: 40.7 % (ref 42–52)
HGB BLD-MCNC: 13 G/DL (ref 14–18)
IMM GRANULOCYTES # BLD AUTO: 0.03 K/UL (ref 0–0.11)
IMM GRANULOCYTES NFR BLD AUTO: 0.4 % (ref 0–0.9)
KETONES UR STRIP.AUTO-MCNC: NEGATIVE MG/DL
LEUKOCYTE ESTERASE UR QL STRIP.AUTO: NEGATIVE
LIPASE SERPL-CCNC: 34 U/L (ref 11–82)
LYMPHOCYTES # BLD AUTO: 1.83 K/UL (ref 1–4.8)
LYMPHOCYTES NFR BLD: 21.9 % (ref 22–41)
MCH RBC QN AUTO: 26.4 PG (ref 27–33)
MCHC RBC AUTO-ENTMCNC: 31.9 G/DL (ref 33.7–35.3)
MCV RBC AUTO: 82.7 FL (ref 81.4–97.8)
MICRO URNS: NORMAL
MONOCYTES # BLD AUTO: 0.64 K/UL (ref 0–0.85)
MONOCYTES NFR BLD AUTO: 7.7 % (ref 0–13.4)
NEUTROPHILS # BLD AUTO: 5.72 K/UL (ref 1.82–7.42)
NEUTROPHILS NFR BLD: 68.3 % (ref 44–72)
NITRITE UR QL STRIP.AUTO: NEGATIVE
NRBC # BLD AUTO: 0 K/UL
NRBC BLD-RTO: 0 /100 WBC
PH UR STRIP.AUTO: 5 [PH] (ref 5–8)
PLATELET # BLD AUTO: 314 K/UL (ref 164–446)
PMV BLD AUTO: 10.2 FL (ref 9–12.9)
POTASSIUM SERPL-SCNC: 4.6 MMOL/L (ref 3.6–5.5)
PROT SERPL-MCNC: 10 G/DL (ref 6–8.2)
PROT UR QL STRIP: NEGATIVE MG/DL
RBC # BLD AUTO: 4.92 M/UL (ref 4.7–6.1)
RBC UR QL AUTO: NEGATIVE
SODIUM SERPL-SCNC: 137 MMOL/L (ref 135–145)
SP GR UR STRIP.AUTO: 1.02
UROBILINOGEN UR STRIP.AUTO-MCNC: 1 MG/DL
WBC # BLD AUTO: 8.4 K/UL (ref 4.8–10.8)

## 2020-03-21 PROCEDURE — 99285 EMERGENCY DEPT VISIT HI MDM: CPT

## 2020-03-21 PROCEDURE — 85025 COMPLETE CBC W/AUTO DIFF WBC: CPT

## 2020-03-21 PROCEDURE — 81003 URINALYSIS AUTO W/O SCOPE: CPT

## 2020-03-21 PROCEDURE — 71045 X-RAY EXAM CHEST 1 VIEW: CPT

## 2020-03-21 PROCEDURE — 700102 HCHG RX REV CODE 250 W/ 637 OVERRIDE(OP): Performed by: EMERGENCY MEDICINE

## 2020-03-21 PROCEDURE — 80053 COMPREHEN METABOLIC PANEL: CPT

## 2020-03-21 PROCEDURE — A9270 NON-COVERED ITEM OR SERVICE: HCPCS | Performed by: EMERGENCY MEDICINE

## 2020-03-21 PROCEDURE — 83690 ASSAY OF LIPASE: CPT

## 2020-03-21 RX ORDER — IBUPROFEN 600 MG/1
600 TABLET ORAL ONCE
Status: COMPLETED | OUTPATIENT
Start: 2020-03-21 | End: 2020-03-21

## 2020-03-21 RX ORDER — LOPERAMIDE HYDROCHLORIDE 2 MG/1
2 CAPSULE ORAL ONCE
Status: COMPLETED | OUTPATIENT
Start: 2020-03-21 | End: 2020-03-21

## 2020-03-21 RX ORDER — ACETAMINOPHEN 500 MG
1000 TABLET ORAL ONCE
Status: COMPLETED | OUTPATIENT
Start: 2020-03-21 | End: 2020-03-21

## 2020-03-21 RX ORDER — LOPERAMIDE HYDROCHLORIDE 2 MG/1
2 CAPSULE ORAL 4 TIMES DAILY PRN
Qty: 30 CAP | Refills: 0 | Status: SHIPPED | OUTPATIENT
Start: 2020-03-21 | End: 2020-05-19

## 2020-03-21 RX ADMIN — ACETAMINOPHEN 1000 MG: 500 TABLET ORAL at 22:04

## 2020-03-21 RX ADMIN — LOPERAMIDE HYDROCHLORIDE 2 MG: 2 CAPSULE ORAL at 22:05

## 2020-03-21 RX ADMIN — IBUPROFEN 600 MG: 600 TABLET ORAL at 22:04

## 2020-03-21 ASSESSMENT — FIBROSIS 4 INDEX: FIB4 SCORE: 0.9

## 2020-03-22 RX ORDER — LOPERAMIDE HYDROCHLORIDE 2 MG/1
2 CAPSULE ORAL 4 TIMES DAILY PRN
Qty: 16 CAP | Refills: 0 | Status: SHIPPED | OUTPATIENT
Start: 2020-03-22 | End: 2020-05-19

## 2020-03-22 NOTE — DISCHARGE INSTRUCTIONS
You likely have a viral illness and may have COVID-19. At this point we do not have testing available in the outpatient setting here in the emergency department for COVID-19. We also have limited testing for flu and other viral illnesses due to national shortages.  Therefore, COVID-19 is not ruled out and you will need to remain in home quarantine until all three of the following are true:  You are 7 days from symptom onset  your symptoms are improving   you have been fever free for at least 72hours.  Testing is only occurring through the [facility] district at this point; you may call them at [phone].  After a phone triage process you may or may not be tested.  If you develop significant shortness of breath, meaning that it is difficult for you to walk even short distances without having to stop and catch your breath, or you become severely dizzy and this is persistent then please return to the emergency department.

## 2020-03-22 NOTE — ED NOTES
"Agree with triage assessment, no changes noted. Pt reports \"I just have this weird abd pain and I think its because I didn't eat with my meds.\"  Pt hooked to monitor. Pt denies any further needs at this time. Call light within reach. Bed in low locked position. Comfort measures provided.     "

## 2020-03-22 NOTE — DISCHARGE PLANNING
Received a call from ER . Pt is here stating that he lost his prescription from yesterday. Dr. Saleem was nice enough to reprint the prescription for Immodium. Prescription has been delivered to ER .

## 2020-03-22 NOTE — ED TRIAGE NOTES
"Chief Complaint   Patient presents with   • Abdominal Pain     Pt BIB by ems from shelter reporting that around 1200 today with generalized abdominal pain and intermittent cramping with associated nausea and diarrhea since this afternoon. He is also reporting feeling \"achey\". Pt has not traveled out of [facility], no respiratory symptoms and is not a PUI.       /64   Pulse 71   Temp 36.4 °C (97.6 °F) (Temporal) Comment: Pt non compliant with oral thermometer  Resp 18   Ht 1.702 m (5' 7\")   Wt 55.7 kg (122 lb 12.7 oz)   SpO2 97%   BMI 19.23 kg/m²      Pt is aaox4, ambulatory and resting on Ra. Pt negative for PUI at this time and in NAD.     Pt Informed regarding triage process and verbalized understanding to inform triage tech or RN for any changes in condition.  Placed in lobby.    "

## 2020-03-22 NOTE — ED PROVIDER NOTES
"ED Provider Note    CHIEF COMPLAINT  Chief Complaint   Patient presents with   • Abdominal Pain     Pt BIB by ems from shelter reporting that around 1200 today with generalized abdominal pain and intermittent cramping with associated nausea and diarrhea since this afternoon. He is also reporting feeling \"achey\". Pt has not traveled out of [facility], no respiratory symptoms and is not a PUI.       HPI  Sylvain Espinosa is a 64 y.o. male who presents to the emergency department from the shelter this evening for abdominal pain diarrhea and generalized feelings of illness including a cough and nasal congestion.  He denies any recent travel or known contacts with coronavirus patients  He is unsure if he is had any fevers have he has endorsing chills no nausea or vomiting is actively eating when I walked in the room.  Is been no blood in his stool or emesis he is endorsing abdominal cramping.    REVIEW OF SYSTEMS  Positives as above. Pertinent negatives include nausea vomiting fevers chest pain shortness of breath leg swelling bloody stools  All other review of systems are negative    PAST MEDICAL HISTORY   has a past medical history of Abnormality of left ventricle of heart, Aortic stenosis, BPH (benign prostatic hyperplasia), CVA (cerebral vascular accident) (HCC), Hepatitis C, Hypercholesteremia, Hypertension, and Myocardial infarction (HCC).    SOCIAL HISTORY  Social History     Tobacco Use   • Smoking status: Current Some Day Smoker     Packs/day: 0.20     Years: 40.00     Pack years: 8.00     Types: Cigarettes   • Smokeless tobacco: Never Used   Substance and Sexual Activity   • Alcohol use: Yes     Frequency: Monthly or less     Drinks per session: 1 or 2     Binge frequency: Never     Comment: occ   • Drug use: Yes     Types: Inhaled, Marijuana     Comment: marijuana, meth   • Sexual activity: Not on file       SURGICAL HISTORY   has a past surgical history that includes tonsillectomy; zzz cardiac cath; " "transcatheter aortic valve replacement (N/A, 3/18/2019); and silvina (3/18/2019).    CURRENT MEDICATIONS  Home Medications     Reviewed by Patricia Garg R.N. (Registered Nurse) on 03/21/20 at 2020  Med List Status: Partial   Medication Last Dose Status   amLODIPine (NORVASC) 2.5 MG Tab  Active   atorvastatin (LIPITOR) 40 MG Tab  Active   furosemide (LASIX) 20 MG Tab  Active   lisinopril (PRINIVIL, ZESTRIL) 40 MG tablet  Active   metoprolol (LOPRESSOR) 25 MG Tab  Active   nicotine (NICODERM) 7 MG/24HR PATCH 24 HR  Active   potassium chloride (MICRO-K) 10 MEQ capsule  Active   POTASSIUM PO  Active   riFAMPin (RIFADINE) 300 MG Cap  Active   Vancomycin HCl (MD ALERT...VANCOMYCIN PER PHARMACY)  Active                ALLERGIES  No Known Allergies    PHYSICAL EXAM  VITAL SIGNS: /64   Pulse 71   Temp 36.4 °C (97.6 °F) (Temporal) Comment: Pt non compliant with oral thermometer  Resp 18   Ht 1.702 m (5' 7\")   Wt 55.7 kg (122 lb 12.7 oz)   SpO2 97%   BMI 19.23 kg/m²    Pulse ox interpretation: I interpret this pulse ox as normal.  Constitutional: Alert in no apparent distress.  HENT: Normocephalic atraumatic, MMM, nasal congestion oropharynx clear  Eyes: PER, Conjunctiva normal, Non-icteric.   Neck: Normal range of motion, No tenderness, Supple, No stridor.   Cardiovascular: Regular rate and rhythm, no murmurs.   Thorax & Lungs: Normal breath sounds, No respiratory distress, No wheezing, No chest tenderness.  Wet cough  Abdomen: Bowel sounds normal, Soft, No tenderness, No pulsatile masses. No peritoneal signs.  Skin: Warm, Dry, No erythema, No rash.   Back: No bony tenderness, No CVA tenderness.   Extremities/MSK: Intact distal pulses, No edema, No tenderness, No cyanosis, no major deformities noted  Neurologic: Alert and oriented x3, No focal deficits noted.       DIFFERENTIAL DIAGNOSIS AND WORK UP PLAN    This is a 64 y.o. male who presents with signs symptoms likely insistent with viral enteritis and viral " "respiratory syndrome he does have a wet cough but is not in respiratory distress, he is low risk for Coblator however it is endemic to this area  I verified that the patient was wearing a mask and I was wearing appropriate PPE every time I entered the room. The patient's mask was on the patient at all times during my encounter except for a brief view of the oropharynx.    He is already tolerating orals and does not require IV fluids but will be given some Tylenol ibuprofen and Imodium as well as laboratory Allises evaluate for electrolyte abnormality    DIAGNOSTIC STUDIES / PROCEDURES    LABS  Pertinent Lab Findings  Hemoglobin low at 13, CMP with chronic kidney disease otherwise normal electrolytes normal lipase normal urine normal      RADIOLOGY  DX-CHEST-PORTABLE (1 VIEW)   Final Result      No focal consolidation or pleural effusions.        The radiologist's interpretation of all radiological studies have been reviewed by me.      COURSE & MEDICAL DECISION MAKING  Pertinent Labs & Imaging studies reviewed. (See chart for details)    10:03 PM  Reassessed patient at bedside is resting comfortably tolerating orals he will be discharged we discussed that he should quarantine himself as best he can and return to the ED for any severe worsening chest pain cough or shortness of breath.  He understands and feels comfortable with the plan    BP (!) 98/50   Pulse 67   Temp 36.4 °C (97.6 °F) (Temporal) Comment: Pt non compliant with oral thermometer  Resp 18   Ht 1.702 m (5' 7\")   Wt 55.7 kg (122 lb 12.7 oz)   SpO2 96%   BMI 19.23 kg/m²       The patient will return for new or worsening symptoms and is stable at the time of discharge.    The patient is referred to a primary physician for blood pressure management, diabetic screening, and for all other preventative health concerns.    DISPOSITION:  Patient will be discharged home in stable condition.    FOLLOW UP:  [facility], Emergency " Dept  [facility] [facility] [address]  [phone]    If symptoms worsen - worsening chest pain or difficulty breathing      OUTPATIENT MEDICATIONS:  Discharge Medication List as of 3/21/2020 10:13 PM      START taking these medications    Details   loperamide (IMODIUM) 2 MG Cap Take 1 Cap by mouth 4 times a day as needed for Diarrhea., Disp-30 Cap, R-0, Print Rx Paper               FINAL IMPRESSION  1. Viral syndrome     2. Cough     3. Diarrhea of presumed infectious origin       Electronically signed by: Teresa Johnson M.D., 3/21/2020 9:18 PM    This dictation has been created using voice recognition software and/or scribes. The accuracy of the dictation is limited by the abilities of the software and the expertise of the scribes. I expect there may be some errors of grammar and possibly content. I made every attempt to manually correct the errors within my dictation. However, errors related to voice recognition software and/or scribes may still exist and should be interpreted within the appropriate context.

## 2020-05-19 ENCOUNTER — HOSPITAL ENCOUNTER (INPATIENT)
Facility: MEDICAL CENTER | Age: 65
LOS: 10 days | DRG: 289 | End: 2020-05-29
Attending: INTERNAL MEDICINE | Admitting: INTERNAL MEDICINE
Payer: MEDICAID

## 2020-05-19 ENCOUNTER — APPOINTMENT (OUTPATIENT)
Dept: RADIOLOGY | Facility: MEDICAL CENTER | Age: 65
DRG: 289 | End: 2020-05-19
Attending: EMERGENCY MEDICINE
Payer: MEDICAID

## 2020-05-19 DIAGNOSIS — T68.XXXA HYPOTHERMIA, INITIAL ENCOUNTER: ICD-10-CM

## 2020-05-19 DIAGNOSIS — I71.40 ABDOMINAL AORTIC ANEURYSM (AAA) WITHOUT RUPTURE (HCC): ICD-10-CM

## 2020-05-19 DIAGNOSIS — E87.20 LACTIC ACIDOSIS: Primary | ICD-10-CM

## 2020-05-19 DIAGNOSIS — R10.9 ABDOMINAL PAIN, UNSPECIFIED ABDOMINAL LOCATION: ICD-10-CM

## 2020-05-19 PROBLEM — N17.9 ACUTE RENAL FAILURE (ARF) (HCC): Status: ACTIVE | Noted: 2020-05-19

## 2020-05-19 PROBLEM — R10.30 LOWER ABDOMINAL PAIN: Status: ACTIVE | Noted: 2020-05-19

## 2020-05-19 LAB
ALBUMIN SERPL BCP-MCNC: 4.2 G/DL (ref 3.2–4.9)
ALBUMIN/GLOB SERPL: 0.8 G/DL
ALP SERPL-CCNC: 110 U/L (ref 30–99)
ALT SERPL-CCNC: 24 U/L (ref 2–50)
AMPHET UR QL SCN: POSITIVE
ANION GAP SERPL CALC-SCNC: 14 MMOL/L (ref 7–16)
APPEARANCE UR: CLEAR
AST SERPL-CCNC: 40 U/L (ref 12–45)
BARBITURATES UR QL SCN: NEGATIVE
BASOPHILS # BLD AUTO: 0.2 % (ref 0–1.8)
BASOPHILS # BLD: 0.02 K/UL (ref 0–0.12)
BENZODIAZ UR QL SCN: NEGATIVE
BILIRUB SERPL-MCNC: 0.5 MG/DL (ref 0.1–1.5)
BILIRUB UR QL STRIP.AUTO: NEGATIVE
BLOOD CULTURE HOLD CXBCH: NORMAL
BUN SERPL-MCNC: 36 MG/DL (ref 8–22)
BZE UR QL SCN: NEGATIVE
CALCIUM SERPL-MCNC: 10.4 MG/DL (ref 8.5–10.5)
CANNABINOIDS UR QL SCN: NEGATIVE
CHLORIDE SERPL-SCNC: 98 MMOL/L (ref 96–112)
CO2 SERPL-SCNC: 23 MMOL/L (ref 20–33)
COLOR UR: YELLOW
COVID ORDER STATUS COVID19: NORMAL
CREAT SERPL-MCNC: 1.52 MG/DL (ref 0.5–1.4)
EKG IMPRESSION: NORMAL
EOSINOPHIL # BLD AUTO: 0.02 K/UL (ref 0–0.51)
EOSINOPHIL NFR BLD: 0.2 % (ref 0–6.9)
ERYTHROCYTE [DISTWIDTH] IN BLOOD BY AUTOMATED COUNT: 52.1 FL (ref 35.9–50)
ETHANOL BLD-MCNC: <10.1 MG/DL (ref 0–10.1)
GLOBULIN SER CALC-MCNC: 5.4 G/DL (ref 1.9–3.5)
GLUCOSE SERPL-MCNC: 134 MG/DL (ref 65–99)
GLUCOSE UR STRIP.AUTO-MCNC: NEGATIVE MG/DL
HCT VFR BLD AUTO: 51 % (ref 42–52)
HGB BLD-MCNC: 15.9 G/DL (ref 14–18)
IMM GRANULOCYTES # BLD AUTO: 0.07 K/UL (ref 0–0.11)
IMM GRANULOCYTES NFR BLD AUTO: 0.6 % (ref 0–0.9)
KETONES UR STRIP.AUTO-MCNC: NEGATIVE MG/DL
LACTATE BLD-SCNC: 2.5 MMOL/L (ref 0.5–2)
LACTATE BLD-SCNC: 4.7 MMOL/L (ref 0.5–2)
LEUKOCYTE ESTERASE UR QL STRIP.AUTO: NEGATIVE
LIPASE SERPL-CCNC: 30 U/L (ref 11–82)
LYMPHOCYTES # BLD AUTO: 1.44 K/UL (ref 1–4.8)
LYMPHOCYTES NFR BLD: 12.3 % (ref 22–41)
MAGNESIUM SERPL-MCNC: 2.1 MG/DL (ref 1.5–2.5)
MCH RBC QN AUTO: 27.4 PG (ref 27–33)
MCHC RBC AUTO-ENTMCNC: 31.2 G/DL (ref 33.7–35.3)
MCV RBC AUTO: 87.8 FL (ref 81.4–97.8)
METHADONE UR QL SCN: NEGATIVE
MICRO URNS: NORMAL
MONOCYTES # BLD AUTO: 0.63 K/UL (ref 0–0.85)
MONOCYTES NFR BLD AUTO: 5.4 % (ref 0–13.4)
NEUTROPHILS # BLD AUTO: 9.49 K/UL (ref 1.82–7.42)
NEUTROPHILS NFR BLD: 81.3 % (ref 44–72)
NITRITE UR QL STRIP.AUTO: NEGATIVE
NRBC # BLD AUTO: 0 K/UL
NRBC BLD-RTO: 0 /100 WBC
OPIATES UR QL SCN: NEGATIVE
OXYCODONE UR QL SCN: NEGATIVE
PCP UR QL SCN: NEGATIVE
PH UR STRIP.AUTO: 5 [PH] (ref 5–8)
PLATELET # BLD AUTO: 272 K/UL (ref 164–446)
PMV BLD AUTO: 10.1 FL (ref 9–12.9)
POTASSIUM SERPL-SCNC: 4.6 MMOL/L (ref 3.6–5.5)
PROPOXYPH UR QL SCN: NEGATIVE
PROT SERPL-MCNC: 9.6 G/DL (ref 6–8.2)
PROT UR QL STRIP: NEGATIVE MG/DL
RBC # BLD AUTO: 5.81 M/UL (ref 4.7–6.1)
RBC UR QL AUTO: NEGATIVE
SARS-COV-2 RNA RESP QL NAA+PROBE: NOTDETECTED
SODIUM SERPL-SCNC: 135 MMOL/L (ref 135–145)
SP GR UR REFRACTOMETRY: >1.045
SPECIMEN SOURCE: NORMAL
TROPONIN T SERPL-MCNC: 26 NG/L (ref 6–19)
UROBILINOGEN UR STRIP.AUTO-MCNC: 0.2 MG/DL
WBC # BLD AUTO: 11.7 K/UL (ref 4.8–10.8)

## 2020-05-19 PROCEDURE — 81003 URINALYSIS AUTO W/O SCOPE: CPT

## 2020-05-19 PROCEDURE — 700105 HCHG RX REV CODE 258: Performed by: EMERGENCY MEDICINE

## 2020-05-19 PROCEDURE — 96365 THER/PROPH/DIAG IV INF INIT: CPT

## 2020-05-19 PROCEDURE — 36415 COLL VENOUS BLD VENIPUNCTURE: CPT

## 2020-05-19 PROCEDURE — 99291 CRITICAL CARE FIRST HOUR: CPT | Mod: GC | Performed by: INTERNAL MEDICINE

## 2020-05-19 PROCEDURE — 83605 ASSAY OF LACTIC ACID: CPT | Mod: 91

## 2020-05-19 PROCEDURE — 85025 COMPLETE CBC W/AUTO DIFF WBC: CPT

## 2020-05-19 PROCEDURE — 83690 ASSAY OF LIPASE: CPT

## 2020-05-19 PROCEDURE — 700105 HCHG RX REV CODE 258: Performed by: STUDENT IN AN ORGANIZED HEALTH CARE EDUCATION/TRAINING PROGRAM

## 2020-05-19 PROCEDURE — 93005 ELECTROCARDIOGRAM TRACING: CPT | Performed by: EMERGENCY MEDICINE

## 2020-05-19 PROCEDURE — 700117 HCHG RX CONTRAST REV CODE 255: Performed by: EMERGENCY MEDICINE

## 2020-05-19 PROCEDURE — 87040 BLOOD CULTURE FOR BACTERIA: CPT

## 2020-05-19 PROCEDURE — 770022 HCHG ROOM/CARE - ICU (200)

## 2020-05-19 PROCEDURE — 74175 CTA ABDOMEN W/CONTRAST: CPT

## 2020-05-19 PROCEDURE — 80307 DRUG TEST PRSMV CHEM ANLYZR: CPT

## 2020-05-19 PROCEDURE — 80053 COMPREHEN METABOLIC PANEL: CPT

## 2020-05-19 PROCEDURE — U0004 COV-19 TEST NON-CDC HGH THRU: HCPCS

## 2020-05-19 PROCEDURE — 84484 ASSAY OF TROPONIN QUANT: CPT

## 2020-05-19 PROCEDURE — 94760 N-INVAS EAR/PLS OXIMETRY 1: CPT

## 2020-05-19 PROCEDURE — C9803 HOPD COVID-19 SPEC COLLECT: HCPCS | Performed by: EMERGENCY MEDICINE

## 2020-05-19 PROCEDURE — 99291 CRITICAL CARE FIRST HOUR: CPT

## 2020-05-19 PROCEDURE — 700111 HCHG RX REV CODE 636 W/ 250 OVERRIDE (IP): Performed by: EMERGENCY MEDICINE

## 2020-05-19 PROCEDURE — 83735 ASSAY OF MAGNESIUM: CPT

## 2020-05-19 PROCEDURE — 96375 TX/PRO/DX INJ NEW DRUG ADDON: CPT

## 2020-05-19 RX ORDER — SODIUM CHLORIDE 9 MG/ML
1000 INJECTION, SOLUTION INTRAVENOUS ONCE
Status: COMPLETED | OUTPATIENT
Start: 2020-05-19 | End: 2020-05-19

## 2020-05-19 RX ORDER — LABETALOL HYDROCHLORIDE 5 MG/ML
10 INJECTION, SOLUTION INTRAVENOUS
Status: DISCONTINUED | OUTPATIENT
Start: 2020-05-19 | End: 2020-05-29 | Stop reason: HOSPADM

## 2020-05-19 RX ORDER — SODIUM CHLORIDE, SODIUM LACTATE, POTASSIUM CHLORIDE, CALCIUM CHLORIDE 600; 310; 30; 20 MG/100ML; MG/100ML; MG/100ML; MG/100ML
1000 INJECTION, SOLUTION INTRAVENOUS ONCE
Status: COMPLETED | OUTPATIENT
Start: 2020-05-19 | End: 2020-05-20

## 2020-05-19 RX ORDER — SODIUM CHLORIDE, SODIUM LACTATE, POTASSIUM CHLORIDE, CALCIUM CHLORIDE 600; 310; 30; 20 MG/100ML; MG/100ML; MG/100ML; MG/100ML
1000 INJECTION, SOLUTION INTRAVENOUS ONCE
Status: COMPLETED | OUTPATIENT
Start: 2020-05-19 | End: 2020-05-19

## 2020-05-19 RX ADMIN — IOHEXOL 100 ML: 350 INJECTION, SOLUTION INTRAVENOUS at 19:40

## 2020-05-19 RX ADMIN — SODIUM CHLORIDE, POTASSIUM CHLORIDE, SODIUM LACTATE AND CALCIUM CHLORIDE 1000 ML: 600; 310; 30; 20 INJECTION, SOLUTION INTRAVENOUS at 20:22

## 2020-05-19 RX ADMIN — SODIUM CHLORIDE 1000 ML: 9 INJECTION, SOLUTION INTRAVENOUS at 19:42

## 2020-05-19 RX ADMIN — SODIUM CHLORIDE, POTASSIUM CHLORIDE, SODIUM LACTATE AND CALCIUM CHLORIDE 1000 ML: 600; 310; 30; 20 INJECTION, SOLUTION INTRAVENOUS at 22:15

## 2020-05-19 RX ADMIN — FENTANYL CITRATE 100 MCG: 50 INJECTION INTRAMUSCULAR; INTRAVENOUS at 19:41

## 2020-05-19 RX ADMIN — PIPERACILLIN AND TAZOBACTAM 4.5 G: 4; .5 INJECTION, POWDER, LYOPHILIZED, FOR SOLUTION INTRAVENOUS; PARENTERAL at 20:22

## 2020-05-19 ASSESSMENT — ENCOUNTER SYMPTOMS
COUGH: 0
HEADACHES: 0
VOMITING: 0
NAUSEA: 0
ABDOMINAL PAIN: 1
SHORTNESS OF BREATH: 0
FEVER: 0
NAUSEA: 1
CONSTIPATION: 1
CHILLS: 0
BLOOD IN STOOL: 1

## 2020-05-19 ASSESSMENT — LIFESTYLE VARIABLES
EVER HAD A DRINK FIRST THING IN THE MORNING TO STEADY YOUR NERVES TO GET RID OF A HANGOVER: NO
CONSUMPTION TOTAL: INCOMPLETE
HAVE PEOPLE ANNOYED YOU BY CRITICIZING YOUR DRINKING: NO
HAVE YOU EVER FELT YOU SHOULD CUT DOWN ON YOUR DRINKING: NO
TOTAL SCORE: 0
TOTAL SCORE: 0
EVER FELT BAD OR GUILTY ABOUT YOUR DRINKING: NO
TOTAL SCORE: 0

## 2020-05-19 ASSESSMENT — FIBROSIS 4 INDEX
FIB4 SCORE: 1.92
FIB4 SCORE: 1.47

## 2020-05-20 LAB
ALBUMIN SERPL BCP-MCNC: 3.2 G/DL (ref 3.2–4.9)
ALBUMIN/GLOB SERPL: 0.8 G/DL
ALP SERPL-CCNC: 80 U/L (ref 30–99)
ALT SERPL-CCNC: 16 U/L (ref 2–50)
ANION GAP SERPL CALC-SCNC: 11 MMOL/L (ref 7–16)
AST SERPL-CCNC: 21 U/L (ref 12–45)
BASOPHILS # BLD AUTO: 0.3 % (ref 0–1.8)
BASOPHILS # BLD: 0.04 K/UL (ref 0–0.12)
BILIRUB SERPL-MCNC: 0.2 MG/DL (ref 0.1–1.5)
BUN SERPL-MCNC: 37 MG/DL (ref 8–22)
CALCIUM SERPL-MCNC: 9.2 MG/DL (ref 8.5–10.5)
CHLORIDE SERPL-SCNC: 103 MMOL/L (ref 96–112)
CO2 SERPL-SCNC: 23 MMOL/L (ref 20–33)
CREAT SERPL-MCNC: 1.44 MG/DL (ref 0.5–1.4)
EOSINOPHIL # BLD AUTO: 0.02 K/UL (ref 0–0.51)
EOSINOPHIL NFR BLD: 0.2 % (ref 0–6.9)
ERYTHROCYTE [DISTWIDTH] IN BLOOD BY AUTOMATED COUNT: 51 FL (ref 35.9–50)
GLOBULIN SER CALC-MCNC: 4.2 G/DL (ref 1.9–3.5)
GLUCOSE SERPL-MCNC: 114 MG/DL (ref 65–99)
HCT VFR BLD AUTO: 41.6 % (ref 42–52)
HCT VFR BLD AUTO: 42 % (ref 42–52)
HGB BLD-MCNC: 13.5 G/DL (ref 14–18)
HGB BLD-MCNC: 13.5 G/DL (ref 14–18)
IMM GRANULOCYTES # BLD AUTO: 0.05 K/UL (ref 0–0.11)
IMM GRANULOCYTES NFR BLD AUTO: 0.4 % (ref 0–0.9)
LACTATE BLD-SCNC: 2.2 MMOL/L (ref 0.5–2)
LYMPHOCYTES # BLD AUTO: 1.08 K/UL (ref 1–4.8)
LYMPHOCYTES NFR BLD: 8.6 % (ref 22–41)
MCH RBC QN AUTO: 27.6 PG (ref 27–33)
MCHC RBC AUTO-ENTMCNC: 32.1 G/DL (ref 33.7–35.3)
MCV RBC AUTO: 85.9 FL (ref 81.4–97.8)
MONOCYTES # BLD AUTO: 1.14 K/UL (ref 0–0.85)
MONOCYTES NFR BLD AUTO: 9.1 % (ref 0–13.4)
NEUTROPHILS # BLD AUTO: 10.16 K/UL (ref 1.82–7.42)
NEUTROPHILS NFR BLD: 81.4 % (ref 44–72)
NRBC # BLD AUTO: 0 K/UL
NRBC BLD-RTO: 0 /100 WBC
PLATELET # BLD AUTO: 233 K/UL (ref 164–446)
PMV BLD AUTO: 10.3 FL (ref 9–12.9)
POTASSIUM SERPL-SCNC: 4.7 MMOL/L (ref 3.6–5.5)
PROT SERPL-MCNC: 7.4 G/DL (ref 6–8.2)
RBC # BLD AUTO: 4.89 M/UL (ref 4.7–6.1)
SODIUM SERPL-SCNC: 137 MMOL/L (ref 135–145)
WBC # BLD AUTO: 12.5 K/UL (ref 4.8–10.8)

## 2020-05-20 PROCEDURE — 80053 COMPREHEN METABOLIC PANEL: CPT

## 2020-05-20 PROCEDURE — 85018 HEMOGLOBIN: CPT

## 2020-05-20 PROCEDURE — 85014 HEMATOCRIT: CPT

## 2020-05-20 PROCEDURE — 99233 SBSQ HOSP IP/OBS HIGH 50: CPT | Performed by: INTERNAL MEDICINE

## 2020-05-20 PROCEDURE — 99406 BEHAV CHNG SMOKING 3-10 MIN: CPT

## 2020-05-20 PROCEDURE — 700111 HCHG RX REV CODE 636 W/ 250 OVERRIDE (IP): Performed by: STUDENT IN AN ORGANIZED HEALTH CARE EDUCATION/TRAINING PROGRAM

## 2020-05-20 PROCEDURE — 770001 HCHG ROOM/CARE - MED/SURG/GYN PRIV*

## 2020-05-20 PROCEDURE — 700105 HCHG RX REV CODE 258: Performed by: STUDENT IN AN ORGANIZED HEALTH CARE EDUCATION/TRAINING PROGRAM

## 2020-05-20 PROCEDURE — 85025 COMPLETE CBC W/AUTO DIFF WBC: CPT

## 2020-05-20 PROCEDURE — 83605 ASSAY OF LACTIC ACID: CPT

## 2020-05-20 PROCEDURE — 700111 HCHG RX REV CODE 636 W/ 250 OVERRIDE (IP): Performed by: INTERNAL MEDICINE

## 2020-05-20 PROCEDURE — 700105 HCHG RX REV CODE 258: Performed by: INTERNAL MEDICINE

## 2020-05-20 RX ORDER — AMOXICILLIN 250 MG
2 CAPSULE ORAL 2 TIMES DAILY
Status: DISCONTINUED | OUTPATIENT
Start: 2020-05-20 | End: 2020-05-29 | Stop reason: HOSPADM

## 2020-05-20 RX ORDER — ACETAMINOPHEN 325 MG/1
650 TABLET ORAL EVERY 6 HOURS PRN
Status: DISCONTINUED | OUTPATIENT
Start: 2020-05-20 | End: 2020-05-29 | Stop reason: HOSPADM

## 2020-05-20 RX ORDER — POLYETHYLENE GLYCOL 3350 17 G/17G
1 POWDER, FOR SOLUTION ORAL
Status: DISCONTINUED | OUTPATIENT
Start: 2020-05-20 | End: 2020-05-29 | Stop reason: HOSPADM

## 2020-05-20 RX ORDER — BISACODYL 10 MG
10 SUPPOSITORY, RECTAL RECTAL
Status: DISCONTINUED | OUTPATIENT
Start: 2020-05-20 | End: 2020-05-29 | Stop reason: HOSPADM

## 2020-05-20 RX ADMIN — VANCOMYCIN HYDROCHLORIDE 800 MG: 500 INJECTION, POWDER, LYOPHILIZED, FOR SOLUTION INTRAVENOUS at 20:13

## 2020-05-20 RX ADMIN — VANCOMYCIN HYDROCHLORIDE 1500 MG: 500 INJECTION, POWDER, LYOPHILIZED, FOR SOLUTION INTRAVENOUS at 01:30

## 2020-05-20 ASSESSMENT — LIFESTYLE VARIABLES
EVER_SMOKED: YES
DOES PATIENT WANT TO STOP DRINKING: NO
HOW MANY TIMES IN THE PAST YEAR HAVE YOU HAD 5 OR MORE DRINKS IN A DAY: 0
EVER FELT BAD OR GUILTY ABOUT YOUR DRINKING: NO
ALCOHOL_USE: YES
AVERAGE NUMBER OF DAYS PER WEEK YOU HAVE A DRINK CONTAINING ALCOHOL: 1
HAVE YOU EVER FELT YOU SHOULD CUT DOWN ON YOUR DRINKING: NO
HAVE PEOPLE ANNOYED YOU BY CRITICIZING YOUR DRINKING: NO
TOTAL SCORE: 0
ON A TYPICAL DAY WHEN YOU DRINK ALCOHOL HOW MANY DRINKS DO YOU HAVE: 1
CONSUMPTION TOTAL: NEGATIVE
EVER HAD A DRINK FIRST THING IN THE MORNING TO STEADY YOUR NERVES TO GET RID OF A HANGOVER: NO
TOTAL SCORE: 0
TOTAL SCORE: 0
EVER_SMOKED: YES

## 2020-05-20 ASSESSMENT — ENCOUNTER SYMPTOMS
COUGH: 0
STRIDOR: 0
DIZZINESS: 0
CHILLS: 0
FEVER: 0
SENSORY CHANGE: 0
BLOOD IN STOOL: 1
SHORTNESS OF BREATH: 0
BLURRED VISION: 0
ABDOMINAL PAIN: 1
DIARRHEA: 0
DIAPHORESIS: 0
VOMITING: 0
FOCAL WEAKNESS: 0
SPUTUM PRODUCTION: 0
MYALGIAS: 0
NAUSEA: 0

## 2020-05-20 ASSESSMENT — COGNITIVE AND FUNCTIONAL STATUS - GENERAL
DAILY ACTIVITIY SCORE: 22
WALKING IN HOSPITAL ROOM: A LITTLE
STANDING UP FROM CHAIR USING ARMS: A LITTLE
SUGGESTED CMS G CODE MODIFIER DAILY ACTIVITY: CJ
DRESSING REGULAR LOWER BODY CLOTHING: A LITTLE
SUGGESTED CMS G CODE MODIFIER MOBILITY: CJ
MOBILITY SCORE: 21
CLIMB 3 TO 5 STEPS WITH RAILING: A LITTLE
HELP NEEDED FOR BATHING: A LITTLE

## 2020-05-20 ASSESSMENT — COPD QUESTIONNAIRES
HAVE YOU SMOKED AT LEAST 100 CIGARETTES IN YOUR ENTIRE LIFE: YES
COPD SCREENING SCORE: 4
DO YOU EVER COUGH UP ANY MUCUS OR PHLEGM?: NO/ONLY WITH OCCASIONAL COLDS OR INFECTIONS
DURING THE PAST 4 WEEKS HOW MUCH DID YOU FEEL SHORT OF BREATH: NONE/LITTLE OF THE TIME

## 2020-05-20 ASSESSMENT — FIBROSIS 4 INDEX: FIB4 SCORE: 2.24

## 2020-05-20 NOTE — H&P
Internal Medicine Admitting History and Physical    Note Author: Clyde Ro M.D.       Name Sylvain Espinosa     1955   Age/Sex 64 y.o. male   MRN [id number]   Code Status Full     After 5PM or if no immediate response to page, please call for cross-coverage  Attending/Team: CHRISTOPHER Gupta See Patient List for primary contact information  Call [phone] to page      Chief Complaint:   Abdominal pain    HPI:  Mr. Espinosa is a 64 year old man with history of extensive cardiovascular disease including endocarditis, CVA, aortic stenosis s/p TAVR (3/18/2019), limb ischemia and known infrarenal AAA, as well as chronic hep C, substance abuse (meth, IVDU), COPD, and hypertension. He presents due to abdominal pain that started yesterday. He is lethargic at the time of interview and gives quiet, short answers, complete history was difficult to obtain. He reports having lower medial abdominal pain, does not specify further. He reports having some blood in stool, and hematochezia was witnessed in the ED. He denies nausea/vomiting, back pain, chest pain, shortness of breath, cough, diarrhea. He may have been constipated before this current episode. He is homeless and mostly stays at the [facility], denies known sick contacts, fever, chills.     Of note, he was diagnosed with endocarditis in 10/2019, blood cx positive for Rothia mucilaginosa,  found to have vegetation on aortic valve (TAVR), appears to have received 6+ weeks of vancomycin and rifampin which was discontinued on 2019 and patient was to continue of PO penicillin for chronic suppression but reports that he has not been taking antibiotics. Also appears that he has not had a follow up JAYLYN as planned by ID.     In ED was hypothermic with T 93.7F and placed under Dagmar Hugger with warmed crystalloid fluid resuscitation. WBC 11/7, Hgb 15.9, BUN 36 Cr 1.52. Otherwise stable with hypertension in the 180's. Observed to have hematochezia,  "and per EDP there was red blood on rectal exam without observed hemorrhoids. CTA thoracoabdominal showed possible thrombus in distal abd aorta, possible occlusion of mesenteric artery, and stable aortic ectasia compared with prior exam. Vascular surgery was consulted and Dr. Angelique marlow, interpreted CTA to show patent celiac, SMA and renal arteries, no evidence of thrombus, no indication for surgery at this time, will follow patient.       Review of Systems   Unable to perform ROS: Mental acuity   Constitutional: Negative for chills and fever.   Respiratory: Negative for cough and shortness of breath.    Cardiovascular: Negative for chest pain.   Gastrointestinal: Positive for abdominal pain and blood in stool. Negative for nausea and vomiting.   Genitourinary: Negative for dysuria.   Neurological: Negative for headaches.             Past Medical History (Chronic medical problem, known complications and current treatment)     Past Medical History:   Diagnosis Date   • Abnormality of left ventricle of heart     \"constricted left ventricle\"    • Aortic stenosis    • BPH (benign prostatic hyperplasia)    • CVA (cerebral vascular accident) (HCC)    • Hepatitis C    • Hypercholesteremia    • Hypertension    • Myocardial infarction (HCC)          Past Surgical History:  Past Surgical History:   Procedure Laterality Date   • TRANSCATHETER AORTIC VALVE REPLACEMENT N/A 3/18/2019    Procedure: REPLACEMENT, AORTIC VALVE, TRANSCATHETER;  Surgeon: Dariel Moser M.D.;  Location: SURGERY [facility];  Service: Cardiac   • JAYLYN  3/18/2019    Procedure: ECHOCARDIOGRAM, TRANSESOPHAGEAL;  Surgeon: Dariel Moser M.D.;  Location: SURGERY [facility];  Service: Cardiac   • TONSILLECTOMY     • ZZZ CARDIAC CATH         Current Outpatient Medications:  Home Medications     Reviewed by Chantelle Villalpando (Pharmacy Tech) on 05/19/20 at 2146  Med List Status: Complete   Medication Last Dose Status        Patient Yazan Taking any " Medications                       Medication Allergy/Sensitivities:  No Known Allergies      Family History (mandatory)   Family History   Problem Relation Age of Onset   • Heart Disease Mother        Social History (mandatory)   Social History     Socioeconomic History   • Marital status: Single     Spouse name: Not on file   • Number of children: Not on file   • Years of education: Not on file   • Highest education level: Not on file   Occupational History   • Not on file   Social Needs   • Financial resource strain: Not on file   • Food insecurity     Worry: Not on file     Inability: Not on file   • Transportation needs     Medical: Not on file     Non-medical: Not on file   Tobacco Use   • Smoking status: Current Some Day Smoker     Packs/day: 0.20     Years: 40.00     Pack years: 8.00     Types: Cigarettes   • Smokeless tobacco: Never Used   Substance and Sexual Activity   • Alcohol use: Yes     Frequency: Monthly or less     Drinks per session: 1 or 2     Binge frequency: Never     Comment: occ   • Drug use: Yes     Types: Inhaled, Marijuana     Comment: marijuana, meth   • Sexual activity: Not on file   Lifestyle   • Physical activity     Days per week: Not on file     Minutes per session: Not on file   • Stress: Not on file   Relationships   • Social connections     Talks on phone: Not on file     Gets together: Not on file     Attends Mandaen service: Not on file     Active member of club or organization: Not on file     Attends meetings of clubs or organizations: Not on file     Relationship status: Not on file   • Intimate partner violence     Fear of current or ex partner: Not on file     Emotionally abused: Not on file     Physically abused: Not on file     Forced sexual activity: Not on file   Other Topics Concern   • Not on file   Social History Narrative   • Not on file     Living situation: Homeless  PCP : NEY Ledesma    Physical Exam     Vitals:    05/19/20 2201 05/19/20 2216 05/19/20  2231 05/19/20 2246   BP: 146/81 130/68 126/65 124/64   Pulse: 100 (!) 103 (!) 104 (!) 108   Resp: 16 12 14 12   Temp: (!) 35.8 °C (96.4 °F)      TempSrc: Oral      SpO2: 99% 98% 99% 95%   Weight:       Height:         Body mass index is 20.36 kg/m².  O2 therapy: Pulse Oximetry: 95 %, O2 (LPM): 2, O2 Delivery Device: Nasal Cannula    Physical Exam   Constitutional:   Thin, disheveled man, appears uncomfortable, non-toxic appearing   HENT:   Head: Normocephalic and atraumatic.   Eyes: Pupils are equal, round, and reactive to light. EOM are normal. No scleral icterus.   Neck: Normal range of motion. Neck supple. No JVD present.   Cardiovascular: Normal rate, regular rhythm and intact distal pulses.   Murmur heard.  Systolic murmur late peaking 3/6 radiating to abdomen   Pulmonary/Chest: Effort normal and breath sounds normal. No respiratory distress. He has no wheezes. He has no rales.   Abdominal: Soft. He exhibits no distension.   Lower medial abdominal tenderness, poorly localized, no rebounding, BS hypoactive   Genitourinary:    Genitourinary Comments: Deferred as completed by EDP     Musculoskeletal:         General: No edema.   Neurological: No cranial nerve deficit.   Lethargic though oriented to person, place, year and partially to events   Skin: Skin is warm and dry. He is not diaphoretic.         Data Review       Old Records Request:   Deferred  Current Records review/summary: Completed    Lab Data Review:  Recent Results (from the past 24 hour(s))   CBC WITH DIFFERENTIAL    Collection Time: 05/19/20  7:17 PM   Result Value Ref Range    WBC 11.7 (H) 4.8 - 10.8 K/uL    RBC 5.81 4.70 - 6.10 M/uL    Hemoglobin 15.9 14.0 - 18.0 g/dL    Hematocrit 51.0 42.0 - 52.0 %    MCV 87.8 81.4 - 97.8 fL    MCH 27.4 27.0 - 33.0 pg    MCHC 31.2 (L) 33.7 - 35.3 g/dL    RDW 52.1 (H) 35.9 - 50.0 fL    Platelet Count 272 164 - 446 K/uL    MPV 10.1 9.0 - 12.9 fL    Neutrophils-Polys 81.30 (H) 44.00 - 72.00 %    Lymphocytes 12.30  (L) 22.00 - 41.00 %    Monocytes 5.40 0.00 - 13.40 %    Eosinophils 0.20 0.00 - 6.90 %    Basophils 0.20 0.00 - 1.80 %    Immature Granulocytes 0.60 0.00 - 0.90 %    Nucleated RBC 0.00 /100 WBC    Neutrophils (Absolute) 9.49 (H) 1.82 - 7.42 K/uL    Lymphs (Absolute) 1.44 1.00 - 4.80 K/uL    Monos (Absolute) 0.63 0.00 - 0.85 K/uL    Eos (Absolute) 0.02 0.00 - 0.51 K/uL    Baso (Absolute) 0.02 0.00 - 0.12 K/uL    Immature Granulocytes (abs) 0.07 0.00 - 0.11 K/uL    NRBC (Absolute) 0.00 K/uL   COMP METABOLIC PANEL    Collection Time: 20  7:17 PM   Result Value Ref Range    Sodium 135 135 - 145 mmol/L    Potassium 4.6 3.6 - 5.5 mmol/L    Chloride 98 96 - 112 mmol/L    Co2 23 20 - 33 mmol/L    Anion Gap 14.0 7.0 - 16.0    Glucose 134 (H) 65 - 99 mg/dL    Bun 36 (H) 8 - 22 mg/dL    Creatinine 1.52 (H) 0.50 - 1.40 mg/dL    Calcium 10.4 8.5 - 10.5 mg/dL    AST(SGOT) 40 12 - 45 U/L    ALT(SGPT) 24 2 - 50 U/L    Alkaline Phosphatase 110 (H) 30 - 99 U/L    Total Bilirubin 0.5 0.1 - 1.5 mg/dL    Albumin 4.2 3.2 - 4.9 g/dL    Total Protein 9.6 (H) 6.0 - 8.2 g/dL    Globulin 5.4 (H) 1.9 - 3.5 g/dL    A-G Ratio 0.8 g/dL   LIPASE    Collection Time: 20  7:17 PM   Result Value Ref Range    Lipase 30 11 - 82 U/L   LACTIC ACID    Collection Time: 20  7:17 PM   Result Value Ref Range    Lactic Acid 4.7 (HH) 0.5 - 2.0 mmol/L   ESTIMATED GFR    Collection Time: 20  7:17 PM   Result Value Ref Range    GFR If  56 (A) >60 mL/min/1.73 m 2    GFR If Non  46 (A) >60 mL/min/1.73 m 2   EKG    Collection Time: 20  7:34 PM   Result Value Ref Range    Report       [facility] Emergency Dept[facility]    Test Date:  2020  Pt Name:    JAIMIE YAO              Department: ER  MRN:        [id number]                      Room:       [facility]  Gender:     Male                         Technician: [id number]  :        1955                   Requested By:JOHN MAE  NORIS  Order #:    [id number]                    Reading MD: Mikel Sinclair II, MD    Measurements  Intervals                                Axis  Rate:       87                           P:          82  OK:         144                          QRS:        68  QRSD:       98                           T:          63  QT:         436  QTc:        525    Interpretive Statements  SINUS RHYTHM  Rate 87  PROLONGED QT INTERVAL  Subtle ST depression in inferior lateral leads. Normal twaves  Sinus rhythm EKG with ST depression which could be ischemia, prolonged QT.  Compared to ECG 03/22/2019 11:08:03  Myocardial infarct finding now present  Prolonged QT interval now present  L eft ventricular hypertrophy no longer present  ST (T wave) deviation no longer present  Electronically Signed On 5- 20:21:38 PDT by Mikel Sinclair II, MD     TROPONIN    Collection Time: 05/19/20  7:48 PM   Result Value Ref Range    Troponin T 26 (H) 6 - 19 ng/L   LACTIC ACID    Collection Time: 05/19/20  8:53 PM   Result Value Ref Range    Lactic Acid 2.5 (H) 0.5 - 2.0 mmol/L   DIAGNOSTIC ALCOHOL    Collection Time: 05/19/20  8:53 PM   Result Value Ref Range    Diagnostic Alcohol <10.1 0.0 - 10.1 mg/dL   MAGNESIUM    Collection Time: 05/19/20  8:53 PM   Result Value Ref Range    Magnesium 2.1 1.5 - 2.5 mg/dL   Blood Culture,Hold    Collection Time: 05/19/20  9:13 PM   Result Value Ref Range    Blood Culture Hold Collected    COVID/SARS CoV-2    Collection Time: 05/19/20  9:19 PM    Specimen: Nasopharyngeal; Respirate   Result Value Ref Range    COVID Order Status Received    SARS-CoV-2, PCR (In-House)    Collection Time: 05/19/20  9:19 PM   Result Value Ref Range    SARS-CoV-2 Source NP Swab     SARS-CoV-2 by PCR NotDetected NotDetected   URINE DRUG SCREEN    Collection Time: 05/19/20 10:00 PM   Result Value Ref Range    Amphetamines Urine Positive (A) Negative    Barbiturates Negative Negative    Benzodiazepines Negative Negative    Cocaine  Metabolite Negative Negative    Methadone Negative Negative    Opiates Negative Negative    Oxycodone Negative Negative    Phencyclidine -Pcp Negative Negative    Propoxyphene Negative Negative    Cannabinoid Metab Negative Negative   URINALYSIS,CULTURE IF INDICATED    Collection Time: 05/19/20 10:00 PM    Specimen: Urine, Straight Cath   Result Value Ref Range    Color Yellow     Character Clear     Ph 5.0 5.0 - 8.0    Glucose Negative Negative mg/dL    Ketones Negative Negative mg/dL    Protein Negative Negative mg/dL    Bilirubin Negative Negative    Urobilinogen, Urine 0.2 Negative    Nitrite Negative Negative    Leukocyte Esterase Negative Negative    Occult Blood Negative Negative    Micro Urine Req see below    REFRACTOMETER SG    Collection Time: 05/19/20 10:00 PM   Result Value Ref Range    Specific Gravity >1.045        Imaging/Procedures Review:    Independant Imaging Review: Completed  CT-CTA COMPLETE THORACOABDOMINAL AORTA   Final Result      1.  Diffuse abdominal aortic ectasia measuring up to 3.3 cm in diameter, similar to prior exam.  No evidence for dissection.   2.  Diminished enhancement along the ventral aspect of distal abdominal aorta may be admixture artifact or possibly thrombus.   3.  Inferior mesenteric artery is not well visualized which may be due to artifact or occlusion.   4.  Severe emphysema.   5.  Heterogeneous fatty infiltration of liver.   6.  Bilateral renal scarring.   7.  No bowel obstruction or pneumoperitoneum.               EC-ECHOCARDIOGRAM COMPLETE W/O CONT    (Results Pending)          EKG:   EKG Independent Review: Completed  Prolonged QTc:525, HR: 87, Normal Sinus Rhythm, borderline ST depression in lateral leads    Records reviewed and summarized in current documentation :  Yes           Assessment/Plan     * Lower abdominal pain  Assessment & Plan  Unclear etiology at this time. Initially given history of vascular disease, lactic acidosis considered mesenteric  ischemia/ischemia bowel likely, concern for septic emboli to bowel given endocarditis, though vascular surgery evaluation of pt and CTA did not see this as likely. AAA appears stable on CTA. No clear changes of colitis. Hemoglobin 15.9 on presentation.  Lipase WNL.   Lactic acidosis improving with fluids.    Plan:  Admit to ICU for lactic acidosis  Vascular following, appreciate recommendations  IV Fluids  Started on Zosyn in ED, adding vancomycin given unknown source and history of endocarditis  Echocardiogram for evaluation of residual vegetations  Place Johnson for I/O, U/A  Pain control  Monitor H/H  Trend lactic acid  Follow blood cx  Control BP in setting of AAA  UDS, EtOH    Lactic acidosis  Assessment & Plan  Unclear etiology at this time. Initially given history of vascular disease, lactic acidosis considered mesenteric ischemia/ischemia bowel likely, concern for septic emboli to bowel given endocarditis, though vascular surgery evaluation of pt and CTA did not see this as likely.  -See abdominal pain for further plan    Acute renal failure (ARF) (HCC)  Assessment & Plan  BUN/Cr 36/1.52. Had presumed TATI on ED visit 3/2020, no follow up labs. Currently presumed TATI vs CKD. Has evidence of renal vascular disease with bilateral scarring on CT.   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 Also appears that he has not had a follow up JAYLYN as planned by ID.   -TTE, consider JAYLYN   -Blood cx x3  -Consider ID consult  -Received zosyn in ED, ordered vancomycin on admission to ICU    HTN  (hypertension)- (present on admission)  Assessment & Plan  Unclear outpatient regimen and adherence. Hypertensive on presentation.  Control BP < 160 in setting of AAA  -PRN labetalol      AAA (abdominal aortic aneurysm) (HCC)- (present on admission)  Assessment & Plan  Stable on CT  BP control, SBP<160  Vascular on board    COPD (chronic obstructive pulmonary disease) (HCC)- (present on admission)  Assessment & Plan  Severe emphysema on CT  No current exacerbation  RT protocol    S/P TAVR (transcatheter aortic valve replacement)- (present on admission)  Assessment & Plan  Placed 3/18/2019 for severe aortic stenosis  Endocarditis 10/2019 with vegetation on prosthesis   No ASA/plavix due to high bleed risk per chart review  See endocarditis for further plan    History of methamphetamine use- (present on admission)  Assessment & Plan  Denies recent use  History IVDU  -UDS  -Counseling      Anticipated Hospital stay:  >2 midnights        Quality Measures  Quality-Core Measures   Reviewed items::  EKG reviewed, Labs reviewed, Medications reviewed and Radiology images reviewed  Johnson catheter::  Critically Ill - Requiring Accurate Measurement of Urinary Output  DVT prophylaxis pharmacological::  Contraindicated - High bleeding risk  DVT prophylaxis - mechanical:  SCDs    PCP: NEY Ledesma

## 2020-05-20 NOTE — ED PROVIDER NOTES
ED Provider Note     5/19/2020  6:47 PM    Means of Arrival: EMS  History obtained by: patient  Limitations: providing minimal history, moaning in pain    CHIEF COMPLAINT  Chief Complaint   Patient presents with   • Abdominal Pain       HPI  Sylvain Espinosa is a 64 y.o. male with history of CVA, aortic stenosis, atherosclerosis, hypertension, MI, smoking history, aortic stenosis s/p TAVR, known infrarenal abdominal aortic aneurysm who presents with complaint of constipation and severe lower abdominal pain.  He provides minimal history is in saying that he is having a lot of pain.  Immediately after arriving by ambulance he has been taken the bathroom to have a bowel movement and while in the bathroom he had bright red blood per rectum and presyncopal episode.    REVIEW OF SYSTEMS  Review of Systems   Unable to perform ROS: Acuity of condition   Cardiovascular: Negative for chest pain.   Gastrointestinal: Positive for abdominal pain, constipation and nausea.     See HPI for further details.    PAST MEDICAL HISTORY   has a past medical history of Abnormality of left ventricle of heart, Aortic stenosis, BPH (benign prostatic hyperplasia), CVA (cerebral vascular accident) (HCC), Hepatitis C, Hypercholesteremia, Hypertension, and Myocardial infarction (HCC).    SOCIAL HISTORY  Social History     Tobacco Use   • Smoking status: Current Some Day Smoker     Packs/day: 0.20     Years: 40.00     Pack years: 8.00     Types: Cigarettes   • Smokeless tobacco: Never Used   Substance and Sexual Activity   • Alcohol use: Yes     Frequency: Monthly or less     Drinks per session: 1 or 2     Binge frequency: Never     Comment: occ   • Drug use: Yes     Types: Inhaled, Marijuana     Comment: marijuana, meth   • Sexual activity: Not on file       SURGICAL HISTORY   has a past surgical history that includes tonsillectomy; zzz cardiac cath; transcatheter aortic valve replacement (N/A, 3/18/2019); and silvina (3/18/2019).    CURRENT  "MEDICATIONS  Home Medications     Reviewed by Chantelle Villalpando (Pharmacy Tech) on 20 at 2146  Med List Status: Complete   Medication Last Dose Status        Patient Yazan Taking any Medications                       ALLERGIES  No Known Allergies    PHYSICAL EXAM  VITAL SIGNS: /64   Pulse (!) 108   Temp (!) 35.8 °C (96.4 °F) (Oral) Comment: with bear hugger on  Resp 12   Ht 1.702 m (5' 7\")   Wt 59 kg (130 lb)   SpO2 95%   BMI 20.36 kg/m²     Pulse ox interpretation: I interpret this pulse ox as normal.  Constitutional: Very thin 64 year old man, writhing in pain, moaning, ill appearing  HENT: No signs of trauma, Bilateral external ears normal, Nose normal.   Eyes: Pupils are equal, Conjunctiva normal, Non-icteric.   Neck: Normal range of motion, No tenderness, Supple, No stridor.   Cardiovascular: Regular rate and rhythm. Cool distal extremities with 1+ pulses (symmetric).   Thorax & Lungs: Normal breath sounds  Abdomen: Diffuse guarding of abdomen.   Rectal exam done and there is a small amount of bright red blood, no melena, no fecal impaction.  Skin: Cool, mottled  Back: No midline bony tenderness  Musculoskeletal: Able to passively range all extremities.   Neurologic: Alert but not answering any questions.    Psychiatric: Labile mood.   Physical Exam      DIAGNOSTIC STUDIES / PROCEDURES    EKG  Results for orders placed or performed during the hospital encounter of 20   EKG   Result Value Ref Range    Report       [facility] Emergency Dept.    Test Date:  2020  Pt Name:    JAIMIE YAO              Department: ER  MRN:        [id number]                      Room:        22  Gender:     Male                         Technician: [id number]  :        1955                   Requested By:JOHN SINCLAIR II  Order #:    [id number]                    Reading MD: John Sinclair II, MD    Measurements  Intervals                                Axis  Rate:       " 87                           P:          82  IN:         144                          QRS:        68  QRSD:       98                           T:          63  QT:         436  QTc:        525    Interpretive Statements  SINUS RHYTHM  Rate 87  PROLONGED QT INTERVAL  Subtle ST depression in inferior lateral leads. Normal twaves  Sinus rhythm EKG with ST depression which could be ischemia, prolonged QT.  Compared to ECG 03/22/2019 11:08:03  Myocardial infarct finding now present  Prolonged QT interval now present  L eft ventricular hypertrophy no longer present  ST (T wave) deviation no longer present  Electronically Signed On 5- 20:21:38 PDT by Mikel Sinclair II, MD           LABS  Pertinent Labs & Imaging studies reviewed. (See chart for details)    RADIOLOGY  Pertinent Labs & Imaging studies reviewed. (See chart for details)    COURSE & MEDICAL DECISION MAKING  Pertinent Labs & Imaging studies reviewed. (See chart for details)    6:47 PM This is an emergent evaluation of a  64 y.o. male who presents via EMS with concerns of constipation, but on my interview he is altered not providing me with any information. He is rolling back and forth on bed, moaning with hands on abdomen. Given his significant vascular history of TAVR, endocarditis, AAA, hypertension I have called a code aorta. Other etiology of symptoms could be mesenteric ischemia, illicit substance use, organ failure, hypothermia, endocarditis.     8:16 PM  White count 11.7, hemoglobin 15.9.  He has a lactic acid of 4.7.  Creatinine 1.52 with a BUN of 36.  Baseline usually less than 1, last admission he was 1.79.  CT aorta with multiple findings.  He has diffuse abdominal aortic ectasia up to 3.3 cm in diameter similar to prior exam.  There is diminished enhancement along the ventral aspect of the distal abdominal aorta which could be artifact versus possible thrombus.  Unable to visualize inferior mesenteric artery which could be artifact or  occlusion.  Incidental findings of severe emphysema.  There is no bowel obstruction or pneumoperitoneum.  Given elevated lactic acid, abdominal pain, hypothermia (39F), I do have concerns that this presentation could be due to mesenteric ischemia.  He also has a history of endocarditis, I have ordered blood cultures x3, antibiotics.  I have placed a page to vascular surgery, Dr. Merino.  Hypothermia is being addressed with warm crystalloid fluids , bear hugger. He will receive 30cc per kg bolus.     8:40 PM  Dr. Merino will come to bedside to examine him. I have paged CHRISTOPHER SUNG and Critical Care for evaluation as well. He will need critical care services given elevated lactic acid, hypothermia. Empirically treated for sepsis.     9:14 PM  I have spoken with Dr. Bo, intensivist.  He will evaluate patient as well.  I have reviewed Dr. Merino's assessment and at this time there is not concern for vascular emergency that needs intervention. Overall unclear etiology of his symptoms.     On re-examination his skin is feeling warmer, vitals normal, lactic acid improved to 2.5. Calm but still not answering specific questions.     The total critical care time on this patient is 35 minutes, resuscitating patient, speaking with admitting physician, and deciphering test results. This 35 minutes is exclusive of separately billable procedures.    FINAL IMPRESSION    ICD-10-CM   1. Lactic acidosis Active E87.2   2. Abdominal pain, unspecified abdominal location Active R10.9   3. Abdominal aortic aneurysm (AAA) without rupture (HCC) Chronic I71.4   4. Hypothermia, initial encounter Active T68.XXXA            This dictation was created using voice recognition software. The accuracy of the dictation is limited to the abilities of the software. I expect there may be some errors of grammar and possibly content. The nursing notes were reviewed and certain aspects of this information were incorporated into this  note.    Electronically signed by: Mikel Sinclair II, M.D., 5/19/2020 6:47 PM

## 2020-05-20 NOTE — PROGRESS NOTES
ICU Interval Note  Note Author: Qasim Rodriguez M.D.     Name Sylvain Espinosa     1955   Age/Sex 64 y.o. male   MRN [id number]   Code Status FULL     After 5PM or if no immediate response to page, please call for cross-coverage  Attending/Team: CHRISTOPHER Gupta See Patient List for primary contact information  Call [phone] to page    1st Call - Day Intern (R1):    2nd Call - Day Sr. Resident (R2/R3):   Dr Rodriguez       Reason for interval visit  (Principal Problem)   Lower abdominal pain    Interval Problem Daily Status Update  (24 hours)   - Admitted overnight for abdominal pain, concern for possible ischemic bowel; has hx of embolic CVA, endocarditis  - abdominal pain resolved  - LA 2.2, down from 4.7; WBC elevated 12.5  - vascular surgery following, no signs of ischemia or thrombus per their review of CTA thoracoabdominal aorta  - on vancomycin for hx of endocarditis, echocardiogram ordered    Review of Systems   Constitutional: Negative for chills, diaphoresis, fever and malaise/fatigue.   Respiratory: Negative for cough and shortness of breath.    Cardiovascular: Negative for chest pain and leg swelling.   Gastrointestinal: Positive for abdominal pain and blood in stool. Negative for diarrhea, nausea and vomiting.   Skin: Negative for itching and rash.       Physical Exam       Vitals:    20 0600 20 0700 20 0800 20 0900   BP: 119/63 134/65 123/62 136/59   Pulse: 99 98 99 93   Resp: 15 17 (!) 26 (!) 34   Temp:       TempSrc: Bladder  Bladder    SpO2: 94% 96% 95% 92%   Weight:       Height:         Body mass index is 18.44 kg/m². Weight: 53.4 kg (117 lb 11.6 oz)  Oxygen Therapy:  Pulse Oximetry: 92 %, O2 (LPM): 2, O2 Delivery Device: Room air w/o2 available    Physical Exam   Constitutional: He is oriented to person, place, and time. No distress.   HENT:   Head: Normocephalic and atraumatic.   Eyes: Pupils are equal, round, and reactive to light. Conjunctivae are normal. No scleral  icterus.   Cardiovascular: Normal rate and regular rhythm.   Murmur heard.  Pulmonary/Chest: Effort normal. No respiratory distress.   Abdominal: Soft. Bowel sounds are normal. He exhibits no distension. There is no abdominal tenderness. There is no rebound and no guarding.   Musculoskeletal:         General: No edema.   Neurological: He is alert and oriented to person, place, and time.   Skin: Skin is warm and dry. He is not diaphoretic.   Psychiatric: Affect and judgment normal.         Lab Data Review:         5/20/2020  7:14 AM    Recent Labs     05/19/20 1917 05/19/20 2053 05/20/20  0540   SODIUM 135  --  137   POTASSIUM 4.6  --  4.7   CHLORIDE 98  --  103   CO2 23  --  23   BUN 36*  --  37*   CREATININE 1.52*  --  1.44*   MAGNESIUM  --  2.1  --    CALCIUM 10.4  --  9.2       Recent Labs     05/19/20 1917 05/20/20  0540   ALTSGPT 24 16   ASTSGOT 40 21   ALKPHOSPHAT 110* 80   TBILIRUBIN 0.5 0.2   LIPASE 30  --    GLUCOSE 134* 114*       Recent Labs     05/19/20 1917 05/20/20  0013 05/20/20  0540   RBC 5.81  --  4.89   HEMOGLOBIN 15.9 13.5* 13.5*   HEMATOCRIT 51.0 41.6* 42.0   PLATELETCT 272  --  233       Recent Labs     05/19/20 1917 05/20/20  0540   WBC 11.7* 12.5*   NEUTSPOLYS 81.30* 81.40*   LYMPHOCYTES 12.30* 8.60*   MONOCYTES 5.40 9.10   EOSINOPHILS 0.20 0.20   BASOPHILS 0.20 0.30   ASTSGOT 40 21   ALTSGPT 24 16   ALKPHOSPHAT 110* 80   TBILIRUBIN 0.5 0.2           Assessment/Plan     * Lower abdominal pain  Assessment & Plan  Unclear etiology at this time. Initially given history of vascular disease, lactic acidosis considered mesenteric ischemia/ischemia bowel likely, concern for septic emboli to bowel given endocarditis, though vascular surgery evaluation of pt and CTA did not see this as likely. AAA appears stable on CTA. No clear changes of colitis. Hemoglobin 15.9 on presentation.  Lipase WNL.   Lactic acidosis improving with fluids.    Plan:  Pain resolved  Vascular following  Continue IV  Fluids, serial abdominal exams  Pain control  Start diet as tolerated if okay with vascular surgery    Lactic acidosis  Assessment & Plan  Unclear etiology at this time. Initially given history of vascular disease, lactic acidosis considered mesenteric ischemia/ischemia bowel likely, concern for septic emboli to bowel given endocarditis, though vascular surgery evaluation of pt and CTA did not see this as likely.    Plan:  LA 2.2; 4.7 on presentation. Trend until <2  Continue IV fluids    Acute renal failure (ARF) ([facility])  Assessment & Plan  BUN/Cr 36/1.52. Had presumed TATI on ED visit 3/2020, no follow up labs. Currently presumed TATI vs CKD. Has evidence of renal vascular disease with bilateral scarring on CT.   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 Also appears that he has not had a follow up JAYLYN as planned by ID.   -TTE ordered, consider JAYLYN  -Blood cx x3  -Consider ID consult for antibiotic management  -continue vancomycin    HTN (hypertension)- (present on admission)  Assessment & Plan  Unclear outpatient regimen and adherence. Hypertensive on presentation.  Control BP < 160 in setting of AAA  -PRN labetalol      AAA (abdominal aortic aneurysm) ([facility])- (present on admission)  Assessment & Plan  Stable on CT  BP control, SBP<160  Vascular on board    COPD (chronic obstructive pulmonary disease) ([facility])- (present on admission)  Assessment & Plan  Severe emphysema on CT  No current exacerbation  RT protocol    S/P TAVR (transcatheter aortic valve replacement)- (present on  admission)  Assessment & Plan  Placed 3/18/2019 for severe aortic stenosis  Endocarditis 10/2019 with vegetation on prosthesis   No ASA/plavix due to high bleed risk per chart review  Follow up echocardiogram    History of methamphetamine use- (present on admission)  Assessment & Plan  Denies recent use  History IVDU

## 2020-05-20 NOTE — RESPIRATORY CARE
" COPD EDUCATION by COPD CLINICAL EDUCATOR  5/20/2020 at 3:15 PM by Humera Tam, RRT     Patient reviewed by COPD education team. Smoking Cessation Intervention and education completed, 5 minutes spent on smoking cessation education with patient    Provided smoking cessation packet with \"Tips to Quit\" and flyer for \"Free Smoking Cessation Classes\".     "

## 2020-05-20 NOTE — ASSESSMENT & PLAN NOTE
Denies recent use  History IVDU  UDS positive for methamphetamine   Plan:  Counseling provided to quit meth use  SW consult

## 2020-05-20 NOTE — ASSESSMENT & PLAN NOTE
Improving   Initially given history of vascular disease, lactic acidosis considered mesenteric ischemia/ischemia bowel likely, concern for septic emboli to bowel given endocarditis, though vascular surgery evaluation of pt and CTA did not see this as likely. AAA appears stable on CTA.   Plan:  continue to monitor H and H  Regular diet    Continue to monitor

## 2020-05-20 NOTE — ASSESSMENT & PLAN NOTE
Stable on CT  BP control, SBP<160  Plan:   outpatient Vascular surgery follow up  for aneurysm Surveillance.

## 2020-05-20 NOTE — ASSESSMENT & PLAN NOTE
Diagnosed with endocarditis in 10/2019, blood cx positive for Rothia mucilaginosa,  found to have vegetation on aortic valve (TAVR), appears to have received 6+ weeks of vancomycin and rifampin which was discontinued on 11/25/2019 and patient was to continue of PO penicillin for chronic suppression but reports that he has not been taking antibiotics. Also appears that he has not had a follow up JAYLYN as planned by ID.   Plan:    Echo on 05/21/2020 - Showed increased size of the vegetations compared to previous echo with decreased TAVR leaflet excursion.   Blood cultures are negative till date   JAYLYN done on 05/26/2020 -probable aortic root abscess or pseudoaneurysm, case was discussed with cardiology by infectious disease Dr. Lee of cardiology confirming that the patient does not have an abscess.  Infectious disease to discontinue vancomycin as patient already had a course of vancomycin with rifampin in November 2019.  Patient will be followed up with primary care with regular JAYLYN/echocardiogram every 6 months on a continual basis for further intervention and need.

## 2020-05-20 NOTE — ED NOTES
Med rec updated and complete. Placed call to listed home pharmacy . Pt has not filled any medications in > 2 years.  Unable to reach family at this time.      [facility]

## 2020-05-20 NOTE — PROGRESS NOTES
"Critical Care Progress Note    Date of admission  5/19/2020    Chief Complaint  64 y.o. male admitted 5/19/2020 with abdominal pain, elevated lactate    Hospital Course    \"Mr. Espinosa is a 64 year old man with history of extensive cardiovascular disease including endocarditis, CVA, aortic stenosis s/p TAVR (3/18/2019), limb ischemia and known infrarenal AAA, as well as chronic hep C, substance abuse (meth, IVDU), COPD, and hypertension. He presents due to abdominal pain that started yesterday. He is lethargic at the time of interview and gives quiet, short answers, complete history was difficult to obtain. He reports having lower medial abdominal pain, does not specify further. He reports having some blood in stool, and hematochezia was witnessed in the ED. He denies nausea/vomiting, back pain, chest pain, shortness of breath, cough, diarrhea. He may have been constipated before this current episode. He is homeless and mostly stays at the [facility], denies known sick contacts, fever, chills.      Of note, he was diagnosed with endocarditis in 10/2019, blood cx positive for Rothia mucilaginosa,  found to have vegetation on aortic valve (TAVR), appears to have received 6+ weeks of vancomycin and rifampin which was discontinued on 11/25/2019 and patient was to continue of PO penicillin for chronic suppression but reports that he has not been taking antibiotics. Also appears that he has not had a follow up JAYLYN as planned by ID.      In ED was hypothermic with T 93.7F and placed under Dagmar Hugger with warmed crystalloid fluid resuscitation. WBC 11/7, Hgb 15.9, BUN 36 Cr 1.52. Otherwise stable with hypertension in the 180's. Observed to have hematochezia, and per EDP there was red blood on rectal exam without observed hemorrhoids. CTA thoracoabdominal showed possible thrombus in distal abd aorta, possible occlusion of mesenteric artery, and stable aortic ectasia compared with prior exam. Vascular surgery was " "consulted and Dr. Merino saw, interpreted CTA to show patent celiac, SMA and renal arteries, no evidence of thrombus, no indication for surgery at this time, will follow patient.\"      Interval Problem Update  Reviewed last 24 hour events:   - Admitted overnight for abd pain and elvated lactate, evaluated by surg - no concern for mesenteric ischemia   - Neuro: A&O x4   - HR: 90s-110s   - SBP: 120s-140s   - GI: Abdominal pain resolved, stool now brown   - UOP: 225 mL overnight   - Johnson: yes   - Tm: initially hypothermic, now normothermic   - Lines: PIV   - PPx: GI not indicated, DVT Lovenox   - RA   - CXR (personally reviewed and compared to prior): No new      Review of Systems  Review of Systems   Constitutional: Negative for chills and fever.   Eyes: Negative for blurred vision.   Respiratory: Negative for cough, sputum production, shortness of breath and stridor.    Cardiovascular: Negative for chest pain.   Gastrointestinal: Positive for abdominal pain (Resolved) and blood in stool (Resolved). Negative for nausea and vomiting.   Genitourinary: Negative for dysuria.   Musculoskeletal: Negative for myalgias.   Skin: Negative for rash.   Neurological: Negative for dizziness, sensory change and focal weakness.        Vital Signs for last 24 hours   Temp:  [34.3 °C (93.7 °F)-36.9 °C (98.5 °F)] 36.9 °C (98.5 °F)  Pulse:  [] 99  Resp:  [12-29] 26  BP: (116-185)/(59-89) 123/62  SpO2:  [93 %-100 %] 95 %    Hemodynamic parameters for last 24 hours       Respiratory Information for the last 24 hours       Physical Exam   Physical Exam  Vitals signs and nursing note reviewed.   Constitutional:       General: He is not in acute distress.     Appearance: Normal appearance. He is well-developed and normal weight. He is ill-appearing.   HENT:      Head: Normocephalic and atraumatic.      Right Ear: External ear normal.      Left Ear: External ear normal.      Nose: Nose normal.      Mouth/Throat:      Mouth: Mucous " membranes are moist.   Eyes:      Extraocular Movements: Extraocular movements intact.      Conjunctiva/sclera: Conjunctivae normal.   Neck:      Musculoskeletal: Neck supple.      Vascular: No JVD.      Trachea: No tracheal deviation.   Cardiovascular:      Rate and Rhythm: Normal rate and regular rhythm.      Pulses: Normal pulses.      Heart sounds: Murmur present.   Pulmonary:      Effort: Pulmonary effort is normal.      Breath sounds: Normal breath sounds. No rales.   Abdominal:      General: Bowel sounds are normal. There is no distension.      Palpations: Abdomen is soft.      Tenderness: There is no abdominal tenderness. There is no guarding or rebound.   Musculoskeletal:         General: No tenderness.      Right lower leg: No edema.      Left lower leg: No edema.   Skin:     General: Skin is warm and dry.      Capillary Refill: Capillary refill takes less than 2 seconds.      Findings: No rash.   Neurological:      General: No focal deficit present.      Mental Status: He is alert and oriented to person, place, and time.      Cranial Nerves: No cranial nerve deficit.      Sensory: No sensory deficit.      Motor: No weakness.   Psychiatric:         Mood and Affect: Mood normal.         Behavior: Behavior normal.         Medications  Current Facility-Administered Medications   Medication Dose Route Frequency Provider Last Rate Last Dose   • labetalol (NORMODYNE/TRANDATE) injection 10 mg  10 mg Intravenous Q2HRS PRN Clyde Ro M.D.       • Respiratory Therapy Consult   Nebulization Continuous RT Clyde Ro M.D.       • MD Alert...Vancomycin per Pharmacy   Other PHARMACY TO DOSE Clyde Ro M.D.       • fentaNYL (SUBLIMAZE) injection 25 mcg  25 mcg Intravenous Q2HRS PRN Clyde Ro M.D.           Fluids    Intake/Output Summary (Last 24 hours) at 5/20/2020 0904  Last data filed at 5/20/2020 0800  Gross per 24 hour   Intake 2249.9 ml   Output 285 ml   Net 1964.9 ml        Laboratory          Recent Labs     05/19/20 1917 05/19/20 2053 05/20/20  0540   SODIUM 135  --  137   POTASSIUM 4.6  --  4.7   CHLORIDE 98  --  103   CO2 23  --  23   BUN 36*  --  37*   CREATININE 1.52*  --  1.44*   MAGNESIUM  --  2.1  --    CALCIUM 10.4  --  9.2     Recent Labs     05/19/20 1917 05/20/20  0540   ALTSGPT 24 16   ASTSGOT 40 21   ALKPHOSPHAT 110* 80   TBILIRUBIN 0.5 0.2   LIPASE 30  --    GLUCOSE 134* 114*     Recent Labs     05/19/20 1917 05/20/20  0540   WBC 11.7* 12.5*   NEUTSPOLYS 81.30* 81.40*   LYMPHOCYTES 12.30* 8.60*   MONOCYTES 5.40 9.10   EOSINOPHILS 0.20 0.20   BASOPHILS 0.20 0.30   ASTSGOT 40 21   ALTSGPT 24 16   ALKPHOSPHAT 110* 80   TBILIRUBIN 0.5 0.2     Recent Labs     05/19/20 1917 05/20/20  0013 05/20/20  0540   RBC 5.81  --  4.89   HEMOGLOBIN 15.9 13.5* 13.5*   HEMATOCRIT 51.0 41.6* 42.0   PLATELETCT 272  --  233       Imaging  CT:    Reviewed    Assessment/Plan  * Lower abdominal pain  Assessment & Plan  On admission, associated with small amount of bright red blood per rectum  Pain is resolved, stool no longer bloody  Lactate cleared  Surgery following  Advance diet as tolerated      Lactic acidosis  Assessment & Plan  Improved  Status post IV fluid resuscitation    Acute renal failure (ARF) (HCC)- (present on admission)  Assessment & Plan  Improving  IVF  Renal dose meds, avoid nephrotoxins  Strict I/Os  Follow renal function  Urine studies    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)  Assessment & Plan  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  With  Completed 6 weeks of IV antimicrobials  Noncompliant with chronic suppressive penicillin  TTE pending  Follow-up blood cultures  Continue vancomycin  Consider ID consultation    HTN (hypertension)- (present on admission)  Assessment & Plan  Unknown current home regimen  Will need to identify regimen prior to discharge    AAA (abdominal aortic aneurysm) (HCC)- (present on admission)  Assessment & Plan  Will  need surveillance after discharge  Seen by vascular surgery during this admission    COPD (chronic obstructive pulmonary disease) (HCC)- (present on admission)  Assessment & Plan  Not in acute exacerbation  RT/O2 Protocols  Titrate supplemental FiO2 to maintain SpO2 >88%    S/P TAVR (transcatheter aortic valve replacement)- (present on admission)  Assessment & Plan  TAVR completed 3/18/2019 for severe aortic stenosis  Now with recent endocarditis and prosthetic valve vegetation  Reportedly completed 6 weeks of antimicrobials  Noncompliant with chronic suppressive therapy  TTE ordered, follow-up on results    History of methamphetamine use- (present on admission)  Assessment & Plan  With IV drug abuse, denies recent use however drug screen positive on admission       VTE:  Lovenox  Ulcer: Not Indicated  Lines: None    I have performed a physical exam and reviewed and updated ROS and Plan today (5/20/2020). In review of yesterday's note (5/19/2020), there are no changes except as documented above.     Patient will be transferred out of ICU today.  [facility] green team will assume care at this time. [facility] will sign off. Please call with any questions.    Discussed patient condition and risk of morbidity and/or mortality with RN, RT, Pharmacy, , [facility] Gold resident, Charge nurse / hot rounds and Patient

## 2020-05-20 NOTE — PROGRESS NOTES
ICU transfer note     Attending: Dr Jones  Resident: Dr. Rodriguez  Date of admission: 5/19/20  Date of transferring out from ICU:  5/20/20    Primary diagnosis:   - Abdominal pain    Secondary diagnoses:  Lactic acidosis  Acute renal failure  Hx bacterial endocarditis  Hypertension  Abdominal aortic aneurysm  Chronic obstructive pulmonary disease  S/p TAVR  Hx methamphetamine use       HPI and ICU Course Summary (Brief Narrative):  Mr. Espinosa is a 64 year old man with history of bacterial endocarditis, embolic stroke, aortic stenosis s/p TAVR (3/18/2019), limb ischemia and known infrarenal AAA, as well as chronic hep C, substance abuse (meth, IVDU), COPD, and hypertension. He presents due to abdominal pain that started one day prior to presentation. He reported lower medial abdominal pain, hematochezia, possible non compliance with chronic suppressive penicillin for hx of Rothia mucilaginosa endocarditis. Patient hypothermic to 93.7F on presentation, lethargic. He was observed to have hematochezia in ED, lactic acid 4.7, acute vs chronic renal failure with creatinine 1.5; there was concern for possible ischemic bowel given hx of endocarditis with embolic phenomenom. CTA thoracoabdominal aorta showed possible thrombus in distal ventral abdominal aorta. Vascular surgery was consulted who did not feel patient had any occlusion or thrombus on imaging. He was admitted to ICU for close monitoring of lactic acid and abdominal pain, which both improved with IV fluids and pain control. Blood cultures drawn at presentation currently no growth to date. Echocardiogram ordered to reevaluate for possible endocarditis. He is currently being treated with vancomycin at time of transfer out of ICU to the medical floor.       Important Events in the ICU:   - Central Line: no  - Intubation: no  - Pressors: no  - Johnson catheter: yes, inserted 5/19/20  - Tube feeding: no  - Antibiotics: yes, vancomycin  started 5/19/20  - Other procedures: no      Labs and imaging studies to be continued with their indications:  - CBC: yes, possible infection  - CMP or BMP: yes, renal failure  - Magnesium: no  - Phosphorus: no  - Chest Xray: no  - Other studies: no    Things to follow:   Serial abdominal exams  Lactic acid levels  Blood cultures from admission  Echocardiogram to evaluate endocarditis, consider ID consult pending results  Vascular surgery recommendations

## 2020-05-20 NOTE — ASSESSMENT & PLAN NOTE
Placed 3/18/2019 for severe aortic stenosis  Endocarditis 10/2019 with vegetation on prosthesis   No ASA/plavix due to high bleed risk per chart review  Echocardiogram showed increased size of the vegetations compared to previous echo in 10/10/19.  Plan:  JAYLYN does not show any vegetation, probable aortic root abscess or aneurysm.  Case discussed with cardiology by infectious disease who to also do not think it is a vegetation or an abscess.  Vancomycin intravenous will be discontinued per ID recommendations.  As per chart review ,Cardiology has discussed with CT surgery and they do not feel that he is a good surgical candidate given ongoing drug use .

## 2020-05-20 NOTE — ED NOTES
This RN to check in on patient in bathroom. Bright red blood noted in toilet. Patient had near syncopal episode while standing; assisted to wheelchair and brought back to room. ERP to bedside.

## 2020-05-20 NOTE — PROGRESS NOTES
Pharmacy Kinetics 64 y.o. male on vancomycin day # 1   2020    Currently on Vancomycin Pulse Dosing    : Vanco load 1500 mg IV at 0130    Provider specified end date: TBD    Indication for Treatment: Empiric - history of endocarditis with incomplete suppressive treatment    Pertinent history per medical record: Admitted on 2020 for abdominal pain. During patient's workup, it was noted he had a history of bacterial endocarditis in 2019. Patient completed 6 weeks of IV vancomycin + oral rifampin per ID, but patient failed to complete planned suppressive oral antibiotics or get follow-up echo. Empiric vancomycin resumed pending further work-up.    Other antibiotics: None    Allergies: Patient has no known allergies.     Concerns for renal function include BUN/SCr ratio > 20:1, albumin 3.2, recent elevated lactate and contrast for CT on .    Pertinent cultures to date:   : peripheral blood cx X2 - NGTD  10/10/19: peripheral blood cx X2 - Rothia mucilaginosa    MRSA nares swab if pneumonia is a concern (ordered/positive/negative/n-a): N/A    Pertinent imagin/19: CTA thoracoabdominal aorta - Diffuse abdominal aortic ectasia measuring up to 3.3 cm in diameter, similar to prior exam.  No evidence for dissection. Diminished enhancement along the ventral aspect of distal abdominal aorta may be admixture artifact or possibly thrombus. Inferior mesenteric artery is not well visualized which may be due to artifact or occlusion.     Recent Labs     20  0540   WBC 11.7* 12.5*   NEUTSPOLYS 81.30* 81.40*     Recent Labs     20  0540   BUN 36* 37*   CREATININE 1.52* 1.44*   ALBUMIN 4.2 3.2     No results for input(s): VANCOTROUGH, VANCOPEAK, VANCORANDOM in the last 72 hours.    Intake/Output Summary (Last 24 hours) at 2020 1150  Last data filed at 2020 1100  Gross per 24 hour   Intake 2249.9 ml   Output 410 ml   Net 1839.9 ml      /63    "Pulse (!) 105   Temp 36.9 °C (98.5 °F) (Temporal)   Resp 13   Ht 1.702 m (5' 7\")   Wt 53.4 kg (117 lb 11.6 oz)   SpO2 94%  Temp (24hrs), Av.7 °C (96.3 °F), Min:34.3 °C (93.7 °F), Max:36.9 °C (98.5 °F)      A/P   1. Vancomycin dose change: Start vancomycin 800 mg IV q24hrs  2. Next vancomycin level: 2 days (not currently ordered)  3. Goal trough: 16-20 mcg/mL  4. Comments: Patient remains on empiric antibiotics pending transthoracic echo results. Surgery consulted regarding abdominal pain but no plans for intervention noted. Patient's renal indices improved slightly following admission and lactate levels are normalizing. Vanco loading dose administered early this AM, will start scheduled once daily regimen per P&T protocol and plan trough in ~2 days to evaluate and adjust as necessary to achieve goal trough levels. Recommend ID consult for guidance on continued treatment/suppressive therapy for his endocarditis.    Pharmacy will continue to follow.     Kimi Hua, PharmD        "

## 2020-05-20 NOTE — DISCHARGE PLANNING
Patient is eligible for Medicaid Meds to Beds at discharge if they have coverage with [place] Medicaid, Medicaid FFS, Medicaid HMO ([facility]), or [place]. This service is provided through the [facility] if orders are received prior to 4 p.m. Monday through Friday, excluding holidays. Please call [phone] prior to discharge.

## 2020-05-20 NOTE — PROGRESS NOTES
Patient arrived to [facility] and attached to ICU monitor.    /59  HR 94  Temp-98.5F  94% on RA  Weight- 53.4kg    2 RN skin check complete with HEBERT Duncan.  Devices in place: PIVs x3, BP cuff, EKG leads, Pulse ox.  Skin assessed under the following devices mentioned above.     Preventative measures in place including:   - Q2h turns   - Extremities floated on pillows   - Devices repositioned   - Mepilex placed on sacrum    Following areas of concern:    - Bilateral elbows red and blanching   - Small skin tear on left elbow   - Bilateral knees red and slow to everett, photos taken   - Left knee abrasion   - Bilateral posterior legs red and blanching   - Sacrum red and blanching   - Bilateral heels calloused and cracking    Wound consult placedYES/NO: N\A    Wound reported YES/NO: N\A  Appropriate LDAs opened YES/NO: N\A

## 2020-05-20 NOTE — ASSESSMENT & PLAN NOTE
Unclear outpatient regimen and adherence. Hypertensive on presentation.  Control BP < 160 in setting of AAA  PRN labetalol  Coreg 12.5 BID   Lisinopril 5 mg daily

## 2020-05-20 NOTE — ED NOTES
Bedside report given to HEBERT Duncan for the patient going to [facility].    Telephone report given to HEBERT Vazquez, who will be the primary RN for this patient. Updated on new orders and POC for the patient.

## 2020-05-20 NOTE — ED TRIAGE NOTES
Patient with lower abdominal pain x a few days. Reports constipation. Immediately to BR on arrival to have BM.

## 2020-05-20 NOTE — CARE PLAN
Problem: Safety  Goal: Will remain free from falls  Intervention: Implement fall precautions  Note: Patient educated to call for assistance prior to getting out of bed. Call light within reach, bed alarm on, sides rails up x3, treaded slipper socks in place, belongings within reach.      Problem: Infection  Goal: Will remain free from infection  Intervention: Implement standard precautions and perform hand washing before and after patient contact  Note: Standard precautions in place to prevent infection. Hand hygiene performed before and after patient contact.

## 2020-05-20 NOTE — CONSULTS
"Vascular Surgery Consult Note  -------------------------------------------------------------------------------------------------  Date: 5/19/2020    Consulting Physician: Eric Merino M.D. [facility]  -------------------------------------------------------------------------------------------------    Reason for consultation:  Abdominal pain    HPI:  This is a 64 y.o. male who is presenting with abdominal pain, lactic acidosis, and hypothermia to 93 degrees.  When I came to see him he was more alert than prior and able to answer questions. He says his abdominal pain has resolved. He is homeless sleeping in a tent so that was contributing to his hypothermia. The lactic acidosis remains unexplained - he does not have mesenteric stenosis/occlusion on CTA and the bowel does not appear ischemic on CT images.    Past Medical History:   Diagnosis Date   • Abnormality of left ventricle of heart     \"constricted left ventricle\"    • Aortic stenosis    • BPH (benign prostatic hyperplasia)    • CVA (cerebral vascular accident) (HCC)    • Hepatitis C    • Hypercholesteremia    • Hypertension    • Myocardial infarction (HCC)        Past Surgical History:   Procedure Laterality Date   • TRANSCATHETER AORTIC VALVE REPLACEMENT N/A 3/18/2019    Procedure: REPLACEMENT, AORTIC VALVE, TRANSCATHETER;  Surgeon: Dariel Moser M.D.;  Location: SURGERY [facility];  Service: Cardiac   • JAYLYN  3/18/2019    Procedure: ECHOCARDIOGRAM, TRANSESOPHAGEAL;  Surgeon: Dariel Moser M.D.;  Location: SURGERY [facility];  Service: Cardiac   • TONSILLECTOMY     • ZZZ CARDIAC CATH         No current facility-administered medications for this encounter.      Current Outpatient Medications   Medication Sig Dispense Refill   • loperamide (IMODIUM) 2 MG Cap Take 1 Cap by mouth 4 times a day as needed for Diarrhea. 16 Cap 0   • loperamide (IMODIUM) 2 MG Cap Take 1 Cap by mouth 4 times a day as needed for Diarrhea. 30 Cap 0   • " furosemide (LASIX) 20 MG Tab Take 20 mg by mouth 2 times a day.     • POTASSIUM PO Take  by mouth.     • potassium chloride (MICRO-K) 10 MEQ capsule Take 10 mEq by mouth every day.     • amLODIPine (NORVASC) 2.5 MG Tab Take 2.5 mg by mouth every day.     • riFAMPin (RIFADINE) 300 MG Cap Take 300 mg by mouth 2 times a day.     • metoprolol (LOPRESSOR) 25 MG Tab Take 25 mg by mouth 2 times a day.     • atorvastatin (LIPITOR) 40 MG Tab Take 1 Tab by mouth every evening. 30 Tab    • lisinopril (PRINIVIL, ZESTRIL) 40 MG tablet Take 1 Tab by mouth every day. 30 Tab    • Vancomycin HCl (MD ALERT...VANCOMYCIN PER PHARMACY) 1 Each by Other route PHARMACY TO DOSE.     • nicotine (NICODERM) 7 MG/24HR PATCH 24 HR Apply 1 Patch to skin as directed every 24 hours. (Patient not taking: Reported on 11/11/2019) 30 Patch        Social History     Socioeconomic History   • Marital status: Single     Spouse name: Not on file   • Number of children: Not on file   • Years of education: Not on file   • Highest education level: Not on file   Occupational History   • Not on file   Social Needs   • Financial resource strain: Not on file   • Food insecurity     Worry: Not on file     Inability: Not on file   • Transportation needs     Medical: Not on file     Non-medical: Not on file   Tobacco Use   • Smoking status: Current Some Day Smoker     Packs/day: 0.20     Years: 40.00     Pack years: 8.00     Types: Cigarettes   • Smokeless tobacco: Never Used   Substance and Sexual Activity   • Alcohol use: Yes     Frequency: Monthly or less     Drinks per session: 1 or 2     Binge frequency: Never     Comment: occ   • Drug use: Yes     Types: Inhaled, Marijuana     Comment: marijuana, meth   • Sexual activity: Not on file   Lifestyle   • Physical activity     Days per week: Not on file     Minutes per session: Not on file   • Stress: Not on file   Relationships   • Social connections     Talks on phone: Not on file     Gets together: Not on file     " Attends Jew service: Not on file     Active member of club or organization: Not on file     Attends meetings of clubs or organizations: Not on file     Relationship status: Not on file   • Intimate partner violence     Fear of current or ex partner: Not on file     Emotionally abused: Not on file     Physically abused: Not on file     Forced sexual activity: Not on file   Other Topics Concern   • Not on file   Social History Narrative   • Not on file       Family History   Problem Relation Age of Onset   • Heart Disease Mother        Allergies:  Patient has no known allergies.    Review of Systems:  Noncontributory except as per HPI    Physical Exam:  /87   Pulse 89   Temp (!) 34.3 °C (93.7 °F) (Rectal)   Resp 16   Ht 1.702 m (5' 7\")   Wt 59 kg (130 lb)   SpO2 100%     Constitutional: Alert, no acute distress, cachectic  HEENT:  Normocephalic and atraumatic, EOMI  Neck:   Supple, no JVD,   Cardiovascular: Regular rate and rhythm,   Pulmonary:  Good air entry bilaterally,    Abdominal:  Soft, non-tender, non-distended     Aortic impulse not widened on exam  Musculoskeletal: No edema, no tenderness  Neurological:  CN II-XII grossly intact, no focal deficits  Vascular:  Strong palpable femoral pulses    Labs:  Recent Labs     05/19/20 1917   WBC 11.7*   RBC 5.81   HEMOGLOBIN 15.9   HEMATOCRIT 51.0   MCV 87.8   MCH 27.4   MCHC 31.2*   RDW 52.1*   PLATELETCT 272   MPV 10.1     Recent Labs     05/19/20 1917   SODIUM 135   POTASSIUM 4.6   CHLORIDE 98   CO2 23   GLUCOSE 134*   BUN 36*   CREATININE 1.52*   CALCIUM 10.4         Recent Labs     05/19/20 1917   ASTSGOT 40   ALTSGPT 24   TBILIRUBIN 0.5   ALKPHOSPHAT 110*   GLOBULIN 5.4*       Radiology:  CTA: celiac, SMA, and renal arteries widely patent. Small infrarenal aortic aneurysm to 3.3cm with no evidence of complications, the contrast density is lighter at the aortic bifurcation but it does not appear consistent with thrombus to me.  No bowel " abnormalities. The stomach is moderately distended with fluid    Assessment/Plan:  -  Abdominal pain  -  Lactic acidosis  -  Small asymptomatic infrarenal aortic aneurysm  -  ARF  -  hypothermia  -  Hepatitis C  - Hypertension  - Hypercholesterolemia  - CAD with history of MI    No surgical intervention at this time.   Will be admitted for rewarming and rehydration and we will trend his lactic acid. I expect his lactic acidosis to resolve with resuscitation.  Following along    Eric Merino MD  [facility] (General and Vascular Surgery)  Cell: [phone] (text or call is fine, if you don't reach me please try my office)  Office: [phone]    5/19/2020    8:54 PM  ___________________________________________________________________  Patient:Sylvain Espinosa   MRN:[id number]   CSN:[id number]

## 2020-05-20 NOTE — PROGRESS NOTES
Pt received to room #425     Pt very groggy, only answering some questions & drifting back to sleep     VSS    Pt still NPO, asked pt if hungry by RN  - Pt grunts & rolls over to sleep     Bed alarm on     x2 RN Skin check completed   - Coccyx red, blanchable. Pt sleeping on side right now   - Elbow red, blanchable    Unable to answer admit questions at this time

## 2020-05-20 NOTE — PROGRESS NOTES
Transfer from ICU 10:05am.  =============  Regarding patient Sylvain Espinosa:  Mr. Espinosa 64-year-old male, with a history of bacterial endocarditis, aortic stenosis status post TAVR on 3/18/2019 limb ischemia with infrarenal abdominal aortic aneurysm, with CTA of the thoracoabdominal aorta showing possible thrombus in the distal ventral abdominal aorta admitted to ICU for abdominal pain, with concern for ischemic bowel.  Abdominal pain resolving with lactic acid trending down and awaiting echocardiogram.  Lipase within normal limits  Continue on IV fluids with serial abdominal exams  We will continue to trend lactic acid and IV fluids and will monitor urine output and will continue vancomycin with possible JAYLYN after transthoracic echo and ID consult after.  Primary team will follow patient.    Lucina Grewal M.D.

## 2020-05-20 NOTE — ED NOTES
Lab called with critical result of lactic 4.7 at 1930. Critical lab result read back to lab.   Dr. Sinclair notified of critical lab result at 1931.  Critical lab result read back by Dr. Sinclair.

## 2020-05-20 NOTE — PROGRESS NOTES
"Pharmacy Kinetics 64 y.o. male on vancomycin day # 1   2020    Received Vancomycin LD  1500 mg iv x1 ( at 0130)   Provider specified end date: TBD    Indication for Treatment: bacterial endocarditis     Pertinent history per medical record: Admitted on 2020 for abdominal pain. Pt was hospitalized for endocarditis 7 months ago with incomplete treatment.        Other antibiotics: Zosyn 4.5 g IV x 1 in ED given    Allergies: Patient has no known allergies.     List concerns for renal function: BUN/SCr ratio ~24, elevated SCr (1.52)      Pertinent cultures to date:   Blood cx - pending     MRSA nares swab if pneumonia is a concern (ordered/positive/negative/n-a): N/A    Recent Labs     20   WBC 11.7*   NEUTSPOLYS 81.30*     Recent Labs     20   BUN 36*   CREATININE 1.52*   ALBUMIN 4.2     No results for input(s): VANCOTROUGH, VANCOPEAK, VANCORANDOM in the last 72 hours.    Intake/Output Summary (Last 24 hours) at 2020  Last data filed at 2020 2111  Gross per 24 hour   Intake 1000 ml   Output no documentation   Net 1000 ml      Blood Pressure 146/81   Pulse 100   Temperature (Abnormal) 34.3 °C (93.7 °F) (Rectal)   Respiration 16   Height 1.702 m (5' 7\")   Weight 59 kg (130 lb)   Oxygen Saturation 99%  Temp (24hrs), Av.1 °C (95.2 °F), Min:34.3 °C (93.7 °F), Max:35.9 °C (96.6 °F)      A/P   1. Vancomycin dose change: new start vanco  2. Next vancomycin level:  @ 1300 - ordered   3. Goal trough: 16-20 mcg/mL   4. Comments: Random level to be drawn given pt's unstable renal function. Pharmacy will continue to follow and recommend de-escalation of antibiotics as appropriate.      Danya Concepcion, PharmD      "

## 2020-05-21 ENCOUNTER — APPOINTMENT (OUTPATIENT)
Dept: CARDIOLOGY | Facility: MEDICAL CENTER | Age: 65
DRG: 289 | End: 2020-05-21
Attending: STUDENT IN AN ORGANIZED HEALTH CARE EDUCATION/TRAINING PROGRAM
Payer: MEDICAID

## 2020-05-21 ENCOUNTER — PATIENT OUTREACH (OUTPATIENT)
Dept: HEALTH INFORMATION MANAGEMENT | Facility: OTHER | Age: 65
End: 2020-05-21

## 2020-05-21 LAB
ALBUMIN SERPL BCP-MCNC: 3 G/DL (ref 3.2–4.9)
ALBUMIN/GLOB SERPL: 0.8 G/DL
ALP SERPL-CCNC: 63 U/L (ref 30–99)
ALT SERPL-CCNC: 14 U/L (ref 2–50)
ANION GAP SERPL CALC-SCNC: 8 MMOL/L (ref 7–16)
AST SERPL-CCNC: 16 U/L (ref 12–45)
BASOPHILS # BLD AUTO: 0.3 % (ref 0–1.8)
BASOPHILS # BLD: 0.03 K/UL (ref 0–0.12)
BILIRUB SERPL-MCNC: 0.3 MG/DL (ref 0.1–1.5)
BUN SERPL-MCNC: 25 MG/DL (ref 8–22)
CALCIUM SERPL-MCNC: 8.8 MG/DL (ref 8.5–10.5)
CHLORIDE SERPL-SCNC: 107 MMOL/L (ref 96–112)
CO2 SERPL-SCNC: 22 MMOL/L (ref 20–33)
CREAT SERPL-MCNC: 1.22 MG/DL (ref 0.5–1.4)
EOSINOPHIL # BLD AUTO: 0.07 K/UL (ref 0–0.51)
EOSINOPHIL NFR BLD: 0.7 % (ref 0–6.9)
ERYTHROCYTE [DISTWIDTH] IN BLOOD BY AUTOMATED COUNT: 51.2 FL (ref 35.9–50)
GLOBULIN SER CALC-MCNC: 3.8 G/DL (ref 1.9–3.5)
GLUCOSE SERPL-MCNC: 96 MG/DL (ref 65–99)
HCT VFR BLD AUTO: 40.3 % (ref 42–52)
HGB BLD-MCNC: 12.8 G/DL (ref 14–18)
IMM GRANULOCYTES # BLD AUTO: 0.03 K/UL (ref 0–0.11)
IMM GRANULOCYTES NFR BLD AUTO: 0.3 % (ref 0–0.9)
LACTATE BLD-SCNC: 1.4 MMOL/L (ref 0.5–2)
LV EJECT FRACT MOD 2C 99903: 69.83
LV EJECT FRACT MOD 4C 99902: 71.72
LV EJECT FRACT MOD BP 99901: 70.41
LYMPHOCYTES # BLD AUTO: 1.74 K/UL (ref 1–4.8)
LYMPHOCYTES NFR BLD: 16.4 % (ref 22–41)
MCH RBC QN AUTO: 27.2 PG (ref 27–33)
MCHC RBC AUTO-ENTMCNC: 31.8 G/DL (ref 33.7–35.3)
MCV RBC AUTO: 85.7 FL (ref 81.4–97.8)
MONOCYTES # BLD AUTO: 1.32 K/UL (ref 0–0.85)
MONOCYTES NFR BLD AUTO: 12.4 % (ref 0–13.4)
NEUTROPHILS # BLD AUTO: 7.43 K/UL (ref 1.82–7.42)
NEUTROPHILS NFR BLD: 69.9 % (ref 44–72)
NRBC # BLD AUTO: 0 K/UL
NRBC BLD-RTO: 0 /100 WBC
PLATELET # BLD AUTO: 205 K/UL (ref 164–446)
PMV BLD AUTO: 10 FL (ref 9–12.9)
POTASSIUM SERPL-SCNC: 4.4 MMOL/L (ref 3.6–5.5)
PROT SERPL-MCNC: 6.8 G/DL (ref 6–8.2)
RBC # BLD AUTO: 4.7 M/UL (ref 4.7–6.1)
SODIUM SERPL-SCNC: 137 MMOL/L (ref 135–145)
VANCOMYCIN TROUGH SERPL-MCNC: 4.3 UG/ML (ref 10–20)
WBC # BLD AUTO: 10.6 K/UL (ref 4.8–10.8)

## 2020-05-21 PROCEDURE — 93306 TTE W/DOPPLER COMPLETE: CPT

## 2020-05-21 PROCEDURE — 85025 COMPLETE CBC W/AUTO DIFF WBC: CPT

## 2020-05-21 PROCEDURE — 93306 TTE W/DOPPLER COMPLETE: CPT | Mod: 26 | Performed by: INTERNAL MEDICINE

## 2020-05-21 PROCEDURE — 80202 ASSAY OF VANCOMYCIN: CPT

## 2020-05-21 PROCEDURE — 700111 HCHG RX REV CODE 636 W/ 250 OVERRIDE (IP): Performed by: INTERNAL MEDICINE

## 2020-05-21 PROCEDURE — 770001 HCHG ROOM/CARE - MED/SURG/GYN PRIV*

## 2020-05-21 PROCEDURE — 700105 HCHG RX REV CODE 258: Performed by: INTERNAL MEDICINE

## 2020-05-21 PROCEDURE — 99232 SBSQ HOSP IP/OBS MODERATE 35: CPT | Mod: GC | Performed by: INTERNAL MEDICINE

## 2020-05-21 PROCEDURE — 80053 COMPREHEN METABOLIC PANEL: CPT

## 2020-05-21 PROCEDURE — 36415 COLL VENOUS BLD VENIPUNCTURE: CPT

## 2020-05-21 PROCEDURE — A9270 NON-COVERED ITEM OR SERVICE: HCPCS | Performed by: INTERNAL MEDICINE

## 2020-05-21 PROCEDURE — 83605 ASSAY OF LACTIC ACID: CPT

## 2020-05-21 PROCEDURE — 700102 HCHG RX REV CODE 250 W/ 637 OVERRIDE(OP): Performed by: INTERNAL MEDICINE

## 2020-05-21 RX ADMIN — VANCOMYCIN HYDROCHLORIDE 800 MG: 500 INJECTION, POWDER, LYOPHILIZED, FOR SOLUTION INTRAVENOUS at 21:23

## 2020-05-21 RX ADMIN — ACETAMINOPHEN 650 MG: 325 TABLET, FILM COATED ORAL at 21:25

## 2020-05-21 ASSESSMENT — ENCOUNTER SYMPTOMS
WEIGHT LOSS: 0
LOSS OF CONSCIOUSNESS: 0
BLURRED VISION: 0
FOCAL WEAKNESS: 0
SEIZURES: 0
CHILLS: 0
BLOOD IN STOOL: 1
ABDOMINAL PAIN: 1
SENSORY CHANGE: 0
NAUSEA: 0
BACK PAIN: 0
SHORTNESS OF BREATH: 0
PALPITATIONS: 0
HEADACHES: 0
TINGLING: 0
DIZZINESS: 0
SPEECH CHANGE: 0
CONSTIPATION: 0
HEMOPTYSIS: 0
WEAKNESS: 0
ORTHOPNEA: 0
SPUTUM PRODUCTION: 0
VOMITING: 0
SORE THROAT: 0
CLAUDICATION: 0
STRIDOR: 0
COUGH: 0
FEVER: 0
PHOTOPHOBIA: 0
TREMORS: 0
DIAPHORESIS: 1
DEPRESSION: 0
NECK PAIN: 0
MYALGIAS: 1
HEARTBURN: 0
DOUBLE VISION: 0
FALLS: 0
DIARRHEA: 1
SINUS PAIN: 0
WHEEZING: 0

## 2020-05-21 ASSESSMENT — FIBROSIS 4 INDEX: FIB4 SCORE: 1.6

## 2020-05-21 ASSESSMENT — LIFESTYLE VARIABLES: SUBSTANCE_ABUSE: 1

## 2020-05-21 NOTE — ASSESSMENT & PLAN NOTE
On admission, associated with small amount of bright red blood per rectum  Pain is resolved, stool no longer bloody  Lactate cleared  Surgery following  Advance diet as tolerated

## 2020-05-21 NOTE — ASSESSMENT & PLAN NOTE
Improving  IVF  Renal dose meds, avoid nephrotoxins  Strict I/Os  Follow renal function  Urine studies

## 2020-05-21 NOTE — PROGRESS NOTES
"Pharmacy Kinetics 64 y.o. male on vancomycin day # 2 2020    Currently on Vancomycin 1500 mg iv q24hr (2100)  Provider specified end date: TBD    Indication for Treatment: Endocarditis    Pertinent history per medical record: Admitted on 2020 for abdominal pain. During patient's workup, it was noted he had a history of bacterial endocarditis in 2019. Patient completed 6 weeks of IV vancomycin + oral rifampin per ID, but patient failed to complete planned suppressive oral antibiotics or get follow-up echo. Empiric vancomycin resumed. ECHO on  showing increased size of the vegetations compared to previous ECHO. ID planning to be consulted.     Other antibiotics: None     Allergies: Patient has no known allergies.      Concerns for renal function include BUN/SCr ratio > 20:1, low albumin, recent elevated lactate and contrast for CT on .     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    Intake/Output Summary (Last 24 hours) at 2020 1448  Last data filed at 2020 0400  Gross per 24 hour   Intake 660 ml   Output 875 ml   Net -215 ml      /59   Pulse 100   Temp 37.4 °C (99.4 °F) (Temporal)   Resp 18   Ht 1.702 m (5' 7\")   Wt 53.4 kg (117 lb 11.6 oz)   SpO2 93%  Temp (24hrs), Av.3 °C (99.2 °F), Min:37.1 " °C (98.8 °F), Max:37.4 °C (99.4 °F)      A/P   1. Vancomycin dose change: not indicated  2. Next vancomycin level: tonight at 2030 prior to dose  3. Goal trough: 16 - 20 mcg/mL  4. Comments: Vancomycin to continue pending ID input for increased size of vegetation concerning for reccurent endocarditis. Patient's renal function stable and slightly improving. Based on previous vancomycin administration, patient was on q8h dosing but renal function was better and patient was approx. 10 kg heavier. Will obtain a trough tonight to ensure patient clearing/therapeutic. Pharmacy will continue to monitor.    Thank you!    Myrna Palm, PharmD, BCPS

## 2020-05-21 NOTE — PROGRESS NOTES
Daily Progress Note:     Date of Service: 5/21/2020  Primary Team: [facility] Green Team   Attending: Ector Ferrell M.D.   Senior Resident:    Intern:   Contact:  [phone]    Chief Complaint:   Chief Complaint   Patient presents with   • Abdominal Pain         Subjective:  No acute events overnight   Patient complainting of 4/10 lower abdominal pain. Denies nausea or vomiting  2 episodes of BM this am , both with urgency .One episode was noticed by nurse as red/jerzy colored stool .    Interval Update:   Lactic acidosis resolved. Blood cultures NGTD  Vascular surgery , Dr Merino saw the patient . Recommended no interventions as less likely ischemia / thrombus.  Started on regular diet. As per surgery team standpoint , ok to discharge if tolerating the diet and to follow up outpatient for aneurysm Surveillance.  Trans thoracic echo was done on 05/21/2020.Showed increased size of the vegetations compared to previous echo in 10/10/19.  Infectious disease team is consulted for further recommendations - regarding antibiotics or further imaging with JAYLYN . Awaiting recommendations.  Will continue IV Vancomycin for now.      Consultants/Specialty:  Vascular Surgery   Infectious disease     Review of Systems:    Review of Systems   Constitutional: Positive for diaphoresis and malaise/fatigue. Negative for chills, fever and weight loss.   HENT: Negative for congestion, ear pain, hearing loss, nosebleeds, sinus pain, sore throat and tinnitus.    Eyes: Negative for blurred vision, double vision and photophobia.   Respiratory: Negative for cough, hemoptysis, sputum production, shortness of breath, wheezing and stridor.    Cardiovascular: Negative for chest pain, palpitations, orthopnea, claudication and leg swelling.   Gastrointestinal: Positive for abdominal pain, blood in stool and diarrhea. Negative for constipation, heartburn, melena, nausea and vomiting.   Genitourinary: Negative for dysuria,  frequency and urgency.   Musculoskeletal: Positive for myalgias. Negative for back pain, falls, joint pain and neck pain.   Skin: Negative for itching and rash.   Neurological: Negative for dizziness, tingling, tremors, sensory change, speech change, focal weakness, seizures, loss of consciousness, weakness and headaches.   Psychiatric/Behavioral: Positive for substance abuse. Negative for depression and suicidal ideas.       Objective Data:   Physical Exam:   Vitals:   Temp:  [37.1 °C (98.8 °F)-37.4 °C (99.4 °F)] 37.4 °C (99.4 °F)  Pulse:  [] 100  Resp:  [14-19] 18  BP: (149-160)/(59-95) 155/59  SpO2:  [93 %-97 %] 93 %    Physical Exam     Constitutional:   Thin, disheveled man, appears uncomfortable  HENT:   Head: Normocephalic and atraumatic.   Eyes: Pupils are equal, round, and reactive to light. EOM are normal. No scleral icterus.   Neck: Normal range of motion. Neck supple. No JVD present.   Cardiovascular: Normal rate, regular rhythm and intact distal pulses.   Murmur heard.  Systolic murmur with late peak  Pulmonary/Chest: Effort normal and breath sounds normal. No respiratory distress. He has no wheezes. He has no rales.   Abdominal: Soft. He exhibits no distension.   lower abdominal tenderness, BS +. No guarding or rigidity   Musculoskeletal:         General: No edema.   Neurological: AO x 4 .No cranial nerve deficit.   Lethargic   Skin: Skin is warm and dry. He is not diaphoretic.     Labs:     Recent Labs     05/19/20  1917 05/20/20  0013 05/20/20  0540 05/21/20  0254   WBC 11.7*  --  12.5* 10.6   RBC 5.81  --  4.89 4.70   HEMOGLOBIN 15.9 13.5* 13.5* 12.8*   HEMATOCRIT 51.0 41.6* 42.0 40.3*   MCV 87.8  --  85.9 85.7   MCH 27.4  --  27.6 27.2   RDW 52.1*  --  51.0* 51.2*   PLATELETCT 272  --  233 205   MPV 10.1  --  10.3 10.0   NEUTSPOLYS 81.30*  --  81.40* 69.90   LYMPHOCYTES 12.30*  --  8.60* 16.40*   MONOCYTES 5.40  --  9.10 12.40   EOSINOPHILS 0.20  --  0.20 0.70   BASOPHILS 0.20  --  0.30 0.30      Recent Labs     05/19/20 1917 05/20/20  0540 05/21/20  0254   SODIUM 135 137 137   POTASSIUM 4.6 4.7 4.4   CHLORIDE 98 103 107   CO2 23 23 22   GLUCOSE 134* 114* 96   BUN 36* 37* 25*     Recent Labs     05/19/20 1917 05/20/20  0540 05/21/20  0254   ALBUMIN 4.2 3.2 3.0*   TBILIRUBIN 0.5 0.2 0.3   ALKPHOSPHAT 110* 80 63   TOTPROTEIN 9.6* 7.4 6.8   ALTSGPT 24 16 14   ASTSGOT 40 21 16   CREATININE 1.52* 1.44* 1.22       Imaging:   EC-ECHOCARDIOGRAM COMPLETE W/O CONT   Final Result      CT-CTA COMPLETE THORACOABDOMINAL AORTA   Final Result      1.  Diffuse abdominal aortic ectasia measuring up to 3.3 cm in diameter, similar to prior exam.  No evidence for dissection.   2.  Diminished enhancement along the ventral aspect of distal abdominal aorta may be admixture artifact or possibly thrombus.   3.  Inferior mesenteric artery is not well visualized which may be due to artifact or occlusion.   4.  Severe emphysema.   5.  Heterogeneous fatty infiltration of liver.   6.  Bilateral renal scarring.   7.  No bowel obstruction or pneumoperitoneum.                     * Lower abdominal pain  Assessment & Plan  Initially given history of vascular disease, lactic acidosis considered mesenteric ischemia/ischemia bowel likely, concern for septic emboli to bowel given endocarditis, though vascular surgery evaluation of pt and CTA did not see this as likely. AAA appears stable on CTA. No clear changes of colitis.  Hemoglobin is 12.8 from 15.9 .   Plan:  continue to monitor H and H  Continue IV Fluids, serial abdominal exams  Pain control  Diet advanced to regular   Continue to monitor     Lactic acidosis  Assessment & Plan  Resolved with IV fluids     Acute renal failure (ARF) (HCC)- (present on admission)  Assessment & Plan  Resolved with IV fluids       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)  Assessment & Plan  Diagnosed with endocarditis in 10/2019, blood cx positive for Rothia mucilaginosa,  found to have vegetation  on aortic valve (TAVR), appears to have received 6+ weeks of vancomycin and rifampin which was discontinued on 11/25/2019 and patient was to continue of PO penicillin for chronic suppression but reports that he has not been taking antibiotics. Also appears that he has not had a follow up JAYLYN as planned by ID.   Plan:    Echo on 05/21/2020 - Showed increased size of the vegetations compared to previous echo with decreased TAVR leaflet excursion.   Blood cultures are negative till date    ID consult is placed  for antibiotics and further imaging recommendations   continue vancomycin for now    HTN (hypertension)- (present on admission)  Assessment & Plan  Unclear outpatient regimen and adherence. Hypertensive on presentation.  Control BP < 160 in setting of AAA  PRN labetalol      AAA (abdominal aortic aneurysm) ([facility])- (present on admission)  Assessment & Plan  Stable on CT  BP control, SBP<160  Plan:   outpatient Vascular surgery follow up  for aneurysm Surveillance.      COPD (chronic obstructive pulmonary disease) ([facility])- (present on admission)  Assessment & Plan  Severe emphysema on CT  No current exacerbation  RT protocol    S/P TAVR (transcatheter aortic valve replacement)- (present on admission)  Assessment & Plan  Placed 3/18/2019 for severe aortic stenosis  Endocarditis 10/2019 with vegetation on prosthesis   No ASA/plavix due to high bleed risk per chart review  Echocardiogram showed increased size of the vegetations compared to previous echo in 10/10/19.  Plan:  ID consult - awaiting recommendations       History of methamphetamine use- (present on admission)  Assessment & Plan  Denies recent use  History IVDU  UDS positive for methamphetamine   Plan:  Counseling provided to quit meth use  SW consult

## 2020-05-21 NOTE — ASSESSMENT & PLAN NOTE
TAVR completed 3/18/2019 for severe aortic stenosis  Now with recent endocarditis and prosthetic valve vegetation  Reportedly completed 6 weeks of antimicrobials  Noncompliant with chronic suppressive therapy  TTE ordered, follow-up on results

## 2020-05-21 NOTE — CARE PLAN
Problem: Communication  Goal: The ability to communicate needs accurately and effectively will improve  Outcome: PROGRESSING SLOWER THAN EXPECTED  Intervention: [place] patient and significant other/support system to call light to alert staff of needs  Note: Pt was educated on call light use and the need to alert staff to needs and prior to mobilization. Pt verbalizes understanding, but has not demonstrated understanding.      Problem: Knowledge Deficit  Goal: Knowledge of disease process/condition, treatment plan, diagnostic tests, and medications will improve  Outcome: PROGRESSING SLOWER THAN EXPECTED  Goal: Knowledge of the prescribed therapeutic regimen will improve  Outcome: PROGRESSING SLOWER THAN EXPECTED  Intervention: Discuss information regarding therpeutic regimen and document in education  Note: Pt was educated on POC, MAR, shift routine and nursing education R/T Dx. Questions were encouraged, but pt states that he has no questions at this time. Pt verbalized understanding of all teaching, but declines to actively participate in education.

## 2020-05-21 NOTE — PROGRESS NOTES
Report received from Gay AMBROSIO   Assumed care of patient at 1900.    Pt is A&O x 4.  Pain reported at 0/10.   Nausea denied  Pt is very fatigued and declines to hold extended conversations.   Tolerating Regular Diet   Abd is soft, but tender.   + Urine output  + BM    + Flatus  JACKSON mobility D/T pt refusal  Bed in lowest position and locked.  Pt resting comfortably now.  Review plan of care with patient  Call light within reach  Hourly rounds in place  All needs met at this time

## 2020-05-21 NOTE — DISCHARGE SUMMARY
Discharge Summary    Date of Admission: 5/19/2020  Date of Discharge: No discharge date for patient encounter.  Discharging Attending: Dr.Amanjot Bates  Discharging Senior Resident: Dr. Grewal   Discharging Intern: Dr. Vilchis    CHIEF COMPLAINT ON ADMISSION  Chief Complaint   Patient presents with   • Abdominal Pain       Reason for Admission  Abdominal Pain   Hematochezia   Acute kidney failure     Secondary diagnosis  Essential hypertension  Methamphetamine abuse   History of Bacterial endocarditis s/p TAVR    Admission Date  5/19/2020    CODE STATUS  Full Code    HPI & HOSPITAL COURSE  This is a 64 y.o. 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  He presented to the ED on 5/19/2020 due to abdominal pain that started a day prior to presentation.  He reported lower medial abdominal pain, hematochezia, noncompliance with chronic suppressive penicillin therapy for history of Rothia mucilaginosa endocarditis.  On presentation patient was hypothermic to 93.7 °F and lethargic. He was observed to have hematochezia in ED with lactic acid of 4.7, acute renal failure with creatinine of 1.5.  Patient was admitted in ICU for close monitoring with the concern for possible ischemic bowel given his history of endocarditis with embolic phenomenon. Patient was started on IV fluids and pain management.  CTA thoracoabdominal aorta was done which showed possible thrombus in distal ventral abdominal aorta. Vascular surgery was consulted who did not feel patient had any occlusion or thrombus on imaging and recommended no acute interventions.  Patient was transferred to the floor as he showed improvement. lactic acidosis and renal function improved . Blood  cultures drawn on presentation showed no growth. Patient's abdominal pain resolved. Echocardiogram was done on 5/21/2020 which showed increased size of the vegetations on the aortic prosthetic valve and decreased TAVR leaflet excursion compared to the previous echo done in 10/2019.  Infectious disease was consulted and a JAYLYN was done  on 5/27/20 which did not show evidence of vegetation or abscess. 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 Cardiology has discussed with CT surgery and they do not feel that he is a good surgical candidate given ongoing drug use.From Infectious disease standpoint, he has completed 6 weeks therapy with vancomycin and rifampin in 11/2019. No further antibiotics needed at present.    Counseling was provided to quit smoking and methamphetamine use.  Therefore, he is discharged in good and stable condition to the community/shelter temporarily. He has an apartment which would be available to him in a week .    The patient met 2-midnight criteria for an inpatient stay at the time of discharge.    Discharge Date  05/29/20    FOLLOW UP ITEMS POST DISCHARGE    Follow up with Outpatient vascular Surgery for aneurysm surveillance.  Follow up with primary care provider for blood pressure and medications management     Of note : Apparently patient was previously on lisinopril 40 mg , amlodipine 2.5 mg , metoprolol tartrate 25 BID , furosemide 20 mg and atorvastatin 40 mg ( as pee the central pharmacy records  )but no home medications documented on admission. Primary care provider to consider medication management. Currently blood pressure stable on lisinopril 5 mg and carvedilol 12.5 BID . Restarted statin therapy .      DISCHARGE DIAGNOSES  Principal Problem:    Bacterial endocarditis POA: Yes  Active Problems:    Lower abdominal pain POA: Unknown    Lactic acidosis POA: Unknown    HTN (hypertension) (Chronic) POA: Yes    Acute renal failure (ARF) (HCC) POA: Yes     History of methamphetamine use POA: Yes    S/P TAVR (transcatheter aortic valve replacement) POA: Yes    COPD (chronic obstructive pulmonary disease) (HCC) POA: Yes    AAA (abdominal aortic aneurysm) (HCC) POA: Yes  Resolved Problems:    * No resolved hospital problems. *      FOLLOW UP  No future appointments.  Eric Merino M.D.  [address]  [address]  [place] [address]  [phone]    Go in 1 month  You are scheduled for a hospital follow up for 06/24/20 at 10:45am. Checkin at 10:15am. Please wear a mask to your appointment. Thank you.     NEY Ledesma  [address]  [address]  [facility] [address]  [phone]    Call  Per office please call and schedule your hospital follow up. Thank you.     Schedule an appointment with your primary care provider in 1-2 weeks    MEDICATIONS ON DISCHARGE     Medication List      START taking these medications      Instructions   acetaminophen 325 MG Tabs  Commonly known as:  TYLENOL   Take 2 Tabs by mouth every 6 hours as needed (Mild Pain; (Pain scale 1-3); Temp greater than 100.5 F).  Dose:  650 mg     atorvastatin 40 MG Tabs  Commonly known as:  LIPITOR   Take 1 Tab by mouth every day.  Dose:  40 mg     carvedilol 12.5 MG Tabs  Commonly known as:  COREG   Take 1 Tab by mouth 2 times a day, with meals.  Dose:  12.5 mg     lisinopril 5 MG Tabs  Start taking on:  May 30, 2020  Commonly known as:  PRINIVIL   Take 1 Tab by mouth every day.  Dose:  5 mg            Allergies  No Known Allergies    DIET  Orders Placed This Encounter   Procedures   • Diet Order Regular     Standing Status:   Standing     Number of Occurrences:   1     Order Specific Question:   Diet:     Answer:   Regular [1]       ACTIVITY  As tolerated.  Weight bearing as tolerated    CONSULTATIONS  Vascular Surgery , Dr Merino   Infectious disease     PROCEDURES  Transthoracic echo   Transesophageal echo Dictation #1  MRN:[id number]  CSN:[id number]

## 2020-05-21 NOTE — DIETARY
"Nutrition services: Day 2 of admit.  Sylvain Espinosa is a 64 y.o. male with admitting DX of lower GI bleed, abdominal pain, lactic acidosis.    Consult received for BMI<19, wt loss (14-23 lbs) and poor PO per admit screen (MST score of 2).  Spoke with pt at bedside, thin appearance noted. Pt reports a low appetite PTA d/t abdominal pain. Pt is homeless. Unsure of UBW, states he does not weigh himself. Per chart review, pt wt was 122 lbs 3/21/20 and 120 lbs 11/11/19.     Assessment:  Height: 170.2 cm (5' 7\")  Weight: 53.4 kg (117 lb 11.6 oz)  Body mass index is 18.44 kg/m²., BMI classification: underweight  Diet/Intake: Regular; PO % for two meals thus far    Evaluation:   1. Labs and meds reviewed.  2. No significant wt loss noted.    Malnutrition Risk: Does not meet criteria per ASPEN guidelines at this time.    Recommendations/Plan:  1. Encourage intake of meals.  2. Document intake of all meals as % taken in ADLs to provide interdisciplinary communication across all shifts.   3. Monitor weight.  4. Nutrition rep will continue to see patient for ongoing meal and snack preferences.  5. Add snacks per pt request.    RD will monitor per dept guidelines.          "

## 2020-05-21 NOTE — PROGRESS NOTES
Assumed care of patient.    Patient wants to sleep and is unwilling to complete admit profile.     Patient also wanted to eat, paged [facility] team, resident stated that they would resume diet tomorrow.

## 2020-05-21 NOTE — PROGRESS NOTES
Vascular    Abdomen benign  Lower extremities well perfused  Doubt any aortic thrombus or mesenteric ischemia. No intervention needed    Restarted regular diet. If tolerates he can be discharged from vascular standpoint    He can/should follow-up with me to discuss aneurysm surveillance, I put my info in the computer.    Eric Merino MD  [facility] (General and Vascular Surgery)  Cell: [phone] (text or call is fine, if you don't reach me please try my office)  Office: [phone]  __________________________________________________________________  Patient:Sylvain Espinosa   MRN:[id number]   CSN:[id number]    5/20/2020    6:59 PM

## 2020-05-21 NOTE — PROGRESS NOTES
Pt having loose, red/jerzy colored stool this AM. Unable to obtain occult blood D/T urgency. Admitting Dx includes possible GI Bleed. Pt has scheduled Lovenox this AM. This RN paged CHRISTOPHER Gupta for clarification. Orders to hold medication received.

## 2020-05-22 LAB
ANION GAP SERPL CALC-SCNC: 9 MMOL/L (ref 7–16)
BASOPHILS # BLD AUTO: 0.3 % (ref 0–1.8)
BASOPHILS # BLD: 0.02 K/UL (ref 0–0.12)
BUN SERPL-MCNC: 22 MG/DL (ref 8–22)
CALCIUM SERPL-MCNC: 9.2 MG/DL (ref 8.5–10.5)
CHLORIDE SERPL-SCNC: 98 MMOL/L (ref 96–112)
CO2 SERPL-SCNC: 23 MMOL/L (ref 20–33)
CREAT SERPL-MCNC: 1.01 MG/DL (ref 0.5–1.4)
EKG IMPRESSION: NORMAL
EOSINOPHIL # BLD AUTO: 0.15 K/UL (ref 0–0.51)
EOSINOPHIL NFR BLD: 2 % (ref 0–6.9)
ERYTHROCYTE [DISTWIDTH] IN BLOOD BY AUTOMATED COUNT: 49.8 FL (ref 35.9–50)
GLUCOSE SERPL-MCNC: 97 MG/DL (ref 65–99)
HCT VFR BLD AUTO: 36.5 % (ref 42–52)
HGB BLD-MCNC: 11.8 G/DL (ref 14–18)
IMM GRANULOCYTES # BLD AUTO: 0.01 K/UL (ref 0–0.11)
IMM GRANULOCYTES NFR BLD AUTO: 0.1 % (ref 0–0.9)
LYMPHOCYTES # BLD AUTO: 1.82 K/UL (ref 1–4.8)
LYMPHOCYTES NFR BLD: 24.5 % (ref 22–41)
MCH RBC QN AUTO: 27.4 PG (ref 27–33)
MCHC RBC AUTO-ENTMCNC: 32.3 G/DL (ref 33.7–35.3)
MCV RBC AUTO: 84.7 FL (ref 81.4–97.8)
MONOCYTES # BLD AUTO: 0.98 K/UL (ref 0–0.85)
MONOCYTES NFR BLD AUTO: 13.2 % (ref 0–13.4)
NEUTROPHILS # BLD AUTO: 4.46 K/UL (ref 1.82–7.42)
NEUTROPHILS NFR BLD: 59.9 % (ref 44–72)
NRBC # BLD AUTO: 0 K/UL
NRBC BLD-RTO: 0 /100 WBC
PLATELET # BLD AUTO: 171 K/UL (ref 164–446)
PMV BLD AUTO: 10 FL (ref 9–12.9)
POTASSIUM SERPL-SCNC: 4 MMOL/L (ref 3.6–5.5)
RBC # BLD AUTO: 4.31 M/UL (ref 4.7–6.1)
SODIUM SERPL-SCNC: 130 MMOL/L (ref 135–145)
WBC # BLD AUTO: 7.4 K/UL (ref 4.8–10.8)

## 2020-05-22 PROCEDURE — 97161 PT EVAL LOW COMPLEX 20 MIN: CPT

## 2020-05-22 PROCEDURE — 700111 HCHG RX REV CODE 636 W/ 250 OVERRIDE (IP): Performed by: INTERNAL MEDICINE

## 2020-05-22 PROCEDURE — 93005 ELECTROCARDIOGRAM TRACING: CPT | Performed by: STUDENT IN AN ORGANIZED HEALTH CARE EDUCATION/TRAINING PROGRAM

## 2020-05-22 PROCEDURE — 700105 HCHG RX REV CODE 258: Performed by: INTERNAL MEDICINE

## 2020-05-22 PROCEDURE — A9270 NON-COVERED ITEM OR SERVICE: HCPCS | Performed by: STUDENT IN AN ORGANIZED HEALTH CARE EDUCATION/TRAINING PROGRAM

## 2020-05-22 PROCEDURE — 36415 COLL VENOUS BLD VENIPUNCTURE: CPT

## 2020-05-22 PROCEDURE — 99232 SBSQ HOSP IP/OBS MODERATE 35: CPT | Mod: GC | Performed by: INTERNAL MEDICINE

## 2020-05-22 PROCEDURE — 99255 IP/OBS CONSLTJ NEW/EST HI 80: CPT | Performed by: INTERNAL MEDICINE

## 2020-05-22 PROCEDURE — 85025 COMPLETE CBC W/AUTO DIFF WBC: CPT

## 2020-05-22 PROCEDURE — 770001 HCHG ROOM/CARE - MED/SURG/GYN PRIV*

## 2020-05-22 PROCEDURE — 80048 BASIC METABOLIC PNL TOTAL CA: CPT

## 2020-05-22 PROCEDURE — 700102 HCHG RX REV CODE 250 W/ 637 OVERRIDE(OP): Performed by: STUDENT IN AN ORGANIZED HEALTH CARE EDUCATION/TRAINING PROGRAM

## 2020-05-22 PROCEDURE — 93010 ELECTROCARDIOGRAM REPORT: CPT | Performed by: INTERNAL MEDICINE

## 2020-05-22 RX ORDER — CARVEDILOL 6.25 MG/1
6.25 TABLET ORAL 2 TIMES DAILY WITH MEALS
Status: DISCONTINUED | OUTPATIENT
Start: 2020-05-22 | End: 2020-05-23

## 2020-05-22 RX ADMIN — VANCOMYCIN HYDROCHLORIDE 1000 MG: 500 INJECTION, POWDER, LYOPHILIZED, FOR SOLUTION INTRAVENOUS at 20:05

## 2020-05-22 RX ADMIN — CARVEDILOL 6.25 MG: 6.25 TABLET, FILM COATED ORAL at 17:09

## 2020-05-22 RX ADMIN — VANCOMYCIN HYDROCHLORIDE 1000 MG: 500 INJECTION, POWDER, LYOPHILIZED, FOR SOLUTION INTRAVENOUS at 11:15

## 2020-05-22 ASSESSMENT — ENCOUNTER SYMPTOMS
WEAKNESS: 0
BACK PAIN: 0
DIARRHEA: 0
DIARRHEA: 1
WHEEZING: 0
WEIGHT LOSS: 0
COUGH: 0
SHORTNESS OF BREATH: 0
FALLS: 0
DIAPHORESIS: 0
SENSORY CHANGE: 0
CLAUDICATION: 0
PALPITATIONS: 0
NAUSEA: 0
ORTHOPNEA: 0
STRIDOR: 0
TINGLING: 0
FOCAL WEAKNESS: 0
BLURRED VISION: 0
DIZZINESS: 0
ABDOMINAL PAIN: 1
CONSTIPATION: 0
LOSS OF CONSCIOUSNESS: 0
SPUTUM PRODUCTION: 0
FEVER: 0
SEIZURES: 0
DOUBLE VISION: 0
SORE THROAT: 0
MYALGIAS: 1
SINUS PAIN: 0
VOMITING: 0
CHILLS: 0
NERVOUS/ANXIOUS: 0
NECK PAIN: 0
HEARTBURN: 0
DEPRESSION: 0
BLOOD IN STOOL: 0
PHOTOPHOBIA: 0
SPEECH CHANGE: 0
HEMOPTYSIS: 0
TREMORS: 0
HEADACHES: 0

## 2020-05-22 ASSESSMENT — COGNITIVE AND FUNCTIONAL STATUS - GENERAL
MOVING FROM LYING ON BACK TO SITTING ON SIDE OF FLAT BED: A LITTLE
SUGGESTED CMS G CODE MODIFIER MOBILITY: CK
CLIMB 3 TO 5 STEPS WITH RAILING: A LITTLE
MOBILITY SCORE: 19
MOVING TO AND FROM BED TO CHAIR: A LITTLE
WALKING IN HOSPITAL ROOM: A LITTLE
STANDING UP FROM CHAIR USING ARMS: A LITTLE

## 2020-05-22 ASSESSMENT — GAIT ASSESSMENTS
ASSISTIVE DEVICE: OTHER (COMMENTS)
GAIT LEVEL OF ASSIST: MINIMAL ASSIST
DEVIATION: BRADYKINETIC
DISTANCE (FEET): 30

## 2020-05-22 ASSESSMENT — FIBROSIS 4 INDEX: FIB4 SCORE: 1.6

## 2020-05-22 ASSESSMENT — LIFESTYLE VARIABLES: SUBSTANCE_ABUSE: 1

## 2020-05-22 NOTE — THERAPY
Physical Therapy   Initial Evaluation     Patient Name: Sylvain Espinosa  Age:  64 y.o., Sex:  male  Medical Record #: [id number]  Today's Date: 5/22/2020     Precautions: Fall Risk    Assessment  Patient is 64 y.o. male admitted with lower GI bleed, abdominal pain. Pt s/p TAVR 3/18/19. Pt reports he is currently looking for an apartment and that he does not have a place to live. Pt slow to respond to questions but is cooperative. Pt presents with generalized weakness and deconditioning and is limited by abdominal pain. He ambulated with assist in room to bathroom and BTB reporting 6/10 abdominal pain. PT will see pt during acute stay to improve functional mobility.     Plan    Recommend Physical Therapy 3 times per week until therapy goals are met for the following treatments:  Bed Mobility, Equipment, Gait Training, Neuro Re-Education / Balance, Therapeutic Activities and Therapeutic Exercises    Discharge recommendations:  Recommend post-acute placement for continued physical therapy services prior to discharge home.          Objective       05/22/20 0954   Prior Living Situation   Prior Services Unable To Determine At This Time   Housing / Facility Homeless   Equipment Owned Front-Wheel Walker  (pt reports that he lost his walker.)   Lives with - Patient's Self Care Capacity Unable To Determine At This Time   Prior Level of Functional Mobility   Bed Mobility Independent   Transfer Status Independent   Ambulation Independent   Distance Ambulation (Feet) 300   Assistive Devices Used None   Stairs Independent   History of Falls   History of Falls No   Cognition    Speech/ Communication Delayed Responses   Level of Consciousness Alert   Comments cooperative   Passive ROM Lower Body   Passive ROM Lower Body WDL   Active ROM Lower Body    Active ROM Lower Body  WDL   Strength Lower Body   Gross Strength Generalized Weakness, Equal Bilaterally   Sensation Lower Body   Lower Extremity Sensation   WDL   Lower Body  Muscle Tone   Lower Body Muscle Tone  WDL   Upper Body Muscle Tone   Upper Body Muscle Tone  WDL   Coordination Lower Body    Coordination Lower Body  WDL   Balance Assessment   Sitting Balance (Static) Good   Sitting Balance (Dynamic) Fair -   Standing Balance (Static) Fair   Standing Balance (Dynamic) Fair   Weight Shift Sitting Poor   Weight Shift Standing Fair   Comments at bedside, no AD   Gait Analysis   Gait Level Of Assist Minimal Assist   Assistive Device Other (Comments)  (IV pole)   Distance (Feet) 30   # of Times Distance was Traveled 2   Deviation Bradykinetic   Weight Bearing Status FWB   Skilled Intervention Verbal Cuing;Sequencing;Compensatory Strategies   Bed Mobility    Supine to Sit Supervised   Sit to Supine Supervised   Scooting Supervised   Rolling Supervised   Skilled Intervention Verbal Cuing;Sequencing   Functional Mobility   Sit to Stand Minimal Assist   Toilet Transfers Minimal Assist   Mobility gait in room to BR   Skilled Intervention Verbal Cuing;Sequencing   Patient / Family Goals    Patient / Family Goal #1 Pt wants to find an apt   Short Term Goals    Short Term Goal # 1 Pt will be SPV for all transfers with LRAD in 6 txs to imporve functional mobility.   Short Term Goal # 2 Pt will be SPV for gait x 200' with LRAD in 6 txs to improve functional mobility.   Problem List    Problems Pain;Impaired Bed Mobility;Impaired Transfers;Impaired Ambulation;Functional Strength Deficit;Impaired Balance;Decreased Activity Tolerance   Anticipated Discharge Equipment   DC Equipment Unable To Determine At This Time

## 2020-05-22 NOTE — CONSULTS
Consults   INFECTIOUS DISEASES INPATIENT CONSULT NOTE     Date of Service: 5/22/2020    Consult Requested By: Ector Ferrell M.D.    Reason for Consultation:  Follow-up for prior prosthetic valve endocarditis    History of Present Illness:   Mr. Espinosa is a 64-year-old  male with a history of homelessness, drug abuse, chronic hepatitis C, and TAVR. He was previously seen by the ID service in the past for epididymitis, prostatitis and seminal abscess.  He underwent his TAVR on 3/18/2019 for severe symptomatic aortic stenosis.  Patient was then admitted in 10/2019 for abdominal pain and CT scan showed renal infarcts.  TTE was performed and revealed a vegetation on his prosthetic aortic valve and his blood cultures on 10/10/2019 were positive for Rothia mucilaginosa (penicillin sensitive).  Repeat blood cultures on 10/11/2019 were negative.  Patient was transferred to skilled nursing facility to complete a 6-week course of IV vancomycin and oral rifampin through plan stop date of 11/22/2019.  Per ID notes there was plans for JAYLYN following his IV antibiotic course and potential continuation of p.o. penicillin but it does not appear that the JAYLYN was performed and he has not continued on penicillin.     Hospital Course:   In ED he c/o abdominal pain, nausea and lethargia. Found to have some rectal bleeding and due to history of abdominal aortic aneurysm and aortogram was ordered by ED, equivocal with possible ischemic bowel.  He seen by vascular surgery who felt that no intervention was indicated.  No specific evidence of endocarditis but due to this history he was started on antibiotics and echocardiogram was ordered.  TTE was done on 5/21 with finding of grade 1 diastolic dysfunction and thickened aortic valve leaflets with decreased TAVR leaflet excursion. Echodensity noted attached between the TAVR valve and the anterior mitral valve annulus measuring 1.1 x 1.6 cm.  The last echocardiogram we  "have is from 10/10/2019 prior to his antibiotic course at which point the vegetation on the aortic prosthetic  valve was 0.7 x 0.9 cm.  COVID-19 testing performed on 5/19 and negative. Today he is complaining of left side abdominal pain.       Review Of Systems:  Review of Systems   Constitutional: Positive for malaise/fatigue. Negative for chills and fever.   HENT: Negative for hearing loss.    Eyes: Negative for blurred vision and double vision.   Respiratory: Negative for cough, sputum production and shortness of breath.    Cardiovascular: Negative for chest pain, palpitations and leg swelling.   Gastrointestinal: Positive for abdominal pain. Negative for constipation, diarrhea, nausea and vomiting.   Genitourinary: Negative for dysuria.   Musculoskeletal: Positive for myalgias. Negative for back pain and joint pain.   Skin: Negative for rash.   Neurological: Negative for weakness and headaches.   Psychiatric/Behavioral: Positive for substance abuse. The patient is not nervous/anxious.          PMH:   Past Medical History:   Diagnosis Date   • Abnormality of left ventricle of heart     \"constricted left ventricle\"    • Aortic stenosis    • BPH (benign prostatic hyperplasia)    • CVA (cerebral vascular accident) (HCC)    • Hepatitis C    • Hypercholesteremia    • Hypertension    • Myocardial infarction (HCC)        PSH:  Past Surgical History:   Procedure Laterality Date   • TRANSCATHETER AORTIC VALVE REPLACEMENT N/A 3/18/2019    Procedure: REPLACEMENT, AORTIC VALVE, TRANSCATHETER;  Surgeon: Dariel Moser M.D.;  Location: SURGERY [facility];  Service: Cardiac   • JAYLYN  3/18/2019    Procedure: ECHOCARDIOGRAM, TRANSESOPHAGEAL;  Surgeon: Dariel Moser M.D.;  Location: SURGERY [facility];  Service: Cardiac   • TONSILLECTOMY     • ZZZ CARDIAC CATH         FAMILY HX:  Family History   Problem Relation Age of Onset   • Heart Disease Mother        SOCIAL HX:  Social History     Socioeconomic History   • Marital " status: Single     Spouse name: Not on file   • Number of children: Not on file   • Years of education: Not on file   • Highest education level: Not on file   Occupational History   • Not on file   Social Needs   • Financial resource strain: Not on file   • Food insecurity     Worry: Not on file     Inability: Not on file   • Transportation needs     Medical: Not on file     Non-medical: Not on file   Tobacco Use   • Smoking status: Current Some Day Smoker     Packs/day: 0.20     Years: 40.00     Pack years: 8.00     Types: Cigarettes   • Smokeless tobacco: Never Used   Substance and Sexual Activity   • Alcohol use: Yes     Frequency: Monthly or less     Drinks per session: 1 or 2     Binge frequency: Never     Comment: occ   • Drug use: Yes     Types: Inhaled, Marijuana     Comment: marijuana, meth   • Sexual activity: Not on file   Lifestyle   • Physical activity     Days per week: Not on file     Minutes per session: Not on file   • Stress: Not on file   Relationships   • Social connections     Talks on phone: Not on file     Gets together: Not on file     Attends Jew service: Not on file     Active member of club or organization: Not on file     Attends meetings of clubs or organizations: Not on file     Relationship status: Not on file   • Intimate partner violence     Fear of current or ex partner: Not on file     Emotionally abused: Not on file     Physically abused: Not on file     Forced sexual activity: Not on file   Other Topics Concern   • Not on file   Social History Narrative   • Not on file     Social History     Tobacco Use   Smoking Status Current Some Day Smoker   • Packs/day: 0.20   • Years: 40.00   • Pack years: 8.00   • Types: Cigarettes   Smokeless Tobacco Never Used     Social History     Substance and Sexual Activity   Alcohol Use Yes   • Frequency: Monthly or less   • Drinks per session: 1 or 2   • Binge frequency: Never    Comment: occ       Allergies/Intolerances:  No Known  "Allergies    History reviewed with the patient \    Other Current Medications:    Current Facility-Administered Medications:   •  senna-docusate (PERICOLACE or SENOKOT S) 8.6-50 MG per tablet 2 Tab, 2 Tab, Oral, BID, Stopped at 20 1800 **AND** polyethylene glycol/lytes (MIRALAX) PACKET 1 Packet, 1 Packet, Oral, QDAY PRN **AND** magnesium hydroxide (MILK OF MAGNESIA) suspension 30 mL, 30 mL, Oral, QDAY PRN **AND** bisacodyl (DULCOLAX) suppository 10 mg, 10 mg, Rectal, QDAY PRN, Maxim Jones Jr., D.O.  •  enoxaparin (LOVENOX) inj 30 mg, 30 mg, Subcutaneous, DAILY, Maxim Jones Jr., D.O., Stopped at 20 0600  •  acetaminophen (TYLENOL) tablet 650 mg, 650 mg, Oral, Q6HRS PRN, Maxim Jones Jr. D.O., 650 mg at 20 2125  •  vancomycin (VANCOCIN) 800 mg in  mL IVPB, 15 mg/kg, Intravenous, QHS, Maxim Jones Jr., D.O., Stopped at 20 2323  •  labetalol (NORMODYNE/TRANDATE) injection 10 mg, 10 mg, Intravenous, Q2HRS PRN, Clyde Ro M.D.  •  Respiratory Therapy Consult, , Nebulization, Continuous RT, Clyde Ro M.D.  •  MD Alert...Vancomycin per Pharmacy, , Other, PHARMACY TO DOSE, Clyde Ro M.D.  •  fentaNYL (SUBLIMAZE) injection 25 mcg, 25 mcg, Intravenous, Q2HRS PRN, Clyde Ro M.D.  [unfilled]    Most Recent Vital Signs:  /88   Pulse 89   Temp 37.1 °C (98.8 °F) (Temporal)   Resp 16   Ht 1.702 m (5' 7\")   Wt 54.1 kg (119 lb 4.3 oz)   SpO2 93%   BMI 18.68 kg/m²   Temp  Av.8 °C (98.2 °F)  Min: 34.3 °C (93.7 °F)  Max: 37.4 °C (99.4 °F)    Physical Exam:  Physical Exam   Constitutional: He is oriented to person, place, and time. He appears distressed.   HENT:   Head: Normocephalic and atraumatic.   Right Ear: External ear normal.   Left Ear: External ear normal.   Nose: Nose normal.   Eyes: Pupils are equal, round, and reactive to light. Conjunctivae and EOM are normal.   Cardiovascular: Normal rate and regular rhythm.   Murmur " "heard.  Pulmonary/Chest: Effort normal and breath sounds normal.   Decreased breath sounds bilaterally   Abdominal: Soft. Bowel sounds are normal. He exhibits no distension. There is abdominal tenderness. There is no rebound and no guarding.   Left upper quadrant tenderness to palpation   Musculoskeletal: Normal range of motion.   Neurological: He is alert and oriented to person, place, and time.   Skin: Skin is warm and dry. He is not diaphoretic.   Psychiatric: Affect and judgment normal.           Pertinent Lab Results:  Recent Labs     05/20/20 0540 05/21/20  0254 05/22/20  0437   WBC 12.5* 10.6 7.4      Recent Labs     05/20/20 0540 05/21/20  0254 05/22/20  0437   HEMOGLOBIN 13.5* 12.8* 11.8*   HEMATOCRIT 42.0 40.3* 36.5*   MCV 85.9 85.7 84.7   MCH 27.6 27.2 27.4   PLATELETCT 233 205 171         Recent Labs     05/20/20 0540 05/21/20 0254 05/22/20 0437   SODIUM 137 137 130*   POTASSIUM 4.7 4.4 4.0   CHLORIDE 103 107 98   CO2 23 22 23   CREATININE 1.44* 1.22 1.01        Recent Labs     05/19/20  1917 05/20/20 0540 05/21/20  0254   ALBUMIN 4.2 3.2 3.0*        Pertinent Micro:  Results     Procedure Component Value Units Date/Time    BLOOD CULTURE x2 [[id number]] Collected:  05/19/20 2052    Order Status:  Completed Specimen:  Blood from Peripheral Updated:  05/20/20 0718     Significant Indicator NEG     Source BLD     Site PERIPHERAL     Culture Result No Growth  Note: Blood cultures are incubated for 5 days and  are monitored continuously.Positive blood cultures  are called to the RN and reported as soon as  they are identified.      Narrative:       Per Hospital Policy: Only change Specimen Src: to \"Line\" if  specified by physician order.  L    BLOOD CULTURE x2 [[id number]] Collected:  05/19/20 2052    Order Status:  Completed Specimen:  Blood from Peripheral Updated:  05/20/20 0718     Significant Indicator NEG     Source BLD     Site PERIPHERAL     Culture Result No Growth  Note: Blood cultures are " "incubated for 5 days and  are monitored continuously.Positive blood cultures  are called to the RN and reported as soon as  they are identified.      Narrative:       Per Hospital Policy: Only change Specimen Src: to \"Line\" if  specified by physician order.  R    SARS-CoV-2, PCR (In-House) [[id number]] Collected:  05/19/20 2119    Order Status:  Completed Updated:  05/19/20 2240     SARS-CoV-2 Source NP Swab     SARS-CoV-2 by PCR NotDetected     Comment: [facility] providers: PLEASE REFER TO DE-ESCALATION AND RETESTING PROTOCOL  on [email or web address]  **The Only [place] GeneXpert Xpress SARS-CoV-2 Test has been made available for  use under the Emergency Use Authorization (EUA) only.         URINALYSIS,CULTURE IF INDICATED [[id number]] Collected:  05/19/20 2200    Order Status:  Completed Specimen:  Urine, Straight Cath Updated:  05/19/20 2223     Color Yellow     Character Clear     Ph 5.0     Glucose Negative mg/dL      Ketones Negative mg/dL      Protein Negative mg/dL      Bilirubin Negative     Urobilinogen, Urine 0.2     Nitrite Negative     Leukocyte Esterase Negative     Occult Blood Negative     Micro Urine Req see below     Comment: Microscopic examination not performed when specimen is clear  and chemically negative for protein, blood, leukocyte esterase  and nitrite.         Blood Culture,Hold [[id number]] Collected:  05/19/20 2113    Order Status:  Completed Updated:  05/19/20 2206     Blood Culture Hold Collected    COVID/SARS CoV-2 [[id number]] Collected:  05/19/20 2119    Order Status:  Completed Specimen:  Respirate from Nasopharyngeal Updated:  05/19/20 2146     COVID Order Status Received    Blood Culture [[id number]] Collected:  05/19/20 0000    Order Status:  Canceled Specimen:  Other from Peripheral         Blood Culture Hold   Date Value Ref Range Status   05/19/2020 Collected  Final        Studies:  Ec-echocardiogram Complete W/o Cont    Result Date: 5/21/2020  Transthoracic Echo Report Echocardiography " Laboratory CONCLUSIONS Hyperdynamic left ventricular systolic function. Evidence of increased intracavity gradient, peak velocity is 4.3 m/sec. The right ventricle was normal in size and function. Severely dilated left atrium. Thickened mitral valve leaflets. Thickened aortic valve annulus. Decreased TAVR leaflet excursion. Echodensity noted attached between the TAVR valve and the anterior mitral valve annulus measuring 1.1 x 1.6 cm; margins not clearly defined. Transvalvular gradients are - Peak: 45 mmHg,  Mean: 27 mmHg. Mild paravalvular leak is noted. Estimated right ventricular systolic pressure  is 40 mmHg, midly elevated. Estimated right atrial pressure is 3 mmHg, normal. Compared to prior echo on 10/10/19, size of echogenic mass has increased. Consider JAYLYN if clinically warranted. JAIMIE YAO Exam Date:         2020                    05:43 Exam Location:     Inpatient Priority:          Routine Ordering Physician:        ABDOULAYE THOMAS Referring Physician:       [id number] Mcqueen Sonographer:               Anisha Solis RVT LI Age:    64     Gender:    M MRN:    [id number] :    1955 BSA:    1.61   Ht (in):    67     Wt (lb):    117 Exam Type:     Complete Indications:     Murmur, Endocarditis ICD Codes:       785.2  421 CPT Codes:       78227 BP:   147    /   78     HR:   100 Technical Quality:       Technically difficult study -                          adequate information is obtained MEASUREMENTS  (Male / Female) Normal Values 2D ECHO LV Diastolic Diameter PLAX        3.3 cm                4.2 - 5.9 / 3.9 - 5.3 cm LV Systolic Diameter PLAX         2.1 cm                2.1 - 4.0 cm IVS Diastolic Thickness           1.4 cm                LVPW Diastolic Thickness          1.5 cm                LVOT Diameter                     1.9 cm                LV Ejection Fraction MOD BP       70.4 %                >= 55  % LV Ejection Fraction MOD 4C       71.7 %                 LV Ejection Fraction MOD 2C       69.8 %                IVC Diameter                      1.5 cm                DOPPLER AV Peak Velocity                  3.2 m/s               AV Peak Gradient                  41.8 mmHg             AV Mean Gradient                  24.3 mmHg             LVOT Peak Velocity                1.5 m/s               AV Area Cont Eq vti               1.7 cm2               MV Velocity Time Integral         25 cm                 Mitral E Point Velocity           0.85 m/s              Mitral E to A Ratio               0.51                  Mitral A Duration                 120 ms                MV Pressure Half Time             49.7 ms               MV Area PHT                       4.4 cm2               MV Deceleration Time              171 ms                TR Peak Velocity                  303 cm/s              * Indicates values subject to auto-interpretation LV EF:        % FINDINGS Left Ventricle The left ventricle was normal in size. Moderate concentric left ventricular hypertrophy. Hyperdynamic left ventricular systolic function. Evidence of increased intracavity gradient, peak velocity is 4.3 m/sec. Normal regional wall motion. Left ventricular ejection fraction is visually estimated to be 75%. Grade I diastolic dysfunction. Right Ventricle The right ventricle was normal in size and function. Right Atrium The right atrium is normal in size.  Normal inferior vena cava size and inspiratory collapse. Left Atrium Severely dilated left atrium. Left atrial volume index is 54 mL/sq m. Mitral Valve Mitral annular calcification. Thickened mitral valve leaflets. No mitral stenosis. No mitral regurgitation. Aortic Valve Thickened aortic valve leaflets and annulus. Decreased TAVR leaflet excursion. Echodensity noted attached between the TAVR valve and the anterior mitral valve annulus measuring 1.1 x 1.6 cm; margins not clearly defined. Known TAVR with moderately increased gradients.  Transvalvular gradients are - Peak: 45 mmHg,  Mean: 27 mmHg. Vmax is 3.4 m/s. Mild paravalvular leak is noted. Tricuspid Valve Structurally normal tricuspid valve without significant stenosis. Trace tricuspid regurgitation. Estimated right ventricular systolic pressure  is 40 mmHg. Pulmonic Valve The pulmonic valve is not well visualized. Pericardium Normal pericardium without effusion. Aorta The aortic root is normal.  Ascending aorta not well visualized. Douglas Brooke MD (Electronically Signed) Final Date:     21 May 2020 07:33    Ct-cta Complete Thoracoabdominal Aorta    Result Date: 5/19/2020 5/19/2020 7:19 PM HISTORY/REASON FOR EXAM:  AAA screening, smoking history (Age > 50y); known abdominal aneurysm, here with acute onset severe abdominal pain, presyncopal TECHNIQUE/EXAM DESCRIPTION:  CT angiogram of the chest and abdomen with and without reconstructions. Initial precontrast images were obtained from the lung apices through the diaphragmatic domes. Following this, 100 mL of Omnipaque 350 nonionic contrast was administered at 5 mL/sec and helical scanning was obtained from the lung apices thru the iliac crest and bifurcation. Thick and thin section multiplanar volume reformats were generated from the axial data set in the sagittal and coronal planes. 3D angiographic images were reviewed on PACS. Maximum intensity projection (MIP) images were generated and reviewed. Low dose optimization technique was utilized for this CT exam including automated exposure control and adjustment of the mA and/or kV according to patient size. COMPARISON:  CT abdomen and pelvis 10/10/2019 FINDINGS: Noncontrast images: There is no intramural hematoma. Contrast images: Thoracic aorta shows mild atherosclerotic calcifications.  No aneurysm or dissection. Abdominal aorta shows mild atherosclerotic changes with infrarenal aneurysm measuring 3.3 cm in diameter.  Abdominal aorta is diffusely ectatic.  Moderate atherosclerotic  calcification of the iliac arteries, without occlusion. Celiac trunk and superior mesenteric artery enhances normally.  Inferior mesenteric artery is not well-visualized.  Diminished enhancement is seen along the anterior aspect of the distal colon aorta, possibly thrombus. No mediastinal mass or adenopathy. Prosthetic aortic valve present. Lungs show severe emphysematous changes. Liver shows heterogeneous low attenuation suggesting fatty infiltration. Kidneys show cortical thinning and probable small scars bilaterally. Visualized bladder is unremarkable. No peritoneal fluid or pneumoperitoneum. Stomach is distended with fluid. 3D angiographic/MIP images of the vasculature confirm the vascular findings as described above.     1.  Diffuse abdominal aortic ectasia measuring up to 3.3 cm in diameter, similar to prior exam.  No evidence for dissection. 2.  Diminished enhancement along the ventral aspect of distal abdominal aorta may be admixture artifact or possibly thrombus. 3.  Inferior mesenteric artery is not well visualized which may be due to artifact or occlusion. 4.  Severe emphysema. 5.  Heterogeneous fatty infiltration of liver. 6.  Bilateral renal scarring. 7.  No bowel obstruction or pneumoperitoneum.       ASSESSMENT/PLAN:     64-year-old  with a history of drug abuse, homelessness, chronic hepatitis C, and TAVR. He was previously seen by the ID service in the past for epididymitis, prostatitis and seminal abscess.  He underwent his TAVR on 3/18/2019 for severe symptomatic aortic stenosis.  Patient was then admitted in 10/2019 for abdominal pain and CT scan showed renal infarcts.  TTE was performed and revealed a vegetation on his prosthetic aortic valve and his blood cultures on 10/10/2019 were positive for Rothia mucilaginosa (penicillin sensitive).  Repeat blood cultures on 10/11/2019 were negative.  Patient was transferred to skilled nursing facility to complete a 6-week course of IV  vancomycin and oral rifampin through plan stop date of 11/22/2019.  Per ID notes there was plans for JAYLYN following his IV antibiotic course and potential continuation of p.o. penicillin but it does not appear that the JAYLYN was performed and he has not continued on penicillin.     Hospital Course:   In ED he c/o abdominal pain, nausea and lethargia. Found to have some rectal bleeding and due to history of abdominal aortic aneurysm and aortogram was ordered by ED, equivocal with possible ischemic bowel.  He seen by vascular surgery who felt that no intervention was indicated.  No specific evidence of endocarditis but due to this history he was started on antibiotics and echocardiogram was ordered.  TTE was done on 5/21 with finding of grade 1 diastolic dysfunction and thickened aortic valve leaflets with decreased TAVR leaflet excursion. Echodensity noted attached between the TAVR valve and the anterior mitral valve annulus measuring 1.1 x 1.6 cm.  The last echocardiogram we have is from 10/10/2019 prior to his antibiotic course at which point the vegetation on the aortic prosthetic  valve was 0.7 x 0.9 cm.  COVID-19 testing performed on 5/19 and negative. Today he is complaining of left side abdominal pain.     Problem List    Fevers, on admit- resolved   Mitral valve endocarditis   - TTE on 5/21 with echodensity noted attached between the TAVR valve and the anterior mitral valve annulus measuring 1.1 x 1.6 cm; margins not clearly defined.     TAVR and hx of prosthetic aortic valve endocarditis  -Completed 6-week IV antibiotic course with vancomycin and rifampin on 11/2019 but no echocardiogram was done at the end of the course to assess if there was residual vegetation.      --- Difficult to ascertain if this is an ongoing/new infection or merely residual sterile vegetation from prior.  The vegetation has increased in size and is now involving the mitral which is concerning.  He did have fevers on admit but blood  cultures are so far negative and for an infection to to be ongoing for ~ 6 months seems unlikely. It's possible this is a new infection and he does endorse recent drug use but denies any IV drug use.   --- Discussed with cardiology - Dr. Lao and recommended JAYLYN to better characterize -please order JAYLYN and make patient n.p.o. at midnight on Monday night for JAYLYN on Tuesday.   --- Cardiology has discussed with CT surgery and they do not feel that he is a good surgical candidate given ongoing drug use     --- Follow-up blood cultures- NGTD   --- Will continue antibiotics for now with vancomycin.  After additional work-up will make decision regarding whether a second infective endocarditis course is warranted    Abdominal aortic aneurysm, chronic  -CTA on 5/19 with no evidence of dissection  COPD/emphysema  Drug abuse, endorses smoking amphetamines within a few days of admit, denies IV drug use  --- HIV screen - ordered   Chronic Hepatitis C  Abdominal pain, ongoing      Plan of care discussed with IM Ector Ferrell M.D.. Will continue to follow peripherally.     Loree Blackmon M.D.

## 2020-05-22 NOTE — PROGRESS NOTES
"Pharmacy Kinetics 64 y.o. male on vancomycin day # 3 2020    Currently on Vancomycin 800 mg iv q24hr (2100)  Provider specified end date: TBD     Indication for Treatment: Endocarditis    Pertinent history per medical record: Admitted on 2020 for Lower GI bleed. 64-y/o M, history of bacterial endocarditis, aortic stenosis status post TAVR on 3/18/2019 limb ischemia with infra-renal abdominal aortic aneurysm, with CTA of the thoracoabdominal aorta showing possible thrombus in the distal ventral abdominal aorta. Documented hx of IVDA. Notes indicate pending ID consult.     Other antibiotics: None     Allergies: Patient has no known allergies.      Concerns for renal function include BUN/SCr ratio > 20:1,Elevted SCr for age (trending down)     Pertinent cultures to date:   1920:PBCx2: NGTD     MRSA nares swab if pneumonia is a concern (ordered/positive/negative/n-a): N/A     Pertinent Imagin/21: ECHO \"Echodensity noted attached between the TAVR valve and the anterior mitral valve annulus measuring 1.1 x 1.6 cm; margins not clearly defined.\"    Recent Labs     20  0540 20  0254 20  0437   WBC 11.7* 12.5* 10.6 7.4   NEUTSPOLYS 81.30* 81.40* 69.90 59.90     Recent Labs     20  0540 20  0254 20  0437   BUN 36* 37* 25* 22   CREATININE 1.52* 1.44* 1.22 1.01   ALBUMIN 4.2 3.2 3.0*  --      Recent Labs     20   VANCOTROUGH 4.3*       Intake/Output Summary (Last 24 hours) at 2020 0904  Last data filed at 2020 0408  Gross per 24 hour   Intake 810 ml   Output 500 ml   Net 310 ml      /88   Pulse 89   Temp 37.1 °C (98.8 °F) (Temporal)   Resp 16   Ht 1.702 m (5' 7\")   Wt 54.1 kg (119 lb 4.3 oz)   SpO2 93%  Temp (24hrs), Av.2 °C (98.9 °F), Min:36.7 °C (98.1 °F), Max:37.4 °C (99.4 °F)      A/P   1. Vancomycin dose change: Vancomycin 1000 mg iv q12hr (1000 2200)  2. Next vancomycin level: 20@0930  3. Goal " trough: 16-20 mcg/mL   4. Comments: No leukocytosis , afebrile, cx NGTD. Per vascular , no interventions. SCr trending down, vancomycin dose increased and follow up level ordered.      Liang OrtizD BCPS

## 2020-05-22 NOTE — DISCHARGE PLANNING
Care Transition Team Assessment    LSW spoke with the patient.  The patient verified his demographic information in the Facesheet.    The patient reported his is homeless by choice.  The patient stated his income is $700.00/month on care home Benefits.    The patient has a hx of Meth and Alcohol Use.  The patient stated his NOK is [place], his mother and Mona, his sister, both live in [place].    The patient indicated hi is in the Wait List for Low Income Apartments.  The patient anticipates discharging to the [facility] with Outpatient Services.    Information Source  Orientation : Oriented x 4  Information Given By: Patient  Informant's Name: (Sylvain)  Who is responsible for making decisions for patient? : Patient    Readmission Evaluation  Is this a readmission?: No    Elopement Risk  Legal Hold: No  Ambulatory or Self Mobile in Wheelchair: Yes  Disoriented: No  Psychiatric Symptoms: None  History of Wandering: No  Elopement this Admit: No  Vocalizing Wanting to Leave: No  Displays Behaviors, Body Language Wanting to Leave: No-Not at Risk for Elopement  Elopement Risk: Not at Risk for Elopement    Interdisciplinary Discharge Planning  Lives with - Patient's Self Care Capacity: Unable To Determine At This Time  Patient or legal guardian wants to designate a caregiver (see row info): No  Housing / Facility: Homeless  Prior Services: Unable To Determine At This Time    Discharge Preparedness  What is your plan after discharge?: Other (comment)  Prior Functional Level: Independent with Activities of Daily Living    Functional Assesment  Prior Functional Level: Independent with Activities of Daily Living    Finances  Financial Barriers to Discharge: No  Prescription Coverage: Yes    Vision / Hearing Impairment  Vision Impairment : No  Hearing Impairment : No         Advance Directive  Advance Directive?: None  Advance Directive offered?: AD Booklet refused    Domestic Abuse  Have you ever been the victim of  abuse or violence?: No  Physical Abuse or Sexual Abuse: No  Verbal Abuse or Emotional Abuse: No  Possible Abuse Reported to:: Not Applicable    Psychological Assessment  History of Substance Abuse: Alcohol, Methamphetamine    Discharge Risks or Barriers  Discharge risks or barriers?: Substance abuse, Homeless / couch surfing  Patient risk factors: Homeless, Substance abuse    Anticipated Discharge Information  Anticipated discharge disposition: Shelter

## 2020-05-22 NOTE — CARE PLAN
Problem: Knowledge Deficit  Goal: Knowledge of disease process/condition, treatment plan, diagnostic tests, and medications will improve  Outcome: PROGRESSING SLOWER THAN EXPECTED  Goal: Knowledge of the prescribed therapeutic regimen will improve  Outcome: PROGRESSING SLOWER THAN EXPECTED  Intervention: Discuss information regarding therpeutic regimen and document in education  Note: Pt was educated on POC, MAR, shift routine and nursing education R/T Dx. Pt denied questions at this time. Pt verbalized understanding of all teaching.           Problem: Pain Management  Goal: Pain level will decrease to patient's comfort goal  Outcome: PROGRESSING AS EXPECTED  Intervention: Follow pain managment plan developed in collaboration with patient and Interdisciplinary Team  Note: Pt was educated on 0-10 pain scale, non-pharm methods of pain relief and MAR. Pt demonstrates understanding by rating pain as a 3 on 0-10 pain scale. Pt states that their pain has increased since yesterday and requested pain medication. Tylenol was given per MAR.

## 2020-05-22 NOTE — PROGRESS NOTES
Daily Progress Note:     Date of Service: 5/22/2020  Primary Team: [facility] Green Team   Attending: Ector Ferrell M.D.   Senior Resident:    Intern:   Contact:  [phone]    Chief Complaint:   Chief Complaint   Patient presents with   • Abdominal Pain         Subjective:  No acute events overnight   Patient complainting of 4/10 lower abdominal pain. Denies nausea or vomiting  Has BM this am.Tolerating the diet .   Blood cultures NGTD  Infectious disease team is consulted for further recommendations - regarding antibiotics or further imaging with JAYLYN . Awaiting recommendations.  Will continue IV Vancomycin for now  Will start on coreg 6.25 BID for blood pressure management       Consultants/Specialty:  Vascular Surgery   Infectious disease     Review of Systems:    Review of Systems   Constitutional: Positive for malaise/fatigue. Negative for chills, diaphoresis, fever and weight loss.   HENT: Negative for congestion, ear pain, hearing loss, nosebleeds, sinus pain, sore throat and tinnitus.    Eyes: Negative for blurred vision, double vision and photophobia.   Respiratory: Negative for cough, hemoptysis, sputum production, shortness of breath, wheezing and stridor.    Cardiovascular: Negative for chest pain, palpitations, orthopnea, claudication and leg swelling.   Gastrointestinal: Positive for abdominal pain and diarrhea. Negative for blood in stool, constipation, heartburn, melena, nausea and vomiting.   Genitourinary: Negative for dysuria, frequency and urgency.   Musculoskeletal: Positive for myalgias. Negative for back pain, falls, joint pain and neck pain.   Skin: Negative for itching and rash.   Neurological: Negative for dizziness, tingling, tremors, sensory change, speech change, focal weakness, seizures, loss of consciousness, weakness and headaches.   Psychiatric/Behavioral: Positive for substance abuse. Negative for depression and suicidal ideas.       Objective Data:    Physical Exam:   Vitals:   Temp:  [36.7 °C (98.1 °F)-37.3 °C (99.1 °F)] 37.1 °C (98.7 °F)  Pulse:  [] 86  Resp:  [16-19] 16  BP: (154-159)/(65-88) 158/87  SpO2:  [93 %-95 %] 94 %    Physical Exam     Constitutional:   Thin, disheveled man, appears uncomfortable  HENT:   Head: Normocephalic and atraumatic.   Eyes: Pupils are equal, round, and reactive to light. EOM are normal. No scleral icterus.   Neck: Normal range of motion. Neck supple. No JVD present.   Cardiovascular: Normal rate, regular rhythm and intact distal pulses.   Murmur heard.  Systolic murmur with late peak  Pulmonary/Chest: Effort normal and breath sounds normal. No respiratory distress. He has no wheezes. He has no rales.   Abdominal: Soft. He exhibits no distension.   Mild lower abdominal tenderness, BS +. No guarding or rigidity   Musculoskeletal:         General: No edema.   Neurological: AO x 4 .No cranial nerve deficit.   Lethargic   Skin: Skin is warm and dry. He is not diaphoretic.     Labs:     Recent Labs     05/20/20 0540 05/21/20 0254 05/22/20 0437   WBC 12.5* 10.6 7.4   RBC 4.89 4.70 4.31*   HEMOGLOBIN 13.5* 12.8* 11.8*   HEMATOCRIT 42.0 40.3* 36.5*   MCV 85.9 85.7 84.7   MCH 27.6 27.2 27.4   RDW 51.0* 51.2* 49.8   PLATELETCT 233 205 171   MPV 10.3 10.0 10.0   NEUTSPOLYS 81.40* 69.90 59.90   LYMPHOCYTES 8.60* 16.40* 24.50   MONOCYTES 9.10 12.40 13.20   EOSINOPHILS 0.20 0.70 2.00   BASOPHILS 0.30 0.30 0.30     Recent Labs     05/20/20  0540 05/21/20 0254 05/22/20 0437   SODIUM 137 137 130*   POTASSIUM 4.7 4.4 4.0   CHLORIDE 103 107 98   CO2 23 22 23   GLUCOSE 114* 96 97   BUN 37* 25* 22     Recent Labs     05/19/20  1917 05/20/20  0540 05/21/20  0254 05/22/20  0437   ALBUMIN 4.2 3.2 3.0*  --    TBILIRUBIN 0.5 0.2 0.3  --    ALKPHOSPHAT 110* 80 63  --    TOTPROTEIN 9.6* 7.4 6.8  --    ALTSGPT 24 16 14  --    ASTSGOT 40 21 16  --    CREATININE 1.52* 1.44* 1.22 1.01       Imaging:   EC-ECHOCARDIOGRAM COMPLETE W/O CONT   Final  Result      CT-CTA COMPLETE THORACOABDOMINAL AORTA   Final Result      1.  Diffuse abdominal aortic ectasia measuring up to 3.3 cm in diameter, similar to prior exam.  No evidence for dissection.   2.  Diminished enhancement along the ventral aspect of distal abdominal aorta may be admixture artifact or possibly thrombus.   3.  Inferior mesenteric artery is not well visualized which may be due to artifact or occlusion.   4.  Severe emphysema.   5.  Heterogeneous fatty infiltration of liver.   6.  Bilateral renal scarring.   7.  No bowel obstruction or pneumoperitoneum.                     * Lower abdominal pain  Assessment & Plan  Initially given history of vascular disease, lactic acidosis considered mesenteric ischemia/ischemia bowel likely, concern for septic emboli to bowel given endocarditis, though vascular surgery evaluation of pt and CTA did not see this as likely. AAA appears stable on CTA. No clear changes of colitis.  Hemoglobin is 12.8 from 15.9 .   Plan:  continue to monitor H and H  Continue IV Fluids, serial abdominal exams  Pain control  Diet advanced to regular   Continue to monitor     Lactic acidosis  Assessment & Plan  Resolved with IV fluids     Acute renal failure (ARF) (HCC)- (present on admission)  Assessment & Plan  Resolved with IV fluids       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)  Assessment & Plan  Diagnosed with endocarditis in 10/2019, blood cx positive for Rothia mucilaginosa,  found to have vegetation on aortic valve (TAVR), appears to have received 6+ weeks of vancomycin and rifampin which was discontinued on 11/25/2019 and patient was to continue of PO penicillin for chronic suppression but reports that he has not been taking antibiotics. Also appears that he has not had a follow up JAYLYN as planned by ID.   Plan:    Echo on 05/21/2020 - Showed increased size of the vegetations compared to previous echo with decreased TAVR leaflet excursion.   Blood cultures are negative  till date    ID consult is placed  for antibiotics and further imaging recommendations   continue vancomycin for now    HTN (hypertension)- (present on admission)  Assessment & Plan  Unclear outpatient regimen and adherence. Hypertensive on presentation.  Control BP < 160 in setting of AAA  PRN labetalol  Coreg 6.25 BID       AAA (abdominal aortic aneurysm) (Former[facility])- (present on admission)  Assessment & Plan  Stable on CT  BP control, SBP<160  Plan:   outpatient Vascular surgery follow up  for aneurysm Surveillance.      COPD (chronic obstructive pulmonary disease) (Former[facility])- (present on admission)  Assessment & Plan  Severe emphysema on CT  No current exacerbation  RT protocol    S/P TAVR (transcatheter aortic valve replacement)- (present on admission)  Assessment & Plan  Placed 3/18/2019 for severe aortic stenosis  Endocarditis 10/2019 with vegetation on prosthesis   No ASA/plavix due to high bleed risk per chart review  Echocardiogram showed increased size of the vegetations compared to previous echo in 10/10/19.  Plan:  ID consult - awaiting recommendations       History of methamphetamine use- (present on admission)  Assessment & Plan  Denies recent use  History IVDU  UDS positive for methamphetamine   Plan:  Counseling provided to quit meth use  SW consult

## 2020-05-22 NOTE — CARE PLAN
Problem: Pain Management  Goal: Pain level will decrease to patient's comfort goal  Outcome: PROGRESSING AS EXPECTED     Problem: Fluid Volume:  Goal: Will maintain balanced intake and output  Outcome: PROGRESSING AS EXPECTED     Problem: Communication  Goal: The ability to communicate needs accurately and effectively will improve  Outcome: PROGRESSING AS EXPECTED     Problem: Safety  Goal: Will remain free from injury  Outcome: PROGRESSING AS EXPECTED  Goal: Will remain free from falls  Outcome: PROGRESSING AS EXPECTED     Problem: Infection  Goal: Will remain free from infection  Outcome: PROGRESSING AS EXPECTED     Problem: Venous Thromboembolism (VTW)/Deep Vein Thrombosis (DVT) Prevention:  Goal: Patient will participate in Venous Thrombosis (VTE)/Deep Vein Thrombosis (DVT)Prevention Measures  Outcome: PROGRESSING AS EXPECTED     Problem: Bowel/Gastric:  Goal: Normal bowel function is maintained or improved  Outcome: PROGRESSING AS EXPECTED  Goal: Will not experience complications related to bowel motility  Outcome: PROGRESSING AS EXPECTED     Problem: Knowledge Deficit  Goal: Knowledge of disease process/condition, treatment plan, diagnostic tests, and medications will improve  Outcome: PROGRESSING AS EXPECTED  Goal: Knowledge of the prescribed therapeutic regimen will improve  Outcome: PROGRESSING AS EXPECTED     Problem: Discharge Barriers/Planning  Goal: Patient's continuum of care needs will be met  Outcome: PROGRESSING AS EXPECTED     Problem: Respiratory:  Goal: Respiratory status will improve  Outcome: PROGRESSING AS EXPECTED     Problem: Urinary Elimination:  Goal: Ability to reestablish a normal urinary elimination pattern will improve  Outcome: PROGRESSING AS EXPECTED     Problem: Skin Integrity  Goal: Risk for impaired skin integrity will decrease  Outcome: PROGRESSING AS EXPECTED     Problem: Mobility  Goal: Risk for activity intolerance will decrease  Outcome: PROGRESSING AS EXPECTED

## 2020-05-23 LAB
HEMOCCULT STL QL: POSITIVE
HIV 1+2 AB+HIV1 P24 AG SERPL QL IA: NORMAL
VANCOMYCIN TROUGH SERPL-MCNC: 10.3 UG/ML (ref 10–20)

## 2020-05-23 PROCEDURE — 99231 SBSQ HOSP IP/OBS SF/LOW 25: CPT | Mod: GC | Performed by: INTERNAL MEDICINE

## 2020-05-23 PROCEDURE — A9270 NON-COVERED ITEM OR SERVICE: HCPCS | Performed by: STUDENT IN AN ORGANIZED HEALTH CARE EDUCATION/TRAINING PROGRAM

## 2020-05-23 PROCEDURE — 700105 HCHG RX REV CODE 258: Performed by: INTERNAL MEDICINE

## 2020-05-23 PROCEDURE — G0475 HIV COMBINATION ASSAY: HCPCS

## 2020-05-23 PROCEDURE — 700111 HCHG RX REV CODE 636 W/ 250 OVERRIDE (IP): Performed by: INTERNAL MEDICINE

## 2020-05-23 PROCEDURE — 700102 HCHG RX REV CODE 250 W/ 637 OVERRIDE(OP): Performed by: STUDENT IN AN ORGANIZED HEALTH CARE EDUCATION/TRAINING PROGRAM

## 2020-05-23 PROCEDURE — 80202 ASSAY OF VANCOMYCIN: CPT

## 2020-05-23 PROCEDURE — 82272 OCCULT BLD FECES 1-3 TESTS: CPT

## 2020-05-23 PROCEDURE — 770001 HCHG ROOM/CARE - MED/SURG/GYN PRIV*

## 2020-05-23 PROCEDURE — 36415 COLL VENOUS BLD VENIPUNCTURE: CPT

## 2020-05-23 RX ORDER — SODIUM CHLORIDE 9 MG/ML
INJECTION, SOLUTION INTRAVENOUS
Status: COMPLETED
Start: 2020-05-23 | End: 2020-05-23

## 2020-05-23 RX ORDER — CARVEDILOL 6.25 MG/1
12.5 TABLET ORAL 2 TIMES DAILY WITH MEALS
Status: DISCONTINUED | OUTPATIENT
Start: 2020-05-23 | End: 2020-05-29 | Stop reason: HOSPADM

## 2020-05-23 RX ADMIN — VANCOMYCIN HYDROCHLORIDE 1000 MG: 500 INJECTION, POWDER, LYOPHILIZED, FOR SOLUTION INTRAVENOUS at 10:42

## 2020-05-23 RX ADMIN — VANCOMYCIN HYDROCHLORIDE 900 MG: 500 INJECTION, POWDER, LYOPHILIZED, FOR SOLUTION INTRAVENOUS at 18:00

## 2020-05-23 RX ADMIN — CARVEDILOL 6.25 MG: 6.25 TABLET, FILM COATED ORAL at 08:30

## 2020-05-23 RX ADMIN — CARVEDILOL 12.5 MG: 6.25 TABLET, FILM COATED ORAL at 17:58

## 2020-05-23 ASSESSMENT — ENCOUNTER SYMPTOMS
SENSORY CHANGE: 0
STRIDOR: 0
CLAUDICATION: 0
WEIGHT LOSS: 0
TREMORS: 0
DEPRESSION: 0
COUGH: 0
ABDOMINAL PAIN: 0
WEAKNESS: 0
HEMOPTYSIS: 0
SHORTNESS OF BREATH: 0
BLURRED VISION: 0
CHILLS: 0
DIAPHORESIS: 0
SEIZURES: 0
MYALGIAS: 1
HEADACHES: 0
FALLS: 0
DIARRHEA: 0
SORE THROAT: 0
SPUTUM PRODUCTION: 0
CONSTIPATION: 0
TINGLING: 0
SPEECH CHANGE: 0
BACK PAIN: 0
HEARTBURN: 0
BLOOD IN STOOL: 0
VOMITING: 0
PHOTOPHOBIA: 0
NECK PAIN: 0
WHEEZING: 0
FOCAL WEAKNESS: 0
PALPITATIONS: 0
DIZZINESS: 0
FEVER: 0
SINUS PAIN: 0
DOUBLE VISION: 0
LOSS OF CONSCIOUSNESS: 0
NAUSEA: 0
ORTHOPNEA: 0

## 2020-05-23 ASSESSMENT — LIFESTYLE VARIABLES: SUBSTANCE_ABUSE: 1

## 2020-05-23 NOTE — PROGRESS NOTES
Report received from Rachel RN   Assumed care of patient at 1900.    Pt is A&O x 4.  Pain reported as tolerable with heat pack in place. Medication was declined.   Nausea denied  Tolerating Regular Diet   Abd is soft, but tender.   + Urine output  + BM    + Flatus  Pt is up with stand-by assistance.   Bed in lowest position and locked.  Pt resting comfortably now.  Review plan of care with patient  Call light within reach  Hourly rounds in place  All needs met at this time

## 2020-05-23 NOTE — PROGRESS NOTES
"Pharmacy Kinetics 64 y.o. male on vancomycin day # 4   2020    Currently on  Vancomycin 1000 mg iv q12hr (1000 2200)  Provider specified end date: TBD     Indication for Treatment: Endocarditis     Pertinent history per medical record: Admitted on 2020 for Lower GI bleed. 64-y/o M, history of bacterial endocarditis, aortic stenosis status post TAVR on 3/18/2019 limb ischemia with infra-renal abdominal aortic aneurysm, with CTA of the thoracoabdominal aorta showing possible thrombus in the distal ventral abdominal aorta. Documented hx of IVDA.      Other antibiotics: None      Allergies: Patient has no known allergies.      Concerns for renal function include BUN/SCr ratio > 20:1,Elevted SCr for age (trending down)     Pertinent cultures to date:   1920:PBCx2: NGTD     MRSA nares swab if pneumonia is a concern (ordered/positive/negative/n-a): N/A     Pertinent Imagin/21: ECHO \"Echodensity noted attached between the TAVR valve and the anterior mitral valve annulus measuring 1.1 x 1.6 cm; margins not clearly defined.\"    Recent Labs     20  0254 20  0437   WBC 10.6 7.4   NEUTSPOLYS 69.90 59.90     Recent Labs     20  0254 20  0437   BUN 25* 22   CREATININE 1.22 1.01   ALBUMIN 3.0*  --      Recent Labs     20  0920   VANCOTROUGH 4.3* 10.3       Intake/Output Summary (Last 24 hours) at 2020 1617  Last data filed at 2020 1130  Gross per 24 hour   Intake 1780 ml   Output 850 ml   Net 930 ml      /79   Pulse 69   Temp 36.7 °C (98.1 °F) (Temporal)   Resp 17   Ht 1.702 m (5' 7\")   Wt 53.1 kg (117 lb 1 oz)   SpO2 96%  Temp (24hrs), Av.2 °C (98.9 °F), Min:36.7 °C (98.1 °F), Max:37.5 °C (99.5 °F)      A/P        1. Vancomycin dose change: Vancomycin 900 mg iv q8hr (0200 1000 1800)  2. Next vancomycin level: 20@0930  3. Goal trough: 16-20 mcg/mL   4. Comments: No leukocytosis , afebrile, cx NGTD. Per vascular , no interventions. BMP " daily ordered. Vancomycin level below goal, increased maintenance dose. ID following , JAYLYN planned for Tues.      Liang OrtizD BCPS

## 2020-05-23 NOTE — CARE PLAN
Problem: Pain Management  Goal: Pain level will decrease to patient's comfort goal  Outcome: PROGRESSING AS EXPECTED  Intervention: Follow pain managment plan developed in collaboration with patient and Interdisciplinary Team  Note: Pt was educated on 0-10 pain scale, non-pharm methods of pain relief and MAR. Pt was notably grimacing during assessment. However, pt states that their pain is well controlled and declines pain medication, but accepts the offer of a hot pack.      Problem: Knowledge Deficit  Goal: Knowledge of disease process/condition, treatment plan, diagnostic tests, and medications will improve  Outcome: PROGRESSING SLOWER THAN EXPECTED  Goal: Knowledge of the prescribed therapeutic regimen will improve  Outcome: PROGRESSING SLOWER THAN EXPECTED  Intervention: Discuss information regarding therpeutic regimen and document in education  Note: Pt was educated on POC, MAR, shift routine and nursing education R/T Dx. Questions were encouraged, but pt denied questions at this time. Pt verbalized understanding of all teaching, but did not actively participate in education. Pt is very pleasant, but states that he would prefer to sleep at this time.

## 2020-05-23 NOTE — PROGRESS NOTES
Daily Progress Note:     Date of Service: 5/23/2020  Primary Team: [facility]   Attending: Ector Ferrell M.D.   Senior Resident:    Intern:   Contact:  [phone]    Chief Complaint:   Chief Complaint   Patient presents with   • Abdominal Pain         Subjective:  No acute events overnight .  Patient complainting of 2/10 lower abdominal pain. Denies nausea or vomiting  Having good BM, no diarrhea.Tolerating diet .   Blood cultures NGTD  Infectious disease team was consulted.Recommended -JAYLYN on Tuesday.    Will continue IV Vancomycin for now  Will increase coreg 12.5 BID for blood pressure management       Consultants/Specialty:  Vascular Surgery   Infectious disease     Review of Systems:    Review of Systems   Constitutional: Positive for malaise/fatigue. Negative for chills, diaphoresis, fever and weight loss.   HENT: Negative for congestion, ear pain, hearing loss, nosebleeds, sinus pain, sore throat and tinnitus.    Eyes: Negative for blurred vision, double vision and photophobia.   Respiratory: Negative for cough, hemoptysis, sputum production, shortness of breath, wheezing and stridor.    Cardiovascular: Negative for chest pain, palpitations, orthopnea, claudication and leg swelling.   Gastrointestinal: Negative for abdominal pain, blood in stool, constipation, diarrhea, heartburn, melena, nausea and vomiting.   Genitourinary: Negative for dysuria, frequency and urgency.   Musculoskeletal: Positive for myalgias. Negative for back pain, falls, joint pain and neck pain.   Skin: Negative for itching and rash.   Neurological: Negative for dizziness, tingling, tremors, sensory change, speech change, focal weakness, seizures, loss of consciousness, weakness and headaches.   Psychiatric/Behavioral: Positive for substance abuse. Negative for depression and suicidal ideas.       Objective Data:   Physical Exam:   Vitals:   Temp:  [36.7 °C (98.1 °F)-37.5 °C (99.5 °F)] 36.7 °C (98.1  °F)  Pulse:  [69-85] 69  Resp:  [17-18] 17  BP: (139-165)/(67-86) 146/79  SpO2:  [94 %-96 %] 96 %    Physical Exam     Constitutional:   Thin, disheveled man, appears uncomfortable  HENT:   Head: Normocephalic and atraumatic.   Eyes: Pupils are equal, round, and reactive to light. EOM are normal. No scleral icterus.   Neck: Normal range of motion. Neck supple. No JVD present.   Cardiovascular: Normal rate, regular rhythm and intact distal pulses.   Murmur heard.  Systolic murmur with late peak  Pulmonary/Chest: Effort normal and breath sounds normal. No respiratory distress. He has no wheezes. He has no rales.   Abdominal: Soft. He exhibits no distension.   Mild lower abdominal tenderness, BS +. No guarding or rigidity   Musculoskeletal:         General: No edema.   Neurological: AO x 4 .No cranial nerve deficit.   Lethargic   Skin: Skin is warm and dry. He is not diaphoretic.     Labs:     Recent Labs     05/21/20 0254 05/22/20 0437   WBC 10.6 7.4   RBC 4.70 4.31*   HEMOGLOBIN 12.8* 11.8*   HEMATOCRIT 40.3* 36.5*   MCV 85.7 84.7   MCH 27.2 27.4   RDW 51.2* 49.8   PLATELETCT 205 171   MPV 10.0 10.0   NEUTSPOLYS 69.90 59.90   LYMPHOCYTES 16.40* 24.50   MONOCYTES 12.40 13.20   EOSINOPHILS 0.70 2.00   BASOPHILS 0.30 0.30     Recent Labs     05/21/20  0254 05/22/20  0437   SODIUM 137 130*   POTASSIUM 4.4 4.0   CHLORIDE 107 98   CO2 22 23   GLUCOSE 96 97   BUN 25* 22     Recent Labs     05/21/20  0254 05/22/20  0437   ALBUMIN 3.0*  --    TBILIRUBIN 0.3  --    ALKPHOSPHAT 63  --    TOTPROTEIN 6.8  --    ALTSGPT 14  --    ASTSGOT 16  --    CREATININE 1.22 1.01       Imaging:   EC-ECHOCARDIOGRAM COMPLETE W/O CONT   Final Result      CT-CTA COMPLETE THORACOABDOMINAL AORTA   Final Result      1.  Diffuse abdominal aortic ectasia measuring up to 3.3 cm in diameter, similar to prior exam.  No evidence for dissection.   2.  Diminished enhancement along the ventral aspect of distal abdominal aorta may be admixture artifact or  possibly thrombus.   3.  Inferior mesenteric artery is not well visualized which may be due to artifact or occlusion.   4.  Severe emphysema.   5.  Heterogeneous fatty infiltration of liver.   6.  Bilateral renal scarring.   7.  No bowel obstruction or pneumoperitoneum.                     * Lower abdominal pain  Assessment & Plan  Improving   Initially given history of vascular disease, lactic acidosis considered mesenteric ischemia/ischemia bowel likely, concern for septic emboli to bowel given endocarditis, though vascular surgery evaluation of pt and CTA did not see this as likely. AAA appears stable on CTA.   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 Also appears that he has not had a follow up JAYLYN as planned by ID.   Plan:    Echo on 05/21/2020 - Showed increased size of the vegetations compared to previous echo with decreased TAVR leaflet excursion.   Blood cultures are negative till date    ID consulted, recommended -JAYLYN on Tuesday and to  continue IV Vancomycin for now    continue vancomycin for now    HTN (hypertension)- (present on admission)  Assessment & Plan  Unclear outpatient regimen and adherence. Hypertensive on presentation.  Control BP < 160 in setting of AAA  PRN labetalol  Coreg 12.5 BID       AAA (abdominal aortic aneurysm) (HCC)- (present on admission)  Assessment &  Plan  Stable on CT  BP control, SBP<160  Plan:   outpatient Vascular surgery follow up  for aneurysm Surveillance.      COPD (chronic obstructive pulmonary disease) (HCC)- (present on admission)  Assessment & Plan  Severe emphysema on CT  No current exacerbation  RT protocol    S/P TAVR (transcatheter aortic valve replacement)- (present on admission)  Assessment & Plan  Placed 3/18/2019 for severe aortic stenosis  Endocarditis 10/2019 with vegetation on prosthesis   No ASA/plavix due to high bleed risk per chart review  Echocardiogram showed increased size of the vegetations compared to previous echo in 10/10/19.  Plan:  JAYLYN on Tuesday   As per chart review ,Cardiology has discussed with CT surgery and they do not feel that he is a good surgical candidate given ongoing drug use        History of methamphetamine use- (present on admission)  Assessment & Plan  Denies recent use  History IVDU  UDS positive for methamphetamine   Plan:  Counseling provided to quit meth use  SW consult

## 2020-05-24 LAB
ANION GAP SERPL CALC-SCNC: 10 MMOL/L (ref 7–16)
BACTERIA BLD CULT: NORMAL
BACTERIA BLD CULT: NORMAL
BASOPHILS # BLD AUTO: 0.3 % (ref 0–1.8)
BASOPHILS # BLD: 0.02 K/UL (ref 0–0.12)
BUN SERPL-MCNC: 19 MG/DL (ref 8–22)
CALCIUM SERPL-MCNC: 9.1 MG/DL (ref 8.5–10.5)
CHLORIDE SERPL-SCNC: 101 MMOL/L (ref 96–112)
CO2 SERPL-SCNC: 21 MMOL/L (ref 20–33)
CREAT SERPL-MCNC: 0.83 MG/DL (ref 0.5–1.4)
EOSINOPHIL # BLD AUTO: 0.23 K/UL (ref 0–0.51)
EOSINOPHIL NFR BLD: 3.4 % (ref 0–6.9)
ERYTHROCYTE [DISTWIDTH] IN BLOOD BY AUTOMATED COUNT: 49.8 FL (ref 35.9–50)
GLUCOSE SERPL-MCNC: 88 MG/DL (ref 65–99)
HCT VFR BLD AUTO: 35.4 % (ref 42–52)
HGB BLD-MCNC: 11.2 G/DL (ref 14–18)
IMM GRANULOCYTES # BLD AUTO: 0.03 K/UL (ref 0–0.11)
IMM GRANULOCYTES NFR BLD AUTO: 0.4 % (ref 0–0.9)
LYMPHOCYTES # BLD AUTO: 1.98 K/UL (ref 1–4.8)
LYMPHOCYTES NFR BLD: 29.2 % (ref 22–41)
MCH RBC QN AUTO: 27.2 PG (ref 27–33)
MCHC RBC AUTO-ENTMCNC: 31.6 G/DL (ref 33.7–35.3)
MCV RBC AUTO: 85.9 FL (ref 81.4–97.8)
MONOCYTES # BLD AUTO: 0.87 K/UL (ref 0–0.85)
MONOCYTES NFR BLD AUTO: 12.9 % (ref 0–13.4)
NEUTROPHILS # BLD AUTO: 3.64 K/UL (ref 1.82–7.42)
NEUTROPHILS NFR BLD: 53.8 % (ref 44–72)
NRBC # BLD AUTO: 0 K/UL
NRBC BLD-RTO: 0 /100 WBC
PLATELET # BLD AUTO: 192 K/UL (ref 164–446)
PMV BLD AUTO: 9.9 FL (ref 9–12.9)
POTASSIUM SERPL-SCNC: 4.2 MMOL/L (ref 3.6–5.5)
RBC # BLD AUTO: 4.12 M/UL (ref 4.7–6.1)
SIGNIFICANT IND 70042: NORMAL
SIGNIFICANT IND 70042: NORMAL
SITE SITE: NORMAL
SITE SITE: NORMAL
SODIUM SERPL-SCNC: 132 MMOL/L (ref 135–145)
SOURCE SOURCE: NORMAL
SOURCE SOURCE: NORMAL
VANCOMYCIN TROUGH SERPL-MCNC: 21.2 UG/ML (ref 10–20)
WBC # BLD AUTO: 6.8 K/UL (ref 4.8–10.8)

## 2020-05-24 PROCEDURE — 99231 SBSQ HOSP IP/OBS SF/LOW 25: CPT | Mod: GC | Performed by: INTERNAL MEDICINE

## 2020-05-24 PROCEDURE — A9270 NON-COVERED ITEM OR SERVICE: HCPCS | Performed by: STUDENT IN AN ORGANIZED HEALTH CARE EDUCATION/TRAINING PROGRAM

## 2020-05-24 PROCEDURE — 770001 HCHG ROOM/CARE - MED/SURG/GYN PRIV*

## 2020-05-24 PROCEDURE — 700111 HCHG RX REV CODE 636 W/ 250 OVERRIDE (IP): Performed by: INTERNAL MEDICINE

## 2020-05-24 PROCEDURE — 80202 ASSAY OF VANCOMYCIN: CPT

## 2020-05-24 PROCEDURE — 700102 HCHG RX REV CODE 250 W/ 637 OVERRIDE(OP): Performed by: INTERNAL MEDICINE

## 2020-05-24 PROCEDURE — 36415 COLL VENOUS BLD VENIPUNCTURE: CPT

## 2020-05-24 PROCEDURE — 80048 BASIC METABOLIC PNL TOTAL CA: CPT

## 2020-05-24 PROCEDURE — 700105 HCHG RX REV CODE 258: Performed by: INTERNAL MEDICINE

## 2020-05-24 PROCEDURE — A9270 NON-COVERED ITEM OR SERVICE: HCPCS | Performed by: INTERNAL MEDICINE

## 2020-05-24 PROCEDURE — 700102 HCHG RX REV CODE 250 W/ 637 OVERRIDE(OP): Performed by: STUDENT IN AN ORGANIZED HEALTH CARE EDUCATION/TRAINING PROGRAM

## 2020-05-24 PROCEDURE — 85025 COMPLETE CBC W/AUTO DIFF WBC: CPT

## 2020-05-24 RX ADMIN — VANCOMYCIN HYDROCHLORIDE 700 MG: 500 INJECTION, POWDER, LYOPHILIZED, FOR SOLUTION INTRAVENOUS at 19:32

## 2020-05-24 RX ADMIN — VANCOMYCIN HYDROCHLORIDE 900 MG: 500 INJECTION, POWDER, LYOPHILIZED, FOR SOLUTION INTRAVENOUS at 10:37

## 2020-05-24 RX ADMIN — CARVEDILOL 12.5 MG: 6.25 TABLET, FILM COATED ORAL at 17:52

## 2020-05-24 RX ADMIN — VANCOMYCIN HYDROCHLORIDE 900 MG: 500 INJECTION, POWDER, LYOPHILIZED, FOR SOLUTION INTRAVENOUS at 03:06

## 2020-05-24 RX ADMIN — SENNOSIDES, DOCUSATE SODIUM 2 TABLET: 50; 8.6 TABLET, FILM COATED ORAL at 05:04

## 2020-05-24 RX ADMIN — ENOXAPARIN SODIUM 30 MG: 30 INJECTION SUBCUTANEOUS at 05:04

## 2020-05-24 RX ADMIN — CARVEDILOL 12.5 MG: 6.25 TABLET, FILM COATED ORAL at 09:51

## 2020-05-24 ASSESSMENT — ENCOUNTER SYMPTOMS
HEARTBURN: 0
DIARRHEA: 0
WHEEZING: 0
HEMOPTYSIS: 0
COUGH: 0
FEVER: 0
VOMITING: 0
SPEECH CHANGE: 0
SHORTNESS OF BREATH: 0
BLOOD IN STOOL: 0
WEAKNESS: 0
DOUBLE VISION: 0
CLAUDICATION: 0
TINGLING: 0
STRIDOR: 0
SORE THROAT: 0
PHOTOPHOBIA: 0
SPUTUM PRODUCTION: 0
ABDOMINAL PAIN: 0
FALLS: 0
MYALGIAS: 0
PALPITATIONS: 0
NAUSEA: 0
DIZZINESS: 0
ORTHOPNEA: 0
HEADACHES: 0
SINUS PAIN: 0
NECK PAIN: 0
CONSTIPATION: 0
DEPRESSION: 0
TREMORS: 0
FOCAL WEAKNESS: 0
SEIZURES: 0
LOSS OF CONSCIOUSNESS: 0
DIAPHORESIS: 0
BLURRED VISION: 0
WEIGHT LOSS: 0
CHILLS: 0
BACK PAIN: 0
SENSORY CHANGE: 0

## 2020-05-24 ASSESSMENT — LIFESTYLE VARIABLES: SUBSTANCE_ABUSE: 1

## 2020-05-24 NOTE — PROGRESS NOTES
"Pharmacy Kinetics 64 y.o. male on vancomycin day # 5   2020    Currently on  Vancomycin 900 mg iv q8hr (0200 1000 1800)  Provider specified end date: TBD     Indication for Treatment: Endocarditis     Pertinent history per medical record: Admitted on 2020 for Lower GI bleed. 64-y/o M, history of bacterial endocarditis, aortic stenosis status post TAVR on 3/18/2019 limb ischemia with infra-renal abdominal aortic aneurysm, with CTA of the thoracoabdominal aorta showing possible thrombus in the distal ventral abdominal aorta. Documented hx of IVDA.      Other antibiotics: None      Allergies: Patient has no known allergies.      Concerns for renal function include BUN/SCr ratio > 20:1,Elevted SCr for age (trending down)     Pertinent cultures to date:   1920:PBCx2: NGTD     MRSA nares swab if pneumonia is a concern (ordered/positive/negative/n-a): N/A     Pertinent Imagin/21: ECHO \"Echodensity noted attached between the TAVR valve and the anterior mitral valve annulus measuring 1.1 x 1.6 cm; margins not clearly defined.\"       Recent Labs     20  0437 20  0444   WBC 7.4 6.8   NEUTSPOLYS 59.90 53.80     Recent Labs     20  0437 20  0444   BUN 22 19   CREATININE 1.01 0.83     Recent Labs     208 20  0920   VANCOTROUGH 4.3* 10.3       Intake/Output Summary (Last 24 hours) at 2020 1022  Last data filed at 2020 0810  Gross per 24 hour   Intake 480 ml   Output 2550 ml   Net -2070 ml      /75   Pulse 79   Temp 36.7 °C (98 °F) (Temporal)   Resp 18   Ht 1.702 m (5' 7\")   Wt 53.1 kg (117 lb 1 oz)   SpO2 95%  Temp (24hrs), Av.2 °C (98.9 °F), Min:36.7 °C (98 °F), Max:37.9 °C (100.2 °F)      A/P        1. Vancomycin dose change: No change   2. Next vancomycin level: 20@0930  3. Goal trough: 16-20 mcg/mL   4. Comments: No leukocytosis, cx NGTD. Tmax 100.2. Per vascular , no interventions. BMP ordered daily, renal indices trending down. " Vancomycin maintenance dose increased yesterday, level today. ID following , JAYLYN planned for Tues.      Liang Swartz PharmD BCPS     Update: 05/24/20 5/24/2020 09:39   Vancomycin Trough 21.2 (H)     A/P        5. Vancomycin dose change: Vancomycin 700 mg iv q8hr (0400 1200 2000)  6. Next vancomycin level: 05/25/20@1130  7. Goal trough: 16-20 mcg/mL   8. Comments: Level above goal , maintenance dose reduced. Level higher than anticipated given previous level. Follow up level ordered early to be cautious, renal indices trended down over interval.      Liang Swartz PharmD BCPS

## 2020-05-24 NOTE — PROGRESS NOTES
Daily Progress Note:     Date of Service: 5/24/2020  Primary Team: [facility]   Attending: Ector Ferrell M.D.   Senior Resident:    Intern:   Contact:  [phone]    Chief Complaint:   Chief Complaint   Patient presents with   • Abdominal Pain         Subjective:  No acute events overnight . Patient resting comfortably in bed.  Patient states improvement of abdominal pain. Denies nausea or vomiting. Still feels tired.  Scheduled for a JAYLYN on Tuesday as per ID recommendations. Appreciate it.   Will continue IV Vancomycin for now  Stable vitals. On Coreg 12.5 mg BID for BP management . BP still in 130-140s. Will consider adding lisinopril for better BP control.     Consultants/Specialty:  Vascular Surgery   Infectious disease     Review of Systems:    Review of Systems   Constitutional: Positive for malaise/fatigue. Negative for chills, diaphoresis, fever and weight loss.   HENT: Negative for congestion, ear pain, hearing loss, nosebleeds, sinus pain, sore throat and tinnitus.    Eyes: Negative for blurred vision, double vision and photophobia.   Respiratory: Negative for cough, hemoptysis, sputum production, shortness of breath, wheezing and stridor.    Cardiovascular: Negative for chest pain, palpitations, orthopnea, claudication and leg swelling.   Gastrointestinal: Negative for abdominal pain, blood in stool, constipation, diarrhea, heartburn, melena, nausea and vomiting.   Genitourinary: Negative for dysuria, frequency and urgency.   Musculoskeletal: Negative for back pain, falls, joint pain, myalgias and neck pain.   Skin: Negative for itching and rash.   Neurological: Negative for dizziness, tingling, tremors, sensory change, speech change, focal weakness, seizures, loss of consciousness, weakness and headaches.   Psychiatric/Behavioral: Positive for substance abuse. Negative for depression and suicidal ideas.       Objective Data:   Physical Exam:   Vitals:   Temp:  [36.8  °C (98.3 °F)-37.9 °C (100.2 °F)] 37.3 °C (99.1 °F)  Pulse:  [72-88] 88  Resp:  [16] 16  BP: (131-143)/(77-82) 143/82  SpO2:  [92 %-95 %] 93 %    Physical Exam     Constitutional:   Thin, disheveled man, appears uncomfortable  HENT:   Head: Normocephalic and atraumatic.   Eyes: Pupils are equal, round, and reactive to light. EOM are normal. No scleral icterus.   Neck: Normal range of motion. Neck supple. No JVD present.   Cardiovascular: Normal rate, regular rhythm and intact distal pulses.   Murmur heard.  Systolic murmur with late peak  Pulmonary/Chest: Effort normal and breath sounds normal. No respiratory distress. He has no wheezes. He has no rales.   Abdominal: Soft. He exhibits no distension. mild discomfort in the lower abdomen   BS +. No guarding or rigidity   Musculoskeletal:         General: No edema.   Neurological: AO x 4 .No cranial nerve deficit.   Lethargic   Skin: Skin is warm and dry. He is not diaphoretic.     Labs:     Recent Labs     05/22/20 0437 05/24/20  0444   WBC 7.4 6.8   RBC 4.31* 4.12*   HEMOGLOBIN 11.8* 11.2*   HEMATOCRIT 36.5* 35.4*   MCV 84.7 85.9   MCH 27.4 27.2   RDW 49.8 49.8   PLATELETCT 171 192   MPV 10.0 9.9   NEUTSPOLYS 59.90 53.80   LYMPHOCYTES 24.50 29.20   MONOCYTES 13.20 12.90   EOSINOPHILS 2.00 3.40   BASOPHILS 0.30 0.30     Recent Labs     05/22/20 0437 05/24/20  0444   SODIUM 130* 132*   POTASSIUM 4.0 4.2   CHLORIDE 98 101   CO2 23 21   GLUCOSE 97 88   BUN 22 19     Recent Labs     05/22/20 0437 05/24/20  0444   CREATININE 1.01 0.83       Imaging:   EC-ECHOCARDIOGRAM COMPLETE W/O CONT   Final Result      CT-CTA COMPLETE THORACOABDOMINAL AORTA   Final Result      1.  Diffuse abdominal aortic ectasia measuring up to 3.3 cm in diameter, similar to prior exam.  No evidence for dissection.   2.  Diminished enhancement along the ventral aspect of distal abdominal aorta may be admixture artifact or possibly thrombus.   3.  Inferior mesenteric artery is not well visualized  which may be due to artifact or occlusion.   4.  Severe emphysema.   5.  Heterogeneous fatty infiltration of liver.   6.  Bilateral renal scarring.   7.  No bowel obstruction or pneumoperitoneum.                     * Lower abdominal pain  Assessment & Plan  Improving   Initially given history of vascular disease, lactic acidosis considered mesenteric ischemia/ischemia bowel likely, concern for septic emboli to bowel given endocarditis, though vascular surgery evaluation of pt and CTA did not see this as likely. AAA appears stable on CTA.   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 Also appears that he has not had a follow up JAYLYN as planned by ID.   Plan:    Echo on 05/21/2020 - Showed increased size of the vegetations compared to previous echo with decreased TAVR leaflet excursion.   Blood cultures are negative till date    ID consulted, recommended -JAYLYN on Tuesday and to  continue IV Vancomycin for now    continue vancomycin for now    HTN (hypertension)- (present on admission)  Assessment & Plan  Unclear outpatient regimen and adherence. Hypertensive on presentation.  Control BP < 160 in setting of AAA  PRN labetalol  Coreg 12.5 BID       AAA (abdominal aortic aneurysm) (HCC)- (present on admission)  Assessment & Plan  Stable on CT  BP control, SBP<160  Plan:   outpatient Vascular  surgery follow up  for aneurysm Surveillance.      COPD (chronic obstructive pulmonary disease) (HCC)- (present on admission)  Assessment & Plan  Severe emphysema on CT  No current exacerbation  RT protocol    S/P TAVR (transcatheter aortic valve replacement)- (present on admission)  Assessment & Plan  Placed 3/18/2019 for severe aortic stenosis  Endocarditis 10/2019 with vegetation on prosthesis   No ASA/plavix due to high bleed risk per chart review  Echocardiogram showed increased size of the vegetations compared to previous echo in 10/10/19.  Plan:  JAYLYN on Tuesday   As per chart review ,Cardiology has discussed with CT surgery and they do not feel that he is a good surgical candidate given ongoing drug use        History of methamphetamine use- (present on admission)  Assessment & Plan  Denies recent use  History IVDU  UDS positive for methamphetamine   Plan:  Counseling provided to quit meth use  SW consult

## 2020-05-24 NOTE — CARE PLAN
Problem: Pain Management  Goal: Pain level will decrease to patient's comfort goal  Outcome: PROGRESSING AS EXPECTED   Pain is well controlled with medication per mar    Problem: Communication  Goal: The ability to communicate needs accurately and effectively will improve  Outcome: PROGRESSING AS EXPECTED   Participation with poc  Problem: Safety  Goal: Will remain free from injury  Outcome: PROGRESSING AS EXPECTED   Fall precautions in place

## 2020-05-24 NOTE — CARE PLAN
Problem: Pain Management  Goal: Pain level will decrease to patient's comfort goal  Outcome: PROGRESSING AS EXPECTED     Problem: Fluid Volume:  Goal: Will maintain balanced intake and output  Outcome: PROGRESSING AS EXPECTED     Problem: Communication  Goal: The ability to communicate needs accurately and effectively will improve  Outcome: PROGRESSING AS EXPECTED     Problem: Safety  Goal: Will remain free from injury  Outcome: PROGRESSING AS EXPECTED  Goal: Will remain free from falls  Outcome: PROGRESSING AS EXPECTED     Problem: Infection  Goal: Will remain free from infection  Outcome: PROGRESSING AS EXPECTED     Problem: Venous Thromboembolism (VTW)/Deep Vein Thrombosis (DVT) Prevention:  Goal: Patient will participate in Venous Thrombosis (VTE)/Deep Vein Thrombosis (DVT)Prevention Measures  Outcome: PROGRESSING AS EXPECTED     Problem: Bowel/Gastric:  Goal: Normal bowel function is maintained or improved  Outcome: PROGRESSING AS EXPECTED     Problem: Urinary Elimination:  Goal: Ability to reestablish a normal urinary elimination pattern will improve  Outcome: PROGRESSING AS EXPECTED     Problem: Skin Integrity  Goal: Risk for impaired skin integrity will decrease  Outcome: PROGRESSING AS EXPECTED     Problem: Mobility  Goal: Risk for activity intolerance will decrease  Outcome: PROGRESSING AS EXPECTED

## 2020-05-25 LAB — VANCOMYCIN TROUGH SERPL-MCNC: 21.2 UG/ML (ref 10–20)

## 2020-05-25 PROCEDURE — 700111 HCHG RX REV CODE 636 W/ 250 OVERRIDE (IP): Performed by: INTERNAL MEDICINE

## 2020-05-25 PROCEDURE — 36415 COLL VENOUS BLD VENIPUNCTURE: CPT

## 2020-05-25 PROCEDURE — A9270 NON-COVERED ITEM OR SERVICE: HCPCS | Performed by: STUDENT IN AN ORGANIZED HEALTH CARE EDUCATION/TRAINING PROGRAM

## 2020-05-25 PROCEDURE — 770001 HCHG ROOM/CARE - MED/SURG/GYN PRIV*

## 2020-05-25 PROCEDURE — 97530 THERAPEUTIC ACTIVITIES: CPT

## 2020-05-25 PROCEDURE — 700105 HCHG RX REV CODE 258: Performed by: STUDENT IN AN ORGANIZED HEALTH CARE EDUCATION/TRAINING PROGRAM

## 2020-05-25 PROCEDURE — 700105 HCHG RX REV CODE 258: Performed by: INTERNAL MEDICINE

## 2020-05-25 PROCEDURE — 700102 HCHG RX REV CODE 250 W/ 637 OVERRIDE(OP): Performed by: STUDENT IN AN ORGANIZED HEALTH CARE EDUCATION/TRAINING PROGRAM

## 2020-05-25 PROCEDURE — 700111 HCHG RX REV CODE 636 W/ 250 OVERRIDE (IP): Performed by: STUDENT IN AN ORGANIZED HEALTH CARE EDUCATION/TRAINING PROGRAM

## 2020-05-25 PROCEDURE — 97116 GAIT TRAINING THERAPY: CPT

## 2020-05-25 PROCEDURE — 99232 SBSQ HOSP IP/OBS MODERATE 35: CPT | Mod: GC | Performed by: INTERNAL MEDICINE

## 2020-05-25 PROCEDURE — 80202 ASSAY OF VANCOMYCIN: CPT

## 2020-05-25 RX ORDER — LISINOPRIL 5 MG/1
5 TABLET ORAL
Status: DISCONTINUED | OUTPATIENT
Start: 2020-05-25 | End: 2020-05-29 | Stop reason: HOSPADM

## 2020-05-25 RX ADMIN — CARVEDILOL 12.5 MG: 6.25 TABLET, FILM COATED ORAL at 09:21

## 2020-05-25 RX ADMIN — ENOXAPARIN SODIUM 30 MG: 30 INJECTION SUBCUTANEOUS at 06:21

## 2020-05-25 RX ADMIN — VANCOMYCIN HYDROCHLORIDE 700 MG: 500 INJECTION, POWDER, LYOPHILIZED, FOR SOLUTION INTRAVENOUS at 03:58

## 2020-05-25 RX ADMIN — CARVEDILOL 12.5 MG: 6.25 TABLET, FILM COATED ORAL at 18:35

## 2020-05-25 RX ADMIN — VANCOMYCIN HYDROCHLORIDE 1000 MG: 500 INJECTION, POWDER, LYOPHILIZED, FOR SOLUTION INTRAVENOUS at 15:08

## 2020-05-25 RX ADMIN — LISINOPRIL 5 MG: 5 TABLET ORAL at 09:21

## 2020-05-25 ASSESSMENT — COGNITIVE AND FUNCTIONAL STATUS - GENERAL
MOBILITY SCORE: 21
STANDING UP FROM CHAIR USING ARMS: A LITTLE
CLIMB 3 TO 5 STEPS WITH RAILING: A LITTLE
WALKING IN HOSPITAL ROOM: A LITTLE
SUGGESTED CMS G CODE MODIFIER MOBILITY: CJ

## 2020-05-25 ASSESSMENT — ENCOUNTER SYMPTOMS
CHILLS: 0
FEVER: 0
VOMITING: 0
NECK PAIN: 0
BLOOD IN STOOL: 0
SPUTUM PRODUCTION: 0
CONSTIPATION: 0
SORE THROAT: 0
FOCAL WEAKNESS: 0
CLAUDICATION: 0
BLURRED VISION: 0
TREMORS: 0
HEARTBURN: 0
DEPRESSION: 0
ORTHOPNEA: 0
SINUS PAIN: 0
WHEEZING: 0
STRIDOR: 0
ABDOMINAL PAIN: 0
SHORTNESS OF BREATH: 0
MYALGIAS: 0
COUGH: 0
HEMOPTYSIS: 0
NAUSEA: 0
TINGLING: 0
DIAPHORESIS: 0
SPEECH CHANGE: 0
SENSORY CHANGE: 0
BACK PAIN: 0
WEIGHT LOSS: 0
LOSS OF CONSCIOUSNESS: 0
DIARRHEA: 0
FALLS: 0
PHOTOPHOBIA: 0
SEIZURES: 0
WEAKNESS: 0
HEADACHES: 0
DOUBLE VISION: 0
PALPITATIONS: 0
DIZZINESS: 0

## 2020-05-25 ASSESSMENT — GAIT ASSESSMENTS
DEVIATION: DECREASED BASE OF SUPPORT;BRADYKINETIC
ASSISTIVE DEVICE: OTHER (COMMENTS)
GAIT LEVEL OF ASSIST: MINIMAL ASSIST
DISTANCE (FEET): 100

## 2020-05-25 ASSESSMENT — LIFESTYLE VARIABLES: SUBSTANCE_ABUSE: 1

## 2020-05-25 NOTE — PROGRESS NOTES
"Pharmacy Kinetics 64 y.o. male on vancomycin day # 6   2020    Currently on Vancomycin 700 mg iv q8hr  Provider specified end date: TBD    Indication for Treatment: Endocarditis     Pertinent history per medical record: Admitted on 2020 for Lower GI bleed. 64-y/o M, history of bacterial endocarditis, aortic stenosis status post TAVR on 3/18/2019 limb ischemia with infra-renal abdominal aortic aneurysm, with CTA of the thoracoabdominal aorta showing possible thrombus in the distal ventral abdominal aorta. Documented hx of IVDA.      Other antibiotics: None      Allergies: Patient has no known allergies.      Concerns for renal function include BUN/SCr ratio > 20:1     Pertinent cultures to date:   20:PBCx2: NGTD     MRSA nares swab if pneumonia is a concern (ordered/positive/negative/n-a): N/A     Pertinent Imagin/21: ECHO \"Echodensity noted attached between the TAVR valve and the anterior mitral valve annulus measuring 1.1 x 1.6 cm; margins not clearly defined.\"      Recent Labs     20  0444   WBC 6.8   NEUTSPOLYS 53.80     Recent Labs     20  0444   BUN 19   CREATININE 0.83     Recent Labs     20  0920 20  0939 20  1129   VANCOTROUGH 10.3 21.2* 21.2*       Intake/Output Summary (Last 24 hours) at 2020 1339  Last data filed at 2020 0815  Gross per 24 hour   Intake 600 ml   Output 2150 ml   Net -1550 ml      /78   Pulse 63   Temp 36.6 °C (97.8 °F) (Temporal)   Resp 16   Ht 1.702 m (5' 7\")   Wt 53.1 kg (117 lb 1 oz)   SpO2 96%  Temp (24hrs), Av °C (98.6 °F), Min:36.6 °C (97.8 °F), Max:37.2 °C (99 °F)      A/P   1. Vancomycin dose change: yes, change vancomycin to 1000 mg IV q12h  2. Next vancomycin level: 20 @ 1430 (not ordered)  3. Goal trough: 16-20 mcg/ml   4. Comments: Vancomycin trough supra therapeutic prior to 3rd dose of reduced vancomycin 700 mg IV q8h.  Will change vancomycin back to 1000 mg q12h as previous level was obtained " prior to 3rd total dose and vanco random level prior to this dosing was 4.3 mcg/ml.  Pt will likely achieve therapeutic levels on this dosing. Trough level planned prior to 5th total dose of new regimen.  Pt had tolerated 800 mg IV q8h back in 2019, however, pt has lost weight and his renal function has worsend since that time. Consider obtaining BMP with vancomycin level if renal indices not checked prior to this time.     Perla Escobedo, PharmD, BCOP

## 2020-05-25 NOTE — THERAPY
Physical Therapy   Daily Treatment     Patient Name: Sylvain Espinosa  Age:  64 y.o., Sex:  male  Medical Record #: [id number]  Today's Date: 5/25/2020     Precautions: Fall Risk       Objective       05/25/20 0933   Cognition    Speech/ Communication Delayed Responses   Level of Consciousness Alert   Comments cooperative   Passive ROM Lower Body   Passive ROM Lower Body WDL   Active ROM Lower Body    Active ROM Lower Body  WDL   Strength Lower Body   Lower Body Strength  WDL   Balance   Standing Balance (Static) Fair   Standing Balance (Dynamic) Fair   Weight Shift Standing Fair   Skilled Intervention Verbal Cuing;Sequencing   Comments No AD   Gait Analysis   Gait Level Of Assist Minimal Assist   Assistive Device Other (Comments)  (IV poles)   Distance (Feet) 100   # of Times Distance was Traveled 3   Deviation Decreased Base Of Support;Bradykinetic   Weight Bearing Status FWB   Skilled Intervention Verbal Cuing;Sequencing   Bed Mobility    Supine to Sit Supervised   Sit to Supine Supervised   Scooting Supervised   Rolling Supervised   Skilled Intervention Verbal Cuing;Compensatory Strategies   Functional Mobility   Sit to Stand Supervised   Mobility gait in ramirez, gait to bathroom   Skilled Intervention Verbal Cuing;Compensatory Strategies   Short Term Goals    Short Term Goal # 1 Pt will be SPV for all transfers with LRAD in 6 txs to imporve functional mobility.   Goal Outcome # 1 Goal met   Short Term Goal # 2 Pt will be SPV for gait x 200' with LRAD in 6 txs to improve functional mobility.   Goal Outcome # 2 Goal not met   Anticipated Discharge Equipment   DC Equipment Unable To Determine At This Time       Assessment    Pt progressing well per POC. Pt fatigues easily during ambulation, requiring 3 standing rest breaks while ambulating in the ramirez pushing his IV pole. No c/o pain during activity.    Plan    Continue current treatment plan.    Discharge recommendations:  Recommend post-acute placement for  continued physical therapy services prior to discharge home.

## 2020-05-25 NOTE — PROGRESS NOTES
Assumed care at 0700    Received report from NOC shift RN.    Reviewed recent lab results, notes, orders, and MAR  POC discussed and updated on care board  Bed is in the lowest and locked position, call light within reach      RA  A&Ox4  +voiding  lbm 5/23/2020  Up self ambulating  Tolerating regular diet  IV antibiotics

## 2020-05-25 NOTE — PROGRESS NOTES
Pt is A&O x 4.  Pain well managed with current regimen.   Denies nausea, tolerating a regular diet.  + Voids  + flatus  Last BM 05/24/2020 per pt.   Up with assist x 1.  Pt low fall risk per kwasi hooker.  Reviewed plan of care with patient, bed in lowest position and locked, pt resting comfortably now, call light within reach, all needs met at this time. Interventions will be executed per plan of care

## 2020-05-25 NOTE — PROGRESS NOTES
Daily Progress Note:     Date of Service: 5/25/2020  Primary Team: [facility] IM Green Team   Attending: Ector Ferrell M.D.   Senior Resident:    Intern:   Contact:  [phone]    Chief Complaint:   Chief Complaint   Patient presents with   • Abdominal Pain         Subjective:  No acute events overnight . Patient resting comfortably in bed.  Patient states improvement of abdominal pain , 1-2/10.  Scheduled for a JAYLYN on Tuesday . NPO midnight  Will continue IV Vancomycin for now    Consultants/Specialty:  Vascular Surgery   Infectious disease     Review of Systems:    Review of Systems   Constitutional: Positive for malaise/fatigue. Negative for chills, diaphoresis, fever and weight loss.   HENT: Negative for congestion, ear pain, hearing loss, nosebleeds, sinus pain, sore throat and tinnitus.    Eyes: Negative for blurred vision, double vision and photophobia.   Respiratory: Negative for cough, hemoptysis, sputum production, shortness of breath, wheezing and stridor.    Cardiovascular: Negative for chest pain, palpitations, orthopnea, claudication and leg swelling.   Gastrointestinal: Negative for abdominal pain, blood in stool, constipation, diarrhea, heartburn, melena, nausea and vomiting.   Genitourinary: Negative for dysuria, frequency and urgency.   Musculoskeletal: Negative for back pain, falls, joint pain, myalgias and neck pain.   Skin: Negative for itching and rash.   Neurological: Negative for dizziness, tingling, tremors, sensory change, speech change, focal weakness, seizures, loss of consciousness, weakness and headaches.   Psychiatric/Behavioral: Positive for substance abuse. Negative for depression and suicidal ideas.       Objective Data:   Physical Exam:   Vitals:   Temp:  [36.9 °C (98.5 °F)-37.5 °C (99.5 °F)] 36.9 °C (98.5 °F)  Pulse:  [62-79] 62  Resp:  [17-18] 17  BP: (130-156)/(66-76) 156/76  SpO2:  [94 %-97 %] 94 %    Physical Exam     Constitutional:   Thin, disheveled  man, appears uncomfortable  HENT:   Head: Normocephalic and atraumatic.   Eyes: Pupils are equal, round, and reactive to light. EOM are normal. No scleral icterus.   Neck: Normal range of motion. Neck supple. No JVD present.   Cardiovascular: Normal rate, regular rhythm and intact distal pulses.   Murmur heard.  Systolic murmur   Pulmonary/Chest: Effort normal and breath sounds normal. No respiratory distress. He has no wheezes. He has no rales.   Abdominal: Soft. He exhibits no distension. mild discomfort in the lower abdomen   BS +. No guarding or rigidity   Musculoskeletal:         General: No edema.   Neurological: AO x 4 .No cranial nerve deficit.   Skin: Skin is warm and dry. He is not diaphoretic.     Labs:     Recent Labs     05/24/20  0444   WBC 6.8   RBC 4.12*   HEMOGLOBIN 11.2*   HEMATOCRIT 35.4*   MCV 85.9   MCH 27.2   RDW 49.8   PLATELETCT 192   MPV 9.9   NEUTSPOLYS 53.80   LYMPHOCYTES 29.20   MONOCYTES 12.90   EOSINOPHILS 3.40   BASOPHILS 0.30     Recent Labs     05/24/20  0444   SODIUM 132*   POTASSIUM 4.2   CHLORIDE 101   CO2 21   GLUCOSE 88   BUN 19     Recent Labs     05/24/20  0444   CREATININE 0.83       Imaging:   EC-ECHOCARDIOGRAM COMPLETE W/O CONT   Final Result      CT-CTA COMPLETE THORACOABDOMINAL AORTA   Final Result      1.  Diffuse abdominal aortic ectasia measuring up to 3.3 cm in diameter, similar to prior exam.  No evidence for dissection.   2.  Diminished enhancement along the ventral aspect of distal abdominal aorta may be admixture artifact or possibly thrombus.   3.  Inferior mesenteric artery is not well visualized which may be due to artifact or occlusion.   4.  Severe emphysema.   5.  Heterogeneous fatty infiltration of liver.   6.  Bilateral renal scarring.   7.  No bowel obstruction or pneumoperitoneum.                     * Lower abdominal pain  Assessment & Plan  Improving   Initially given history of vascular disease, lactic acidosis considered mesenteric ischemia/ischemia  bowel likely, concern for septic emboli to bowel given endocarditis, though vascular surgery evaluation of pt and CTA did not see this as likely. AAA appears stable on CTA.   Plan:  continue to monitor H and H  Regular diet    Continue to monitor     Lactic acidosis  Assessment & Plan  Resolved with IV fluids     Acute renal failure (ARF) ([facility])- (present on admission)  Assessment & Plan  Resolved with IV fluids       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)  Assessment & Plan  Diagnosed with endocarditis in 10/2019, blood cx positive for Rothia mucilaginosa,  found to have vegetation on aortic valve (TAVR), appears to have received 6+ weeks of vancomycin and rifampin which was discontinued on 11/25/2019 and patient was to continue of PO penicillin for chronic suppression but reports that he has not been taking antibiotics. Also appears that he has not had a follow up JAYLYN as planned by ID.   Plan:    Echo on 05/21/2020 - Showed increased size of the vegetations compared to previous echo with decreased TAVR leaflet excursion.   Blood cultures are negative till date    ID consulted, recommended -JAYLYN on Tuesday   continue vancomycin for now    HTN (hypertension)- (present on admission)  Assessment & Plan  Unclear outpatient regimen and adherence. Hypertensive on presentation.  Control BP < 160 in setting of AAA  PRN labetalol  Coreg 12.5 BID   Lisinopril 5 mg daily      AAA (abdominal aortic aneurysm) ([facility])- (present on admission)  Assessment & Plan  Stable on CT  BP control, SBP<160  Plan:   outpatient Vascular surgery follow up  for aneurysm Surveillance.      COPD (chronic obstructive pulmonary disease) ([facility])- (present on admission)  Assessment & Plan  Severe emphysema on CT  No current exacerbation  RT protocol    S/P TAVR (transcatheter aortic valve replacement)- (present on admission)  Assessment & Plan  Placed 3/18/2019 for severe aortic stenosis  Endocarditis 10/2019 with vegetation on prosthesis   No  ASA/plavix due to high bleed risk per chart review  Echocardiogram showed increased size of the vegetations compared to previous echo in 10/10/19.  Plan:  JAYLYN on Tuesday   As per chart review ,Cardiology has discussed with CT surgery and they do not feel that he is a good surgical candidate given ongoing drug use        History of methamphetamine use- (present on admission)  Assessment & Plan  Denies recent use  History IVDU  UDS positive for methamphetamine   Plan:  Counseling provided to quit meth use  SW consult

## 2020-05-25 NOTE — CARE PLAN
Problem: Pain Management  Goal: Pain level will decrease to patient's comfort goal  Outcome: PROGRESSING AS EXPECTED     Problem: Communication  Goal: The ability to communicate needs accurately and effectively will improve  Outcome: PROGRESSING AS EXPECTED     Problem: Safety  Goal: Will remain free from injury  Outcome: PROGRESSING AS EXPECTED  Goal: Will remain free from falls  Outcome: PROGRESSING AS EXPECTED     Problem: Infection  Goal: Will remain free from infection  Outcome: PROGRESSING AS EXPECTED     Problem: Bowel/Gastric:  Goal: Normal bowel function is maintained or improved  Outcome: PROGRESSING AS EXPECTED

## 2020-05-25 NOTE — CARE PLAN
Problem: Pain Management  Goal: Pain level will decrease to patient's comfort goal  Outcome: PROGRESSING AS EXPECTED  Denies pain  Problem: Fluid Volume:  Goal: Will maintain balanced intake and output  Outcome: PROGRESSING AS EXPECTED   Patient is voiding  Problem: Communication  Goal: The ability to communicate needs accurately and effectively will improve  Outcome: PROGRESSING AS EXPECTED   Participation in POC

## 2020-05-26 ENCOUNTER — ANESTHESIA (OUTPATIENT)
Dept: SURGERY | Facility: MEDICAL CENTER | Age: 65
DRG: 289 | End: 2020-05-26
Payer: MEDICAID

## 2020-05-26 ENCOUNTER — ANESTHESIA EVENT (OUTPATIENT)
Dept: SURGERY | Facility: MEDICAL CENTER | Age: 65
DRG: 289 | End: 2020-05-26
Payer: MEDICAID

## 2020-05-26 ENCOUNTER — APPOINTMENT (OUTPATIENT)
Dept: CARDIOLOGY | Facility: MEDICAL CENTER | Age: 65
DRG: 289 | End: 2020-05-26
Attending: STUDENT IN AN ORGANIZED HEALTH CARE EDUCATION/TRAINING PROGRAM
Payer: MEDICAID

## 2020-05-26 LAB
ANION GAP SERPL CALC-SCNC: 6 MMOL/L (ref 7–16)
BUN SERPL-MCNC: 18 MG/DL (ref 8–22)
CALCIUM SERPL-MCNC: 9.3 MG/DL (ref 8.5–10.5)
CHLORIDE SERPL-SCNC: 101 MMOL/L (ref 96–112)
CO2 SERPL-SCNC: 26 MMOL/L (ref 20–33)
CREAT SERPL-MCNC: 0.96 MG/DL (ref 0.5–1.4)
ERYTHROCYTE [DISTWIDTH] IN BLOOD BY AUTOMATED COUNT: 48.9 FL (ref 35.9–50)
GLUCOSE SERPL-MCNC: 96 MG/DL (ref 65–99)
HCT VFR BLD AUTO: 35.1 % (ref 42–52)
HGB BLD-MCNC: 11.3 G/DL (ref 14–18)
LV EJECT FRACT  99904: 65
MCH RBC QN AUTO: 27.2 PG (ref 27–33)
MCHC RBC AUTO-ENTMCNC: 32.2 G/DL (ref 33.7–35.3)
MCV RBC AUTO: 84.6 FL (ref 81.4–97.8)
PLATELET # BLD AUTO: 216 K/UL (ref 164–446)
PMV BLD AUTO: 9.8 FL (ref 9–12.9)
POTASSIUM SERPL-SCNC: 4.9 MMOL/L (ref 3.6–5.5)
RBC # BLD AUTO: 4.15 M/UL (ref 4.7–6.1)
SODIUM SERPL-SCNC: 133 MMOL/L (ref 135–145)
WBC # BLD AUTO: 7.2 K/UL (ref 4.8–10.8)

## 2020-05-26 PROCEDURE — 700102 HCHG RX REV CODE 250 W/ 637 OVERRIDE(OP): Performed by: STUDENT IN AN ORGANIZED HEALTH CARE EDUCATION/TRAINING PROGRAM

## 2020-05-26 PROCEDURE — 93325 DOPPLER ECHO COLOR FLOW MAPG: CPT | Mod: 26 | Performed by: INTERNAL MEDICINE

## 2020-05-26 PROCEDURE — 99233 SBSQ HOSP IP/OBS HIGH 50: CPT | Performed by: INTERNAL MEDICINE

## 2020-05-26 PROCEDURE — 85027 COMPLETE CBC AUTOMATED: CPT

## 2020-05-26 PROCEDURE — B24BZZ4 ULTRASONOGRAPHY OF HEART WITH AORTA, TRANSESOPHAGEAL: ICD-10-PCS | Performed by: INTERNAL MEDICINE

## 2020-05-26 PROCEDURE — 80048 BASIC METABOLIC PNL TOTAL CA: CPT

## 2020-05-26 PROCEDURE — 700105 HCHG RX REV CODE 258

## 2020-05-26 PROCEDURE — 700105 HCHG RX REV CODE 258: Performed by: ANESTHESIOLOGY

## 2020-05-26 PROCEDURE — 700105 HCHG RX REV CODE 258: Performed by: STUDENT IN AN ORGANIZED HEALTH CARE EDUCATION/TRAINING PROGRAM

## 2020-05-26 PROCEDURE — 700101 HCHG RX REV CODE 250: Performed by: ANESTHESIOLOGY

## 2020-05-26 PROCEDURE — 160002 HCHG RECOVERY MINUTES (STAT): Performed by: INTERNAL MEDICINE

## 2020-05-26 PROCEDURE — 36415 COLL VENOUS BLD VENIPUNCTURE: CPT

## 2020-05-26 PROCEDURE — 700111 HCHG RX REV CODE 636 W/ 250 OVERRIDE (IP): Performed by: ANESTHESIOLOGY

## 2020-05-26 PROCEDURE — 700111 HCHG RX REV CODE 636 W/ 250 OVERRIDE (IP): Performed by: STUDENT IN AN ORGANIZED HEALTH CARE EDUCATION/TRAINING PROGRAM

## 2020-05-26 PROCEDURE — A9270 NON-COVERED ITEM OR SERVICE: HCPCS | Performed by: STUDENT IN AN ORGANIZED HEALTH CARE EDUCATION/TRAINING PROGRAM

## 2020-05-26 PROCEDURE — 93320 DOPPLER ECHO COMPLETE: CPT | Mod: 26 | Performed by: INTERNAL MEDICINE

## 2020-05-26 PROCEDURE — 93312 ECHO TRANSESOPHAGEAL: CPT

## 2020-05-26 PROCEDURE — 770001 HCHG ROOM/CARE - MED/SURG/GYN PRIV*

## 2020-05-26 PROCEDURE — 93312 ECHO TRANSESOPHAGEAL: CPT | Mod: 26 | Performed by: INTERNAL MEDICINE

## 2020-05-26 RX ORDER — METOPROLOL TARTRATE 1 MG/ML
1 INJECTION, SOLUTION INTRAVENOUS
Status: DISCONTINUED | OUTPATIENT
Start: 2020-05-26 | End: 2020-05-26 | Stop reason: HOSPADM

## 2020-05-26 RX ORDER — MEPERIDINE HYDROCHLORIDE 25 MG/ML
12.5 INJECTION INTRAMUSCULAR; INTRAVENOUS; SUBCUTANEOUS
Status: DISCONTINUED | OUTPATIENT
Start: 2020-05-26 | End: 2020-05-26

## 2020-05-26 RX ORDER — PHENYLEPHRINE HYDROCHLORIDE 10 MG/ML
INJECTION, SOLUTION INTRAMUSCULAR; INTRAVENOUS; SUBCUTANEOUS PRN
Status: DISCONTINUED | OUTPATIENT
Start: 2020-05-26 | End: 2020-05-26 | Stop reason: SURG

## 2020-05-26 RX ORDER — HALOPERIDOL 5 MG/ML
1 INJECTION INTRAMUSCULAR
Status: DISCONTINUED | OUTPATIENT
Start: 2020-05-26 | End: 2020-05-26 | Stop reason: HOSPADM

## 2020-05-26 RX ORDER — ONDANSETRON 2 MG/ML
4 INJECTION INTRAMUSCULAR; INTRAVENOUS
Status: DISCONTINUED | OUTPATIENT
Start: 2020-05-26 | End: 2020-05-26 | Stop reason: HOSPADM

## 2020-05-26 RX ORDER — SODIUM CHLORIDE, SODIUM LACTATE, POTASSIUM CHLORIDE, CALCIUM CHLORIDE 600; 310; 30; 20 MG/100ML; MG/100ML; MG/100ML; MG/100ML
INJECTION, SOLUTION INTRAVENOUS CONTINUOUS
Status: DISCONTINUED | OUTPATIENT
Start: 2020-05-26 | End: 2020-05-26 | Stop reason: HOSPADM

## 2020-05-26 RX ORDER — MIDAZOLAM HYDROCHLORIDE 1 MG/ML
1 INJECTION INTRAMUSCULAR; INTRAVENOUS
Status: DISCONTINUED | OUTPATIENT
Start: 2020-05-26 | End: 2020-05-26 | Stop reason: HOSPADM

## 2020-05-26 RX ORDER — HALOPERIDOL 5 MG/ML
1 INJECTION INTRAMUSCULAR
Status: DISCONTINUED | OUTPATIENT
Start: 2020-05-26 | End: 2020-05-26

## 2020-05-26 RX ORDER — LABETALOL HYDROCHLORIDE 5 MG/ML
5 INJECTION, SOLUTION INTRAVENOUS
Status: DISCONTINUED | OUTPATIENT
Start: 2020-05-26 | End: 2020-05-26 | Stop reason: HOSPADM

## 2020-05-26 RX ORDER — LABETALOL HYDROCHLORIDE 5 MG/ML
5 INJECTION, SOLUTION INTRAVENOUS
Status: DISCONTINUED | OUTPATIENT
Start: 2020-05-26 | End: 2020-05-26

## 2020-05-26 RX ORDER — SODIUM CHLORIDE 9 MG/ML
INJECTION, SOLUTION INTRAVENOUS
Status: COMPLETED
Start: 2020-05-26 | End: 2020-05-26

## 2020-05-26 RX ORDER — DIPHENHYDRAMINE HYDROCHLORIDE 50 MG/ML
12.5 INJECTION INTRAMUSCULAR; INTRAVENOUS
Status: DISCONTINUED | OUTPATIENT
Start: 2020-05-26 | End: 2020-05-26 | Stop reason: HOSPADM

## 2020-05-26 RX ORDER — DIPHENHYDRAMINE HYDROCHLORIDE 50 MG/ML
12.5 INJECTION INTRAMUSCULAR; INTRAVENOUS
Status: DISCONTINUED | OUTPATIENT
Start: 2020-05-26 | End: 2020-05-26

## 2020-05-26 RX ORDER — ACETAMINOPHEN 500 MG
1000 TABLET ORAL EVERY 4 HOURS PRN
Status: DISCONTINUED | OUTPATIENT
Start: 2020-05-26 | End: 2020-05-26 | Stop reason: HOSPADM

## 2020-05-26 RX ORDER — MEPERIDINE HYDROCHLORIDE 25 MG/ML
12.5 INJECTION INTRAMUSCULAR; INTRAVENOUS; SUBCUTANEOUS
Status: DISCONTINUED | OUTPATIENT
Start: 2020-05-26 | End: 2020-05-26 | Stop reason: HOSPADM

## 2020-05-26 RX ORDER — SODIUM CHLORIDE, SODIUM LACTATE, POTASSIUM CHLORIDE, CALCIUM CHLORIDE 600; 310; 30; 20 MG/100ML; MG/100ML; MG/100ML; MG/100ML
INJECTION, SOLUTION INTRAVENOUS
Status: DISCONTINUED | OUTPATIENT
Start: 2020-05-26 | End: 2020-05-26 | Stop reason: SURG

## 2020-05-26 RX ORDER — OXYCODONE HCL 5 MG/5 ML
5 SOLUTION, ORAL ORAL
Status: DISCONTINUED | OUTPATIENT
Start: 2020-05-26 | End: 2020-05-26 | Stop reason: HOSPADM

## 2020-05-26 RX ORDER — OXYCODONE HCL 5 MG/5 ML
10 SOLUTION, ORAL ORAL
Status: DISCONTINUED | OUTPATIENT
Start: 2020-05-26 | End: 2020-05-26 | Stop reason: HOSPADM

## 2020-05-26 RX ORDER — METOPROLOL TARTRATE 1 MG/ML
1 INJECTION, SOLUTION INTRAVENOUS
Status: DISCONTINUED | OUTPATIENT
Start: 2020-05-26 | End: 2020-05-26

## 2020-05-26 RX ORDER — ONDANSETRON 2 MG/ML
4 INJECTION INTRAMUSCULAR; INTRAVENOUS
Status: DISCONTINUED | OUTPATIENT
Start: 2020-05-26 | End: 2020-05-26

## 2020-05-26 RX ADMIN — PROPOFOL 50 MG: 10 INJECTION, EMULSION INTRAVENOUS at 10:58

## 2020-05-26 RX ADMIN — LIDOCAINE HYDROCHLORIDE 100 MG: 20 INJECTION, SOLUTION INTRAVENOUS at 10:56

## 2020-05-26 RX ADMIN — PROPOFOL 100 MG: 10 INJECTION, EMULSION INTRAVENOUS at 11:05

## 2020-05-26 RX ADMIN — LISINOPRIL 5 MG: 5 TABLET ORAL at 05:59

## 2020-05-26 RX ADMIN — VANCOMYCIN HYDROCHLORIDE 1000 MG: 500 INJECTION, POWDER, LYOPHILIZED, FOR SOLUTION INTRAVENOUS at 15:02

## 2020-05-26 RX ADMIN — ENOXAPARIN SODIUM 30 MG: 30 INJECTION SUBCUTANEOUS at 15:02

## 2020-05-26 RX ADMIN — SODIUM CHLORIDE, POTASSIUM CHLORIDE, SODIUM LACTATE AND CALCIUM CHLORIDE: 600; 310; 30; 20 INJECTION, SOLUTION INTRAVENOUS at 10:39

## 2020-05-26 RX ADMIN — PROPOFOL 50 MG: 10 INJECTION, EMULSION INTRAVENOUS at 11:10

## 2020-05-26 RX ADMIN — CARVEDILOL 12.5 MG: 6.25 TABLET, FILM COATED ORAL at 18:05

## 2020-05-26 RX ADMIN — PROPOFOL 50 MG: 10 INJECTION, EMULSION INTRAVENOUS at 11:01

## 2020-05-26 RX ADMIN — PHENYLEPHRINE HYDROCHLORIDE 200 MCG: 10 INJECTION INTRAVENOUS at 11:12

## 2020-05-26 RX ADMIN — CARVEDILOL 12.5 MG: 6.25 TABLET, FILM COATED ORAL at 08:54

## 2020-05-26 RX ADMIN — SODIUM CHLORIDE 500 ML: 9 INJECTION, SOLUTION INTRAVENOUS at 05:59

## 2020-05-26 RX ADMIN — VANCOMYCIN HYDROCHLORIDE 1000 MG: 500 INJECTION, POWDER, LYOPHILIZED, FOR SOLUTION INTRAVENOUS at 03:24

## 2020-05-26 RX ADMIN — PROPOFOL 50 MG: 10 INJECTION, EMULSION INTRAVENOUS at 10:51

## 2020-05-26 RX ADMIN — PROPOFOL 50 MG: 10 INJECTION, EMULSION INTRAVENOUS at 10:54

## 2020-05-26 RX ADMIN — PROPOFOL 50 MG: 10 INJECTION, EMULSION INTRAVENOUS at 10:56

## 2020-05-26 ASSESSMENT — ENCOUNTER SYMPTOMS
DIZZINESS: 0
CHILLS: 0
SINUS PAIN: 0
SENSORY CHANGE: 0
MYALGIAS: 0
COUGH: 0
LOSS OF CONSCIOUSNESS: 0
NAUSEA: 0
HEMOPTYSIS: 0
PHOTOPHOBIA: 0
BACK PAIN: 0
SPUTUM PRODUCTION: 0
DIAPHORESIS: 0
SPEECH CHANGE: 0
ABDOMINAL PAIN: 0
CLAUDICATION: 0
DIARRHEA: 0
TREMORS: 0
VOMITING: 0
SHORTNESS OF BREATH: 0
FOCAL WEAKNESS: 0
WEAKNESS: 0
BLURRED VISION: 0
HEADACHES: 0
NECK PAIN: 0
HEARTBURN: 0
FALLS: 0
PALPITATIONS: 0
WHEEZING: 0
SEIZURES: 0
ORTHOPNEA: 0
NERVOUS/ANXIOUS: 0
DOUBLE VISION: 0
BLOOD IN STOOL: 0
FEVER: 0
SORE THROAT: 0
TINGLING: 0
DEPRESSION: 0
CONSTIPATION: 0
STRIDOR: 0
WEIGHT LOSS: 0

## 2020-05-26 ASSESSMENT — LIFESTYLE VARIABLES: SUBSTANCE_ABUSE: 1

## 2020-05-26 ASSESSMENT — PAIN SCALES - GENERAL: PAIN_LEVEL: 0

## 2020-05-26 NOTE — ANESTHESIA TIME REPORT
Anesthesia Start and Stop Event Times     Date Time Event    5/26/2020 1039 Anesthesia Start     1129 Anesthesia Stop        Responsible Staff  05/26/20    Name Role Begin End    Abhay De La Torre M.D. Anesth 1039 1129        Preop Diagnosis (Free Text):  Pre-op Diagnosis             Preop Diagnosis (Codes):    Post op Diagnosis  Endocarditis      Premium Reason  A. 3PM - 7AM    Comments:

## 2020-05-26 NOTE — ANESTHESIA PREPROCEDURE EVALUATION
Relevant Problems   No relevant active problems       Physical Exam    Anesthesia Plan    ASA 3   ASA physical status 3 criteria: CVA or TIA - history (> 3 months)    Plan - general             Induction: intravenous    Postoperative Plan: Postoperative administration of opioids is intended.    Pertinent diagnostic labs and testing reviewed    Informed Consent:    Anesthetic plan and risks discussed with patient.    Use of blood products discussed with: patient whom consented to blood products.

## 2020-05-26 NOTE — PROGRESS NOTES
Infectious Disease Progress Note    Date of admission  5/19/2020    Chief Complaint  Mr. Espinosa is a 64-year-old  male with a history of homelessness, drug abuse, chronic hepatitis C, and TAVR. He was previously seen by the ID service in the past for epididymitis, prostatitis and seminal abscess.  He underwent his TAVR on 3/18/2019 for severe symptomatic aortic stenosis.  Patient was then admitted in 10/2019 for abdominal pain and CT scan showed renal infarcts.  TTE was performed and revealed a vegetation on his prosthetic aortic valve and his blood cultures on 10/10/2019 were positive for Rothia mucilaginosa (penicillin sensitive).  Repeat blood cultures on 10/11/2019 were negative.  Patient was transferred to skilled nursing facility to complete a 6-week course of IV vancomycin and oral rifampin through plan stop date of 11/22/2019.  Per ID notes there was plans for JAYLYN following his IV antibiotic course and potential continuation of p.o. penicillin but it does not appear that the JAYLYN was performed and he has not continued on penicillin.        Hospital Course    In ED he c/o abdominal pain, nausea and lethargia. Found to have some rectal bleeding and due to history of abdominal aortic aneurysm and aortogram was ordered by ED, equivocal with possible ischemic bowel.  He seen by vascular surgery who felt that no intervention was indicated.  No specific evidence of endocarditis but due to this history he was started on antibiotics and echocardiogram was ordered.  TTE was done on 5/21 with finding of grade 1 diastolic dysfunction and thickened aortic valve leaflets with decreased TAVR leaflet excursion. Echodensity noted attached between the TAVR valve and the anterior mitral valve annulus measuring 1.1 x 1.6 cm.  The last echocardiogram we have is from 10/10/2019 prior to his antibiotic course at which point the vegetation on the aortic prosthetic  valve was 0.7 x 0.9 cm.  COVID-19 testing performed  on 5/19 and negative.       Interval Problem Update  Reviewed last 24 hour events:  Afebrile  SBP in 120-150s  HR in 60s  On room air  Pt to go to JAYLYN today    Review of Systems  Review of Systems   Constitutional: Negative for chills, fever and malaise/fatigue.   HENT: Negative for congestion and sinus pain.    Respiratory: Negative for cough, sputum production and shortness of breath.    Cardiovascular: Negative for chest pain, palpitations, orthopnea and leg swelling.   Gastrointestinal: Negative for abdominal pain, diarrhea, nausea and vomiting.   Genitourinary: Negative for dysuria and urgency.   Musculoskeletal: Negative for back pain, falls and joint pain.   Skin: Negative for rash.   Neurological: Negative for seizures, weakness and headaches.   Psychiatric/Behavioral: The patient is not nervous/anxious.    All other systems reviewed and are negative.       Vital Signs for last 24 hours   Temp:  [36.6 °C (97.8 °F)-37.7 °C (99.9 °F)] 36.9 °C (98.4 °F)  Pulse:  [61-83] 61  Resp:  [14-18] 17  BP: ()/(46-77) 121/70  SpO2:  [94 %-98 %] 96 %    Hemodynamic parameters for last 24 hours       Physical Exam   Physical Exam   Constitutional: He is oriented to person, place, and time. No distress.   HENT:   Head: Normocephalic and atraumatic.   Mouth/Throat: Oropharynx is clear and moist.   Eyes: Conjunctivae are normal. No scleral icterus.   Neck: Neck supple.   Cardiovascular: Normal rate, regular rhythm, normal heart sounds and intact distal pulses.   No murmur heard.  Pulmonary/Chest: Effort normal and breath sounds normal. No stridor. No respiratory distress. He has no wheezes. He has no rales.   Abdominal: Soft. Bowel sounds are normal. He exhibits no distension. There is no abdominal tenderness. There is no rebound and no guarding.   Musculoskeletal: Normal range of motion.         General: No deformity or edema.   Neurological: He is alert and oriented to person, place, and time.   Skin: Skin is warm. No  rash noted. He is not diaphoretic. No erythema.   Psychiatric: He has a normal mood and affect. His behavior is normal. Thought content normal.   Nursing note and vitals reviewed.      Medications  Current Facility-Administered Medications   Medication Dose Route Frequency Provider Last Rate Last Dose   • lisinopril (PRINIVIL) tablet 5 mg  5 mg Oral Q DAY Maura Vilchis M.D.   5 mg at 05/26/20 0559   • vancomycin (VANCOCIN) 1,000 mg in  mL IVPB  1,000 mg Intravenous Q12HR Clyde Ro M.D.   Stopped at 05/26/20 0524   • carvedilol (COREG) tablet 12.5 mg  12.5 mg Oral BID WITH MEALS Maura Vilchis M.D.   12.5 mg at 05/26/20 0854   • senna-docusate (PERICOLACE or SENOKOT S) 8.6-50 MG per tablet 2 Tab  2 Tab Oral BID Maxim Jones Jr., D.O.   2 Tab at 05/24/20 0504    And   • polyethylene glycol/lytes (MIRALAX) PACKET 1 Packet  1 Packet Oral QDAY PRN Maxim Jones Jr., D.O.        And   • magnesium hydroxide (MILK OF MAGNESIA) suspension 30 mL  30 mL Oral QDAY PRN Maxim Jones Jr., D.O.        And   • bisacodyl (DULCOLAX) suppository 10 mg  10 mg Rectal QDAY PRN Maxim Jones Jr., D.O.       • acetaminophen (TYLENOL) tablet 650 mg  650 mg Oral Q6HRS PRN Maxim Jones Jr., D.O.   650 mg at 05/21/20 2125   • labetalol (NORMODYNE/TRANDATE) injection 10 mg  10 mg Intravenous Q2HRS PRN Clyde Ro M.D.       • Respiratory Therapy Consult   Nebulization Continuous RT Clyde Ro M.D.       • MD Alert...Vancomycin per Pharmacy   Other PHARMACY TO DOSE Clyde Ro M.D.           Fluids    Intake/Output Summary (Last 24 hours) at 5/26/2020 1243  Last data filed at 5/26/2020 1128  Gross per 24 hour   Intake 1840 ml   Output 2350 ml   Net -510 ml       Laboratory          Recent Labs     05/24/20 0444 05/26/20 0827   SODIUM 132* 133*   POTASSIUM 4.2 4.9   CHLORIDE 101 101   CO2 21 26   BUN 19 18   CREATININE 0.83 0.96   CALCIUM 9.1 9.3     Recent Labs     05/24/20 0444 05/26/20 0827  "  GLUCOSE 88 96     Recent Labs     05/24/20  0444 05/26/20 0824   WBC 6.8 7.2   NEUTSPOLYS 53.80  --    LYMPHOCYTES 29.20  --    MONOCYTES 12.90  --    EOSINOPHILS 3.40  --    BASOPHILS 0.30  --      Recent Labs     05/24/20  0444 05/26/20 0824   RBC 4.12* 4.15*   HEMOGLOBIN 11.2* 11.3*   HEMATOCRIT 35.4* 35.1*   PLATELETCT 192 216       Pertinent Micro:  Results     Procedure Component Value Units Date/Time    BLOOD CULTURE x2 [[id number]] Collected:  05/19/20 2052    Order Status:  Completed Specimen:  Blood from Peripheral Updated:  05/24/20 2300     Significant Indicator NEG     Source BLD     Site PERIPHERAL     Culture Result No growth after 5 days of incubation.    Narrative:       Per Hospital Policy: Only change Specimen Src: to \"Line\" if  specified by physician order.  L    BLOOD CULTURE x2 [[id number]] Collected:  05/19/20 2052    Order Status:  Completed Specimen:  Blood from Peripheral Updated:  05/24/20 2300     Significant Indicator NEG     Source BLD     Site PERIPHERAL     Culture Result No growth after 5 days of incubation.    Narrative:       Per Hospital Policy: Only change Specimen Src: to \"Line\" if  specified by physician order.  R    SARS-CoV-2, PCR (In-House) [[id number]] Collected:  05/19/20 2119    Order Status:  Completed Updated:  05/19/20 2240     SARS-CoV-2 Source NP Swab     SARS-CoV-2 by PCR NotDetected     Comment: [facility] providers: PLEASE REFER TO DE-ESCALATION AND RETESTING PROTOCOL  on [email or web address]  **The [employer] GeneXpert Xpress SARS-CoV-2 Test has been made available for  use under the Emergency Use Authorization (EUA) only.         URINALYSIS,CULTURE IF INDICATED [[id number]] Collected:  05/19/20 2200    Order Status:  Completed Specimen:  Urine, Straight Cath Updated:  05/19/20 2223     Color Yellow     Character Clear     Ph 5.0     Glucose Negative mg/dL      Ketones Negative mg/dL      Protein Negative mg/dL      Bilirubin Negative     Urobilinogen, Urine 0.2     Nitrite " "Negative     Leukocyte Esterase Negative     Occult Blood Negative     Micro Urine Req see below     Comment: Microscopic examination not performed when specimen is clear  and chemically negative for protein, blood, leukocyte esterase  and nitrite.         Blood Culture,Hold [[id number]] Collected:  05/19/20 2113    Order Status:  Completed Updated:  05/19/20 2206     Blood Culture Hold Collected    COVID/SARS CoV-2 [[id number]] Collected:  05/19/20 2119    Order Status:  Completed Specimen:  Respirate from Nasopharyngeal Updated:  05/19/20 2146     COVID Order Status Received        Blood Culture Hold   Date Value Ref Range Status   05/19/2020 Collected  Final      Microbiology\"  5/19 BCx 2/2: negative x2  5/23 HIV 1/2 Ab negative    Imaging  X-Ray:  I have personally reviewed the images and compared with prior images.    Assessment  65yo AA male, homeless with   1. Severe AS s/p TAVR in 3/18/2019  2. Hx of prosthetic AV valve in 10/2019 with blood cultures positive for Rothia mucilaginosa (Penicillin sensitive). Negative blood cultures 10/11/2019   - Completed 6 week course of IV vancomycin and rifampin PO, for stop date 11/22/2019.    - It appeared that initial plan was to perform JAYLYN after IV antibiotic course and followed with PO penicillin, but this didn't seem to happen.   3. TTE 5/21 this admission showed thickened aortic valave leaflets with decreased TAVR leaflet excursion and echodensity noted 1.1x1.6cm. (which seems larger)   - CTS has been consulted and was deemed not surgical candidate.   4. Chronic AAA   - CTA 5/19 without evidence of dissection   5. hronic HCV  5. Hx of IVDA  6. Hx of epididymitis, prostatitis, seminal abscess    Plan:   Fevers, on admit- resolved   Mitral valve endocarditis   - TTE on 5/21 with echodensity noted attached between the TAVR valve and the anterior mitral valve annulus measuring 1.1 x 1.6 cm; margins not clearly defined.      TAVR and hx of prosthetic aortic valve " endocarditis  -Completed 6-week IV antibiotic course with vancomycin and rifampin on 11/2019 but no echocardiogram was done at the end of the course to assess if there was residual vegetation.       --- Difficult to ascertain if this is an ongoing/new infection or merely residual sterile vegetation from prior.  The vegetation has increased in size and is now involving the mitral which is concerning.  He did have fevers on admit but blood cultures are so far negative and for an infection to to be ongoing for ~ 6 months seems unlikely. It's possible this is a new infection and he does endorse recent drug use but denies any IV drug use.   --- JAYLYN today, pending result   --- Cardiology has discussed with CT surgery and they do not feel that he is a good surgical candidate given ongoing drug use     --- Follow-up blood cultures- NGTD   --- Will continue antibiotics for now with vancomycin.  After additional work-up will make decision regarding whether a second infective endocarditis course is warranted     Abdominal aortic aneurysm, chronic  -CTA on 5/19 with no evidence of dissection  COPD/emphysema  Drug abuse, endorses smoking amphetamines within a few days of admit, denies IV drug use  --- HIV screen - ordered   Chronic Hepatitis C    I have performed a physical exam and reviewed and updated ROS and Plan today (5/26/2020). In review of yesterday's note (5/25/2020), there are no changes except as documented above.     Discussed patient condition and risk of morbidity and/or mortality with Hospitalist, RN and Patient

## 2020-05-26 NOTE — ANESTHESIA QCDR
2019 [facility] Clinical Data Registry (for Quality Improvement)     Postoperative nausea/vomiting risk protocol (Adult = 18 yrs and Pediatric 3-17 yrs)- (430 and 463)  General inhalation anesthetic (NOT TIVA) with PONV risk factors: No  Provision of anti-emetic therapy with at least 2 different classes of agents: N/A  Patient DID NOT receive anti-emetic therapy and reason is documented in Medical Record: N/A    Multimodal Pain Management- (477)  Non-emergent surgery AND patient age >= 18: No  Use of Multimodal Pain Management, two or more drugs and/or interventions, NOT including systemic opioids:   Exception: Documented allergy to multiple classes of analgesics:     Smoking Abstinence (404)  Patient is current smoker (cigarette, pipe, e-cig, marijuanna): No  Elective Surgery:   Abstinence instructions provided prior to day of surgery:   Patient abstained from smoking on day of surgery:     Pre-Op Beta-Blocker in Isolated CABG (44)  Isolated CABG AND patient age >= 18: No  Beta-blocker admin within 24 hours of surgical incision:   Exception:of medical reason(s) for not administering beta blocker within 24 hours prior to surgical incision (e.g., not  indicated,other medical reason):     PACU assessment of acute postoperative pain prior to Anesthesia Care End- Applies to Patients Age = 18- (ABG7)  Initial PACU pain score is which of the following: < 7/10  Patient unable to report pain score: N/A    Post-anesthetic transfer of care checklist/protocol to PACU/ICU- (426 and 427)  Upon conclusion of case, patient transferred to which of the following locations: PACU/Non-ICU  Use of transfer checklist/protocol: Yes  Exclusion: Service Performed in Patient Hospital Room (and thus did not require transfer): N/A  Unplanned admission to ICU related to anesthesia service up through end of PACU care- (MD51)  Unplanned admission to ICU (not initially anticipated at anesthesia start time): No

## 2020-05-26 NOTE — PROGRESS NOTES
Daily Progress Note:     Date of Service: 5/26/2020  Primary Team: [facility] Green Team   Attending: ANSON Cotto.   Senior Resident:    Intern:   Contact:  [phone]    Chief Complaint:   Chief Complaint   Patient presents with   • Abdominal Pain         Subjective:  No acute events overnight .AO x 4  Patient states improvement of abdominal pain , 1/10.  Had JAYLYN done today to decide further treatment plan. As per the cardiologist ,  he either has a aortic root abscess or pseudo aneurysm of the root. Did not see actual vegetation on the valve but recommended to consider treatment as it is infectious valvular endocarditis. He also mentioned that he will touch base with the radiologist to review the CT thorax results . Appreciate recommendations.  Will continue IV Vancomycin for now. ID team on board. Appreciate rec's    Consultants/Specialty:  Vascular Surgery   Infectious disease     Review of Systems:    Review of Systems   Constitutional: Positive for malaise/fatigue. Negative for chills, diaphoresis, fever and weight loss.   HENT: Negative for congestion, ear pain, hearing loss, nosebleeds, sinus pain, sore throat and tinnitus.    Eyes: Negative for blurred vision, double vision and photophobia.   Respiratory: Negative for cough, hemoptysis, sputum production, shortness of breath, wheezing and stridor.    Cardiovascular: Negative for chest pain, palpitations, orthopnea, claudication and leg swelling.   Gastrointestinal: Negative for abdominal pain, blood in stool, constipation, diarrhea, heartburn, melena, nausea and vomiting.   Genitourinary: Negative for dysuria, frequency and urgency.   Musculoskeletal: Negative for back pain, falls, joint pain, myalgias and neck pain.   Skin: Negative for itching and rash.   Neurological: Negative for dizziness, tingling, tremors, sensory change, speech change, focal weakness, seizures, loss of consciousness, weakness and headaches.    Psychiatric/Behavioral: Positive for substance abuse. Negative for depression and suicidal ideas.       Objective Data:   Physical Exam:   Vitals:   Temp:  [36.6 °C (97.8 °F)-37.7 °C (99.9 °F)] 36.9 °C (98.4 °F)  Pulse:  [61-83] 61  Resp:  [14-18] 17  BP: ()/(46-77) 121/70  SpO2:  [94 %-98 %] 96 %    Physical Exam     Constitutional:   Thin, disheveled man, appears uncomfortable  HENT:   Head: Normocephalic and atraumatic.   Eyes: Pupils are equal, round, and reactive to light. EOM are normal. No scleral icterus.   Neck: Normal range of motion. Neck supple. No JVD present.   Cardiovascular: Normal rate, regular rhythm and intact distal pulses.   Murmur heard.  Systolic murmur   Pulmonary/Chest: Effort normal and breath sounds normal. No respiratory distress. He has no wheezes. He has no rales.   Abdominal: Soft. He exhibits no distension. mild discomfort in the lower abdomen   BS +. No guarding or rigidity   Musculoskeletal:         General: No edema.   Neurological: AO x 4 .No cranial nerve deficit.   Skin: Skin is warm and dry. He is not diaphoretic.     Labs:     Recent Labs     05/24/20 0444 05/26/20  0824   WBC 6.8 7.2   RBC 4.12* 4.15*   HEMOGLOBIN 11.2* 11.3*   HEMATOCRIT 35.4* 35.1*   MCV 85.9 84.6   MCH 27.2 27.2   RDW 49.8 48.9   PLATELETCT 192 216   MPV 9.9 9.8   NEUTSPOLYS 53.80  --    LYMPHOCYTES 29.20  --    MONOCYTES 12.90  --    EOSINOPHILS 3.40  --    BASOPHILS 0.30  --      Recent Labs     05/24/20 0444 05/26/20  0827   SODIUM 132* 133*   POTASSIUM 4.2 4.9   CHLORIDE 101 101   CO2 21 26   GLUCOSE 88 96   BUN 19 18     Recent Labs     05/24/20 0444 05/26/20  0827   CREATININE 0.83 0.96       Imaging:   EC-JAYLYN W/O CONT         EC-ECHOCARDIOGRAM COMPLETE W/O CONT   Final Result      CT-CTA COMPLETE THORACOABDOMINAL AORTA   Final Result      1.  Diffuse abdominal aortic ectasia measuring up to 3.3 cm in diameter, similar to prior exam.  No evidence for dissection.   2.  Diminished  enhancement along the ventral aspect of distal abdominal aorta may be admixture artifact or possibly thrombus.   3.  Inferior mesenteric artery is not well visualized which may be due to artifact or occlusion.   4.  Severe emphysema.   5.  Heterogeneous fatty infiltration of liver.   6.  Bilateral renal scarring.   7.  No bowel obstruction or pneumoperitoneum.                     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 Also appears that he has not had a follow up JAYLYN as planned by ID.   Plan:    Echo on 05/21/2020 - Showed increased size of the vegetations compared to previous echo with decreased TAVR leaflet excursion.   Blood cultures are negative till date   JAYLYN done on 05/26/2020 - showed possible aortic root abscess vs pseudo aneurysm  continue vancomycin for now. ID team on board,appreciate rec's.    Lactic acidosis  Assessment & Plan  Resolved with IV fluids     Lower abdominal pain  Assessment & Plan  Improving   Initially given history of vascular disease, lactic acidosis considered mesenteric ischemia/ischemia bowel likely, concern for septic emboli to bowel given endocarditis, though vascular surgery evaluation of pt and CTA did not see this as likely. AAA appears stable on CTA.   Plan:  continue to monitor H and H  Regular diet    Continue to monitor     Acute renal failure (ARF) (HCC)- (present on admission)  Assessment & Plan  Resolved with IV fluids       HTN (hypertension)- (present on admission)  Assessment & Plan  Unclear outpatient regimen and adherence. Hypertensive on presentation.  Control BP < 160 in setting of  AAA  PRN labetalol  Coreg 12.5 BID   Lisinopril 5 mg daily      AAA (abdominal aortic aneurysm) ([facility])- (present on admission)  Assessment & Plan  Stable on CT  BP control, SBP<160  Plan:   outpatient Vascular surgery follow up  for aneurysm Surveillance.      COPD (chronic obstructive pulmonary disease) ([facility])- (present on admission)  Assessment & Plan  Severe emphysema on CT  No current exacerbation  RT protocol    S/P TAVR (transcatheter aortic valve replacement)- (present on admission)  Assessment & Plan  Placed 3/18/2019 for severe aortic stenosis  Endocarditis 10/2019 with vegetation on prosthesis   No ASA/plavix due to high bleed risk per chart review  Echocardiogram showed increased size of the vegetations compared to previous echo in 10/10/19.  Plan:  JAYLYN on Tuesday   As per chart review ,Cardiology has discussed with CT surgery and they do not feel that he is a good surgical candidate given ongoing drug use        History of methamphetamine use- (present on admission)  Assessment & Plan  Denies recent use  History IVDU  UDS positive for methamphetamine   Plan:  Counseling provided to quit meth use  SW consult

## 2020-05-26 NOTE — ANESTHESIA POSTPROCEDURE EVALUATION
Patient: Sylvain Espinosa    Procedure Summary     Date:  05/26/20 Room / Location:  [facility] OR 19 / SURGERY [facility]; [facility] IMAGING - ECHOCARDIOLOGY [facility]    Anesthesia Start:  1039 Anesthesia Stop:  1129    Procedures:       EC-JAYLYN W/O CONT      ECHOCARDIOGRAM, TRANSESOPHAGEAL Diagnosis:                                                                                         Scheduled Providers:  Mitzi Lee M.D.; Abhay De La Torre M.D.; Qasim Love M.D. Responsible Provider:  Abhay De La Torre M.D.    Anesthesia Type:  general ASA Status:  3          Final Anesthesia Type: general  Last vitals  BP   Blood Pressure: 133/77    Temp   36.8 °C (98.3 °F)    Pulse   Pulse: 69   Resp   18    SpO2   96 %      Anesthesia Post Evaluation    Patient location during evaluation: PACU  Patient participation: complete - patient participated  Level of consciousness: awake and alert  Pain score: 0    Airway patency: patent  Anesthetic complications: no  Cardiovascular status: hemodynamically stable  Respiratory status: acceptable  Hydration status: euvolemic    PONV: none           Nurse Pain Score: 0 (NPRS)

## 2020-05-26 NOTE — CARE PLAN
Problem: Pain Management  Goal: Pain level will decrease to patient's comfort goal  Outcome: PROGRESSING AS EXPECTED   Pain is well controlled   Problem: Communication  Goal: The ability to communicate needs accurately and effectively will improve  Outcome: PROGRESSING AS EXPECTED   Participation in POC  Problem: Safety  Goal: Will remain free from injury  Outcome: PROGRESSING AS EXPECTED   Fall precautions in place

## 2020-05-26 NOTE — CARE PLAN
Problem: Safety  Goal: Will remain free from injury  Outcome: PROGRESSING AS EXPECTED  Goal: Will remain free from falls  Outcome: PROGRESSING AS EXPECTED   Updated about POC. Reinforce call light use. Pt acknowledged understanding     Problem: Infection  Goal: Will remain free from infection  Outcome: PROGRESSING AS EXPECTED  Iv abx continued      Problem: Mobility  Goal: Risk for activity intolerance will decrease  Outcome: PROGRESSING AS EXPECTED  Ambulating independently

## 2020-05-26 NOTE — PROGRESS NOTES
Assumed care at 1845. Pt resting in bed. A&ox 4  Ambulates independently  Tolerating diet.   JAYLYN in AM. NPO after midnight  O2 on RA  Pain controlled   Voiding in the urinal  No bm tonight  Call light within reach. Hourly rounding in place

## 2020-05-26 NOTE — ADDENDUM NOTE
Addendum  created 05/26/20 1240 by Abhay De La Torre M.D.    Clinical Note Signed, Order list changed, Order sets accessed, Review and Sign - Ready for Procedure

## 2020-05-26 NOTE — PROGRESS NOTES
"Pharmacy Kinetics 64 y.o. male on Vancomycin Day # 7  2020    Currently on Vancomycin 1,000 mg iv q12hr (0300, 1500)     Provider specified end date: MAREK ASCENCIO consulted, last note 20.      Indication for Treatment: Endocarditis     Pertinent history per medical record: Admitted on 2020 for Lower GI bleed. 64-y/o M, history of bacterial endocarditis, aortic stenosis status post TAVR on 3/18/2019 limb ischemia with infra-renal abdominal aortic aneurysm, with CTA of the thoracoabdominal aorta showing possible thrombus in the distal ventral abdominal aorta. Documented hx of IVDA.      Other antibiotics: None      Allergies: Patient has no known allergies.      Concerns for renal function include: Age, HF     Pertinent cultures to date:   20:PBCx2: NGTD     MRSA nares swab if pneumonia is a concern (ordered/positive/negative/n-a): N/A     Pertinent Imagin20: JAYLYN \"No vegetations seen. TAVR well seated. 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, less likely root abscess\"  20: ECHO \"Echodensity noted attached between the TAVR valve and the anterior mitral valve annulus measuring 1.1 x 1.6 cm; margins not clearly defined.\"    Recent Labs     20  0444 20  0824   WBC 6.8 7.2   NEUTSPOLYS 53.80  --      Recent Labs     20  0444 20  0827   BUN 19 18   CREATININE 0.83 0.96     Recent Labs     20  0939 20  1129   VANCOTROUGH 21.2* 21.2*       Intake/Output Summary (Last 24 hours) at 2020 1603  Last data filed at 2020 1128  Gross per 24 hour   Intake 1840 ml   Output 2225 ml   Net -385 ml      /70   Pulse 61   Temp 36.9 °C (98.4 °F) (Temporal)   Resp 17   Ht 1.702 m (5' 7\")   Wt 53.1 kg (117 lb 1 oz)   SpO2 96%  Temp (24hrs), Av.9 °C (98.5 °F), Min:36.7 °C (98.1 °F), Max:37.7 °C (99.9 °F)      A/P   1. Vancomycin dose change: No.   2. Next vancomycin level: Tomorrow 20 at " 0230  3. Goal trough: 16 - 20 mcg/mL   4. Comments: Latest Vancomycin trough level was above goal range as outlined above. Dose was reduced to 1,000 mg IV q12h. Plan for repeat level tomorrow morning.   5. SCr trending upward, will CTM. Afebrile, No leukocytosis, VS appears stabilized WNL.     Kimberly Boland, PharmD, BCPS

## 2020-05-26 NOTE — ANESTHESIA PREPROCEDURE EVALUATION
Relevant Problems   PULMONARY   (+) COPD (chronic obstructive pulmonary disease) (HCC)      NEURO   (+) CVA (cerebral vascular accident) (HCC)   (+) History of methamphetamine use      CARDIAC   (+) AAA (abdominal aortic aneurysm) (HCC)   (+) HTN (hypertension)   (+) Hemangioma of liver   (+) Ischemic leg   (+) Renal infarct (HCC)   (+) Severe aortic stenosis         (+) Acute renal failure (ARF) (HCC)   (+) Hemangioma of liver   (+) Hepatitis C       Physical Exam    Airway   Mallampati: II  TM distance: >3 FB  Neck ROM: full       Cardiovascular - normal exam  Rhythm: regular  Rate: normal  (-) murmur     Dental - normal exam           Pulmonary - normal exam  Breath sounds clear to auscultation     Abdominal    Neurological - normal exam                 Anesthesia Plan    ASA 3   ASA physical status 3 criteria: CVA or TIA - history (> 3 months)    Plan - general       Airway plan will be ETT                  Informed Consent:

## 2020-05-26 NOTE — DISCHARGE PLANNING
Anticipated Discharge Disposition: TBD    Action: Reviewed POC; not medically cleared. Pt received JAYLYN today.     Barriers to Discharge: Pending medical clearance; long term IV antibiotics plan pending.       Plan: Continue to follow and assist with discharge planning.

## 2020-05-26 NOTE — ANESTHESIA PREPROCEDURE EVALUATION
Relevant Problems   No relevant active problems       Physical Exam    Airway   Mallampati: II  TM distance: >3 FB  Neck ROM: full       Cardiovascular - normal exam  Rhythm: regular  Rate: normal  (-) murmur     Dental - normal exam           Pulmonary - normal exam  Breath sounds clear to auscultation     Abdominal    Neurological - normal exam         Other findings: trach            Anesthesia Plan

## 2020-05-27 LAB — VANCOMYCIN TROUGH SERPL-MCNC: 21.1 UG/ML (ref 10–20)

## 2020-05-27 PROCEDURE — 36415 COLL VENOUS BLD VENIPUNCTURE: CPT

## 2020-05-27 PROCEDURE — 99232 SBSQ HOSP IP/OBS MODERATE 35: CPT | Mod: GC | Performed by: INTERNAL MEDICINE

## 2020-05-27 PROCEDURE — 97530 THERAPEUTIC ACTIVITIES: CPT

## 2020-05-27 PROCEDURE — 700105 HCHG RX REV CODE 258

## 2020-05-27 PROCEDURE — 700111 HCHG RX REV CODE 636 W/ 250 OVERRIDE (IP): Performed by: STUDENT IN AN ORGANIZED HEALTH CARE EDUCATION/TRAINING PROGRAM

## 2020-05-27 PROCEDURE — 700102 HCHG RX REV CODE 250 W/ 637 OVERRIDE(OP): Performed by: STUDENT IN AN ORGANIZED HEALTH CARE EDUCATION/TRAINING PROGRAM

## 2020-05-27 PROCEDURE — 700105 HCHG RX REV CODE 258: Performed by: STUDENT IN AN ORGANIZED HEALTH CARE EDUCATION/TRAINING PROGRAM

## 2020-05-27 PROCEDURE — 97116 GAIT TRAINING THERAPY: CPT

## 2020-05-27 PROCEDURE — A9270 NON-COVERED ITEM OR SERVICE: HCPCS | Performed by: STUDENT IN AN ORGANIZED HEALTH CARE EDUCATION/TRAINING PROGRAM

## 2020-05-27 PROCEDURE — 700105 HCHG RX REV CODE 258: Performed by: INTERNAL MEDICINE

## 2020-05-27 PROCEDURE — 80202 ASSAY OF VANCOMYCIN: CPT

## 2020-05-27 PROCEDURE — 770001 HCHG ROOM/CARE - MED/SURG/GYN PRIV*

## 2020-05-27 PROCEDURE — 700111 HCHG RX REV CODE 636 W/ 250 OVERRIDE (IP): Performed by: INTERNAL MEDICINE

## 2020-05-27 RX ORDER — SODIUM CHLORIDE 9 MG/ML
INJECTION, SOLUTION INTRAVENOUS
Status: COMPLETED
Start: 2020-05-27 | End: 2020-05-27

## 2020-05-27 RX ADMIN — ENOXAPARIN SODIUM 30 MG: 30 INJECTION SUBCUTANEOUS at 05:46

## 2020-05-27 RX ADMIN — VANCOMYCIN HYDROCHLORIDE 800 MG: 500 INJECTION, POWDER, LYOPHILIZED, FOR SOLUTION INTRAVENOUS at 18:37

## 2020-05-27 RX ADMIN — CARVEDILOL 12.5 MG: 6.25 TABLET, FILM COATED ORAL at 18:37

## 2020-05-27 RX ADMIN — CARVEDILOL 12.5 MG: 6.25 TABLET, FILM COATED ORAL at 08:42

## 2020-05-27 RX ADMIN — LISINOPRIL 5 MG: 5 TABLET ORAL at 05:46

## 2020-05-27 RX ADMIN — VANCOMYCIN HYDROCHLORIDE 1000 MG: 500 INJECTION, POWDER, LYOPHILIZED, FOR SOLUTION INTRAVENOUS at 03:11

## 2020-05-27 RX ADMIN — SODIUM CHLORIDE 500 ML: 9 INJECTION, SOLUTION INTRAVENOUS at 18:41

## 2020-05-27 ASSESSMENT — ENCOUNTER SYMPTOMS
CONSTIPATION: 0
DIZZINESS: 0
NECK PAIN: 0
HEADACHES: 0
COUGH: 0
WEAKNESS: 0
PALPITATIONS: 0
DEPRESSION: 0
SORE THROAT: 0
SPUTUM PRODUCTION: 0
DIARRHEA: 0
NAUSEA: 0
DOUBLE VISION: 0
TINGLING: 0
WEIGHT LOSS: 0
FALLS: 0
SHORTNESS OF BREATH: 0
DIAPHORESIS: 0
SENSORY CHANGE: 0
FOCAL WEAKNESS: 0
LOSS OF CONSCIOUSNESS: 0
BACK PAIN: 0
STRIDOR: 0
FEVER: 0
SPEECH CHANGE: 0
PHOTOPHOBIA: 0
HEMOPTYSIS: 0
ORTHOPNEA: 0
SEIZURES: 0
WHEEZING: 0
SINUS PAIN: 0
CLAUDICATION: 0
MYALGIAS: 0
HEARTBURN: 0
CHILLS: 0
ABDOMINAL PAIN: 0
BLOOD IN STOOL: 0
TREMORS: 0
VOMITING: 0
BLURRED VISION: 0

## 2020-05-27 ASSESSMENT — LIFESTYLE VARIABLES: SUBSTANCE_ABUSE: 1

## 2020-05-27 ASSESSMENT — GAIT ASSESSMENTS
GAIT LEVEL OF ASSIST: MINIMAL ASSIST
ASSISTIVE DEVICE: OTHER (COMMENTS)
DISTANCE (FEET): 300
DEVIATION: DECREASED BASE OF SUPPORT;BRADYKINETIC

## 2020-05-27 ASSESSMENT — COGNITIVE AND FUNCTIONAL STATUS - GENERAL
MOBILITY SCORE: 24
SUGGESTED CMS G CODE MODIFIER MOBILITY: CH

## 2020-05-27 ASSESSMENT — FIBROSIS 4 INDEX
FIB4 SCORE: 1.27
FIB4 SCORE: 1.27

## 2020-05-27 NOTE — CARE PLAN
Problem: Communication  Goal: The ability to communicate needs accurately and effectively will improve  Outcome: PROGRESSING AS EXPECTED  Note: Pt updated on POC, all questions answered at this time      Problem: Safety  Goal: Will remain free from injury  Outcome: PROGRESSING AS EXPECTED  Note: Call light is within reach, treaded socks on, bed in lowest position, personal belongings are within reach, all needs met at this time

## 2020-05-27 NOTE — PROGRESS NOTES
Report received from RN, assumed care at 0700  Pt is A0X4, and responds appropriately   Pt declines any SOB, chest pain, new onset of numbness/ tingiling  Pt rates pain at 0/10, on a scale of 1-10, pt declines pain and pain medication needs at this time   Pt is voiding adequatly and without hesitancy  Pt has + flatus, + bowel sounds, + BM on 5/25/2020  Pt ambulates with a stand by assist   Pt is tolerating a regular diet, pt denies any nausea/vomiting        Plan of care discussed, all questions answered. Explained importance of calling before getting OOB and pt verbalizes understanding. Explained importance of oral care. Call light is within reach, treaded slipper socks on, bed in lowest/ locked position, hourly rounding in place, all needs met at this time

## 2020-05-27 NOTE — CARE PLAN
Problem: Safety  Goal: Will remain free from injury  Outcome: PROGRESSING AS EXPECTED  Goal: Will remain free from falls  Outcome: PROGRESSING AS EXPECTED   Updated about POC. Reinforce call light use. Pt acknowledged understanding    Problem: Infection  Goal: Will remain free from infection  Outcome: PROGRESSING AS EXPECTED  Iv abx continued     Problem: Mobility  Goal: Risk for activity intolerance will decrease  Outcome: PROGRESSING AS EXPECTED  Pt ambulates independently

## 2020-05-27 NOTE — PROGRESS NOTES
Daily Progress Note:     Date of Service: 5/27/2020  Primary Team: [facility]   Attending: JAMES Cotto   Senior Resident:    Intern:   Contact:  [phone]    Chief Complaint:   Chief Complaint   Patient presents with   • Abdominal Pain         Subjective:  No acute events overnight .AO x 4  Patient denies any acute complaints at present. Denies abdominal pain, nausea or vomiting. Last BM on 05/25/20.  JAYLYN was done . Awaiting formal note for the cardiology and further plan/ recommendations by Infectious disease team.Appreciate recommendations.  Will continue IV Vancomycin for now.    Consultants/Specialty:  Vascular Surgery   Infectious disease     Review of Systems:    Review of Systems   Constitutional: Positive for malaise/fatigue. Negative for chills, diaphoresis, fever and weight loss.   HENT: Negative for congestion, ear pain, hearing loss, nosebleeds, sinus pain, sore throat and tinnitus.    Eyes: Negative for blurred vision, double vision and photophobia.   Respiratory: Negative for cough, hemoptysis, sputum production, shortness of breath, wheezing and stridor.    Cardiovascular: Negative for chest pain, palpitations, orthopnea, claudication and leg swelling.   Gastrointestinal: Negative for abdominal pain, blood in stool, constipation, diarrhea, heartburn, melena, nausea and vomiting.   Genitourinary: Negative for dysuria, frequency and urgency.   Musculoskeletal: Negative for back pain, falls, joint pain, myalgias and neck pain.   Skin: Negative for itching and rash.   Neurological: Negative for dizziness, tingling, tremors, sensory change, speech change, focal weakness, seizures, loss of consciousness, weakness and headaches.   Psychiatric/Behavioral: Positive for substance abuse. Negative for depression and suicidal ideas.       Objective Data:   Physical Exam:   Vitals:   Temp:  [36.2 °C (97.1 °F)-37.7 °C (99.9 °F)] 36.2 °C (97.1 °F)  Pulse:  [53-84] 82  Resp:  [14-18]  15  BP: (116-147)/(61-83) 137/83  SpO2:  [95 %-100 %] 100 %    Physical Exam     Constitutional:   Thin, disheveled man, appears uncomfortable  HENT:   Head: Normocephalic and atraumatic.   Eyes: Pupils are equal, round, and reactive to light. EOM are normal. No scleral icterus.   Neck: Normal range of motion. Neck supple. No JVD present.   Cardiovascular: Normal rate, regular rhythm and intact distal pulses.   Murmur heard.  Systolic murmur   Pulmonary/Chest: Effort normal and breath sounds normal. No respiratory distress. He has no wheezes. He has no rales.   Abdominal: Soft. He exhibits no distension. mild discomfort in the lower abdomen   BS +. No guarding or rigidity   Musculoskeletal:         General: No edema.   Neurological: AO x 4 .No cranial nerve deficit.   Skin: Skin is warm and dry. He is not diaphoretic.     Labs:     Recent Labs     05/26/20  0824   WBC 7.2   RBC 4.15*   HEMOGLOBIN 11.3*   HEMATOCRIT 35.1*   MCV 84.6   MCH 27.2   RDW 48.9   PLATELETCT 216   MPV 9.8     Recent Labs     05/26/20  0827   SODIUM 133*   POTASSIUM 4.9   CHLORIDE 101   CO2 26   GLUCOSE 96   BUN 18     Recent Labs     05/26/20  0827   CREATININE 0.96       Imaging:   EC-JAYLYN W/O CONT   Final Result      EC-ECHOCARDIOGRAM COMPLETE W/O CONT   Final Result      CT-CTA COMPLETE THORACOABDOMINAL AORTA   Final Result      1.  Diffuse abdominal aortic ectasia measuring up to 3.3 cm in diameter, similar to prior exam.  No evidence for dissection.   2.  Diminished enhancement along the ventral aspect of distal abdominal aorta may be admixture artifact or possibly thrombus.   3.  Inferior mesenteric artery is not well visualized which may be due to artifact or occlusion.   4.  Severe emphysema.   5.  Heterogeneous fatty infiltration of liver.   6.  Bilateral renal scarring.   7.  No bowel obstruction or pneumoperitoneum.                     * 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)  Assessment & Plan  Diagnosed with endocarditis  in 10/2019, blood cx positive for Rothia mucilaginosa,  found to have vegetation on aortic valve (TAVR), appears to have received 6+ weeks of vancomycin and rifampin which was discontinued on 11/25/2019 and patient was to continue of PO penicillin for chronic suppression but reports that he has not been taking antibiotics. Also appears that he has not had a follow up JAYLYN as planned by ID.   Plan:    Echo on 05/21/2020 - Showed increased size of the vegetations compared to previous echo with decreased TAVR leaflet excursion.   Blood cultures are negative till date   JAYLYN done on 05/26/2020 - awaiting formal notes from the cardiology and furhter recommendations from ID.  continue vancomycin for now. ,appreciate rec's.    Lactic acidosis  Assessment & Plan  Resolved with IV fluids     Lower abdominal pain  Assessment & Plan  Improving   Initially given history of vascular disease, lactic acidosis considered mesenteric ischemia/ischemia bowel likely, concern for septic emboli to bowel given endocarditis, though vascular surgery evaluation of pt and CTA did not see this as likely. AAA appears stable on CTA.   Plan:  continue to monitor H and H  Regular diet    Continue to monitor     Acute renal failure (ARF) (HCC)- (present on admission)  Assessment & Plan  Resolved with IV fluids       HTN (hypertension)- (present on admission)  Assessment & Plan  Unclear outpatient regimen and adherence. Hypertensive on presentation.  Control BP < 160 in setting of AAA  PRN labetalol  Coreg 12.5 BID   Lisinopril 5 mg daily      AAA (abdominal aortic aneurysm) ([facility])- (present on admission)  Assessment & Plan  Stable on CT  BP control, SBP<160  Plan:   outpatient Vascular surgery follow up  for aneurysm Surveillance.      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  Plan:  JAYLYN on Tuesday   As per chart review ,Cardiology has discussed with CT surgery and they do not feel that he is a good surgical candidate given ongoing drug use        History of methamphetamine use- (present on admission)  Assessment & Plan  Denies recent use  History IVDU  UDS positive for methamphetamine   Plan:  Counseling provided to quit meth use  SW consult

## 2020-05-27 NOTE — THERAPY
Physical Therapy   Daily Treatment     Patient Name: Sylvain Espinosa  Age:  64 y.o., Sex:  male  Medical Record #: [id number]  Today's Date: 5/27/2020     Precautions: Fall Risk    Assessment    Patient demonstrates improved balance, improved pacing, and good functional mobility this visit.  Patient able to ambulate stairs safely.  Patient agreeable to ambulate with cane for longer distances, and no AD within home, as patient has used cane before.  Patient would benefit from continued ambulation with nursing staff, however at this time does not require skilled PT in acute care setting.  Patient ambulation intensity low enough where no standing breaks were required.  However, patient fatigued after session.      05/27/20 1200   Cognition    Cognition / Consciousness X   Speech/ Communication Delayed Responses   Level of Consciousness Alert   Comments cooperative   Passive ROM Lower Body   Passive ROM Lower Body WDL   Active ROM Lower Body    Active ROM Lower Body  WDL   Strength Lower Body   Lower Body Strength  WDL   Gross Strength Generalized Weakness, Equal Bilaterally   Sensation Lower Body   Lower Extremity Sensation   WDL   Lower Body Muscle Tone   Lower Body Muscle Tone  WDL   Balance   Sitting Balance (Static) Good   Sitting Balance (Dynamic) Good   Standing Balance (Static) Fair   Standing Balance (Dynamic) Fair -   Weight Shift Sitting Fair   Weight Shift Standing Fair   Skilled Intervention Verbal Cuing;Compensatory Strategies   Comments no AD   Gait Analysis   Gait Level Of Assist Minimal Assist   Assistive Device Other (Comments)   Distance (Feet) 300  (patient slow ambulator, no signs of fatigue, HR approx 100)   # of Times Distance was Traveled 1   Deviation Decreased Base Of Support;Bradykinetic   Weight Bearing Status fwb   Bed Mobility    Supine to Sit Supervised   Sit to Supine Supervised   Functional Mobility   Sit to Stand Supervised   How much difficulty does the patient currently have...    Turning over in bed (including adjusting bedclothes, sheets and blankets)? 4   Sitting down on and standing up from a chair with arms (e.g., wheelchair, bedside commode, etc.) 4   Moving from lying on back to sitting on the side of the bed? 4   How much help from another person does the patient currently need...   Moving to and from a bed to a chair (including a wheelchair)? 4   Need to walk in a hospital room? 4   Climbing 3-5 steps with a railing? 4   6 clicks Mobility Score 24   Short Term Goals    Short Term Goal # 1 Pt will be SPV for all transfers with LRAD in 6 txs to imporve functional mobility.   Goal Outcome # 1 Goal met   Short Term Goal # 2 Pt will be SPV for gait x 200' with LRAD in 6 txs to improve functional mobility.   Goal Outcome # 2 Goal met   Education Group   Education Provided Role of Physical Therapist   Role of Physical Therapist Patient Response Patient;Acceptance;Explanation;Action Demonstration     Plan    Discharge secondary to goals met.    Discharge recommendations:  Back to community, outpatient physical therapy for balance.

## 2020-05-27 NOTE — PROGRESS NOTES
"Pharmacy Kinetics      64 y.o. male on Vancomycin Day # 8             2020     Currently on Vancomycin 800 mg iv q12hr (0400, 1600)     Provider specified end date: MAREK ASCENCIO consulting. Current stop date applied 20.      Indication for Treatment: Endocarditis     Pertinent history per medical record: Admitted on 2020 for Lower GI bleed. 64-y/o M, history of bacterial endocarditis, aortic stenosis status post TAVR on 3/18/2019 limb ischemia with infra-renal abdominal aortic aneurysm, with CTA of the thoracoabdominal aorta showing possible thrombus in the distal ventral abdominal aorta. Documented hx of IVDA.      Other antibiotics: None      Allergies: Patient has no known allergies.      Concerns for renal function include: Age, HF     Pertinent cultures to date:   20:PBCx2: NGTD     MRSA nares swab if pneumonia is a concern (ordered/positive/negative/n-a): N/A     Pertinent Imagin20: JAYLYN \"No vegetations seen. TAVR well seated. 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, less likely root abscess\"  20: ECHO \"Echodensity noted attached between the TAVR valve and the anterior mitral valve annulus measuring 1.1 x 1.6 cm; margins not clearly defined.\"    Recent Labs     20  0824   WBC 7.2     Recent Labs     20  0827   BUN 18   CREATININE 0.96     Recent Labs     20  1129 20  0216   VANCOTROUGH 21.2* 21.1*       Intake/Output Summary (Last 24 hours) at 2020 1610  Last data filed at 2020 0910  Gross per 24 hour   Intake 1590 ml   Output 2000 ml   Net -410 ml      /83   Pulse 82   Temp 36.2 °C (97.1 °F) (Temporal)   Resp 15   Ht 1.702 m (5' 7\")   Wt 55.4 kg (122 lb 2.2 oz)   SpO2 100%  Temp (24hrs), Av.2 °C (99 °F), Min:36.2 °C (97.1 °F), Max:37.7 °C (99.9 °F)      A/P   1. Vancomycin dose change: Yes.   2. Next vancomycin level: ~ 2 days, pending continuation of therapy.    3. Goal " trough: 16 - 20 mcg/mL   4. Comments:   · Repeat Vancomycin trough level resulted this morning at 21.1 mcg/mL essentially unchanged from last trough level despite dose reduction.   · Reduced dosing regimen further from 1,000 mg IV q12h to 800 mg IV q12h.   · No new renal labs today, ordered BMP q48 hours to monitor while on continued Vancomycin therapy. VS remain stable. Tmax 99.9 F. Leukocytosis resolved since 5/21/20.     Kimberly Boland, PharmD, BCPS

## 2020-05-27 NOTE — DISCHARGE PLANNING
"Anticipated Disposition:  TBD    Action:   Per chart review, \"Awaiting formal note for the cardiology and further plan/ recommendations by Infectious disease team .Appreciate recommendations.  Will continue IV Vancomycin for now\"          Barriers to Discharge:   Medical clearance  Long term IV   Homelessness       Plan:  Please follow up with attending physician and ID to assist IV infusion outpatient.       "

## 2020-05-27 NOTE — PROGRESS NOTES
Pt is A+Ox4   Denies pain at this time     Tolerating a regular diet   Denies N/V/D     VSS   Oxygen saturations in the mid 90's on room air     Daily weights in place, call light within reach, bed locked and in the lowest position, all needs met at this time   Hourly rounding in place

## 2020-05-28 LAB
ANION GAP SERPL CALC-SCNC: 6 MMOL/L (ref 7–16)
BUN SERPL-MCNC: 19 MG/DL (ref 8–22)
CALCIUM SERPL-MCNC: 9.2 MG/DL (ref 8.5–10.5)
CHLORIDE SERPL-SCNC: 98 MMOL/L (ref 96–112)
CO2 SERPL-SCNC: 25 MMOL/L (ref 20–33)
CREAT SERPL-MCNC: 1.06 MG/DL (ref 0.5–1.4)
GLUCOSE SERPL-MCNC: 94 MG/DL (ref 65–99)
POTASSIUM SERPL-SCNC: 4.6 MMOL/L (ref 3.6–5.5)
SODIUM SERPL-SCNC: 129 MMOL/L (ref 135–145)

## 2020-05-28 PROCEDURE — 99233 SBSQ HOSP IP/OBS HIGH 50: CPT | Performed by: INTERNAL MEDICINE

## 2020-05-28 PROCEDURE — 700102 HCHG RX REV CODE 250 W/ 637 OVERRIDE(OP): Performed by: STUDENT IN AN ORGANIZED HEALTH CARE EDUCATION/TRAINING PROGRAM

## 2020-05-28 PROCEDURE — 770001 HCHG ROOM/CARE - MED/SURG/GYN PRIV*

## 2020-05-28 PROCEDURE — 99232 SBSQ HOSP IP/OBS MODERATE 35: CPT | Mod: GC | Performed by: INTERNAL MEDICINE

## 2020-05-28 PROCEDURE — 700111 HCHG RX REV CODE 636 W/ 250 OVERRIDE (IP): Performed by: STUDENT IN AN ORGANIZED HEALTH CARE EDUCATION/TRAINING PROGRAM

## 2020-05-28 PROCEDURE — 80048 BASIC METABOLIC PNL TOTAL CA: CPT

## 2020-05-28 PROCEDURE — A9270 NON-COVERED ITEM OR SERVICE: HCPCS | Performed by: STUDENT IN AN ORGANIZED HEALTH CARE EDUCATION/TRAINING PROGRAM

## 2020-05-28 PROCEDURE — 700111 HCHG RX REV CODE 636 W/ 250 OVERRIDE (IP): Performed by: INTERNAL MEDICINE

## 2020-05-28 PROCEDURE — 700105 HCHG RX REV CODE 258: Performed by: INTERNAL MEDICINE

## 2020-05-28 PROCEDURE — 36415 COLL VENOUS BLD VENIPUNCTURE: CPT

## 2020-05-28 RX ADMIN — CARVEDILOL 12.5 MG: 6.25 TABLET, FILM COATED ORAL at 09:00

## 2020-05-28 RX ADMIN — CARVEDILOL 12.5 MG: 6.25 TABLET, FILM COATED ORAL at 17:55

## 2020-05-28 RX ADMIN — LISINOPRIL 5 MG: 5 TABLET ORAL at 06:21

## 2020-05-28 RX ADMIN — ENOXAPARIN SODIUM 30 MG: 30 INJECTION SUBCUTANEOUS at 06:20

## 2020-05-28 RX ADMIN — VANCOMYCIN HYDROCHLORIDE 800 MG: 500 INJECTION, POWDER, LYOPHILIZED, FOR SOLUTION INTRAVENOUS at 04:43

## 2020-05-28 ASSESSMENT — ENCOUNTER SYMPTOMS
WEAKNESS: 0
BLOOD IN STOOL: 0
FALLS: 0
CLAUDICATION: 0
NERVOUS/ANXIOUS: 0
WEIGHT LOSS: 0
NAUSEA: 0
DEPRESSION: 0
NECK PAIN: 0
MYALGIAS: 0
SEIZURES: 0
PHOTOPHOBIA: 0
SHORTNESS OF BREATH: 0
CHILLS: 0
SINUS PAIN: 0
HEMOPTYSIS: 0
SPUTUM PRODUCTION: 0
HEARTBURN: 0
TREMORS: 0
DOUBLE VISION: 0
BACK PAIN: 0
SENSORY CHANGE: 0
FOCAL WEAKNESS: 0
VOMITING: 0
DIARRHEA: 0
BLURRED VISION: 0
DIZZINESS: 0
WHEEZING: 0
TINGLING: 0
LOSS OF CONSCIOUSNESS: 0
HEADACHES: 0
COUGH: 0
ORTHOPNEA: 0
PALPITATIONS: 0
DIAPHORESIS: 0
SPEECH CHANGE: 0
ABDOMINAL PAIN: 0
FEVER: 0
CONSTIPATION: 0
STRIDOR: 0
SORE THROAT: 0

## 2020-05-28 ASSESSMENT — LIFESTYLE VARIABLES: SUBSTANCE_ABUSE: 1

## 2020-05-28 NOTE — CARE PLAN
Problem: Pain Management  Goal: Pain level will decrease to patient's comfort goal  Outcome: PROGRESSING AS EXPECTED   Denies pain    Problem: Infection  Goal: Will remain free from infection  Outcome: PROGRESSING AS EXPECTED   ID following pt. Pt on IV vanco

## 2020-05-28 NOTE — PROGRESS NOTES
Received bedside report from HEBERT Narayan  Pt is AAOx4  MARINELLI  VSS  Denies pain  Denies SOB  -N/V; tolerating diet  +BS +Flatus Last BM 5/27  Pt on IV vanco; ID following pt.   Pt up self w/ steady gait.   POC discussed  Bed locked and in the lowest position  Call light w/in reach  Hourly rounding in place

## 2020-05-28 NOTE — PROGRESS NOTES
"Pharmacy Vancomycin Kinetics     Sylvain Espinosa is a 64 y.o. male on vancomycin day # 9 for an indication of endocarditis.    Anticipated Duration of Therapy:  TBD - ID consulted.  Further workup pending  Last Anticipated Day:  Currently through     Current dosing:  · Vancomycin 1500 mg IV loading dose on   · Vancomycin 800 mg IV every 12 hours  -   · Vancomycin 1000 mg IV every 12 hours  -   · Vancomycin 900 mg IV every 8 hours  -   · Vancomycin 700 mg IV every 8 hours  -   · Vancomycin 1000 mg IV every 12 hours  -   · Vancomycin 800 mg IV every 12 hours  - current    History of Present Illness:   Admitted on 2020 for Lower GI bleed.  Past medical history of bacterial endocarditis (Rothia mucilaginosa, PCN sensitive), aortic stenosis status post TAVR on 3/18/2019, limb ischemia with infra-renal abdominal aortic aneurysm, and possible thrombus in the distal ventral abdominal aorta on CTA.  Documented history of ongoing IVDA.  Infectious Diseases following.  Pertinent past medical history per chart.    Antimicrobial history for admission:   · Piperacillin/tazobactam   · Vancomycin  - current    Allergies: Patient has no known allergies.     Concerns for renal function: No significant concerns    Pertinent cultures to date:   ·  Blood, negative    MRSA nares swab ordered: N/A    Recent Labs     20  0824   WBC 7.2     Recent Labs     20  0827 20  0403   BUN 18 19   CREATININE 0.96 1.06     Recent Labs     20  0216   VANCOTROUGH 21.1*       Intake/Output Summary (Last 24 hours) at 2020 1311  Last data filed at 2020 0710  Gross per 24 hour   Intake 980 ml   Output 1400 ml   Net -420 ml      /61   Pulse 62   Temp 36.9 °C (98.5 °F) (Temporal)   Resp 16   Ht 1.702 m (5' 7\")   Wt 56.3 kg (124 lb 1.9 oz)   SpO2 95%  Temp (24hrs), Av.7 °C (98 °F), Min:36.4 °C (97.5 °F), Max:36.9 °C (98.5 " °F)      Assessment and Plan:  1. Dosing regimen and changes: No change  2. Next vancomycin level: 5/29 at 0330, ordered  3. Goal trough: 16-20 mcg/mL for indication of endocarditis  4. Comments and plan: Dosing decreased yesterday for slightly elevated trough level.  Dosing appears appropriate at this time.  Plan to reassess trough level before morning dose tomorrow.  Urine output > 1 mL/kg/hr.  Renal indices relatively stable.  ID following, will follow for recs.  Pharmacy to follow daily during therapy.    Thank you!  Yajaira Bee, PharmD, BCCCP

## 2020-05-28 NOTE — PROGRESS NOTES
Infectious Disease Progress Note    Date of admission  5/19/2020    Chief Complaint  Mr. Espinosa is a 64-year-old  male with a history of homelessness, drug abuse, chronic hepatitis C, and TAVR. He was previously seen by the ID service in the past for epididymitis, prostatitis and seminal abscess.  He underwent his TAVR on 3/18/2019 for severe symptomatic aortic stenosis.  Patient was then admitted in 10/2019 for abdominal pain and CT scan showed renal infarcts.  TTE was performed and revealed a vegetation on his prosthetic aortic valve and his blood cultures on 10/10/2019 were positive for Rothia mucilaginosa (penicillin sensitive).  Repeat blood cultures on 10/11/2019 were negative.  Patient was transferred to skilled nursing facility to complete a 6-week course of IV vancomycin and oral rifampin through plan stop date of 11/22/2019.  Per ID notes there was plans for JAYLYN following his IV antibiotic course and potential continuation of p.o. penicillin but it does not appear that the JAYLYN was performed and he has not continued on penicillin.        Hospital Course    In ED he c/o abdominal pain, nausea and lethargia. Found to have some rectal bleeding and due to history of abdominal aortic aneurysm and aortogram was ordered by ED, equivocal with possible ischemic bowel.  He seen by vascular surgery who felt that no intervention was indicated.  No specific evidence of endocarditis but due to this history he was started on antibiotics and echocardiogram was ordered.  TTE was done on 5/21 with finding of grade 1 diastolic dysfunction and thickened aortic valve leaflets with decreased TAVR leaflet excursion. Echodensity noted attached between the TAVR valve and the anterior mitral valve annulus measuring 1.1 x 1.6 cm.  The last echocardiogram we have is from 10/10/2019 prior to his antibiotic course at which point the vegetation on the aortic prosthetic  valve was 0.7 x 0.9 cm.  COVID-19 testing performed  on 5/19 and negative.       Interval Problem Update  Reviewed last 24 hour events:  Had JAYLYN yesterday that showed more of aortic root pseudoaneurysm vs aneursym present. Not abscess and no vegetation noted.   Pt is afebrile, hemodynamically stable  On room air, sat >94%, -120s.   Creatinine 1.06.     Review of Systems  Review of Systems   Constitutional: Negative for chills, fever and malaise/fatigue.   HENT: Negative for congestion and sinus pain.    Respiratory: Negative for cough, sputum production and shortness of breath.    Cardiovascular: Negative for chest pain, palpitations, orthopnea and leg swelling.   Gastrointestinal: Negative for abdominal pain, diarrhea, nausea and vomiting.   Genitourinary: Negative for dysuria and urgency.   Musculoskeletal: Negative for back pain, falls and joint pain.   Skin: Negative for rash.   Neurological: Negative for seizures, weakness and headaches.   Psychiatric/Behavioral: The patient is not nervous/anxious.    All other systems reviewed and are negative.       Vital Signs for last 24 hours   Temp:  [36.4 °C (97.5 °F)-36.9 °C (98.5 °F)] 36.9 °C (98.5 °F)  Pulse:  [58-83] 62  Resp:  [16-18] 16  BP: (117-122)/(53-62) 117/61  SpO2:  [92 %-100 %] 95 %    Hemodynamic parameters for last 24 hours       Physical Exam   Physical Exam   Constitutional: He is oriented to person, place, and time. No distress.   HENT:   Head: Normocephalic and atraumatic.   Mouth/Throat: Oropharynx is clear and moist.   Eyes: Conjunctivae are normal. No scleral icterus.   Neck: Neck supple.   Cardiovascular: Normal rate, regular rhythm, normal heart sounds and intact distal pulses.   No murmur heard.  Pulmonary/Chest: Effort normal and breath sounds normal. No stridor. No respiratory distress. He has no wheezes. He has no rales.   Abdominal: Soft. Bowel sounds are normal. He exhibits no distension. There is no abdominal tenderness. There is no rebound and no guarding.   Musculoskeletal: Normal  range of motion.         General: No deformity or edema.   Neurological: He is alert and oriented to person, place, and time.   Skin: Skin is warm. No rash noted. He is not diaphoretic. No erythema.   Psychiatric: He has a normal mood and affect. His behavior is normal. Thought content normal.   Nursing note and vitals reviewed.      Medications  Current Facility-Administered Medications   Medication Dose Route Frequency Provider Last Rate Last Dose   • vancomycin (VANCOCIN) 800 mg in  mL IVPB  800 mg Intravenous Q12HR Domingo Bates M.D.   Stopped at 05/28/20 0643   • enoxaparin (LOVENOX) inj 30 mg  30 mg Subcutaneous DAILY Maura Vilchis M.D.   30 mg at 05/28/20 0620   • lisinopril (PRINIVIL) tablet 5 mg  5 mg Oral Q DAY Maura Vilchis M.D.   5 mg at 05/28/20 0621   • carvedilol (COREG) tablet 12.5 mg  12.5 mg Oral BID WITH MEALS Maura Vilchis M.D.   12.5 mg at 05/28/20 0900   • senna-docusate (PERICOLACE or SENOKOT S) 8.6-50 MG per tablet 2 Tab  2 Tab Oral BID Maxim Jones Jr., D.O.   2 Tab at 05/24/20 0504    And   • polyethylene glycol/lytes (MIRALAX) PACKET 1 Packet  1 Packet Oral QDAY PRN Maxim Jones Jr., D.O.        And   • magnesium hydroxide (MILK OF MAGNESIA) suspension 30 mL  30 mL Oral QDAY PRN Maxim Jones Jr., D.O.        And   • bisacodyl (DULCOLAX) suppository 10 mg  10 mg Rectal QDAY PRN Maxim Jones Jr., D.O.       • acetaminophen (TYLENOL) tablet 650 mg  650 mg Oral Q6HRS PRN Maxim Jones Jr., D.O.   650 mg at 05/21/20 2125   • labetalol (NORMODYNE/TRANDATE) injection 10 mg  10 mg Intravenous Q2HRS PRN Clyde Ro M.D.       • Respiratory Therapy Consult   Nebulization Continuous RT Clyde Ro M.D.       • MD Alert...Vancomycin per Pharmacy   Other PHARMACY TO DOSE Clyde Ro M.D.           Fluids    Intake/Output Summary (Last 24 hours) at 5/28/2020 1415  Last data filed at 5/28/2020 1140  Gross per 24 hour   Intake 1100 ml   Output 2050 ml   Net -950 ml  "      Laboratory          Recent Labs     05/26/20  0827 05/28/20  0403   SODIUM 133* 129*   POTASSIUM 4.9 4.6   CHLORIDE 101 98   CO2 26 25   BUN 18 19   CREATININE 0.96 1.06   CALCIUM 9.3 9.2     Recent Labs     05/26/20  0827 05/28/20  0403   GLUCOSE 96 94     Recent Labs     05/26/20  0824   WBC 7.2     Recent Labs     05/26/20  0824   RBC 4.15*   HEMOGLOBIN 11.3*   HEMATOCRIT 35.1*   PLATELETCT 216       Pertinent Micro:  Results     Procedure Component Value Units Date/Time    BLOOD CULTURE x2 [[id number]] Collected:  05/19/20 2052    Order Status:  Completed Specimen:  Blood from Peripheral Updated:  05/24/20 2300     Significant Indicator NEG     Source BLD     Site PERIPHERAL     Culture Result No growth after 5 days of incubation.    Narrative:       Per Hospital Policy: Only change Specimen Src: to \"Line\" if  specified by physician order.  L    BLOOD CULTURE x2 [[id number]] Collected:  05/19/20 2052    Order Status:  Completed Specimen:  Blood from Peripheral Updated:  05/24/20 2300     Significant Indicator NEG     Source BLD     Site PERIPHERAL     Culture Result No growth after 5 days of incubation.    Narrative:       Per Hospital Policy: Only change Specimen Src: to \"Line\" if  specified by physician order.  R        Blood Culture Hold   Date Value Ref Range Status   05/19/2020 Collected  Final      Microbiology\"  5/19 BCx 2/2: negative x2  5/23 HIV 1/2 Ab negative    Imaging  X-Ray:  I have personally reviewed the images and compared with prior images.    Assessment  65yo AA male, homeless with   1. Severe AS s/p TAVR in 3/18/2019  2. Hx of prosthetic AV valve in 10/2019 with blood cultures positive for Rothia mucilaginosa (Penicillin sensitive). Negative blood cultures 10/11/2019   - Completed 6 week course of IV vancomycin and rifampin PO, for stop date 11/22/2019.    - It appeared that initial plan was to perform JAYLYN after IV antibiotic course and followed with PO penicillin, but this didn't seem to " happen.   3. TTE 5/21 this admission showed thickened aortic valave leaflets with decreased TAVR leaflet excursion and echodensity noted 1.1x1.6cm. (which seems larger)   - CTS has been consulted and was deemed not surgical candidate.   4. Chronic AAA   - CTA 5/19 without evidence of dissection   5. hronic HCV  5. Hx of IVDA  6. Hx of epididymitis, prostatitis, seminal abscess    Plan:   Mitral valve endocarditis   TAVR and hx of prosthetic aortic valve endocarditis  - TTE on 5/21 with echodensity noted attached between the TAVR valve and the anterior mitral valve annulus measuring 1.1 x 1.6 cm; margins not clearly defined.  - JAYLYN on 5/27 did not show evidence of vegetation or abscess. The echodensity noted is concerning more of aortic root pseudoaneurysm.   - I discussed with cardiology Dr. Lee regarding the JAYLYN finding and confirmed the absence of vegetations/abscess.   - Pt is unfortunately not a surgical candidate at this time.   - From ID standpoint, he has completed 6 weeks therapy with vancomycin and rifampin in 11/2019. No further antibiotics needed  - He's currently on vancomycin and this can be discontinued.      Abdominal aortic aneurysm, chronic  -CTA on 5/19 with no evidence of dissection  COPD/emphysema  Drug abuse, endorses smoking amphetamines within a few days of admit, denies IV drug use  --- HIV screen - ordered   Chronic Hepatitis C    I have performed a physical exam and reviewed and updated ROS and Plan today (5/28/2020). In review of yesterday's note (5/27/2020), there are no changes except as documented above.     Discussed patient condition and risk of morbidity and/or mortality with Hospitalist, RN and Patient  D/w [facility] Team regarding my recommendation.     Will sign off, please call with questions

## 2020-05-28 NOTE — CARE PLAN
Problem: Pain Management  Goal: Pain level will decrease to patient's comfort goal  Outcome: PROGRESSING AS EXPECTED  Note: Pt declines PRN pain interventions.      Problem: Venous Thromboembolism (VTW)/Deep Vein Thrombosis (DVT) Prevention:  Goal: Patient will participate in Venous Thrombosis (VTE)/Deep Vein Thrombosis (DVT)Prevention Measures  Outcome: PROGRESSING AS EXPECTED  Flowsheets (Taken 5/27/2020 2000)  Pharmacologic Prophylaxis Used: LMWH: Enoxaparin(Lovenox)

## 2020-05-28 NOTE — PROGRESS NOTES
AMOR Mancilla outbound TC to In-patient  [place] 5/28/20 regarding pt.  KYLEW emailed  CCM brochure to provided to pt.     Plan:  AMOR Mancilla will monitot chart for d/c plans.

## 2020-05-28 NOTE — PROGRESS NOTES
"Pt A&O x4.    Vitals: /57   Pulse 83   Temp 36.6 °C (97.9 °F) (Temporal)   Resp 18   Ht 1.702 m (5' 7\")   Wt 56.3 kg (124 lb 1.9 oz)   SpO2 92%   BMI 19.44 kg/m²     Declines pain interventions at this time.     Neuro: MARINELLI. Denies new onset of numbness/ tingling.    Cardiac: Denies new onset of chest pain.    Vascular: Pulses 2+ BUE, BLE. No edema noted.    Respiratory: Lungs sound diminished in bases. Pulling 1250 on IS, weak effort. On RA. Denies SOB.    GI: + BM today. On regular diet, tolerating well. - nausea/ vomiting.    : Pt voiding adequately.      MSK: Pt up to bathroom self, no assistance required, tolerating well. Generalized stiffness.     Integumentary: Intact. Sacrum pink/ red, blanching.     Labs noted.    Fall precautions in place: Bed locked in lowest position, Upper bed rails up, treaded socks in place, personal belongings within reach, call light within reach, appropriate mobility signs in place, - bed alarm. Pt calls appropriately.     Pt updated on POC.   "

## 2020-05-29 ENCOUNTER — PATIENT OUTREACH (OUTPATIENT)
Dept: HEALTH INFORMATION MANAGEMENT | Facility: OTHER | Age: 65
End: 2020-05-29

## 2020-05-29 VITALS
RESPIRATION RATE: 18 BRPM | TEMPERATURE: 98.9 F | HEIGHT: 67 IN | HEART RATE: 60 BPM | WEIGHT: 124.12 LBS | OXYGEN SATURATION: 95 % | SYSTOLIC BLOOD PRESSURE: 125 MMHG | BODY MASS INDEX: 19.48 KG/M2 | DIASTOLIC BLOOD PRESSURE: 62 MMHG

## 2020-05-29 PROCEDURE — A9270 NON-COVERED ITEM OR SERVICE: HCPCS | Performed by: STUDENT IN AN ORGANIZED HEALTH CARE EDUCATION/TRAINING PROGRAM

## 2020-05-29 PROCEDURE — 700105 HCHG RX REV CODE 258: Performed by: STUDENT IN AN ORGANIZED HEALTH CARE EDUCATION/TRAINING PROGRAM

## 2020-05-29 PROCEDURE — 700102 HCHG RX REV CODE 250 W/ 637 OVERRIDE(OP): Performed by: STUDENT IN AN ORGANIZED HEALTH CARE EDUCATION/TRAINING PROGRAM

## 2020-05-29 RX ORDER — ATORVASTATIN CALCIUM 40 MG/1
40 TABLET, FILM COATED ORAL DAILY
Qty: 30 TAB | Refills: 0 | Status: SHIPPED | OUTPATIENT
Start: 2020-05-29

## 2020-05-29 RX ORDER — SODIUM CHLORIDE 9 MG/ML
INJECTION, SOLUTION INTRAVENOUS CONTINUOUS
Status: DISCONTINUED | OUTPATIENT
Start: 2020-05-29 | End: 2020-05-29 | Stop reason: HOSPADM

## 2020-05-29 RX ORDER — LISINOPRIL 5 MG/1
5 TABLET ORAL DAILY
Qty: 30 TAB | Refills: 0 | Status: SHIPPED | OUTPATIENT
Start: 2020-05-30 | End: 2020-10-15

## 2020-05-29 RX ORDER — CARVEDILOL 12.5 MG/1
12.5 TABLET ORAL 2 TIMES DAILY WITH MEALS
Qty: 60 TAB | Refills: 0 | Status: SHIPPED | OUTPATIENT
Start: 2020-05-29

## 2020-05-29 RX ORDER — ACETAMINOPHEN 325 MG/1
650 TABLET ORAL EVERY 6 HOURS PRN
Qty: 30 TAB | Refills: 0 | Status: SHIPPED | OUTPATIENT
Start: 2020-05-29

## 2020-05-29 RX ADMIN — LISINOPRIL 5 MG: 5 TABLET ORAL at 06:41

## 2020-05-29 RX ADMIN — SODIUM CHLORIDE: 9 INJECTION, SOLUTION INTRAVENOUS at 09:37

## 2020-05-29 RX ADMIN — CARVEDILOL 12.5 MG: 6.25 TABLET, FILM COATED ORAL at 09:37

## 2020-05-29 RX ADMIN — CARVEDILOL 12.5 MG: 6.25 TABLET, FILM COATED ORAL at 17:21

## 2020-05-29 NOTE — DISCHARGE PLANNING
Meds-to-Beds: Discharge prescription orders listed below delivered to patient's bedside and given to HEBERT Arvizu who locked in patient drawer. Patient counseled.  Patient stated that he has home medications in pharmacy - meds not noted on initial med rec. Per HEBERT Arvizu, patient had previously been taking lisinopril 40 mg, metoprolol tartrate 25 mg BID,  Atorvastatin 40 mg, amlodipine 10 mg, furosemide 20 mg, and potassium chloride 10 MEQ. Discussed with MD. Patient to stop previous home medications and take the following medications that were delivered:      Sylvain Espinosa   Home Medication Instructions JESSY:[id number]    Printed on:05/29/20 6151   Medication Information                      acetaminophen (TYLENOL) 325 MG Tab  Take 2 Tabs by mouth every 6 hours as needed (Mild Pain; (Pain scale 1-3); Temp greater than 100.5 F).             carvedilol (COREG) 12.5 MG Tab  Take 1 Tab by mouth 2 times a day, with meals.             lisinopril (PRINIVIL) 5 MG Tab  Take 1 Tab by mouth every day.               Additional order delivered to patient's bedside and given to HEBERT Schroeder. Provided 2 weekly pill containers for assistance with medication adherence. Instructed patient to stop previous home medications until follow-up with PCP.     atorvastatin (LIPITOR) 40 MG Tab  Take 1 Tab by mouth every day.               Lila Duff, DianaD

## 2020-05-29 NOTE — CARE PLAN
Problem: Pain Management  Goal: Pain level will decrease to patient's comfort goal  Outcome: PROGRESSING AS EXPECTED  Note: Pt denies having any pain. No interventions needed at this time.      Problem: Infection  Goal: Will remain free from infection  Outcome: PROGRESSING AS EXPECTED  Note: Antibiotic therapy has been discontinued.      Problem: Venous Thromboembolism (VTW)/Deep Vein Thrombosis (DVT) Prevention:  Goal: Patient will participate in Venous Thrombosis (VTE)/Deep Vein Thrombosis (DVT)Prevention Measures  Outcome: PROGRESSING AS EXPECTED  Flowsheets (Taken 5/28/2020 2000)  Pharmacologic Prophylaxis Used: LMWH: Enoxaparin(Lovenox)     Problem: Bowel/Gastric:  Goal: Normal bowel function is maintained or improved  Outcome: PROGRESSING AS EXPECTED  Flowsheets (Taken 5/28/2020 2000)  Last BM: 05/28/20

## 2020-05-29 NOTE — DISCHARGE PLANNING
Anticipated Discharge Disposition: Home.    Action: Yesterday, Aletha Horner with [place] [phone] informed LSW, this patient has an apartment and in order to keep the apartment while he is in the hospital, the patient has to yobani all the intake forms.      This LSW facilitated the signature of the documents and provided to documents to Aletha.      Today, MD informed LSW, this patient is medically cleared for discharge home. LSW called Aletha to verified the apartment availability for today.    Barriers to Discharge: None.    Plan: As Above.

## 2020-05-29 NOTE — PROGRESS NOTES
Bedside report received. Assessment completed.  Pt is A&O x4. Pt on room air.   Denies pain  Denies nausea.   - numbness, - tingling.  Last BM 5/29. +flatus, +void.  Regular diet. Tolerates well.   Pt up self. Tolerates well.   Call light within reach. All needs met at this time. Fall Precautions and hourly rounding in place.

## 2020-05-29 NOTE — CARE PLAN
Problem: Knowledge Deficit  Goal: Knowledge of disease process/condition, treatment plan, diagnostic tests, and medications will improve  Outcome: PROGRESSING AS EXPECTED  Note: Pt updated on plan of care, educated about medications and labs     Problem: Mobility  Goal: Risk for activity intolerance will decrease  Outcome: PROGRESSING AS EXPECTED  Note: Pt encouraged to ambulate, pt w steady gait

## 2020-05-29 NOTE — PROGRESS NOTES
"Pt A&O x4.    Vitals: /56   Pulse 77   Temp 37.4 °C (99.3 °F) (Temporal)   Resp 18   Ht 1.702 m (5' 7\")   Wt 56.3 kg (124 lb 1.9 oz)   SpO2 94%   BMI 19.44 kg/m²     Declines pain interventions at this time.     Neuro: MARINELLI. Denies new onset of numbness/ tingling.    Cardiac: Denies new onset of chest pain.    Vascular: Pulses 2+ BUE, BLE. No edema noted.    Respiratory: Lungs sound diminished in bases. Pulling 1250 on IS, weak effort. On RA. Denies SOB.    GI: + BM today. On regular diet, tolerating well. - nausea/ vomiting.    : Pt voiding adequately.      MSK: Pt up to bathroom self, no assistance required, tolerating well. Generalized stiffness.     Integumentary: Intact. Sacrum pink/ red, blanching.     Labs noted.    Fall precautions in place: Bed locked in lowest position, Upper bed rails up, treaded socks in place, personal belongings within reach, call light within reach, appropriate mobility signs in place, - bed alarm. Pt calls appropriately.     Pt updated on POC.   "

## 2020-05-29 NOTE — DISCHARGE INSTRUCTIONS
Discharge Instructions    Discharged to home by car with self. Discharged via wheelchair, hospital escort: Yes.  Special equipment needed: Not Applicable    Be sure to schedule a follow-up appointment with your primary care doctor or any specialists as instructed.     Discharge Plan:   Diet Plan: Discussed  Activity Level: Discussed  Smoking Cessation Offered: Patient Counseled  Confirmed Follow up Appointment: Patient to Call and Schedule Appointment  Confirmed Symptoms Management: Discussed  Medication Reconciliation Updated: Yes    I understand that a diet low in cholesterol, fat, and sodium is recommended for good health. Unless I have been given specific instructions below for another diet, I accept this instruction as my diet prescription.       Gastrointestinal Bleeding  Introduction  Gastrointestinal bleeding is bleeding somewhere along the path food travels through the body (digestive tract). This path is anywhere between the mouth and the opening of the butt (anus). You may have blood in your poop (stools) or have black poop. If you throw up (vomit), there may be blood in it.  This condition can be mild, serious, or even life-threatening. If you have a lot of bleeding, you may need to stay in the hospital.  Follow these instructions at home:  · Take over-the-counter and prescription medicines only as told by your doctor.  · Eat foods that have a lot of fiber in them. These foods include whole grains, fruits, and vegetables. You can also try eating 1-3 prunes each day.  · Drink enough fluid to keep your pee (urine) clear or pale yellow.  · Keep all follow-up visits as told by your doctor. This is important.  Contact a doctor if:  · Your symptoms do not get better.  Get help right away if:  · Your bleeding gets worse.  · You feel dizzy or you pass out (faint).  · You feel weak.  · You have very bad cramps in your back or belly (abdomen).  · You pass large clumps of blood (clots) in your poop.  · Your  symptoms are getting worse.  This information is not intended to replace advice given to you by your health care provider. Make sure you discuss any questions you have with your health care provider.  Document Released: 09/26/2009 Document Revised: 05/25/2017 Document Reviewed: 06/06/2016  © 2017 [employer]        Transesophageal Echocardiogram  Transesophageal echocardiography (JAYLYN) is a picture test of your heart using sound waves. The pictures taken can give very detailed pictures of your heart. This can help your doctor see if there are problems with your heart. JAYLYN can check:  · If your heart has blood clots in it.  · How well your heart valves are working.  · If you have an infection on the inside of your heart.  · Some of the major arteries of your heart.  · If your heart valve is working after a repair.  · Your heart before a procedure that uses a shock to your heart to get the rhythm back to normal.  What happens before the procedure?  · Do not eat or drink for 6 hours before the procedure or as told by your doctor.  · Make plans to have someone drive you home after the procedure. Do not drive yourself home.  · An IV tube will be put in your arm.  What happens during the procedure?  · You will be given a medicine to help you relax (sedative). It will be given through the IV tube.  · A numbing medicine will be sprayed or gargled in the back of your throat to help numb it.  · The tip of the probe is placed into the back of your mouth. You will be asked to swallow. This helps to pass the probe into your esophagus.  · Once the tip of the probe is in the right place, your doctor can take pictures of your heart.  · You may feel pressure at the back of your throat.  What happens after the procedure?  · You will be taken to a recovery area so the sedative can wear off.  · Your throat may be sore and scratchy. This will go away slowly over time.  · You will go home when you are fully awake and able to swallow  liquids.  · You should have someone stay with you for the next 24 hours.  · Do not drive or operate machinery for the next 24 hours.  This information is not intended to replace advice given to you by your health care provider. Make sure you discuss any questions you have with your health care provider.  Document Released: 10/15/2010 Document Revised: 05/25/2017 Document Reviewed: 06/19/2014  [employer] Interactive Patient Education © 2017 [employer]          Endocarditis  Endocarditis is an infection of the inner layer of the heart (endocardium) or an infection of the heart valves. Endocarditis can cause growths inside the heart or on the heart valves. Over time, these growths can destroy heart tissue and cause heart failure or problems with heart rhythm. They can also cause stroke if they break away and form a blood clot in the brain. Early treatment offers the best chance for curing endocarditis and preventing complications.  What are the causes?  This condition may be caused by:  · Germs that normally live in or on your body. The germs that most commonly cause endocarditis are bacteria.  · A fungus.  What increases the risk?  This condition is more likely to develop in people who have:  · A heart defect.  · Artificial (prosthetic) heart valves.  · An abnormal or damaged heart valve.  · A history of endocarditis.  Having certain procedures may also increase the risk of germs getting into the heart or bloodstream.  What are the signs or symptoms?  Symptoms of this condition may start suddenly, or they may start slowly and gradually get worse.  Symptoms include:  · Fever.  · Chills.  · Night sweats.  · Muscle aches.  · Fatigue.  · Weakness.  · Shortness of breath.  · Chest pain.  · Blood spots in the eyes.  · Bleeding under the fingernails or toenails.  · Painless red spots on the palms.  · Painful lumps in the fingertips or toes.  · Swelling in the feet or ankles.  How is this diagnosed?  This condition may be  diagnosed based on:  · A physical exam. Your health care provider will listen to your heart to check for abnormal heart sounds (murmur). He or she may also use a scope to check for bleeding at the back of your eyes (retinas).  · Tests. They may include:  ¨ Blood tests to look for the germs that cause endocarditis.  ¨ Imaging tests. A chest x-ray, CT scan, or echocardiogram may be used to create an image of your heart. A type of echocardiogram called a transesophageal echocardiogram may be done to look at certain heart valves more closely.  How is this treated?  Treatment for this condition depends on the cause of the endocarditis. Treatment may include:  · Antibiotic medicines. These may be given through a tube into one of your veins (IV antibiotics) or taken by mouth. You may need to be on more than one antibiotic medicine.  · Surgery to replace your heart valve. You may need surgery if:  ¨ The endocarditis does not respond to treatment.  ¨ You develop complications.  ¨ Your heart valve is severely damaged.  Follow these instructions at home:  Medicines  · Take over-the-counter and prescription medicines only as told by your health care provider.  · If you were prescribed an antibiotic medicine, take it as told by your health care provider. Do not stop taking the antibiotic even if you start to feel better. You may need to be on intravenous antibiotics for several weeks.  · Do not use IV drugs unless it is part of your medical treatment.  Lifestyle  · Do not get tattoos or body piercings.  · Practice good oral hygiene. This includes:  ¨ Brushing and flossing regularly.  ¨ Scheduling routine dental appointments.  · Do not use any products that contain nicotine or tobacco, such as cigarettes and e-cigarettes. If you need help quitting, ask your health care provider.  · Limit alcohol intake to no more than 1 drink a day for nonpregnant women and 2 drinks a day for men. One drink equals 12 oz of beer, 5 oz of wine, or  1½ oz of hard liquor.  General instructions  · Let your health care provider know before you have any dental or surgical procedures. You may need to take antibiotics before the procedure.  · Tell all of your health care providers, including your dentist, that you have had endocarditis.  · Gradually resume your usual activities.  · Keep all follow-up visits as told by your health care provider. This is important.  Contact a health care provider if:  · You have a fever.  · Your symptoms do not improve.  · Your symptoms get worse.  · Your symptoms come back.  Get help right away if:  · You have trouble breathing.  · You have chest pain.  · You have symptoms of a stroke. These include:  ¨ Sudden weakness.  ¨ Numbness.  ¨ Confusion.  ¨ Trouble talking or understanding.  ¨ A severe headache.  Summary  · Endocarditis is an infection of the inner layer of the heart (endocardium) or heart valves. It is caused by bacteria or a fungus.  · Having certain heart conditions or procedures may increase the risk of endocarditis.  · Antibiotics are an important treatment for endocarditis. Take these medicines as told by your health care provider. Do not stop taking them even if you start to feel better.  · Tell all of your health care providers, including your dentist, that you have had endocarditis.  This information is not intended to replace advice given to you by your health care provider. Make sure you discuss any questions you have with your health care provider.  Document Released: 12/18/2006 Document Revised: 09/29/2017 Document Reviewed: 09/29/2017  [employer] Interactive Patient Education © 2017 [employer]        Depression / Suicide Risk    As you are discharged from this [facility] facility, it is important to learn how to keep safe from harming yourself.    Recognize the warning signs:  · Abrupt changes in personality, positive or negative- including increase in energy   · Giving away possessions  · Change in eating  patterns- significant weight changes-  positive or negative  · Change in sleeping patterns- unable to sleep or sleeping all the time   · Unwillingness or inability to communicate  · Depression  · Unusual sadness, discouragement and loneliness  · Talk of wanting to die  · Neglect of personal appearance   · Rebelliousness- reckless behavior  · Withdrawal from people/activities they love  · Confusion- inability to concentrate     If you or a loved one observes any of these behaviors or has concerns about self-harm, here's what you can do:  · Talk about it- your feelings and reasons for harming yourself  · Remove any means that you might use to hurt yourself (examples: pills, rope, extension cords, firearm)  · Get professional help from the community (Mental Health, Substance Abuse, psychological counseling)  · Do not be alone:Call your Safe Contact- someone whom you trust who will be there for you.  · Call your local CRISIS HOTLINE 563-7750 or 362-371-1444  · Call your local Children's Mobile Crisis Response Team [place] (336) 238-9050 or www.Bancha  · Call the toll free National Suicide Prevention Hotlines   · National Suicide Prevention Lifeline 380-588-IIGA (1313)  · National Hope Line Network 800-SUICIDE (297-0893)

## 2020-05-30 NOTE — DISCHARGE SUMMARY
Discharge Summary    Date of Admission: 5/19/2020  Date of Discharge: No discharge date for patient encounter.  Discharging Attending: Dr.Amanjot Bates  Discharging Senior Resident: Dr. Grewal   Discharging Intern: Dr. Vilchis    CHIEF COMPLAINT ON ADMISSION  Chief Complaint   Patient presents with   • Abdominal Pain       Reason for Admission  Abdominal Pain   Hematochezia   Acute kidney failure     Secondary diagnosis  Essential hypertension  Methamphetamine abuse   History of Bacterial endocarditis s/p TAVR    Admission Date  5/19/2020    CODE STATUS  Full Code    HPI & HOSPITAL COURSE  This is a 64 y.o. 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  He presented to the ED on 5/19/2020 due to abdominal pain that started a day prior to presentation.  He reported lower medial abdominal pain, hematochezia, noncompliance with chronic suppressive penicillin therapy for history of Rothia mucilaginosa endocarditis.  On presentation patient was hypothermic to 93.7 °F and lethargic. He was observed to have hematochezia in ED with lactic acid of 4.7, acute renal failure with creatinine of 1.5.  Patient was admitted in ICU for close monitoring with the concern for possible ischemic bowel given his history of endocarditis with embolic phenomenon. Patient was started on IV fluids and pain management.  CTA thoracoabdominal aorta was done which showed possible thrombus in distal ventral abdominal aorta. Vascular surgery was consulted who did not feel patient had any occlusion or thrombus on imaging and recommended no acute interventions.  Patient was transferred to the floor as he showed improvement. lactic acidosis and renal function improved . Blood  cultures drawn on presentation showed no growth. Patient's abdominal pain resolved. Echocardiogram was done on 5/21/2020 which showed increased size of the vegetations on the aortic prosthetic valve and decreased TAVR leaflet excursion compared to the previous echo done in 10/2019.  Infectious disease was consulted and a JAYLYN was done  on 5/27/20 which did not show evidence of vegetation or abscess. 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 Cardiology has discussed with CT surgery and they do not feel that he is a good surgical candidate given ongoing drug use.From Infectious disease standpoint, he has completed 6 weeks therapy with vancomycin and rifampin in 11/2019. No further antibiotics needed at present.    Counseling was provided to quit smoking and methamphetamine use.  Therefore, he is discharged in good and stable condition to the community/shelter temporarily. He has an apartment which would be available to him in a week .    The patient met 2-midnight criteria for an inpatient stay at the time of discharge.    Discharge Date  05/29/20    FOLLOW UP ITEMS POST DISCHARGE    Follow up with Outpatient vascular Surgery for aneurysm surveillance.  Follow up with primary care provider for blood pressure and medications management     Of note : Apparently patient was previously on lisinopril 40 mg , amlodipine 2.5 mg , metoprolol tartrate 25 BID , furosemide 20 mg and atorvastatin 40 mg ( as pee the central pharmacy records  )but no home medications documented on admission. Primary care provider to consider medication management. Currently blood pressure stable on lisinopril 5 mg and carvedilol 12.5 BID . Restarted statin therapy .      DISCHARGE DIAGNOSES  Principal Problem:    Bacterial endocarditis POA: Yes  Active Problems:    Lower abdominal pain POA: Unknown    Lactic acidosis POA: Unknown    HTN (hypertension) (Chronic) POA: Yes    Acute renal failure (ARF) (HCC) POA: Yes     History of methamphetamine use POA: Yes    S/P TAVR (transcatheter aortic valve replacement) POA: Yes    COPD (chronic obstructive pulmonary disease) (HCC) POA: Yes    AAA (abdominal aortic aneurysm) (HCC) POA: Yes  Resolved Problems:    * No resolved hospital problems. *      FOLLOW UP  No future appointments.  Eric Merino M.D.  [address]  [address]  [place] [address]  [phone]    Go in 1 month  You are scheduled for a hospital follow up for 06/24/20 at 10:45am. Checkin at 10:15am. Please wear a mask to your appointment. Thank you.     NEY Ledesma  [address]  [address]  [facility] [address]  [phone]    Call  Per office please call and schedule your hospital follow up. Thank you.     Schedule an appointment with your primary care provider in 1-2 weeks    MEDICATIONS ON DISCHARGE     Medication List      START taking these medications      Instructions   acetaminophen 325 MG Tabs  Commonly known as:  TYLENOL   Take 2 Tabs by mouth every 6 hours as needed (Mild Pain; (Pain scale 1-3); Temp greater than 100.5 F).  Dose:  650 mg     atorvastatin 40 MG Tabs  Commonly known as:  LIPITOR   Take 1 Tab by mouth every day.  Dose:  40 mg     carvedilol 12.5 MG Tabs  Commonly known as:  COREG   Take 1 Tab by mouth 2 times a day, with meals.  Dose:  12.5 mg     lisinopril 5 MG Tabs  Start taking on:  May 30, 2020  Commonly known as:  PRINIVIL   Take 1 Tab by mouth every day.  Dose:  5 mg            Allergies  No Known Allergies    DIET  Orders Placed This Encounter   Procedures   • Diet Order Regular     Standing Status:   Standing     Number of Occurrences:   1     Order Specific Question:   Diet:     Answer:   Regular [1]       ACTIVITY  As tolerated.  Weight bearing as tolerated    CONSULTATIONS  Vascular Surgery , Dr Merino   Infectious disease     PROCEDURES  Transthoracic echo   Transesophageal echo Dictation #1  MRN:[id number]  CSN:[id number]

## 2020-05-30 NOTE — PROGRESS NOTES
Discharging Patient home per physician order.  Discharged with self.  Demonstrated understanding of discharge instructions, follow up appointments, home medications, prescriptions, and nursing care for s/p GI bleed.  Ambulating without assistance, voiding without difficulty, pain well controlled, tolerating oral medications, oxygen saturation greater than 90%, tolerating diet.  Educational handouts were given and discussed.  Verbalized understanding of discharge instructions and educational handouts.  All questions answered.  Belongings with patient at time of discharge.

## 2020-05-30 NOTE — DISCHARGE SUMMARY
Discharge Summary    Date of Admission: 5/19/2020  Date of Discharge: No discharge date for patient encounter.  Discharging Attending: Dr.Amanjot Bates  Discharging Senior Resident: Dr. Grewal   Discharging Intern: Dr. Vilchis    CHIEF COMPLAINT ON ADMISSION  Chief Complaint   Patient presents with   • Abdominal Pain       Reason for Admission  Abdominal Pain   Hematochezia   Acute kidney failure     Secondary diagnosis  Essential hypertension  Methamphetamine abuse   History of Bacterial endocarditis s/p TAVR    Admission Date  5/19/2020    CODE STATUS  Full Code    HPI & HOSPITAL COURSE  This is a 64 y.o. 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  He presented to the ED on 5/19/2020 due to abdominal pain that started a day prior to presentation.  He reported lower medial abdominal pain, hematochezia, noncompliance with chronic suppressive penicillin therapy for history of Rothia mucilaginosa endocarditis.  On presentation patient was hypothermic to 93.7 °F and lethargic. He was observed to have hematochezia in ED with lactic acid of 4.7, acute renal failure with creatinine of 1.5.  Patient was admitted in ICU for close monitoring with the concern for possible ischemic bowel given his history of endocarditis with embolic phenomenon. Patient was started on IV fluids and pain management.  CTA thoracoabdominal aorta was done which showed possible thrombus in distal ventral abdominal aorta. Vascular surgery was consulted who did not feel patient had any occlusion or thrombus on imaging and recommended no acute interventions.  Patient was transferred to the floor as he showed improvement. lactic acidosis and renal function improved . Blood  cultures drawn on presentation showed no growth. Patient's abdominal pain resolved. Echocardiogram was done on 5/21/2020 which showed increased size of the vegetations on the aortic prosthetic valve and decreased TAVR leaflet excursion compared to the previous echo done in 10/2019.  Infectious disease was consulted and a JAYLYN was done  on 5/27/20 which did not show evidence of vegetation or abscess. 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 Cardiology has discussed with CT surgery and they do not feel that he is a good surgical candidate given ongoing drug use.From Infectious disease standpoint, he has completed 6 weeks therapy with vancomycin and rifampin in 11/2019. No further antibiotics needed at present.    Counseling was provided to quit smoking and methamphetamine use.  Therefore, he is discharged in good and stable condition to the community/shelter temporarily. He has an apartment which would be available to him in a week .    The patient met 2-midnight criteria for an inpatient stay at the time of discharge.    Discharge Date  05/29/20    FOLLOW UP ITEMS POST DISCHARGE    Follow up with Outpatient vascular Surgery for aneurysm surveillance.  Follow up with primary care provider for blood pressure and medications management     Of note : Apparently patient was previously on lisinopril 40 mg , amlodipine 2.5 mg , metoprolol tartrate 25 BID , furosemide 20 mg and atorvastatin 40 mg ( as pee the central pharmacy records  )but no home medications documented on admission. Primary care provider to consider medication management. Currently blood pressure stable on lisinopril 5 mg and carvedilol 12.5 BID . Restarted statin therapy .      DISCHARGE DIAGNOSES  Principal Problem:    Bacterial endocarditis POA: Yes  Active Problems:    Lower abdominal pain POA: Unknown    Lactic acidosis POA: Unknown    HTN (hypertension) (Chronic) POA: Yes    Acute renal failure (ARF) (HCC) POA: Yes     History of methamphetamine use POA: Yes    S/P TAVR (transcatheter aortic valve replacement) POA: Yes    COPD (chronic obstructive pulmonary disease) (HCC) POA: Yes    AAA (abdominal aortic aneurysm) (HCC) POA: Yes  Resolved Problems:    * No resolved hospital problems. *      FOLLOW UP  No future appointments.  Eric Merino M.D.  [address]  [address]  [place] [address]  [phone]    Go in 1 month  You are scheduled for a hospital follow up for 06/24/20 at 10:45am. Checkin at 10:15am. Please wear a mask to your appointment. Thank you.     NEY Ledesma  [address]  [address]  [facility] [address]  [phone]    Call  Per office please call and schedule your hospital follow up. Thank you.     Schedule an appointment with your primary care provider in 1-2 weeks    MEDICATIONS ON DISCHARGE     Medication List      START taking these medications      Instructions   acetaminophen 325 MG Tabs  Commonly known as:  TYLENOL   Take 2 Tabs by mouth every 6 hours as needed (Mild Pain; (Pain scale 1-3); Temp greater than 100.5 F).  Dose:  650 mg     atorvastatin 40 MG Tabs  Commonly known as:  LIPITOR   Take 1 Tab by mouth every day.  Dose:  40 mg     carvedilol 12.5 MG Tabs  Commonly known as:  COREG   Take 1 Tab by mouth 2 times a day, with meals.  Dose:  12.5 mg     lisinopril 5 MG Tabs  Start taking on:  May 30, 2020  Commonly known as:  PRINIVIL   Take 1 Tab by mouth every day.  Dose:  5 mg            Allergies  No Known Allergies    DIET  Orders Placed This Encounter   Procedures   • Diet Order Regular     Standing Status:   Standing     Number of Occurrences:   1     Order Specific Question:   Diet:     Answer:   Regular [1]       ACTIVITY  As tolerated.  Weight bearing as tolerated    CONSULTATIONS  Vascular Surgery , Dr Merino   Infectious disease     PROCEDURES  Transthoracic echo   Transesophageal echo Dictation #1  MRN:[id number]  CSN:[id number]

## 2020-06-01 NOTE — PROGRESS NOTES
Pt. Was d/c'ed to Shelter 5/29/20 and has no current contact information.   CHW Laurent will wait until 6/5/20 before d/cing Pt. From [facility] services.

## 2020-10-06 ENCOUNTER — APPOINTMENT (OUTPATIENT)
Dept: RADIOLOGY | Facility: MEDICAL CENTER | Age: 65
End: 2020-10-06
Attending: EMERGENCY MEDICINE
Payer: MEDICARE

## 2020-10-06 ENCOUNTER — HOSPITAL ENCOUNTER (EMERGENCY)
Facility: MEDICAL CENTER | Age: 65
End: 2020-10-06
Attending: EMERGENCY MEDICINE
Payer: MEDICARE

## 2020-10-06 VITALS
TEMPERATURE: 99.2 F | HEIGHT: 68 IN | RESPIRATION RATE: 19 BRPM | DIASTOLIC BLOOD PRESSURE: 73 MMHG | BODY MASS INDEX: 18.19 KG/M2 | OXYGEN SATURATION: 93 % | SYSTOLIC BLOOD PRESSURE: 154 MMHG | HEART RATE: 87 BPM | WEIGHT: 120 LBS

## 2020-10-06 DIAGNOSIS — S09.8XXA BLUNT HEAD TRAUMA, INITIAL ENCOUNTER: Primary | ICD-10-CM

## 2020-10-06 PROCEDURE — 99283 EMERGENCY DEPT VISIT LOW MDM: CPT | Mod: 25

## 2020-10-06 PROCEDURE — 70450 CT HEAD/BRAIN W/O DYE: CPT

## 2020-10-06 ASSESSMENT — ENCOUNTER SYMPTOMS
DIZZINESS: 0
WHEEZING: 0
VOMITING: 0
FEVER: 0
NECK PAIN: 0
HEADACHES: 1
NAUSEA: 0
FOCAL WEAKNESS: 0
BACK PAIN: 0
SENSORY CHANGE: 0
ABDOMINAL PAIN: 0
SORE THROAT: 0
COUGH: 0

## 2020-10-06 ASSESSMENT — FIBROSIS 4 INDEX: FIB4 SCORE: 1.29
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
## 2020-10-06 NOTE — ED NOTES
This RN reviewed AVS with patient, patient verbalized understanding. Patient stable upon discharge.
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
## 2020-10-15 ENCOUNTER — OFFICE VISIT (OUTPATIENT)
Dept: CARDIOLOGY | Facility: MEDICAL CENTER | Age: 65
End: 2020-10-15
Payer: MEDICARE

## 2020-10-15 VITALS
OXYGEN SATURATION: 90 % | WEIGHT: 116 LBS | HEIGHT: 67 IN | HEART RATE: 86 BPM | DIASTOLIC BLOOD PRESSURE: 68 MMHG | BODY MASS INDEX: 18.21 KG/M2 | RESPIRATION RATE: 14 BRPM | SYSTOLIC BLOOD PRESSURE: 108 MMHG

## 2020-10-15 DIAGNOSIS — Z95.2 S/P TAVR (TRANSCATHETER AORTIC VALVE REPLACEMENT): ICD-10-CM

## 2020-10-15 PROCEDURE — 99214 OFFICE O/P EST MOD 30 MIN: CPT | Performed by: INTERNAL MEDICINE

## 2020-10-15 RX ORDER — LISINOPRIL 40 MG/1
TABLET ORAL
COMMUNITY
Start: 2020-09-28

## 2020-10-15 ASSESSMENT — ENCOUNTER SYMPTOMS
GASTROINTESTINAL NEGATIVE: 1
CARDIOVASCULAR NEGATIVE: 1
FEVER: 0
DOUBLE VISION: 0
SHORTNESS OF BREATH: 0
WEIGHT LOSS: 0
HEADACHES: 0
ABDOMINAL PAIN: 0
PSYCHIATRIC NEGATIVE: 1
DIZZINESS: 0
WEAKNESS: 0
CONSTITUTIONAL NEGATIVE: 1
BRUISES/BLEEDS EASILY: 0
NEUROLOGICAL NEGATIVE: 1
RESPIRATORY NEGATIVE: 1
MUSCULOSKELETAL NEGATIVE: 1
NAUSEA: 0
DEPRESSION: 0
PALPITATIONS: 0
NERVOUS/ANXIOUS: 0
FOCAL WEAKNESS: 0
EYES NEGATIVE: 1
COUGH: 0
CLAUDICATION: 0
CHILLS: 0
BLURRED VISION: 0
MYALGIAS: 0
VOMITING: 0

## 2020-10-15 ASSESSMENT — FIBROSIS 4 INDEX: FIB4 SCORE: 1.29

## 2020-10-15 NOTE — PROGRESS NOTES
"Chief Complaint   Patient presents with   • Aortic Stenosis       Subjective:   Sylvain Espinosa is a 65 y.o. male who presents today for hospital follow up.    Since the discharge from [facility] on 05/29/20 for abdominal pain, he has been doing well. He denies fatigue, shortness of breath, dyspnea on exertion, chest pain, dizziness or syncope. He is under significant stress currently with the loss of his mother.    Past Medical History:   Diagnosis Date   • Abnormality of left ventricle of heart     \"constricted left ventricle\"    • Aortic stenosis    • BPH (benign prostatic hyperplasia)    • CVA (cerebral vascular accident) (HCC)    • Hepatitis C    • Hypercholesteremia    • Hypertension    • Myocardial infarction (HCC)      Past Surgical History:   Procedure Laterality Date   • JAYLYN  5/26/2020    Procedure: ECHOCARDIOGRAM, TRANSESOPHAGEAL;  Surgeon: Qasmi Love M.D.;  Location: SURGERY [facility];  Service: Cardiac   • TRANSCATHETER AORTIC VALVE REPLACEMENT N/A 3/18/2019    Procedure: REPLACEMENT, AORTIC VALVE, TRANSCATHETER;  Surgeon: Dariel Moser M.D.;  Location: SURGERY [facility];  Service: Cardiac   • JAYLYN  3/18/2019    Procedure: ECHOCARDIOGRAM, TRANSESOPHAGEAL;  Surgeon: Dariel Moser M.D.;  Location: SURGERY [facility];  Service: Cardiac   • TONSILLECTOMY     • ZZZ CARDIAC CATH       Family History   Problem Relation Age of Onset   • Heart Disease Mother      Social History     Socioeconomic History   • Marital status: Single     Spouse name: Not on file   • Number of children: Not on file   • Years of education: Not on file   • Highest education level: Not on file   Occupational History   • Not on file   Social Needs   • Financial resource strain: Not on file   • Food insecurity     Worry: Not on file     Inability: Not on file   • Transportation needs     Medical: Not on file     Non-medical: Not on file   Tobacco Use   • Smoking status: Current Some Day Smoker "     Packs/day: 0.20     Years: 40.00     Pack years: 8.00     Types: Cigarettes   • Smokeless tobacco: Never Used   Substance and Sexual Activity   • Alcohol use: Yes     Frequency: Monthly or less     Drinks per session: 1 or 2     Binge frequency: Never     Comment: occ   • Drug use: Yes     Types: Inhaled, Marijuana     Comment: marijuana, meth   • Sexual activity: Not on file   Lifestyle   • Physical activity     Days per week: Not on file     Minutes per session: Not on file   • Stress: Not on file   Relationships   • Social connections     Talks on phone: Not on file     Gets together: Not on file     Attends [employer] service: Not on file     Active member of club or organization: Not on file     Attends meetings of clubs or organizations: Not on file     Relationship status: Not on file   • Intimate partner violence     Fear of current or ex partner: Not on file     Emotionally abused: Not on file     Physically abused: Not on file     Forced sexual activity: Not on file   Other Topics Concern   • Not on file   Social History Narrative   • Not on file     No Known Allergies     (Medications reviewed.)  Outpatient Encounter Medications as of 10/15/2020   Medication Sig Dispense Refill   • lisinopril (PRINIVIL) 40 MG tablet      • acetaminophen (TYLENOL) 325 MG Tab Take 2 Tabs by mouth every 6 hours as needed (Mild Pain; (Pain scale 1-3); Temp greater than 100.5 F). 30 Tab 0   • carvedilol (COREG) 12.5 MG Tab Take 1 Tab by mouth 2 times a day, with meals. 60 Tab 0   • atorvastatin (LIPITOR) 40 MG Tab Take 1 Tab by mouth every day. 30 Tab 0   • [DISCONTINUED] lisinopril (PRINIVIL) 5 MG Tab Take 1 Tab by mouth every day. (Patient not taking: Reported on 10/15/2020) 30 Tab 0     No facility-administered encounter medications on file as of 10/15/2020.      Review of Systems   Constitutional: Negative.  Negative for chills, fever, malaise/fatigue and weight loss.   HENT: Negative.  Negative for hearing loss.   "  Eyes: Negative.  Negative for blurred vision and double vision.   Respiratory: Negative.  Negative for cough and shortness of breath.    Cardiovascular: Negative.  Negative for chest pain, palpitations, claudication and leg swelling.   Gastrointestinal: Negative.  Negative for abdominal pain, nausea and vomiting.   Genitourinary: Negative.  Negative for dysuria and urgency.   Musculoskeletal: Negative.  Negative for joint pain and myalgias.   Skin: Negative.  Negative for itching and rash.   Neurological: Negative.  Negative for dizziness, focal weakness, weakness and headaches.   Endo/Heme/Allergies: Negative.  Does not bruise/bleed easily.   Psychiatric/Behavioral: Negative.  Negative for depression. The patient is not nervous/anxious.         Objective:   /68 (BP Location: Left arm, Patient Position: Sitting, BP Cuff Size: Adult)   Pulse 86   Resp 14   Ht 1.702 m (5' 7\")   Wt 52.6 kg (116 lb)   SpO2 90%   BMI 18.17 kg/m²     Physical Exam   Constitutional: He is oriented to person, place, and time. He appears cachectic.   HENT:   Head: Normocephalic and atraumatic.   Eyes: EOM are normal.   Neck: No JVD present.   Cardiovascular: Normal rate, regular rhythm and normal heart sounds.   Pulmonary/Chest: Effort normal and breath sounds normal.   Abdominal: Soft. Bowel sounds are normal.   No hepatosplenomegaly.   Musculoskeletal: Normal range of motion.   Lymphadenopathy:     He has no cervical adenopathy.   Neurological: He is alert and oriented to person, place, and time.   Skin: Skin is warm and dry.   Psychiatric: He has a normal mood and affect.     CARDIAC STUDIES/PROCEDURES:     CARDIAC CATHETERIZATION CONCLUSIONS (01/01/19)  1.  Severe aortic stenosis with peak to peak gradient of 80 mmHg, mean   gradient of 61 mmHg, calculated TAYA of 0.48 cm2.  2.  No angiographic evidence of coronary artery disease.  3.  Mildly reduced left ventricular systolic function with ejection fraction of 55%.  4. "  Elevated left ventricular end diastolic pressure.  5.  Elevated right heart pressure with PA systolic pressure of 65 mmHg.  6.  Mildly dilated ascending aorta.  7.  Ectatic and diffusely atherosclerotic distal abdominal aorta with   bilateral iliofemoral system, moderate in caliber, bilateral nonobstructive   stenosis with tortuosity left greater than right.     CAROTID ULTRASOUND (01/03/19)  1.  Less than 50% stenosis in the right carotid bifurcation with no    evidence of internal carotid arterial stenosis.  2.  Normal left carotid duplex examination.  3.  Antegrade bilateral vertebral arterial flow.     CTA OF CHEST AND ABDOMEN (01/04/15)  1.  Mean aortic valvular area 584 sq mm  2.  The coronary arteries originate just greater than 10 mm above the aortic annulus  3.  Minimal diameter right external iliac artery 6.1 mm with minimal tortuosity  4.  Minimal diameter left external iliac artery 5.7 mm with moderate tortuosity  5.  3.7 x 3.5 cm infrarenal abdominal aortic aneurysm  6.  Hepatic fatty infiltration and moderate hepatomegaly  7.  2.6 cm benign cavernous hemangioma within the right lobe of the liver.    ECHOCARDIOGRAM CONCLUSIONS (05/21/20)  Hyperdynamic left ventricular systolic function. Evidence of increased   intracavity gradient, peak velocity is 4.3 m/sec.  The right ventricle was normal in size and function.  Severely dilated left atrium.  Thickened mitral valve leaflets.  Thickened aortic valve annulus.  Decreased TAVR leaflet excursion. Echodensity noted attached between   the TAVR valve and the anterior mitral valve annulus measuring 1.1 x   1.6 cm; margins not clearly defined. Transvalvular gradients are -   Peak: 45 mmHg,  Mean: 27 mmHg. Mild paravalvular leak is noted.  Estimated right ventricular systolic pressure  is 40 mmHg, midly elevated.  Estimated right atrial pressure is 3 mmHg, normal.  Compared to prior echo on 10/10/19, size of echogenic mass has   increased. Consider JAYLYN if  clinically warranted.  (study result reviewed)     ECHOCARDIOGRAM CONCLUSIONS (10/10/19)  Hyperdynamic left ventricular systolic function.  Left ventricular ejection fraction is visually estimated to be greater than 75%.  Severe concentric left ventricular hypertrophy.  Known TAVR aortic valve with normal leaflet excursion with increased   gradients most likely related to hyperdynamic flow.  Echodensity prosthetic valve leaflet 0.7 x 0.9 cm.  Compared to the images of the study done 3/29/2019 there is an   echodensity of the prosthetic valve leaflet seen on current study   however there is no comparable view on the previous study; increased   prosthetic valve gradients related to increased velocity due to change   in hyperdynamic left ventricular contractility.   (study result reviewed)    ECHOCARDIOGRAM CONCLUSIONS (03/19/19)  Left ventricular systolic function is low normal.  Moderate concentric left ventricular hypertrophy.  Severely dilated left atrium.  Known TAVR aortic valve that is functioning normally with normal   transvalvular gradients.  Transvalvular gradients are - Peak: 15 mmHg,  Mean: 9 mmHg.  Compared to the images of the study done 12/30/18 - there has been   interval placement of a bioprosthetic aortic valve, which is   functioning normally (previously severe aortic stenosis).   (study result reviewed)     ECHOCARDIOGRAM CONCLUSIONS (12/30/18)  Moderate concentric left ventricular hypertrophy.  Left ventricular systolic function is low normal.  Severe aortic stenosis.  Transvalvular gradients are - Peak: 98 mmHg, Mean: 56 mmHg.  Estimated right ventricular systolic pressure  is 55 mmHg.  Compared to the images of the study done on 7/19/17, previous severe   aortic stenosis is now critical aortic stenosis and previously normal   left ventricular ejection fraction is now mildly reduced.     EKG performed on (03/19/19) EKG shows sinus rhythm with left ventricular hypertrophy.  EKG performed on  (03/15/19) EKG shows sinus rhythm with left ventricular hypertrophy.  EKG performed on (12/29/18) EKG shows sinus tachycardia with Left ventricular hypertrophy.    Laboratory results of (05/28/20) were reviewed. Bun of 19 mg/dl, creatinine levels of 1.06 mg/dl noted.    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 No vegetations seen.  LV EF 65%.  Probably moderate concentric LVH.  TAVR well seated.  Normal gradient, under 3 m/s, no obvious stenosis.  Possible AV leaflet thickening but no obvious valve vegetation.  Probably moderate eccentric aortic insufficiency.  Aortic root appears abnormal, most likely aneurysm vs pseudoaneurysm,   less likely root abscess.  Ascending aorta normal size.  Correlation with prior CTA would be helpful.  Finding communicated to Dr. Vilchis at time of image aquisition.  (study result reviewed)    TRANSESOPHAGEAL ECHOCARDIOGRAM CONCLUSIONS by Dr. Funk (03/18/19)  Intraoperative JAYLYN during TAVR.  Baseline images show normal   biventricular systolic function. Left ventricular ejection fraction is   visually estimated to be 55%.  Calcified aortic valve leaflets. Severe   aortic stenosis. Severe aortic insufficiency. Post deployment images   show improved LV systolic function with EF closer to  45%.  A 26 mm   Nathan Lonnie 3 TAVR valve is seen in the aortic position with normal   leaflet motion, no significant paravalvular leak, mean gradient 4mm Hg,   and calculated effective orifice area of 3.2cm2.  Findings   communicated at the time of exam.    Assessment:     1. S/P TAVR (transcatheter aortic valve replacement)       Medical Decision Making:  Today's Assessment / Status / Plan:     1. 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 He is clinically doing well. He was not recommended antibiotics. He is not candidate for any further surgeries or interventions.   2. Methamphetamine abuse. Recommend lifestyle changes.    We will follow up with him PRN from TAVR standpoint and have him continue follow up with his primary cardiologist, Mitzi Trivedi.    CC Mitzi Trivedi and Jocelyne Paredes

## 2021-03-03 DIAGNOSIS — Z23 NEED FOR VACCINATION: ICD-10-CM

## 2021-04-24 NOTE — FACE TO FACE
"Face to Face Note  -  Durable Medical Equipment    Qasim Rodriguez M.D. - NPI: [id number]  I certify that this patient is under my care and that they had a durable medical equipment(DME)face to face encounter by myself that meets the physician DME face-to-face encounter requirements with this patient on:    Date of encounter:   Patient:                    MRN:                       YOB: 2019  Sylvain Espinosa  [id number]  1955     The encounter with the patient was in whole, or in part, for the following medical condition, which is the primary reason for durable medical equipment:  CHF    I certify that, based on my findings, the following durable medical equipment is medically necessary:  Oxygen.    HOME O2 Saturation Measurements:(Values must be present for Home Oxygen orders)  Room air sat at rest: 86  Room air sat with amb: 83  With liters of O2: 3, O2 sat at rest with O2: 96  With Liters of O2: 3, O2 sat with amb with O2 : 93  Is the patient mobile?: Yes    My Clinical findings support the need for the above equipment due to:  Hypoxia    Supporting Symptoms: The patient requires supplemental oxygen, as the following interventions have been tried with limited or no improvement: \"Ambulation with oximetry    " Doxycycline Counseling:  Patient counseled regarding possible photosensitivity and increased risk for sunburn.  Patient instructed to avoid sunlight, if possible.  When exposed to sunlight, patients should wear protective clothing, sunglasses, and sunscreen.  The patient was instructed to call the office immediately if the following severe adverse effects occur:  hearing changes, easy bruising/bleeding, severe headache, or vision changes.  The patient verbalized understanding of the proper use and possible adverse effects of doxycycline.  All of the patient's questions and concerns were addressed.

## 2023-11-12 NOTE — ASSESSMENT & PLAN NOTE
Improving  Continue Lasix  RT/O2 protocols  Limit sedatives  Mobilize/ambulate, encourage incentive spirometry       optho transfer

## 2024-04-07 NOTE — CONSULTS
DATE OF SERVICE:  01/03/2019    CARDIAC SURGERY CONSULTATION    REQUESTING PHYSICIAN:  Mitzi Lee MD    CONSULTING PHYSICIAN:  Otoniel Sanchez MD    REASON FOR CONSULTATION:  Consideration for aortic valve replacement.    CHIEF COMPLAINT:  Shortness of breath.    HISTORY OF PRESENT ILLNESS:  This is a 63-year-old man who was found to have   severe symptomatic aortic stenosis.  He presented on 12/29 complaining of   worsening shortness of breath that has gradually worsened over the past few   months.  He denies any other associated symptoms such as chest pain,   diaphoresis, paroxysmal nocturnal dyspnea, orthopnea or syncope.  His most   recent transthoracic echocardiogram on 12/30 showed severe aortic stenosis,   peak and mean transvalvular gradients of 98 and 66 mmHg respectively.  The   V-max was 12.94 meters per second.  There was trace mitral regurgitation.    Ejection fraction was estimated to be 50%.  He had a coronary angiogram on   01/01 that did not show any coronary artery disease.  His ejection fraction   was estimated at 55%.  He had peak and mean transvalvular gradients of 80 and   61 mmHg respectively.  The patient is an active methamphetamine user.    PAST MEDICAL HISTORY:  Hypertension, hyperlipidemia, BPH, and substance abuse   disorder.    PAST SURGICAL HISTORY:  None.    ALLERGIES:  No known drug allergies.    MEDICATIONS:  Tylenol, aspirin, Lipitor, Cardura, Vasotec, Motrin, and Zofran.    FAMILY HISTORY:  No family history of heart disease.    SOCIAL HISTORY:  The patient is an active methamphetamine user and also uses   marijuana.  He reportedly quit smoking cigarettes 2 months ago.    REVIEW OF SYSTEMS:  A complete 14-point review of systems was performed and is   negative except as noted in the HPI.  He notes history of shortness of breath   and denies any history of stroke or diabetes.    PHYSICAL EXAMINATION:  VITAL SIGNS:  Height 171 cm, weight 57 kilograms, temperature 37.3  degrees   Celsius, pulse 83, respiratory rate 17, saturating 95% on 1.5 liters nasal   cannula, blood pressure 99/64.  GENERAL:  He is an adult man, in no apparent distress.  He is skinny, but   appears adequately nourished.  HEENT:  Normocephalic, atraumatic.  His oral and nasal mucosae are moist.    Sclerae are anicteric.  Dentition is fair.  NECK:  There is no jugular venous distention.  There are no carotid bruits.    There is no cervical lymphadenopathy.  RESPIRATORY:  Moves air well bilaterally without the use of accessory   respiratory muscles.  Nasal cannula is in place.  CARDIOVASCULAR:  Normal rate, regular rhythm.  A 3/6 systolic murmur.  ABDOMEN:  Soft, nontender, nondistended.  There are no palpable masses.  EXTREMITIES:  Warm and well perfused.  There is no cyanosis or clubbing.    There is 1+ bilateral lower extremity edema.  SKIN:  Normal without rashes.  MUSCULOSKELETAL:  Joints within normal limits.  PSYCHIATRIC:  Mood and affect normal.  NEUROLOGIC:  He is alert and oriented x3.  There are no gross neurologic   deficits.    ASSESSMENT:  Severe aortic stenosis, acute on chronic left ventricular   systolic and diastolic failure, congestive heart failure secondary to valvular   heart disease, active methamphetamine abuse.    PLAN:  This is a 63-year-old man who presented with severe symptomatic aortic   stenosis.  The patient is an intermediate risk candidate for surgical aortic   valve replacement.  His STS mortality risk score is 2.5% and his morbidity and   mortality risk score is 11%.  However, his presurgical risk is probably in   the 3-5% range because of his active methamphetamine use.  Therefore, I think   he is a more appropriate candidate for a TAVR as opposed to open heart surgery   and I recommend proceeding with a workup for this.  The risks and benefits of   a TAVR were discussed with the patient.  He is in agreement to proceed.    Recommendations have been communicated to the patient's  cardiologist.    Thank you very much for allowing me taking care of this patient.  I will of   course keep you updated with his progress.       ____________________________________     MD SHENG Durbin / CARA    DD:  01/03/2019 15:18:43  DT:  01/03/2019 15:40:52    D#:  [id number]  Job#:  [id number]     no

## 2024-06-05 NOTE — TELEPHONE ENCOUNTER
Called and LM for [facility] to return my call to schedule a hospital follow up appointment for ptLuis E SINGLETARY   Using aldara qhs on nose for 3 weeks with extreme reaction x 2 weeks. Will take one week off and then do 2 more weeks. Will follow up as needed. Detail Level: Zone

## 2025-06-16 NOTE — PROGRESS NOTES
Cardiology Follow Up Progress Note    Date of Service  1/3/2019    Attending Physician  Radha Blackmon M.D.    Chief Complaint   Shortness of Breath    Cardiology consulted for critical aortic stenosis.    HPI  Sylvain Espinosa is a 63 y.o. male admitted 12/29/2018 with aortic stenosis, pulmonary edema , and heart failure. Patient was previously evaluated in cardiology clinic at which time it was moderate and was not seen in follow-up as he did not show for his stress test and appointments. Patient is an active methamphetamine user and had been using up to the time of his admission.     Past medical history of aortic stenosis, diastolic heart failure, methamphetamine abuse, benign prostatic hyperplasia (BPH), hyperlipidemia, hypertension, and former smoker.    Interim Events  1/3/19: Patient resting comfortably at bed denies complaints of chest pain, palpitations, shortness of breath, and/or lower extremity edema.  Telemetry monitor showed sinus rhythm with heart rate 70s-80s.  No significant events or rhythm changes overnight.     Review of Systems  Review of Systems   Constitutional: Negative for chills, diaphoresis, fatigue and fever.   Respiratory: Negative for cough, chest tightness, shortness of breath, wheezing and stridor.    Cardiovascular: Negative for chest pain, palpitations and leg swelling.   Gastrointestinal: Negative for abdominal pain.   Genitourinary: Negative for difficulty urinating and hematuria.   Skin: Negative for color change and rash.   Neurological: Negative for dizziness and light-headedness.     Vital signs in last 24 hours  Temp:  [36.3 °C (97.4 °F)-37.1 °C (98.7 °F)] 36.6 °C (97.9 °F)  Pulse:  [] 81  Resp:  [16-23] 19  BP: (112-129)/(61-71) 112/71    Physical Exam  Physical Exam   Constitutional: He is oriented to person, place, and time. He appears well-developed and well-nourished. No distress.   HENT:   Head: Normocephalic.   Eyes: Pupils are equal, round, and reactive to  Good Afternoon    I received a voicemail from health plan.  They did not leave a call back number.    I submitted what clinicals I had on patient and they are deeming not clinically necessary.    Did patient have colonoscopy at previous facility?      Did you call to schedule P2P?    Thank you    Kristan  [facility]    light.   Neck: No JVD present. No thyromegaly present.   Cardiovascular: Normal rate and regular rhythm.    Murmur heard.  Pulses:       Radial pulses are 2+ on the right side, and 2+ on the left side.        Dorsalis pedis pulses are 2+ on the right side, and 2+ on the left side.   Pulmonary/Chest: Effort normal. No respiratory distress. He has no wheezes. He has rhonchi in the right lower field and the left lower field. He has no rales. He exhibits no tenderness.   Abdominal: Soft. There is no tenderness.   Musculoskeletal: He exhibits no edema.   Neurological: He is alert and oriented to person, place, and time.   Skin: Skin is warm and dry. He is not diaphoretic. No erythema.   Psychiatric: He has a normal mood and affect. His behavior is normal. Judgment and thought content normal.   Nursing note reviewed.    Lab Review  Lab Results   Component Value Date/Time    WBC 5.1 01/03/2019 03:23 AM    RBC 4.68 (L) 01/03/2019 03:23 AM    HEMOGLOBIN 13.5 (L) 01/03/2019 03:23 AM    HEMATOCRIT 41.1 (L) 01/03/2019 03:23 AM    MCV 87.8 01/03/2019 03:23 AM    MCH 28.8 01/03/2019 03:23 AM    MCHC 32.8 (L) 01/03/2019 03:23 AM    MPV 9.8 01/03/2019 03:23 AM      Lab Results   Component Value Date/Time    SODIUM 135 01/03/2019 03:23 AM    POTASSIUM 4.4 01/03/2019 03:23 AM    CHLORIDE 96 01/03/2019 03:23 AM    CO2 35 (H) 01/03/2019 03:23 AM    GLUCOSE 90 01/03/2019 03:23 AM    BUN 31 (H) 01/03/2019 03:23 AM    CREATININE 1.08 01/03/2019 03:23 AM      Lab Results   Component Value Date/Time    ASTSGOT 19 01/01/2019 05:19 AM    ALTSGPT 25 01/01/2019 05:19 AM     Lab Results   Component Value Date/Time    CHOLSTRLTOT 129 01/15/2015 11:37 PM    LDL 74 01/15/2015 11:37 PM    HDL 30 (A) 01/15/2015 11:37 PM    TRIGLYCERIDE 124 01/15/2015 11:37 PM    TROPONINI 0.06 (H) 12/30/2018 03:55 PM           Cardiac Imaging and Procedures Review  EKG 12/29/2018:    SINUS TACHYCARDIA   PROBABLE LEFT ATRIAL ABNORMALITY   LEFT VENTRICULAR HYPERTROPHY    BORDERLINE PROLONGED QT INTERVAL   BASELINE WANDER IN LEAD(S) II,III,aVF   Compared to ECG 11/17/2017 10:50:09   ST (T wave) deviation now present   Sinus arrhythmia no longer present   No STEMI     Echocardiogram 12/30/2018:  Critical AS, Vmax 5 m/s, MG 56 mmHg, EF 50% (from prior 55%), RVSP 55 mmHg, severe LAE     Left Cardiac Catheterization 01/01/2019:  1.  Severe aortic stenosis with peak to peak gradient of 80 mmHg, mean   gradient of 61 mmHg, calculated TAYA of 0.48 cm2.  2.  No angiographic evidence of coronary artery disease.  3.  Mildly reduced left ventricular systolic function with ejection fraction   of 55%.  4.  Elevated left ventricular end diastolic pressure.  5.  Elevated right heart pressure with PA systolic pressure of 65 mmHg.  6.  Mildly dilated ascending aorta.  7.  Ectatic and diffusely atherosclerotic distal abdominal aorta with   bilateral iliofemoral system, moderate in caliber, bilateral nonobstructive   stenosis with tortuosity left greater than right.    Assessment/Plan  Severe Aortic Stenosis  - Critical AS per echo 12/30/18 with LV dysfunction.  - Cardiac catheterization on 1/1/19 showed no evidence of coronary artery disease, mildly reduced LVEF 55%, and severe AS with peak gradient of 80 mmHg, mean gradient of 61 mmHg, and calculated TAYA of 0.48 cm².  - Not a surgical candidate per CTS.  - Discussed if TAVR candidate while in hospital with MICHAEL Davidson- per Dr. Moser, patient needs to abstain from methamphetamines for at least 2 weeks.  - TAVR clinic will schedule outpatient appointment for evaluation 1/10/19.  - Patient has hx of missing appointments in clinic x3, patient will need to make this appointment or he will be discharged from clinic.     Acute diastolic congestive heart failure  - Secondary to AS, [facility] IV  - LVEF 50%  - Appears euvolemic.  - Net I/Os: -11.4L since admission (per pt report unsure, but per hx dry weight ~124 lbs; today weight 125 lbs).  -  BUN/Cr: 31/1.08, slowly trending up, will monitor.  - Switch to PO lasix tomorrow, from Lasix 20 mg IV BID to 20 mg PO daily.   - Continue aspirin 81 mg daily and Lipitor 40 mg daily.  - No beta-blocker at this time, will reevaluate after surgery.  - High risk for arrhythmias, severe LAE, continue to monitor.    Methamphetamine abuse  - Needs to abstain from meth use for TAVR evaluation. Discussed with patient at bedside, he has social support from his sister and mother who lives in [place] and may go home with them.    Hypertension  -Stable  -Continue Norvasc 2.5 mg daily.    Pulmonary Hypertension  - Moderate to severe, likely secondary to aortic stenosis and methamphetamine abuse.  - RVSP 55 mmHg  -Continue to diurese per above.    Hyperlipidemia  - HLP per history, LDL 74 (1/15/15)  - Continue Lipitor 20 mg daily.      Thank you for allowing me to participate in the care of this patient.  I will continue to follow this patient    Please contact me with any questions.    BRENDA Esquivel.   Cardiologist, [facility]  [phone]

## (undated) DEVICE — LACTATED RINGERS INJ 1000 ML - (14EA/CA 60CA/PF)

## (undated) DEVICE — SUCTION INSTRUMENT YANKAUER BULBOUS TIP W/O VENT (50EA/CA)

## (undated) DEVICE — CATHETER PIGTAIL 6FR 145 (5EA/BX)

## (undated) DEVICE — SET EXTENSION WITH 2 PORTS (48EA/CA) ***PART #2C8610 IS A SUBSTITUTE*****

## (undated) DEVICE — PROTECTOR ULNA NERVE - (36PR/CA)

## (undated) DEVICE — HEAD HOLDER JUNIOR/ADULT

## (undated) DEVICE — DERMABOND ADVANCED - (12EA/BX)

## (undated) DEVICE — BLANKET UNDERBODY FULL ACCES - (5/CA)

## (undated) DEVICE — SUTURE 4-0 VICRYL PLUS SH - UNDYED 27 INCH (36PK/BX)

## (undated) DEVICE — TUBE E-T HI-LO CUFF 7.0MM (10EA/PK)

## (undated) DEVICE — DRAPE MAYO STAND - (30/CA)

## (undated) DEVICE — SUTURE OHS

## (undated) DEVICE — KIT ANESTHESIA W/CIRCUIT & 3/LT BAG W/FILTER (20EA/CA)

## (undated) DEVICE — SENSOR SPO2 NEO LNCS ADHESIVE (20/BX) SEE USER NOTES

## (undated) DEVICE — KIT ROOM DECONTAMINATION

## (undated) DEVICE — CHLORAPREP 26 ML APPLICATOR - ORANGE TINT(25/CA)

## (undated) DEVICE — SOD. CHL. INJ. 0.9% 1000 ML - (14EA/CA 60CA/PF)

## (undated) DEVICE — WIRE GUIDE LUNDQST.035X180 - TSMG-35-180-4-LES ORDER BY BOX (5EA/BX)

## (undated) DEVICE — INTRODUCER CATHETER  DILATOR PROTRUDING 8FR 2.5CM (10EA/BX)

## (undated) DEVICE — GOWN WARMING STANDARD FLEX - (30/CA)

## (undated) DEVICE — ELECTRODE RADIOLUCNT SOLID GEL DEFIB PADS (12EA/CA)

## (undated) DEVICE — STOPCOCK 3-WAY W/SWIVEL LEVER LOCK (50EA/CA)

## (undated) DEVICE — SYS DLV COST CLS RM TEMP - INJECTATE (CO-SET II) (10EA/CA)

## (undated) DEVICE — CABLE TEMPORARY PACING

## (undated) DEVICE — BLADE SURGICAL #11 - (50/BX)

## (undated) DEVICE — TUBE CONNECT SUCTION CLEAR 120 X 1/4" (50EA/CA)"

## (undated) DEVICE — SLEEVE, VASO, THIGH, MED

## (undated) DEVICE — INTRODUCER SHEATH 6FR 2.5CM - DILATOR PROTRUDING (10/BX)

## (undated) DEVICE — GUIDEWIRE STARTER STRAIGHT FIXED CORE .035 150CM 4 STRAIGHT PTFE/HEPARIN COATED (10/BX)

## (undated) DEVICE — TRANSDUCER BIFURCATED MONITORING KIT (10EA/CA)

## (undated) DEVICE — NEEDLE PERCUTANEOUS THINWALL 7CM 18GA BSDN 18-7.0

## (undated) DEVICE — MICRODRIP PRIMARY VENTED 60 (48EA/CA) THIS WAS PART #2C8428 WHICH WAS DISCONTINUED

## (undated) DEVICE — GOWN SURGEONS X-LARGE - DISP. (30/CA)

## (undated) DEVICE — ELECTRODE 850 FOAM ADHESIVE - HYDROGEL RADIOTRNSPRNT (50/PK)

## (undated) DEVICE — TUBING CLEARLINK DUO-VENT - C-FLO (48EA/CA)

## (undated) DEVICE — MASK ANESTHESIA ADULT  - (100/CA)

## (undated) DEVICE — DRESSING TRANSPARENT FILM TEGADERM 4 X 4.75" (50EA/BX)"

## (undated) DEVICE — SUTURE 0 ETHIBOND MO6 C/R - (12/BX) 8-18 INCH ETHICON

## (undated) DEVICE — SUCTION INSTRUMENT YANKAUER OPEN TIP W/O VENT (50EA/CA)

## (undated) DEVICE — CATHETER 6FR AL1 100CM (5/BX)

## (undated) DEVICE — CONTAINER, SPECIMEN, STERILE

## (undated) DEVICE — STOPCOCK IV 400 PSI 3W ROT (50EA/BX)

## (undated) DEVICE — CANISTER SUCTION 3000ML MECHANICAL FILTER AUTO SHUTOFF MEDI-VAC NONSTERILE LF DISP  (40EA/CA)

## (undated) DEVICE — IV SET, NON-VENT PLUMB PUMP

## (undated) DEVICE — GLOVE BIOGEL INDICATOR SZ 8 SURGICAL PF LTX - (50/BX 4BX/CA)

## (undated) DEVICE — SET LEADWIRE 5 LEAD BEDSIDE DISPOSABLE ECG (1SET OF 5/EA)

## (undated) DEVICE — SET FLUID WARMING STANDARD FLOW - (10/CA)

## (undated) DEVICE — BAG DECANTER (50EA/CS)

## (undated) DEVICE — TUBING PRSS MNTR 72IN M/ M LL - (25/BX) MONIT. LINE W/MALE L/L

## (undated) DEVICE — GUIDEWIRE STARTER J CURVED FIXED CORE .035 260CM 10 3MM PTFE/HEPARIN COATED (5EA/BX)

## (undated) DEVICE — COVER LIGHT HANDLE FLEXIBLE - SOFT (2EA/PK 80PK/CA)

## (undated) DEVICE — DEVICE CLOSER PERCLOSE - (10/BX) PROGLIDE SYSTEM

## (undated) DEVICE — SET BIFURCATED BLOOD - (48EA/CS)

## (undated) DEVICE — COVER FOOT UNIVERSAL DISP. - (25EA/CA)

## (undated) DEVICE — ELECTRODE DUAL RETURN W/ CORD - (50/PK)

## (undated) DEVICE — WIRE GUIDE AES .035 260CM WITH 3MM J TIP"

## (undated) DEVICE — GLOVE BIOGEL SZ 7.5 SURGICAL PF LTX - (50PR/BX 4BX/CA)

## (undated) DEVICE — ELECTRODE DFBR ADLT 6.5X5IN (12PR/CA) *8900-4003 PART # FOR CA QTY. PART #8900-4004 IS EACH QTY

## (undated) DEVICE — BAG RESUSCITATION DISPOSABLE - WITH MASK (10 EA/CA)

## (undated) DEVICE — SODIUM CHL. INJ. 0.9% 500ML (24EA/CA 50CA/PF)

## (undated) DEVICE — KIT RADIAL ARTERY 20GA W/MAX BARRIER AND BIOPATCH  (5EA/CA) #10740 IS FOR THE SET RADIAL ARTERIAL

## (undated) DEVICE — DRAPE CLEAR W/ELASTIC BAND RAD CARM 40 X40" (20EA/CA)"

## (undated) DEVICE — PACK TAVR (3EA/CA)

## (undated) DEVICE — ARMBOARD  SMALL IV 9 INLONG - (25EA/CA)